# Patient Record
Sex: FEMALE | Race: WHITE | Employment: UNEMPLOYED | URBAN - METROPOLITAN AREA
[De-identification: names, ages, dates, MRNs, and addresses within clinical notes are randomized per-mention and may not be internally consistent; named-entity substitution may affect disease eponyms.]

---

## 2017-04-17 ENCOUNTER — HOSPITAL ENCOUNTER (EMERGENCY)
Age: 42
Discharge: HOME | End: 2017-04-17
Attending: EMERGENCY MEDICINE
Payer: MEDICAID

## 2017-04-17 VITALS
WEIGHT: 135 LBS | HEART RATE: 84 BPM | BODY MASS INDEX: 26.5 KG/M2 | TEMPERATURE: 98.1 F | DIASTOLIC BLOOD PRESSURE: 73 MMHG | SYSTOLIC BLOOD PRESSURE: 115 MMHG | HEIGHT: 60 IN | RESPIRATION RATE: 16 BRPM | OXYGEN SATURATION: 99 %

## 2017-04-17 DIAGNOSIS — L23.0 ALLERGIC CONTACT DERMATITIS DUE TO METALS: Primary | ICD-10-CM

## 2017-04-17 PROCEDURE — 99282 EMERGENCY DEPT VISIT SF MDM: CPT

## 2017-04-17 PROCEDURE — 6370000000 HC RX 637 (ALT 250 FOR IP): Performed by: PHYSICIAN ASSISTANT

## 2017-04-17 RX ORDER — PREDNISONE 20 MG/1
60 TABLET ORAL ONCE
Status: COMPLETED | OUTPATIENT
Start: 2017-04-17 | End: 2017-04-17

## 2017-04-17 RX ORDER — PREDNISONE 50 MG/1
50 TABLET ORAL DAILY
Qty: 4 TABLET | Refills: 0 | Status: ON HOLD | OUTPATIENT
Start: 2017-04-17 | End: 2017-04-29 | Stop reason: HOSPADM

## 2017-04-17 RX ORDER — DIPHENHYDRAMINE HCL 25 MG
25 CAPSULE ORAL EVERY 4 HOURS PRN
Qty: 15 CAPSULE | Refills: 0 | Status: ON HOLD | OUTPATIENT
Start: 2017-04-17 | End: 2017-04-29 | Stop reason: HOSPADM

## 2017-04-17 RX ADMIN — PREDNISONE 60 MG: 20 TABLET ORAL at 12:18

## 2017-04-25 ENCOUNTER — HOSPITAL ENCOUNTER (INPATIENT)
Age: 42
LOS: 4 days | Discharge: HOME | DRG: 720 | End: 2017-04-29
Attending: EMERGENCY MEDICINE | Admitting: INTERNAL MEDICINE
Payer: MEDICAID

## 2017-04-25 ENCOUNTER — APPOINTMENT (OUTPATIENT)
Dept: GENERAL RADIOLOGY | Age: 42
DRG: 720 | End: 2017-04-25
Payer: MEDICAID

## 2017-04-25 DIAGNOSIS — J18.9 PNEUMONIA DUE TO ORGANISM: Primary | ICD-10-CM

## 2017-04-25 DIAGNOSIS — R09.02 HYPOXEMIA: ICD-10-CM

## 2017-04-25 DIAGNOSIS — R50.9 FEVER, UNSPECIFIED FEVER CAUSE: ICD-10-CM

## 2017-04-25 PROBLEM — F17.200 SMOKER UNMOTIVATED TO QUIT: Status: ACTIVE | Noted: 2017-04-25

## 2017-04-25 PROBLEM — Z98.890 HISTORY OF GASTRIC SURGERY: Status: ACTIVE | Noted: 2017-04-25

## 2017-04-25 PROBLEM — J45.909 ASTHMA: Status: ACTIVE | Noted: 2017-04-25

## 2017-04-25 LAB
-: NORMAL
ABSOLUTE EOS #: 0.1 K/UL (ref 0–0.4)
ABSOLUTE LYMPH #: 1.3 K/UL (ref 1–4.8)
ABSOLUTE MONO #: 1.5 K/UL (ref 0.1–1.3)
ALBUMIN SERPL-MCNC: 3.4 G/DL (ref 3.5–5.2)
ALBUMIN/GLOBULIN RATIO: ABNORMAL (ref 1–2.5)
ALP BLD-CCNC: 69 U/L (ref 35–104)
ALT SERPL-CCNC: 19 U/L (ref 5–33)
AMORPHOUS: NORMAL
AMPHETAMINE SCREEN URINE: NEGATIVE
ANION GAP SERPL CALCULATED.3IONS-SCNC: 14 MMOL/L (ref 9–17)
AST SERPL-CCNC: 29 U/L
BACTERIA: NORMAL
BARBITURATE SCREEN URINE: NEGATIVE
BASOPHILS # BLD: 0 %
BASOPHILS ABSOLUTE: 0 K/UL (ref 0–0.2)
BENZODIAZEPINE SCREEN, URINE: NEGATIVE
BILIRUB SERPL-MCNC: 0.83 MG/DL (ref 0.3–1.2)
BILIRUBIN DIRECT: 0.27 MG/DL
BILIRUBIN URINE: NEGATIVE
BILIRUBIN, INDIRECT: 0.56 MG/DL (ref 0–1)
BUN BLDV-MCNC: 4 MG/DL (ref 6–20)
BUN/CREAT BLD: ABNORMAL (ref 9–20)
BUPRENORPHINE URINE: ABNORMAL
CALCIUM SERPL-MCNC: 8.5 MG/DL (ref 8.6–10.4)
CANNABINOID SCREEN URINE: NEGATIVE
CASTS UA: NORMAL /LPF
CHLORIDE BLD-SCNC: 97 MMOL/L (ref 98–107)
CO2: 25 MMOL/L (ref 20–31)
COCAINE METABOLITE, URINE: NEGATIVE
COLOR: YELLOW
COMMENT UA: ABNORMAL
CREAT SERPL-MCNC: 0.67 MG/DL (ref 0.5–0.9)
CRYSTALS, UA: NORMAL /HPF
DIFFERENTIAL TYPE: ABNORMAL
EOSINOPHILS RELATIVE PERCENT: 1 %
EPITHELIAL CELLS UA: NORMAL /HPF
GFR AFRICAN AMERICAN: >60 ML/MIN
GFR NON-AFRICAN AMERICAN: >60 ML/MIN
GFR SERPL CREATININE-BSD FRML MDRD: ABNORMAL ML/MIN/{1.73_M2}
GFR SERPL CREATININE-BSD FRML MDRD: ABNORMAL ML/MIN/{1.73_M2}
GLOBULIN: ABNORMAL G/DL (ref 1.5–3.8)
GLUCOSE BLD-MCNC: 102 MG/DL (ref 70–99)
GLUCOSE URINE: NEGATIVE
HCG QUALITATIVE: NEGATIVE
HCT VFR BLD CALC: 39.8 % (ref 36–46)
HEMOGLOBIN: 13.3 G/DL (ref 12–16)
KETONES, URINE: NEGATIVE
LACTIC ACID: 1.7 MMOL/L (ref 0.5–2.2)
LEUKOCYTE ESTERASE, URINE: NEGATIVE
LYMPHOCYTES # BLD: 12 %
MCH RBC QN AUTO: 27.8 PG (ref 26–34)
MCHC RBC AUTO-ENTMCNC: 33.4 G/DL (ref 31–37)
MCV RBC AUTO: 83.1 FL (ref 80–100)
MDMA URINE: ABNORMAL
METHADONE SCREEN, URINE: NEGATIVE
METHAMPHETAMINE, URINE: ABNORMAL
MONOCYTES # BLD: 14 %
MUCUS: NORMAL
NITRITE, URINE: NEGATIVE
OPIATES, URINE: POSITIVE
OTHER OBSERVATIONS UA: NORMAL
OXYCODONE SCREEN URINE: NEGATIVE
PDW BLD-RTO: 16.9 % (ref 11.5–14.9)
PH UA: 8 (ref 5–8)
PHENCYCLIDINE, URINE: NEGATIVE
PLATELET # BLD: 265 K/UL (ref 150–450)
PLATELET ESTIMATE: ABNORMAL
PMV BLD AUTO: 7.4 FL (ref 6–12)
POTASSIUM SERPL-SCNC: 3.1 MMOL/L (ref 3.7–5.3)
PROPOXYPHENE, URINE: ABNORMAL
PROTEIN UA: NEGATIVE
RBC # BLD: 4.78 M/UL (ref 4–5.2)
RBC # BLD: ABNORMAL 10*6/UL
RBC UA: NORMAL /HPF
RENAL EPITHELIAL, UA: NORMAL /HPF
SEG NEUTROPHILS: 73 %
SEGMENTED NEUTROPHILS ABSOLUTE COUNT: 8.1 K/UL (ref 1.3–9.1)
SODIUM BLD-SCNC: 136 MMOL/L (ref 135–144)
SPECIFIC GRAVITY UA: 1.01 (ref 1–1.03)
TEST INFORMATION: ABNORMAL
TOTAL PROTEIN: 7.4 G/DL (ref 6.4–8.3)
TRICHOMONAS: NORMAL
TRICYCLIC ANTIDEPRESSANTS, UR: ABNORMAL
TROPONIN INTERP: NORMAL
TROPONIN T: <0.03 NG/ML
TURBIDITY: CLEAR
URINE HGB: NEGATIVE
UROBILINOGEN, URINE: ABNORMAL
WBC # BLD: 11.1 K/UL (ref 3.5–11)
WBC # BLD: ABNORMAL 10*3/UL
WBC UA: NORMAL /HPF
YEAST: NORMAL

## 2017-04-25 PROCEDURE — 80307 DRUG TEST PRSMV CHEM ANLYZR: CPT

## 2017-04-25 PROCEDURE — 2580000003 HC RX 258: Performed by: EMERGENCY MEDICINE

## 2017-04-25 PROCEDURE — 6360000002 HC RX W HCPCS: Performed by: EMERGENCY MEDICINE

## 2017-04-25 PROCEDURE — 94761 N-INVAS EAR/PLS OXIMETRY MLT: CPT

## 2017-04-25 PROCEDURE — 94640 AIRWAY INHALATION TREATMENT: CPT

## 2017-04-25 PROCEDURE — 80048 BASIC METABOLIC PNL TOTAL CA: CPT

## 2017-04-25 PROCEDURE — 84703 CHORIONIC GONADOTROPIN ASSAY: CPT

## 2017-04-25 PROCEDURE — 6370000000 HC RX 637 (ALT 250 FOR IP): Performed by: HOSPITALIST

## 2017-04-25 PROCEDURE — 93005 ELECTROCARDIOGRAM TRACING: CPT

## 2017-04-25 PROCEDURE — 87040 BLOOD CULTURE FOR BACTERIA: CPT

## 2017-04-25 PROCEDURE — 6360000002 HC RX W HCPCS: Performed by: HOSPITALIST

## 2017-04-25 PROCEDURE — 71010 XR CHEST PORTABLE: CPT

## 2017-04-25 PROCEDURE — 99223 1ST HOSP IP/OBS HIGH 75: CPT | Performed by: INTERNAL MEDICINE

## 2017-04-25 PROCEDURE — 84484 ASSAY OF TROPONIN QUANT: CPT

## 2017-04-25 PROCEDURE — 94664 DEMO&/EVAL PT USE INHALER: CPT

## 2017-04-25 PROCEDURE — 2060000000 HC ICU INTERMEDIATE R&B

## 2017-04-25 PROCEDURE — 36415 COLL VENOUS BLD VENIPUNCTURE: CPT

## 2017-04-25 PROCEDURE — 85025 COMPLETE CBC W/AUTO DIFF WBC: CPT

## 2017-04-25 PROCEDURE — 2580000003 HC RX 258: Performed by: HOSPITALIST

## 2017-04-25 PROCEDURE — 83605 ASSAY OF LACTIC ACID: CPT

## 2017-04-25 PROCEDURE — 80076 HEPATIC FUNCTION PANEL: CPT

## 2017-04-25 PROCEDURE — 6370000000 HC RX 637 (ALT 250 FOR IP): Performed by: EMERGENCY MEDICINE

## 2017-04-25 PROCEDURE — 81001 URINALYSIS AUTO W/SCOPE: CPT

## 2017-04-25 PROCEDURE — 87449 NOS EACH ORGANISM AG IA: CPT

## 2017-04-25 PROCEDURE — 99285 EMERGENCY DEPT VISIT HI MDM: CPT

## 2017-04-25 RX ORDER — SODIUM CHLORIDE 0.9 % (FLUSH) 0.9 %
10 SYRINGE (ML) INJECTION PRN
Status: DISCONTINUED | OUTPATIENT
Start: 2017-04-25 | End: 2017-04-29 | Stop reason: HOSPADM

## 2017-04-25 RX ORDER — LORAZEPAM 2 MG/ML
3 INJECTION INTRAMUSCULAR
Status: DISCONTINUED | OUTPATIENT
Start: 2017-04-25 | End: 2017-04-26

## 2017-04-25 RX ORDER — LORAZEPAM 1 MG/1
3 TABLET ORAL
Status: DISCONTINUED | OUTPATIENT
Start: 2017-04-25 | End: 2017-04-26

## 2017-04-25 RX ORDER — GABAPENTIN 300 MG/1
600 CAPSULE ORAL 3 TIMES DAILY
Status: DISCONTINUED | OUTPATIENT
Start: 2017-04-25 | End: 2017-04-26

## 2017-04-25 RX ORDER — IPRATROPIUM BROMIDE AND ALBUTEROL SULFATE 2.5; .5 MG/3ML; MG/3ML
1 SOLUTION RESPIRATORY (INHALATION)
Status: DISCONTINUED | OUTPATIENT
Start: 2017-04-25 | End: 2017-04-25

## 2017-04-25 RX ORDER — ACETAMINOPHEN 325 MG/1
650 TABLET ORAL EVERY 4 HOURS PRN
Status: DISCONTINUED | OUTPATIENT
Start: 2017-04-25 | End: 2017-04-25 | Stop reason: SDUPTHER

## 2017-04-25 RX ORDER — QUETIAPINE FUMARATE 200 MG/1
200 TABLET, FILM COATED ORAL NIGHTLY
Status: ON HOLD | COMMUNITY
End: 2017-05-14 | Stop reason: HOSPADM

## 2017-04-25 RX ORDER — IPRATROPIUM BROMIDE AND ALBUTEROL SULFATE 2.5; .5 MG/3ML; MG/3ML
1 SOLUTION RESPIRATORY (INHALATION) EVERY 4 HOURS PRN
Status: DISCONTINUED | OUTPATIENT
Start: 2017-04-25 | End: 2017-04-29 | Stop reason: HOSPADM

## 2017-04-25 RX ORDER — IPRATROPIUM BROMIDE AND ALBUTEROL SULFATE 2.5; .5 MG/3ML; MG/3ML
1 SOLUTION RESPIRATORY (INHALATION)
Status: DISCONTINUED | OUTPATIENT
Start: 2017-04-25 | End: 2017-04-26

## 2017-04-25 RX ORDER — LEVOFLOXACIN 5 MG/ML
750 INJECTION, SOLUTION INTRAVENOUS EVERY 24 HOURS
Status: DISCONTINUED | OUTPATIENT
Start: 2017-04-25 | End: 2017-04-25 | Stop reason: SDUPTHER

## 2017-04-25 RX ORDER — SODIUM CHLORIDE 0.9 % (FLUSH) 0.9 %
10 SYRINGE (ML) INJECTION EVERY 12 HOURS SCHEDULED
Status: DISCONTINUED | OUTPATIENT
Start: 2017-04-25 | End: 2017-04-29 | Stop reason: HOSPADM

## 2017-04-25 RX ORDER — DOCUSATE SODIUM 100 MG/1
100 CAPSULE, LIQUID FILLED ORAL 2 TIMES DAILY
Status: DISCONTINUED | OUTPATIENT
Start: 2017-04-25 | End: 2017-04-29 | Stop reason: HOSPADM

## 2017-04-25 RX ORDER — ONDANSETRON 2 MG/ML
4 INJECTION INTRAMUSCULAR; INTRAVENOUS EVERY 6 HOURS PRN
Status: DISCONTINUED | OUTPATIENT
Start: 2017-04-25 | End: 2017-04-29 | Stop reason: HOSPADM

## 2017-04-25 RX ORDER — ACETAMINOPHEN 325 MG/1
650 TABLET ORAL ONCE
Status: COMPLETED | OUTPATIENT
Start: 2017-04-25 | End: 2017-04-25

## 2017-04-25 RX ORDER — LORAZEPAM 2 MG/ML
1 INJECTION INTRAMUSCULAR
Status: DISCONTINUED | OUTPATIENT
Start: 2017-04-25 | End: 2017-04-26

## 2017-04-25 RX ORDER — SODIUM CHLORIDE 9 MG/ML
INJECTION, SOLUTION INTRAVENOUS CONTINUOUS
Status: CANCELLED | OUTPATIENT
Start: 2017-04-25

## 2017-04-25 RX ORDER — 0.9 % SODIUM CHLORIDE 0.9 %
30 INTRAVENOUS SOLUTION INTRAVENOUS ONCE
Status: COMPLETED | OUTPATIENT
Start: 2017-04-25 | End: 2017-04-25

## 2017-04-25 RX ORDER — LORAZEPAM 2 MG/ML
2 INJECTION INTRAMUSCULAR
Status: DISCONTINUED | OUTPATIENT
Start: 2017-04-25 | End: 2017-04-26

## 2017-04-25 RX ORDER — SODIUM CHLORIDE 9 MG/ML
INJECTION, SOLUTION INTRAVENOUS CONTINUOUS
Status: DISCONTINUED | OUTPATIENT
Start: 2017-04-25 | End: 2017-04-29 | Stop reason: HOSPADM

## 2017-04-25 RX ORDER — LORAZEPAM 1 MG/1
2 TABLET ORAL
Status: DISCONTINUED | OUTPATIENT
Start: 2017-04-25 | End: 2017-04-26

## 2017-04-25 RX ORDER — FLUOXETINE HYDROCHLORIDE 20 MG/1
60 CAPSULE ORAL DAILY
Status: DISCONTINUED | OUTPATIENT
Start: 2017-04-25 | End: 2017-04-29 | Stop reason: HOSPADM

## 2017-04-25 RX ORDER — POTASSIUM CHLORIDE 20 MEQ/1
40 TABLET, EXTENDED RELEASE ORAL ONCE
Status: COMPLETED | OUTPATIENT
Start: 2017-04-25 | End: 2017-04-25

## 2017-04-25 RX ORDER — ARIPIPRAZOLE 5 MG/1
5 TABLET ORAL ONCE
Status: DISCONTINUED | OUTPATIENT
Start: 2017-04-25 | End: 2017-04-29 | Stop reason: HOSPADM

## 2017-04-25 RX ORDER — ALBUTEROL SULFATE 2.5 MG/3ML
2.5 SOLUTION RESPIRATORY (INHALATION)
Status: DISCONTINUED | OUTPATIENT
Start: 2017-04-25 | End: 2017-04-26

## 2017-04-25 RX ORDER — 0.9 % SODIUM CHLORIDE 0.9 %
500 INTRAVENOUS SOLUTION INTRAVENOUS ONCE
Status: DISCONTINUED | OUTPATIENT
Start: 2017-04-25 | End: 2017-04-28

## 2017-04-25 RX ORDER — LORAZEPAM 2 MG/ML
4 INJECTION INTRAMUSCULAR
Status: DISCONTINUED | OUTPATIENT
Start: 2017-04-25 | End: 2017-04-26

## 2017-04-25 RX ORDER — TRAZODONE HYDROCHLORIDE 50 MG/1
100 TABLET ORAL NIGHTLY
Status: DISCONTINUED | OUTPATIENT
Start: 2017-04-25 | End: 2017-04-26

## 2017-04-25 RX ORDER — BUPROPION HYDROCHLORIDE 150 MG/1
150 TABLET, EXTENDED RELEASE ORAL 2 TIMES DAILY
Status: ON HOLD | COMMUNITY
End: 2017-05-14 | Stop reason: HOSPADM

## 2017-04-25 RX ORDER — PANTOPRAZOLE SODIUM 20 MG/1
20 TABLET, DELAYED RELEASE ORAL
Status: DISCONTINUED | OUTPATIENT
Start: 2017-04-26 | End: 2017-04-26

## 2017-04-25 RX ORDER — BISACODYL 10 MG
10 SUPPOSITORY, RECTAL RECTAL DAILY PRN
Status: DISCONTINUED | OUTPATIENT
Start: 2017-04-25 | End: 2017-04-29 | Stop reason: HOSPADM

## 2017-04-25 RX ORDER — VENLAFAXINE HYDROCHLORIDE 150 MG/1
225 CAPSULE, EXTENDED RELEASE ORAL DAILY
Status: ON HOLD | COMMUNITY
End: 2017-05-14 | Stop reason: HOSPADM

## 2017-04-25 RX ORDER — LORAZEPAM 1 MG/1
1 TABLET ORAL
Status: DISCONTINUED | OUTPATIENT
Start: 2017-04-25 | End: 2017-04-26

## 2017-04-25 RX ORDER — FOLIC ACID 1 MG/1
1 TABLET ORAL DAILY
Status: DISCONTINUED | OUTPATIENT
Start: 2017-04-25 | End: 2017-04-29 | Stop reason: HOSPADM

## 2017-04-25 RX ORDER — LEVOFLOXACIN 5 MG/ML
750 INJECTION, SOLUTION INTRAVENOUS EVERY 24 HOURS
Status: DISCONTINUED | OUTPATIENT
Start: 2017-04-25 | End: 2017-04-29 | Stop reason: HOSPADM

## 2017-04-25 RX ORDER — ACETAMINOPHEN 325 MG/1
650 TABLET ORAL EVERY 4 HOURS PRN
Status: DISCONTINUED | OUTPATIENT
Start: 2017-04-25 | End: 2017-04-29 | Stop reason: HOSPADM

## 2017-04-25 RX ORDER — LORAZEPAM 1 MG/1
4 TABLET ORAL
Status: DISCONTINUED | OUTPATIENT
Start: 2017-04-25 | End: 2017-04-26

## 2017-04-25 RX ADMIN — LORAZEPAM 3 MG: 2 INJECTION INTRAMUSCULAR; INTRAVENOUS at 23:41

## 2017-04-25 RX ADMIN — POTASSIUM CHLORIDE 40 MEQ: 20 TABLET, EXTENDED RELEASE ORAL at 12:21

## 2017-04-25 RX ADMIN — TRAZODONE HYDROCHLORIDE 100 MG: 50 TABLET ORAL at 21:48

## 2017-04-25 RX ADMIN — LORAZEPAM 3 MG: 1 TABLET ORAL at 16:13

## 2017-04-25 RX ADMIN — IPRATROPIUM BROMIDE AND ALBUTEROL SULFATE 1 AMPULE: .5; 3 SOLUTION RESPIRATORY (INHALATION) at 14:43

## 2017-04-25 RX ADMIN — LORAZEPAM 3 MG: 2 INJECTION INTRAMUSCULAR; INTRAVENOUS at 18:39

## 2017-04-25 RX ADMIN — IPRATROPIUM BROMIDE AND ALBUTEROL SULFATE 1 AMPULE: .5; 3 SOLUTION RESPIRATORY (INHALATION) at 11:38

## 2017-04-25 RX ADMIN — ACETAMINOPHEN 650 MG: 325 TABLET ORAL at 12:21

## 2017-04-25 RX ADMIN — IPRATROPIUM BROMIDE AND ALBUTEROL SULFATE 1 AMPULE: .5; 3 SOLUTION RESPIRATORY (INHALATION) at 17:20

## 2017-04-25 RX ADMIN — IPRATROPIUM BROMIDE AND ALBUTEROL SULFATE 1 AMPULE: .5; 3 SOLUTION RESPIRATORY (INHALATION) at 19:57

## 2017-04-25 RX ADMIN — LORAZEPAM 4 MG: 2 INJECTION INTRAMUSCULAR; INTRAVENOUS at 21:36

## 2017-04-25 RX ADMIN — IPRATROPIUM BROMIDE AND ALBUTEROL SULFATE 1 AMPULE: .5; 3 SOLUTION RESPIRATORY (INHALATION) at 13:01

## 2017-04-25 RX ADMIN — GABAPENTIN 600 MG: 300 CAPSULE ORAL at 21:47

## 2017-04-25 RX ADMIN — ACETAMINOPHEN 650 MG: 325 TABLET ORAL at 23:41

## 2017-04-25 RX ADMIN — LEVOFLOXACIN 750 MG: 5 INJECTION, SOLUTION INTRAVENOUS at 12:41

## 2017-04-25 RX ADMIN — SODIUM CHLORIDE: 9 INJECTION, SOLUTION INTRAVENOUS at 14:11

## 2017-04-25 RX ADMIN — SODIUM CHLORIDE 1701 ML: 9 INJECTION, SOLUTION INTRAVENOUS at 11:43

## 2017-04-25 RX ADMIN — LORAZEPAM 4 MG: 2 INJECTION INTRAMUSCULAR; INTRAVENOUS at 20:10

## 2017-04-25 RX ADMIN — SODIUM CHLORIDE: 9 INJECTION, SOLUTION INTRAVENOUS at 23:50

## 2017-04-25 RX ADMIN — CEFTRIAXONE SODIUM 1 G: 1 INJECTION, POWDER, FOR SOLUTION INTRAMUSCULAR; INTRAVENOUS at 12:21

## 2017-04-25 ASSESSMENT — ENCOUNTER SYMPTOMS
RHINORRHEA: 0
SHORTNESS OF BREATH: 1
EYE REDNESS: 0
CONSTIPATION: 1
ABDOMINAL PAIN: 0
COUGH: 1
BACK PAIN: 0
BLOOD IN STOOL: 0
PHOTOPHOBIA: 0
WHEEZING: 1
CHEST TIGHTNESS: 0
WHEEZING: 0
COLOR CHANGE: 0
SORE THROAT: 0
VOMITING: 0
NAUSEA: 0

## 2017-04-25 ASSESSMENT — PAIN SCALES - GENERAL
PAINLEVEL_OUTOF10: 0
PAINLEVEL_OUTOF10: 6

## 2017-04-26 ENCOUNTER — APPOINTMENT (OUTPATIENT)
Dept: GENERAL RADIOLOGY | Age: 42
DRG: 720 | End: 2017-04-26
Payer: MEDICAID

## 2017-04-26 ENCOUNTER — APPOINTMENT (OUTPATIENT)
Dept: INTERVENTIONAL RADIOLOGY/VASCULAR | Age: 42
DRG: 720 | End: 2017-04-26
Payer: MEDICAID

## 2017-04-26 LAB
ALLEN TEST: ABNORMAL
ALLEN TEST: ABNORMAL
ANION GAP SERPL CALCULATED.3IONS-SCNC: 12 MMOL/L (ref 9–17)
BUN BLDV-MCNC: 4 MG/DL (ref 6–20)
BUN/CREAT BLD: ABNORMAL (ref 9–20)
CALCIUM SERPL-MCNC: 7.9 MG/DL (ref 8.6–10.4)
CARBOXYHEMOGLOBIN: 2.2 % (ref 0–5)
CARBOXYHEMOGLOBIN: 2.6 % (ref 0–5)
CHLORIDE BLD-SCNC: 109 MMOL/L (ref 98–107)
CO2: 21 MMOL/L (ref 20–31)
CREAT SERPL-MCNC: 0.56 MG/DL (ref 0.5–0.9)
DIRECT EXAM: NORMAL
FIO2: ABNORMAL
FIO2: ABNORMAL
GFR AFRICAN AMERICAN: >60 ML/MIN
GFR NON-AFRICAN AMERICAN: >60 ML/MIN
GFR SERPL CREATININE-BSD FRML MDRD: ABNORMAL ML/MIN/{1.73_M2}
GFR SERPL CREATININE-BSD FRML MDRD: ABNORMAL ML/MIN/{1.73_M2}
GLUCOSE BLD-MCNC: 88 MG/DL (ref 70–99)
HCO3 ARTERIAL: 21.3 MMOL/L (ref 22–26)
HCO3 ARTERIAL: 22 MMOL/L (ref 22–26)
HCT VFR BLD CALC: 35.8 % (ref 36–46)
HEMOGLOBIN: 11.6 G/DL (ref 12–16)
LACTIC ACID, WHOLE BLOOD: NORMAL MMOL/L (ref 0.7–2.1)
LACTIC ACID: 0.8 MMOL/L (ref 0.5–2.2)
LACTIC ACID: 0.8 MMOL/L (ref 0.5–2.2)
Lab: NORMAL
MCH RBC QN AUTO: 26.9 PG (ref 26–34)
MCHC RBC AUTO-ENTMCNC: 32.4 G/DL (ref 31–37)
MCV RBC AUTO: 83 FL (ref 80–100)
METHEMOGLOBIN: 0.2 % (ref 0–1.9)
METHEMOGLOBIN: 0.3 % (ref 0–1.9)
MODE: ABNORMAL
MODE: ABNORMAL
NEGATIVE BASE EXCESS, ART: 1.7 MMOL/L (ref 0–2)
NEGATIVE BASE EXCESS, ART: 2.8 MMOL/L (ref 0–2)
NOTIFICATION TIME: ABNORMAL
NOTIFICATION TIME: ABNORMAL
NOTIFICATION: ABNORMAL
NOTIFICATION: ABNORMAL
O2 DEVICE/FLOW/%: ABNORMAL
O2 DEVICE/FLOW/%: ABNORMAL
O2 SAT, ARTERIAL: 88.5 % (ref 95–98)
O2 SAT, ARTERIAL: 93.7 % (ref 95–98)
OXYHEMOGLOBIN: ABNORMAL % (ref 95–98)
OXYHEMOGLOBIN: ABNORMAL % (ref 95–98)
PATIENT TEMP: 37
PATIENT TEMP: 37
PCO2 ARTERIAL: 30.5 MMHG (ref 35–45)
PCO2 ARTERIAL: 31.4 MMHG (ref 35–45)
PCO2, ART, TEMP ADJ: ABNORMAL (ref 35–45)
PCO2, ART, TEMP ADJ: ABNORMAL (ref 35–45)
PDW BLD-RTO: 16.6 % (ref 11.5–14.9)
PEEP/CPAP: ABNORMAL
PEEP/CPAP: ABNORMAL
PH ARTERIAL: 7.44 (ref 7.35–7.45)
PH ARTERIAL: 7.47 (ref 7.35–7.45)
PH, ART, TEMP ADJ: ABNORMAL (ref 7.35–7.45)
PH, ART, TEMP ADJ: ABNORMAL (ref 7.35–7.45)
PLATELET # BLD: 273 K/UL (ref 150–450)
PMV BLD AUTO: 7.4 FL (ref 6–12)
PO2 ARTERIAL: 54.3 MMHG (ref 80–100)
PO2 ARTERIAL: 70.7 MMHG (ref 80–100)
PO2, ART, TEMP ADJ: ABNORMAL MMHG (ref 80–100)
PO2, ART, TEMP ADJ: ABNORMAL MMHG (ref 80–100)
POSITIVE BASE EXCESS, ART: ABNORMAL MMOL/L (ref 0–2)
POSITIVE BASE EXCESS, ART: ABNORMAL MMOL/L (ref 0–2)
POTASSIUM SERPL-SCNC: 3.8 MMOL/L (ref 3.7–5.3)
PROCALCITONIN: 0.14 NG/ML
PSV: ABNORMAL
PSV: ABNORMAL
PT. POSITION: ABNORMAL
PT. POSITION: ABNORMAL
RBC # BLD: 4.31 M/UL (ref 4–5.2)
RESPIRATORY RATE: 38
RESPIRATORY RATE: 50
SAMPLE SITE: ABNORMAL
SAMPLE SITE: ABNORMAL
SET RATE: ABNORMAL
SET RATE: ABNORMAL
SODIUM BLD-SCNC: 142 MMOL/L (ref 135–144)
SPECIMEN DESCRIPTION: NORMAL
SPECIMEN DESCRIPTION: NORMAL
STATUS: NORMAL
TEXT FOR RESPIRATORY: ABNORMAL
TEXT FOR RESPIRATORY: ABNORMAL
TOTAL HB: ABNORMAL G/DL (ref 12–16)
TOTAL HB: ABNORMAL G/DL (ref 12–16)
TOTAL RATE: ABNORMAL
TOTAL RATE: ABNORMAL
VT: ABNORMAL
VT: ABNORMAL
WBC # BLD: 11.4 K/UL (ref 3.5–11)

## 2017-04-26 PROCEDURE — 80048 BASIC METABOLIC PNL TOTAL CA: CPT

## 2017-04-26 PROCEDURE — 36569 INSJ PICC 5 YR+ W/O IMAGING: CPT | Performed by: RADIOLOGY

## 2017-04-26 PROCEDURE — C1769 GUIDE WIRE: HCPCS

## 2017-04-26 PROCEDURE — 82805 BLOOD GASES W/O2 SATURATION: CPT

## 2017-04-26 PROCEDURE — 83605 ASSAY OF LACTIC ACID: CPT

## 2017-04-26 PROCEDURE — 71010 XR CHEST PORTABLE: CPT

## 2017-04-26 PROCEDURE — 6370000000 HC RX 637 (ALT 250 FOR IP): Performed by: INTERNAL MEDICINE

## 2017-04-26 PROCEDURE — 6360000002 HC RX W HCPCS: Performed by: HOSPITALIST

## 2017-04-26 PROCEDURE — 2000000000 HC ICU R&B

## 2017-04-26 PROCEDURE — 77001 FLUOROGUIDE FOR VEIN DEVICE: CPT | Performed by: RADIOLOGY

## 2017-04-26 PROCEDURE — 85027 COMPLETE CBC AUTOMATED: CPT

## 2017-04-26 PROCEDURE — 99291 CRITICAL CARE FIRST HOUR: CPT | Performed by: INTERNAL MEDICINE

## 2017-04-26 PROCEDURE — 94640 AIRWAY INHALATION TREATMENT: CPT

## 2017-04-26 PROCEDURE — 6360000002 HC RX W HCPCS: Performed by: INTERNAL MEDICINE

## 2017-04-26 PROCEDURE — 94762 N-INVAS EAR/PLS OXIMTRY CONT: CPT

## 2017-04-26 PROCEDURE — 6370000000 HC RX 637 (ALT 250 FOR IP): Performed by: HOSPITALIST

## 2017-04-26 PROCEDURE — 36415 COLL VENOUS BLD VENIPUNCTURE: CPT

## 2017-04-26 PROCEDURE — 36600 WITHDRAWAL OF ARTERIAL BLOOD: CPT

## 2017-04-26 PROCEDURE — 2500000003 HC RX 250 WO HCPCS: Performed by: INTERNAL MEDICINE

## 2017-04-26 PROCEDURE — 76937 US GUIDE VASCULAR ACCESS: CPT | Performed by: RADIOLOGY

## 2017-04-26 PROCEDURE — 02HV33Z INSERTION OF INFUSION DEVICE INTO SUPERIOR VENA CAVA, PERCUTANEOUS APPROACH: ICD-10-PCS | Performed by: RADIOLOGY

## 2017-04-26 PROCEDURE — 51702 INSERT TEMP BLADDER CATH: CPT

## 2017-04-26 PROCEDURE — 2580000003 HC RX 258: Performed by: NURSE PRACTITIONER

## 2017-04-26 PROCEDURE — 2580000003 HC RX 258: Performed by: HOSPITALIST

## 2017-04-26 PROCEDURE — 2580000003 HC RX 258: Performed by: INTERNAL MEDICINE

## 2017-04-26 PROCEDURE — C9113 INJ PANTOPRAZOLE SODIUM, VIA: HCPCS | Performed by: INTERNAL MEDICINE

## 2017-04-26 PROCEDURE — 87040 BLOOD CULTURE FOR BACTERIA: CPT

## 2017-04-26 PROCEDURE — 84145 PROCALCITONIN (PCT): CPT

## 2017-04-26 PROCEDURE — 6360000002 HC RX W HCPCS: Performed by: EMERGENCY MEDICINE

## 2017-04-26 RX ORDER — PANTOPRAZOLE SODIUM 40 MG/10ML
40 INJECTION, POWDER, LYOPHILIZED, FOR SOLUTION INTRAVENOUS DAILY
Status: DISCONTINUED | OUTPATIENT
Start: 2017-04-26 | End: 2017-04-28

## 2017-04-26 RX ORDER — IPRATROPIUM BROMIDE AND ALBUTEROL SULFATE 2.5; .5 MG/3ML; MG/3ML
1 SOLUTION RESPIRATORY (INHALATION) EVERY 4 HOURS
Status: DISCONTINUED | OUTPATIENT
Start: 2017-04-26 | End: 2017-04-28

## 2017-04-26 RX ORDER — 0.9 % SODIUM CHLORIDE 0.9 %
10 VIAL (ML) INJECTION DAILY
Status: DISCONTINUED | OUTPATIENT
Start: 2017-04-26 | End: 2017-04-28

## 2017-04-26 RX ORDER — CLONIDINE HYDROCHLORIDE 0.1 MG/1
0.1 TABLET ORAL 2 TIMES DAILY
Status: DISCONTINUED | OUTPATIENT
Start: 2017-04-26 | End: 2017-04-26

## 2017-04-26 RX ORDER — HALOPERIDOL 5 MG/ML
5 INJECTION INTRAMUSCULAR EVERY 4 HOURS PRN
Status: DISCONTINUED | OUTPATIENT
Start: 2017-04-26 | End: 2017-04-29 | Stop reason: HOSPADM

## 2017-04-26 RX ORDER — 0.9 % SODIUM CHLORIDE 0.9 %
1000 INTRAVENOUS SOLUTION INTRAVENOUS ONCE
Status: COMPLETED | OUTPATIENT
Start: 2017-04-26 | End: 2017-04-26

## 2017-04-26 RX ADMIN — SODIUM CHLORIDE 1000 ML: 9 INJECTION, SOLUTION INTRAVENOUS at 02:45

## 2017-04-26 RX ADMIN — LORAZEPAM 3 MG: 2 INJECTION INTRAMUSCULAR; INTRAVENOUS at 11:40

## 2017-04-26 RX ADMIN — IPRATROPIUM BROMIDE AND ALBUTEROL SULFATE 1 AMPULE: .5; 3 SOLUTION RESPIRATORY (INHALATION) at 15:29

## 2017-04-26 RX ADMIN — DEXMEDETOMIDINE HYDROCHLORIDE 1.4 MCG/KG/HR: 100 INJECTION, SOLUTION INTRAVENOUS at 17:08

## 2017-04-26 RX ADMIN — SODIUM CHLORIDE: 9 INJECTION, SOLUTION INTRAVENOUS at 04:51

## 2017-04-26 RX ADMIN — IPRATROPIUM BROMIDE AND ALBUTEROL SULFATE 1 AMPULE: .5; 3 SOLUTION RESPIRATORY (INHALATION) at 23:16

## 2017-04-26 RX ADMIN — Medication 10 ML: at 08:27

## 2017-04-26 RX ADMIN — Medication 10 ML: at 20:45

## 2017-04-26 RX ADMIN — HALOPERIDOL LACTATE 5 MG: 5 INJECTION, SOLUTION INTRAMUSCULAR at 20:45

## 2017-04-26 RX ADMIN — LORAZEPAM 4 MG: 2 INJECTION INTRAMUSCULAR; INTRAVENOUS at 04:55

## 2017-04-26 RX ADMIN — DEXMEDETOMIDINE HYDROCHLORIDE 0.2 MCG/KG/HR: 100 INJECTION, SOLUTION INTRAVENOUS at 10:08

## 2017-04-26 RX ADMIN — LORAZEPAM 2 MG: 2 INJECTION INTRAMUSCULAR; INTRAVENOUS at 08:27

## 2017-04-26 RX ADMIN — ALBUTEROL SULFATE 2.5 MG: 2.5 SOLUTION RESPIRATORY (INHALATION) at 11:18

## 2017-04-26 RX ADMIN — SODIUM CHLORIDE: 9 INJECTION, SOLUTION INTRAVENOUS at 15:26

## 2017-04-26 RX ADMIN — LEVOFLOXACIN 750 MG: 5 INJECTION, SOLUTION INTRAVENOUS at 12:24

## 2017-04-26 RX ADMIN — HALOPERIDOL LACTATE 5 MG: 5 INJECTION, SOLUTION INTRAMUSCULAR at 16:23

## 2017-04-26 RX ADMIN — ENOXAPARIN SODIUM 40 MG: 40 INJECTION SUBCUTANEOUS at 08:30

## 2017-04-26 RX ADMIN — DEXMEDETOMIDINE HYDROCHLORIDE 1.4 MCG/KG/HR: 100 INJECTION, SOLUTION INTRAVENOUS at 21:42

## 2017-04-26 RX ADMIN — IPRATROPIUM BROMIDE AND ALBUTEROL SULFATE 1 AMPULE: .5; 3 SOLUTION RESPIRATORY (INHALATION) at 19:20

## 2017-04-26 RX ADMIN — SODIUM CHLORIDE 10 ML: 9 INJECTION INTRAMUSCULAR; INTRAVENOUS; SUBCUTANEOUS at 13:29

## 2017-04-26 RX ADMIN — SODIUM CHLORIDE 3 G: 900 INJECTION INTRAVENOUS at 19:44

## 2017-04-26 RX ADMIN — LORAZEPAM 4 MG: 2 INJECTION INTRAMUSCULAR; INTRAVENOUS at 02:32

## 2017-04-26 RX ADMIN — IPRATROPIUM BROMIDE AND ALBUTEROL SULFATE 1 AMPULE: .5; 3 SOLUTION RESPIRATORY (INHALATION) at 07:26

## 2017-04-26 RX ADMIN — PANTOPRAZOLE SODIUM 40 MG: 40 INJECTION, POWDER, FOR SOLUTION INTRAVENOUS at 13:28

## 2017-04-26 ASSESSMENT — PAIN SCALES - GENERAL
PAINLEVEL_OUTOF10: 0

## 2017-04-27 ENCOUNTER — APPOINTMENT (OUTPATIENT)
Dept: GENERAL RADIOLOGY | Age: 42
DRG: 720 | End: 2017-04-27
Payer: MEDICAID

## 2017-04-27 LAB
ALLEN TEST: ABNORMAL
ANION GAP SERPL CALCULATED.3IONS-SCNC: 15 MMOL/L (ref 9–17)
BUN BLDV-MCNC: 6 MG/DL (ref 6–20)
BUN/CREAT BLD: ABNORMAL (ref 9–20)
CALCIUM SERPL-MCNC: 8.2 MG/DL (ref 8.6–10.4)
CARBOXYHEMOGLOBIN: 1.5 % (ref 0–5)
CHLORIDE BLD-SCNC: 105 MMOL/L (ref 98–107)
CO2: 18 MMOL/L (ref 20–31)
CREAT SERPL-MCNC: <0.4 MG/DL (ref 0.5–0.9)
FIO2: ABNORMAL
GFR AFRICAN AMERICAN: ABNORMAL ML/MIN
GFR NON-AFRICAN AMERICAN: ABNORMAL ML/MIN
GFR SERPL CREATININE-BSD FRML MDRD: ABNORMAL ML/MIN/{1.73_M2}
GFR SERPL CREATININE-BSD FRML MDRD: ABNORMAL ML/MIN/{1.73_M2}
GLUCOSE BLD-MCNC: 120 MG/DL (ref 65–105)
GLUCOSE BLD-MCNC: 134 MG/DL (ref 65–105)
GLUCOSE BLD-MCNC: 142 MG/DL (ref 70–99)
HCO3 ARTERIAL: 24.5 MMOL/L (ref 22–26)
HCT VFR BLD CALC: 39.3 % (ref 36–46)
HEMOGLOBIN: 12.6 G/DL (ref 12–16)
MAGNESIUM: 1.9 MG/DL (ref 1.6–2.6)
MCH RBC QN AUTO: 27.3 PG (ref 26–34)
MCHC RBC AUTO-ENTMCNC: 32 G/DL (ref 31–37)
MCV RBC AUTO: 85.2 FL (ref 80–100)
METHEMOGLOBIN: 0.3 % (ref 0–1.9)
MODE: ABNORMAL
NEGATIVE BASE EXCESS, ART: ABNORMAL MMOL/L (ref 0–2)
NOTIFICATION TIME: ABNORMAL
NOTIFICATION: ABNORMAL
O2 DEVICE/FLOW/%: ABNORMAL
O2 SAT, ARTERIAL: 95.1 % (ref 95–98)
OXYHEMOGLOBIN: ABNORMAL % (ref 95–98)
PATIENT TEMP: 37
PCO2 ARTERIAL: 34.9 MMHG (ref 35–45)
PCO2, ART, TEMP ADJ: ABNORMAL (ref 35–45)
PDW BLD-RTO: 17 % (ref 11.5–14.9)
PEEP/CPAP: ABNORMAL
PH ARTERIAL: 7.45 (ref 7.35–7.45)
PH, ART, TEMP ADJ: ABNORMAL (ref 7.35–7.45)
PHOSPHORUS: 2.8 MG/DL (ref 2.6–4.5)
PLATELET # BLD: 376 K/UL (ref 150–450)
PMV BLD AUTO: 7.3 FL (ref 6–12)
PO2 ARTERIAL: 76.4 MMHG (ref 80–100)
PO2, ART, TEMP ADJ: ABNORMAL MMHG (ref 80–100)
POSITIVE BASE EXCESS, ART: 0.5 MMOL/L (ref 0–2)
POTASSIUM SERPL-SCNC: 3.9 MMOL/L (ref 3.7–5.3)
PREALBUMIN: <4 MG/DL (ref 20–40)
PSV: ABNORMAL
PT. POSITION: ABNORMAL
RBC # BLD: 4.62 M/UL (ref 4–5.2)
RESPIRATORY RATE: 38
SAMPLE SITE: ABNORMAL
SET RATE: ABNORMAL
SODIUM BLD-SCNC: 138 MMOL/L (ref 135–144)
TEXT FOR RESPIRATORY: ABNORMAL
TOTAL HB: ABNORMAL G/DL (ref 12–16)
TOTAL RATE: ABNORMAL
TRIGL SERPL-MCNC: 158 MG/DL
VT: ABNORMAL
WBC # BLD: 11.4 K/UL (ref 3.5–11)

## 2017-04-27 PROCEDURE — 84134 ASSAY OF PREALBUMIN: CPT

## 2017-04-27 PROCEDURE — 2000000000 HC ICU R&B

## 2017-04-27 PROCEDURE — 6360000002 HC RX W HCPCS: Performed by: STUDENT IN AN ORGANIZED HEALTH CARE EDUCATION/TRAINING PROGRAM

## 2017-04-27 PROCEDURE — 6360000002 HC RX W HCPCS: Performed by: HOSPITALIST

## 2017-04-27 PROCEDURE — 36415 COLL VENOUS BLD VENIPUNCTURE: CPT

## 2017-04-27 PROCEDURE — 83735 ASSAY OF MAGNESIUM: CPT

## 2017-04-27 PROCEDURE — 99291 CRITICAL CARE FIRST HOUR: CPT | Performed by: INTERNAL MEDICINE

## 2017-04-27 PROCEDURE — 84478 ASSAY OF TRIGLYCERIDES: CPT

## 2017-04-27 PROCEDURE — 2580000003 HC RX 258: Performed by: HOSPITALIST

## 2017-04-27 PROCEDURE — 2500000003 HC RX 250 WO HCPCS: Performed by: STUDENT IN AN ORGANIZED HEALTH CARE EDUCATION/TRAINING PROGRAM

## 2017-04-27 PROCEDURE — 94640 AIRWAY INHALATION TREATMENT: CPT

## 2017-04-27 PROCEDURE — 87899 AGENT NOS ASSAY W/OPTIC: CPT

## 2017-04-27 PROCEDURE — C9113 INJ PANTOPRAZOLE SODIUM, VIA: HCPCS | Performed by: INTERNAL MEDICINE

## 2017-04-27 PROCEDURE — 82805 BLOOD GASES W/O2 SATURATION: CPT

## 2017-04-27 PROCEDURE — 71010 XR CHEST PORTABLE: CPT

## 2017-04-27 PROCEDURE — 2500000003 HC RX 250 WO HCPCS: Performed by: INTERNAL MEDICINE

## 2017-04-27 PROCEDURE — 85027 COMPLETE CBC AUTOMATED: CPT

## 2017-04-27 PROCEDURE — 84100 ASSAY OF PHOSPHORUS: CPT

## 2017-04-27 PROCEDURE — 6370000000 HC RX 637 (ALT 250 FOR IP): Performed by: INTERNAL MEDICINE

## 2017-04-27 PROCEDURE — 82947 ASSAY GLUCOSE BLOOD QUANT: CPT

## 2017-04-27 PROCEDURE — 94762 N-INVAS EAR/PLS OXIMTRY CONT: CPT

## 2017-04-27 PROCEDURE — 80048 BASIC METABOLIC PNL TOTAL CA: CPT

## 2017-04-27 PROCEDURE — 6360000002 HC RX W HCPCS: Performed by: INTERNAL MEDICINE

## 2017-04-27 PROCEDURE — 36600 WITHDRAWAL OF ARTERIAL BLOOD: CPT

## 2017-04-27 PROCEDURE — 6360000002 HC RX W HCPCS: Performed by: EMERGENCY MEDICINE

## 2017-04-27 PROCEDURE — 2580000003 HC RX 258: Performed by: INTERNAL MEDICINE

## 2017-04-27 RX ORDER — NICOTINE POLACRILEX 4 MG
15 LOZENGE BUCCAL PRN
Status: DISCONTINUED | OUTPATIENT
Start: 2017-04-27 | End: 2017-04-29 | Stop reason: HOSPADM

## 2017-04-27 RX ORDER — DEXTROSE MONOHYDRATE 25 G/50ML
12.5 INJECTION, SOLUTION INTRAVENOUS PRN
Status: DISCONTINUED | OUTPATIENT
Start: 2017-04-27 | End: 2017-04-29 | Stop reason: HOSPADM

## 2017-04-27 RX ORDER — FUROSEMIDE 10 MG/ML
40 INJECTION INTRAMUSCULAR; INTRAVENOUS ONCE
Status: COMPLETED | OUTPATIENT
Start: 2017-04-27 | End: 2017-04-27

## 2017-04-27 RX ORDER — HYDRALAZINE HYDROCHLORIDE 20 MG/ML
5 INJECTION INTRAMUSCULAR; INTRAVENOUS EVERY 6 HOURS PRN
Status: DISCONTINUED | OUTPATIENT
Start: 2017-04-27 | End: 2017-04-29 | Stop reason: HOSPADM

## 2017-04-27 RX ORDER — DEXTROSE MONOHYDRATE 50 MG/ML
100 INJECTION, SOLUTION INTRAVENOUS PRN
Status: DISCONTINUED | OUTPATIENT
Start: 2017-04-27 | End: 2017-04-29 | Stop reason: HOSPADM

## 2017-04-27 RX ADMIN — ENOXAPARIN SODIUM 40 MG: 40 INJECTION SUBCUTANEOUS at 08:16

## 2017-04-27 RX ADMIN — Medication 10 ML: at 12:53

## 2017-04-27 RX ADMIN — IPRATROPIUM BROMIDE AND ALBUTEROL SULFATE 1 AMPULE: .5; 3 SOLUTION RESPIRATORY (INHALATION) at 07:21

## 2017-04-27 RX ADMIN — DEXMEDETOMIDINE HYDROCHLORIDE 1.4 MCG/KG/HR: 100 INJECTION, SOLUTION INTRAVENOUS at 13:47

## 2017-04-27 RX ADMIN — SODIUM CHLORIDE: 9 INJECTION, SOLUTION INTRAVENOUS at 13:42

## 2017-04-27 RX ADMIN — IPRATROPIUM BROMIDE AND ALBUTEROL SULFATE 1 AMPULE: .5; 3 SOLUTION RESPIRATORY (INHALATION) at 19:45

## 2017-04-27 RX ADMIN — SODIUM CHLORIDE: 9 INJECTION, SOLUTION INTRAVENOUS at 01:49

## 2017-04-27 RX ADMIN — PANTOPRAZOLE SODIUM 40 MG: 40 INJECTION, POWDER, FOR SOLUTION INTRAVENOUS at 08:16

## 2017-04-27 RX ADMIN — I.V. FAT EMULSION 100 ML: 20 EMULSION INTRAVENOUS at 17:55

## 2017-04-27 RX ADMIN — FUROSEMIDE 40 MG: 10 INJECTION, SOLUTION INTRAVENOUS at 13:47

## 2017-04-27 RX ADMIN — DEXMEDETOMIDINE HYDROCHLORIDE 1.4 MCG/KG/HR: 100 INJECTION, SOLUTION INTRAVENOUS at 09:12

## 2017-04-27 RX ADMIN — SODIUM CHLORIDE 3 G: 900 INJECTION INTRAVENOUS at 03:26

## 2017-04-27 RX ADMIN — IPRATROPIUM BROMIDE AND ALBUTEROL SULFATE 1 AMPULE: .5; 3 SOLUTION RESPIRATORY (INHALATION) at 23:29

## 2017-04-27 RX ADMIN — HALOPERIDOL LACTATE 5 MG: 5 INJECTION, SOLUTION INTRAMUSCULAR at 00:32

## 2017-04-27 RX ADMIN — HALOPERIDOL LACTATE 5 MG: 5 INJECTION, SOLUTION INTRAMUSCULAR at 08:16

## 2017-04-27 RX ADMIN — SODIUM CHLORIDE 3 G: 900 INJECTION INTRAVENOUS at 19:26

## 2017-04-27 RX ADMIN — CALCIUM GLUCONATE: 94 INJECTION, SOLUTION INTRAVENOUS at 17:54

## 2017-04-27 RX ADMIN — IPRATROPIUM BROMIDE AND ALBUTEROL SULFATE 1 AMPULE: .5; 3 SOLUTION RESPIRATORY (INHALATION) at 03:42

## 2017-04-27 RX ADMIN — IPRATROPIUM BROMIDE AND ALBUTEROL SULFATE 1 AMPULE: .5; 3 SOLUTION RESPIRATORY (INHALATION) at 15:26

## 2017-04-27 RX ADMIN — LEVOFLOXACIN 750 MG: 5 INJECTION, SOLUTION INTRAVENOUS at 13:37

## 2017-04-27 RX ADMIN — IPRATROPIUM BROMIDE AND ALBUTEROL SULFATE 1 AMPULE: .5; 3 SOLUTION RESPIRATORY (INHALATION) at 11:09

## 2017-04-27 RX ADMIN — DEXMEDETOMIDINE HYDROCHLORIDE 1.4 MCG/KG/HR: 100 INJECTION, SOLUTION INTRAVENOUS at 03:23

## 2017-04-27 RX ADMIN — SODIUM CHLORIDE 3 G: 900 INJECTION INTRAVENOUS at 12:50

## 2017-04-27 ASSESSMENT — PAIN SCALES - GENERAL
PAINLEVEL_OUTOF10: 0

## 2017-04-28 ENCOUNTER — APPOINTMENT (OUTPATIENT)
Dept: GENERAL RADIOLOGY | Age: 42
DRG: 720 | End: 2017-04-28
Payer: MEDICAID

## 2017-04-28 PROBLEM — A41.9 SEPSIS (HCC): Status: ACTIVE | Noted: 2017-04-28

## 2017-04-28 LAB
ALBUMIN SERPL-MCNC: 2.4 G/DL (ref 3.5–5.2)
ALBUMIN/GLOBULIN RATIO: ABNORMAL (ref 1–2.5)
ALP BLD-CCNC: 47 U/L (ref 35–104)
ALT SERPL-CCNC: 9 U/L (ref 5–33)
ANION GAP SERPL CALCULATED.3IONS-SCNC: 10 MMOL/L (ref 9–17)
AST SERPL-CCNC: 14 U/L
BILIRUB SERPL-MCNC: 0.34 MG/DL (ref 0.3–1.2)
BUN BLDV-MCNC: 7 MG/DL (ref 6–20)
BUN/CREAT BLD: ABNORMAL (ref 9–20)
CALCIUM SERPL-MCNC: 7.9 MG/DL (ref 8.6–10.4)
CHLORIDE BLD-SCNC: 106 MMOL/L (ref 98–107)
CO2: 26 MMOL/L (ref 20–31)
CREAT SERPL-MCNC: <0.4 MG/DL (ref 0.5–0.9)
DIRECT EXAM: NORMAL
DIRECT EXAM: NORMAL
GFR AFRICAN AMERICAN: ABNORMAL ML/MIN
GFR NON-AFRICAN AMERICAN: ABNORMAL ML/MIN
GFR SERPL CREATININE-BSD FRML MDRD: ABNORMAL ML/MIN/{1.73_M2}
GFR SERPL CREATININE-BSD FRML MDRD: ABNORMAL ML/MIN/{1.73_M2}
GLUCOSE BLD-MCNC: 119 MG/DL (ref 65–105)
GLUCOSE BLD-MCNC: 130 MG/DL (ref 65–105)
GLUCOSE BLD-MCNC: 132 MG/DL (ref 70–99)
GLUCOSE BLD-MCNC: 145 MG/DL (ref 65–105)
HCT VFR BLD CALC: 35.1 % (ref 36–46)
HEMOGLOBIN: 11.3 G/DL (ref 12–16)
Lab: NORMAL
MAGNESIUM: 1.9 MG/DL (ref 1.6–2.6)
MCH RBC QN AUTO: 26.9 PG (ref 26–34)
MCHC RBC AUTO-ENTMCNC: 32 G/DL (ref 31–37)
MCV RBC AUTO: 83.9 FL (ref 80–100)
PDW BLD-RTO: 16.7 % (ref 11.5–14.9)
PHOSPHORUS: 2.3 MG/DL (ref 2.6–4.5)
PLATELET # BLD: 392 K/UL (ref 150–450)
PMV BLD AUTO: 7.3 FL (ref 6–12)
POTASSIUM SERPL-SCNC: 3.1 MMOL/L (ref 3.7–5.3)
RBC # BLD: 4.19 M/UL (ref 4–5.2)
SODIUM BLD-SCNC: 142 MMOL/L (ref 135–144)
SPECIMEN DESCRIPTION: NORMAL
SPECIMEN DESCRIPTION: NORMAL
STATUS: NORMAL
TOTAL PROTEIN: 5.9 G/DL (ref 6.4–8.3)
WBC # BLD: 9.5 K/UL (ref 3.5–11)

## 2017-04-28 PROCEDURE — C9113 INJ PANTOPRAZOLE SODIUM, VIA: HCPCS | Performed by: INTERNAL MEDICINE

## 2017-04-28 PROCEDURE — 71010 XR CHEST PORTABLE: CPT

## 2017-04-28 PROCEDURE — 2580000003 HC RX 258: Performed by: INTERNAL MEDICINE

## 2017-04-28 PROCEDURE — 99291 CRITICAL CARE FIRST HOUR: CPT | Performed by: INTERNAL MEDICINE

## 2017-04-28 PROCEDURE — 94640 AIRWAY INHALATION TREATMENT: CPT

## 2017-04-28 PROCEDURE — 83735 ASSAY OF MAGNESIUM: CPT

## 2017-04-28 PROCEDURE — 6360000002 HC RX W HCPCS: Performed by: EMERGENCY MEDICINE

## 2017-04-28 PROCEDURE — 2580000003 HC RX 258: Performed by: HOSPITALIST

## 2017-04-28 PROCEDURE — 2060000000 HC ICU INTERMEDIATE R&B

## 2017-04-28 PROCEDURE — 6360000002 HC RX W HCPCS: Performed by: HOSPITALIST

## 2017-04-28 PROCEDURE — 6370000000 HC RX 637 (ALT 250 FOR IP): Performed by: INTERNAL MEDICINE

## 2017-04-28 PROCEDURE — 6360000002 HC RX W HCPCS: Performed by: INTERNAL MEDICINE

## 2017-04-28 PROCEDURE — 85027 COMPLETE CBC AUTOMATED: CPT

## 2017-04-28 PROCEDURE — 80053 COMPREHEN METABOLIC PANEL: CPT

## 2017-04-28 PROCEDURE — 6370000000 HC RX 637 (ALT 250 FOR IP): Performed by: HOSPITALIST

## 2017-04-28 PROCEDURE — 82947 ASSAY GLUCOSE BLOOD QUANT: CPT

## 2017-04-28 PROCEDURE — 94762 N-INVAS EAR/PLS OXIMTRY CONT: CPT

## 2017-04-28 PROCEDURE — 84100 ASSAY OF PHOSPHORUS: CPT

## 2017-04-28 RX ORDER — PANTOPRAZOLE SODIUM 40 MG/1
40 TABLET, DELAYED RELEASE ORAL
Status: DISCONTINUED | OUTPATIENT
Start: 2017-04-29 | End: 2017-04-29 | Stop reason: HOSPADM

## 2017-04-28 RX ORDER — LORAZEPAM 2 MG/ML
2 INJECTION INTRAMUSCULAR EVERY 4 HOURS PRN
Status: DISCONTINUED | OUTPATIENT
Start: 2017-04-28 | End: 2017-04-29 | Stop reason: HOSPADM

## 2017-04-28 RX ORDER — POTASSIUM CHLORIDE 20MEQ/15ML
40 LIQUID (ML) ORAL PRN
Status: DISCONTINUED | OUTPATIENT
Start: 2017-04-28 | End: 2017-04-29 | Stop reason: HOSPADM

## 2017-04-28 RX ORDER — POTASSIUM CHLORIDE 7.45 MG/ML
10 INJECTION INTRAVENOUS PRN
Status: DISCONTINUED | OUTPATIENT
Start: 2017-04-28 | End: 2017-04-29 | Stop reason: HOSPADM

## 2017-04-28 RX ORDER — IPRATROPIUM BROMIDE AND ALBUTEROL SULFATE 2.5; .5 MG/3ML; MG/3ML
1 SOLUTION RESPIRATORY (INHALATION) 4 TIMES DAILY
Status: DISCONTINUED | OUTPATIENT
Start: 2017-04-28 | End: 2017-04-29 | Stop reason: HOSPADM

## 2017-04-28 RX ORDER — POTASSIUM CHLORIDE 20 MEQ/1
40 TABLET, EXTENDED RELEASE ORAL PRN
Status: DISCONTINUED | OUTPATIENT
Start: 2017-04-28 | End: 2017-04-29 | Stop reason: HOSPADM

## 2017-04-28 RX ADMIN — IPRATROPIUM BROMIDE AND ALBUTEROL SULFATE 1 AMPULE: .5; 3 SOLUTION RESPIRATORY (INHALATION) at 03:14

## 2017-04-28 RX ADMIN — DOCUSATE SODIUM 100 MG: 100 CAPSULE, LIQUID FILLED ORAL at 09:06

## 2017-04-28 RX ADMIN — SODIUM CHLORIDE 3 G: 900 INJECTION INTRAVENOUS at 20:12

## 2017-04-28 RX ADMIN — LEVOFLOXACIN 750 MG: 5 INJECTION, SOLUTION INTRAVENOUS at 15:19

## 2017-04-28 RX ADMIN — IPRATROPIUM BROMIDE AND ALBUTEROL SULFATE 1 AMPULE: .5; 3 SOLUTION RESPIRATORY (INHALATION) at 11:01

## 2017-04-28 RX ADMIN — IPRATROPIUM BROMIDE AND ALBUTEROL SULFATE 1 AMPULE: .5; 3 SOLUTION RESPIRATORY (INHALATION) at 07:09

## 2017-04-28 RX ADMIN — Medication 10 ML: at 20:13

## 2017-04-28 RX ADMIN — IPRATROPIUM BROMIDE AND ALBUTEROL SULFATE 1 AMPULE: .5; 3 SOLUTION RESPIRATORY (INHALATION) at 16:19

## 2017-04-28 RX ADMIN — SODIUM CHLORIDE 10 ML: 9 INJECTION INTRAMUSCULAR; INTRAVENOUS; SUBCUTANEOUS at 09:06

## 2017-04-28 RX ADMIN — Medication 10 ML: at 09:07

## 2017-04-28 RX ADMIN — FLUOXETINE 60 MG: 20 CAPSULE ORAL at 09:06

## 2017-04-28 RX ADMIN — ACETAMINOPHEN 650 MG: 325 TABLET ORAL at 17:00

## 2017-04-28 RX ADMIN — IPRATROPIUM BROMIDE AND ALBUTEROL SULFATE 1 AMPULE: .5; 3 SOLUTION RESPIRATORY (INHALATION) at 19:39

## 2017-04-28 RX ADMIN — SODIUM CHLORIDE 3 G: 900 INJECTION INTRAVENOUS at 04:40

## 2017-04-28 RX ADMIN — FOLIC ACID 1 MG: 1 TABLET ORAL at 09:07

## 2017-04-28 RX ADMIN — ENOXAPARIN SODIUM 40 MG: 40 INJECTION SUBCUTANEOUS at 09:06

## 2017-04-28 RX ADMIN — PANTOPRAZOLE SODIUM 40 MG: 40 INJECTION, POWDER, FOR SOLUTION INTRAVENOUS at 09:05

## 2017-04-28 RX ADMIN — SODIUM CHLORIDE 3 G: 900 INJECTION INTRAVENOUS at 11:39

## 2017-04-28 ASSESSMENT — PAIN SCALES - GENERAL
PAINLEVEL_OUTOF10: 0
PAINLEVEL_OUTOF10: 5

## 2017-04-28 ASSESSMENT — PAIN DESCRIPTION - DESCRIPTORS: DESCRIPTORS: HEADACHE

## 2017-04-29 VITALS
WEIGHT: 125 LBS | TEMPERATURE: 98.8 F | HEART RATE: 70 BPM | HEIGHT: 60 IN | SYSTOLIC BLOOD PRESSURE: 109 MMHG | RESPIRATION RATE: 14 BRPM | DIASTOLIC BLOOD PRESSURE: 78 MMHG | BODY MASS INDEX: 24.54 KG/M2 | OXYGEN SATURATION: 98 %

## 2017-04-29 LAB
ANION GAP SERPL CALCULATED.3IONS-SCNC: 13 MMOL/L (ref 9–17)
BUN BLDV-MCNC: 7 MG/DL (ref 6–20)
BUN/CREAT BLD: ABNORMAL (ref 9–20)
CALCIUM SERPL-MCNC: 8 MG/DL (ref 8.6–10.4)
CHLORIDE BLD-SCNC: 105 MMOL/L (ref 98–107)
CO2: 25 MMOL/L (ref 20–31)
CREAT SERPL-MCNC: 0.44 MG/DL (ref 0.5–0.9)
GFR AFRICAN AMERICAN: >60 ML/MIN
GFR NON-AFRICAN AMERICAN: >60 ML/MIN
GFR SERPL CREATININE-BSD FRML MDRD: ABNORMAL ML/MIN/{1.73_M2}
GFR SERPL CREATININE-BSD FRML MDRD: ABNORMAL ML/MIN/{1.73_M2}
GLUCOSE BLD-MCNC: 105 MG/DL (ref 70–99)
GLUCOSE BLD-MCNC: 111 MG/DL (ref 65–105)
GLUCOSE BLD-MCNC: 131 MG/DL (ref 65–105)
HCT VFR BLD CALC: 37.2 % (ref 36–46)
HEMOGLOBIN: 12.1 G/DL (ref 12–16)
MAGNESIUM: 2.1 MG/DL (ref 1.6–2.6)
MCH RBC QN AUTO: 27.3 PG (ref 26–34)
MCHC RBC AUTO-ENTMCNC: 32.5 G/DL (ref 31–37)
MCV RBC AUTO: 84.1 FL (ref 80–100)
PDW BLD-RTO: 17 % (ref 11.5–14.9)
PHOSPHORUS: 3.4 MG/DL (ref 2.6–4.5)
PLATELET # BLD: 473 K/UL (ref 150–450)
PMV BLD AUTO: 7.4 FL (ref 6–12)
POTASSIUM SERPL-SCNC: 3.6 MMOL/L (ref 3.7–5.3)
RBC # BLD: 4.43 M/UL (ref 4–5.2)
SODIUM BLD-SCNC: 143 MMOL/L (ref 135–144)
WBC # BLD: 8.6 K/UL (ref 3.5–11)

## 2017-04-29 PROCEDURE — 36415 COLL VENOUS BLD VENIPUNCTURE: CPT

## 2017-04-29 PROCEDURE — 6370000000 HC RX 637 (ALT 250 FOR IP): Performed by: STUDENT IN AN ORGANIZED HEALTH CARE EDUCATION/TRAINING PROGRAM

## 2017-04-29 PROCEDURE — 84100 ASSAY OF PHOSPHORUS: CPT

## 2017-04-29 PROCEDURE — 6370000000 HC RX 637 (ALT 250 FOR IP): Performed by: INTERNAL MEDICINE

## 2017-04-29 PROCEDURE — 2580000003 HC RX 258: Performed by: INTERNAL MEDICINE

## 2017-04-29 PROCEDURE — 99238 HOSP IP/OBS DSCHRG MGMT 30/<: CPT | Performed by: INTERNAL MEDICINE

## 2017-04-29 PROCEDURE — 6360000002 HC RX W HCPCS: Performed by: INTERNAL MEDICINE

## 2017-04-29 PROCEDURE — 6370000000 HC RX 637 (ALT 250 FOR IP): Performed by: HOSPITALIST

## 2017-04-29 PROCEDURE — 82947 ASSAY GLUCOSE BLOOD QUANT: CPT

## 2017-04-29 PROCEDURE — 94640 AIRWAY INHALATION TREATMENT: CPT

## 2017-04-29 PROCEDURE — 80048 BASIC METABOLIC PNL TOTAL CA: CPT

## 2017-04-29 PROCEDURE — 85027 COMPLETE CBC AUTOMATED: CPT

## 2017-04-29 PROCEDURE — 6360000002 HC RX W HCPCS: Performed by: HOSPITALIST

## 2017-04-29 PROCEDURE — 83735 ASSAY OF MAGNESIUM: CPT

## 2017-04-29 RX ORDER — AMOXICILLIN AND CLAVULANATE POTASSIUM 875; 125 MG/1; MG/1
1 TABLET, FILM COATED ORAL 2 TIMES DAILY
Qty: 20 TABLET | Refills: 0 | Status: ON HOLD | OUTPATIENT
Start: 2017-04-29 | End: 2017-05-14 | Stop reason: HOSPADM

## 2017-04-29 RX ORDER — CALCIUM CARBONATE 200(500)MG
500 TABLET,CHEWABLE ORAL 3 TIMES DAILY PRN
Status: DISCONTINUED | OUTPATIENT
Start: 2017-04-29 | End: 2017-04-29 | Stop reason: HOSPADM

## 2017-04-29 RX ADMIN — SODIUM CHLORIDE 3 G: 900 INJECTION INTRAVENOUS at 11:07

## 2017-04-29 RX ADMIN — IPRATROPIUM BROMIDE AND ALBUTEROL SULFATE 1 AMPULE: .5; 3 SOLUTION RESPIRATORY (INHALATION) at 11:24

## 2017-04-29 RX ADMIN — FLUOXETINE 60 MG: 20 CAPSULE ORAL at 08:40

## 2017-04-29 RX ADMIN — FOLIC ACID 1 MG: 1 TABLET ORAL at 08:40

## 2017-04-29 RX ADMIN — SODIUM CHLORIDE 3 G: 900 INJECTION INTRAVENOUS at 03:27

## 2017-04-29 RX ADMIN — PANTOPRAZOLE SODIUM 40 MG: 40 TABLET, DELAYED RELEASE ORAL at 06:12

## 2017-04-29 RX ADMIN — IPRATROPIUM BROMIDE AND ALBUTEROL SULFATE 1 AMPULE: .5; 3 SOLUTION RESPIRATORY (INHALATION) at 07:25

## 2017-04-29 RX ADMIN — Medication 10 ML: at 08:40

## 2017-04-29 RX ADMIN — SODIUM CHLORIDE: 9 INJECTION, SOLUTION INTRAVENOUS at 02:31

## 2017-04-29 RX ADMIN — ENOXAPARIN SODIUM 40 MG: 40 INJECTION SUBCUTANEOUS at 08:40

## 2017-04-29 RX ADMIN — ANTACID TABLETS 500 MG: 500 TABLET, CHEWABLE ORAL at 10:15

## 2017-04-29 ASSESSMENT — PAIN SCALES - GENERAL: PAINLEVEL_OUTOF10: 0

## 2017-05-01 LAB
CULTURE: NORMAL
EKG ATRIAL RATE: 98 BPM
EKG P AXIS: 47 DEGREES
EKG P-R INTERVAL: 138 MS
EKG Q-T INTERVAL: 352 MS
EKG QRS DURATION: 74 MS
EKG QTC CALCULATION (BAZETT): 449 MS
EKG R AXIS: 15 DEGREES
EKG T AXIS: 38 DEGREES
EKG VENTRICULAR RATE: 98 BPM
Lab: NORMAL
SPECIMEN DESCRIPTION: NORMAL
STATUS: NORMAL
STATUS: NORMAL

## 2017-05-02 LAB
CULTURE: NORMAL
CULTURE: NORMAL
Lab: NORMAL
Lab: NORMAL
SPECIMEN DESCRIPTION: NORMAL
SPECIMEN DESCRIPTION: NORMAL
STATUS: NORMAL

## 2017-05-07 ENCOUNTER — HOSPITAL ENCOUNTER (INPATIENT)
Age: 42
LOS: 8 days | Discharge: HOME | DRG: 881 | End: 2017-05-15
Attending: EMERGENCY MEDICINE | Admitting: PSYCHIATRY & NEUROLOGY
Payer: COMMERCIAL

## 2017-05-07 DIAGNOSIS — R45.850 HOMICIDAL IDEATION: ICD-10-CM

## 2017-05-07 DIAGNOSIS — R45.851 SUICIDAL IDEATION: ICD-10-CM

## 2017-05-07 DIAGNOSIS — F10.921 ACUTE ALCOHOLIC INTOXICATION, WITH DELIRIUM (HCC): ICD-10-CM

## 2017-05-07 DIAGNOSIS — F32.A DEPRESSION, UNSPECIFIED DEPRESSION TYPE: Primary | ICD-10-CM

## 2017-05-07 LAB
ABSOLUTE EOS #: 0.1 K/UL (ref 0–0.4)
ABSOLUTE LYMPH #: 4.9 K/UL (ref 1–4.8)
ABSOLUTE MONO #: 0.5 K/UL (ref 0.1–1.3)
ALBUMIN SERPL-MCNC: 3.8 G/DL (ref 3.5–5.2)
ALBUMIN/GLOBULIN RATIO: ABNORMAL (ref 1–2.5)
ALP BLD-CCNC: 69 U/L (ref 35–104)
ALT SERPL-CCNC: 15 U/L (ref 5–33)
AMPHETAMINE SCREEN URINE: NEGATIVE
ANION GAP SERPL CALCULATED.3IONS-SCNC: 14 MMOL/L (ref 9–17)
AST SERPL-CCNC: 21 U/L
BARBITURATE SCREEN URINE: NEGATIVE
BASOPHILS # BLD: 2 %
BASOPHILS ABSOLUTE: 0.2 K/UL (ref 0–0.2)
BENZODIAZEPINE SCREEN, URINE: NEGATIVE
BILIRUB SERPL-MCNC: <0.15 MG/DL (ref 0.3–1.2)
BUN BLDV-MCNC: 5 MG/DL (ref 6–20)
BUN/CREAT BLD: ABNORMAL (ref 9–20)
BUPRENORPHINE URINE: ABNORMAL
CALCIUM SERPL-MCNC: 8.9 MG/DL (ref 8.6–10.4)
CANNABINOID SCREEN URINE: POSITIVE
CHLORIDE BLD-SCNC: 104 MMOL/L (ref 98–107)
CO2: 28 MMOL/L (ref 20–31)
COCAINE METABOLITE, URINE: NEGATIVE
CREAT SERPL-MCNC: 0.69 MG/DL (ref 0.5–0.9)
DIFFERENTIAL TYPE: ABNORMAL
EOSINOPHILS RELATIVE PERCENT: 1 %
ETHANOL PERCENT: 0.19 %
ETHANOL PERCENT: <0.01 %
ETHANOL: 187 MG/DL
ETHANOL: <10 MG/DL
GFR AFRICAN AMERICAN: >60 ML/MIN
GFR NON-AFRICAN AMERICAN: >60 ML/MIN
GFR SERPL CREATININE-BSD FRML MDRD: ABNORMAL ML/MIN/{1.73_M2}
GFR SERPL CREATININE-BSD FRML MDRD: ABNORMAL ML/MIN/{1.73_M2}
GLUCOSE BLD-MCNC: 85 MG/DL (ref 70–99)
HCG QUALITATIVE: NEGATIVE
HCT VFR BLD CALC: 42.9 % (ref 36–46)
HEMOGLOBIN: 13.9 G/DL (ref 12–16)
LYMPHOCYTES # BLD: 49 %
MAGNESIUM: 2.3 MG/DL (ref 1.6–2.6)
MCH RBC QN AUTO: 27.3 PG (ref 26–34)
MCHC RBC AUTO-ENTMCNC: 32.4 G/DL (ref 31–37)
MCV RBC AUTO: 84.3 FL (ref 80–100)
MDMA URINE: ABNORMAL
METHADONE SCREEN, URINE: NEGATIVE
METHAMPHETAMINE, URINE: ABNORMAL
MONOCYTES # BLD: 5 %
MORPHOLOGY: ABNORMAL
OPIATES, URINE: NEGATIVE
OXYCODONE SCREEN URINE: NEGATIVE
PDW BLD-RTO: 17.1 % (ref 11.5–14.9)
PHENCYCLIDINE, URINE: NEGATIVE
PLATELET # BLD: 592 K/UL (ref 150–450)
PLATELET ESTIMATE: ABNORMAL
PMV BLD AUTO: 6.8 FL (ref 6–12)
POTASSIUM SERPL-SCNC: 3.9 MMOL/L (ref 3.7–5.3)
PROPOXYPHENE, URINE: ABNORMAL
RBC # BLD: 5.08 M/UL (ref 4–5.2)
RBC # BLD: ABNORMAL 10*6/UL
SEG NEUTROPHILS: 43 %
SEGMENTED NEUTROPHILS ABSOLUTE COUNT: 4.3 K/UL (ref 1.3–9.1)
SODIUM BLD-SCNC: 146 MMOL/L (ref 135–144)
TEST INFORMATION: ABNORMAL
TOTAL PROTEIN: 7.9 G/DL (ref 6.4–8.3)
TRICYCLIC ANTIDEPRESSANTS, UR: ABNORMAL
WBC # BLD: 10 K/UL (ref 3.5–11)
WBC # BLD: ABNORMAL 10*3/UL

## 2017-05-07 PROCEDURE — 83735 ASSAY OF MAGNESIUM: CPT

## 2017-05-07 PROCEDURE — 84703 CHORIONIC GONADOTROPIN ASSAY: CPT

## 2017-05-07 PROCEDURE — 6360000002 HC RX W HCPCS: Performed by: EMERGENCY MEDICINE

## 2017-05-07 PROCEDURE — 80053 COMPREHEN METABOLIC PANEL: CPT

## 2017-05-07 PROCEDURE — 1240000000 HC EMOTIONAL WELLNESS R&B

## 2017-05-07 PROCEDURE — 96372 THER/PROPH/DIAG INJ SC/IM: CPT

## 2017-05-07 PROCEDURE — 36415 COLL VENOUS BLD VENIPUNCTURE: CPT

## 2017-05-07 PROCEDURE — 99285 EMERGENCY DEPT VISIT HI MDM: CPT

## 2017-05-07 PROCEDURE — 85025 COMPLETE CBC W/AUTO DIFF WBC: CPT

## 2017-05-07 PROCEDURE — G0480 DRUG TEST DEF 1-7 CLASSES: HCPCS

## 2017-05-07 PROCEDURE — 80307 DRUG TEST PRSMV CHEM ANLYZR: CPT

## 2017-05-07 PROCEDURE — G0378 HOSPITAL OBSERVATION PER HR: HCPCS

## 2017-05-07 RX ORDER — LORAZEPAM 2 MG/ML
2 INJECTION INTRAMUSCULAR ONCE
Status: COMPLETED | OUTPATIENT
Start: 2017-05-07 | End: 2017-05-07

## 2017-05-07 RX ADMIN — LORAZEPAM 2 MG: 2 INJECTION INTRAMUSCULAR; INTRAVENOUS at 12:29

## 2017-05-07 ASSESSMENT — SLEEP AND FATIGUE QUESTIONNAIRES
DIFFICULTY ARISING: NO
SLEEP PATTERN: DIFFICULTY FALLING ASLEEP
RESTFUL SLEEP: NO
DO YOU HAVE DIFFICULTY SLEEPING: YES
AVERAGE NUMBER OF SLEEP HOURS: 4
DIFFICULTY STAYING ASLEEP: YES
SLEEP PATTERN: DIFFICULTY FALLING ASLEEP
DIFFICULTY ARISING: NO
RESTFUL SLEEP: NO
DO YOU HAVE DIFFICULTY SLEEPING: YES
DIFFICULTY FALLING ASLEEP: YES
AVERAGE NUMBER OF SLEEP HOURS: 5
DIFFICULTY FALLING ASLEEP: YES
DIFFICULTY STAYING ASLEEP: YES
DO YOU USE A SLEEP AID: YES
DO YOU USE A SLEEP AID: YES

## 2017-05-07 ASSESSMENT — LIFESTYLE VARIABLES
HISTORY_ALCOHOL_USE: YES
HISTORY_ALCOHOL_USE: YES

## 2017-05-08 PROBLEM — F33.2 SEVERE RECURRENT MAJOR DEPRESSION WITHOUT PSYCHOTIC FEATURES (HCC): Status: ACTIVE | Noted: 2017-05-08

## 2017-05-08 PROCEDURE — G0378 HOSPITAL OBSERVATION PER HR: HCPCS

## 2017-05-08 PROCEDURE — 81001 URINALYSIS AUTO W/SCOPE: CPT

## 2017-05-08 PROCEDURE — 1240000000 HC EMOTIONAL WELLNESS R&B

## 2017-05-08 PROCEDURE — 6370000000 HC RX 637 (ALT 250 FOR IP): Performed by: PSYCHIATRY & NEUROLOGY

## 2017-05-08 RX ORDER — PANTOPRAZOLE SODIUM 40 MG/1
40 TABLET, DELAYED RELEASE ORAL
Status: DISCONTINUED | OUTPATIENT
Start: 2017-05-09 | End: 2017-05-15 | Stop reason: HOSPADM

## 2017-05-08 RX ORDER — ACETAMINOPHEN 325 MG/1
650 TABLET ORAL EVERY 4 HOURS PRN
Status: DISCONTINUED | OUTPATIENT
Start: 2017-05-08 | End: 2017-05-15 | Stop reason: HOSPADM

## 2017-05-08 RX ORDER — ARIPIPRAZOLE 10 MG/1
10 TABLET ORAL DAILY
Status: DISCONTINUED | OUTPATIENT
Start: 2017-05-08 | End: 2017-05-08

## 2017-05-08 RX ORDER — QUETIAPINE FUMARATE 200 MG/1
200 TABLET, FILM COATED ORAL NIGHTLY
Status: DISCONTINUED | OUTPATIENT
Start: 2017-05-08 | End: 2017-05-08

## 2017-05-08 RX ORDER — TRAZODONE HYDROCHLORIDE 100 MG/1
100 TABLET ORAL NIGHTLY
Status: DISCONTINUED | OUTPATIENT
Start: 2017-05-08 | End: 2017-05-15 | Stop reason: HOSPADM

## 2017-05-08 RX ORDER — HYDROXYZINE HYDROCHLORIDE 25 MG/1
25 TABLET, FILM COATED ORAL 3 TIMES DAILY PRN
Status: DISCONTINUED | OUTPATIENT
Start: 2017-05-08 | End: 2017-05-15 | Stop reason: HOSPADM

## 2017-05-08 RX ORDER — THIAMINE MONONITRATE (VIT B1) 100 MG
100 TABLET ORAL DAILY
Status: DISCONTINUED | OUTPATIENT
Start: 2017-05-08 | End: 2017-05-15 | Stop reason: HOSPADM

## 2017-05-08 RX ORDER — BENZTROPINE MESYLATE 1 MG/ML
2 INJECTION INTRAMUSCULAR; INTRAVENOUS DAILY PRN
Status: DISCONTINUED | OUTPATIENT
Start: 2017-05-08 | End: 2017-05-15 | Stop reason: HOSPADM

## 2017-05-08 RX ORDER — NAPROXEN 500 MG/1
500 TABLET ORAL 2 TIMES DAILY PRN
Status: DISCONTINUED | OUTPATIENT
Start: 2017-05-08 | End: 2017-05-15 | Stop reason: HOSPADM

## 2017-05-08 RX ORDER — M-VIT,TX,IRON,MINS/CALC/FOLIC 27MG-0.4MG
1 TABLET ORAL DAILY
Status: DISCONTINUED | OUTPATIENT
Start: 2017-05-08 | End: 2017-05-15 | Stop reason: HOSPADM

## 2017-05-08 RX ORDER — MAGNESIUM HYDROXIDE/ALUMINUM HYDROXICE/SIMETHICONE 120; 1200; 1200 MG/30ML; MG/30ML; MG/30ML
20 SUSPENSION ORAL EVERY 6 HOURS PRN
Status: DISCONTINUED | OUTPATIENT
Start: 2017-05-08 | End: 2017-05-15 | Stop reason: HOSPADM

## 2017-05-08 RX ORDER — GABAPENTIN 300 MG/1
600 CAPSULE ORAL 2 TIMES DAILY
Status: DISCONTINUED | OUTPATIENT
Start: 2017-05-08 | End: 2017-05-08

## 2017-05-08 RX ORDER — VENLAFAXINE HYDROCHLORIDE 150 MG/1
150 CAPSULE, EXTENDED RELEASE ORAL DAILY
Status: DISCONTINUED | OUTPATIENT
Start: 2017-05-08 | End: 2017-05-08

## 2017-05-08 RX ORDER — BUPROPION HYDROCHLORIDE 150 MG/1
150 TABLET, EXTENDED RELEASE ORAL 2 TIMES DAILY
Status: DISCONTINUED | OUTPATIENT
Start: 2017-05-08 | End: 2017-05-08

## 2017-05-08 RX ORDER — AMOXICILLIN AND CLAVULANATE POTASSIUM 875; 125 MG/1; MG/1
1 TABLET, FILM COATED ORAL 2 TIMES DAILY
Status: COMPLETED | OUTPATIENT
Start: 2017-05-08 | End: 2017-05-12

## 2017-05-08 RX ORDER — FLUOXETINE HYDROCHLORIDE 20 MG/1
60 CAPSULE ORAL DAILY
Status: DISCONTINUED | OUTPATIENT
Start: 2017-05-08 | End: 2017-05-08

## 2017-05-08 RX ORDER — FOLIC ACID 1 MG/1
1 TABLET ORAL DAILY
Status: DISCONTINUED | OUTPATIENT
Start: 2017-05-08 | End: 2017-05-15 | Stop reason: HOSPADM

## 2017-05-08 RX ADMIN — Medication 100 MG: at 11:26

## 2017-05-08 RX ADMIN — MULTIPLE VITAMINS W/ MINERALS TAB 1 TABLET: TAB at 11:26

## 2017-05-08 RX ADMIN — HYDROXYZINE HYDROCHLORIDE 25 MG: 25 TABLET, FILM COATED ORAL at 21:40

## 2017-05-08 RX ADMIN — TRAZODONE HYDROCHLORIDE 100 MG: 100 TABLET ORAL at 21:40

## 2017-05-08 RX ADMIN — VITAMIN D, TAB 1000IU (100/BT) 1000 UNITS: 25 TAB at 11:26

## 2017-05-08 RX ADMIN — AMOXICILLIN AND CLAVULANATE POTASSIUM 1 TABLET: 875; 125 TABLET, FILM COATED ORAL at 15:36

## 2017-05-08 RX ADMIN — HYDROXYZINE HYDROCHLORIDE 25 MG: 25 TABLET, FILM COATED ORAL at 11:26

## 2017-05-08 RX ADMIN — FOLIC ACID 1 MG: 1 TABLET ORAL at 11:26

## 2017-05-08 RX ADMIN — AMOXICILLIN AND CLAVULANATE POTASSIUM 1 TABLET: 875; 125 TABLET, FILM COATED ORAL at 21:40

## 2017-05-09 PROCEDURE — 6370000000 HC RX 637 (ALT 250 FOR IP): Performed by: PSYCHIATRY & NEUROLOGY

## 2017-05-09 PROCEDURE — 1240000000 HC EMOTIONAL WELLNESS R&B

## 2017-05-09 PROCEDURE — G0378 HOSPITAL OBSERVATION PER HR: HCPCS

## 2017-05-09 RX ORDER — CITALOPRAM 20 MG/1
20 TABLET ORAL DAILY
Status: DISCONTINUED | OUTPATIENT
Start: 2017-05-09 | End: 2017-05-15 | Stop reason: HOSPADM

## 2017-05-09 RX ORDER — QUETIAPINE FUMARATE 50 MG/1
300 TABLET, EXTENDED RELEASE ORAL NIGHTLY
Status: ON HOLD | COMMUNITY
End: 2017-05-14 | Stop reason: HOSPADM

## 2017-05-09 RX ADMIN — FOLIC ACID 1 MG: 1 TABLET ORAL at 08:25

## 2017-05-09 RX ADMIN — PANTOPRAZOLE SODIUM 40 MG: 40 TABLET, DELAYED RELEASE ORAL at 06:32

## 2017-05-09 RX ADMIN — ACETAMINOPHEN 650 MG: 325 TABLET, FILM COATED ORAL at 08:24

## 2017-05-09 RX ADMIN — VITAMIN D, TAB 1000IU (100/BT) 1000 UNITS: 25 TAB at 08:25

## 2017-05-09 RX ADMIN — TRAZODONE HYDROCHLORIDE 100 MG: 100 TABLET ORAL at 20:56

## 2017-05-09 RX ADMIN — MULTIPLE VITAMINS W/ MINERALS TAB 1 TABLET: TAB at 08:25

## 2017-05-09 RX ADMIN — AMOXICILLIN AND CLAVULANATE POTASSIUM 1 TABLET: 875; 125 TABLET, FILM COATED ORAL at 08:25

## 2017-05-09 RX ADMIN — HYDROXYZINE HYDROCHLORIDE 25 MG: 25 TABLET, FILM COATED ORAL at 08:25

## 2017-05-09 RX ADMIN — AMOXICILLIN AND CLAVULANATE POTASSIUM 1 TABLET: 875; 125 TABLET, FILM COATED ORAL at 20:56

## 2017-05-09 RX ADMIN — CITALOPRAM HYDROBROMIDE 20 MG: 20 TABLET ORAL at 09:05

## 2017-05-09 RX ADMIN — ACETAMINOPHEN 650 MG: 325 TABLET, FILM COATED ORAL at 13:11

## 2017-05-09 RX ADMIN — Medication 100 MG: at 08:25

## 2017-05-09 RX ADMIN — ACETAMINOPHEN 650 MG: 325 TABLET, FILM COATED ORAL at 20:56

## 2017-05-09 ASSESSMENT — PAIN SCALES - GENERAL
PAINLEVEL_OUTOF10: 1
PAINLEVEL_OUTOF10: 8
PAINLEVEL_OUTOF10: 1
PAINLEVEL_OUTOF10: 2
PAINLEVEL_OUTOF10: 2
PAINLEVEL_OUTOF10: 3

## 2017-05-09 ASSESSMENT — PAIN DESCRIPTION - ORIENTATION: ORIENTATION: MID;LOWER

## 2017-05-09 ASSESSMENT — PAIN DESCRIPTION - LOCATION: LOCATION: BACK

## 2017-05-09 ASSESSMENT — PAIN DESCRIPTION - PAIN TYPE: TYPE: CHRONIC PAIN

## 2017-05-10 PROCEDURE — G0378 HOSPITAL OBSERVATION PER HR: HCPCS

## 2017-05-10 PROCEDURE — 6370000000 HC RX 637 (ALT 250 FOR IP): Performed by: PSYCHIATRY & NEUROLOGY

## 2017-05-10 PROCEDURE — 1240000000 HC EMOTIONAL WELLNESS R&B

## 2017-05-10 RX ADMIN — FOLIC ACID 1 MG: 1 TABLET ORAL at 08:42

## 2017-05-10 RX ADMIN — TRAZODONE HYDROCHLORIDE 100 MG: 100 TABLET ORAL at 20:57

## 2017-05-10 RX ADMIN — AMOXICILLIN AND CLAVULANATE POTASSIUM 1 TABLET: 875; 125 TABLET, FILM COATED ORAL at 08:41

## 2017-05-10 RX ADMIN — AMOXICILLIN AND CLAVULANATE POTASSIUM 1 TABLET: 875; 125 TABLET, FILM COATED ORAL at 20:57

## 2017-05-10 RX ADMIN — VITAMIN D, TAB 1000IU (100/BT) 1000 UNITS: 25 TAB at 08:42

## 2017-05-10 RX ADMIN — MAGNESIUM HYDROXIDE 30 ML: 400 SUSPENSION ORAL at 20:56

## 2017-05-10 RX ADMIN — HYDROXYZINE HYDROCHLORIDE 25 MG: 25 TABLET, FILM COATED ORAL at 20:56

## 2017-05-10 RX ADMIN — PANTOPRAZOLE SODIUM 40 MG: 40 TABLET, DELAYED RELEASE ORAL at 06:36

## 2017-05-10 RX ADMIN — Medication 100 MG: at 08:42

## 2017-05-10 RX ADMIN — CITALOPRAM HYDROBROMIDE 20 MG: 20 TABLET ORAL at 08:42

## 2017-05-10 RX ADMIN — MULTIPLE VITAMINS W/ MINERALS TAB 1 TABLET: TAB at 08:42

## 2017-05-11 PROCEDURE — 1240000000 HC EMOTIONAL WELLNESS R&B

## 2017-05-11 PROCEDURE — 6370000000 HC RX 637 (ALT 250 FOR IP): Performed by: PSYCHIATRY & NEUROLOGY

## 2017-05-11 PROCEDURE — G0378 HOSPITAL OBSERVATION PER HR: HCPCS

## 2017-05-11 RX ORDER — QUETIAPINE FUMARATE 200 MG/1
200 TABLET, FILM COATED ORAL NIGHTLY
Status: DISCONTINUED | OUTPATIENT
Start: 2017-05-11 | End: 2017-05-15 | Stop reason: HOSPADM

## 2017-05-11 RX ADMIN — FOLIC ACID 1 MG: 1 TABLET ORAL at 08:50

## 2017-05-11 RX ADMIN — HYDROXYZINE HYDROCHLORIDE 25 MG: 25 TABLET, FILM COATED ORAL at 20:25

## 2017-05-11 RX ADMIN — TRAZODONE HYDROCHLORIDE 100 MG: 100 TABLET ORAL at 20:25

## 2017-05-11 RX ADMIN — QUETIAPINE FUMARATE 200 MG: 200 TABLET, FILM COATED ORAL at 20:25

## 2017-05-11 RX ADMIN — PANTOPRAZOLE SODIUM 40 MG: 40 TABLET, DELAYED RELEASE ORAL at 06:24

## 2017-05-11 RX ADMIN — AMOXICILLIN AND CLAVULANATE POTASSIUM 1 TABLET: 875; 125 TABLET, FILM COATED ORAL at 09:40

## 2017-05-11 RX ADMIN — VITAMIN D, TAB 1000IU (100/BT) 1000 UNITS: 25 TAB at 08:50

## 2017-05-11 RX ADMIN — MULTIPLE VITAMINS W/ MINERALS TAB 1 TABLET: TAB at 08:50

## 2017-05-11 RX ADMIN — Medication 100 MG: at 08:50

## 2017-05-11 RX ADMIN — CITALOPRAM HYDROBROMIDE 20 MG: 20 TABLET ORAL at 08:50

## 2017-05-11 RX ADMIN — ALUMINUM HYDROXIDE, MAGNESIUM HYDROXIDE, AND SIMETHICONE 20 ML: 200; 200; 20 SUSPENSION ORAL at 20:26

## 2017-05-11 RX ADMIN — AMOXICILLIN AND CLAVULANATE POTASSIUM 1 TABLET: 875; 125 TABLET, FILM COATED ORAL at 20:26

## 2017-05-12 PROCEDURE — 1240000000 HC EMOTIONAL WELLNESS R&B

## 2017-05-12 PROCEDURE — G0378 HOSPITAL OBSERVATION PER HR: HCPCS

## 2017-05-12 PROCEDURE — 6370000000 HC RX 637 (ALT 250 FOR IP): Performed by: PSYCHIATRY & NEUROLOGY

## 2017-05-12 RX ADMIN — TRAZODONE HYDROCHLORIDE 100 MG: 100 TABLET ORAL at 21:04

## 2017-05-12 RX ADMIN — HYDROXYZINE HYDROCHLORIDE 25 MG: 25 TABLET, FILM COATED ORAL at 21:04

## 2017-05-12 RX ADMIN — PANTOPRAZOLE SODIUM 40 MG: 40 TABLET, DELAYED RELEASE ORAL at 06:27

## 2017-05-12 RX ADMIN — QUETIAPINE FUMARATE 200 MG: 200 TABLET, FILM COATED ORAL at 21:04

## 2017-05-12 RX ADMIN — MULTIPLE VITAMINS W/ MINERALS TAB 1 TABLET: TAB at 08:36

## 2017-05-12 RX ADMIN — CITALOPRAM HYDROBROMIDE 20 MG: 20 TABLET ORAL at 08:35

## 2017-05-12 RX ADMIN — NICOTINE POLACRILEX 2 MG: 2 GUM, CHEWING BUCCAL at 18:21

## 2017-05-12 RX ADMIN — VITAMIN D, TAB 1000IU (100/BT) 1000 UNITS: 25 TAB at 08:35

## 2017-05-12 RX ADMIN — Medication 100 MG: at 08:35

## 2017-05-12 RX ADMIN — NICOTINE POLACRILEX 2 MG: 2 GUM, CHEWING BUCCAL at 10:02

## 2017-05-12 RX ADMIN — AMOXICILLIN AND CLAVULANATE POTASSIUM 1 TABLET: 875; 125 TABLET, FILM COATED ORAL at 21:04

## 2017-05-12 RX ADMIN — FOLIC ACID 1 MG: 1 TABLET ORAL at 08:35

## 2017-05-12 RX ADMIN — AMOXICILLIN AND CLAVULANATE POTASSIUM 1 TABLET: 875; 125 TABLET, FILM COATED ORAL at 08:35

## 2017-05-12 ASSESSMENT — PAIN SCALES - GENERAL: PAINLEVEL_OUTOF10: 0

## 2017-05-13 LAB
-: ABNORMAL
AMORPHOUS: ABNORMAL
BACTERIA: ABNORMAL
BILIRUBIN URINE: NEGATIVE
CASTS UA: ABNORMAL /LPF
COLOR: YELLOW
COMMENT UA: ABNORMAL
CRYSTALS, UA: ABNORMAL /HPF
EPITHELIAL CELLS UA: ABNORMAL /HPF
GLUCOSE URINE: NEGATIVE
KETONES, URINE: NEGATIVE
LEUKOCYTE ESTERASE, URINE: ABNORMAL
MUCUS: ABNORMAL
NITRITE, URINE: NEGATIVE
OTHER OBSERVATIONS UA: ABNORMAL
PH UA: 6.5 (ref 5–8)
PROTEIN UA: NEGATIVE
RBC UA: ABNORMAL /HPF
RENAL EPITHELIAL, UA: ABNORMAL /HPF
SPECIFIC GRAVITY UA: 1.02 (ref 1–1.03)
TRICHOMONAS: ABNORMAL
TURBIDITY: ABNORMAL
URINE HGB: NEGATIVE
UROBILINOGEN, URINE: NORMAL
WBC UA: ABNORMAL /HPF
YEAST: ABNORMAL

## 2017-05-13 PROCEDURE — G0378 HOSPITAL OBSERVATION PER HR: HCPCS

## 2017-05-13 PROCEDURE — 6370000000 HC RX 637 (ALT 250 FOR IP): Performed by: PSYCHIATRY & NEUROLOGY

## 2017-05-13 PROCEDURE — 87086 URINE CULTURE/COLONY COUNT: CPT

## 2017-05-13 PROCEDURE — 1240000000 HC EMOTIONAL WELLNESS R&B

## 2017-05-13 RX ADMIN — NICOTINE POLACRILEX 2 MG: 2 GUM, CHEWING BUCCAL at 18:42

## 2017-05-13 RX ADMIN — NICOTINE POLACRILEX 2 MG: 2 GUM, CHEWING BUCCAL at 08:36

## 2017-05-13 RX ADMIN — QUETIAPINE FUMARATE 200 MG: 200 TABLET, FILM COATED ORAL at 21:21

## 2017-05-13 RX ADMIN — PANTOPRAZOLE SODIUM 40 MG: 40 TABLET, DELAYED RELEASE ORAL at 06:32

## 2017-05-13 RX ADMIN — FOLIC ACID 1 MG: 1 TABLET ORAL at 08:36

## 2017-05-13 RX ADMIN — MULTIPLE VITAMINS W/ MINERALS TAB 1 TABLET: TAB at 08:36

## 2017-05-13 RX ADMIN — Medication 100 MG: at 08:36

## 2017-05-13 RX ADMIN — HYDROXYZINE HYDROCHLORIDE 25 MG: 25 TABLET, FILM COATED ORAL at 08:36

## 2017-05-13 RX ADMIN — TRAZODONE HYDROCHLORIDE 100 MG: 100 TABLET ORAL at 21:21

## 2017-05-13 RX ADMIN — VITAMIN D, TAB 1000IU (100/BT) 1000 UNITS: 25 TAB at 08:36

## 2017-05-13 RX ADMIN — HYDROXYZINE HYDROCHLORIDE 25 MG: 25 TABLET, FILM COATED ORAL at 21:21

## 2017-05-13 RX ADMIN — CITALOPRAM HYDROBROMIDE 20 MG: 20 TABLET ORAL at 08:36

## 2017-05-14 LAB
CULTURE: NO GROWTH
CULTURE: NORMAL
Lab: NORMAL
SPECIMEN DESCRIPTION: NORMAL
SPECIMEN DESCRIPTION: NORMAL
STATUS: NORMAL

## 2017-05-14 PROCEDURE — 6370000000 HC RX 637 (ALT 250 FOR IP): Performed by: PSYCHIATRY & NEUROLOGY

## 2017-05-14 PROCEDURE — G0378 HOSPITAL OBSERVATION PER HR: HCPCS

## 2017-05-14 PROCEDURE — 1240000000 HC EMOTIONAL WELLNESS R&B

## 2017-05-14 RX ORDER — TRAZODONE HYDROCHLORIDE 100 MG/1
100 TABLET ORAL NIGHTLY
Qty: 14 TABLET | Refills: 0 | Status: ON HOLD | OUTPATIENT
Start: 2017-05-14 | End: 2017-06-08 | Stop reason: HOSPADM

## 2017-05-14 RX ORDER — QUETIAPINE FUMARATE 200 MG/1
200 TABLET, FILM COATED ORAL NIGHTLY
Qty: 14 TABLET | Refills: 0 | Status: ON HOLD | OUTPATIENT
Start: 2017-05-14 | End: 2017-06-08 | Stop reason: HOSPADM

## 2017-05-14 RX ORDER — CITALOPRAM 20 MG/1
20 TABLET ORAL DAILY
Qty: 14 TABLET | Refills: 0 | Status: ON HOLD | OUTPATIENT
Start: 2017-05-14 | End: 2017-06-08 | Stop reason: HOSPADM

## 2017-05-14 RX ADMIN — VITAMIN D, TAB 1000IU (100/BT) 1000 UNITS: 25 TAB at 08:17

## 2017-05-14 RX ADMIN — QUETIAPINE FUMARATE 200 MG: 200 TABLET, FILM COATED ORAL at 21:07

## 2017-05-14 RX ADMIN — ACETAMINOPHEN 650 MG: 325 TABLET, FILM COATED ORAL at 08:17

## 2017-05-14 RX ADMIN — TRAZODONE HYDROCHLORIDE 100 MG: 100 TABLET ORAL at 21:07

## 2017-05-14 RX ADMIN — NICOTINE POLACRILEX 2 MG: 2 GUM, CHEWING BUCCAL at 13:14

## 2017-05-14 RX ADMIN — MULTIPLE VITAMINS W/ MINERALS TAB 1 TABLET: TAB at 08:17

## 2017-05-14 RX ADMIN — NICOTINE POLACRILEX 2 MG: 2 GUM, CHEWING BUCCAL at 08:17

## 2017-05-14 RX ADMIN — PANTOPRAZOLE SODIUM 40 MG: 40 TABLET, DELAYED RELEASE ORAL at 06:40

## 2017-05-14 RX ADMIN — FOLIC ACID 1 MG: 1 TABLET ORAL at 08:17

## 2017-05-14 RX ADMIN — Medication 100 MG: at 08:17

## 2017-05-14 RX ADMIN — CITALOPRAM HYDROBROMIDE 20 MG: 20 TABLET ORAL at 08:17

## 2017-05-14 ASSESSMENT — PAIN SCALES - GENERAL
PAINLEVEL_OUTOF10: 3
PAINLEVEL_OUTOF10: 3

## 2017-05-15 VITALS
BODY MASS INDEX: 26.5 KG/M2 | OXYGEN SATURATION: 99 % | WEIGHT: 135 LBS | HEIGHT: 60 IN | SYSTOLIC BLOOD PRESSURE: 103 MMHG | TEMPERATURE: 98.1 F | HEART RATE: 76 BPM | RESPIRATION RATE: 14 BRPM | DIASTOLIC BLOOD PRESSURE: 72 MMHG

## 2017-05-15 PROCEDURE — 6370000000 HC RX 637 (ALT 250 FOR IP): Performed by: PSYCHIATRY & NEUROLOGY

## 2017-05-15 PROCEDURE — G0378 HOSPITAL OBSERVATION PER HR: HCPCS

## 2017-05-15 RX ADMIN — PANTOPRAZOLE SODIUM 40 MG: 40 TABLET, DELAYED RELEASE ORAL at 06:15

## 2017-05-15 RX ADMIN — MULTIPLE VITAMINS W/ MINERALS TAB 1 TABLET: TAB at 08:48

## 2017-05-15 RX ADMIN — FOLIC ACID 1 MG: 1 TABLET ORAL at 08:48

## 2017-05-15 RX ADMIN — VITAMIN D, TAB 1000IU (100/BT) 1000 UNITS: 25 TAB at 08:48

## 2017-05-15 RX ADMIN — CITALOPRAM HYDROBROMIDE 20 MG: 20 TABLET ORAL at 08:48

## 2017-05-15 RX ADMIN — Medication 100 MG: at 08:48

## 2017-05-24 ENCOUNTER — HOSPITAL ENCOUNTER (EMERGENCY)
Age: 42
Discharge: HOME | End: 2017-05-24
Attending: EMERGENCY MEDICINE
Payer: MEDICAID

## 2017-05-24 VITALS
RESPIRATION RATE: 22 BRPM | WEIGHT: 130 LBS | OXYGEN SATURATION: 99 % | DIASTOLIC BLOOD PRESSURE: 66 MMHG | BODY MASS INDEX: 25.52 KG/M2 | HEART RATE: 75 BPM | SYSTOLIC BLOOD PRESSURE: 101 MMHG | TEMPERATURE: 98.2 F | HEIGHT: 60 IN

## 2017-05-24 DIAGNOSIS — F11.10 OPIOID ABUSE (HCC): ICD-10-CM

## 2017-05-24 PROCEDURE — 96374 THER/PROPH/DIAG INJ IV PUSH: CPT

## 2017-05-24 PROCEDURE — 99283 EMERGENCY DEPT VISIT LOW MDM: CPT

## 2017-05-24 PROCEDURE — 6360000002 HC RX W HCPCS

## 2017-05-24 RX ORDER — NALOXONE HYDROCHLORIDE 0.4 MG/ML
0.4 INJECTION, SOLUTION INTRAMUSCULAR; INTRAVENOUS; SUBCUTANEOUS EVERY 5 MIN PRN
Status: DISCONTINUED | OUTPATIENT
Start: 2017-05-24 | End: 2017-05-24 | Stop reason: HOSPADM

## 2017-05-24 RX ORDER — ONDANSETRON 2 MG/ML
INJECTION INTRAMUSCULAR; INTRAVENOUS
Status: DISCONTINUED
Start: 2017-05-24 | End: 2017-05-24 | Stop reason: WASHOUT

## 2017-05-24 RX ORDER — NALOXONE HYDROCHLORIDE 1 MG/ML
INJECTION INTRAMUSCULAR; INTRAVENOUS; SUBCUTANEOUS
Status: COMPLETED
Start: 2017-05-24 | End: 2017-05-24

## 2017-05-24 RX ADMIN — NALOXONE HYDROCHLORIDE 0.4 MG: 1 INJECTION PARENTERAL at 16:58

## 2017-05-24 ASSESSMENT — ENCOUNTER SYMPTOMS
DIARRHEA: 0
NAUSEA: 0
ABDOMINAL PAIN: 0
COUGH: 0
WHEEZING: 0
SHORTNESS OF BREATH: 0
VOMITING: 0

## 2017-05-26 ENCOUNTER — HOSPITAL ENCOUNTER (EMERGENCY)
Age: 42
Discharge: HOME | End: 2017-05-26
Attending: EMERGENCY MEDICINE
Payer: MEDICAID

## 2017-05-26 VITALS
RESPIRATION RATE: 16 BRPM | TEMPERATURE: 98.4 F | WEIGHT: 130 LBS | BODY MASS INDEX: 25.52 KG/M2 | HEIGHT: 60 IN | HEART RATE: 83 BPM | SYSTOLIC BLOOD PRESSURE: 108 MMHG | OXYGEN SATURATION: 97 % | DIASTOLIC BLOOD PRESSURE: 80 MMHG

## 2017-05-26 DIAGNOSIS — F33.2 SEVERE RECURRENT MAJOR DEPRESSION WITHOUT PSYCHOTIC FEATURES (HCC): Primary | ICD-10-CM

## 2017-05-26 LAB
ABSOLUTE EOS #: 0.3 K/UL (ref 0–0.4)
ABSOLUTE LYMPH #: 4.3 K/UL (ref 1–4.8)
ABSOLUTE MONO #: 0.8 K/UL (ref 0.1–1.2)
ACETAMINOPHEN LEVEL: <10 UG/ML (ref 10–30)
ALBUMIN SERPL-MCNC: 3.7 G/DL (ref 3.5–5.2)
ALBUMIN/GLOBULIN RATIO: 0.9 (ref 1–2.5)
ALP BLD-CCNC: 98 U/L (ref 35–104)
ALT SERPL-CCNC: 73 U/L (ref 5–33)
AMPHETAMINE SCREEN URINE: NEGATIVE
ANION GAP SERPL CALCULATED.3IONS-SCNC: 19 MMOL/L (ref 9–17)
AST SERPL-CCNC: 78 U/L
BARBITURATE SCREEN URINE: NEGATIVE
BASOPHILS # BLD: 1 %
BASOPHILS ABSOLUTE: 0.1 K/UL (ref 0–0.2)
BENZODIAZEPINE SCREEN, URINE: NEGATIVE
BILIRUB SERPL-MCNC: 0.19 MG/DL (ref 0.3–1.2)
BILIRUBIN DIRECT: 0.1 MG/DL
BILIRUBIN, INDIRECT: 0.09 MG/DL (ref 0–1)
BUN BLDV-MCNC: 2 MG/DL (ref 6–20)
BUN/CREAT BLD: ABNORMAL (ref 9–20)
BUPRENORPHINE URINE: NORMAL
CALCIUM SERPL-MCNC: 8.6 MG/DL (ref 8.6–10.4)
CANNABINOID SCREEN URINE: NEGATIVE
CHLORIDE BLD-SCNC: 101 MMOL/L (ref 98–107)
CO2: 21 MMOL/L (ref 20–31)
COCAINE METABOLITE, URINE: NEGATIVE
CREAT SERPL-MCNC: 0.71 MG/DL (ref 0.5–0.9)
DIFFERENTIAL TYPE: ABNORMAL
EOSINOPHILS RELATIVE PERCENT: 3 %
ETHANOL PERCENT: 0.09 %
ETHANOL: 87 MG/DL
GFR AFRICAN AMERICAN: >60 ML/MIN
GFR NON-AFRICAN AMERICAN: >60 ML/MIN
GFR SERPL CREATININE-BSD FRML MDRD: ABNORMAL ML/MIN/{1.73_M2}
GFR SERPL CREATININE-BSD FRML MDRD: ABNORMAL ML/MIN/{1.73_M2}
GLOBULIN: ABNORMAL G/DL (ref 1.5–3.8)
GLUCOSE BLD-MCNC: 135 MG/DL (ref 70–99)
HCT VFR BLD CALC: 48 % (ref 36–46)
HEMOGLOBIN: 15.7 G/DL (ref 12–16)
LIPASE: 50 U/L (ref 13–60)
LYMPHOCYTES # BLD: 41 %
MCH RBC QN AUTO: 27.6 PG (ref 26–34)
MCHC RBC AUTO-ENTMCNC: 32.8 G/DL (ref 31–37)
MCV RBC AUTO: 84.2 FL (ref 80–100)
MDMA URINE: NORMAL
METHADONE SCREEN, URINE: NEGATIVE
METHAMPHETAMINE, URINE: NORMAL
MONOCYTES # BLD: 8 %
OPIATES, URINE: NEGATIVE
OXYCODONE SCREEN URINE: NEGATIVE
PDW BLD-RTO: 16.3 % (ref 12.5–15.4)
PHENCYCLIDINE, URINE: NEGATIVE
PLATELET # BLD: 325 K/UL (ref 140–450)
PLATELET ESTIMATE: ABNORMAL
PMV BLD AUTO: 7.9 FL (ref 6–12)
POTASSIUM SERPL-SCNC: 3.8 MMOL/L (ref 3.7–5.3)
PROPOXYPHENE, URINE: NORMAL
RBC # BLD: 5.7 M/UL (ref 4–5.2)
RBC # BLD: ABNORMAL 10*6/UL
SALICYLATE LEVEL: <1 MG/DL (ref 3–10)
SEG NEUTROPHILS: 47 %
SEGMENTED NEUTROPHILS ABSOLUTE COUNT: 5.1 K/UL (ref 1.8–7.7)
SODIUM BLD-SCNC: 141 MMOL/L (ref 135–144)
TEST INFORMATION: NORMAL
TOTAL PROTEIN: 7.9 G/DL (ref 6.4–8.3)
TOXIC TRICYCLIC SC,BLOOD: NEGATIVE
TRICYCLIC ANTIDEPRESSANTS, UR: NORMAL
WBC # BLD: 10.6 K/UL (ref 3.5–11)
WBC # BLD: ABNORMAL 10*3/UL

## 2017-05-26 PROCEDURE — G0480 DRUG TEST DEF 1-7 CLASSES: HCPCS

## 2017-05-26 PROCEDURE — 6360000002 HC RX W HCPCS: Performed by: EMERGENCY MEDICINE

## 2017-05-26 PROCEDURE — 2580000003 HC RX 258: Performed by: EMERGENCY MEDICINE

## 2017-05-26 PROCEDURE — 96374 THER/PROPH/DIAG INJ IV PUSH: CPT

## 2017-05-26 PROCEDURE — 85025 COMPLETE CBC W/AUTO DIFF WBC: CPT

## 2017-05-26 PROCEDURE — 80307 DRUG TEST PRSMV CHEM ANLYZR: CPT

## 2017-05-26 PROCEDURE — 83690 ASSAY OF LIPASE: CPT

## 2017-05-26 PROCEDURE — 80076 HEPATIC FUNCTION PANEL: CPT

## 2017-05-26 PROCEDURE — 80048 BASIC METABOLIC PNL TOTAL CA: CPT

## 2017-05-26 PROCEDURE — 99284 EMERGENCY DEPT VISIT MOD MDM: CPT

## 2017-05-26 RX ORDER — ONDANSETRON 2 MG/ML
4 INJECTION INTRAMUSCULAR; INTRAVENOUS ONCE
Status: COMPLETED | OUTPATIENT
Start: 2017-05-26 | End: 2017-05-26

## 2017-05-26 RX ORDER — 0.9 % SODIUM CHLORIDE 0.9 %
1000 INTRAVENOUS SOLUTION INTRAVENOUS ONCE
Status: COMPLETED | OUTPATIENT
Start: 2017-05-26 | End: 2017-05-26

## 2017-05-26 RX ORDER — ONDANSETRON 4 MG/1
4 TABLET, FILM COATED ORAL ONCE
Status: DISCONTINUED | OUTPATIENT
Start: 2017-05-26 | End: 2017-05-26 | Stop reason: HOSPADM

## 2017-05-26 RX ADMIN — ONDANSETRON 4 MG: 2 INJECTION INTRAMUSCULAR; INTRAVENOUS at 18:45

## 2017-05-26 RX ADMIN — SODIUM CHLORIDE 1000 ML: 9 INJECTION, SOLUTION INTRAVENOUS at 18:44

## 2017-05-26 ASSESSMENT — ENCOUNTER SYMPTOMS
VOMITING: 1
COUGH: 0
WHEEZING: 0
BACK PAIN: 0
SHORTNESS OF BREATH: 0
ABDOMINAL DISTENTION: 0
CHEST TIGHTNESS: 0
ABDOMINAL PAIN: 1
CONSTIPATION: 0
NAUSEA: 1
DIARRHEA: 0

## 2017-06-03 ENCOUNTER — HOSPITAL ENCOUNTER (EMERGENCY)
Age: 42
Discharge: PSYCHIATRIC OR SUBSTANCE USE FACILITY | End: 2017-06-04
Attending: EMERGENCY MEDICINE
Payer: COMMERCIAL

## 2017-06-03 DIAGNOSIS — F32.A DEPRESSION, UNSPECIFIED DEPRESSION TYPE: ICD-10-CM

## 2017-06-03 DIAGNOSIS — R41.0 MENTAL CONFUSION: ICD-10-CM

## 2017-06-03 DIAGNOSIS — R45.851 SUICIDE IDEATION: Primary | ICD-10-CM

## 2017-06-03 PROCEDURE — 99285 EMERGENCY DEPT VISIT HI MDM: CPT

## 2017-06-03 ASSESSMENT — PAIN DESCRIPTION - LOCATION: LOCATION: CHEST

## 2017-06-04 ENCOUNTER — APPOINTMENT (OUTPATIENT)
Dept: GENERAL RADIOLOGY | Age: 42
End: 2017-06-04
Payer: COMMERCIAL

## 2017-06-04 ENCOUNTER — HOSPITAL ENCOUNTER (INPATIENT)
Age: 42
LOS: 5 days | Discharge: PSYCHIATRIC OR SUBSTANCE USE FACILITY | DRG: 885 | End: 2017-06-09
Attending: PSYCHIATRY & NEUROLOGY | Admitting: PSYCHIATRY & NEUROLOGY
Payer: COMMERCIAL

## 2017-06-04 ENCOUNTER — APPOINTMENT (OUTPATIENT)
Dept: CT IMAGING | Age: 42
End: 2017-06-04
Payer: COMMERCIAL

## 2017-06-04 VITALS
SYSTOLIC BLOOD PRESSURE: 111 MMHG | TEMPERATURE: 98.4 F | HEIGHT: 61 IN | HEART RATE: 78 BPM | OXYGEN SATURATION: 94 % | DIASTOLIC BLOOD PRESSURE: 83 MMHG | WEIGHT: 130 LBS | RESPIRATION RATE: 16 BRPM | BODY MASS INDEX: 24.55 KG/M2

## 2017-06-04 LAB
ABSOLUTE EOS #: 0.2 K/UL (ref 0–0.4)
ABSOLUTE LYMPH #: 3.9 K/UL (ref 1–4.8)
ABSOLUTE MONO #: 0.6 K/UL (ref 0.1–1.2)
ACETAMINOPHEN LEVEL: <10 UG/ML (ref 10–30)
ALBUMIN SERPL-MCNC: 4.1 G/DL (ref 3.5–5.2)
ALBUMIN/GLOBULIN RATIO: 1.1 (ref 1–2.5)
ALP BLD-CCNC: 96 U/L (ref 35–104)
ALT SERPL-CCNC: 124 U/L (ref 5–33)
AMPHETAMINE SCREEN URINE: NEGATIVE
ANION GAP SERPL CALCULATED.3IONS-SCNC: 17 MMOL/L (ref 9–17)
AST SERPL-CCNC: 115 U/L
BARBITURATE SCREEN URINE: NEGATIVE
BASOPHILS # BLD: 1 %
BASOPHILS ABSOLUTE: 0.1 K/UL (ref 0–0.2)
BENZODIAZEPINE SCREEN, URINE: POSITIVE
BILIRUB SERPL-MCNC: 0.22 MG/DL (ref 0.3–1.2)
BILIRUBIN DIRECT: <0.08 MG/DL
BILIRUBIN, INDIRECT: ABNORMAL MG/DL (ref 0–1)
BUN BLDV-MCNC: 4 MG/DL (ref 6–20)
BUN/CREAT BLD: ABNORMAL (ref 9–20)
BUPRENORPHINE URINE: ABNORMAL
CALCIUM SERPL-MCNC: 8.8 MG/DL (ref 8.6–10.4)
CANNABINOID SCREEN URINE: POSITIVE
CHLORIDE BLD-SCNC: 101 MMOL/L (ref 98–107)
CO2: 22 MMOL/L (ref 20–31)
COCAINE METABOLITE, URINE: POSITIVE
CREAT SERPL-MCNC: 0.76 MG/DL (ref 0.5–0.9)
DIFFERENTIAL TYPE: ABNORMAL
EOSINOPHILS RELATIVE PERCENT: 2 %
ETHANOL PERCENT: 0.29 %
ETHANOL: 291 MG/DL
GFR AFRICAN AMERICAN: >60 ML/MIN
GFR NON-AFRICAN AMERICAN: >60 ML/MIN
GFR SERPL CREATININE-BSD FRML MDRD: ABNORMAL ML/MIN/{1.73_M2}
GFR SERPL CREATININE-BSD FRML MDRD: ABNORMAL ML/MIN/{1.73_M2}
GLOBULIN: ABNORMAL G/DL (ref 1.5–3.8)
GLUCOSE BLD-MCNC: 116 MG/DL (ref 70–99)
HCG QUALITATIVE: NEGATIVE
HCT VFR BLD CALC: 42.4 % (ref 36–46)
HEMOGLOBIN: 13.7 G/DL (ref 12–16)
LYMPHOCYTES # BLD: 45 %
MCH RBC QN AUTO: 27.6 PG (ref 26–34)
MCHC RBC AUTO-ENTMCNC: 32.2 G/DL (ref 31–37)
MCV RBC AUTO: 85.6 FL (ref 80–100)
MDMA URINE: ABNORMAL
METHADONE SCREEN, URINE: NEGATIVE
METHAMPHETAMINE, URINE: ABNORMAL
MONOCYTES # BLD: 7 %
OPIATES, URINE: NEGATIVE
OXYCODONE SCREEN URINE: NEGATIVE
PDW BLD-RTO: 17.3 % (ref 12.5–15.4)
PHENCYCLIDINE, URINE: NEGATIVE
PLATELET # BLD: 294 K/UL (ref 140–450)
PLATELET ESTIMATE: ABNORMAL
PMV BLD AUTO: 7.5 FL (ref 6–12)
POTASSIUM SERPL-SCNC: 4.7 MMOL/L (ref 3.7–5.3)
PROPOXYPHENE, URINE: ABNORMAL
RBC # BLD: 4.96 M/UL (ref 4–5.2)
RBC # BLD: ABNORMAL 10*6/UL
SALICYLATE LEVEL: <1 MG/DL (ref 3–10)
SEG NEUTROPHILS: 45 %
SEGMENTED NEUTROPHILS ABSOLUTE COUNT: 3.9 K/UL (ref 1.8–7.7)
SODIUM BLD-SCNC: 140 MMOL/L (ref 135–144)
TEST INFORMATION: ABNORMAL
TOTAL PROTEIN: 7.7 G/DL (ref 6.4–8.3)
TOXIC TRICYCLIC SC,BLOOD: NEGATIVE
TRICYCLIC ANTIDEPRESSANTS, UR: ABNORMAL
WBC # BLD: 8.6 K/UL (ref 3.5–11)
WBC # BLD: ABNORMAL 10*3/UL

## 2017-06-04 PROCEDURE — 85025 COMPLETE CBC W/AUTO DIFF WBC: CPT

## 2017-06-04 PROCEDURE — 70450 CT HEAD/BRAIN W/O DYE: CPT

## 2017-06-04 PROCEDURE — 80076 HEPATIC FUNCTION PANEL: CPT

## 2017-06-04 PROCEDURE — G0480 DRUG TEST DEF 1-7 CLASSES: HCPCS

## 2017-06-04 PROCEDURE — 80307 DRUG TEST PRSMV CHEM ANLYZR: CPT

## 2017-06-04 PROCEDURE — 80048 BASIC METABOLIC PNL TOTAL CA: CPT

## 2017-06-04 PROCEDURE — 6370000000 HC RX 637 (ALT 250 FOR IP): Performed by: PSYCHIATRY & NEUROLOGY

## 2017-06-04 PROCEDURE — 6360000002 HC RX W HCPCS: Performed by: EMERGENCY MEDICINE

## 2017-06-04 PROCEDURE — 84703 CHORIONIC GONADOTROPIN ASSAY: CPT

## 2017-06-04 PROCEDURE — 72040 X-RAY EXAM NECK SPINE 2-3 VW: CPT

## 2017-06-04 PROCEDURE — 96374 THER/PROPH/DIAG INJ IV PUSH: CPT

## 2017-06-04 PROCEDURE — 93005 ELECTROCARDIOGRAM TRACING: CPT

## 2017-06-04 PROCEDURE — 1240000000 HC EMOTIONAL WELLNESS R&B

## 2017-06-04 PROCEDURE — 96372 THER/PROPH/DIAG INJ SC/IM: CPT

## 2017-06-04 RX ORDER — CITALOPRAM 20 MG/1
20 TABLET ORAL DAILY
Status: DISCONTINUED | OUTPATIENT
Start: 2017-06-04 | End: 2017-06-09 | Stop reason: HOSPADM

## 2017-06-04 RX ORDER — VENLAFAXINE 75 MG/1
225 TABLET ORAL DAILY
Status: DISCONTINUED | OUTPATIENT
Start: 2017-06-04 | End: 2017-06-09 | Stop reason: HOSPADM

## 2017-06-04 RX ORDER — QUETIAPINE 300 MG/1
300 TABLET, FILM COATED, EXTENDED RELEASE ORAL NIGHTLY
Status: ON HOLD | COMMUNITY
End: 2017-06-08 | Stop reason: HOSPADM

## 2017-06-04 RX ORDER — HALOPERIDOL 5 MG/ML
5 INJECTION INTRAMUSCULAR ONCE
Status: COMPLETED | OUTPATIENT
Start: 2017-06-04 | End: 2017-06-04

## 2017-06-04 RX ORDER — M-VIT,TX,IRON,MINS/CALC/FOLIC 27MG-0.4MG
1 TABLET ORAL DAILY
Status: DISCONTINUED | OUTPATIENT
Start: 2017-06-04 | End: 2017-06-09 | Stop reason: HOSPADM

## 2017-06-04 RX ORDER — VENLAFAXINE 75 MG/1
75 TABLET ORAL DAILY
Status: ON HOLD | COMMUNITY
End: 2017-06-08 | Stop reason: HOSPADM

## 2017-06-04 RX ORDER — THIAMINE MONONITRATE (VIT B1) 100 MG
100 TABLET ORAL DAILY
Status: DISCONTINUED | OUTPATIENT
Start: 2017-06-04 | End: 2017-06-09 | Stop reason: HOSPADM

## 2017-06-04 RX ORDER — HYDROXYZINE HYDROCHLORIDE 25 MG/1
50 TABLET, FILM COATED ORAL 3 TIMES DAILY PRN
Status: DISCONTINUED | OUTPATIENT
Start: 2017-06-04 | End: 2017-06-09 | Stop reason: HOSPADM

## 2017-06-04 RX ORDER — QUETIAPINE 150 MG/1
150 TABLET, FILM COATED, EXTENDED RELEASE ORAL NIGHTLY
Status: ON HOLD | COMMUNITY
End: 2017-06-08 | Stop reason: HOSPADM

## 2017-06-04 RX ORDER — QUETIAPINE FUMARATE 200 MG/1
200 TABLET, FILM COATED ORAL NIGHTLY
Status: DISCONTINUED | OUTPATIENT
Start: 2017-06-04 | End: 2017-06-09 | Stop reason: HOSPADM

## 2017-06-04 RX ORDER — GABAPENTIN 600 MG/1
600 TABLET ORAL 2 TIMES DAILY
Status: ON HOLD | COMMUNITY
End: 2017-06-08 | Stop reason: HOSPADM

## 2017-06-04 RX ORDER — OMEGA-3S/DHA/EPA/FISH OIL/D3 300MG-1000
400 CAPSULE ORAL DAILY
Status: DISCONTINUED | OUTPATIENT
Start: 2017-06-04 | End: 2017-06-09 | Stop reason: HOSPADM

## 2017-06-04 RX ORDER — GABAPENTIN 300 MG/1
600 CAPSULE ORAL 2 TIMES DAILY
Status: DISCONTINUED | OUTPATIENT
Start: 2017-06-04 | End: 2017-06-09 | Stop reason: HOSPADM

## 2017-06-04 RX ORDER — MIDAZOLAM HYDROCHLORIDE 1 MG/ML
2 INJECTION INTRAMUSCULAR; INTRAVENOUS ONCE
Status: COMPLETED | OUTPATIENT
Start: 2017-06-04 | End: 2017-06-04

## 2017-06-04 RX ORDER — FOLIC ACID 1 MG/1
1 TABLET ORAL DAILY
Status: DISCONTINUED | OUTPATIENT
Start: 2017-06-04 | End: 2017-06-09 | Stop reason: HOSPADM

## 2017-06-04 RX ORDER — PANTOPRAZOLE SODIUM 40 MG/1
40 TABLET, DELAYED RELEASE ORAL
Status: DISCONTINUED | OUTPATIENT
Start: 2017-06-05 | End: 2017-06-05

## 2017-06-04 RX ORDER — TRAZODONE HYDROCHLORIDE 100 MG/1
100 TABLET ORAL NIGHTLY
Status: DISCONTINUED | OUTPATIENT
Start: 2017-06-04 | End: 2017-06-09 | Stop reason: HOSPADM

## 2017-06-04 RX ADMIN — Medication 100 MG: at 16:20

## 2017-06-04 RX ADMIN — VITAMIN B12 0.1 MG ORAL TABLET 100 MCG: 0.1 TABLET ORAL at 17:14

## 2017-06-04 RX ADMIN — HALOPERIDOL LACTATE 5 MG: 5 INJECTION, SOLUTION INTRAMUSCULAR at 00:54

## 2017-06-04 RX ADMIN — GABAPENTIN 600 MG: 300 CAPSULE ORAL at 21:14

## 2017-06-04 RX ADMIN — CITALOPRAM HYDROBROMIDE 20 MG: 20 TABLET ORAL at 16:20

## 2017-06-04 RX ADMIN — FOLIC ACID 1 MG: 1 TABLET ORAL at 16:20

## 2017-06-04 RX ADMIN — MIDAZOLAM HYDROCHLORIDE 2 MG: 1 INJECTION, SOLUTION INTRAMUSCULAR; INTRAVENOUS at 00:04

## 2017-06-04 RX ADMIN — MULTIPLE VITAMINS W/ MINERALS TAB 1 TABLET: TAB at 16:20

## 2017-06-04 RX ADMIN — VENLAFAXINE 225 MG: 75 TABLET ORAL at 16:20

## 2017-06-04 RX ADMIN — TRAZODONE HYDROCHLORIDE 100 MG: 100 TABLET ORAL at 21:14

## 2017-06-04 RX ADMIN — CHOLECALCIFEROL TAB 10 MCG (400 UNIT) 400 UNITS: 10 TAB at 17:14

## 2017-06-04 RX ADMIN — QUETIAPINE FUMARATE 200 MG: 200 TABLET, FILM COATED ORAL at 21:14

## 2017-06-04 ASSESSMENT — PAIN DESCRIPTION - PROGRESSION: CLINICAL_PROGRESSION: NOT CHANGED

## 2017-06-04 ASSESSMENT — SLEEP AND FATIGUE QUESTIONNAIRES
SLEEP PATTERN: DIFFICULTY FALLING ASLEEP
AVERAGE NUMBER OF SLEEP HOURS: 5
DIFFICULTY ARISING: NO
DIFFICULTY FALLING ASLEEP: YES
DIFFICULTY STAYING ASLEEP: YES
DO YOU HAVE DIFFICULTY SLEEPING: YES
RESTFUL SLEEP: NO
DO YOU USE A SLEEP AID: YES

## 2017-06-04 ASSESSMENT — PAIN DESCRIPTION - FREQUENCY: FREQUENCY: CONTINUOUS

## 2017-06-04 ASSESSMENT — LIFESTYLE VARIABLES: HISTORY_ALCOHOL_USE: YES

## 2017-06-04 ASSESSMENT — PAIN SCALES - GENERAL: PAINLEVEL_OUTOF10: 8

## 2017-06-04 ASSESSMENT — PAIN DESCRIPTION - DESCRIPTORS: DESCRIPTORS: TENDER

## 2017-06-04 ASSESSMENT — PAIN DESCRIPTION - LOCATION: LOCATION: CHEST

## 2017-06-04 ASSESSMENT — PAIN DESCRIPTION - ONSET: ONSET: OTHER (COMMENT)

## 2017-06-04 ASSESSMENT — PAIN DESCRIPTION - PAIN TYPE: TYPE: ACUTE PAIN

## 2017-06-05 LAB
EKG ATRIAL RATE: 106 BPM
EKG P AXIS: 82 DEGREES
EKG P-R INTERVAL: 136 MS
EKG Q-T INTERVAL: 364 MS
EKG QRS DURATION: 74 MS
EKG QTC CALCULATION (BAZETT): 483 MS
EKG R AXIS: 62 DEGREES
EKG T AXIS: 62 DEGREES
EKG VENTRICULAR RATE: 106 BPM

## 2017-06-05 PROCEDURE — 1240000000 HC EMOTIONAL WELLNESS R&B

## 2017-06-05 PROCEDURE — 6370000000 HC RX 637 (ALT 250 FOR IP): Performed by: PSYCHIATRY & NEUROLOGY

## 2017-06-05 RX ORDER — PANTOPRAZOLE SODIUM 40 MG/1
40 TABLET, DELAYED RELEASE ORAL
Status: DISCONTINUED | OUTPATIENT
Start: 2017-06-05 | End: 2017-06-06

## 2017-06-05 RX ORDER — BENZTROPINE MESYLATE 1 MG/ML
2 INJECTION INTRAMUSCULAR; INTRAVENOUS DAILY PRN
Status: DISCONTINUED | OUTPATIENT
Start: 2017-06-05 | End: 2017-06-09 | Stop reason: HOSPADM

## 2017-06-05 RX ORDER — ACETAMINOPHEN 325 MG/1
650 TABLET ORAL EVERY 4 HOURS PRN
Status: DISCONTINUED | OUTPATIENT
Start: 2017-06-05 | End: 2017-06-09 | Stop reason: HOSPADM

## 2017-06-05 RX ADMIN — HYDROXYZINE HYDROCHLORIDE 50 MG: 25 TABLET, FILM COATED ORAL at 12:17

## 2017-06-05 RX ADMIN — MULTIPLE VITAMINS W/ MINERALS TAB 1 TABLET: TAB at 08:35

## 2017-06-05 RX ADMIN — GABAPENTIN 600 MG: 300 CAPSULE ORAL at 08:35

## 2017-06-05 RX ADMIN — VITAMIN B12 0.1 MG ORAL TABLET 100 MCG: 0.1 TABLET ORAL at 08:35

## 2017-06-05 RX ADMIN — CITALOPRAM HYDROBROMIDE 20 MG: 20 TABLET ORAL at 08:35

## 2017-06-05 RX ADMIN — QUETIAPINE FUMARATE 200 MG: 200 TABLET, FILM COATED ORAL at 20:36

## 2017-06-05 RX ADMIN — Medication 100 MG: at 08:35

## 2017-06-05 RX ADMIN — TRAZODONE HYDROCHLORIDE 100 MG: 100 TABLET ORAL at 20:36

## 2017-06-05 RX ADMIN — GABAPENTIN 600 MG: 300 CAPSULE ORAL at 20:36

## 2017-06-05 RX ADMIN — VENLAFAXINE 225 MG: 75 TABLET ORAL at 08:35

## 2017-06-05 RX ADMIN — FOLIC ACID 1 MG: 1 TABLET ORAL at 08:35

## 2017-06-05 RX ADMIN — PANTOPRAZOLE SODIUM 40 MG: 40 TABLET, DELAYED RELEASE ORAL at 08:35

## 2017-06-05 RX ADMIN — CHOLECALCIFEROL TAB 10 MCG (400 UNIT) 400 UNITS: 10 TAB at 08:35

## 2017-06-05 RX ADMIN — HYDROXYZINE HYDROCHLORIDE 50 MG: 25 TABLET, FILM COATED ORAL at 20:36

## 2017-06-05 ASSESSMENT — LIFESTYLE VARIABLES: HISTORY_ALCOHOL_USE: YES

## 2017-06-06 LAB
-: ABNORMAL
AMORPHOUS: ABNORMAL
BACTERIA: ABNORMAL
BILIRUBIN URINE: ABNORMAL
CASTS UA: ABNORMAL /LPF
COLOR: ABNORMAL
COMMENT UA: ABNORMAL
CRYSTALS, UA: ABNORMAL /HPF
DIRECT EXAM: NORMAL
EPITHELIAL CELLS UA: ABNORMAL /HPF
GLUCOSE URINE: NEGATIVE
KETONES, URINE: ABNORMAL
LEUKOCYTE ESTERASE, URINE: ABNORMAL
Lab: NORMAL
MUCUS: ABNORMAL
NITRITE, URINE: NEGATIVE
OTHER OBSERVATIONS UA: ABNORMAL
PH UA: 5.5 (ref 5–8)
PROTEIN UA: NEGATIVE
RBC UA: ABNORMAL /HPF
RENAL EPITHELIAL, UA: ABNORMAL /HPF
SPECIFIC GRAVITY UA: 1.03 (ref 1–1.03)
SPECIMEN DESCRIPTION: NORMAL
SPECIMEN DESCRIPTION: NORMAL
STATUS: NORMAL
TRICHOMONAS: ABNORMAL
TURBIDITY: ABNORMAL
URINE HGB: NEGATIVE
UROBILINOGEN, URINE: NORMAL
WBC UA: ABNORMAL /HPF
YEAST: ABNORMAL

## 2017-06-06 PROCEDURE — 87510 GARDNER VAG DNA DIR PROBE: CPT

## 2017-06-06 PROCEDURE — 6370000000 HC RX 637 (ALT 250 FOR IP): Performed by: PSYCHIATRY & NEUROLOGY

## 2017-06-06 PROCEDURE — 81001 URINALYSIS AUTO W/SCOPE: CPT

## 2017-06-06 PROCEDURE — 1240000000 HC EMOTIONAL WELLNESS R&B

## 2017-06-06 PROCEDURE — 87660 TRICHOMONAS VAGIN DIR PROBE: CPT

## 2017-06-06 PROCEDURE — 87480 CANDIDA DNA DIR PROBE: CPT

## 2017-06-06 PROCEDURE — 87591 N.GONORRHOEAE DNA AMP PROB: CPT

## 2017-06-06 PROCEDURE — 87086 URINE CULTURE/COLONY COUNT: CPT

## 2017-06-06 PROCEDURE — 87491 CHLMYD TRACH DNA AMP PROBE: CPT

## 2017-06-06 RX ORDER — PANTOPRAZOLE SODIUM 40 MG/1
40 TABLET, DELAYED RELEASE ORAL
Status: DISCONTINUED | OUTPATIENT
Start: 2017-06-06 | End: 2017-06-09 | Stop reason: HOSPADM

## 2017-06-06 RX ADMIN — VITAMIN B12 0.1 MG ORAL TABLET 100 MCG: 0.1 TABLET ORAL at 09:01

## 2017-06-06 RX ADMIN — Medication 100 MG: at 09:01

## 2017-06-06 RX ADMIN — MULTIPLE VITAMINS W/ MINERALS TAB 1 TABLET: TAB at 09:01

## 2017-06-06 RX ADMIN — GABAPENTIN 600 MG: 300 CAPSULE ORAL at 09:01

## 2017-06-06 RX ADMIN — ACETAMINOPHEN 650 MG: 325 TABLET ORAL at 21:00

## 2017-06-06 RX ADMIN — FOLIC ACID 1 MG: 1 TABLET ORAL at 09:01

## 2017-06-06 RX ADMIN — TRAZODONE HYDROCHLORIDE 100 MG: 100 TABLET ORAL at 21:00

## 2017-06-06 RX ADMIN — VENLAFAXINE 225 MG: 75 TABLET ORAL at 09:01

## 2017-06-06 RX ADMIN — GABAPENTIN 600 MG: 300 CAPSULE ORAL at 21:00

## 2017-06-06 RX ADMIN — PANTOPRAZOLE SODIUM 40 MG: 40 TABLET, DELAYED RELEASE ORAL at 09:01

## 2017-06-06 RX ADMIN — HYDROXYZINE HYDROCHLORIDE 50 MG: 25 TABLET, FILM COATED ORAL at 09:01

## 2017-06-06 RX ADMIN — HYDROXYZINE HYDROCHLORIDE 50 MG: 25 TABLET, FILM COATED ORAL at 16:41

## 2017-06-06 RX ADMIN — QUETIAPINE FUMARATE 200 MG: 200 TABLET, FILM COATED ORAL at 21:00

## 2017-06-06 RX ADMIN — CITALOPRAM HYDROBROMIDE 20 MG: 20 TABLET ORAL at 09:01

## 2017-06-06 RX ADMIN — CHOLECALCIFEROL TAB 10 MCG (400 UNIT) 400 UNITS: 10 TAB at 09:01

## 2017-06-06 ASSESSMENT — PAIN SCALES - GENERAL
PAINLEVEL_OUTOF10: 0
PAINLEVEL_OUTOF10: 3
PAINLEVEL_OUTOF10: 3

## 2017-06-06 ASSESSMENT — PAIN DESCRIPTION - LOCATION: LOCATION: CHEST

## 2017-06-07 LAB
C TRACH DNA GENITAL QL NAA+PROBE: NEGATIVE
CULTURE: NORMAL
CULTURE: NORMAL
Lab: NORMAL
N. GONORRHOEAE DNA: NEGATIVE
SPECIMEN DESCRIPTION: NORMAL
SPECIMEN DESCRIPTION: NORMAL
STATUS: NORMAL

## 2017-06-07 PROCEDURE — 6370000000 HC RX 637 (ALT 250 FOR IP): Performed by: PSYCHIATRY & NEUROLOGY

## 2017-06-07 PROCEDURE — 1240000000 HC EMOTIONAL WELLNESS R&B

## 2017-06-07 RX ORDER — NITROFURANTOIN 25; 75 MG/1; MG/1
100 CAPSULE ORAL EVERY 12 HOURS SCHEDULED
Status: DISCONTINUED | OUTPATIENT
Start: 2017-06-07 | End: 2017-06-09 | Stop reason: HOSPADM

## 2017-06-07 RX ORDER — MAGNESIUM HYDROXIDE/ALUMINUM HYDROXICE/SIMETHICONE 120; 1200; 1200 MG/30ML; MG/30ML; MG/30ML
20 SUSPENSION ORAL 3 TIMES DAILY PRN
Status: DISCONTINUED | OUTPATIENT
Start: 2017-06-07 | End: 2017-06-09 | Stop reason: HOSPADM

## 2017-06-07 RX ORDER — IBUPROFEN 800 MG/1
800 TABLET ORAL EVERY 8 HOURS PRN
Status: DISCONTINUED | OUTPATIENT
Start: 2017-06-07 | End: 2017-06-09 | Stop reason: HOSPADM

## 2017-06-07 RX ADMIN — NITROFURANTOIN (MONOHYDRATE/MACROCRYSTALS) 100 MG: 75; 25 CAPSULE ORAL at 22:03

## 2017-06-07 RX ADMIN — ALUMINUM HYDROXIDE, MAGNESIUM HYDROXIDE, AND SIMETHICONE 20 ML: 200; 200; 20 SUSPENSION ORAL at 11:08

## 2017-06-07 RX ADMIN — Medication 100 MG: at 08:14

## 2017-06-07 RX ADMIN — CITALOPRAM HYDROBROMIDE 20 MG: 20 TABLET ORAL at 08:15

## 2017-06-07 RX ADMIN — PANTOPRAZOLE SODIUM 40 MG: 40 TABLET, DELAYED RELEASE ORAL at 08:15

## 2017-06-07 RX ADMIN — MULTIPLE VITAMINS W/ MINERALS TAB 1 TABLET: TAB at 08:14

## 2017-06-07 RX ADMIN — IBUPROFEN 800 MG: 800 TABLET, FILM COATED ORAL at 14:39

## 2017-06-07 RX ADMIN — CHOLECALCIFEROL TAB 10 MCG (400 UNIT) 400 UNITS: 10 TAB at 08:14

## 2017-06-07 RX ADMIN — GABAPENTIN 600 MG: 300 CAPSULE ORAL at 22:00

## 2017-06-07 RX ADMIN — GABAPENTIN 600 MG: 300 CAPSULE ORAL at 08:14

## 2017-06-07 RX ADMIN — ACETAMINOPHEN 650 MG: 325 TABLET ORAL at 08:15

## 2017-06-07 RX ADMIN — VENLAFAXINE 225 MG: 75 TABLET ORAL at 08:15

## 2017-06-07 RX ADMIN — ALUMINUM HYDROXIDE, MAGNESIUM HYDROXIDE, AND SIMETHICONE 20 ML: 200; 200; 20 SUSPENSION ORAL at 18:09

## 2017-06-07 RX ADMIN — QUETIAPINE FUMARATE 200 MG: 200 TABLET, FILM COATED ORAL at 22:00

## 2017-06-07 RX ADMIN — ACETAMINOPHEN 650 MG: 325 TABLET ORAL at 20:24

## 2017-06-07 RX ADMIN — TRAZODONE HYDROCHLORIDE 100 MG: 100 TABLET ORAL at 22:00

## 2017-06-07 RX ADMIN — FOLIC ACID 1 MG: 1 TABLET ORAL at 08:15

## 2017-06-07 RX ADMIN — VITAMIN B12 0.1 MG ORAL TABLET 100 MCG: 0.1 TABLET ORAL at 08:15

## 2017-06-07 ASSESSMENT — PAIN SCALES - GENERAL
PAINLEVEL_OUTOF10: 8
PAINLEVEL_OUTOF10: 6
PAINLEVEL_OUTOF10: 6
PAINLEVEL_OUTOF10: 0
PAINLEVEL_OUTOF10: 3

## 2017-06-07 ASSESSMENT — PAIN DESCRIPTION - LOCATION: LOCATION: CHEST

## 2017-06-08 PROCEDURE — 93005 ELECTROCARDIOGRAM TRACING: CPT

## 2017-06-08 PROCEDURE — 1240000000 HC EMOTIONAL WELLNESS R&B

## 2017-06-08 PROCEDURE — 99255 IP/OBS CONSLTJ NEW/EST HI 80: CPT | Performed by: INTERNAL MEDICINE

## 2017-06-08 PROCEDURE — 6370000000 HC RX 637 (ALT 250 FOR IP): Performed by: PSYCHIATRY & NEUROLOGY

## 2017-06-08 RX ORDER — HYDROXYZINE 50 MG/1
50 TABLET, FILM COATED ORAL 3 TIMES DAILY PRN
Qty: 42 TABLET | Refills: 0 | Status: SHIPPED | OUTPATIENT
Start: 2017-06-08 | End: 2017-06-18

## 2017-06-08 RX ORDER — CITALOPRAM 20 MG/1
20 TABLET ORAL DAILY
Qty: 14 TABLET | Refills: 0 | Status: SHIPPED | OUTPATIENT
Start: 2017-06-08 | End: 2017-07-12

## 2017-06-08 RX ORDER — GABAPENTIN 300 MG/1
600 CAPSULE ORAL 2 TIMES DAILY
Qty: 28 CAPSULE | Refills: 0 | Status: SHIPPED | OUTPATIENT
Start: 2017-06-08 | End: 2017-07-12

## 2017-06-08 RX ORDER — QUETIAPINE FUMARATE 200 MG/1
200 TABLET, FILM COATED ORAL NIGHTLY
Qty: 14 TABLET | Refills: 0 | Status: ON HOLD | OUTPATIENT
Start: 2017-06-08 | End: 2017-11-05

## 2017-06-08 RX ORDER — TRAZODONE HYDROCHLORIDE 100 MG/1
100 TABLET ORAL NIGHTLY
Qty: 14 TABLET | Refills: 0 | Status: SHIPPED | OUTPATIENT
Start: 2017-06-08 | End: 2017-07-12

## 2017-06-08 RX ORDER — VENLAFAXINE 75 MG/1
225 TABLET ORAL DAILY
Qty: 42 TABLET | Refills: 0 | Status: SHIPPED | OUTPATIENT
Start: 2017-06-08 | End: 2017-07-12

## 2017-06-08 RX ORDER — PANTOPRAZOLE SODIUM 40 MG/1
40 TABLET, DELAYED RELEASE ORAL
Qty: 14 TABLET | Refills: 0 | Status: SHIPPED | OUTPATIENT
Start: 2017-06-08 | End: 2017-07-12

## 2017-06-08 RX ADMIN — NITROFURANTOIN (MONOHYDRATE/MACROCRYSTALS) 100 MG: 75; 25 CAPSULE ORAL at 08:39

## 2017-06-08 RX ADMIN — Medication 100 MG: at 08:39

## 2017-06-08 RX ADMIN — HYDROXYZINE HYDROCHLORIDE 50 MG: 25 TABLET, FILM COATED ORAL at 21:00

## 2017-06-08 RX ADMIN — GABAPENTIN 600 MG: 300 CAPSULE ORAL at 08:39

## 2017-06-08 RX ADMIN — PANTOPRAZOLE SODIUM 40 MG: 40 TABLET, DELAYED RELEASE ORAL at 08:39

## 2017-06-08 RX ADMIN — IBUPROFEN 800 MG: 800 TABLET, FILM COATED ORAL at 13:41

## 2017-06-08 RX ADMIN — NICOTINE POLACRILEX 2 MG: 2 GUM, CHEWING BUCCAL at 08:58

## 2017-06-08 RX ADMIN — TRAZODONE HYDROCHLORIDE 100 MG: 100 TABLET ORAL at 21:00

## 2017-06-08 RX ADMIN — QUETIAPINE FUMARATE 200 MG: 200 TABLET, FILM COATED ORAL at 21:00

## 2017-06-08 RX ADMIN — CITALOPRAM HYDROBROMIDE 20 MG: 20 TABLET ORAL at 08:39

## 2017-06-08 RX ADMIN — VENLAFAXINE 225 MG: 75 TABLET ORAL at 08:39

## 2017-06-08 RX ADMIN — MULTIPLE VITAMINS W/ MINERALS TAB 1 TABLET: TAB at 08:38

## 2017-06-08 RX ADMIN — CHOLECALCIFEROL TAB 10 MCG (400 UNIT) 400 UNITS: 10 TAB at 08:38

## 2017-06-08 RX ADMIN — FOLIC ACID 1 MG: 1 TABLET ORAL at 08:38

## 2017-06-08 RX ADMIN — HYDROXYZINE HYDROCHLORIDE 50 MG: 25 TABLET, FILM COATED ORAL at 08:39

## 2017-06-08 RX ADMIN — VITAMIN B12 0.1 MG ORAL TABLET 100 MCG: 0.1 TABLET ORAL at 09:57

## 2017-06-08 RX ADMIN — NICOTINE POLACRILEX 2 MG: 2 GUM, CHEWING BUCCAL at 21:02

## 2017-06-08 RX ADMIN — GABAPENTIN 600 MG: 300 CAPSULE ORAL at 21:00

## 2017-06-08 RX ADMIN — NITROFURANTOIN (MONOHYDRATE/MACROCRYSTALS) 100 MG: 75; 25 CAPSULE ORAL at 21:00

## 2017-06-08 ASSESSMENT — PAIN SCALES - GENERAL
PAINLEVEL_OUTOF10: 8
PAINLEVEL_OUTOF10: 7

## 2017-06-08 ASSESSMENT — PAIN DESCRIPTION - LOCATION: LOCATION: CHEST

## 2017-06-09 VITALS
HEART RATE: 78 BPM | SYSTOLIC BLOOD PRESSURE: 100 MMHG | DIASTOLIC BLOOD PRESSURE: 73 MMHG | RESPIRATION RATE: 16 BRPM | TEMPERATURE: 97.8 F | WEIGHT: 130 LBS | HEIGHT: 61 IN | BODY MASS INDEX: 24.55 KG/M2

## 2017-06-09 PROCEDURE — 6370000000 HC RX 637 (ALT 250 FOR IP): Performed by: PSYCHIATRY & NEUROLOGY

## 2017-06-09 PROCEDURE — 99232 SBSQ HOSP IP/OBS MODERATE 35: CPT | Performed by: INTERNAL MEDICINE

## 2017-06-09 PROCEDURE — 5130000000 HC BRIDGE APPOINTMENT

## 2017-06-09 RX ADMIN — VITAMIN B12 0.1 MG ORAL TABLET 100 MCG: 0.1 TABLET ORAL at 08:22

## 2017-06-09 RX ADMIN — IBUPROFEN 800 MG: 800 TABLET, FILM COATED ORAL at 03:53

## 2017-06-09 RX ADMIN — NITROFURANTOIN (MONOHYDRATE/MACROCRYSTALS) 100 MG: 75; 25 CAPSULE ORAL at 08:23

## 2017-06-09 RX ADMIN — CHOLECALCIFEROL TAB 10 MCG (400 UNIT) 400 UNITS: 10 TAB at 08:22

## 2017-06-09 RX ADMIN — CITALOPRAM HYDROBROMIDE 20 MG: 20 TABLET ORAL at 08:22

## 2017-06-09 RX ADMIN — NICOTINE POLACRILEX 2 MG: 2 GUM, CHEWING BUCCAL at 08:23

## 2017-06-09 RX ADMIN — HYDROXYZINE HYDROCHLORIDE 50 MG: 25 TABLET, FILM COATED ORAL at 08:22

## 2017-06-09 RX ADMIN — VENLAFAXINE 225 MG: 75 TABLET ORAL at 08:22

## 2017-06-09 RX ADMIN — PANTOPRAZOLE SODIUM 40 MG: 40 TABLET, DELAYED RELEASE ORAL at 08:22

## 2017-06-09 RX ADMIN — GABAPENTIN 600 MG: 300 CAPSULE ORAL at 08:22

## 2017-06-09 RX ADMIN — FOLIC ACID 1 MG: 1 TABLET ORAL at 08:22

## 2017-06-09 RX ADMIN — MULTIPLE VITAMINS W/ MINERALS TAB 1 TABLET: TAB at 08:23

## 2017-06-09 RX ADMIN — Medication 100 MG: at 08:22

## 2017-06-09 ASSESSMENT — PAIN SCALES - GENERAL
PAINLEVEL_OUTOF10: 7
PAINLEVEL_OUTOF10: 9

## 2017-06-12 LAB
EKG ATRIAL RATE: 78 BPM
EKG P AXIS: 41 DEGREES
EKG P-R INTERVAL: 150 MS
EKG Q-T INTERVAL: 398 MS
EKG QRS DURATION: 80 MS
EKG QTC CALCULATION (BAZETT): 453 MS
EKG R AXIS: 26 DEGREES
EKG T AXIS: 31 DEGREES
EKG VENTRICULAR RATE: 78 BPM

## 2017-07-05 ENCOUNTER — HOSPITAL ENCOUNTER (OUTPATIENT)
Age: 42
Discharge: HOME | End: 2017-07-05
Payer: MEDICAID

## 2017-07-05 LAB
ABSOLUTE EOS #: 0.2 K/UL (ref 0–0.4)
ABSOLUTE LYMPH #: 3.5 K/UL (ref 1–4.8)
ABSOLUTE MONO #: 0.7 K/UL (ref 0.1–1.2)
ALBUMIN SERPL-MCNC: 3.7 G/DL (ref 3.5–5.2)
ALBUMIN/GLOBULIN RATIO: 1 (ref 1–2.5)
ALP BLD-CCNC: 66 U/L (ref 35–104)
ALT SERPL-CCNC: 10 U/L (ref 5–33)
ANION GAP SERPL CALCULATED.3IONS-SCNC: 13 MMOL/L (ref 9–17)
AST SERPL-CCNC: 15 U/L
BASOPHILS # BLD: 1 %
BASOPHILS ABSOLUTE: 0 K/UL (ref 0–0.2)
BILIRUB SERPL-MCNC: <0.1 MG/DL (ref 0.3–1.2)
BUN BLDV-MCNC: 7 MG/DL (ref 6–20)
BUN/CREAT BLD: ABNORMAL (ref 9–20)
CALCIUM SERPL-MCNC: 9.6 MG/DL (ref 8.6–10.4)
CHLORIDE BLD-SCNC: 102 MMOL/L (ref 98–107)
CO2: 27 MMOL/L (ref 20–31)
CREAT SERPL-MCNC: 0.69 MG/DL (ref 0.5–0.9)
DIFFERENTIAL TYPE: ABNORMAL
EOSINOPHILS RELATIVE PERCENT: 3 %
GFR AFRICAN AMERICAN: >60 ML/MIN
GFR NON-AFRICAN AMERICAN: >60 ML/MIN
GFR SERPL CREATININE-BSD FRML MDRD: ABNORMAL ML/MIN/{1.73_M2}
GFR SERPL CREATININE-BSD FRML MDRD: ABNORMAL ML/MIN/{1.73_M2}
GLUCOSE BLD-MCNC: 69 MG/DL (ref 70–99)
HCT VFR BLD CALC: 41.5 % (ref 36–46)
HEMOGLOBIN: 13.4 G/DL (ref 12–16)
HIV AG/AB: NONREACTIVE
LYMPHOCYTES # BLD: 43 %
MCH RBC QN AUTO: 26.7 PG (ref 26–34)
MCHC RBC AUTO-ENTMCNC: 32.2 G/DL (ref 31–37)
MCV RBC AUTO: 83 FL (ref 80–100)
MONOCYTES # BLD: 9 %
PDW BLD-RTO: 15.8 % (ref 12.5–15.4)
PLATELET # BLD: 318 K/UL (ref 140–450)
PLATELET ESTIMATE: ABNORMAL
PMV BLD AUTO: 7.5 FL (ref 6–12)
POTASSIUM SERPL-SCNC: 4.6 MMOL/L (ref 3.7–5.3)
RBC # BLD: 5 M/UL (ref 4–5.2)
RBC # BLD: ABNORMAL 10*6/UL
SEG NEUTROPHILS: 44 %
SEGMENTED NEUTROPHILS ABSOLUTE COUNT: 3.6 K/UL (ref 1.8–7.7)
SODIUM BLD-SCNC: 142 MMOL/L (ref 135–144)
T. PALLIDUM, IGG: NONREACTIVE
THYROXINE, FREE: 0.85 NG/DL (ref 0.93–1.7)
TOTAL PROTEIN: 7.5 G/DL (ref 6.4–8.3)
TSH SERPL DL<=0.05 MIU/L-ACNC: 0.66 MIU/L (ref 0.3–5)
WBC # BLD: 8 K/UL (ref 3.5–11)
WBC # BLD: ABNORMAL 10*3/UL

## 2017-07-05 PROCEDURE — 84443 ASSAY THYROID STIM HORMONE: CPT

## 2017-07-05 PROCEDURE — 84439 ASSAY OF FREE THYROXINE: CPT

## 2017-07-05 PROCEDURE — 80053 COMPREHEN METABOLIC PANEL: CPT

## 2017-07-05 PROCEDURE — 86780 TREPONEMA PALLIDUM: CPT

## 2017-07-05 PROCEDURE — 85025 COMPLETE CBC W/AUTO DIFF WBC: CPT

## 2017-07-05 PROCEDURE — 87389 HIV-1 AG W/HIV-1&-2 AB AG IA: CPT

## 2017-07-05 PROCEDURE — 36415 COLL VENOUS BLD VENIPUNCTURE: CPT

## 2017-07-12 ENCOUNTER — HOSPITAL ENCOUNTER (EMERGENCY)
Age: 42
Discharge: HOME | End: 2017-07-12
Attending: EMERGENCY MEDICINE
Payer: MEDICAID

## 2017-07-12 VITALS — WEIGHT: 135 LBS | HEIGHT: 60 IN | BODY MASS INDEX: 26.5 KG/M2

## 2017-07-12 DIAGNOSIS — Z76.5 MALINGERING: ICD-10-CM

## 2017-07-12 DIAGNOSIS — H57.89 EYE IRRITATION: Primary | ICD-10-CM

## 2017-07-12 PROCEDURE — 6370000000 HC RX 637 (ALT 250 FOR IP): Performed by: EMERGENCY MEDICINE

## 2017-07-12 PROCEDURE — 99283 EMERGENCY DEPT VISIT LOW MDM: CPT

## 2017-07-12 RX ORDER — MIRTAZAPINE 15 MG/1
15 TABLET, FILM COATED ORAL NIGHTLY
Status: ON HOLD | COMMUNITY
End: 2017-11-05 | Stop reason: HOSPADM

## 2017-07-12 RX ORDER — OMEPRAZOLE 20 MG/1
20 CAPSULE, DELAYED RELEASE ORAL 2 TIMES DAILY
Status: ON HOLD | COMMUNITY
End: 2017-11-05 | Stop reason: HOSPADM

## 2017-07-12 RX ORDER — PROPARACAINE HYDROCHLORIDE 5 MG/ML
1 SOLUTION/ DROPS OPHTHALMIC ONCE
Status: COMPLETED | OUTPATIENT
Start: 2017-07-12 | End: 2017-07-12

## 2017-07-12 RX ADMIN — PROPARACAINE HYDROCHLORIDE 1 DROP: 5 SOLUTION/ DROPS OPHTHALMIC at 14:24

## 2017-07-12 RX ADMIN — CIPROFLOXACIN HYDROCHLORIDE 0.5 INCH: 3 OINTMENT OPHTHALMIC at 16:11

## 2017-07-13 ASSESSMENT — ENCOUNTER SYMPTOMS
EYE ITCHING: 1
PHOTOPHOBIA: 0
ABDOMINAL PAIN: 0
DIARRHEA: 0
CHEST TIGHTNESS: 0
SORE THROAT: 0
SHORTNESS OF BREATH: 0
EYE REDNESS: 0
EYE DISCHARGE: 0
BACK PAIN: 0
VOMITING: 0
CONSTIPATION: 0
RHINORRHEA: 0
BLOOD IN STOOL: 0
NAUSEA: 0
EYE PAIN: 1

## 2017-10-23 ENCOUNTER — HOSPITAL ENCOUNTER (OUTPATIENT)
Age: 42
Setting detail: SPECIMEN
Discharge: HOME | End: 2017-10-23
Payer: MEDICARE

## 2017-10-23 LAB
ABSOLUTE EOS #: 0.2 K/UL (ref 0–0.4)
ABSOLUTE IMMATURE GRANULOCYTE: ABNORMAL K/UL (ref 0–0.3)
ABSOLUTE LYMPH #: 2.6 K/UL (ref 1–4.8)
ABSOLUTE MONO #: 0.7 K/UL (ref 0.1–1.2)
ALBUMIN SERPL-MCNC: 3.7 G/DL (ref 3.5–5.2)
ALBUMIN/GLOBULIN RATIO: 0.9 (ref 1–2.5)
ALP BLD-CCNC: 101 U/L (ref 35–104)
ALT SERPL-CCNC: 85 U/L (ref 5–33)
ANION GAP SERPL CALCULATED.3IONS-SCNC: 16 MMOL/L (ref 9–17)
AST SERPL-CCNC: 80 U/L
BASOPHILS # BLD: 1 %
BASOPHILS ABSOLUTE: 0 K/UL (ref 0–0.2)
BILIRUB SERPL-MCNC: 0.29 MG/DL (ref 0.3–1.2)
BILIRUBIN DIRECT: 0.08 MG/DL
BILIRUBIN, INDIRECT: 0.21 MG/DL (ref 0–1)
BUN BLDV-MCNC: 8 MG/DL (ref 6–20)
CALCIUM SERPL-MCNC: 8.9 MG/DL (ref 8.6–10.4)
CHLORIDE BLD-SCNC: 105 MMOL/L (ref 98–107)
CHOLESTEROL, FASTING: 207 MG/DL
CHOLESTEROL/HDL RATIO: 8
CO2: 24 MMOL/L (ref 20–31)
CREAT SERPL-MCNC: 0.81 MG/DL (ref 0.5–0.9)
DIFFERENTIAL TYPE: ABNORMAL
EOSINOPHILS RELATIVE PERCENT: 3 %
GFR AFRICAN AMERICAN: >60 ML/MIN
GFR NON-AFRICAN AMERICAN: >60 ML/MIN
GFR SERPL CREATININE-BSD FRML MDRD: ABNORMAL ML/MIN/{1.73_M2}
GFR SERPL CREATININE-BSD FRML MDRD: ABNORMAL ML/MIN/{1.73_M2}
GLUCOSE BLD-MCNC: 101 MG/DL (ref 70–99)
HCG QUALITATIVE: NEGATIVE
HCT VFR BLD CALC: 42.4 % (ref 36–46)
HDLC SERPL-MCNC: 26 MG/DL
HEMOGLOBIN: 13.6 G/DL (ref 12–16)
IMMATURE GRANULOCYTES: ABNORMAL %
LDL CHOLESTEROL: 118 MG/DL (ref 0–130)
LYMPHOCYTES # BLD: 30 %
MCH RBC QN AUTO: 25.8 PG (ref 26–34)
MCHC RBC AUTO-ENTMCNC: 32 G/DL (ref 31–37)
MCV RBC AUTO: 80.5 FL (ref 80–100)
MONOCYTES # BLD: 8 %
PDW BLD-RTO: 17.8 % (ref 12.5–15.4)
PLATELET # BLD: 313 K/UL (ref 140–450)
PLATELET ESTIMATE: ABNORMAL
PMV BLD AUTO: 7.8 FL (ref 6–12)
POTASSIUM SERPL-SCNC: 4.4 MMOL/L (ref 3.7–5.3)
RBC # BLD: 5.27 M/UL (ref 4–5.2)
RBC # BLD: ABNORMAL 10*6/UL
SEG NEUTROPHILS: 58 %
SEGMENTED NEUTROPHILS ABSOLUTE COUNT: 5.1 K/UL (ref 1.8–7.7)
SODIUM BLD-SCNC: 145 MMOL/L (ref 135–144)
T3 FREE: 3.5 PG/ML (ref 2.02–4.43)
THYROXINE, FREE: 1.25 NG/DL (ref 0.93–1.7)
TOTAL PROTEIN: 7.7 G/DL (ref 6.4–8.3)
TRIGLYCERIDE, FASTING: 316 MG/DL
TSH SERPL DL<=0.05 MIU/L-ACNC: 0.24 MIU/L (ref 0.3–5)
VLDLC SERPL CALC-MCNC: ABNORMAL MG/DL (ref 1–30)
WBC # BLD: 8.7 K/UL (ref 3.5–11)
WBC # BLD: ABNORMAL 10*3/UL

## 2017-10-24 LAB
HAV IGM SER IA-ACNC: NONREACTIVE
HEPATITIS B CORE IGM ANTIBODY: NONREACTIVE
HEPATITIS B SURFACE ANTIGEN: NONREACTIVE
HEPATITIS C ANTIBODY: REACTIVE
HIV AG/AB: NONREACTIVE

## 2017-10-26 LAB
QUANTIFERON (R) TB GOLD (INCUBATED): NEGATIVE
QUANTIFERON MITOGEN: >10 IU/ML
QUANTIFERON NIL: 0.03 IU/ML
QUANTIFERON TB AG MINUS NIL: 0 IU/ML (ref 0–0.34)

## 2017-10-29 ENCOUNTER — HOSPITAL ENCOUNTER (INPATIENT)
Age: 42
LOS: 8 days | Discharge: HOME | DRG: 885 | End: 2017-11-06
Attending: EMERGENCY MEDICINE | Admitting: PSYCHIATRY & NEUROLOGY
Payer: COMMERCIAL

## 2017-10-29 ENCOUNTER — APPOINTMENT (OUTPATIENT)
Dept: GENERAL RADIOLOGY | Age: 42
DRG: 885 | End: 2017-10-29
Payer: COMMERCIAL

## 2017-10-29 DIAGNOSIS — M79.643 PAIN OF HAND, UNSPECIFIED LATERALITY: ICD-10-CM

## 2017-10-29 DIAGNOSIS — F17.200 SMOKER: ICD-10-CM

## 2017-10-29 DIAGNOSIS — R45.851 SUICIDAL IDEATION: ICD-10-CM

## 2017-10-29 DIAGNOSIS — F10.920 ACUTE ALCOHOLIC INTOXICATION WITHOUT COMPLICATION (HCC): Primary | ICD-10-CM

## 2017-10-29 PROCEDURE — 99285 EMERGENCY DEPT VISIT HI MDM: CPT

## 2017-10-29 PROCEDURE — 73130 X-RAY EXAM OF HAND: CPT

## 2017-10-29 PROCEDURE — 1240000000 HC EMOTIONAL WELLNESS R&B

## 2017-10-29 RX ORDER — NICOTINE 21 MG/24HR
1 PATCH, TRANSDERMAL 24 HOURS TRANSDERMAL DAILY
Status: DISCONTINUED | OUTPATIENT
Start: 2017-10-30 | End: 2017-10-30

## 2017-10-29 ASSESSMENT — PAIN DESCRIPTION - LOCATION: LOCATION: HAND

## 2017-10-29 ASSESSMENT — PAIN SCALES - GENERAL: PAINLEVEL_OUTOF10: 9

## 2017-10-29 ASSESSMENT — PAIN DESCRIPTION - DESCRIPTORS: DESCRIPTORS: ACHING

## 2017-10-29 ASSESSMENT — PAIN DESCRIPTION - ORIENTATION: ORIENTATION: RIGHT

## 2017-10-29 NOTE — ED PROVIDER NOTES
[facility]     Emergency Department     Faculty Attestation    I performed a history and physical examination of the patient and discussed management with the resident. I reviewed the residents note and agree with the documented findings and plan of care. Any areas of disagreement are noted on the chart. I was personally present for the key portions of any procedures. I have documented in the chart those procedures where I was not present during the key portions. I have reviewed the emergency nurses triage note. I agree with the chief complaint, past medical history, past surgical history, allergies, medications, social and family history as documented unless otherwise noted below. Documentation of the HPI, Physical Exam and Medical Decision Making performed by medical students or scribes is based on my personal performance of the HPI, PE and MDM. For APC cases, I have personally evaluated and examined the patient in conjunction with the APC and agree with the assessment, treatment plan, and disposition of the patient as recorded by the APCAdditional findings are as noted.       CHIEF COMPLAINT       Chief Complaint   Patient presents with    Suicidal    Alcohol Intoxication       RECENT VITALS:   Pulse 96   Temp 97.5 °F (36.4 °C) (Axillary)   Resp 18   Ht 5' 3\" (1.6 m)   Wt 145 lb (65.8 kg)   SpO2 98%   BMI 25.69 kg/m²       PERTINENT ATTENDING PHYSICIAN COMMENTS:              Stephany Givens MD, MABEL, [address] [facility]  Attending Emergency Physician           Stephany Givens MD  10/29/17 3245
use Yes      Comment: 3 pints daily    Drug use:      Frequency: 7.0 times per week     Types: Cocaine, Marijuana, IV      Comment: heroin last used 4/24/17     Sexual activity: Not Currently     Other Topics Concern    Not on file     Social History Narrative    No narrative on file       Family History   Problem Relation Age of Onset    Brain Cancer Mother     Cancer Mother      \"female cancer\"       Allergies:  Zithromax [azithromycin]    Home Medications:  Prior to Admission medications    Medication Sig Start Date End Date Taking? Authorizing Provider   omeprazole (PRILOSEC) 20 MG delayed release capsule Take 20 mg by mouth 2 times daily    Historical Provider, MD   mirtazapine (REMERON) 15 MG tablet Take 15 mg by mouth nightly    Historical Provider, MD   QUEtiapine (SEROQUEL) 200 MG tablet Take 1 tablet by mouth nightly 6/8/17   Rajat Phillip MD   therapeutic multivitamin-minerals [facility]) tablet Take 1 tablet by mouth daily. Historical Provider, MD       REVIEW OF SYSTEMS    (2-9 systems for level 4, 10 or more for level 5)      ROS:  Constitutional: Denied fever, weight loss  Eyes: Denied change in vision, eye pain  ENT: Denied sinus problems, congestion/obstruction  Respiratory: Denies shortness of breath, chest pain upon inspiration  CV: Denied edema, chest pain, palpitations  GI: Denies nausea, vomiting, constipation, diarrhea, change in stools   : Denied change in urination, hematuria,   MS: Denied muscle stiffness, arthritis  Neuro: Denies weakness, headaches and seizures  Endocrine Denies polyphagia, polydipsia,   Hematological/lympathic: Denies lymphadenopathy, bleeds easily  Integumentary:Denies skin changes, rashes    PHYSICAL EXAM   (up to 7 for level 4, 8 or more for level 5)      INITIAL VITALS:  height is 5' (1.524 m) and weight is 140 lb (63.5 kg). Her oral temperature is 98.4 °F (36.9 °C). Her blood pressure is 122/86 and her pulse is 113.  Her respiration is 14 and oxygen
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
## 2017-10-30 LAB
ABSOLUTE EOS #: 0.1 K/UL (ref 0–0.4)
ABSOLUTE IMMATURE GRANULOCYTE: ABNORMAL K/UL (ref 0–0.3)
ABSOLUTE LYMPH #: 1.9 K/UL (ref 1–4.8)
ABSOLUTE MONO #: 0.9 K/UL (ref 0.1–1.3)
ALBUMIN SERPL-MCNC: 3.5 G/DL (ref 3.5–5.2)
ALBUMIN/GLOBULIN RATIO: ABNORMAL (ref 1–2.5)
ALP BLD-CCNC: 116 U/L (ref 35–104)
ALT SERPL-CCNC: 95 U/L (ref 5–33)
ANION GAP SERPL CALCULATED.3IONS-SCNC: 14 MMOL/L (ref 9–17)
AST SERPL-CCNC: 82 U/L
BASOPHILS # BLD: 1 %
BASOPHILS ABSOLUTE: 0 K/UL (ref 0–0.2)
BILIRUB SERPL-MCNC: 0.34 MG/DL (ref 0.3–1.2)
BUN BLDV-MCNC: 10 MG/DL (ref 6–20)
BUN/CREAT BLD: ABNORMAL (ref 9–20)
CALCIUM SERPL-MCNC: 8.3 MG/DL (ref 8.6–10.4)
CHLORIDE BLD-SCNC: 100 MMOL/L (ref 98–107)
CO2: 24 MMOL/L (ref 20–31)
CREAT SERPL-MCNC: 0.78 MG/DL (ref 0.5–0.9)
DIFFERENTIAL TYPE: ABNORMAL
EOSINOPHILS RELATIVE PERCENT: 2 %
GFR AFRICAN AMERICAN: >60 ML/MIN
GFR NON-AFRICAN AMERICAN: >60 ML/MIN
GFR SERPL CREATININE-BSD FRML MDRD: ABNORMAL ML/MIN/{1.73_M2}
GFR SERPL CREATININE-BSD FRML MDRD: ABNORMAL ML/MIN/{1.73_M2}
GLUCOSE BLD-MCNC: 213 MG/DL (ref 70–99)
HCT VFR BLD CALC: 40.4 % (ref 36–46)
HEMOGLOBIN: 13 G/DL (ref 12–16)
IMMATURE GRANULOCYTES: ABNORMAL %
LYMPHOCYTES # BLD: 28 %
MCH RBC QN AUTO: 25.7 PG (ref 26–34)
MCHC RBC AUTO-ENTMCNC: 32.1 G/DL (ref 31–37)
MCV RBC AUTO: 80.1 FL (ref 80–100)
MONOCYTES # BLD: 14 %
PDW BLD-RTO: 16.6 % (ref 11.5–14.9)
PLATELET # BLD: 274 K/UL (ref 150–450)
PLATELET ESTIMATE: ABNORMAL
PMV BLD AUTO: 7.2 FL (ref 6–12)
POTASSIUM SERPL-SCNC: 4.5 MMOL/L (ref 3.7–5.3)
RBC # BLD: 5.04 M/UL (ref 4–5.2)
RBC # BLD: ABNORMAL 10*6/UL
SEG NEUTROPHILS: 55 %
SEGMENTED NEUTROPHILS ABSOLUTE COUNT: 3.7 K/UL (ref 1.3–9.1)
SODIUM BLD-SCNC: 138 MMOL/L (ref 135–144)
TOTAL PROTEIN: 7.3 G/DL (ref 6.4–8.3)
WBC # BLD: 6.7 K/UL (ref 3.5–11)
WBC # BLD: ABNORMAL 10*3/UL

## 2017-10-30 PROCEDURE — 1240000000 HC EMOTIONAL WELLNESS R&B

## 2017-10-30 PROCEDURE — 6370000000 HC RX 637 (ALT 250 FOR IP): Performed by: PSYCHIATRY & NEUROLOGY

## 2017-10-30 PROCEDURE — 6370000000 HC RX 637 (ALT 250 FOR IP): Performed by: EMERGENCY MEDICINE

## 2017-10-30 PROCEDURE — 6360000002 HC RX W HCPCS: Performed by: EMERGENCY MEDICINE

## 2017-10-30 PROCEDURE — 85025 COMPLETE CBC W/AUTO DIFF WBC: CPT

## 2017-10-30 PROCEDURE — 90715 TDAP VACCINE 7 YRS/> IM: CPT | Performed by: EMERGENCY MEDICINE

## 2017-10-30 PROCEDURE — 90471 IMMUNIZATION ADMIN: CPT | Performed by: EMERGENCY MEDICINE

## 2017-10-30 PROCEDURE — 80053 COMPREHEN METABOLIC PANEL: CPT

## 2017-10-30 PROCEDURE — 36415 COLL VENOUS BLD VENIPUNCTURE: CPT

## 2017-10-30 RX ORDER — GABAPENTIN 300 MG/1
600 CAPSULE ORAL 2 TIMES DAILY
Status: DISCONTINUED | OUTPATIENT
Start: 2017-10-30 | End: 2017-11-06 | Stop reason: HOSPADM

## 2017-10-30 RX ORDER — FOLIC ACID 1 MG/1
1 TABLET ORAL DAILY
Status: DISCONTINUED | OUTPATIENT
Start: 2017-10-30 | End: 2017-11-06 | Stop reason: HOSPADM

## 2017-10-30 RX ORDER — HYDROXYZINE HYDROCHLORIDE 25 MG/1
25 TABLET, FILM COATED ORAL 3 TIMES DAILY PRN
Status: DISCONTINUED | OUTPATIENT
Start: 2017-10-30 | End: 2017-10-30

## 2017-10-30 RX ORDER — TRAZODONE HYDROCHLORIDE 100 MG/1
100 TABLET ORAL NIGHTLY
Status: DISCONTINUED | OUTPATIENT
Start: 2017-10-30 | End: 2017-11-06 | Stop reason: HOSPADM

## 2017-10-30 RX ORDER — TRAZODONE HYDROCHLORIDE 50 MG/1
50 TABLET ORAL NIGHTLY PRN
Status: DISCONTINUED | OUTPATIENT
Start: 2017-10-30 | End: 2017-10-30

## 2017-10-30 RX ORDER — PANTOPRAZOLE SODIUM 40 MG/1
40 TABLET, DELAYED RELEASE ORAL
Status: DISCONTINUED | OUTPATIENT
Start: 2017-10-30 | End: 2017-10-31

## 2017-10-30 RX ORDER — M-VIT,TX,IRON,MINS/CALC/FOLIC 27MG-0.4MG
1 TABLET ORAL DAILY
Status: DISCONTINUED | OUTPATIENT
Start: 2017-10-30 | End: 2017-11-06 | Stop reason: HOSPADM

## 2017-10-30 RX ORDER — BENZTROPINE MESYLATE 1 MG/ML
2 INJECTION INTRAMUSCULAR; INTRAVENOUS DAILY PRN
Status: DISCONTINUED | OUTPATIENT
Start: 2017-10-30 | End: 2017-11-06 | Stop reason: HOSPADM

## 2017-10-30 RX ORDER — CHLORDIAZEPOXIDE HYDROCHLORIDE 25 MG/1
25 CAPSULE, GELATIN COATED ORAL 2 TIMES DAILY
Status: DISCONTINUED | OUTPATIENT
Start: 2017-10-30 | End: 2017-11-01

## 2017-10-30 RX ORDER — VENLAFAXINE HYDROCHLORIDE 75 MG/1
75 CAPSULE, EXTENDED RELEASE ORAL
Status: DISCONTINUED | OUTPATIENT
Start: 2017-10-30 | End: 2017-11-06 | Stop reason: HOSPADM

## 2017-10-30 RX ORDER — FAMOTIDINE 20 MG/1
20 TABLET, FILM COATED ORAL 2 TIMES DAILY PRN
Status: DISCONTINUED | OUTPATIENT
Start: 2017-10-30 | End: 2017-11-06 | Stop reason: HOSPADM

## 2017-10-30 RX ORDER — MAGNESIUM HYDROXIDE/ALUMINUM HYDROXICE/SIMETHICONE 120; 1200; 1200 MG/30ML; MG/30ML; MG/30ML
20 SUSPENSION ORAL 3 TIMES DAILY PRN
Status: DISCONTINUED | OUTPATIENT
Start: 2017-10-30 | End: 2017-11-06 | Stop reason: HOSPADM

## 2017-10-30 RX ORDER — NICOTINE 21 MG/24HR
1 PATCH, TRANSDERMAL 24 HOURS TRANSDERMAL DAILY
Status: DISCONTINUED | OUTPATIENT
Start: 2017-10-30 | End: 2017-11-06 | Stop reason: HOSPADM

## 2017-10-30 RX ORDER — CITALOPRAM 20 MG/1
20 TABLET ORAL DAILY
Status: DISCONTINUED | OUTPATIENT
Start: 2017-10-30 | End: 2017-11-06 | Stop reason: HOSPADM

## 2017-10-30 RX ORDER — HYDROXYZINE HYDROCHLORIDE 25 MG/1
50 TABLET, FILM COATED ORAL 3 TIMES DAILY PRN
Status: DISCONTINUED | OUTPATIENT
Start: 2017-10-30 | End: 2017-11-06 | Stop reason: HOSPADM

## 2017-10-30 RX ORDER — THIAMINE MONONITRATE (VIT B1) 100 MG
100 TABLET ORAL DAILY
Status: DISCONTINUED | OUTPATIENT
Start: 2017-10-30 | End: 2017-11-06 | Stop reason: HOSPADM

## 2017-10-30 RX ORDER — ACETAMINOPHEN 325 MG/1
650 TABLET ORAL EVERY 4 HOURS PRN
Status: DISCONTINUED | OUTPATIENT
Start: 2017-10-30 | End: 2017-11-06 | Stop reason: HOSPADM

## 2017-10-30 RX ORDER — QUETIAPINE FUMARATE 200 MG/1
200 TABLET, FILM COATED ORAL NIGHTLY
Status: DISCONTINUED | OUTPATIENT
Start: 2017-10-30 | End: 2017-11-06 | Stop reason: HOSPADM

## 2017-10-30 RX ADMIN — HYDROXYZINE HYDROCHLORIDE 50 MG: 25 TABLET, FILM COATED ORAL at 17:02

## 2017-10-30 RX ADMIN — ALUMINUM HYDROXIDE, MAGNESIUM HYDROXIDE, AND SIMETHICONE 20 ML: 200; 200; 20 SUSPENSION ORAL at 14:18

## 2017-10-30 RX ADMIN — QUETIAPINE FUMARATE 200 MG: 200 TABLET ORAL at 21:09

## 2017-10-30 RX ADMIN — FAMOTIDINE 20 MG: 20 TABLET, FILM COATED ORAL at 17:02

## 2017-10-30 RX ADMIN — MULTIPLE VITAMINS W/ MINERALS TAB 1 TABLET: TAB at 12:44

## 2017-10-30 RX ADMIN — GABAPENTIN 600 MG: 300 CAPSULE ORAL at 21:09

## 2017-10-30 RX ADMIN — CITALOPRAM HYDROBROMIDE 20 MG: 20 TABLET ORAL at 10:00

## 2017-10-30 RX ADMIN — Medication 100 MG: at 12:44

## 2017-10-30 RX ADMIN — PANTOPRAZOLE SODIUM 40 MG: 40 TABLET, DELAYED RELEASE ORAL at 10:00

## 2017-10-30 RX ADMIN — TRAZODONE HYDROCHLORIDE 100 MG: 100 TABLET ORAL at 21:09

## 2017-10-30 RX ADMIN — FOLIC ACID 1 MG: 1 TABLET ORAL at 12:44

## 2017-10-30 RX ADMIN — HYDROXYZINE HYDROCHLORIDE 50 MG: 25 TABLET, FILM COATED ORAL at 10:04

## 2017-10-30 RX ADMIN — VENLAFAXINE HYDROCHLORIDE 75 MG: 75 CAPSULE, EXTENDED RELEASE ORAL at 10:01

## 2017-10-30 RX ADMIN — TETANUS TOXOID, REDUCED DIPHTHERIA TOXOID AND ACELLULAR PERTUSSIS VACCINE, ADSORBED 0.5 ML: 5; 2.5; 8; 8; 2.5 SUSPENSION INTRAMUSCULAR at 00:11

## 2017-10-30 RX ADMIN — GABAPENTIN 600 MG: 300 CAPSULE ORAL at 10:00

## 2017-10-30 RX ADMIN — CHLORDIAZEPOXIDE HYDROCHLORIDE 25 MG: 25 CAPSULE ORAL at 10:01

## 2017-10-30 RX ADMIN — CHLORDIAZEPOXIDE HYDROCHLORIDE 25 MG: 25 CAPSULE ORAL at 21:09

## 2017-10-30 ASSESSMENT — SLEEP AND FATIGUE QUESTIONNAIRES
AVERAGE NUMBER OF SLEEP HOURS: 3
DIFFICULTY STAYING ASLEEP: YES
RESTFUL SLEEP: NO
DIFFICULTY FALLING ASLEEP: YES
SLEEP PATTERN: DIFFICULTY FALLING ASLEEP;DIFFICULTY ARISING;RESTLESSNESS;NIGHTMARES/TERRORS
DO YOU USE A SLEEP AID: NO
DO YOU HAVE DIFFICULTY SLEEPING: YES
RESTFUL SLEEP: NO
DIFFICULTY ARISING: YES
DO YOU USE A SLEEP AID: NO
DO YOU HAVE DIFFICULTY SLEEPING: YES
DIFFICULTY STAYING ASLEEP: YES
SLEEP PATTERN: DIFFICULTY FALLING ASLEEP;DIFFICULTY ARISING;DISTURBED/INTERRUPTED SLEEP;RESTLESSNESS
DIFFICULTY FALLING ASLEEP: YES
DIFFICULTY ARISING: NO
AVERAGE NUMBER OF SLEEP HOURS: 3

## 2017-10-30 ASSESSMENT — PAIN DESCRIPTION - PAIN TYPE: TYPE: ACUTE PAIN

## 2017-10-30 ASSESSMENT — PAIN DESCRIPTION - LOCATION: LOCATION: ABDOMEN

## 2017-10-30 ASSESSMENT — LIFESTYLE VARIABLES
HISTORY_ALCOHOL_USE: YES

## 2017-10-30 ASSESSMENT — PATIENT HEALTH QUESTIONNAIRE - PHQ9: SUM OF ALL RESPONSES TO PHQ QUESTIONS 1-9: 18

## 2017-10-30 ASSESSMENT — PAIN SCALES - GENERAL: PAINLEVEL_OUTOF10: 7

## 2017-10-30 NOTE — PLAN OF CARE
Problem: Depressive Behavior with or without Suicide precautions  Goal: STG-Able to verbalize suicidal ideations  Outcome: Ongoing  Patient did not attend psychotherapy group despite encouragement from staff at 1000.
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
## 2017-10-30 NOTE — PLAN OF CARE
Problem: Depressive Behavior with or without Suicide precautions  Goal: STG-Able to verbalize suicidal ideations  Outcome: Ongoing  [facility]  Initial Interdisciplinary Treatment Plan NO      Original treatment plan Date & Time: 10/30/17 0737    Admission Type:  Admission Type: Voluntary    Reason for admission:   Reason for Admission: Pt voluntary from [facility] ED with suicidal ideation to overdose.     Estimated Length of Stay:  5-7days  Estimated Discharge Date: to be determined by physician    PATIENT STRENGTHS:  Patient Strengths:Strengths: Connection to output provider, Medication Compliance  Patient Strengths and Limitations:Limitations: Tendency to isolate self, Inappropriate/potentially harmful leisure interests, Hopeless about future  Addictive Behavior: Addictive Behavior  In the past 3 months, have you felt or has someone told you that you have a problem with:  : None  Do you have a history of Chemical Use?: No  Do you have a history of Alcohol Use?: Yes  Do you have a history of Street Drug Abuse?: Yes  Histroy of Prescripton Drug Abuse?: No  Medical Problems:  Past Medical History:   Diagnosis Date    Anxiety     Asthma     Depression      Status EXAM:Status and Exam  Normal: No  Facial Expression: Expressionless, Sad, Avoids Gaze  Affect: Constricted  Level of Consciousness: Alert  Mood:Normal: No  Mood: Depressed, Helpless, Sad, Angry  Motor Activity:Normal: Yes  Interview Behavior: Cooperative  Preception: [place] to Person, [place] to Time, [place] to Place, [place] to Situation  Attention:Normal: Yes  Thought Content:Normal: No  Thought Content: Preoccupations  Hallucinations: None  Delusions: No  Memory:Normal: Yes  Insight and Judgment: No  Insight and Judgment: Poor Judgment, Poor Insight  Present Suicidal Ideation: Yes (plan to OD on pills or heroin)  Present Homicidal Ideation: No    EDUCATION:   Learner Progress Toward Treatment Goals: reviewed group plans and strategies for care    Method:group therapy, medication compliance, individualized assessments and care planning    Outcome: needs reinforcement    PATIENT GOALS: to be discussed with patient within 72 hours    PLAN/TREATMENT RECOMMENDATIONS:     continue group therapy , medications compliance, goal setting, individualized assessments and care, continue to monitor pt on unit      SHORT-TERM GOALS:   Time frame for Short-Term Goals: 5-7 days    LONG-TERM GOALS:  Time frame for Long-Term Goals: 6 months  Members Present in Team Meeting: See Signature Sheet    KRYSTIAN Haro    Goal: LTG-Absence of self-harm  Outcome: Ongoing      Problem: Substance Abuse  Goal: LTG-Absence of acute withdrawl symptoms  Outcome: Ongoing    Goal: STG-Knowledge of positive coping patterns  Outcome: Ongoing

## 2017-10-30 NOTE — BH NOTE
Patient given tobacco quitline number 71306161307 at this time, refusing to call at this time, states \" I just dont want to quit now\"- patient given information as to the dangers of long term tobacco use. Continue to reinforce the importance of tobacco cessation.

## 2017-10-30 NOTE — PLAN OF CARE
Problem: Substance Abuse  Goal: STG-Knowledge of positive coping patterns  Outcome: Ongoing  Pt did not attend RT group at 6912-1381 d/t resting in room despite staff invitation to attend.

## 2017-10-30 NOTE — PLAN OF CARE
Problem: Depressive Behavior with or without Suicide precautions  Goal: STG-Able to verbalize suicidal ideations  Outcome: Ongoing  Pt did not attend creative expression skills group at 1100 despite staff invitation to attend.

## 2017-10-30 NOTE — PLAN OF CARE
Problem: Depressive Behavior with or without Suicide precautions  Goal: STG-Able to verbalize suicidal ideations  Outcome: Ongoing  Pt did not attend Community meeting/goal setting at 0900 d/t resting in room despite staff invitation to attend.

## 2017-10-30 NOTE — CARE COORDINATION
BHI Biopsychosocial Assessment    Current Level of Psychosocial Functioning     Independent X  Dependent    Minimal Assist     Comments:    Psychosocial High Risk Factors (check all that apply)    Unable to obtain meds   Chronic illness/pain    Substance abuse X  Lack of Family Support X  Financial stress X  Isolation X  Inadequate Community Resources  Suicide attempt(s)  Not taking medications X  Victim of crime   Developmental Delay  Unable to manage personal needs    Age 72 or older   Homeless X  No transportation   Readmission within 30 days  Unemployment X  Traumatic Event    Comments:   Psychiatric Advanced Directives: none on record    Family to Involve in Treatment: none requested    Sexual Orientation: homosexual    Patient Strengths: communication, insight, transportation    Patient Barriers: homeless, losses, lack of positive support      Opiate Education Provided:  yes      CMHC/mental health history: previous admission and linked with [facility]   medication management, group/individual therapies, family meetings, psycho -education, treatment team meetings to assist with stabilization    Initial Discharge Plan:  Pt. Has an apartment coming available on  at [facility] but would give it up to be placed in Rehab program follow up with Jerzy; [facility] second choice      Clinical Summary:    Pt is a 35 y.o.  female admitted for SI. Pt has history of admission for SI and substance abuse related issues. Pt reports mother  Aug 2017 and Father Oct 2017. Pt is homeless but has a car. Pt was removed from the [facility] program 2 weeks ago due to a positive drug test. Pt reports binge drinking and doing drugs for the pat two weeks. She has a placement in the [facility] coming available in two weeks but requests a residential sobriety program placement.

## 2017-10-30 NOTE — BH NOTE
`[facility] [place]  Admission Note     Admission Type:   Admission Type: Voluntary    Reason for admission:  Reason for Admission: Pt voluntary from [facility] with suicidal ideation to overdose. PATIENT STRENGTHS:  Strengths: Connection to output provider, Medication Compliance    Patient Strengths and Limitations:  Limitations: Tendency to isolate self, Inappropriate/potentially harmful leisure interests, Hopeless about future    Addictive Behavior:   Addictive Behavior  In the past 3 months, have you felt or has someone told you that you have a problem with:  : None  Do you have a history of Chemical Use?: No  Do you have a history of Alcohol Use?: Yes  Do you have a history of Street Drug Abuse?: Yes  Histroy of Prescripton Drug Abuse?: No    Medical Problems:   Past Medical History:   Diagnosis Date    Anxiety     Asthma     Depression        Status EXAM:  Status and Exam  Normal: No  Facial Expression: Expressionless, Sad, Avoids Gaze  Affect: Constricted  Level of Consciousness: Alert  Mood:Normal: No  Mood: Depressed, Helpless, Sad, Angry  Motor Activity:Normal: Yes  Interview Behavior: Cooperative  Preception: [place] to Person, [place] to Time, [place] to Place, [place] to Situation  Attention:Normal: Yes  Thought Content:Normal: No  Thought Content: Preoccupations  Hallucinations: None  Delusions: No  Memory:Normal: Yes  Insight and Judgment: No  Insight and Judgment: Poor Judgment, Poor Insight  Present Suicidal Ideation: Yes (plan to OD on pills or heroin)  Present Homicidal Ideation: No    Pt admitted with followings belongings:  Dentures: None  Vision - Corrective Lenses: None  Hearing Aid: None  Jewelry: Necklace  Body Piercings Removed: N/A  Clothing: Footwear, Pants, Shirt, Socks  Were All Patient Medications Collected?: Yes  Other Valuables: Cell phone     Valuables placed in safe in security envelope, number:  [id number]. Patient's home medications were placed in safe.   Patient

## 2017-10-31 PROCEDURE — 6370000000 HC RX 637 (ALT 250 FOR IP): Performed by: PSYCHIATRY & NEUROLOGY

## 2017-10-31 PROCEDURE — 1240000000 HC EMOTIONAL WELLNESS R&B

## 2017-10-31 RX ORDER — PANTOPRAZOLE SODIUM 40 MG/1
40 TABLET, DELAYED RELEASE ORAL
Status: DISCONTINUED | OUTPATIENT
Start: 2017-10-31 | End: 2017-11-06 | Stop reason: HOSPADM

## 2017-10-31 RX ADMIN — Medication 100 MG: at 08:40

## 2017-10-31 RX ADMIN — FOLIC ACID 1 MG: 1 TABLET ORAL at 08:40

## 2017-10-31 RX ADMIN — TRAZODONE HYDROCHLORIDE 100 MG: 100 TABLET ORAL at 20:59

## 2017-10-31 RX ADMIN — GABAPENTIN 600 MG: 300 CAPSULE ORAL at 08:39

## 2017-10-31 RX ADMIN — MULTIPLE VITAMINS W/ MINERALS TAB 1 TABLET: TAB at 08:40

## 2017-10-31 RX ADMIN — CHLORDIAZEPOXIDE HYDROCHLORIDE 25 MG: 25 CAPSULE ORAL at 20:59

## 2017-10-31 RX ADMIN — VENLAFAXINE HYDROCHLORIDE 75 MG: 75 CAPSULE, EXTENDED RELEASE ORAL at 08:40

## 2017-10-31 RX ADMIN — GABAPENTIN 600 MG: 300 CAPSULE ORAL at 20:59

## 2017-10-31 RX ADMIN — QUETIAPINE FUMARATE 200 MG: 200 TABLET ORAL at 20:59

## 2017-10-31 RX ADMIN — PANTOPRAZOLE SODIUM 40 MG: 40 TABLET, DELAYED RELEASE ORAL at 08:42

## 2017-10-31 RX ADMIN — CHLORDIAZEPOXIDE HYDROCHLORIDE 25 MG: 25 CAPSULE ORAL at 08:40

## 2017-10-31 RX ADMIN — CITALOPRAM HYDROBROMIDE 20 MG: 20 TABLET ORAL at 08:40

## 2017-10-31 NOTE — PLAN OF CARE
Problem: Substance Abuse  Goal: STG-Knowledge of positive coping patterns  Outcome: Ongoing  Pt did not attend RT group at 1330 d/t resting in room despite staff invitation to attend.

## 2017-10-31 NOTE — PLAN OF CARE
Problem: Substance Abuse  Goal: STG-Knowledge of positive coping patterns  Outcome: Ongoing  PSYCHOEDUCATION GROUP NOTE    Date: 10/31/17  Start Time: 0900  End Time: 0915    Number Participants in Group:  9/19     Goal:  Patient will demonstrate increased interpersonal interaction   Topic: Community Meeting     Discipline Responsible:   OT  AT  [facility]. x RT MHP Other       Participation Level:     None  Minimal   x Active Listener x Interactive    Monopolizing         Participation Quality:  x Appropriate  Inappropriate   x       Attentive        Intrusive   x       Sharing        Resistant          Supportive        Lethargic       Affective:    Congruent  Incongruent  Blunted  Flat   x Constricted  Anxious  Elated  Angry    Labile  Depressed  Other         Cognitive:  x Alert x Oriented PPTP     Concentration x G  F  P   Attention Span x G  F  P   Short-Term Memory x G  F  P   Long-Term Memory x G  F  P   ProblemSolving/  Decision Making  G x F  P   Ability to Process  Information  G x F  P      Contributing Factors             Delusional             Hallucinating             Flight of Ideas             Other:       Modes of Intervention:  x Education x Support x Exploration   x Clarifying x Problem Solving  Confrontation    Socialization  Limit Setting x Reality Testing    Activity  Movement  Media    Other:            Response to Learning:   Able to verbalize current knowledge/experience    Able to verbalize/acknowledge new learning    Able to retain information    Capable of insight    Able to change behavior   x Progressing to goal    Other:        Comments:

## 2017-10-31 NOTE — PLAN OF CARE
Problem: Substance Abuse  Goal: STG-Knowledge of positive coping patterns  Outcome: Ongoing  PSYCHOTHERAPY GROUP NOTE       Date:   10/31/17               Start Time:    1000                     End Time: 1045      Number Participants in Group: 9/17      Goals: relationships and recovery         RT  SW  Nsg  LPN   BHTII  XX LPC       Participation Level:     None  Minimal   X Active Listener X Interactive    Monopolizing         Participation Quality:  x Appropriate  Inappropriate   x  Attentive   Intrusive   x  Sharing   Resistant   x  Supportive    Lethargic       Affective:   x Congruent  Incongruent  Blunted  Flat    Constricted  Anxious  Elated  Angry    Labile  Depressed  Other         Cognitive:  x Alert x Oriented PPTS     Concentration G x F  P    Attention Span G x F  P    Short-Term Memory G x F  P    Long-Term Memory G x F  P    ProblemSolving/  Decision Making G x F  P    Ability to Process  Information G x F  P       Contributing Factors             Delusional             Hallucinating             Flight of Ideas             Other: poor concentration       Modes of Intervention:   Education x Support x Exploration   x Clarifying x Problem Solving  Confrontation    Socialization  Limit Setting  Reality Testing    Activity  Movement  Media    Other:          Response to Learning:  x Able to verbalize current knowledge/experience   x Able to verbalize/acknowledge new learning   x Able to retain information   x Capable of insight   x Able to change behavior   x Progressing to goal    Other:        Comments:  participated fully in group

## 2017-10-31 NOTE — PLAN OF CARE
Problem: Depressive Behavior with or without Suicide precautions  Goal: LTG-Absence of self-harm  Outcome: Ongoing  Psychoeducation Group Note    Date: 10/31//17  Start Time: 1100  End Time: 4181    Number Participants in Group:  7    Goal:  Patient will demonstrate increased interpersonal interaction. Topic:  Social skills    Discipline Responsible:   OT  AT  [facility]. X RT  Other       Participation Level:     None  Minimal   x Active Listener x Interactive    Monopolizing         Participation Quality:  x Appropriate  Inappropriate   x       Attentive        Intrusive   x       Sharing        Resistant          Supportive        Lethargic       Affective:   x Congruent  Incongruent  Blunted  Flat    Constricted  Anxious  Elated  Angry    Labile  Depressed  Other  Bright       Cognitive:  x Alert x Oriented PPTP     Concentration x G  F  P   Attention Span x G  F  P   Short-Term Memory  G  F  P   Long-Term Memory  G  F  P   ProblemSolving/  Decision Making x G  F  P   Ability to Process  Information x G  F  P      Contributing Factors             Delusional             Hallucinating             Flight of Ideas             Other:       Modes of Intervention:   Education  Support  Exploration    Clarifying x Problem Solving x Confrontation   x Socialization  Limit Setting  Reality Testing    Activity  Movement  Media    Other:            Response to Learning:  x Able to verbalize current knowledge/experience   x Able to verbalize/acknowledge new learning   x Able to retain information   x Capable of insight   x Able to change behavior   x Progressing to goal    Other:        Comments: pt attended group and participated.

## 2017-10-31 NOTE — PLAN OF CARE
Problem: Depressive Behavior with or without Suicide precautions  Goal: LTG-Absence of self-harm  Outcome: Ongoing  [facility]  Day 3 Interdisciplinary Treatment Plan NOTE    Review Date & Time: 10/31/17 1320    Admission Type:   Admission Type: Voluntary    Reason for admission:  Reason for Admission: Pt voluntary from [facility] with suicidal ideation to overdose. Estimated Length of Stay: 5-7 days  Estimated Discharge Date Update: to be determined by physician    PATIENT STRENGTHS:  Patient Strengths Strengths: Connection to output provider, Medication Compliance  Patient Strengths and Limitations:Limitations: Inappropriate/potentially harmful leisure interests, Apathetic / unmotivated, Multiple barriers to leisure interests, Tendency to isolate self, Lacks leisure interests, General negative or hopeless attitude about future/recovery  Addictive Behavior:Addictive Behavior  In the past 3 months, have you felt or has someone told you that you have a problem with:  : None  Do you have a history of Chemical Use?: Yes  Do you have a history of Alcohol Use?: Yes  Do you have a history of Street Drug Abuse?: Yes  Histroy of Prescripton Drug Abuse?: Yes  Medical Problems:  Past Medical History:   Diagnosis Date    Anxiety     Asthma     Depression        Risk:  Fall RiskTotal: 65  Tee Scale Tee Scale Score: 22  BVC Total: 0  Change in scores no Changes to plan of Care no    Status EXAM:   Status and Exam  Normal: No  Facial Expression: Flat, Sad  Affect: Constricted  Level of Consciousness: Alert  Mood:Normal: No  Mood: Depressed, Sad, Helpless  Motor Activity:Normal: No  Motor Activity: Decreased  Interview Behavior: Cooperative  Preception: [place] to Person, [place] to Time, [place] to Place, [place] to Situation  Attention:Normal: No  Attention: Distractible  Thought Processes: Circumstantial  Thought Content:Normal: No  Thought Content: Preoccupations  Hallucinations:  Auditory (Comment), Visual (Comment)  Delusions: No  Memory:Normal: Yes  Insight and Judgment: No  Insight and Judgment: Poor Judgment, Poor Insight  Present Suicidal Ideation: Yes  Present Homicidal Ideation: No    Daily Assessment Last Entry:   Daily Sleep (WDL): Within Defined Limits         Patient Currently in Pain: Denies  Daily Nutrition (WDL): Within Defined Limits    Patient Monitoring:  Frequency of Checks: 4 times per hour, close    Psychiatric Symptoms:   Depression Symptoms  Depression Symptoms: Feelings of helplessness, Feelings of hopelessess, Loss of interest, Isolative, Impaired concentration  Anxiety Symptoms  Anxiety Symptoms: Generalized, Obsessions  Ingrid Symptoms  Ingrid Symptoms: No problems reported or observed. Psychosis Symptoms  Delusion Type: No problems reported or observed. Suicide Risk CSSR-S:  1) Within the past month, have you wished you were dead or wished you could go to sleep and not wake up? : YES  2) Within the past month, have you actually had any thoughts of killing yourself? : YES  3) Within the past month, have you been thinking about how you might kill yourself? : YES  5) Within the past month, have you started to work out or worked out the details of how to kill yourself?  Do you intend to carry out this plan? : YES  6)  Have you ever done anything, started to do anything, or prepared to do anything to end your life?: YES  Change in Result n/a Change in Plan of care n/a      EDUCATION:   EDUCATION:   Learner Progress Toward Treatment Goals: Reviewed results and recommendations of this team, Reviewed group plan and strategies, Reviewed signs, symptoms and risk of self harm and violent behavior, Reviewed goals and plan of care    Method:small group, individual verbal education    Outcome:verbalized by patient, but needs reinforcement to obtain goals    PATIENT GOALS:  Short term:\"work on talking more\"  Long term:\"stay clean and sober\"    PLAN/TREATMENT RECOMMENDATIONS UPDATE: continue with

## 2017-11-01 PROCEDURE — 1240000000 HC EMOTIONAL WELLNESS R&B

## 2017-11-01 PROCEDURE — 6370000000 HC RX 637 (ALT 250 FOR IP): Performed by: PSYCHIATRY & NEUROLOGY

## 2017-11-01 RX ADMIN — PANTOPRAZOLE SODIUM 40 MG: 40 TABLET, DELAYED RELEASE ORAL at 08:38

## 2017-11-01 RX ADMIN — GABAPENTIN 600 MG: 300 CAPSULE ORAL at 08:37

## 2017-11-01 RX ADMIN — CHLORDIAZEPOXIDE HYDROCHLORIDE 25 MG: 25 CAPSULE ORAL at 08:38

## 2017-11-01 RX ADMIN — HYDROXYZINE HYDROCHLORIDE 50 MG: 25 TABLET, FILM COATED ORAL at 20:48

## 2017-11-01 RX ADMIN — GABAPENTIN 600 MG: 300 CAPSULE ORAL at 20:48

## 2017-11-01 RX ADMIN — MULTIPLE VITAMINS W/ MINERALS TAB 1 TABLET: TAB at 08:38

## 2017-11-01 RX ADMIN — TRAZODONE HYDROCHLORIDE 100 MG: 100 TABLET ORAL at 20:48

## 2017-11-01 RX ADMIN — HYDROXYZINE HYDROCHLORIDE 50 MG: 25 TABLET, FILM COATED ORAL at 13:23

## 2017-11-01 RX ADMIN — QUETIAPINE FUMARATE 200 MG: 200 TABLET ORAL at 20:48

## 2017-11-01 RX ADMIN — VENLAFAXINE HYDROCHLORIDE 75 MG: 75 CAPSULE, EXTENDED RELEASE ORAL at 08:38

## 2017-11-01 RX ADMIN — FOLIC ACID 1 MG: 1 TABLET ORAL at 08:38

## 2017-11-01 RX ADMIN — Medication 100 MG: at 08:38

## 2017-11-01 RX ADMIN — CITALOPRAM HYDROBROMIDE 20 MG: 20 TABLET ORAL at 08:38

## 2017-11-01 ASSESSMENT — PAIN DESCRIPTION - ORIENTATION: ORIENTATION: LOWER;MID

## 2017-11-01 ASSESSMENT — PAIN SCALES - GENERAL: PAINLEVEL_OUTOF10: 5

## 2017-11-01 ASSESSMENT — PAIN DESCRIPTION - LOCATION: LOCATION: BACK

## 2017-11-01 NOTE — PLAN OF CARE
Problem: Depressive Behavior with or without Suicide precautions  Goal: LTG-Absence of self-harm  Outcome: Ongoing  PSYCHOTHERAPY GROUP NOTE       Date:   11/01/17               Start Time:    1000                     End Time: 1045      Number Participants in Group: 11/16      Goals: relationships and recovery         RT  SW  Nsg  LPN   BHTII  XX LPC       Participation Level:     None  Minimal   X Active Listener X Interactive    Monopolizing         Participation Quality:  x Appropriate  Inappropriate   x  Attentive   Intrusive   x  Sharing   Resistant   x  Supportive    Lethargic       Affective:   x Congruent  Incongruent  Blunted  Flat    Constricted  Anxious  Elated  Angry    Labile  Depressed  Other         Cognitive:  x Alert x Oriented PPTS     Concentration G x F  P    Attention Span G x F  P    Short-Term Memory G x F  P    Long-Term Memory G x F  P    ProblemSolving/  Decision Making G x F  P    Ability to Process  Information G x F  P       Contributing Factors             Delusional             Hallucinating             Flight of Ideas             Other: poor concentration       Modes of Intervention:   Education x Support x Exploration   x Clarifying x Problem Solving  Confrontation    Socialization  Limit Setting  Reality Testing    Activity  Movement  Media    Other:          Response to Learning:  x Able to verbalize current knowledge/experience   x Able to verbalize/acknowledge new learning   x Able to retain information   x Capable of insight   x Able to change behavior   x Progressing to goal    Other:        Comments:  participated fully in group

## 2017-11-01 NOTE — PLAN OF CARE
Problem: Depressive Behavior with or without Suicide precautions  Goal: STG-Able to verbalize suicidal ideations  Outcome: Ongoing  Pt did not attend community meeting and goal setting group at 0900 despite staff invitation to attend.

## 2017-11-01 NOTE — PLAN OF CARE
Problem: Depressive Behavior with or without Suicide precautions  Goal: LTG-Absence of self-harm  Outcome: Ongoing  Psychoeducation Group Note    Date: 11/1/17  Start Time: 1100  End Time: 6781    Number Participants in Group:  7    Goal:  Patient will demonstrate increased interpersonal interaction. Topic:  Cognitive skills     Discipline Responsible:   OT  AT  [facility]. X RT  Other       Participation Level:     None  Minimal   x Active Listener x Interactive    Monopolizing         Participation Quality:  x Appropriate  Inappropriate   x       Attentive        Intrusive   x       Sharing        Resistant          Supportive        Lethargic       Affective:   x Congruent  Incongruent  Blunted  Flat    Constricted  Anxious  Elated  Angry    Labile  Depressed  Other  Bright       Cognitive:  x Alert x Oriented PPTP     Concentration x G  F  P   Attention Span x G  F  P   Short-Term Memory  G  F  P   Long-Term Memory  G  F  P   ProblemSolving/  Decision Making x G  F  P   Ability to Process  Information x G  F  P      Contributing Factors             Delusional             Hallucinating             Flight of Ideas             Other:       Modes of Intervention:   Education  Support  Exploration   x Clarifying x Problem Solving x Confrontation   x Socialization  Limit Setting  Reality Testing   x Activity  Movement x Media    Other:            Response to Learning:  x Able to verbalize current knowledge/experience   x Able to verbalize/acknowledge new learning    Able to retain information    Capable of insight   x Able to change behavior   x Progressing to goal    Other:        Comments: pt attended group and participated.

## 2017-11-01 NOTE — H&P
Cancer, Seizures,Thyroid disease, Kidney Disease, Hepatitis, TB.    SURGICAL HISTORY       Past Surgical History:   Procedure Laterality Date    CARPAL TUNNEL RELEASE      CHOLECYSTECTOMY      GASTRIC BYPASS SURGERY      HYSTERECTOMY         FAMILY HISTORY       Family History   Problem Relation Age of Onset    Brain Cancer Mother     Cancer Mother      \"female cancer\"       SOCIAL HISTORY       Social History     Social History    Marital status: Single     Spouse name: N/A    Number of children: N/A    Years of education: N/A     Social History Main Topics    Smoking status: Current Every Day Smoker     Packs/day: 1.50     Years: 30.00     Types: Cigarettes    Smokeless tobacco: Not on file      Comment: pt accepting of nicotine replacement    Alcohol use Yes      Comment: 3 pints daily    Drug use:      Frequency: 7.0 times per week     Types: Cocaine, Marijuana, IV      Comment: heroin last used 4/24/17     Sexual activity: Not Currently     Other Topics Concern    Not on file     Social History Narrative    No narrative on file           REVIEW OF SYSTEMS      Allergies   Allergen Reactions    Zithromax [Azithromycin]        No current facility-administered medications on file prior to encounter. Current Outpatient Prescriptions on File Prior to Encounter   Medication Sig Dispense Refill    omeprazole (PRILOSEC) 20 MG delayed release capsule Take 20 mg by mouth 2 times daily      mirtazapine (REMERON) 15 MG tablet Take 15 mg by mouth nightly      QUEtiapine (SEROQUEL) 200 MG tablet Take 1 tablet by mouth nightly 14 tablet 0    therapeutic multivitamin-minerals (THERAGRAN-M) tablet Take 1 tablet by mouth daily. General health:  Fairly good. No fever or chills. Skin:  No itching, redness or rash. Head, eyes, ears, nose, throat:  No headache, epistaxis, rhinorrhea hearing loss or sore throat. Neck:  No pain, stiffness or masses. Cardiovascular/Respiratory system:  No chest pain, palpitation, shortness of breath, coughing or expectoration. Gastrointestinal tract: No abdominal pain, nausea, vomiting, diarrhea or constipation. Genitourinary:  No burning on micturition. No hesitancy, urgency, frequency or discoloration of urine. Locomotor:  No bone or joint pains. No swelling or deformities. Neuropsychiatric:  See HPI. GENERAL PHYSICAL EXAM:     Vitals: /65   Pulse 68   Temp 97.3 °F (36.3 °C) (Oral)   Resp 15   Ht 5' (1.524 m)   Wt 140 lb (63.5 kg)   SpO2 97%   BMI 27.34 kg/m²  Body mass index is 27.34 kg/m². Pt was examined with a nurse present in the room. GENERAL APPEARANCE:  Slime Funes is 38 y.o.,  female, mildly obese, nourished, conscious, alert. Does not appear to be distress or pain at this time. SKIN:  Warm, dry, no cyanosis or jaundice. HEAD:  Normocephalic, atraumatic, no swelling or tenderness. EYES:  Pupils equal, reactive to light, Conjunctiva is clear, EOMs intact anastasia. eyelids WNL. EARS:  No discharge, no marked hearing loss. NOSE:  No rhinorrhea, epistaxis or septal deformity. THROAT:  Not congested. No ulceration bleeding or discharge. NECK:  No stiffness, trachea central.  No palpable masses or L.N.      CHEST:  Symmetrical and equal on expansion. HEART:  Regular rate and rhythm. S1 > S2, No audible murmurs or gallops. LUNGS:  Equal on expansion, normal breath sounds. No adventitious sounds. ABDOMEN:  Obese. Soft on palpation. Mild epig tenderness. No guarding or rigidity. No palpable organomegaly. LYMPHATICS:  No palpable Lymphadenopathy. LOCOMOTOR, BACK AND SPINE:  No tenderness or deformities. EXTREMITIES:  Symmetrical, no pedal edema. Perrys sign negative. No discoloration or ulcerations. NEUROLOGIC:  The patient is conscious, alert, oriented, Gait and balance WNL.   No apparent focal sensory deficits. No motor deficits, muscle strength equal Percy. No facial droop, tongue protrudes centrally, no slurring of the speech. PROVISIONAL DIAGNOSES:      Depression, Alcohol and Substance Abuse.     JARROD TAPIA PA-C on 11/1/2017 at 8:34 AM

## 2017-11-01 NOTE — PLAN OF CARE
Problem: Depressive Behavior with or without Suicide precautions  Goal: STG-Able to verbalize suicidal ideations  Outcome: Ongoing  Pt denies any thoughts to harm herself   Goal: LTG-Absence of self-harm  Outcome: Ongoing  Pt remains free from self harm at this time, Pt denies any thoughts to harm herself and agrees to seek staff out should she start to have these thoughts.  Q 15 minute checks per unit policy

## 2017-11-02 PROCEDURE — 1240000000 HC EMOTIONAL WELLNESS R&B

## 2017-11-02 PROCEDURE — 6370000000 HC RX 637 (ALT 250 FOR IP): Performed by: PSYCHIATRY & NEUROLOGY

## 2017-11-02 RX ADMIN — Medication 100 MG: at 09:38

## 2017-11-02 RX ADMIN — GABAPENTIN 600 MG: 300 CAPSULE ORAL at 09:38

## 2017-11-02 RX ADMIN — MULTIPLE VITAMINS W/ MINERALS TAB 1 TABLET: TAB at 09:37

## 2017-11-02 RX ADMIN — VENLAFAXINE HYDROCHLORIDE 75 MG: 75 CAPSULE, EXTENDED RELEASE ORAL at 09:37

## 2017-11-02 RX ADMIN — HYDROXYZINE HYDROCHLORIDE 50 MG: 25 TABLET, FILM COATED ORAL at 21:03

## 2017-11-02 RX ADMIN — FOLIC ACID 1 MG: 1 TABLET ORAL at 09:38

## 2017-11-02 RX ADMIN — QUETIAPINE FUMARATE 200 MG: 200 TABLET ORAL at 21:03

## 2017-11-02 RX ADMIN — PANTOPRAZOLE SODIUM 40 MG: 40 TABLET, DELAYED RELEASE ORAL at 09:37

## 2017-11-02 RX ADMIN — GABAPENTIN 600 MG: 300 CAPSULE ORAL at 21:03

## 2017-11-02 RX ADMIN — TRAZODONE HYDROCHLORIDE 100 MG: 100 TABLET ORAL at 21:03

## 2017-11-02 RX ADMIN — CITALOPRAM HYDROBROMIDE 20 MG: 20 TABLET ORAL at 09:38

## 2017-11-02 NOTE — PLAN OF CARE
Problem: Depressive Behavior with or without Suicide precautions  Goal: STG-Able to verbalize suicidal ideations  Outcome: Ongoing  PSYCHOEDUCATION GROUP NOTE    Date: 11/2/17  Start Time: 1100  End Time: 1150    Number Participants in Group:  7    Goal:  Patient will demonstrate increased interpersonal interaction   Topic: Health education    Discipline Responsible:   OT  AT  [facility]. x RT MHP Other       Participation Level:     None  Minimal    Active Listener x Interactive    Monopolizing         Participation Quality:  x Appropriate  Inappropriate   x       Attentive        Intrusive   x       Sharing        Resistant          Supportive        Lethargic       Affective:   x Congruent  Incongruent  Blunted  Flat    Constricted  Anxious  Elated  Angry    Labile  Depressed  Other         Cognitive:  x Alert x Oriented PPTP     Concentration  G x F  P   Attention Span x G  F  P   Short-Term Memory x G  F  P   Long-Term Memory x G  F  P   ProblemSolving/  Decision Making x G  F  P   Ability to Process  Information x G  F  P      Contributing Factors             Delusional             Hallucinating             Flight of Ideas             Other:       Modes of Intervention:  x Education  Support  Exploration    Clarifying x Problem Solving  Confrontation   x Socialization  Limit Setting x Reality Testing   x Activity  Movement  Media    Other:            Response to Learning:  x Able to verbalize current knowledge/experience   x Able to verbalize/acknowledge new learning   x Able to retain information   x Capable of insight   x Able to change behavior   x Progressing to goal    Other:        Comments: Pt attended group and participated.

## 2017-11-02 NOTE — PLAN OF CARE
Problem: Depressive Behavior with or without Suicide precautions  Goal: STG-Able to verbalize suicidal ideations  Outcome: Met This Shift  PSYCHOTHERAPY GROUP NOTE       Date:   11/02/17               Start Time:    1000                     End Time: 1045      Number Participants in Group: 6/13      Goals: relationships and recovery         RT  SW  Nsg  LPN   BHTII  XX LPC       Participation Level:     None  Minimal   X Active Listener X Interactive    Monopolizing         Participation Quality:  x Appropriate  Inappropriate   x  Attentive   Intrusive   x  Sharing   Resistant   x  Supportive    Lethargic       Affective:   x Congruent  Incongruent  Blunted  Flat    Constricted  Anxious  Elated  Angry    Labile  Depressed  Other         Cognitive:  x Alert x Oriented PPTS     Concentration G x F  P    Attention Span G x F  P    Short-Term Memory G x F  P    Long-Term Memory G x F  P    ProblemSolving/  Decision Making G x F  P    Ability to Process  Information G x F  P       Contributing Factors             Delusional             Hallucinating             Flight of Ideas             Other: poor concentration       Modes of Intervention:   Education x Support x Exploration   x Clarifying x Problem Solving  Confrontation    Socialization  Limit Setting  Reality Testing    Activity  Movement  Media    Other:          Response to Learning:  x Able to verbalize current knowledge/experience   x Able to verbalize/acknowledge new learning   x Able to retain information   x Capable of insight   x Able to change behavior   x Progressing to goal    Other:        Comments:  participated fully in group

## 2017-11-02 NOTE — PLAN OF CARE
Problem: Depressive Behavior with or without Suicide precautions  Goal: STG-Able to verbalize suicidal ideations  Outcome: Ongoing  PSYCHOEDUCATION GROUP NOTE    Date: 11/2/17  Start Time: 1335  End Time: 1415    Number Participants in Group:  8    Goal:  Patient will demonstrate increased interpersonal interaction   Topic: Physical health and yoga principles    Discipline Responsible:   OT  AT  [facility]. x RT MHP Other       Participation Level:     None  Minimal    Active Listener x Interactive    Monopolizing         Participation Quality:  x Appropriate  Inappropriate   x       Attentive        Intrusive   x       Sharing        Resistant          Supportive        Lethargic       Affective:   x Congruent  Incongruent  Blunted  Flat    Constricted  Anxious  Elated  Angry    Labile  Depressed  Other         Cognitive:  x Alert x Oriented PPTP     Concentration x G  F  P   Attention Span x G  F  P   Short-Term Memory x G  F  P   Long-Term Memory x G  F  P   ProblemSolving/  Decision Making x G  F  P   Ability to Process  Information x G  F  P      Contributing Factors             Delusional             Hallucinating             Flight of Ideas             Other:       Modes of Intervention:  x Education x Support x Exploration    Clarifying  Problem Solving  Confrontation   x Socialization x Limit Setting  Reality Testing   x Activity x Movement  Media    Other:            Response to Learning:  x Able to verbalize current knowledge/experience   x Able to verbalize/acknowledge new learning   x Able to retain information   x Capable of insight   x Able to change behavior   x Progressing to goal    Other:        Comments: Pt attended group and participated.

## 2017-11-02 NOTE — PLAN OF CARE
Problem: Depressive Behavior with or without Suicide precautions  Goal: STG-Able to verbalize suicidal ideations  Outcome: Ongoing  PSYCHOEDUCATION GROUP NOTE    Date: 11/2/17  Start Time: 0900  End Time: 0935    Number Participants in Group:  7    Goal:  Patient will demonstrate increased interpersonal interaction   Topic: Community meeting and goal setting    Discipline Responsible:   OT  AT  [facility]. x RT MHP Other       Participation Level:     None  Minimal    Active Listener x Interactive    Monopolizing         Participation Quality:  x Appropriate  Inappropriate   x       Attentive        Intrusive   x       Sharing        Resistant          Supportive        Lethargic       Affective:   x Congruent  Incongruent  Blunted  Flat    Constricted  Anxious  Elated  Angry    Labile  Depressed  Other         Cognitive:  x Alert x Oriented PPTP     Concentration  G x F  P   Attention Span x G  F  P   Short-Term Memory x G  F  P   Long-Term Memory x G  F  P   ProblemSolving/  Decision Making x G  F  P   Ability to Process  Information x G  F  P      Contributing Factors             Delusional             Hallucinating             Flight of Ideas             Other:       Modes of Intervention:  x Education x Support x Exploration    Clarifying  Problem Solving  Confrontation   x Socialization x Limit Setting  Reality Testing   x Activity  Movement  Media    Other:            Response to Learning:  x Able to verbalize current knowledge/experience   x Able to verbalize/acknowledge new learning   x Able to retain information   x Capable of insight   x Able to change behavior   x Progressing to goal    Other:        Comments: Pt attended group and participated.

## 2017-11-03 PROCEDURE — 6370000000 HC RX 637 (ALT 250 FOR IP): Performed by: PSYCHIATRY & NEUROLOGY

## 2017-11-03 PROCEDURE — 1240000000 HC EMOTIONAL WELLNESS R&B

## 2017-11-03 RX ADMIN — VENLAFAXINE HYDROCHLORIDE 75 MG: 75 CAPSULE, EXTENDED RELEASE ORAL at 08:22

## 2017-11-03 RX ADMIN — MULTIPLE VITAMINS W/ MINERALS TAB 1 TABLET: TAB at 08:22

## 2017-11-03 RX ADMIN — FAMOTIDINE 20 MG: 20 TABLET, FILM COATED ORAL at 11:09

## 2017-11-03 RX ADMIN — Medication 100 MG: at 08:22

## 2017-11-03 RX ADMIN — TRAZODONE HYDROCHLORIDE 100 MG: 100 TABLET ORAL at 20:57

## 2017-11-03 RX ADMIN — HYDROXYZINE HYDROCHLORIDE 50 MG: 25 TABLET, FILM COATED ORAL at 12:55

## 2017-11-03 RX ADMIN — CITALOPRAM HYDROBROMIDE 20 MG: 20 TABLET ORAL at 08:22

## 2017-11-03 RX ADMIN — PANTOPRAZOLE SODIUM 40 MG: 40 TABLET, DELAYED RELEASE ORAL at 08:22

## 2017-11-03 RX ADMIN — QUETIAPINE FUMARATE 200 MG: 200 TABLET ORAL at 20:57

## 2017-11-03 RX ADMIN — GABAPENTIN 600 MG: 300 CAPSULE ORAL at 08:22

## 2017-11-03 RX ADMIN — FOLIC ACID 1 MG: 1 TABLET ORAL at 08:22

## 2017-11-03 RX ADMIN — GABAPENTIN 600 MG: 300 CAPSULE ORAL at 20:57

## 2017-11-03 ASSESSMENT — PAIN DESCRIPTION - LOCATION: LOCATION: BACK

## 2017-11-03 ASSESSMENT — PAIN SCALES - GENERAL: PAINLEVEL_OUTOF10: 7

## 2017-11-03 ASSESSMENT — PAIN DESCRIPTION - ORIENTATION: ORIENTATION: LOWER

## 2017-11-03 NOTE — BH NOTE
Date: 11/2/17   Start Time: 9:00pm  End Time: 9:30pm    Number Participants in Group:  8/16    Goal:  Patient will demonstrate increased interpersonal interaction   Topic:     Discipline Responsible:   OT  AT   x Nsg.  RT  Other       Participation Level:     None  Minimal   x Active Listener x Interactive    Monopolizing         Participation Quality:  x Appropriate  Inappropriate   x       Attentive        Intrusive   x       Sharing        Resistant   x       Supportive        Lethargic       Affective:   x Congruent  Incongruent  Blunted  Flat    Constricted  Anxious  Elated  Angry    Labile  Depressed  Other         Cognitive:  x Alert  Oriented PPTP     Concentration x G  F  P   Attention Span x G  F  P   Short-Term Memory x G  F  P   Long-Term Memory x G  F  P   ProblemSolving/  Decision Making x G  F  P   Ability to Process  Information x G  F  P      Contributing Factors             Delusional             Hallucinating             Flight of Ideas             Other:       Modes of Intervention:  x Education  Support  Exploration   x Clarifying  Problem Solving  Confrontation    Socialization  Limit Setting  Reality Testing    Activity  Movement  Media    Other:            Response to Learning:  x Able to verbalize current knowledge/experience   x Able to verbalize/acknowledge new learning    Able to retain information    Capable of insight    Able to change behavior    Progressing to goal    Other:        Comments:

## 2017-11-03 NOTE — PLAN OF CARE
Problem: Depressive Behavior with or without Suicide precautions  Goal: STG-Able to verbalize suicidal ideations  Outcome: Ongoing  PSYCHOEDUCATION GROUP NOTE    Date: 11/3/17  Start Time: 0900  End Time: 0935    Number Participants in Group:  7    Goal:  Patient will demonstrate increased interpersonal interaction   Topic: [facility], goal setting and walking    Discipline Responsible:   OT  AT  [facility]. x RT MHP Other       Participation Level:     None  Minimal    Active Listener x Interactive    Monopolizing         Participation Quality:  x Appropriate  Inappropriate   x       Attentive        Intrusive   x       Sharing        Resistant          Supportive        Lethargic       Affective:    Congruent  Incongruent  Blunted  Flat   x Constricted  Anxious  Elated  Angry    Labile  Depressed  Other         Cognitive:  x Alert x Oriented PPTP     Concentration  G x F  P   Attention Span x G  F  P   Short-Term Memory x G  F  P   Long-Term Memory x G  F  P   ProblemSolving/  Decision Making x G  F  P   Ability to Process  Information x G  F  P      Contributing Factors             Delusional             Hallucinating             Flight of Ideas             Other:       Modes of Intervention:  x Education x Support x Exploration    Clarifying  Problem Solving  Confrontation   x Socialization x Limit Setting  Reality Testing   x Activity  Movement  Media    Other:            Response to Learning:  x Able to verbalize current knowledge/experience   x Able to verbalize/acknowledge new learning   x Able to retain information   x Capable of insight   x Able to change behavior   x Progressing to goal    Other:        Comments: Pt attended group and participated.

## 2017-11-03 NOTE — PLAN OF CARE
Problem: Depressive Behavior with or without Suicide precautions  Goal: STG-Able to verbalize suicidal ideations  Outcome: Ongoing  Pt did not attend music therapy group at [phone] despite staff invitation to attend.

## 2017-11-04 PROCEDURE — 6370000000 HC RX 637 (ALT 250 FOR IP): Performed by: PSYCHIATRY & NEUROLOGY

## 2017-11-04 PROCEDURE — 1240000000 HC EMOTIONAL WELLNESS R&B

## 2017-11-04 RX ADMIN — FOLIC ACID 1 MG: 1 TABLET ORAL at 08:47

## 2017-11-04 RX ADMIN — Medication 100 MG: at 08:47

## 2017-11-04 RX ADMIN — GABAPENTIN 600 MG: 300 CAPSULE ORAL at 22:01

## 2017-11-04 RX ADMIN — QUETIAPINE FUMARATE 200 MG: 200 TABLET ORAL at 22:01

## 2017-11-04 RX ADMIN — GABAPENTIN 600 MG: 300 CAPSULE ORAL at 08:47

## 2017-11-04 RX ADMIN — VENLAFAXINE HYDROCHLORIDE 75 MG: 75 CAPSULE, EXTENDED RELEASE ORAL at 08:47

## 2017-11-04 RX ADMIN — CITALOPRAM HYDROBROMIDE 20 MG: 20 TABLET ORAL at 08:47

## 2017-11-04 RX ADMIN — MULTIPLE VITAMINS W/ MINERALS TAB 1 TABLET: TAB at 08:47

## 2017-11-04 RX ADMIN — TRAZODONE HYDROCHLORIDE 100 MG: 100 TABLET ORAL at 22:01

## 2017-11-04 RX ADMIN — PANTOPRAZOLE SODIUM 40 MG: 40 TABLET, DELAYED RELEASE ORAL at 08:47

## 2017-11-04 NOTE — PLAN OF CARE
Problem: Depressive Behavior with or without Suicide precautions  Goal: STG-Able to verbalize suicidal ideations  Outcome: Ongoing   Pt denies any self harm q15 min safety checks maintained. Pt is out and social with peers pt is bright and attended all ordered medications. q15 min safety checks maintained.      Goal: LTG-Absence of self-harm  Outcome: Ongoing  Pt remains free from self harm

## 2017-11-04 NOTE — PLAN OF CARE
Problem: Depressive Behavior with or without Suicide precautions  Goal: STG-Able to verbalize suicidal ideations  Outcome: Ongoing  Pt denies all at this time. Calm controlled cooperative with treatment. Cooperative during 1:1. Normal sleep and appetite. Attending groups. MC/BC. Spontaneous safety checks to be maintained by staff.   Goal: LTG-Absence of self-harm  Outcome: Ongoing

## 2017-11-04 NOTE — PLAN OF CARE
Problem: Substance Abuse  Goal: STG-Knowledge of positive coping patterns  Outcome: Ongoing  Pt did not participate in cognitive skills/ leisure grouo at 1330 despite staff encouragement to attend.

## 2017-11-04 NOTE — PLAN OF CARE
Problem: Depressive Behavior with or without Suicide precautions  Goal: STG-Able to verbalize suicidal ideations  Outcome: Ongoing                                                   PSYCHOEDUCATION GROUP NOTE       Date: 11/04/17                 Start Time:  10:00 am          End Time: 10:55 am      Number Participants in Group: 5/18      Name of group: Psychoeducation-Dealing with Conflicts        RT  SW  Nsg  LPN   BHTII        X Other       Participation Level:     None  Minimal    Active Listener x Interactive    Monopolizing         Participation Quality:  x Appropriate  Inappropriate   x  Attentive   Intrusive   x  Sharing   Resistant     Supportive    Lethargic       Affective:   x Congruent  Incongruent  Blunted  Flat    Constricted  Anxious  Elated  Angry    Labile  Depressed  Other         Cognitive:  x Alert  Oriented PPTS     Concentration G x F  P    Attention Span G x F  P    Short-Term Memory G x F  P    Long-Term Memory G x F  P    ProblemSolving/  Decision Making G x F  P    Ability to Process  Information G x F  P       Contributing Factors             Delusional             Hallucinating             Flight of Ideas             Other: poor concentration       Modes of Intervention:  x Education x Support  Exploration    Clarifying x Problem Solving  Confrontation    Socialization  Limit Setting  Reality Testing    Activity  Movement  Media    Other:          Response to Learning:  x Able to verbalize current knowledge/experience   x Able to verbalize/acknowledge new learning   x Able to retain information   x Capable of insight   x Able to change behavior   x Progressing to goal    Other:        Comments: PT was actively engaged in discussion and identified that she tends to be aggressive when she is drinking and controlling. PT reported that when she is sober she becomes more passive aggressive.   PT reported that she could recognize that neither were healthy ways of dealing with

## 2017-11-05 PROCEDURE — 1240000000 HC EMOTIONAL WELLNESS R&B

## 2017-11-05 PROCEDURE — 6370000000 HC RX 637 (ALT 250 FOR IP): Performed by: PSYCHIATRY & NEUROLOGY

## 2017-11-05 RX ORDER — QUETIAPINE FUMARATE 200 MG/1
200 TABLET, FILM COATED ORAL NIGHTLY
Qty: 30 TABLET | Refills: 0 | Status: ON HOLD | OUTPATIENT
Start: 2017-11-05 | End: 2017-11-16

## 2017-11-05 RX ORDER — TRAZODONE HYDROCHLORIDE 100 MG/1
100 TABLET ORAL NIGHTLY
Qty: 30 TABLET | Refills: 0 | Status: ON HOLD | OUTPATIENT
Start: 2017-11-05 | End: 2017-11-16

## 2017-11-05 RX ORDER — VENLAFAXINE HYDROCHLORIDE 75 MG/1
75 CAPSULE, EXTENDED RELEASE ORAL
Qty: 30 CAPSULE | Refills: 0 | Status: ON HOLD | OUTPATIENT
Start: 2017-11-06 | End: 2017-11-16 | Stop reason: HOSPADM

## 2017-11-05 RX ORDER — PANTOPRAZOLE SODIUM 40 MG/1
40 TABLET, DELAYED RELEASE ORAL
Qty: 30 TABLET | Refills: 0 | Status: ON HOLD | OUTPATIENT
Start: 2017-11-06 | End: 2017-11-16

## 2017-11-05 RX ORDER — GABAPENTIN 600 MG/1
600 TABLET ORAL 3 TIMES DAILY
Qty: 90 TABLET | Refills: 0 | Status: ON HOLD | OUTPATIENT
Start: 2017-11-05 | End: 2017-11-16

## 2017-11-05 RX ORDER — CITALOPRAM 20 MG/1
20 TABLET ORAL DAILY
Qty: 30 TABLET | Refills: 0 | Status: ON HOLD | OUTPATIENT
Start: 2017-11-06 | End: 2017-11-16

## 2017-11-05 RX ORDER — M-VIT,TX,IRON,MINS/CALC/FOLIC 27MG-0.4MG
1 TABLET ORAL DAILY
Qty: 30 TABLET | Refills: 0 | Status: ON HOLD | OUTPATIENT
Start: 2017-11-05 | End: 2018-03-28 | Stop reason: HOSPADM

## 2017-11-05 RX ORDER — HYDROXYZINE 50 MG/1
50 TABLET, FILM COATED ORAL 3 TIMES DAILY PRN
Qty: 90 TABLET | Refills: 0 | Status: ON HOLD | OUTPATIENT
Start: 2017-11-05 | End: 2017-11-16 | Stop reason: HOSPADM

## 2017-11-05 RX ADMIN — CITALOPRAM HYDROBROMIDE 20 MG: 20 TABLET ORAL at 08:37

## 2017-11-05 RX ADMIN — Medication 100 MG: at 08:37

## 2017-11-05 RX ADMIN — TRAZODONE HYDROCHLORIDE 100 MG: 100 TABLET ORAL at 20:50

## 2017-11-05 RX ADMIN — PANTOPRAZOLE SODIUM 40 MG: 40 TABLET, DELAYED RELEASE ORAL at 08:37

## 2017-11-05 RX ADMIN — GABAPENTIN 600 MG: 300 CAPSULE ORAL at 20:50

## 2017-11-05 RX ADMIN — GABAPENTIN 600 MG: 300 CAPSULE ORAL at 08:37

## 2017-11-05 RX ADMIN — MULTIPLE VITAMINS W/ MINERALS TAB 1 TABLET: TAB at 08:37

## 2017-11-05 RX ADMIN — VENLAFAXINE HYDROCHLORIDE 75 MG: 75 CAPSULE, EXTENDED RELEASE ORAL at 08:37

## 2017-11-05 RX ADMIN — QUETIAPINE FUMARATE 200 MG: 200 TABLET ORAL at 20:50

## 2017-11-05 RX ADMIN — FOLIC ACID 1 MG: 1 TABLET ORAL at 08:37

## 2017-11-05 ASSESSMENT — PAIN DESCRIPTION - LOCATION: LOCATION: BACK

## 2017-11-05 ASSESSMENT — PAIN SCALES - GENERAL: PAINLEVEL_OUTOF10: 8

## 2017-11-05 NOTE — PLAN OF CARE
Problem: Depressive Behavior with or without Suicide precautions  Goal: STG-Able to verbalize suicidal ideations  Outcome: Ongoing                                                   PSYCHOEDUCATION GROUP NOTE       Date:    11/05/17              Start Time:  10:00 am              End Time: 11:00 am      Number Participants in Group: 5/20      Name of group: Psychoeducation-Defense Mechanisms        RT  SW  Nsg  LPN   BHTII      X Other       Participation Level:     None  Minimal   x Active Listener  Interactive    Monopolizing         Participation Quality:  x Appropriate  Inappropriate   x  Attentive   Intrusive     Sharing   Resistant     Supportive    Lethargic       Affective:   x Congruent  Incongruent  Blunted  Flat    Constricted  Anxious  Elated  Angry    Labile  Depressed  Other         Cognitive:  x Alert  Oriented PPTS     Concentration G x F  P    Attention Span G x F  P    Short-Term Memory G x F  P    Long-Term Memory G x F  P    ProblemSolving/  Decision Making G x F  P    Ability to Process  Information G x F  P       Contributing Factors             Delusional             Hallucinating             Flight of Ideas             Other: poor concentration       Modes of Intervention:  x Education x Support  Exploration    Clarifying x Problem Solving  Confrontation    Socialization  Limit Setting  Reality Testing    Activity  Movement  Media    Other:          Response to Learning:  x Able to verbalize current knowledge/experience   x Able to verbalize/acknowledge new learning   x Able to retain information   x Capable of insight   x Able to change behavior    Progressing to goal    Other:        Comments: PT was attentive and engaged in discussion. PT shared examples of how she uses different defense mechanisms.

## 2017-11-05 NOTE — PLAN OF CARE
Problem: Substance Abuse  Goal: STG-Knowledge of positive coping patterns  Outcome: Ongoing  Pt verbalizes knowledge of positive coping patterns. Pt reports she is ready for discharge.

## 2017-11-06 VITALS
RESPIRATION RATE: 14 BRPM | BODY MASS INDEX: 27.48 KG/M2 | OXYGEN SATURATION: 97 % | HEART RATE: 87 BPM | TEMPERATURE: 97.5 F | SYSTOLIC BLOOD PRESSURE: 108 MMHG | HEIGHT: 60 IN | WEIGHT: 140 LBS | DIASTOLIC BLOOD PRESSURE: 71 MMHG

## 2017-11-06 PROCEDURE — 6370000000 HC RX 637 (ALT 250 FOR IP): Performed by: PSYCHIATRY & NEUROLOGY

## 2017-11-06 PROCEDURE — 5130000000 HC BRIDGE APPOINTMENT: Performed by: COUNSELOR

## 2017-11-06 RX ADMIN — FOLIC ACID 1 MG: 1 TABLET ORAL at 09:05

## 2017-11-06 RX ADMIN — MULTIPLE VITAMINS W/ MINERALS TAB 1 TABLET: TAB at 09:05

## 2017-11-06 RX ADMIN — PANTOPRAZOLE SODIUM 40 MG: 40 TABLET, DELAYED RELEASE ORAL at 09:05

## 2017-11-06 RX ADMIN — Medication 100 MG: at 09:04

## 2017-11-06 RX ADMIN — VENLAFAXINE HYDROCHLORIDE 75 MG: 75 CAPSULE, EXTENDED RELEASE ORAL at 09:05

## 2017-11-06 RX ADMIN — CITALOPRAM HYDROBROMIDE 20 MG: 20 TABLET ORAL at 09:05

## 2017-11-06 RX ADMIN — GABAPENTIN 600 MG: 300 CAPSULE ORAL at 09:06

## 2017-11-06 ASSESSMENT — PAIN DESCRIPTION - LOCATION: LOCATION: BACK

## 2017-11-06 ASSESSMENT — PAIN DESCRIPTION - PAIN TYPE: TYPE: CHRONIC PAIN

## 2017-11-06 ASSESSMENT — PAIN SCALES - GENERAL: PAINLEVEL_OUTOF10: 6

## 2017-11-06 ASSESSMENT — PAIN DESCRIPTION - ORIENTATION: ORIENTATION: LOWER

## 2017-11-06 NOTE — CARE COORDINATION
Name: Violeta Berry    : 1975    Discharge Date: 2017    Primary Auth/Cert #: [id number]    Discharge Medications:      Medication List      START taking these medications    citalopram 20 MG tablet  Commonly known as:  CELEXA  Take 1 tablet by mouth daily  Notes to patient:  Depression      gabapentin 600 MG tablet  Commonly known as:  NEURONTIN  Take 1 tablet by mouth 3 times daily  Notes to patient:  Nerve pain     hydrOXYzine 50 MG tablet  Commonly known as:  ATARAX  Take 1 tablet by mouth 3 times daily as needed for Anxiety  Notes to patient:  Anxiety      pantoprazole 40 MG tablet  Commonly known as:  PROTONIX  Take 1 tablet by mouth every morning (before breakfast)  Notes to patient:  Acid reflux     traZODone 100 MG tablet  Commonly known as:  DESYREL  Take 1 tablet by mouth nightly  Notes to patient:  Sleep      venlafaxine 75 MG extended release capsule  Commonly known as:  EFFEXOR XR  Take 1 capsule by mouth daily (with breakfast)  Notes to patient:  Mood stabilizer         CONTINUE taking these medications    QUEtiapine 200 MG tablet  Commonly known as:  SEROQUEL  Take 1 tablet by mouth nightly  Notes to patient:  Clear thoughts      therapeutic multivitamin-minerals tablet  Take 1 tablet by mouth daily  Notes to patient:  Nutritional supplement         STOP taking these medications    mirtazapine 15 MG tablet  Commonly known as:  REMERON     PRILOSEC 20 MG delayed release capsule  Generic drug:  omeprazole           Where to Get Your Medications      You can get these medications from any pharmacy    Bring a paper prescription for each of these medications  · citalopram 20 MG tablet  · gabapentin 600 MG tablet  · hydrOXYzine 50 MG tablet  · pantoprazole 40 MG tablet  · QUEtiapine 200 MG tablet  · therapeutic multivitamin-minerals tablet  · traZODone 100 MG tablet  · venlafaxine 75 MG extended release capsule         Follow Up Appointment: Krysta Alas MD  [address] [facility]

## 2017-11-06 NOTE — BH NOTE
Pt did not participate  In wrap up group at 20:30 due to sleeping after much encouragement from staff

## 2017-11-08 ENCOUNTER — HOSPITAL ENCOUNTER (INPATIENT)
Age: 42
LOS: 1 days | Discharge: PSYCHIATRIC OR SUBSTANCE USE FACILITY | DRG: 918 | End: 2017-11-09
Attending: EMERGENCY MEDICINE | Admitting: INTERNAL MEDICINE
Payer: COMMERCIAL

## 2017-11-08 DIAGNOSIS — F10.921 ACUTE ALCOHOLIC INTOXICATION WITH DELIRIUM (HCC): ICD-10-CM

## 2017-11-08 DIAGNOSIS — T50.902A INTENTIONAL DRUG OVERDOSE, INITIAL ENCOUNTER (HCC): ICD-10-CM

## 2017-11-08 DIAGNOSIS — R45.851 SUICIDAL IDEATION: Primary | ICD-10-CM

## 2017-11-08 LAB
-: ABNORMAL
ABSOLUTE EOS #: 0.1 K/UL (ref 0–0.4)
ABSOLUTE IMMATURE GRANULOCYTE: ABNORMAL K/UL (ref 0–0.3)
ABSOLUTE LYMPH #: 2.3 K/UL (ref 1–4.8)
ABSOLUTE MONO #: 0.5 K/UL (ref 0.1–1.3)
ACETAMINOPHEN LEVEL: <10 UG/ML (ref 10–30)
ALBUMIN SERPL-MCNC: 3.8 G/DL (ref 3.5–5.2)
ALBUMIN/GLOBULIN RATIO: ABNORMAL (ref 1–2.5)
ALP BLD-CCNC: 109 U/L (ref 35–104)
ALT SERPL-CCNC: 52 U/L (ref 5–33)
AMMONIA: 35 UMOL/L (ref 11–51)
AMORPHOUS: ABNORMAL
AMPHETAMINE SCREEN URINE: POSITIVE
ANION GAP SERPL CALCULATED.3IONS-SCNC: 17 MMOL/L (ref 9–17)
AST SERPL-CCNC: 37 U/L
BACTERIA: ABNORMAL
BARBITURATE SCREEN URINE: POSITIVE
BASOPHILS # BLD: 1 %
BASOPHILS ABSOLUTE: 0 K/UL (ref 0–0.2)
BENZODIAZEPINE SCREEN, URINE: POSITIVE
BILIRUB SERPL-MCNC: 0.19 MG/DL (ref 0.3–1.2)
BILIRUBIN URINE: ABNORMAL
BUN BLDV-MCNC: 9 MG/DL (ref 6–20)
BUN/CREAT BLD: ABNORMAL (ref 9–20)
BUPRENORPHINE URINE: ABNORMAL
CALCIUM SERPL-MCNC: 8.3 MG/DL (ref 8.6–10.4)
CANNABINOID SCREEN URINE: NEGATIVE
CASTS UA: ABNORMAL /LPF
CHLORIDE BLD-SCNC: 103 MMOL/L (ref 98–107)
CO2: 22 MMOL/L (ref 20–31)
COCAINE METABOLITE, URINE: NEGATIVE
COLOR: YELLOW
COMMENT UA: ABNORMAL
CREAT SERPL-MCNC: 0.62 MG/DL (ref 0.5–0.9)
CRYSTALS, UA: ABNORMAL /HPF
DIFFERENTIAL TYPE: ABNORMAL
EOSINOPHILS RELATIVE PERCENT: 1 %
EPITHELIAL CELLS UA: ABNORMAL /HPF
ETHANOL PERCENT: 0.1 %
ETHANOL: 99 MG/DL
GFR AFRICAN AMERICAN: >60 ML/MIN
GFR NON-AFRICAN AMERICAN: >60 ML/MIN
GFR SERPL CREATININE-BSD FRML MDRD: ABNORMAL ML/MIN/{1.73_M2}
GFR SERPL CREATININE-BSD FRML MDRD: ABNORMAL ML/MIN/{1.73_M2}
GLUCOSE BLD-MCNC: 114 MG/DL (ref 70–99)
GLUCOSE BLD-MCNC: 96 MG/DL (ref 65–105)
GLUCOSE URINE: NEGATIVE
HCG QUALITATIVE: NEGATIVE
HCT VFR BLD CALC: 42.8 % (ref 36–46)
HEMOGLOBIN: 14 G/DL (ref 12–16)
IMMATURE GRANULOCYTES: ABNORMAL %
KETONES, URINE: NEGATIVE
LEUKOCYTE ESTERASE, URINE: NEGATIVE
LYMPHOCYTES # BLD: 40 %
MAGNESIUM: 2.1 MG/DL (ref 1.6–2.6)
MCH RBC QN AUTO: 26.3 PG (ref 26–34)
MCHC RBC AUTO-ENTMCNC: 32.7 G/DL (ref 31–37)
MCV RBC AUTO: 80.3 FL (ref 80–100)
MDMA URINE: ABNORMAL
METHADONE SCREEN, URINE: POSITIVE
METHAMPHETAMINE, URINE: ABNORMAL
MONOCYTES # BLD: 9 %
MUCUS: ABNORMAL
NITRITE, URINE: NEGATIVE
OPIATES, URINE: NEGATIVE
OTHER OBSERVATIONS UA: ABNORMAL
OXYCODONE SCREEN URINE: NEGATIVE
PDW BLD-RTO: 16.7 % (ref 11.5–14.9)
PH UA: 5.5 (ref 5–8)
PHENCYCLIDINE, URINE: NEGATIVE
PLATELET # BLD: 264 K/UL (ref 150–450)
PLATELET ESTIMATE: ABNORMAL
PMV BLD AUTO: 7.5 FL (ref 6–12)
POTASSIUM SERPL-SCNC: 4.1 MMOL/L (ref 3.7–5.3)
PROPOXYPHENE, URINE: ABNORMAL
PROTEIN UA: ABNORMAL
RBC # BLD: 5.32 M/UL (ref 4–5.2)
RBC # BLD: ABNORMAL 10*6/UL
RBC UA: ABNORMAL /HPF
RENAL EPITHELIAL, UA: ABNORMAL /HPF
SALICYLATE LEVEL: <1 MG/DL (ref 3–10)
SEG NEUTROPHILS: 49 %
SEGMENTED NEUTROPHILS ABSOLUTE COUNT: 2.9 K/UL (ref 1.3–9.1)
SODIUM BLD-SCNC: 142 MMOL/L (ref 135–144)
SPECIFIC GRAVITY UA: 1.02 (ref 1–1.03)
TEST INFORMATION: ABNORMAL
TOTAL CK: 72 U/L (ref 26–192)
TOTAL PROTEIN: 7.8 G/DL (ref 6.4–8.3)
TRICHOMONAS: ABNORMAL
TRICYCLIC ANTIDEPRESSANTS, UR: ABNORMAL
TURBIDITY: ABNORMAL
URINE HGB: NEGATIVE
UROBILINOGEN, URINE: NORMAL
WBC # BLD: 5.8 K/UL (ref 3.5–11)
WBC # BLD: ABNORMAL 10*3/UL
WBC UA: ABNORMAL /HPF
YEAST: ABNORMAL

## 2017-11-08 PROCEDURE — 2060000000 HC ICU INTERMEDIATE R&B

## 2017-11-08 PROCEDURE — 81001 URINALYSIS AUTO W/SCOPE: CPT

## 2017-11-08 PROCEDURE — G0480 DRUG TEST DEF 1-7 CLASSES: HCPCS

## 2017-11-08 PROCEDURE — 80053 COMPREHEN METABOLIC PANEL: CPT

## 2017-11-08 PROCEDURE — 99285 EMERGENCY DEPT VISIT HI MDM: CPT

## 2017-11-08 PROCEDURE — 82140 ASSAY OF AMMONIA: CPT

## 2017-11-08 PROCEDURE — 93005 ELECTROCARDIOGRAM TRACING: CPT

## 2017-11-08 PROCEDURE — 2580000003 HC RX 258: Performed by: INTERNAL MEDICINE

## 2017-11-08 PROCEDURE — 82947 ASSAY GLUCOSE BLOOD QUANT: CPT

## 2017-11-08 PROCEDURE — 94760 N-INVAS EAR/PLS OXIMETRY 1: CPT

## 2017-11-08 PROCEDURE — 84703 CHORIONIC GONADOTROPIN ASSAY: CPT

## 2017-11-08 PROCEDURE — 80307 DRUG TEST PRSMV CHEM ANLYZR: CPT

## 2017-11-08 PROCEDURE — 96360 HYDRATION IV INFUSION INIT: CPT

## 2017-11-08 PROCEDURE — 85025 COMPLETE CBC W/AUTO DIFF WBC: CPT

## 2017-11-08 PROCEDURE — 36415 COLL VENOUS BLD VENIPUNCTURE: CPT

## 2017-11-08 PROCEDURE — 99222 1ST HOSP IP/OBS MODERATE 55: CPT | Performed by: INTERNAL MEDICINE

## 2017-11-08 PROCEDURE — 2580000003 HC RX 258: Performed by: EMERGENCY MEDICINE

## 2017-11-08 PROCEDURE — 6360000002 HC RX W HCPCS: Performed by: INTERNAL MEDICINE

## 2017-11-08 PROCEDURE — 83735 ASSAY OF MAGNESIUM: CPT

## 2017-11-08 PROCEDURE — 82550 ASSAY OF CK (CPK): CPT

## 2017-11-08 RX ORDER — ACETAMINOPHEN 325 MG/1
650 TABLET ORAL EVERY 4 HOURS PRN
Status: DISCONTINUED | OUTPATIENT
Start: 2017-11-08 | End: 2017-11-09 | Stop reason: HOSPADM

## 2017-11-08 RX ORDER — SODIUM CHLORIDE 0.9 % (FLUSH) 0.9 %
10 SYRINGE (ML) INJECTION EVERY 12 HOURS SCHEDULED
Status: DISCONTINUED | OUTPATIENT
Start: 2017-11-08 | End: 2017-11-09 | Stop reason: HOSPADM

## 2017-11-08 RX ORDER — POTASSIUM CHLORIDE 20 MEQ/1
40 TABLET, EXTENDED RELEASE ORAL PRN
Status: DISCONTINUED | OUTPATIENT
Start: 2017-11-08 | End: 2017-11-09 | Stop reason: HOSPADM

## 2017-11-08 RX ORDER — ONDANSETRON 2 MG/ML
4 INJECTION INTRAMUSCULAR; INTRAVENOUS EVERY 6 HOURS PRN
Status: DISCONTINUED | OUTPATIENT
Start: 2017-11-08 | End: 2017-11-09 | Stop reason: HOSPADM

## 2017-11-08 RX ORDER — NICOTINE 21 MG/24HR
1 PATCH, TRANSDERMAL 24 HOURS TRANSDERMAL DAILY PRN
Status: DISCONTINUED | OUTPATIENT
Start: 2017-11-08 | End: 2017-11-09 | Stop reason: HOSPADM

## 2017-11-08 RX ORDER — POTASSIUM CHLORIDE 20MEQ/15ML
40 LIQUID (ML) ORAL PRN
Status: DISCONTINUED | OUTPATIENT
Start: 2017-11-08 | End: 2017-11-09 | Stop reason: HOSPADM

## 2017-11-08 RX ORDER — BISACODYL 10 MG
10 SUPPOSITORY, RECTAL RECTAL DAILY PRN
Status: DISCONTINUED | OUTPATIENT
Start: 2017-11-08 | End: 2017-11-09 | Stop reason: HOSPADM

## 2017-11-08 RX ORDER — POTASSIUM CHLORIDE 7.45 MG/ML
10 INJECTION INTRAVENOUS PRN
Status: DISCONTINUED | OUTPATIENT
Start: 2017-11-08 | End: 2017-11-09 | Stop reason: HOSPADM

## 2017-11-08 RX ORDER — MAGNESIUM SULFATE 1 G/100ML
1 INJECTION INTRAVENOUS PRN
Status: DISCONTINUED | OUTPATIENT
Start: 2017-11-08 | End: 2017-11-09 | Stop reason: HOSPADM

## 2017-11-08 RX ORDER — SODIUM CHLORIDE 0.9 % (FLUSH) 0.9 %
10 SYRINGE (ML) INJECTION PRN
Status: DISCONTINUED | OUTPATIENT
Start: 2017-11-08 | End: 2017-11-09 | Stop reason: HOSPADM

## 2017-11-08 RX ORDER — SODIUM CHLORIDE 9 MG/ML
INJECTION, SOLUTION INTRAVENOUS CONTINUOUS
Status: DISCONTINUED | OUTPATIENT
Start: 2017-11-08 | End: 2017-11-09 | Stop reason: HOSPADM

## 2017-11-08 RX ORDER — 0.9 % SODIUM CHLORIDE 0.9 %
1000 INTRAVENOUS SOLUTION INTRAVENOUS ONCE
Status: COMPLETED | OUTPATIENT
Start: 2017-11-08 | End: 2017-11-08

## 2017-11-08 RX ADMIN — ENOXAPARIN SODIUM 40 MG: 40 INJECTION SUBCUTANEOUS at 16:00

## 2017-11-08 RX ADMIN — SODIUM CHLORIDE: 9 INJECTION, SOLUTION INTRAVENOUS at 15:16

## 2017-11-08 RX ADMIN — SODIUM CHLORIDE 1000 ML: 9 INJECTION, SOLUTION INTRAVENOUS at 09:46

## 2017-11-08 ASSESSMENT — ENCOUNTER SYMPTOMS
VISUAL CHANGE: 0
INGESTION: 1
ABDOMINAL PAIN: 0

## 2017-11-08 NOTE — ED NOTES
Pt remains A+O x 4, GCS = 15, PMS x 4 intact, eupneic, and PWD. Pt is slow to respond but having appropriate conversation with this RN and her family. Pupils 3mm bilat and slightly sluggish.        Derrek Nicole RN  11/08/17 1300

## 2017-11-08 NOTE — ED PROVIDER NOTES
([facility]     3 pints daily    Anxiety     Asthma     Depression     Polysubstance abuse     drug abuse includes, cocaine, IV heroin, cannabis, and ETOH    Suicide attempt 11/08/2017    S/A by overdosing on Trazodone and Seroquel   bipolar  SURGICAL HISTORY       Past Surgical History:   Procedure Laterality Date    CARPAL TUNNEL RELEASE      CHOLECYSTECTOMY      GASTRIC BYPASS SURGERY      HYSTERECTOMY       CURRENT MEDICATIONS       Previous Medications    CITALOPRAM (CELEXA) 20 MG TABLET    Take 1 tablet by mouth daily    GABAPENTIN (NEURONTIN) 600 MG TABLET    Take 1 tablet by mouth 3 times daily    HYDROXYZINE (ATARAX) 50 MG TABLET    Take 1 tablet by mouth 3 times daily as needed for Anxiety    MULTIPLE VITAMINS-MINERALS (THERAPEUTIC MULTIVITAMIN-MINERALS) TABLET    Take 1 tablet by mouth daily    PANTOPRAZOLE (PROTONIX) 40 MG TABLET    Take 1 tablet by mouth every morning (before breakfast)    QUETIAPINE (SEROQUEL) 200 MG TABLET    Take 1 tablet by mouth nightly    TRAZODONE (DESYREL) 100 MG TABLET    Take 1 tablet by mouth nightly    VENLAFAXINE (EFFEXOR XR) 75 MG EXTENDED RELEASE CAPSULE    Take 1 capsule by mouth daily (with breakfast)     ALLERGIES     is allergic to zithromax [azithromycin]. FAMILY HISTORY     indicated that the status of her mother is unknown. SOCIAL HISTORY      reports that she has been smoking Cigarettes. She has a 45.00 pack-year smoking history. She does not have any smokeless tobacco history on file. She reports that she drinks alcohol. She reports that she uses drugs, including Cocaine, Marijuana, and IV, about 7 times per week. PHYSICAL EXAM     INITIAL VITALS: /72   Pulse 104   Temp 97.5 °F (36.4 °C) (Oral)   Resp 14   Ht 5' (1.524 m)   Wt 144 lb (65.3 kg)   SpO2 100%   BMI 28.12 kg/m²    Physical Exam   Constitutional: She appears well-developed and well-nourished. No distress. HENT:   Head: Normocephalic and atraumatic.    Right Ear: External ear within normal limits   AMMONIA   HCG, SERUM, QUALITATIVE   CK   MAGNESIUM   ICTOTEST, URINE     EMERGENCY DEPARTMENT COURSE:     Vitals:    Vitals:    11/08/17 1018 11/08/17 1115 11/08/17 1200 11/08/17 1258   BP: 119/81 124/88 103/75 108/72   Pulse: 93 99 102 104   Resp: 16 14 13 14   Temp:    97.5 °F (36.4 °C)   TempSrc:    Oral   SpO2: 97% 100% 100% 100%   Weight:       Height:         The patient was given the following medications while in the emergency department:  Orders Placed This Encounter   Medications    0.9 % sodium chloride bolus     CONSULTS:  IP CONSULT TO INTERNAL MEDICINE  IP CONSULT TO PSYCHIATRY    FINAL IMPRESSION      1. Suicidal ideation    2. [facility]    3.  Acute alcoholic intoxication with delirium [facility])          DISPOSITION/PLAN   DISPOSITION admit    Jessica Saeed MD  Attending Emergency Physician                      Jessica Saeed MD  11/08/17 1500 Augustin Avila MD  11/08/17 0882

## 2017-11-08 NOTE — Clinical Note
Patient Class: Inpatient [101]   REQUIRED: Diagnosis: Bipolar 1 disorder [facility]) [207081]   Estimated Length of Stay: Estimated stay of more than 2 midnights   Future Attending Provider: Cindy Ortiz [[id number]]   Admitting Provider: Cindy Ortiz [[id number]]   Preferred Department: [facility]

## 2017-11-09 ENCOUNTER — HOSPITAL ENCOUNTER (INPATIENT)
Age: 42
LOS: 7 days | Discharge: HOME | DRG: 885 | End: 2017-11-16
Attending: PSYCHIATRY & NEUROLOGY | Admitting: PSYCHIATRY & NEUROLOGY
Payer: COMMERCIAL

## 2017-11-09 VITALS
TEMPERATURE: 98.3 F | SYSTOLIC BLOOD PRESSURE: 106 MMHG | RESPIRATION RATE: 14 BRPM | BODY MASS INDEX: 28.27 KG/M2 | DIASTOLIC BLOOD PRESSURE: 80 MMHG | WEIGHT: 144 LBS | HEIGHT: 60 IN | OXYGEN SATURATION: 98 % | HEART RATE: 73 BPM

## 2017-11-09 LAB
ALBUMIN SERPL-MCNC: 3.3 G/DL (ref 3.5–5.2)
ALBUMIN/GLOBULIN RATIO: ABNORMAL (ref 1–2.5)
ALP BLD-CCNC: 88 U/L (ref 35–104)
ALT SERPL-CCNC: 38 U/L (ref 5–33)
ANION GAP SERPL CALCULATED.3IONS-SCNC: 9 MMOL/L (ref 9–17)
AST SERPL-CCNC: 26 U/L
BILIRUB SERPL-MCNC: 0.28 MG/DL (ref 0.3–1.2)
BUN BLDV-MCNC: 9 MG/DL (ref 6–20)
BUN/CREAT BLD: ABNORMAL (ref 9–20)
CALCIUM SERPL-MCNC: 8.3 MG/DL (ref 8.6–10.4)
CHLORIDE BLD-SCNC: 107 MMOL/L (ref 98–107)
CO2: 26 MMOL/L (ref 20–31)
CREAT SERPL-MCNC: 0.75 MG/DL (ref 0.5–0.9)
EKG ATRIAL RATE: 65 BPM
EKG ATRIAL RATE: 91 BPM
EKG P AXIS: 53 DEGREES
EKG P AXIS: 57 DEGREES
EKG P-R INTERVAL: 162 MS
EKG P-R INTERVAL: 166 MS
EKG Q-T INTERVAL: 392 MS
EKG Q-T INTERVAL: 440 MS
EKG QRS DURATION: 76 MS
EKG QRS DURATION: 80 MS
EKG QTC CALCULATION (BAZETT): 457 MS
EKG QTC CALCULATION (BAZETT): 482 MS
EKG R AXIS: 12 DEGREES
EKG R AXIS: 25 DEGREES
EKG T AXIS: 23 DEGREES
EKG T AXIS: 28 DEGREES
EKG VENTRICULAR RATE: 65 BPM
EKG VENTRICULAR RATE: 91 BPM
GFR AFRICAN AMERICAN: >60 ML/MIN
GFR NON-AFRICAN AMERICAN: >60 ML/MIN
GFR SERPL CREATININE-BSD FRML MDRD: ABNORMAL ML/MIN/{1.73_M2}
GFR SERPL CREATININE-BSD FRML MDRD: ABNORMAL ML/MIN/{1.73_M2}
GLUCOSE BLD-MCNC: 111 MG/DL (ref 70–99)
HCT VFR BLD CALC: 39.2 % (ref 36–46)
HEMOGLOBIN: 12.9 G/DL (ref 12–16)
INR BLD: 1
MCH RBC QN AUTO: 26.3 PG (ref 26–34)
MCHC RBC AUTO-ENTMCNC: 32.9 G/DL (ref 31–37)
MCV RBC AUTO: 80.1 FL (ref 80–100)
PDW BLD-RTO: 17.1 % (ref 11.5–14.9)
PLATELET # BLD: 265 K/UL (ref 150–450)
PMV BLD AUTO: 7.4 FL (ref 6–12)
POTASSIUM SERPL-SCNC: 4.5 MMOL/L (ref 3.7–5.3)
PROTHROMBIN TIME: 10.5 SEC (ref 9.7–12)
RBC # BLD: 4.9 M/UL (ref 4–5.2)
SODIUM BLD-SCNC: 142 MMOL/L (ref 135–144)
TOTAL PROTEIN: 6.8 G/DL (ref 6.4–8.3)
WBC # BLD: 7.9 K/UL (ref 3.5–11)

## 2017-11-09 PROCEDURE — 85027 COMPLETE CBC AUTOMATED: CPT

## 2017-11-09 PROCEDURE — 1240000000 HC EMOTIONAL WELLNESS R&B

## 2017-11-09 PROCEDURE — 93005 ELECTROCARDIOGRAM TRACING: CPT

## 2017-11-09 PROCEDURE — 97535 SELF CARE MNGMENT TRAINING: CPT

## 2017-11-09 PROCEDURE — 99238 HOSP IP/OBS DSCHRG MGMT 30/<: CPT | Performed by: INTERNAL MEDICINE

## 2017-11-09 PROCEDURE — 6360000002 HC RX W HCPCS: Performed by: INTERNAL MEDICINE

## 2017-11-09 PROCEDURE — 80053 COMPREHEN METABOLIC PANEL: CPT

## 2017-11-09 PROCEDURE — 97165 OT EVAL LOW COMPLEX 30 MIN: CPT

## 2017-11-09 PROCEDURE — 90686 IIV4 VACC NO PRSV 0.5 ML IM: CPT | Performed by: INTERNAL MEDICINE

## 2017-11-09 PROCEDURE — 36415 COLL VENOUS BLD VENIPUNCTURE: CPT

## 2017-11-09 PROCEDURE — 85610 PROTHROMBIN TIME: CPT

## 2017-11-09 PROCEDURE — 2580000003 HC RX 258: Performed by: INTERNAL MEDICINE

## 2017-11-09 PROCEDURE — G0008 ADMIN INFLUENZA VIRUS VAC: HCPCS | Performed by: INTERNAL MEDICINE

## 2017-11-09 RX ORDER — BENZTROPINE MESYLATE 1 MG/ML
2 INJECTION INTRAMUSCULAR; INTRAVENOUS 2 TIMES DAILY PRN
Status: DISCONTINUED | OUTPATIENT
Start: 2017-11-09 | End: 2017-11-16 | Stop reason: HOSPADM

## 2017-11-09 RX ORDER — NICOTINE 21 MG/24HR
1 PATCH, TRANSDERMAL 24 HOURS TRANSDERMAL DAILY
Status: DISCONTINUED | OUTPATIENT
Start: 2017-11-10 | End: 2017-11-16 | Stop reason: HOSPADM

## 2017-11-09 RX ORDER — GABAPENTIN 600 MG/1
600 TABLET ORAL 3 TIMES DAILY
Status: DISCONTINUED | OUTPATIENT
Start: 2017-11-09 | End: 2017-11-16 | Stop reason: HOSPADM

## 2017-11-09 RX ORDER — M-VIT,TX,IRON,MINS/CALC/FOLIC 27MG-0.4MG
1 TABLET ORAL DAILY
Status: DISCONTINUED | OUTPATIENT
Start: 2017-11-10 | End: 2017-11-16 | Stop reason: HOSPADM

## 2017-11-09 RX ORDER — BISACODYL 10 MG
10 SUPPOSITORY, RECTAL RECTAL DAILY PRN
Status: CANCELLED | OUTPATIENT
Start: 2017-11-09

## 2017-11-09 RX ORDER — QUETIAPINE FUMARATE 200 MG/1
200 TABLET, FILM COATED ORAL NIGHTLY
Status: DISCONTINUED | OUTPATIENT
Start: 2017-11-09 | End: 2017-11-16 | Stop reason: HOSPADM

## 2017-11-09 RX ORDER — VENLAFAXINE HYDROCHLORIDE 150 MG/1
150 CAPSULE, EXTENDED RELEASE ORAL
Status: DISCONTINUED | OUTPATIENT
Start: 2017-11-10 | End: 2017-11-16 | Stop reason: HOSPADM

## 2017-11-09 RX ORDER — ACETAMINOPHEN 325 MG/1
650 TABLET ORAL EVERY 4 HOURS PRN
Status: CANCELLED | OUTPATIENT
Start: 2017-11-09

## 2017-11-09 RX ORDER — TRAZODONE HYDROCHLORIDE 100 MG/1
100 TABLET ORAL NIGHTLY
Status: DISCONTINUED | OUTPATIENT
Start: 2017-11-09 | End: 2017-11-16 | Stop reason: HOSPADM

## 2017-11-09 RX ORDER — ACETAMINOPHEN 325 MG/1
650 TABLET ORAL EVERY 4 HOURS PRN
Status: DISCONTINUED | OUTPATIENT
Start: 2017-11-09 | End: 2017-11-16 | Stop reason: HOSPADM

## 2017-11-09 RX ORDER — PANTOPRAZOLE SODIUM 40 MG/1
40 TABLET, DELAYED RELEASE ORAL
Status: DISCONTINUED | OUTPATIENT
Start: 2017-11-10 | End: 2017-11-16 | Stop reason: HOSPADM

## 2017-11-09 RX ORDER — CITALOPRAM 20 MG/1
20 TABLET ORAL DAILY
Status: DISCONTINUED | OUTPATIENT
Start: 2017-11-10 | End: 2017-11-16 | Stop reason: HOSPADM

## 2017-11-09 RX ORDER — BISACODYL 10 MG
10 SUPPOSITORY, RECTAL RECTAL DAILY PRN
Status: DISCONTINUED | OUTPATIENT
Start: 2017-11-09 | End: 2017-11-16 | Stop reason: HOSPADM

## 2017-11-09 RX ORDER — ACETAMINOPHEN 325 MG/1
650 TABLET ORAL EVERY 4 HOURS PRN
Status: DISCONTINUED | OUTPATIENT
Start: 2017-11-09 | End: 2017-11-09

## 2017-11-09 RX ORDER — IBUPROFEN 400 MG/1
400 TABLET ORAL EVERY 6 HOURS PRN
Status: DISCONTINUED | OUTPATIENT
Start: 2017-11-09 | End: 2017-11-16 | Stop reason: HOSPADM

## 2017-11-09 RX ADMIN — SODIUM CHLORIDE: 9 INJECTION, SOLUTION INTRAVENOUS at 03:34

## 2017-11-09 RX ADMIN — INFLUENZA VIRUS VACCINE 0.5 ML: 15; 15; 15; 15 SUSPENSION INTRAMUSCULAR at 13:53

## 2017-11-09 RX ADMIN — ENOXAPARIN SODIUM 40 MG: 40 INJECTION SUBCUTANEOUS at 09:52

## 2017-11-09 ASSESSMENT — SLEEP AND FATIGUE QUESTIONNAIRES
DIFFICULTY STAYING ASLEEP: YES
DIFFICULTY ARISING: NO
DO YOU HAVE DIFFICULTY SLEEPING: YES
DO YOU USE A SLEEP AID: NO
RESTFUL SLEEP: NO
SLEEP PATTERN: DIFFICULTY FALLING ASLEEP;DIFFICULTY ARISING;DISTURBED/INTERRUPTED SLEEP;RESTLESSNESS
AVERAGE NUMBER OF SLEEP HOURS: 3
DIFFICULTY FALLING ASLEEP: YES

## 2017-11-09 ASSESSMENT — PATIENT HEALTH QUESTIONNAIRE - PHQ9: SUM OF ALL RESPONSES TO PHQ QUESTIONS 1-9: 18

## 2017-11-09 ASSESSMENT — LIFESTYLE VARIABLES: HISTORY_ALCOHOL_USE: YES

## 2017-11-09 NOTE — DISCHARGE SUMMARY
Internal Medicine   Discharge Summary         Patient Identification:  Lizz Gilliam is a 38 y.o. female. :  1975  MRN: [id number]     Acct: [de-identified]   Admit Date:  2017  Discharge date and time: No discharge date for patient encounter. Attending Provider: Lex Son MD                                       Reason for Admission: Suicidal intentional drug overdose      Discharge Diagnoses:   Patient Active Problem List   Diagnosis    History of gastric surgery (gastric sleeve )    Smoker unmotivated to quit    Alcoholism /alcohol abuse ([facility]    Pneumonia of both lower lobes due to infectious organism    Asthma    Sepsis ([facility]    Severe recurrent major depression without psychotic features ([facility]          Consults:  Psychiatry     Procedures:     Hospital Course:     Suicidal intentional drug overdose  D/w poison center by ER   resp status stable       Disposition:  to ecf     Discharged Condition:  Stable     Discharge Medications:       Emma Mcfarlane   Home Medication Instructions [id number]    Printed on:17 1600   Medication Information                      citalopram (CELEXA) 20 MG tablet  Take 1 tablet by mouth daily             gabapentin (NEURONTIN) 600 MG tablet  Take 1 tablet by mouth 3 times daily             hydrOXYzine (ATARAX) 50 MG tablet  Take 1 tablet by mouth 3 times daily as needed for Anxiety             Multiple Vitamins-Minerals (THERAPEUTIC MULTIVITAMIN-MINERALS) tablet  Take 1 tablet by mouth daily             pantoprazole (PROTONIX) 40 MG tablet  Take 1 tablet by mouth every morning (before breakfast)             QUEtiapine (SEROQUEL) 200 MG tablet  Take 1 tablet by mouth nightly             traZODone (DESYREL) 100 MG tablet  Take 1 tablet by mouth nightly             venlafaxine (EFFEXOR XR) 75 MG extended release capsule  Take 1 capsule by mouth daily (with breakfast)                 Discharge Instructions:    Follow up with Raúl Mason

## 2017-11-09 NOTE — FLOWSHEET NOTE
Patient engaged in conversation, sharing that she lost her partner of 17 years a couple years ago and then more recently both her mom and dad. Patient indicated she \"chooses the wrong things\" when she's hurting so she doesn't feel the emotions. Patient's brother and sister were visiting the patient and were supportive of her. Writer prayed with patient and let her know spiritual care is available if she needed to talk. 11/09/17 1505   Encounter Summary   Services provided to: Patient and family together   Referral/Consult From: [address] [place] [address] Family members   Continue Visiting (11-9-17)   Complexity of Encounter Moderate   Length of Encounter 30 minutes   Spiritual Assessment Completed Yes   Routine   Type Initial   Assessment Approachable;Tearful; Anxious; Sad   Intervention Active listening;Explored feelings, thoughts, concerns;Explored coping resources;Prayer;Sustaining presence/ Ministry of presence   Outcome Expressed gratitude;Engaged in conversation;Expressed feelings/needs/concerns; Shared life review; Tearful;Receptive;Grieving

## 2017-11-09 NOTE — CARE COORDINATION
CASE MANAGEMENT NOTE:    Admission Date:  11/8/2017 Gabrielle Carrillo is a 38 y.o.  female    Ad[facility] for : [facility] [F31.9[facility]  Overdose drug, initial encounter Caterina Bynum    Met with:  Patient    PCP:  [facility] Dr. Lauren Coles:  Domenica     Current Residence/ Living Arrangements:  independently at home             Current Services PTA:  No    Is patient agreeable to VNS: no    Freedom of choice provided: NA         VNS chosen:  NA    DME:  none    Home Oxygen: No    Nebulizer: No    Supplier: N/A    Potential Assistance Needed: No    SNF needed: No    Pharmacy:  [facility] on [address]        Does Patient want to use MEDS to BEDS? No    Family Members/Caregivers that pt would like involved in their care:    Yes    If yes, list name here:  Mariano Mullen    Transportation Provider:  Family                      Discharge Plan:  11/9/17 Domenica Pt is from home with her cousin she was just dc'd from [facility] on Monday pt uses no dme and denies need for vns pt will need a ride home if she does not go back to [facility] will follow for needs plan home vs [facility] .//tv               Readmission Risk              Readmission Risk:        16.5       Age 72 or Greater:  0    Admitted from SNF or Requires Paid or Family Care:  0    Currently has CHF,COPD,ARF,CRI,or is on dialysis:  0    Takes more than 5 Prescription Medications:  4    Takes Digoxin,Insulin,Anticoagulants,Narcotics or ASA/Plavix:  [address] in Past 12 Months:  10    On Disability:  0    Patient Considers own Health:  2.5          Electronically signed by:  Sobeida Beach RN on 11/9/2017 at 11:10 AM

## 2017-11-09 NOTE — CONSULTS
[azithromycin]    Results for orders placed or performed during the hospital encounter of 11/08/17   CBC Auto Differential   Result Value Ref Range    WBC 5.8 3.5 - 11.0 k/uL    RBC 5.32 (H) 4.0 - 5.2 m/uL    Hemoglobin 14.0 12.0 - 16.0 g/dL    Hematocrit 42.8 36 - 46 %    MCV 80.3 80 - 100 fL    MCH 26.3 26 - 34 pg    MCHC 32.7 31 - 37 g/dL    RDW 16.7 (H) 11.5 - 14.9 %    Platelets 722 357 - 105 k/uL    MPV 7.5 6.0 - 12.0 fL    Differential Type NOT REPORTED     Seg Neutrophils 49 %    Lymphocytes 40 %    Monocytes 9 %    Eosinophils % 1 %    Basophils 1 %    Immature Granulocytes NOT REPORTED 0 %    Segs Absolute 2.90 1.3 - 9.1 k/uL    Absolute Lymph # 2.30 1.0 - 4.8 k/uL    Absolute Mono # 0.50 0.1 - 1.3 k/uL    Absolute Eos # 0.10 0.0 - 0.4 k/uL    Basophils # 0.00 0.0 - 0.2 k/uL    Absolute Immature Granulocyte NOT REPORTED 0.00 - 0.30 k/uL    WBC Morphology NOT REPORTED     RBC Morphology NOT REPORTED     Platelet Estimate NOT REPORTED    Comprehensive Metabolic Panel   Result Value Ref Range    Glucose 114 (H) 70 - 99 mg/dL    BUN 9 6 - 20 mg/dL    CREATININE 0.62 0.50 - 0.90 mg/dL    Bun/Cre Ratio NOT REPORTED 9 - 20    Calcium 8.3 (L) 8.6 - 10.4 mg/dL    Sodium 142 135 - 144 mmol/L    Potassium 4.1 3.7 - 5.3 mmol/L    Chloride 103 98 - 107 mmol/L    CO2 22 20 - 31 mmol/L    Anion Gap 17 9 - 17 mmol/L    Alkaline Phosphatase 109 (H) 35 - 104 U/L    ALT 52 (H) 5 - 33 U/L    AST 37 (H) <32 U/L    Total Bilirubin 0.19 (L) 0.3 - 1.2 mg/dL    Total Protein 7.8 6.4 - 8.3 g/dL    Alb 3.8 3.5 - 5.2 g/dL    Albumin/Globulin Ratio NOT REPORTED 1.0 - 2.5    GFR Non-African American >60 >60 mL/min    GFR African American >60 >60 mL/min    GFR Comment          GFR Staging NOT REPORTED    Acetaminophen Level   Result Value Ref Range    Acetaminophen Level <10 (L) 10 - 30 ug/mL   Ammonia   Result Value Ref Range    Ammonia 35 11 - 51 umol/L   Salicylate   Result Value Ref Range    Salicylate Lvl <1 (L) 3 - 10 mg/dL   HCG Qualitative, Serum   Result Value Ref Range    hCG Qual NEGATIVE NEG   Urine Drug Screen   Result Value Ref Range    Amphetamine Screen, Ur POSITIVE (A) NEG    Barbiturate Screen, Ur POSITIVE (A) NEG    Benzodiazepine Screen, Urine POSITIVE (A) NEG    Cocaine Metabolite, Urine NEGATIVE NEG    Methadone Screen, Urine POSITIVE (A) NEG    Opiates, Urine NEGATIVE NEG    Phencyclidine, Urine NEGATIVE NEG    Propoxyphene, Urine NOT REPORTED NEG    Cannabinoid Scrn, Ur NEGATIVE NEG    Oxycodone Screen, Ur NEGATIVE NEG    Methamphetamine, Urine NOT REPORTED NEG    Tricyclic Antidepressants, Ur NOT REPORTED NEG    MDMA URINE NOT REPORTED NEG    Buprenorphine Urine NOT REPORTED NEG    Test Information       Assay provides medical screening only.   The absence of expected drug(s) and/or   Urinalysis   Result Value Ref Range    Color, UA YELLOW YEL    Turbidity UA CLOUDY (A) CLEAR    Glucose, Ur NEGATIVE NEG    Bilirubin Urine NEGATIVE  Verified by ictotest. (A) NEG    Ketones, Urine NEGATIVE NEG    Specific Gravity, UA 1.022 1.000 - 1.030    Urine Hgb NEGATIVE NEG    pH, UA 5.5 5.0 - 8.0    Protein, UA TRACE (A) NEG    Urobilinogen, Urine Normal NORM    Nitrite, Urine NEGATIVE NEG    Leukocyte Esterase, Urine NEGATIVE NEG    Urinalysis Comments NOT REPORTED    CK   Result Value Ref Range    Total CK 72 26 - 192 U/L   Ethanol   Result Value Ref Range    Ethanol 99 (H) <10 mg/dL    Ethanol percent 0.099 %   Magnesium   Result Value Ref Range    Magnesium 2.1 1.6 - 2.6 mg/dL   Microscopic Urinalysis   Result Value Ref Range    -          WBC, UA 2 TO 5 /HPF    RBC, UA 2 TO 5 /HPF    Casts UA 20 TO 50 /LPF    Crystals UA NOT REPORTED NONE /HPF    Epithelial Cells UA 2 TO 5 /HPF    Renal Epithelial, Urine NOT REPORTED 0 /HPF    Bacteria, UA FEW (A) NONE    Mucus, UA 1+ (A) NONE    Trichomonas, UA NOT REPORTED NONE    Amorphous, UA NOT REPORTED NONE    Other Observations UA NOT REPORTED NREQ    Yeast, UA NOT REPORTED NONE Comprehensive metabolic panel   Result Value Ref Range    Glucose 111 (H) 70 - 99 mg/dL    BUN 9 6 - 20 mg/dL    CREATININE 0.75 0.50 - 0.90 mg/dL    Bun/Cre Ratio NOT REPORTED 9 - 20    Calcium 8.3 (L) 8.6 - 10.4 mg/dL    Sodium 142 135 - 144 mmol/L    Potassium 4.5 3.7 - 5.3 mmol/L    Chloride 107 98 - 107 mmol/L    CO2 26 20 - 31 mmol/L    Anion Gap 9 9 - 17 mmol/L    Alkaline Phosphatase 88 35 - 104 U/L    ALT 38 (H) 5 - 33 U/L    AST 26 <32 U/L    Total Bilirubin 0.28 (L) 0.3 - 1.2 mg/dL    Total Protein 6.8 6.4 - 8.3 g/dL    Alb 3.3 (L) 3.5 - 5.2 g/dL    Albumin/Globulin Ratio NOT REPORTED 1.0 - 2.5    GFR Non-African American >60 >60 mL/min    GFR African American >60 >60 mL/min    GFR Comment          GFR Staging NOT REPORTED    CBC   Result Value Ref Range    WBC 7.9 3.5 - 11.0 k/uL    RBC 4.90 4.0 - 5.2 m/uL    Hemoglobin 12.9 12.0 - 16.0 g/dL    Hematocrit 39.2 36 - 46 %    MCV 80.1 80 - 100 fL    MCH 26.3 26 - 34 pg    MCHC 32.9 31 - 37 g/dL    RDW 17.1 (H) 11.5 - 14.9 %    Platelets 943 528 - 110 k/uL    MPV 7.4 6.0 - 12.0 fL   Protime-INR   Result Value Ref Range    Protime 10.5 9.7 - 12.0 sec    INR 1.0    POC Glucose Fingerstick   Result Value Ref Range    POC Glucose 96 65 - 105 mg/dL   EKG 12 Lead   Result Value Ref Range    Ventricular Rate 91 BPM    Atrial Rate 91 BPM    P-R Interval 166 ms    QRS Duration 80 ms    Q-T Interval 392 ms    QTc Calculation (Bazett) 482 ms    P Axis 57 degrees    R Axis 12 degrees    T Axis 23 degrees   EKG 12 Lead   Result Value Ref Range    Ventricular Rate 65 BPM    Atrial Rate 65 BPM    P-R Interval 162 ms    QRS Duration 76 ms    Q-T Interval 440 ms    QTc Calculation (Bazett) 457 ms    P Axis 53 degrees    R Axis 25 degrees    T Axis 28 degrees   EKG 12 Lead   Result Value Ref Range    Ventricular Rate 70 BPM    Atrial Rate 70 BPM    P-R Interval 138 ms    QRS Duration 74 ms    Q-T Interval 406 ms    QTc Calculation (Bazett) 438 ms    P Axis 14 degrees    R Axis 5 degrees    T Axis 9 degrees         Examination:      Vital Signs /80   Pulse 73   Temp 98.3 °F (36.8 °C) (Oral)   Resp 14   Ht 5' (1.524 m)   Wt 144 lb (65.3 kg)   SpO2 98%   BMI 28.12 kg/m²    Vitals:    11/09/17 1329   BP: 106/80   Pulse: 73   Resp: 14   Temp: 98.3 °F (36.8 °C)   SpO2: 98%     Gait and station: Not observed  Appearance    alert  Speech    spontaneous  Mood    Anxious  Depressed  Affect    depressed affect  Thought Content    intact  Thought Process    linear  Associations    logical connections  Insight    Fair  Judgment    Impaired  No abnormal movements, tics or mannerisms. Orientation    oriented to person, place, time, and general circumstances  Memory    recent and remote memory intact  Attention/Concentration    intact  Language    0 - no aphasia, normal  Fund of Knowledge    intact  Suicide Assessment    ambivalent  Homicidal ideations denies any. Diagnosis:    Recurrent depression    Recommendations:  1. Please transfer to psychiatry once she is medically cleared  2.  Thank you for allowing me to participate in the care of this patient                 Bree Hernandez  Consulting Psychiatrist

## 2017-11-09 NOTE — PROGRESS NOTES
disability  Leisure & Hobbies: Pt states no hobbies.      Objective              ADL  Feeding: Independent  Grooming: Independent  UE Bathing:  (TBA)  LE Bathing:  (TBA)  UE Dressing: Modified independent  (Pt wears hospital gown)  LE Dressing: Stand by assistance (Pt wears short, footies. )  Toileting: Contact guard assistance    UE Function  Hand Dominance  Hand Dominance: Right        LUE Strength  L Shoulder Flex: 4-/5  L Shoulder ABduction: 4-/5  L Elbow Flex: 5/5  L Elbow Ext: 5/5  L Wrist Flex: 4/5 (IV on top of hand)  L Wrist Ext:  (not tested IV on top of hand)  Left Hand Strength -  (lbs)  Handle Setting 2: 15, 20  LUE Tone: Normotonic     LUE AROM (degrees)  LUE AROM : WFL     Left Hand AROM (degrees)  Left Hand AROM: WFL  RUE Strength  R Shoulder Flex: 4-/5  R Shoulder ADduction: 4-/5  R Elbow Flex: 5/5  R Elbow Ext: 5/5  R Wrist Flex: 4/5  R Wrist Ext: 4/5   Right Hand Strength -  (lbs)  Handle Setting 2: 35, 35  RUE Tone: Normotonic     RUE AROM (degrees)  RUE AROM : WFL     Right Hand AROM (degrees)  Right Hand AROM: WFL    Fine Motor Skills  Coordination  Movements Are Fluid And Coordinated: Yes       Left Hand Strength - Pinch (lbs)  Lateral: 9, 9  Palmar 3 point: 8, 7     Right Hand Strength - Pinch (lbs)  Lateral: 9,8  Palmar 3 point: 8,7             Mobility  Supine to Sit: Independent  Sit to Supine: Independent       Balance  Sitting Balance: Independent (Pt sits at EOB)  Standing Balance  Time: 2 minutes  Activity: Pt stands holding onto bed rail.(pt states her legs feel unsteady)  Sit to stand: Minimal assistance  Stand to sit: Contact guard assistance     Bed mobility  Rolling to Left: Independent  Rolling to Right: Independent  Supine to Sit: Independent  Sit to Supine: Independent     Transfers  Sit to stand: Minimal assistance  Stand to sit: Contact guard assistance  Toilet Transfers  Toilet - Technique: Ambulating  Toilet Transfer: Minimal assistance  Toilet Transfers Comments: Pt ambulates to bathroom without device. Gait unsteady. Pt holds onto grab bar in bathroom and OT pushes IV. Assessment  Performance deficits / Impairments: Decreased functional mobility , Decreased strength, Decreased ADL status  Treatment Diagnosis: Impaired ADL status, impaired mobility, weakness  Prognosis: Good  Decision Making: Low Complexity  History: Pt admitted to hospital w medication drug overdose, suicide attempt, alcohol intoxication. Hx anxiety, depression. poly substance abuse. Exam: ADL transfers, MMT, ROM, coordination, dynamometer, pinch meter. Patient Education: OT POC, safety w ADL Transfers (instructed pt to use grab bars in hospital bathroom and to use grab bars by toilet)  Barriers to Learning: none  REQUIRES OT FOLLOW UP: Yes  Discharge Recommendations: Home with assist PRN  Activity Tolerance: Patient limited by fatigue  Activity Tolerance: Pt states she has been sleeping a lot. Pt states she feels tired. G CODE       Goals  Patient Goals   Patient goals : To go home.    Short term goals  Time Frame for Short term goals: by discharge  Short term goal 1: Pt will safely perform ADL transfers (toilet etc) at modified independent level  Short term goal 2: Pt will verbalize/report and /or demonstrate modified independent to independence w ADL (bathing)   Short term goal 3: Pt will participate in 20-30 minutes bilateral UE therapeutic exercises to increase UB strength and endurance for ADLs  Short term goal 4: Pt will verbalize 2-3 ECWS techniques she can use for daily activities    Plan  Safety Devices  Safety Devices in place: Yes  Type of devices: Call light within reach, Gait belt, Left in bed, Patient at risk for falls (Pt has 1 on 1 person)     Plan  Times per week: 3  Times per day: Daily  Current Treatment Recommendations: Strengthening, Self-Care / ADL, Functional Mobility Training, Patient/Caregiver Education & Training, Safety Education & Training  Plan Comment: continue OT       OT Individual Minutes  Time In: 9613  Time Out: 4413  Minutes: 33     11/09/17 0911   OT Individual Minutes   Time In [address]   Time Out 5987   Minutes 33   Time Code Minutes    Timed Code Treatment Minutes 15 Minutes     Electronically signed by Kia Galvan OT on 11/9/17 at 9:44 AM

## 2017-11-09 NOTE — H&P
[facility]    Patient name:  Aden Bloch  Date of admission:  11/8/2017  9:47 AM  MRN:   [id number]  YOB: 1975      Cc suicidal   etoh intoxication     HPI   Pt admitted with sympts of overdose        HPI   1) Location/Symptom suicidal overdose   2) Timing/Onset: acute   3) Context/Setting: intentional   4) Quality: sleepy   5) Duration: today   6) Modifying Factors: multiple drugs   7) Severity: severe acute         Past Medical History:   Diagnosis Date    Alcoholism ([facility]     3 pints daily    Anxiety     Asthma     Depression     Polysubstance abuse     drug abuse includes, cocaine, IV heroin, cannabis, and ETOH    Suicide attempt 11/08/2017    S/A by overdosing on Trazodone and Seroquel    Suicide ideation      Family History   Problem Relation Age of Onset    Brain Cancer Mother     Cancer Mother      \"female cancer\"    Heart Disease Father       reports that she has been smoking Cigarettes. She has a 45.00 pack-year smoking history. She has never used smokeless tobacco. She reports that she drinks alcohol. She reports that she uses drugs, including Cocaine, Marijuana, and IV, about 7 times per week.   Past Medical History:   Diagnosis Date    Alcoholism ([facility]     3 pints daily    Anxiety     Asthma     Depression     Polysubstance abuse     drug abuse includes, cocaine, IV heroin, cannabis, and ETOH    Suicide attempt 11/08/2017    S/A by overdosing on Trazodone and Seroquel    Suicide ideation      Allergies   Allergen Reactions    Zithromax [Azithromycin] Swelling       Review of systems  Cannot obtain     ROS  Physical exam     /80   Pulse 73   Temp 98.3 °F (36.8 °C) (Oral)   Resp 14   Ht 5' (1.524 m)   Wt 144 lb (65.3 kg)   SpO2 98%   BMI 28.12 kg/m²      General appearance: sleepy  in no distress  Eyes:  CATY   Head: AT/NC  ENT NAD   Neck: Trachea midline; supple  Lungs: normal effort, clear to auscultation. CVS sinus with no murmurs. Vasc No JVP, no carotid bruit  Abdomen: Soft, non-tender; no masses or HSM,   Extremities: no edema; no digital cyanosis or clubbing. Musculoskeletal NO joint effusion or synovitis  Skin: No rash or ulcers  Neurologic: Cranial nerves II-XII grossly intact; no motor deficit. Psych: sleepy   NO lymphadenopathy            Relevant Labs/Imaging  BMP:    Recent Labs      11/08/17   1005    NA  142    K  4.1    CL  103    CO2  22    BUN  9    CREATININE  0.62    GLUCOSE  114*      S. Calcium:       Assessment / Plan      Patient Active Problem List   Diagnosis    History of gastric surgery (gastric sleeve 2012)    Smoker unmotivated to quit    [facility]    Pneumonia of both lower lobes due to infectious organism    Asthma    Sepsis ([facility]    Severe recurrent major depression without psychotic features ([facility]       A/p  Suicidal intentional drug overdose  D/w poison center by ER    resp status stable   Keep NPO until more awake       MD AMADOR Maza [address], [address] [place] [address].    Phone [phone]   Fax: [phone]  Answering Service: [phone]

## 2017-11-09 NOTE — PLAN OF CARE
Problem: Suicide risk  Goal: Provide patient with safe environment  Provide patient with safe environment   Outcome: Met This Shift  1:1 care all shift. Problem: Falls - Risk of  Goal: Absence of falls  Outcome: Met This Shift  No falls this shift. Problem: Safety:  Goal: Free from accidental physical injury  Free from accidental physical injury   Outcome: Met This Shift  No new injury this shift. Problem: Pain:  Goal: Patient's pain/discomfort is manageable  Patient's pain/discomfort is manageable   Outcome: Met This Shift  Reports no pain this shift.

## 2017-11-09 NOTE — PLAN OF CARE
Problem: Suicide risk  Goal: Provide patient with safe environment  Provide patient with safe environment   Outcome: Ongoing  Patient has a 1 on 1    Problem: Falls - Risk of  Goal: Absence of falls  Outcome: Ongoing  Bed locked and in lowest position. Call light in reach. Side rails up x2. Non skid slips on. No falls this shift    Problem: Safety:  Goal: Free from accidental physical injury  Free from accidental physical injury   Outcome: Met This Shift    Goal: Free from intentional harm  Free from intentional harm   Outcome: Met This Shift  Patient free from intentional harm this shift. Problem: Pain:  Goal: Patient's pain/discomfort is manageable  Patient's pain/discomfort is manageable   Outcome: Ongoing  Patient reports no pain.

## 2017-11-10 LAB
EKG ATRIAL RATE: 59 BPM
EKG P AXIS: 25 DEGREES
EKG P-R INTERVAL: 146 MS
EKG Q-T INTERVAL: 424 MS
EKG QRS DURATION: 80 MS
EKG QTC CALCULATION (BAZETT): 419 MS
EKG R AXIS: 26 DEGREES
EKG T AXIS: 30 DEGREES
EKG VENTRICULAR RATE: 59 BPM
THYROXINE, FREE: 0.89 NG/DL (ref 0.93–1.7)
TSH SERPL DL<=0.05 MIU/L-ACNC: 1.95 MIU/L (ref 0.3–5)

## 2017-11-10 PROCEDURE — 1240000000 HC EMOTIONAL WELLNESS R&B

## 2017-11-10 PROCEDURE — 84439 ASSAY OF FREE THYROXINE: CPT

## 2017-11-10 PROCEDURE — 84443 ASSAY THYROID STIM HORMONE: CPT

## 2017-11-10 PROCEDURE — 36415 COLL VENOUS BLD VENIPUNCTURE: CPT

## 2017-11-10 PROCEDURE — 6370000000 HC RX 637 (ALT 250 FOR IP): Performed by: PSYCHIATRY & NEUROLOGY

## 2017-11-10 PROCEDURE — 93005 ELECTROCARDIOGRAM TRACING: CPT

## 2017-11-10 RX ORDER — CHLORDIAZEPOXIDE HYDROCHLORIDE 25 MG/1
25 CAPSULE, GELATIN COATED ORAL EVERY 6 HOURS PRN
Status: DISCONTINUED | OUTPATIENT
Start: 2017-11-10 | End: 2017-11-16 | Stop reason: HOSPADM

## 2017-11-10 RX ORDER — THIAMINE MONONITRATE (VIT B1) 100 MG
100 TABLET ORAL DAILY
Status: DISCONTINUED | OUTPATIENT
Start: 2017-11-10 | End: 2017-11-16 | Stop reason: HOSPADM

## 2017-11-10 RX ORDER — FOLIC ACID 1 MG/1
1 TABLET ORAL DAILY
Status: DISCONTINUED | OUTPATIENT
Start: 2017-11-10 | End: 2017-11-16 | Stop reason: HOSPADM

## 2017-11-10 RX ADMIN — FOLIC ACID 1 MG: 1 TABLET ORAL at 16:56

## 2017-11-10 RX ADMIN — CHLORDIAZEPOXIDE HYDROCHLORIDE 25 MG: 25 CAPSULE ORAL at 16:56

## 2017-11-10 RX ADMIN — CITALOPRAM HYDROBROMIDE 20 MG: 20 TABLET ORAL at 08:36

## 2017-11-10 RX ADMIN — GABAPENTIN 600 MG: 600 TABLET, FILM COATED ORAL at 14:10

## 2017-11-10 RX ADMIN — Medication 100 MG: at 16:56

## 2017-11-10 RX ADMIN — GABAPENTIN 600 MG: 600 TABLET, FILM COATED ORAL at 21:38

## 2017-11-10 RX ADMIN — MULTIPLE VITAMINS W/ MINERALS TAB 1 TABLET: TAB at 08:36

## 2017-11-10 RX ADMIN — TRAZODONE HYDROCHLORIDE 100 MG: 100 TABLET ORAL at 21:38

## 2017-11-10 RX ADMIN — VENLAFAXINE HYDROCHLORIDE 150 MG: 150 CAPSULE, EXTENDED RELEASE ORAL at 08:36

## 2017-11-10 RX ADMIN — PANTOPRAZOLE SODIUM 40 MG: 40 TABLET, DELAYED RELEASE ORAL at 08:36

## 2017-11-10 RX ADMIN — QUETIAPINE FUMARATE 200 MG: 200 TABLET ORAL at 21:38

## 2017-11-10 RX ADMIN — GABAPENTIN 600 MG: 600 TABLET, FILM COATED ORAL at 08:36

## 2017-11-10 ASSESSMENT — PAIN DESCRIPTION - LOCATION
LOCATION: SHOULDER
LOCATION: ABDOMEN

## 2017-11-10 ASSESSMENT — PAIN SCALES - GENERAL
PAINLEVEL_OUTOF10: 6
PAINLEVEL_OUTOF10: 5

## 2017-11-10 ASSESSMENT — PAIN DESCRIPTION - ORIENTATION: ORIENTATION: LEFT

## 2017-11-10 ASSESSMENT — PAIN DESCRIPTION - DESCRIPTORS: DESCRIPTORS: ACHING;CONSTANT

## 2017-11-10 ASSESSMENT — LIFESTYLE VARIABLES: HISTORY_ALCOHOL_USE: YES

## 2017-11-10 ASSESSMENT — PAIN DESCRIPTION - PAIN TYPE: TYPE: ACUTE PAIN

## 2017-11-10 NOTE — PLAN OF CARE
Problem: Altered Mood, Depressive Behavior  Goal: LTG-Able to verbalize acceptance of life and situations over which he or she has no control  Outcome: Ongoing  [facility]  Initial Interdisciplinary Treatment Plan NOTE    Original treatment plan Date & Time: 11/10/17 0920    Admission Type:  Admission Type: Voluntary    Reason for admission:   Reason for Admission: recent overdose w/tx on medical @ [facility], drug abuse    Estimated Length of Stay:  5-7days  Estimated Discharge Date:  11/16/17 to be determined by physician    PATIENT STRENGTHS:  Patient Strengths:Strengths: Connection to output provider, Positive Support  Patient Strengths and Limitations:Limitations: Inappropriate/potentially harmful leisure interests  Addictive Behavior: Addictive Behavior  In the past 3 months, have you felt or has someone told you that you have a problem with:  : None  Do you have a history of Chemical Use?: Yes  Do you have a history of Alcohol Use?: Yes  Do you have a history of Street Drug Abuse?: Yes  Histroy of Prescripton Drug Abuse?: Yes  Medical Problems:  Past Medical History:   Diagnosis Date    Alcoholism ([facility]     3 pints daily    Anxiety     Asthma     Depression     Polysubstance abuse     drug abuse includes, cocaine, IV heroin, cannabis, and ETOH    Suicide attempt 11/08/2017    S/A by overdosing on Trazodone and Seroquel    Suicide ideation      Status EXAM:Status and Exam  Normal: No  Facial Expression: Flat  Affect: Blunt  Level of Consciousness: Alert  Mood:Normal: No  Mood: Depressed, Sad  Motor Activity:Normal: Yes  Interview Behavior: Cooperative  Attention:Normal: Yes  Thought Content:Normal: Yes  Thought Content: Preoccupations  Hallucinations: None  Delusions: No  Memory:Normal: Yes  Insight and Judgment: No  Insight and Judgment: Poor Judgment, Poor Insight  Present Suicidal Ideation: Yes  Present Homicidal Ideation: No    EDUCATION:   Learner Progress Toward Treatment Goals: Reviewed group plans and strategies for care    Method:  Group therapy, medication compliance, individualized assessments and care planning    Outcome:  Needs reinforcement    PATIENT GOALS:  Pt did not identify, to be discussed with patient within 72 hours.     PLAN/TREATMENT RECOMMENDATIONS:   Group therapy, medications compliance, goal setting, individualized assessments and care, continue to monitor pt on unit      SHORT-TERM GOALS:   Time frame for Short-Term Goals:  5-7 days    LONG-TERM GOALS:  Time frame for Long-Term Goals:  6 months    Members Present in Team Meeting:     KRYSTIAN Zuleta  Goal: LTG-Able to verbalize and/or display a decrease in depressive symptoms  Outcome: Ongoing    Goal: STG-Able to verbalize suicidal ideations  Outcome: Ongoing    Goal: STG-Able to verbalize support system  Outcome: Ongoing    Goal: LTG-Absence of self-harm  Outcome: Ongoing    Goal: STG-Knowledge of positive coping patterns  Outcome: Ongoing    Goal: Patient Specific Goal  Outcome: Ongoing    Goal: Participation in care planning  Outcome: Ongoing      Problem: Suicide risk  Goal: Provide patient with safe environment  Provide patient with safe environment   Outcome: Ongoing

## 2017-11-10 NOTE — CARE COORDINATION
Writer attempted to talk with pt but pt was meeting with Chemical Dependency Counselor at this time.

## 2017-11-10 NOTE — PLAN OF CARE
Problem: Altered Mood, Depressive Behavior  Goal: LTG-Able to verbalize and/or display a decrease in depressive symptoms  Outcome: Ongoing  Pt did not participate in Goal Setting and [employer] [facility] [employer] despite staff encouragement.

## 2017-11-10 NOTE — PROGRESS NOTES
`[facility] [place]  Admission Note     Admission Type:   Admission Type: Voluntary    Reason for admission:  Reason for Admission: recent overdose w/tx on medical @ [facility], drug abuse    PATIENT STRENGTHS:       Patient Strengths and Limitations:       Addictive Behavior:   Addictive Behavior  In the past 3 months, have you felt or has someone told you that you have a problem with:  : None  Do you have a history of Chemical Use?: Yes  Do you have a history of Alcohol Use?: Yes  Do you have a history of Street Drug Abuse?: Yes  Histroy of Prescripton Drug Abuse?: Yes    Medical Problems:   Past Medical History:   Diagnosis Date    Alcoholism ([facility]     3 pints daily    Anxiety     Asthma     Depression     Polysubstance abuse     drug abuse includes, cocaine, IV heroin, cannabis, and ETOH    Suicide attempt 11/08/2017    S/A by overdosing on Trazodone and Seroquel    Suicide ideation        Status EXAM:  Status and Exam  Normal: No  Facial Expression: Flat  Affect: Blunt  Level of Consciousness: Alert  Mood:Normal: No  Mood: Depressed, Sad  Motor Activity:Normal: Yes  Interview Behavior: Cooperative  Attention:Normal: Yes  Thought Content:Normal: Yes  Thought Content: Preoccupations  Hallucinations: None  Delusions: No  Memory:Normal: Yes  Insight and Judgment: No  Insight and Judgment: Poor Judgment, Poor Insight  Present Suicidal Ideation: Yes  Present Homicidal Ideation: No    Tobacco Screening:  Practical Counseling, on admission, ainsley X, if applicable and completed (first 3 are required if patient doesn't refuse):            ( )  Recognizing danger situations (included triggers and roadblocks)                    ( )  Coping skills (new ways to manage stress, exercise, relaxation techniques, changing routine, distraction)                                                           ( )  Basic information about quitting (benefits of quitting, techniques in how to quit, available resources  ( ) Referral for counseling faxed to [facility]                                           ( ) Patient refused counseling  ( ) Patient has not smoked in the last 30 days      Pt admitted with followings belongings:  Dentures: None  Vision - Corrective Lenses: None  Hearing Aid: None  Jewelry: Necklace  Body Piercings Removed: N/A  Clothing: Footwear, Pants, Shirt, Socks, Undergarments (Comment)  Were All Patient Medications Collected?: Yes  Other Valuables: Cell phone, Bandar Fermin, Other (Comment) ($87.79, [employer], [employer] card, [employer], social security card, necklace, 2 med bottles)     Valuables sent home with n/a. Valuables placed in safe in security envelope, number:  [id number]. Patient's home medications were not w/her. Patient oriented to surroundings and program expectations and copy of patient rights given. Received admission packet:  yes. Consents reviewed, signed all paperwork. Refused nothing. Patient verbalize understanding:  yes. Patient education on precautions: yes. No contraband found when searched.                    Josh Mims RN
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
## 2017-11-10 NOTE — PROGRESS NOTES

## 2017-11-10 NOTE — PLAN OF CARE
Problem: Altered Mood, Depressive Behavior  Goal: LTG-Able to verbalize and/or display a decrease in depressive symptoms  Outcome: Ongoing  Pt reports feeling depressed and anxious at a level of 10 for both. Goal: STG-Able to verbalize suicidal ideations  Outcome: Ongoing  Pt denies thoughts of self harm and is agreeable to seeking out should thoughts of self harm arise. Safe environment maintained. Q15 minute checks for safety cont per unit policy. Will cont to monitor for safety and provides support and reassurance as needed. Goal: STG-Able to verbalize support system  Outcome: Ongoing  Pt reports having the support of her family. Goal: LTG-Absence of self-harm  Outcome: Ongoing  Pt denied thoughts of SI/HI/self-harm and agreed to seek out staff should thoughts of SI/HI/self-harm arise. Safe environment maintained. Q15 minute checks for safety continued per unit policy. Will continue to monitor for safety and provide support and reassurance as needed. Pt also reports she is grieving for her parents and her mate. Goal: STG-Knowledge of positive coping patterns  Outcome: Ongoing  1:1 with pt x ten minutes. Pt encouraged to attend unit programming and interact with peers and staff. Pt also encouraged to tend to hygiene and ADLs. Pt encouraged to discuss feelings with staff and feedback and reassurance provided.

## 2017-11-11 LAB
EKG ATRIAL RATE: 70 BPM
EKG P AXIS: 14 DEGREES
EKG P-R INTERVAL: 138 MS
EKG Q-T INTERVAL: 406 MS
EKG QRS DURATION: 74 MS
EKG QTC CALCULATION (BAZETT): 438 MS
EKG R AXIS: 5 DEGREES
EKG T AXIS: 9 DEGREES
EKG VENTRICULAR RATE: 70 BPM

## 2017-11-11 PROCEDURE — 1240000000 HC EMOTIONAL WELLNESS R&B

## 2017-11-11 PROCEDURE — 6370000000 HC RX 637 (ALT 250 FOR IP): Performed by: PSYCHIATRY & NEUROLOGY

## 2017-11-11 RX ADMIN — QUETIAPINE FUMARATE 200 MG: 200 TABLET ORAL at 20:52

## 2017-11-11 RX ADMIN — CHLORDIAZEPOXIDE HYDROCHLORIDE 25 MG: 25 CAPSULE ORAL at 20:52

## 2017-11-11 RX ADMIN — TRAZODONE HYDROCHLORIDE 100 MG: 100 TABLET ORAL at 20:52

## 2017-11-11 RX ADMIN — VENLAFAXINE HYDROCHLORIDE 150 MG: 150 CAPSULE, EXTENDED RELEASE ORAL at 08:26

## 2017-11-11 RX ADMIN — GABAPENTIN 600 MG: 600 TABLET, FILM COATED ORAL at 20:52

## 2017-11-11 RX ADMIN — PANTOPRAZOLE SODIUM 40 MG: 40 TABLET, DELAYED RELEASE ORAL at 08:26

## 2017-11-11 RX ADMIN — FOLIC ACID 1 MG: 1 TABLET ORAL at 08:26

## 2017-11-11 RX ADMIN — CHLORDIAZEPOXIDE HYDROCHLORIDE 25 MG: 25 CAPSULE ORAL at 09:15

## 2017-11-11 RX ADMIN — GABAPENTIN 600 MG: 600 TABLET, FILM COATED ORAL at 14:24

## 2017-11-11 RX ADMIN — Medication 100 MG: at 08:26

## 2017-11-11 RX ADMIN — GABAPENTIN 600 MG: 600 TABLET, FILM COATED ORAL at 08:26

## 2017-11-11 RX ADMIN — MULTIPLE VITAMINS W/ MINERALS TAB 1 TABLET: TAB at 08:26

## 2017-11-11 RX ADMIN — CITALOPRAM HYDROBROMIDE 20 MG: 20 TABLET ORAL at 08:26

## 2017-11-11 NOTE — PROGRESS NOTES
Psychiatric Admission and evaluation Note      Patient was admitted through the medical floor with complaints of increasing depression and suicidal ideation. Chief Complaints: Depressed, suicidal attempt, substance abuse  HPI: Patient is a 38 y.o. female who complains of excessive worry, agitation, fearfulness, labile mood, insomnia and depression. She also complains of anhedonia, difficulty concentrating, fatigue, feelings of worthlessness/guilt, hopelessness, insomnia and psychomotor agitation. Onset was approximately a few weeks ago, rapidly worsening since that time. Intensity of depression rated at a 7/10 and denies any mitigating factors. Review of Systems   Constitutional: Negative for chills, fever and weight loss. Neurological: Negative for focal weakness, seizures and loss of consciousness. Psychiatric/Behavioral: Positive for depression, substance abuse and suicidal ideas. All other systems reviewed and are negative.     Past Medical and Psychiatric History:        Diagnosis Date    Alcoholism ([facility]     3 pints daily    Anxiety     Asthma     Depression     Polysubstance abuse     drug abuse includes, cocaine, IV heroin, cannabis, and ETOH    Suicide attempt 11/08/2017    S/A by overdosing on Trazodone and Seroquel    Suicide ideation       Psychiatric hospitalizations, Depression, Anhedonia and ETOH/Drugs    Social History:    Education Status: some college    Employment History: Unemployed, not seeking work    Relationships: Single Children: No     Substance use: Alcohol:  Larger amounts or over a longer period of time than intended, Opioids:  Larger amounts or over a longer period of time than intended, Sedatives, Hypnotics, Anxiolytics:  Larger amounts or over a longer period of time than intended and Stimulants:  Larger amounts or over a longer period of time than intended    Family History:    No Family History  Family is not active in patient's care.         Vitals:    11/10/17 2000   BP: (!) 86/57   Pulse: 66   Resp: 14   Temp: 97.5 °F (36.4 °C)     Results for orders placed or performed during the hospital encounter of 11/09/17   TSH without Reflex   Result Value Ref Range    TSH 1.95 0.30 - 5.00 mIU/L   T4, free   Result Value Ref Range    Thyroxine, Free 0.89 (L) 0.93 - 1.70 ng/dL   EKG 12 lead   Result Value Ref Range    Ventricular Rate 59 BPM    Atrial Rate 59 BPM    P-R Interval 146 ms    QRS Duration 80 ms    Q-T Interval 424 ms    QTc Calculation (Bazett) 419 ms    P Axis 25 degrees    R Axis 26 degrees    T Axis 30 degrees     Current Facility-Administered Medications   Medication Dose Route Frequency Provider Last Rate Last Dose    chlordiazePOXIDE (LIBRIUM) capsule 25 mg  25 mg Oral Q6H PRN Sam Burt MD   25 mg at 11/10/17 1656    vitamin B-1 (THIAMINE) tablet 100 mg  100 mg Oral Daily Sam Burt MD   100 mg at 50/73/75 1939    folic acid (FOLVITE) tablet 1 mg  1 mg Oral Daily Sam Burt MD   1 mg at 11/10/17 1656    citalopram (CELEXA) tablet 20 mg  20 mg Oral Daily Sam Burt MD   20 mg at 11/10/17 0836    gabapentin (NEURONTIN) tablet 600 mg  600 mg Oral TID Sam Burt MD   600 mg at 11/10/17 2138    therapeutic multivitamin-minerals 1 tablet  1 tablet Oral Daily Sam Burt MD   1 tablet at 11/10/17 0836    pantoprazole (PROTONIX) tablet 40 mg  40 mg Oral QAM AC Sam Burt MD   40 mg at 11/10/17 0836    QUEtiapine (SEROQUEL) tablet 200 mg  200 mg Oral Nightly Sam Burt MD   200 mg at 11/10/17 2138    traZODone (DESYREL) tablet 100 mg  100 mg Oral Nightly Sam Burt MD   100 mg at 11/10/17 2138    venlafaxine (EFFEXOR XR) extended release capsule 150 mg  150 mg Oral Daily with breakfast Sam Burt MD   150 mg at 11/10/17 0836    bisacodyl (DULCOLAX) suppository 10 mg  10 mg Rectal Daily PRN Abril Reyes MD        acetaminophen (TYLENOL) tablet 650 mg  650 mg Oral

## 2017-11-11 NOTE — PLAN OF CARE
Problem: Altered Mood, Depressive Behavior  Goal: LTG-Able to verbalize acceptance of life and situations over which he or she has no control  Outcome: Ongoing  1:1 with pt x ten minutes. Pt encouraged to attend unit programming and interact with peers and staff. Pt also encouraged to tend to hygiene and ADLs. Pt encouraged to discuss feelings with staff and feedback and reassurance provided. Pt denies thoughts of self harm and is agreeable to seeking out should thoughts of self harm arise. Safe environment maintained. Q15 minute checks for safety cont per unit policy. Will cont to monitor for safety and provides support and reassurance as needed. Pt is controlled in behaviors. Relates to anxiety. Compliant with medications.

## 2017-11-11 NOTE — PLAN OF CARE
Problem: Altered Mood, Depressive Behavior  Goal: LTG-Able to verbalize and/or display a decrease in depressive symptoms  Outcome: Ongoing  Coop. With vitals taken, remains quiet guarded aloof et asocial, secludes self to room resting on bed. Refuses offer of 1;1. Affect flat, depressed mood. Anxiety is up at this time. Refuses offer of all unit groups et unit activities. Refuses offer of nourishment. Accepting of all medications provided. Goal: STG-Able to verbalize suicidal ideations  Outcome: Ongoing  Cont. To seclude self to room resting on bed. Relates is feeling safe at this time, able to contract for safety. Pt checked q 15  Min.

## 2017-11-11 NOTE — PLAN OF CARE
Problem: Altered Mood, Depressive Behavior  Goal: LTG-Able to verbalize acceptance of life and situations over which he or she has no control  Outcome: Ongoing  PSYCHOEDUCATION GROUP NOTE  Date: 11/11/2017 Start Time: 1000 End Time: 1050  Number Participants in Group: 10  Goal: Patient will demonstrate increased interpersonal interaction, cognitive function, and teamwork/cooperation skills. Topic: Socialization, Cognitive Skills, Teamwork, Cooperation   ? Discipline Responsible:   OT  AT  [facility]. X RT MHP Other   ? Participation Level:    None  Minimal   X Active Listener X Interactive    Monopolizing     ? Participation Quality:  X Appropriate ? Inappropriate   X Attentive ? Intrusive   X Sharing ? Resistant   X Supportive ? Lethargic   ? Affective:   ? Congruent ? Incongruent ? Blunted ? Flat   ? Constricted ? Anxious ? Elated ? Angry   ? Labile ? Depressed ? Other x bright   ? Cognitive:  X Alert X Oriented PPTP     Concentration X G ? F ? P   Attention Span X G  F  P   Short-Term Memory ? G ? F ? P   Long-Term Memory X G ? F ? P   ProblemSolving/  Decision Making X G ? F ? P   Ability to Process  Information X G ? F ? P     ? Contributing Factors   ? Delusional   ? Hallucinating   ? Flight of Ideas   ? Other:   ? Modes of Intervention:  x Education x Support x Exploration   ? Clarifying x Problem Solving ? Confrontation   x Socialization ? Limit Setting ? Reality Testing   x Activity ? Movement ? Media   ? Other:  ? ? ? ?   ? Response to Learning:  X Able to verbalize current knowledge/experience   X Able to verbalize/acknowledge new learning   X Able to retain information   X Capable of insight   ? Able to change behavior   X Progressing to goal   ? Other:    ?  Comments: pt was very social and pleasant during group.

## 2017-11-12 PROCEDURE — 6370000000 HC RX 637 (ALT 250 FOR IP): Performed by: PSYCHIATRY & NEUROLOGY

## 2017-11-12 PROCEDURE — 1240000000 HC EMOTIONAL WELLNESS R&B

## 2017-11-12 RX ADMIN — GABAPENTIN 600 MG: 600 TABLET, FILM COATED ORAL at 20:30

## 2017-11-12 RX ADMIN — CITALOPRAM HYDROBROMIDE 20 MG: 20 TABLET ORAL at 08:09

## 2017-11-12 RX ADMIN — GABAPENTIN 600 MG: 600 TABLET, FILM COATED ORAL at 14:15

## 2017-11-12 RX ADMIN — GABAPENTIN 600 MG: 600 TABLET, FILM COATED ORAL at 08:09

## 2017-11-12 RX ADMIN — Medication 100 MG: at 08:10

## 2017-11-12 RX ADMIN — CHLORDIAZEPOXIDE HYDROCHLORIDE 25 MG: 25 CAPSULE ORAL at 20:30

## 2017-11-12 RX ADMIN — CHLORDIAZEPOXIDE HYDROCHLORIDE 25 MG: 25 CAPSULE ORAL at 08:10

## 2017-11-12 RX ADMIN — MULTIPLE VITAMINS W/ MINERALS TAB 1 TABLET: TAB at 08:09

## 2017-11-12 RX ADMIN — PANTOPRAZOLE SODIUM 40 MG: 40 TABLET, DELAYED RELEASE ORAL at 08:09

## 2017-11-12 RX ADMIN — TRAZODONE HYDROCHLORIDE 100 MG: 100 TABLET ORAL at 20:30

## 2017-11-12 RX ADMIN — VENLAFAXINE HYDROCHLORIDE 150 MG: 150 CAPSULE, EXTENDED RELEASE ORAL at 08:10

## 2017-11-12 RX ADMIN — QUETIAPINE FUMARATE 200 MG: 200 TABLET ORAL at 20:30

## 2017-11-12 RX ADMIN — FOLIC ACID 1 MG: 1 TABLET ORAL at 08:09

## 2017-11-12 NOTE — PLAN OF CARE
Problem: Altered Mood, Depressive Behavior  Goal: LTG-Able to verbalize and/or display a decrease in depressive symptoms  Outcome: Ongoing  During 1:1 pt rated her depression at 6/10. Pt informed writer that her depression is about the same as when she first came in.  Pt's mother  in May and her dad in Oct.this year. Pt's partner of 17 years  4 years ago. Pt informed writer that when her significant other  she lost everything and has been sleeping from place to place. When pt's significant other  she started drinking again. Pt present with sad, flat affect. Pt related that she is ready to get a place of her own again. Pt also has an itinerary written out of counseling, groups and meetings she plans to attend to fill her day when discharged. Empathy/support/positive reinforcement given throughout the shift. Goal: STG-Able to verbalize suicidal ideations  Outcome: Ongoing  Pt currently denies SI and has remained free from self harm this shift. Will continue to monitor for a change in behavior. Q 15 min checks per Unit Policy maintained for pt safety.

## 2017-11-12 NOTE — PLAN OF CARE
Problem: Altered Mood, Depressive Behavior  Goal: LTG-Able to verbalize acceptance of life and situations over which he or she has no control  Outcome: Ongoing  Psychoeducation Group Note    Date: 11/12/17  Start Time: 0845  End Time: 4063    Number Participants in Group:  9    Goal:  Patient will demonstrate increased interpersonal interaction. Topic:  Goal Setting and [employer]    Discipline Responsible:   OT  AT  [facility]. X RT  Other       Participation Level:     None  Minimal   X Active Listener X Interactive    Monopolizing         Participation Quality:  X Appropriate  Inappropriate   X       Attentive        Intrusive   X       Sharing        Resistant   X       Supportive        Lethargic       Affective:   X Congruent  Incongruent  Blunted  Flat    Constricted  Anxious  Elated  Angry    Labile  Depressed  Other  Bright       Cognitive:  X Alert X Oriented PPTP     Concentration X G  F  P   Attention Span X G  F  P   Short-Term Memory X G  F  P   Long-Term Memory X G  F  P   ProblemSolving/  Decision Making X G  F  P   Ability to Process  Information X G  F  P      Contributing Factors             Delusional             Hallucinating             Flight of Ideas             Other:       Modes of Intervention:  X Education X Support X Exploration   X Clarifying X Problem Solving  Confrontation    Socialization X Limit Setting  Reality Testing   X Activity  Movement  Media    Other:            Response to Learning:  X Able to verbalize current knowledge/experience   X Able to verbalize/acknowledge new learning   X Able to retain information   X Capable of insight    Able to change behavior   X Progressing to goal    Other:        Comments: Pt developed daily goal to watch football, work on puzzle, stay positive by interacting with peers.

## 2017-11-13 PROCEDURE — 6370000000 HC RX 637 (ALT 250 FOR IP): Performed by: PSYCHIATRY & NEUROLOGY

## 2017-11-13 PROCEDURE — 1240000000 HC EMOTIONAL WELLNESS R&B

## 2017-11-13 RX ADMIN — GABAPENTIN 600 MG: 600 TABLET, FILM COATED ORAL at 15:19

## 2017-11-13 RX ADMIN — GABAPENTIN 600 MG: 600 TABLET, FILM COATED ORAL at 07:54

## 2017-11-13 RX ADMIN — QUETIAPINE FUMARATE 200 MG: 200 TABLET ORAL at 20:38

## 2017-11-13 RX ADMIN — PANTOPRAZOLE SODIUM 40 MG: 40 TABLET, DELAYED RELEASE ORAL at 06:38

## 2017-11-13 RX ADMIN — CITALOPRAM HYDROBROMIDE 20 MG: 20 TABLET ORAL at 07:54

## 2017-11-13 RX ADMIN — TRAZODONE HYDROCHLORIDE 100 MG: 100 TABLET ORAL at 20:38

## 2017-11-13 RX ADMIN — CHLORDIAZEPOXIDE HYDROCHLORIDE 25 MG: 25 CAPSULE ORAL at 20:38

## 2017-11-13 RX ADMIN — MULTIPLE VITAMINS W/ MINERALS TAB 1 TABLET: TAB at 07:54

## 2017-11-13 RX ADMIN — FOLIC ACID 1 MG: 1 TABLET ORAL at 07:54

## 2017-11-13 RX ADMIN — Medication 100 MG: at 07:54

## 2017-11-13 RX ADMIN — GABAPENTIN 600 MG: 600 TABLET, FILM COATED ORAL at 20:38

## 2017-11-13 RX ADMIN — VENLAFAXINE HYDROCHLORIDE 150 MG: 150 CAPSULE, EXTENDED RELEASE ORAL at 07:54

## 2017-11-13 NOTE — PLAN OF CARE
Problem: Altered Mood, Depressive Behavior  Goal: LTG-Able to verbalize acceptance of life and situations over which he or she has no control  Outcome: Ongoing  1:1 with pt x ten minutes. Pt encouraged to attend unit programming and interact with peers and staff. Pt also encouraged to tend to hygiene and ADLs. Pt encouraged to discuss feelings with staff and feedback and reassurance provided. Pt denies thoughts of self harm and is agreeable to seeking out should thoughts of self harm arise. Safe environment maintained. Q15 minute checks for safety cont per unit policy. Will cont to monitor for safety and provides support and reassurance as needed. Pt is controlled et cooperative. Compliant with medications and shows no side effects. Social with peers and is attending groups.

## 2017-11-13 NOTE — PLAN OF CARE
Problem: Altered Mood, Depressive Behavior  Goal: Participation in care planning  Pt participates in care planning daily with social work and staff. Problem: Pain:  Goal: Control of acute pain  Control of acute pain   Pain controled with prn Tylenol on requests. No c/o pain this shift noted.

## 2017-11-13 NOTE — PLAN OF CARE
Problem: Altered Mood, Depressive Behavior  Goal: LTG-Able to verbalize acceptance of life and situations over which he or she has no control  Outcome: Ongoing  Pt did not participate in Therapeutic Leisure Skills Group at 1100 despite staff encouragement.

## 2017-11-13 NOTE — PLAN OF CARE
Problem: Altered Mood, Depressive Behavior  Goal: LTG-Able to verbalize acceptance of life and situations over which he or she has no control  Outcome: Ongoing  PSYCHOTHERAPY GROUP NOTE    Date: 11/13/17  Start Time: 10 AM  End Time: 1045 AM    Number Participants in Group:  6    Goal:  Patient will demonstrate increased interpersonal interaction   Topic: Support     Discipline Responsible:   OT  AT x SW  Nsg.  RT MHP Other       Participation Level:     None  Minimal    Active Listener x Interactive    Monopolizing         Participation Quality:  x Appropriate  Inappropriate   x       Attentive        Intrusive   x       Sharing        Resistant   x       Supportive        Lethargic       Affective:   x Congruent  Incongruent  Blunted  Flat    Constricted  Anxious  Elated  Angry    Labile  Depressed  Other         Cognitive:  x Alert x Oriented PPTP     Concentration  G x F  P   Attention Span  G x F  P   Short-Term Memory  G x F  P   Long-Term Memory  G x F  P   ProblemSolving/  Decision Making  G x F  P   Ability to Process  Information  G x F  P      Contributing Factors             Delusional             Hallucinating             Flight of Ideas             Other:       Modes of Intervention:   Education x Support  Exploration    Clarifying  Problem Solving  Confrontation    Socialization  Limit Setting  Reality Testing    Activity  Movement  Media    Other:            Response to Learning:  x Able to verbalize current knowledge/experience    Able to verbalize/acknowledge new learning   x Able to retain information   x Capable of insight    Able to change behavior    Progressing to goal    Other:        Comments:

## 2017-11-14 PROCEDURE — 6370000000 HC RX 637 (ALT 250 FOR IP): Performed by: PSYCHIATRY & NEUROLOGY

## 2017-11-14 PROCEDURE — 1240000000 HC EMOTIONAL WELLNESS R&B

## 2017-11-14 RX ADMIN — VENLAFAXINE HYDROCHLORIDE 150 MG: 150 CAPSULE, EXTENDED RELEASE ORAL at 08:17

## 2017-11-14 RX ADMIN — FOLIC ACID 1 MG: 1 TABLET ORAL at 08:17

## 2017-11-14 RX ADMIN — GABAPENTIN 600 MG: 600 TABLET, FILM COATED ORAL at 08:17

## 2017-11-14 RX ADMIN — PANTOPRAZOLE SODIUM 40 MG: 40 TABLET, DELAYED RELEASE ORAL at 08:18

## 2017-11-14 RX ADMIN — GABAPENTIN 600 MG: 600 TABLET, FILM COATED ORAL at 20:48

## 2017-11-14 RX ADMIN — QUETIAPINE FUMARATE 200 MG: 200 TABLET ORAL at 20:48

## 2017-11-14 RX ADMIN — TRAZODONE HYDROCHLORIDE 100 MG: 100 TABLET ORAL at 20:48

## 2017-11-14 RX ADMIN — CHLORDIAZEPOXIDE HYDROCHLORIDE 25 MG: 25 CAPSULE ORAL at 08:17

## 2017-11-14 RX ADMIN — MULTIPLE VITAMINS W/ MINERALS TAB 1 TABLET: TAB at 08:17

## 2017-11-14 RX ADMIN — Medication 100 MG: at 08:17

## 2017-11-14 RX ADMIN — CITALOPRAM HYDROBROMIDE 20 MG: 20 TABLET ORAL at 08:17

## 2017-11-14 RX ADMIN — GABAPENTIN 600 MG: 600 TABLET, FILM COATED ORAL at 14:57

## 2017-11-14 ASSESSMENT — PAIN SCALES - GENERAL: PAINLEVEL_OUTOF10: 0

## 2017-11-14 NOTE — PLAN OF CARE
Problem: Altered Mood, Depressive Behavior  Goal: LTG-Able to verbalize and/or display a decrease in depressive symptoms  Outcome: Ongoing  Pt did not participate in Cognitive Skills Therapeutic Group at 1330 despite staff encouragement.

## 2017-11-14 NOTE — BH NOTE
Psychoeducation Group Note    Date:11/13/2017  Start Time: 845p  End Time: 900p    Number Participants in Group:  7      Topic: wrap up group    Discipline Responsible:   OT  AT  [facility].  RT BHt2 Other       Participation Level:     None x Minimal   x Active Listener  Interactive    Monopolizing         Participation Quality:  x Appropriate  Inappropriate   x       Attentive        Intrusive   x       Sharing        Resistant          Supportive        Lethargic       Affective:    Congruent  Incongruent  Blunted x Flat    Constricted  Anxious  Elated  Angry    Labile  Depressed  Other         Cognitive:  x Alert  Oriented PPTP     Concentration  G x F  P   Attention Span  G x F  P   Short-Term Memory  G x F  P   Long-Term Memory  G x F  P   ProblemSolving/  Decision Making  G x F  P   Ability to Process  Information  G x F  P     x Contributing Factors             Delusional             Hallucinating             Flight of Ideas             Other:       Modes of Intervention:  x Education  Support  Exploration    Clarifying  Problem Solving  Confrontation    Socialization  Limit Setting  Reality Testing    Activity  Movement  Media    Other:            Response to Learning:  x Able to verbalize current knowledge/experience   x Able to verbalize/acknowledge new learning   x Able to retain information   x Capable of insight   x Able to change behavior   x Progressing to goal    Other:        Comments:

## 2017-11-14 NOTE — PLAN OF CARE
Problem: Altered Mood, Depressive Behavior  Goal: LTG-Able to verbalize and/or display a decrease in depressive symptoms  Outcome: Ongoing  Pt reports feeling depressed at a level of 8 and anxious at a level of 5. Goal: STG-Able to verbalize suicidal ideations  Outcome: Ongoing  Pt denies thoughts of self harm and is agreeable to seeking out should thoughts of self harm arise. Safe environment maintained. Q15 minute checks for safety cont per unit policy. Will cont to monitor for safety and provides support and reassurance as needed. Goal: LTG-Absence of self-harm  Outcome: Ongoing  Pt denied thoughts of SI/HI/self-harm and agreed to seek out staff should thoughts of SI/HI/self-harm arise. Safe environment maintained. Q15 minute checks for safety continued per unit policy. Will continue to monitor for safety and provide support and reassurance as needed. Goal: STG-Knowledge of positive coping patterns  Outcome: Ongoing  1:1 with pt x ten minutes. Pt encouraged to attend unit programming and interact with peers and staff. Pt also encouraged to tend to hygiene and ADLs. Pt encouraged to discuss feelings with staff and feedback and reassurance provided. Goal: Patient Specific Goal  Outcome: Ongoing  Pt goals are to stay clean and sober.

## 2017-11-14 NOTE — PROGRESS NOTES
CC: Depressed, suicidal and polysubstance use  Interval History: Chart reviewed Patient seen. Remains symptomatic. Patient complains of depressed mood. She complains that it is accompanied by anhedonia, difficulty concentrating, fatigue, feelings of worthlessness/guilt, hopelessness and insomnia. she describes the depression as a sense of sadness. unchanged since admission. Attending groups. Denies any side effects from medications. Still unable to contract for safety  Patient has been taking care of  AsDL and socializing adequately according to staff  ROS: no dizziness, muscle spasms or fever  No nausea or vomiting  Psychological ROS: positive for - anxiety, concentration difficulties and depression  negative for - disorientation  Vitals:    11/13/17 1930   BP: 105/72   Pulse: 85   Resp: 14   Temp: 97.7 °F (36.5 °C)     Gait and station normal. Appearance shows that patient looks her age and is dressed appropriately. Patient makes reasonable eye contact. Psychomotor activity somewhat decreased. Speech low toned and shows reduced spontaneity. Mood is dysphoric and affect labile. Thought process linear albeit with decreased production. Associations are logical. Contents reflect ideas of helplessness and hopelessness. Denies hallucinations. Oriented X 3. Insight and judgement remain limited. Scheduled Meds:   thiamine  100 mg Oral Daily    folic acid  1 mg Oral Daily    citalopram  20 mg Oral Daily    gabapentin  600 mg Oral TID    therapeutic multivitamin-minerals  1 tablet Oral Daily    pantoprazole  40 mg Oral QAM AC    QUEtiapine  200 mg Oral Nightly    traZODone  100 mg Oral Nightly    venlafaxine  150 mg Oral Daily with breakfast    nicotine  1 patch Transdermal Daily     Continuous Infusions:   PRN Meds:.chlordiazePOXIDE, bisacodyl, acetaminophen, ibuprofen, benztropine mesylate, magnesium hydroxide  She is making slow but steady progress, tolerating medications.  Given that she is unable to contract for safety and having some spells of behavioral dyscontrol patient needs continued hospitalization.   She was provided with supportive and insight oriented therapy

## 2017-11-14 NOTE — H&P
2017    Alcoholism /alcohol abuse ([facility] [address] 2017    Pneumonia of both lower lobes due to infectious organism 2017    Asthma 2017           Katie Blackburn PA-C on 2017 at [address], [place] Physician Assistant Signed Internal Medicine  H&P Date of Service: 2017  8:28 AM   Related encounter: ED to Hosp-Admission (Discharged) from 10/29/2017 in [place] [address] All    []Hide copied text  []Paramjit for attribution information        HISTORY and Treinta SNEHA [address]      NAME:  Talia Waldrop  MRN: [id number]   YOB: 1975   Date: 2017   Age: 38 y.o. Gender: female      COMPLAINT AND PRESENT HISTORY:       Talia Waldrop is 38 y.o.,  female, admitted because of depression. Pt has suicidal ideation, Pt overdosed on pills. Pt had homicidal ideation, towards her partner/ girlfriend. Pt has a history of previous suicide attempts by OD. Pt feels hopeless, helpless, worthless, lack of interest, loss of energy. Poor insight. Pt has poor sleep, loss of appetite, poor concentration and memory. Pt gets auditory, visual hallucinations. patient hears command voices to kill self, but not others. Pt sees a lady in an orange [employer] suit, red eyes, black curly hair. Patient binge drinks alcohol 3 pints of 80% proof Vodka/ day. Pt abuses Cocaine and IV Heroin. Pt relapsed again. Pt's father  4 days ago. Patient lives with cousin, will go to an apartment after discharge. .   Pt has been non compliant with the psychiatric medications.      DIAGNOSTIC RESULTS   Labs:  CBC:       Recent Labs      10/30/17   0919   WBC  6.7   HGB  13.0   PLT  274      BMP:        Recent Labs      10/30/17   0919   NA  138   K  4.5   CL  100   CO2  24   BUN  10   CREATININE  0.78   GLUCOSE  213*      Hepatic:       Recent Labs      10/30/17   0919   AST  82*   ALT  95*   BILITOT  0.34   ALKPHOS  116*      U/A:        Lab Results No ulceration bleeding or discharge.      NECK:  No stiffness, trachea central.  No palpable masses or L. N.       CHEST:  Symmetrical and equal on expansion.       HEART:  Regular rate and rhythm. S1 > S2, No audible murmurs or gallops.      LUNGS:  Equal on expansion, normal breath sounds. No adventitious sounds.       ABDOMEN:  Obese. Soft on palpation. Mild epig tenderness. No guarding or rigidity. No palpable organomegaly.     LYMPHATICS:  No palpable Lymphadenopathy.      LOCOMOTOR, BACK AND SPINE:  No tenderness or deformities.      EXTREMITIES:  Symmetrical, no pedal edema. Perrys sign negative. No discoloration or ulcerations.     NEUROLOGIC:  The patient is conscious, alert, oriented, Gait and balance WNL. No apparent focal sensory deficits. No motor deficits, muscle strength equal Percy. No facial droop, tongue protrudes centrally, no slurring of the speech.             PROVISIONAL DIAGNOSES:       Depression, Alcohol and Substance Abuse.     JARROD TAPIA PA-C on 11/1/2017 at 8:34 AM            Cosigned by: Pavel Ocampo MD at 11/2/2017  1:41 PM

## 2017-11-14 NOTE — PLAN OF CARE
Problem: Altered Mood, Depressive Behavior  Goal: LTG-Able to verbalize acceptance of life and situations over which he or she has no control  Outcome: Ongoing  Psychotherapy Group Note    Date: 11/14/17  Start Time: 11:00am  End Time: 11:40am    Number Participants in Group:  4    Goal:  Patient will demonstrate increased interpersonal interaction   Topic: [facility]    Discipline Responsible:   OT  AT X SW  Nsg.  RT  Other       Participation Level:     None  Minimal   X Active Listener X Interactive    Monopolizing         Participation Quality:  X Appropriate  Inappropriate   X       Attentive        Intrusive   X       Sharing        Resistant   X       Supportive        Lethargic       Affective:    Congruent  Incongruent  Blunted  Flat    Constricted X Anxious  Elated  Angry    Labile  Depressed  Other         Cognitive:  X Alert X Oriented PPTP     Concentration  G X F  P   Attention Span  G X F  P   Short-Term Memory  G X F  P   Long-Term Memory  G X F  P   ProblemSolving/  Decision Making  G X F  P   Ability to Process  Information  G X F  P      Contributing Factors             Delusional             Hallucinating   X          Flight of Ideas             Other:       Modes of Intervention:   Education X Support  Exploration    Clarifying  Problem Solving  Confrontation    Socialization  Limit Setting  Reality Testing    Activity  Movement  Media    Other:            Response to Learning:   Able to verbalize current knowledge/experience    Able to verbalize/acknowledge new learning    Able to retain information   X Capable of insight   X Able to change behavior    Progressing to goal    Other:        Comments:

## 2017-11-15 PROBLEM — F33.9 RECURRENT DEPRESSION (HCC): Status: ACTIVE | Noted: 2017-11-15

## 2017-11-15 PROCEDURE — 5130000000 HC BRIDGE APPOINTMENT

## 2017-11-15 PROCEDURE — 6370000000 HC RX 637 (ALT 250 FOR IP): Performed by: INTERNAL MEDICINE

## 2017-11-15 PROCEDURE — 6370000000 HC RX 637 (ALT 250 FOR IP): Performed by: PSYCHIATRY & NEUROLOGY

## 2017-11-15 PROCEDURE — 1240000000 HC EMOTIONAL WELLNESS R&B

## 2017-11-15 RX ADMIN — GABAPENTIN 600 MG: 600 TABLET, FILM COATED ORAL at 08:34

## 2017-11-15 RX ADMIN — MULTIPLE VITAMINS W/ MINERALS TAB 1 TABLET: TAB at 08:34

## 2017-11-15 RX ADMIN — ACETAMINOPHEN 650 MG: 325 TABLET, FILM COATED ORAL at 10:52

## 2017-11-15 RX ADMIN — QUETIAPINE FUMARATE 200 MG: 200 TABLET ORAL at 21:30

## 2017-11-15 RX ADMIN — TRAZODONE HYDROCHLORIDE 100 MG: 100 TABLET ORAL at 21:33

## 2017-11-15 RX ADMIN — FOLIC ACID 1 MG: 1 TABLET ORAL at 08:34

## 2017-11-15 RX ADMIN — GABAPENTIN 600 MG: 600 TABLET, FILM COATED ORAL at 21:30

## 2017-11-15 RX ADMIN — PANTOPRAZOLE SODIUM 40 MG: 40 TABLET, DELAYED RELEASE ORAL at 07:06

## 2017-11-15 RX ADMIN — VENLAFAXINE HYDROCHLORIDE 150 MG: 150 CAPSULE, EXTENDED RELEASE ORAL at 08:34

## 2017-11-15 RX ADMIN — GABAPENTIN 600 MG: 600 TABLET, FILM COATED ORAL at 14:38

## 2017-11-15 RX ADMIN — CITALOPRAM HYDROBROMIDE 20 MG: 20 TABLET ORAL at 08:34

## 2017-11-15 RX ADMIN — Medication 100 MG: at 08:34

## 2017-11-15 ASSESSMENT — PAIN DESCRIPTION - LOCATION
LOCATION: HEAD
LOCATION: HEAD

## 2017-11-15 ASSESSMENT — PAIN SCALES - GENERAL
PAINLEVEL_OUTOF10: 4
PAINLEVEL_OUTOF10: 8

## 2017-11-15 NOTE — PLAN OF CARE
Problem: Altered Mood, Depressive Behavior  Goal: STG-Knowledge of positive coping patterns  Outcome: Ongoing  Psychoeducation Group Note    Date: 11/15/2017  Start Time: 1330  End Time: 5160    Number Participants in Group:  10    Goal:  Patient will demonstrate increased interpersonal interaction.    Topic:  Positive Thinking / Listening Skills / Leisure Awareness    Discipline Responsible:   OT  AT  [facility]. X RT  Other       Participation Level:     None  Minimal   x Active Listener x Interactive    Monopolizing         Participation Quality:  x Appropriate  Inappropriate   x       Attentive        Intrusive   x       Sharing        Resistant   x       Supportive        Lethargic       Affective:   x Congruent  Incongruent  Blunted  Flat    Constricted  Anxious  Elated  Angry    Labile  Depressed  Other  Bright       Cognitive:  x Alert x Oriented PPTP     Concentration x G  F  P   Attention Span x G  F  P   Short-Term Memory x G  F  P   Long-Term Memory  G  F  P   ProblemSolving/  Decision Making x G  F  P   Ability to Process  Information x G  F  P      Contributing Factors             Delusional             Hallucinating             Flight of Ideas             Other:       Modes of Intervention:  x Education x Support x Exploration   x Clarifying x Problem Solving x Confrontation   x Socialization x Limit Setting x Reality Testing   x Activity  Movement  Media    Other:            Response to Learning:  x Able to verbalize current knowledge/experience   x Able to verbalize/acknowledge new learning   x Able to retain information   x Capable of insight    Able to change behavior   x Progressing to goal    Other:        Comments:

## 2017-11-15 NOTE — PLAN OF CARE
Problem: Altered Mood, Depressive Behavior  Goal: LTG-Able to verbalize and/or display a decrease in depressive symptoms  Outcome: Ongoing  Psychoeducation Group Note    Date: 11/15/17  Start Time: 1100  End Time: 1140    Number Participants in Group:  6    Goal:  Patient will demonstrate increased interpersonal interaction.    Topic:  Therapeutic Leisure Skills Group    Discipline Responsible:   OT  AT  [facility]. X RT  Other       Participation Level:     None  Minimal   X Active Listener X Interactive    Monopolizing         Participation Quality:  X Appropriate  Inappropriate   X       Attentive        Intrusive   X       Sharing        Resistant   X       Supportive        Lethargic       Affective:   X Congruent  Incongruent  Blunted  Flat    Constricted  Anxious  Elated  Angry    Labile  Depressed  Other  Bright       Cognitive:  X Alert X Oriented PPTP     Concentration X G  F  P   Attention Span X G  F  P   Short-Term Memory X G  F  P   Long-Term Memory X G  F  P   ProblemSolving/  Decision Making X G  F  P   Ability to Process  Information X G  F  P      Contributing Factors             Delusional             Hallucinating             Flight of Ideas             Other:       Modes of Intervention:  X Education X Support X Exploration    Clarifying X Problem Solving  Confrontation   X Socialization X Limit Setting  Reality Testing   X Activity  Movement  Media    Other:            Response to Learning:  X Able to verbalize current knowledge/experience   X Able to verbalize/acknowledge new learning   X Able to retain information   X Capable of insight    Able to change behavior   X Progressing to goal    Other:        Comments:

## 2017-11-15 NOTE — CARE COORDINATION
Bridge Appointment completed: Reviewed Discharge Instructions with patient. Patient verbalizes understanding and agreement with the discharge plan using the teachback method. Discharge Arrangements: Pt being discharged to cousins home to gather belongings and then to a sober friends home until [facility] and [facility] inspect apartment. Guardian notified: N/A  Discharge destination/address: [address] [facility].  [place], [address]  Transported by: [employer]

## 2017-11-15 NOTE — PLAN OF CARE
Problem: Altered Mood, Depressive Behavior  Goal: STG-Knowledge of positive coping patterns  Outcome: Ongoing  Psychoeducation Group Note    Date: 11/15/2017  Start Time: 0845  End Time: 0890    Number Participants in Group:  11    Goal:  Patient will demonstrate increased interpersonal interaction. Topic:  Goal Setting / Community Meeting    Discipline Responsible:   OT  AT  [facility]. X RT  Other       Participation Level:     None  Minimal   x Active Listener x Interactive    Monopolizing         Participation Quality:  x Appropriate  Inappropriate   x       Attentive        Intrusive   x       Sharing        Resistant          Supportive        Lethargic       Affective:   x Congruent  Incongruent  Blunted  Flat    Constricted  Anxious  Elated  Angry    Labile  Depressed  Other  Bright       Cognitive:  x Alert x Oriented PPTP     Concentration  G x F  P   Attention Span  G x F  P   Short-Term Memory x G  F  P   Long-Term Memory  G  F  P   ProblemSolving/  Decision Making x G  F  P   Ability to Process  Information x G  F  P      Contributing Factors             Delusional             Hallucinating             Flight of Ideas             Other:       Modes of Intervention:  x Education x Support x Exploration   x Clarifying x Problem Solving x Confrontation   x Socialization x Limit Setting x Reality Testing   x Activity  Movement  Media    Other:            Response to Learning:  x Able to verbalize current knowledge/experience   x Able to verbalize/acknowledge new learning   x Able to retain information   x Capable of insight    Able to change behavior   x Progressing to goal    Other:        Goal for today: Talk with doctor about discharge.

## 2017-11-15 NOTE — PLAN OF CARE
Problem: Altered Mood, Depressive Behavior  Goal: LTG-Able to verbalize and/or display a decrease in depressive symptoms  Outcome: Ongoing  Psychoeducation Group Note    Date: 11/15/17  Start Time: 1430  End Time: 1505    Number Participants in Group:  7    Goal:  Patient will demonstrate increased interpersonal interaction.    Topic:  Therapeutic Recovery Group - Positive Thinking    Discipline Responsible:   OT  AT  [facility]. X RT  Other       Participation Level:     None  Minimal   X Active Listener X Interactive    Monopolizing         Participation Quality:  X Appropriate  Inappropriate   X       Attentive        Intrusive   X       Sharing        Resistant   X       Supportive        Lethargic       Affective:   X Congruent  Incongruent  Blunted  Flat    Constricted  Anxious  Elated  Angry    Labile  Depressed  Other  Bright       Cognitive:  X Alert X Oriented PPTP     Concentration X G  F  P   Attention Span X G  F  P   Short-Term Memory X G  F  P   Long-Term Memory X G  F  P   ProblemSolving/  Decision Making X G  F  P   Ability to Process  Information X G  F  P      Contributing Factors             Delusional             Hallucinating             Flight of Ideas             Other:       Modes of Intervention:   Education X Support X Exploration   X Clarifying X Problem Solving X Confrontation    Socialization  Limit Setting  Reality Testing   X Activity  Movement  Media    Other:            Response to Learning:  X Able to verbalize current knowledge/experience   X Able to verbalize/acknowledge new learning   X Able to retain information   X Capable of insight    Able to change behavior   X Progressing to goal    Other:        Comments:

## 2017-11-15 NOTE — PROGRESS NOTES
CC: Depressed, suicidal, polysubstance dependence  Interval History: Chart reviewed Patient seen. Remains symptomatic. Patient complains of depressed mood. She complains that it is accompanied by anhedonia, difficulty concentrating, fatigue, feelings of worthlessness/guilt, hopelessness and insomnia. she describes the depression as a sense of sadness. unchanged since admission. Attending groups. Denies any side effects from medications. Still unable to contract for safety  Patient has been taking care of  AsDL and socializing adequately according to staff  ROS: no dizziness, muscle spasms or fever  No nausea or vomiting  Psychological ROS: positive for - anxiety, concentration difficulties and depression  negative for - disorientation  Vitals:    11/14/17 1932   BP: 113/75   Pulse: 86   Resp: 14   Temp: 97.8 °F (36.6 °C)     Gait and station normal. Appearance shows that patient looks her age and is dressed appropriately. Patient makes reasonable eye contact. Psychomotor activity somewhat decreased. Speech low toned and shows reduced spontaneity. Mood is dysphoric and affect labile. Thought process linear albeit with decreased production. Associations are logical. Contents reflect ideas of helplessness and hopelessness. Denies hallucinations. Oriented X 3. Insight and judgement remain limited. Scheduled Meds:   thiamine  100 mg Oral Daily    folic acid  1 mg Oral Daily    citalopram  20 mg Oral Daily    gabapentin  600 mg Oral TID    therapeutic multivitamin-minerals  1 tablet Oral Daily    pantoprazole  40 mg Oral QAM AC    QUEtiapine  200 mg Oral Nightly    traZODone  100 mg Oral Nightly    venlafaxine  150 mg Oral Daily with breakfast    nicotine  1 patch Transdermal Daily     Continuous Infusions:   PRN Meds:.chlordiazePOXIDE, bisacodyl, acetaminophen, ibuprofen, benztropine mesylate, magnesium hydroxide  She is making slow but steady progress, tolerating medications.  Given that she is unable to contract for safety and having some spells of behavioral dyscontrol patient needs continued hospitalization.   She was provided with supportive and insight oriented therapy

## 2017-11-15 NOTE — PLAN OF CARE
Problem: Altered Mood, Depressive Behavior  Goal: LTG-Able to verbalize and/or display a decrease in depressive symptoms  Outcome: Ongoing  Patient is calm, controlled, and medication compliant. Patient denies suicidal ideations but reports some anxiety. Patient is brightened , attending groups and reports auditory disturbances stating she hears \"low voices\"; these are improving. Patient reports eating and sleeping adequately with safety checks Q15min and at irregular intervals. Patient safety is maintained.   Goal: LTG-Absence of self-harm  Outcome: Ongoing    Goal: STG-Knowledge of positive coping patterns  Outcome: Ongoing

## 2017-11-16 VITALS
RESPIRATION RATE: 14 BRPM | WEIGHT: 144 LBS | TEMPERATURE: 97.5 F | HEIGHT: 60 IN | BODY MASS INDEX: 28.27 KG/M2 | HEART RATE: 75 BPM | SYSTOLIC BLOOD PRESSURE: 97 MMHG | DIASTOLIC BLOOD PRESSURE: 59 MMHG

## 2017-11-16 PROCEDURE — 6370000000 HC RX 637 (ALT 250 FOR IP): Performed by: PSYCHIATRY & NEUROLOGY

## 2017-11-16 PROCEDURE — 5130000000 HC BRIDGE APPOINTMENT

## 2017-11-16 RX ORDER — GABAPENTIN 600 MG/1
600 TABLET ORAL 3 TIMES DAILY
Qty: 90 TABLET | Refills: 0 | Status: ON HOLD | OUTPATIENT
Start: 2017-11-16 | End: 2018-03-28 | Stop reason: HOSPADM

## 2017-11-16 RX ORDER — NICOTINE 21 MG/24HR
1 PATCH, TRANSDERMAL 24 HOURS TRANSDERMAL DAILY
Qty: 30 PATCH | Refills: 0 | Status: ON HOLD | OUTPATIENT
Start: 2017-11-16 | End: 2018-03-28 | Stop reason: HOSPADM

## 2017-11-16 RX ORDER — CITALOPRAM 20 MG/1
20 TABLET ORAL DAILY
Qty: 30 TABLET | Refills: 0 | Status: ON HOLD | OUTPATIENT
Start: 2017-11-16 | End: 2018-03-28 | Stop reason: HOSPADM

## 2017-11-16 RX ORDER — VENLAFAXINE HYDROCHLORIDE 150 MG/1
150 CAPSULE, EXTENDED RELEASE ORAL
Qty: 30 CAPSULE | Refills: 0 | Status: ON HOLD | OUTPATIENT
Start: 2017-11-16 | End: 2018-03-28 | Stop reason: HOSPADM

## 2017-11-16 RX ORDER — TRAZODONE HYDROCHLORIDE 100 MG/1
100 TABLET ORAL NIGHTLY
Qty: 30 TABLET | Refills: 0 | Status: ON HOLD | OUTPATIENT
Start: 2017-11-16 | End: 2018-03-28 | Stop reason: HOSPADM

## 2017-11-16 RX ORDER — QUETIAPINE FUMARATE 200 MG/1
200 TABLET, FILM COATED ORAL NIGHTLY
Qty: 30 TABLET | Refills: 0 | Status: ON HOLD | OUTPATIENT
Start: 2017-11-16 | End: 2018-03-28 | Stop reason: HOSPADM

## 2017-11-16 RX ORDER — PANTOPRAZOLE SODIUM 40 MG/1
40 TABLET, DELAYED RELEASE ORAL
Qty: 30 TABLET | Refills: 0 | Status: ON HOLD | OUTPATIENT
Start: 2017-11-16 | End: 2018-03-28 | Stop reason: HOSPADM

## 2017-11-16 RX ADMIN — GABAPENTIN 600 MG: 600 TABLET, FILM COATED ORAL at 08:11

## 2017-11-16 RX ADMIN — FOLIC ACID 1 MG: 1 TABLET ORAL at 08:12

## 2017-11-16 RX ADMIN — VENLAFAXINE HYDROCHLORIDE 150 MG: 150 CAPSULE, EXTENDED RELEASE ORAL at 08:11

## 2017-11-16 RX ADMIN — Medication 100 MG: at 08:11

## 2017-11-16 RX ADMIN — GABAPENTIN 600 MG: 600 TABLET, FILM COATED ORAL at 14:14

## 2017-11-16 RX ADMIN — MULTIPLE VITAMINS W/ MINERALS TAB 1 TABLET: TAB at 08:11

## 2017-11-16 RX ADMIN — CITALOPRAM HYDROBROMIDE 20 MG: 20 TABLET ORAL at 08:11

## 2017-11-16 RX ADMIN — PANTOPRAZOLE SODIUM 40 MG: 40 TABLET, DELAYED RELEASE ORAL at 06:57

## 2017-11-16 NOTE — PLAN OF CARE
Problem: Altered Mood, Depressive Behavior  Goal: LTG-Able to verbalize and/or display a decrease in depressive symptoms  Outcome: Ongoing  Psychoeducation Group Note    Date: 11/16/17  Start Time: 1100  End Time: 6845    Number Participants in Group:  8    Goal:  Patient will demonstrate increased interpersonal interaction.    Topic:  Therapeutic Leisure Skills Group    Discipline Responsible:   OT  AT  [facility]. X RT  Other       Participation Level:     None  Minimal   X Active Listener X Interactive    Monopolizing         Participation Quality:  X Appropriate  Inappropriate   X       Attentive        Intrusive   X       Sharing        Resistant   X       Supportive        Lethargic       Affective:   X Congruent  Incongruent  Blunted  Flat    Constricted  Anxious  Elated  Angry    Labile  Depressed  Other  Bright       Cognitive:  X Alert X Oriented PPTP     Concentration X G  F  P   Attention Span X G  F  P   Short-Term Memory X G  F  P   Long-Term Memory X G  F  P   ProblemSolving/  Decision Making X G  F  P   Ability to Process  Information X G  F  P      Contributing Factors             Delusional             Hallucinating             Flight of Ideas             Other:       Modes of Intervention:  X Education X Support X Exploration   X Clarifying X Problem Solving  Confrontation   X Socialization X Limit Setting  Reality Testing   X Activity  Movement  Media    Other:            Response to Learning:  X Able to verbalize current knowledge/experience   X Able to verbalize/acknowledge new learning   X Able to retain information   X Capable of insight    Able to change behavior   X Progressing to goal    Other:        Comments:

## 2017-11-16 NOTE — PLAN OF CARE
Problem: Altered Mood, Depressive Behavior  Goal: LTG-Able to verbalize and/or display a decrease in depressive symptoms  Outcome: Ongoing  Pt. Is controlled and cooperative. Attending groups. Denies voices, denies thougthts to harm self or others. Medication complaint. Pt. Remains safe on the unit. Q 15 minute checks for safety maintained. Goal: LTG-Absence of self-harm  Outcome: Ongoing  Pt denies thoughts of self harm and is agreeable to seeking out should thoughts of self harm arise. Safe environment maintained. Q15 minute checks for safety cont per unit policy. Will cont to monitor for safety and provides support and reassurance as needed. Goal: STG-Knowledge of positive coping patterns  Outcome: Ongoing  Pt. Is med complaint. Selectively attends groups. Says she wants to go home. Controlled and cooperative. Pt. Remains safe on the unit. Q 15 minute checks for safety maintained.

## 2017-11-16 NOTE — BH NOTE
RN WELLNESS GROUP NOTE      The patient did attend RN wellness group this date at 1600 Hours. The patient eagerly participated and was appropriate during such.

## 2017-11-16 NOTE — CARE COORDINATION
DISCHARGE PLANNING:    Pt reported she would be staying with her friend Rachele Porter in [place], [place]. NELA on file. Writer spoke with Rachele Porter and confirmed her address; [address] [place] [address]. [facility], [facility]. She confirmed that pt can stay with her until she obtains her apartment or independent housing. Rachele Porter is a sober friend and willing to support pt through sobriety.

## 2017-11-16 NOTE — BH NOTE
Patient given tobacco quitline number 1-645.724.8029 at this time. With nurse observation patient called number for information and follow up. Continue to reinforce the dangers of long term tobacco use and why tobacco cessation is important to patient.

## 2017-11-16 NOTE — PLAN OF CARE
Problem: Altered Mood, Depressive Behavior  Goal: LTG-Able to verbalize and/or display a decrease in depressive symptoms  Outcome: Ongoing  Psychotherapy Group Note    Date: 11/16/17  Start Time: 10:00am  End Time: 10:45am    Number Participants in Group:  7    Goal:  Patient will demonstrate increased interpersonal interaction   Topic: [facility]    Discipline Responsible:   OT  AT X SW  Nsg.  RT  Other       Participation Level:     None  Minimal   X Active Listener X Interactive    Monopolizing         Participation Quality:  X Appropriate  Inappropriate   X       Attentive        Intrusive   X       Sharing        Resistant   X       Supportive        Lethargic       Affective:   X Congruent  Incongruent  Blunted  Flat    Constricted  Anxious  Elated  Angry    Labile  Depressed  Other         Cognitive:  X Alert X Oriented PPTP     Concentration X G  F  P   Attention Span X G  F  P   Short-Term Memory X G  F  P   Long-Term Memory X G  F  P   ProblemSolving/  Decision Making X G  F  P   Ability to Process  Information X G  F  P      Contributing Factors             Delusional             Hallucinating             Flight of Ideas             Other:       Modes of Intervention:   Education X Support  Exploration    Clarifying  Problem Solving  Confrontation    Socialization  Limit Setting  Reality Testing    Activity  Movement  Media    Other:            Response to Learning:  X Able to verbalize current knowledge/experience   X Able to verbalize/acknowledge new learning   X Able to retain information   X Capable of insight   X Able to change behavior    Progressing to goal    Other:        Comments:

## 2017-11-17 NOTE — CARE COORDINATION
Name: Paul Shelley    : 1975    Discharge Date: 2017    Primary Auth/Cert #: [id number]    Discharge Medications:      Medication List      START taking these medications    nicotine 21 MG/24HR  Commonly known as:  [address] [facility] 1 patch onto the skin daily  Notes to patient:  Smoking cessation        CHANGE how you take these medications    venlafaxine 150 MG extended release capsule  Commonly known as:  EFFEXOR XR  Take 1 capsule by mouth daily (with breakfast)  What changed:  · medication strength  · how much to take  Notes to patient:  depression        CONTINUE taking these medications    citalopram 20 MG tablet  Commonly known as:  CELEXA  Take 1 tablet by mouth daily  Notes to patient:  depression     gabapentin 600 MG tablet  Commonly known as:  NEURONTIN  Take 1 tablet by mouth 3 times daily  Notes to patient:  Nerve pain     pantoprazole 40 MG tablet  Commonly known as:  PROTONIX  Take 1 tablet by mouth every morning (before breakfast)  Notes to patient:  Acid reflux     QUEtiapine 200 MG tablet  Commonly known as:  SEROQUEL  Take 1 tablet by mouth nightly  Notes to patient:  Mood stabilizer     therapeutic multivitamin-minerals tablet  Take 1 tablet by mouth daily  Notes to patient:  supplement     traZODone 100 MG tablet  Commonly known as:  DESYREL  Take 1 tablet by mouth nightly  Notes to patient:  Sleep aid        STOP taking these medications    hydrOXYzine 50 MG tablet  Commonly known as:  ATARAX           Where to Get Your Medications      These medications were sent to [facility]  [address] [place] [address]    Phone:  [phone]   · citalopram 20 MG tablet  · gabapentin 600 MG tablet  · nicotine 21 MG/24HR  · pantoprazole 40 MG tablet  · QUEtiapine 200 MG tablet  · traZODone 100 MG tablet  · venlafaxine 150 MG extended release capsule         Follow Up Appointment: [facility] [place]  [address] [facility] [address]          [address]  [place], [address] [place] [address]  P: [phone]  F: [phone]  Go on 11/27/2017  Appointment at 9:30am for Medication Continuation

## 2017-11-17 NOTE — PROGRESS NOTES
to contract for safety and having some spells of behavioral dyscontrol patient needs continued hospitalization.   She was provided with supportive and insight oriented therapy

## 2017-11-27 ENCOUNTER — HOSPITAL ENCOUNTER (INPATIENT)
Age: 42
LOS: 7 days | Discharge: ANOTHER HOSPITAL | DRG: 885 | End: 2017-12-04
Attending: EMERGENCY MEDICINE | Admitting: PSYCHIATRY & NEUROLOGY
Payer: COMMERCIAL

## 2017-11-27 DIAGNOSIS — F23 ACUTE PSYCHOSIS (HCC): Primary | ICD-10-CM

## 2017-11-27 PROCEDURE — 99285 EMERGENCY DEPT VISIT HI MDM: CPT

## 2017-11-27 PROCEDURE — 1240000000 HC EMOTIONAL WELLNESS R&B

## 2017-11-27 PROCEDURE — 6370000000 HC RX 637 (ALT 250 FOR IP): Performed by: PSYCHIATRY & NEUROLOGY

## 2017-11-27 RX ORDER — MAGNESIUM HYDROXIDE/ALUMINUM HYDROXICE/SIMETHICONE 120; 1200; 1200 MG/30ML; MG/30ML; MG/30ML
20 SUSPENSION ORAL EVERY 6 HOURS PRN
Status: DISCONTINUED | OUTPATIENT
Start: 2017-11-27 | End: 2017-12-04 | Stop reason: HOSPADM

## 2017-11-27 RX ORDER — THIAMINE MONONITRATE (VIT B1) 100 MG
100 TABLET ORAL DAILY
Status: COMPLETED | OUTPATIENT
Start: 2017-11-27 | End: 2017-12-01

## 2017-11-27 RX ORDER — ACETAMINOPHEN 325 MG/1
650 TABLET ORAL EVERY 4 HOURS PRN
Status: DISCONTINUED | OUTPATIENT
Start: 2017-11-27 | End: 2017-12-04 | Stop reason: HOSPADM

## 2017-11-27 RX ORDER — TRAZODONE HYDROCHLORIDE 100 MG/1
100 TABLET ORAL NIGHTLY
Status: DISCONTINUED | OUTPATIENT
Start: 2017-11-27 | End: 2017-12-04 | Stop reason: HOSPADM

## 2017-11-27 RX ORDER — CITALOPRAM 20 MG/1
20 TABLET ORAL DAILY
Status: DISCONTINUED | OUTPATIENT
Start: 2017-11-27 | End: 2017-12-04 | Stop reason: HOSPADM

## 2017-11-27 RX ORDER — FOLIC ACID 1 MG/1
1 TABLET ORAL DAILY
Status: COMPLETED | OUTPATIENT
Start: 2017-11-27 | End: 2017-12-01

## 2017-11-27 RX ORDER — GABAPENTIN 600 MG/1
600 TABLET ORAL 3 TIMES DAILY
Status: DISCONTINUED | OUTPATIENT
Start: 2017-11-27 | End: 2017-12-04 | Stop reason: HOSPADM

## 2017-11-27 RX ORDER — VENLAFAXINE HYDROCHLORIDE 150 MG/1
150 CAPSULE, EXTENDED RELEASE ORAL
Status: DISCONTINUED | OUTPATIENT
Start: 2017-11-28 | End: 2017-12-04 | Stop reason: HOSPADM

## 2017-11-27 RX ORDER — PANTOPRAZOLE SODIUM 40 MG/1
40 TABLET, DELAYED RELEASE ORAL
Status: DISCONTINUED | OUTPATIENT
Start: 2017-11-28 | End: 2017-11-28

## 2017-11-27 RX ORDER — LOPERAMIDE HYDROCHLORIDE 2 MG/1
2 CAPSULE ORAL EVERY 6 HOURS PRN
Status: DISCONTINUED | OUTPATIENT
Start: 2017-11-27 | End: 2017-12-04 | Stop reason: HOSPADM

## 2017-11-27 RX ORDER — CHLORDIAZEPOXIDE HYDROCHLORIDE 25 MG/1
25 CAPSULE, GELATIN COATED ORAL EVERY 6 HOURS PRN
Status: DISPENSED | OUTPATIENT
Start: 2017-11-27 | End: 2017-11-30

## 2017-11-27 RX ORDER — NICOTINE 21 MG/24HR
1 PATCH, TRANSDERMAL 24 HOURS TRANSDERMAL DAILY
Status: DISCONTINUED | OUTPATIENT
Start: 2017-11-27 | End: 2017-11-27

## 2017-11-27 RX ORDER — DOCUSATE SODIUM 100 MG/1
100 CAPSULE, LIQUID FILLED ORAL 2 TIMES DAILY PRN
Status: DISCONTINUED | OUTPATIENT
Start: 2017-11-27 | End: 2017-12-04 | Stop reason: HOSPADM

## 2017-11-27 RX ORDER — QUETIAPINE FUMARATE 200 MG/1
200 TABLET, FILM COATED ORAL NIGHTLY
Status: DISCONTINUED | OUTPATIENT
Start: 2017-11-27 | End: 2017-12-04 | Stop reason: HOSPADM

## 2017-11-27 RX ORDER — NICOTINE 21 MG/24HR
1 PATCH, TRANSDERMAL 24 HOURS TRANSDERMAL DAILY
Status: DISCONTINUED | OUTPATIENT
Start: 2017-11-27 | End: 2017-12-04 | Stop reason: HOSPADM

## 2017-11-27 RX ORDER — ONDANSETRON 2 MG/ML
4 INJECTION INTRAMUSCULAR; INTRAVENOUS EVERY 6 HOURS PRN
Status: DISCONTINUED | OUTPATIENT
Start: 2017-11-27 | End: 2017-11-28

## 2017-11-27 RX ORDER — M-VIT,TX,IRON,MINS/CALC/FOLIC 27MG-0.4MG
1 TABLET ORAL DAILY
Status: DISCONTINUED | OUTPATIENT
Start: 2017-11-27 | End: 2017-12-04 | Stop reason: HOSPADM

## 2017-11-27 RX ADMIN — CHLORDIAZEPOXIDE HYDROCHLORIDE 25 MG: 25 CAPSULE ORAL at 16:33

## 2017-11-27 RX ADMIN — CITALOPRAM HYDROBROMIDE 20 MG: 20 TABLET ORAL at 16:32

## 2017-11-27 RX ADMIN — CHLORDIAZEPOXIDE HYDROCHLORIDE 25 MG: 25 CAPSULE ORAL at 22:39

## 2017-11-27 RX ADMIN — FOLIC ACID 1 MG: 1 TABLET ORAL at 16:33

## 2017-11-27 RX ADMIN — Medication 100 MG: at 16:33

## 2017-11-27 RX ADMIN — GABAPENTIN 600 MG: 600 TABLET, FILM COATED ORAL at 16:33

## 2017-11-27 RX ADMIN — TRAZODONE HYDROCHLORIDE 100 MG: 100 TABLET ORAL at 22:39

## 2017-11-27 RX ADMIN — GABAPENTIN 600 MG: 600 TABLET, FILM COATED ORAL at 22:39

## 2017-11-27 RX ADMIN — MULTIPLE VITAMINS W/ MINERALS TAB 1 TABLET: TAB at 16:33

## 2017-11-27 ASSESSMENT — SLEEP AND FATIGUE QUESTIONNAIRES
DIFFICULTY STAYING ASLEEP: YES
DIFFICULTY ARISING: YES
DIFFICULTY FALLING ASLEEP: YES
SLEEP PATTERN: DIFFICULTY FALLING ASLEEP;RESTLESSNESS;INSOMNIA
RESTFUL SLEEP: NO
DO YOU HAVE DIFFICULTY SLEEPING: YES
AVERAGE NUMBER OF SLEEP HOURS: 5
DO YOU USE A SLEEP AID: YES

## 2017-11-27 ASSESSMENT — LIFESTYLE VARIABLES: HISTORY_ALCOHOL_USE: YES

## 2017-11-27 ASSESSMENT — ENCOUNTER SYMPTOMS
COUGH: 0
DIARRHEA: 0
CONSTIPATION: 0
BLOOD IN STOOL: 0
SORE THROAT: 0
EYE REDNESS: 0
BACK PAIN: 0
EYE DISCHARGE: 0
CHEST TIGHTNESS: 0
SHORTNESS OF BREATH: 0
NAUSEA: 0
COLOR CHANGE: 0
SINUS PRESSURE: 0
FACIAL SWELLING: 0
RHINORRHEA: 0
VOMITING: 0
WHEEZING: 0
TROUBLE SWALLOWING: 0
ABDOMINAL PAIN: 0
EYE PAIN: 0

## 2017-11-27 ASSESSMENT — PAIN SCALES - GENERAL: PAINLEVEL_OUTOF10: 0

## 2017-11-27 ASSESSMENT — PATIENT HEALTH QUESTIONNAIRE - PHQ9: SUM OF ALL RESPONSES TO PHQ QUESTIONS 1-9: 25

## 2017-11-27 NOTE — BH NOTE
`[facility]  Admission Note     Admission Type:   Admission Type: Involuntary (Pt remains on a pink slip.)    Reason for admission:  Reason for Admission:  (Pt is depressed,homicidal, suicidal, lost parents, having hallucinations and using drugs and alcohol.)    PATIENT STRENGTHS:  Strengths: Connection to output provider, No significant Physical Illness    Patient Strengths and Limitations:  Limitations: Difficulty problem solving/relies on others to help solve problems, Inappropriate/potentially harmful leisure interests    Addictive Behavior:        Medical Problems:   Past Medical History:   Diagnosis Date    Alcoholism ([facility] [address]     3 pints daily    Anxiety     Asthma     Depression     Polysubstance abuse     drug abuse includes, cocaine, IV heroin, cannabis, and ETOH    Suicide attempt 11/08/2017    S/A by overdosing on Trazodone and Seroquel    Suicide ideation        Status EXAM:  Status and Exam  Normal: No  Facial Expression: Flat, Sad, Worried  Affect: Blunt  Level of Consciousness: Alert  Mood:Normal: No  Mood: Depressed, Anxious, Sad, Helpless  Motor Activity:Normal: No  Motor Activity: Decreased  Interview Behavior: Cooperative, Irritable  Preception: [place] to Person, Antonetta Shave to Time, [place] to Place, [place] to Situation  Attention:Normal: No  Attention: Unable to Concentrate, Distractible  Thought Processes: Blocking  Thought Content:Normal: No  Thought Content: Paranoia, Preoccupations  Hallucinations: Auditory (Comment)  Delusions: No  Memory:Normal: No  Memory: Poor Recent, Poor Remote  Insight and Judgment: No  Insight and Judgment: Poor Judgment, Poor Insight  Present Suicidal Ideation: Yes  Present Homicidal Ideation: Yes    Tobacco Screening:  Practical Counseling, on admission, ainsley X, if applicable and completed (first 3 are required if patient doesn't refuse):             ( x)  Recognizing danger situations (included triggers and roadblocks)                    (x ) Coping skills (new ways to manage stress, exercise, relaxation techniques, changing routine, distraction)                                                           ( x)  Basic information about quitting (benefits of quitting, techniques in how to quit, available resources  ( ) Referral for counseling faxed to [facility]                                           ( ) Patient refused counseling  ( ) Patient has not smoked in the last 30 days      Pt admitted with followings belongings:  Dentures: None  Vision - Corrective Lenses: None  Hearing Aid: None  Jewelry: Necklace  Body Piercings Removed: N/A  Clothing: Footwear, Pants, Jacket / coat, Shirt, Socks, Pajamas, Undergarments (Comment)  Were All Patient Medications Collected?: Not Applicable  Other Valuables: Cell phone, Money (Comment), Rafy Medrano ($12.00)      Valuables placed in safe in security envelope, number: [id number] Patient's home medications were reviewed and updated in computer. Patient oriented to surroundings and program expectations and copy of patient rights given. Received admission packet: yes Pt is on a pink slip and isn't signed in as of this documentation.      Patient verbalize understanding: yes  Patient education on precautions: yes                 VERNA DELEON RN

## 2017-11-27 NOTE — BH NOTE
Patient given tobacco quitline number 43942881950 at this time, refusing to call at this time, states \" I just dont want to quit now\"- patient given information as to the dangers of long term tobacco use. Continue to reinforce the importance of tobacco cessation.

## 2017-11-27 NOTE — ED PROVIDER NOTES
for arthralgias, back pain, gait problem, joint swelling, myalgias and neck pain. Skin: Negative for color change, pallor, rash and wound. Neurological: Negative for dizziness, tremors, seizures, syncope, speech difficulty, weakness, numbness and headaches. Psychiatric/Behavioral: Positive for dysphoric mood, hallucinations and sleep disturbance. Negative for confusion, decreased concentration, self-injury and suicidal ideas. PAST MEDICAL HISTORY     Past Medical History:   Diagnosis Date    Alcoholism ([facility] [address]     3 pints daily    Anxiety     Asthma     Depression     Polysubstance abuse     drug abuse includes, cocaine, IV heroin, cannabis, and ETOH    Suicide attempt 11/08/2017    S/A by overdosing on Trazodone and Seroquel    Suicide ideation        SURGICAL HISTORY       Past Surgical History:   Procedure Laterality Date    CARPAL TUNNEL RELEASE      CHOLECYSTECTOMY      GASTRIC BYPASS SURGERY      HYSTERECTOMY         CURRENT MEDICATIONS       Previous Medications    CITALOPRAM (CELEXA) 20 MG TABLET    Take 1 tablet by mouth daily    GABAPENTIN (NEURONTIN) 600 MG TABLET    Take 1 tablet by mouth 3 times daily    MULTIPLE VITAMINS-MINERALS (THERAPEUTIC MULTIVITAMIN-MINERALS) TABLET    Take 1 tablet by mouth daily    NICOTINE (NICODERM CQ) 21 MG/24HR    Place 1 patch onto the skin daily    PANTOPRAZOLE (PROTONIX) 40 MG TABLET    Take 1 tablet by mouth every morning (before breakfast)    QUETIAPINE (SEROQUEL) 200 MG TABLET    Take 1 tablet by mouth nightly    TRAZODONE (DESYREL) 100 MG TABLET    Take 1 tablet by mouth nightly    VENLAFAXINE (EFFEXOR XR) 150 MG EXTENDED RELEASE CAPSULE    Take 1 capsule by mouth daily (with breakfast)       ALLERGIES     is allergic to zithromax [azithromycin]. SOCIAL HISTORY      reports that she has been smoking Cigarettes. She has a 45.00 pack-year smoking history. She has never used smokeless tobacco. She reports that she drinks alcohol.  She reports that she uses drugs, including Cocaine, Marijuana, and IV, about 7 times per week. PHYSICAL EXAM     INITIAL VITALS: /74   Pulse 65   Temp 98.1 °F (36.7 °C) (Oral)   Resp 15   Wt 145 lb (65.8 kg)   SpO2 99%   BMI 28.32 kg/m²      Physical Exam   Constitutional: She is oriented to person, place, and time. She appears well-developed and well-nourished. No distress. HENT:   Head: Normocephalic and atraumatic. Eyes: Conjunctivae and EOM are normal. Pupils are equal, round, and reactive to light. Right eye exhibits no discharge. Left eye exhibits no discharge. No scleral icterus. Cardiovascular: Normal rate, regular rhythm, normal heart sounds and intact distal pulses. Exam reveals no gallop and no friction rub. No murmur heard. Pulmonary/Chest: Effort normal and breath sounds normal. No respiratory distress. She has no wheezes. She has no rales. Neurological: She is alert and oriented to person, place, and time. Skin: She is not diaphoretic. Psychiatric: Her speech is delayed. She is slowed and withdrawn. Thought content is paranoid. She exhibits a depressed mood. Unclear due to the patient's answers whether patient is actually suicidal because this is a voice in her head that she intends to shoot whether this is an actual person and she is homicidal.  But she does have a plan of buying a gun and shooting. DIAGNOSTIC RESULTS     RADIOLOGY:All plain film, CT, MRI, and formal ultrasound images (except ED bedside ultrasound) are read by the radiologist and the images and interpretations are directly viewed by the emergency physician. LABS: All lab results were reviewed by myself, and all abnormals are listed below. Labs Reviewed - No data to display      MEDICAL DECISION MAKING:     Patient is medically cleared for psychiatric evaluation.       EMERGENCY DEPARTMENT COURSE:   Vitals:    Vitals:    11/27/17 1110   BP: 107/74   Pulse: 65   Resp: 15   Temp: 98.1 °F (36.7 °C) TempSrc: Oral   SpO2: 99%   Weight: 145 lb (65.8 kg)       The patient was given the following medications while in the emergency department:  No orders of the defined types were placed in this encounter. -------------------------  12:11 PM  Patient has been evaluated and will be admitted to the [facility] for further psychiatric evaluation and treatment. CONSULTS:  None    PROCEDURES:  none    FINAL IMPRESSION      1.  Acute psychosis          DISPOSITION/PLAN   DISPOSITION Admitted    PATIENT REFERRED TO:  Luisa Downs MD  [address] [facility] [address]  [phone]            DISCHARGE MEDICATIONS:  New Prescriptions    No medications on file       (Please note that portions of this note were completed with a voice recognition program.  Efforts were made to edit the dictations but occasionally words are mis-transcribed.)    Sophia Saucedo MD  Attending Emergency Physician                        Sophia Saucedo MD  11/27/17 8052

## 2017-11-27 NOTE — PLAN OF CARE
Problem: Altered Mood, Depressive Behavior  Goal: LTG-Able to verbalize acceptance of life and situations over which he or she has no control  Outcome: Ongoing  [facility]  Initial Interdisciplinary Treatment Plan NO      Original treatment plan Date & Time: 11/27/2017 1349    Admission Type:  Admission Type:  Involuntary (Pt remains on a pink slip.)    Reason for admission:   Reason for Admission:  (Pt is depressed,homicidal, suicidal, lost parents, having hallucinations and using drugs and alcohol.)    Estimated Length of Stay:  5-7days  Estimated Discharge Date: to be determined by physician    PATIENT STRENGTHS:  Patient Strengths:Strengths: Connection to output provider, No significant Physical Illness  Patient Strengths and Limitations:Limitations: Difficult relationships / poor social skills, Difficulty problem solving/relies on others to help solve problems, Inappropriate/potentially harmful leisure interests, Apathetic / unmotivated, Limited education -> difficulty reading or writing, Tendency to isolate self, Multiple barriers to leisure interests (off meds poor coping skills SI/HI limited support poor coping skills substance abuse anxiety)  Addictive Behavior: Addictive Behavior  In the past 3 months, have you felt or has someone told you that you have a problem with:  : None  Do you have a history of Chemical Use?: Yes  Do you have a history of Alcohol Use?: Yes  Do you have a history of Street Drug Abuse?: Yes  Histroy of Prescripton Drug Abuse?: No  Medical Problems:  Past Medical History:   Diagnosis Date    Alcoholism ([facility] [address]     3 pints daily    Anxiety     Asthma     Depression     Polysubstance abuse     drug abuse includes, cocaine, IV heroin, cannabis, and ETOH    Suicide attempt 11/08/2017    S/A by overdosing on Trazodone and Seroquel    Suicide ideation      Status EXAM:Status and Exam  Normal: No  Facial Expression: Flat, Sad, Worried  Affect: Blunt  Level of

## 2017-11-28 PROBLEM — F11.10 HEROIN ABUSE (HCC): Status: ACTIVE | Noted: 2017-11-28

## 2017-11-28 PROBLEM — F31.60 BIPOLAR AFFECTIVE DISORDER, CURRENT EPISODE MIXED (HCC): Status: ACTIVE | Noted: 2017-11-15

## 2017-11-28 PROBLEM — F43.12 CHRONIC POST-TRAUMATIC STRESS DISORDER (PTSD): Status: ACTIVE | Noted: 2017-11-28

## 2017-11-28 LAB
ABSOLUTE EOS #: 0.07 K/UL (ref 0–0.4)
ABSOLUTE IMMATURE GRANULOCYTE: ABNORMAL K/UL (ref 0–0.3)
ABSOLUTE LYMPH #: 2.89 K/UL (ref 1–4.8)
ABSOLUTE MONO #: 0.44 K/UL (ref 0.1–1.3)
ANION GAP SERPL CALCULATED.3IONS-SCNC: 11 MMOL/L (ref 9–17)
BASOPHILS # BLD: 0 % (ref 0–2)
BASOPHILS ABSOLUTE: 0 K/UL (ref 0–0.2)
BUN BLDV-MCNC: 5 MG/DL (ref 6–20)
BUN/CREAT BLD: ABNORMAL (ref 9–20)
CALCIUM SERPL-MCNC: 8.6 MG/DL (ref 8.6–10.4)
CHLORIDE BLD-SCNC: 107 MMOL/L (ref 98–107)
CO2: 26 MMOL/L (ref 20–31)
CREAT SERPL-MCNC: 0.68 MG/DL (ref 0.5–0.9)
DIFFERENTIAL TYPE: ABNORMAL
EOSINOPHILS RELATIVE PERCENT: 1 % (ref 0–4)
GFR AFRICAN AMERICAN: >60 ML/MIN
GFR NON-AFRICAN AMERICAN: >60 ML/MIN
GFR SERPL CREATININE-BSD FRML MDRD: ABNORMAL ML/MIN/{1.73_M2}
GFR SERPL CREATININE-BSD FRML MDRD: ABNORMAL ML/MIN/{1.73_M2}
GLUCOSE BLD-MCNC: 101 MG/DL (ref 70–99)
HCT VFR BLD CALC: 41.8 % (ref 36–46)
HEMOGLOBIN: 13.5 G/DL (ref 12–16)
IMMATURE GRANULOCYTES: ABNORMAL %
LYMPHOCYTES # BLD: 39 % (ref 24–44)
MCH RBC QN AUTO: 25.8 PG (ref 26–34)
MCHC RBC AUTO-ENTMCNC: 32.3 G/DL (ref 31–37)
MCV RBC AUTO: 79.9 FL (ref 80–100)
MONOCYTES # BLD: 6 % (ref 1–7)
MORPHOLOGY: ABNORMAL
PDW BLD-RTO: 16.8 % (ref 11.5–14.9)
PLATELET # BLD: 309 K/UL (ref 150–450)
PLATELET ESTIMATE: ABNORMAL
PMV BLD AUTO: 7.2 FL (ref 6–12)
POTASSIUM SERPL-SCNC: 3.6 MMOL/L (ref 3.7–5.3)
RBC # BLD: 5.24 M/UL (ref 4–5.2)
RBC # BLD: ABNORMAL 10*6/UL
SEG NEUTROPHILS: 54 % (ref 36–66)
SEGMENTED NEUTROPHILS ABSOLUTE COUNT: 4 K/UL (ref 1.3–9.1)
SODIUM BLD-SCNC: 144 MMOL/L (ref 135–144)
WBC # BLD: 7.4 K/UL (ref 3.5–11)
WBC # BLD: ABNORMAL 10*3/UL

## 2017-11-28 PROCEDURE — 1240000000 HC EMOTIONAL WELLNESS R&B

## 2017-11-28 PROCEDURE — 6370000000 HC RX 637 (ALT 250 FOR IP): Performed by: PSYCHIATRY & NEUROLOGY

## 2017-11-28 PROCEDURE — 80048 BASIC METABOLIC PNL TOTAL CA: CPT

## 2017-11-28 PROCEDURE — 6360000002 HC RX W HCPCS: Performed by: PSYCHIATRY & NEUROLOGY

## 2017-11-28 PROCEDURE — 36415 COLL VENOUS BLD VENIPUNCTURE: CPT

## 2017-11-28 PROCEDURE — 85025 COMPLETE CBC W/AUTO DIFF WBC: CPT

## 2017-11-28 RX ORDER — ONDANSETRON 4 MG/1
4 TABLET, FILM COATED ORAL EVERY 6 HOURS PRN
Status: DISCONTINUED | OUTPATIENT
Start: 2017-11-28 | End: 2017-12-04 | Stop reason: HOSPADM

## 2017-11-28 RX ORDER — ONDANSETRON 4 MG/1
4 TABLET, FILM COATED ORAL EVERY 6 HOURS PRN
Status: DISCONTINUED | OUTPATIENT
Start: 2017-11-28 | End: 2017-11-28

## 2017-11-28 RX ORDER — PANTOPRAZOLE SODIUM 40 MG/1
40 TABLET, DELAYED RELEASE ORAL
Status: DISCONTINUED | OUTPATIENT
Start: 2017-11-28 | End: 2017-12-04 | Stop reason: HOSPADM

## 2017-11-28 RX ORDER — ONDANSETRON 2 MG/ML
4 INJECTION INTRAMUSCULAR; INTRAVENOUS EVERY 6 HOURS PRN
Status: DISCONTINUED | OUTPATIENT
Start: 2017-11-28 | End: 2017-12-04 | Stop reason: HOSPADM

## 2017-11-28 RX ADMIN — GABAPENTIN 600 MG: 600 TABLET, FILM COATED ORAL at 13:38

## 2017-11-28 RX ADMIN — FOLIC ACID 1 MG: 1 TABLET ORAL at 08:53

## 2017-11-28 RX ADMIN — TRAZODONE HYDROCHLORIDE 100 MG: 100 TABLET ORAL at 20:41

## 2017-11-28 RX ADMIN — MULTIPLE VITAMINS W/ MINERALS TAB 1 TABLET: TAB at 08:53

## 2017-11-28 RX ADMIN — CITALOPRAM HYDROBROMIDE 20 MG: 20 TABLET ORAL at 08:53

## 2017-11-28 RX ADMIN — CHLORDIAZEPOXIDE HYDROCHLORIDE 25 MG: 25 CAPSULE ORAL at 08:53

## 2017-11-28 RX ADMIN — ACETAMINOPHEN 650 MG: 325 TABLET, FILM COATED ORAL at 20:40

## 2017-11-28 RX ADMIN — ONDANSETRON HYDROCHLORIDE 4 MG: 4 TABLET, FILM COATED ORAL at 13:38

## 2017-11-28 RX ADMIN — PANTOPRAZOLE SODIUM 40 MG: 40 TABLET, DELAYED RELEASE ORAL at 08:53

## 2017-11-28 RX ADMIN — ONDANSETRON HYDROCHLORIDE 4 MG: 4 TABLET, FILM COATED ORAL at 20:41

## 2017-11-28 RX ADMIN — Medication 100 MG: at 08:53

## 2017-11-28 RX ADMIN — GABAPENTIN 600 MG: 600 TABLET, FILM COATED ORAL at 20:41

## 2017-11-28 RX ADMIN — VENLAFAXINE HYDROCHLORIDE 150 MG: 150 CAPSULE, EXTENDED RELEASE ORAL at 08:53

## 2017-11-28 RX ADMIN — CHLORDIAZEPOXIDE HYDROCHLORIDE 25 MG: 25 CAPSULE ORAL at 20:41

## 2017-11-28 RX ADMIN — GABAPENTIN 600 MG: 600 TABLET, FILM COATED ORAL at 08:53

## 2017-11-28 RX ADMIN — CHLORDIAZEPOXIDE HYDROCHLORIDE 25 MG: 25 CAPSULE ORAL at 15:33

## 2017-11-28 ASSESSMENT — PAIN SCALES - GENERAL
PAINLEVEL_OUTOF10: 3
PAINLEVEL_OUTOF10: 9
PAINLEVEL_OUTOF10: 1

## 2017-11-28 ASSESSMENT — LIFESTYLE VARIABLES: HISTORY_ALCOHOL_USE: YES
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
## 2017-11-28 NOTE — PLAN OF CARE
Problem: Altered Mood, Depressive Behavior  Goal: LTG-Able to verbalize and/or display a decrease in depressive symptoms  Outcome: Ongoing  Pt isolative to room for long periods. Affect flat mood depressed. Also reports intermiteent a/h of both \"negative and positive things\"; didn't verbalize specific content. reports has a lot of external stressors on outside. Still utilizing prn meds for w/d s/s. Consumed some of am meal. MC/BC. Reports undecided at present if wants to go to rehab at discharge or work on getting her own place. Reports she is tired of just having to sleep from \" place to place\". Goal: LTG-Absence of self-harm  Outcome: Ongoing  Pt denies any thoughts of self harm. Verbally states wont harm self. Verbalized if feelings/condition changes will inform staff. Safety checks continued and maintained.

## 2017-11-28 NOTE — CARE COORDINATION
Brief Intervention and Referral to Treatment Summary    Patient was provided PHQ-9, AUDIT and DAST Screening:      PHQ-9 Score:  AUDIT Score: 40 Dependent/Referral to treatment  DAST Score: 10 Dependent/ Referral to Treatment    Patients substance use is considered    Low Risk/Healthy  Risk  Harmful  Dependent X AUDIT and DAST    Patients depression is considered:    Minimal  Mild   Moderate  Moderately Severe X  Severe    Brief Education was provided: ALEXANDER    Patient was receptive  Patient was not receptive      Brief Intervention Is Provided (Only for AUDIT or DAST, there is no brief intervention for Depression) ALEXANDER    Patient reports readiness to decrease and/or stop use and a plan was discussed   Patient denies readiness to decrease and/or stop use and a plan was not discussed      Recommendations/Referrals for Brief and/or Specialized Treatment Provided to Patient: SW will provide education and intervention to PT as needed and assist her with community resources nd exploring treatment facilities.

## 2017-11-28 NOTE — PLAN OF CARE
Problem: Altered Mood, Depressive Behavior  Goal: LTG-Absence of self-harm  Outcome: Ongoing  Pt denies thoughts of self harm and is agreeable to seeking out should thoughts of self harm arise. Safe environment maintained. Q15 minute checks for safety cont per unit policy. Will cont to monitor for safety and provides support and reassurance as needed.

## 2017-11-28 NOTE — BH NOTE
Pt did not participate in COMMUNITY MEETING/GOALS GROUP at 845am due to pt was resting in room et declined to attend group when encouraged.

## 2017-11-28 NOTE — PLAN OF CARE
Problem: Pain:  Goal: Pain level will decrease  Pain level will decrease   Outcome: Ongoing    Goal: Control of acute pain  Control of acute pain   Outcome: Ongoing    Goal: Control of chronic pain  Control of chronic pain   Outcome: Ongoing      Problem: Altered Mood, Depressive Behavior  Goal: LTG-Able to verbalize acceptance of life and situations over which he or she has no control  Outcome: Ongoing  [facility]  Day 3 Interdisciplinary Treatment Plan NOTE    Review Date & Time: 11/28/17                  1320  Admission Type:   Admission Type: Involuntary    Reason for admission:  Reason for Admission: SI no plan  Estimated Length of Stay: 5-7 days  Estimated Discharge Date Update: to be determined by physician    PATIENT STRENGTHS:  Patient Strengths Strengths: Positive Support, No significant Physical Illness  Patient Strengths and Limitations:Limitations: Tendency to isolate self, Lacks leisure interests, Difficulty problem solving/relies on others to help solve problems, Hopeless about future, Multiple barriers to leisure interests, Inappropriate/potentially harmful leisure interests (depression substance abuse anxiety poor coping skills)  Addictive Behavior:Addictive Behavior  In the past 3 months, have you felt or has someone told you that you have a problem with:  : None  Do you have a history of Chemical Use?: No  Do you have a history of Alcohol Use?: No  Do you have a history of Street Drug Abuse?: Yes  Histroy of Prescripton Drug Abuse?: No  Medical Problems:No past medical history on file.     Risk:  Fall RiskTotal: 53  Tee Scale Tee Scale Score: 22  BVC Total: 0  Change in scores no Changes to plan of Care no    Status EXAM:   Status and Exam  Normal: No  Facial Expression: Elevated  Affect: Inappropriate  Level of Consciousness: Alert  Mood:Normal: No  Mood: Depressed, Anxious  Motor Activity:Normal: No  Motor Activity: Decreased  Interview Behavior: Cooperative  Preception: [place] to Person, [place]ni F[place] to Time, [place] to Place, [place] to Situation  Attention:Normal: Yes  Attention: Distractible  Thought Processes: Circumstantial  Thought Content:Normal: Yes  Thought Content: Preoccupations  Hallucinations: None  Delusions: No  Memory:Normal: Yes  Memory: Poor Recent, Confabulation  Insight and Judgment: No  Insight and Judgment: Unmotivated  Present Suicidal Ideation: No  Present Homicidal Ideation: No    Daily Assessment Last Entry:   Daily Sleep (WDL): Within Defined Limits         Patient Currently in Pain: No  Daily Nutrition (WDL): Within Defined Limits    Patient Monitoring:  Frequency of Checks: 4 times per hour, close    Psychiatric Symptoms:   Depression Symptoms  Depression Symptoms: Isolative, Loss of interest  Anxiety Symptoms  Anxiety Symptoms: Generalized  Ingrid Symptoms  Ingrid Symptoms: No problems reported or observed. Psychosis Symptoms  Delusion Type: No problems reported or observed.     Suicide Risk CSSR-S:  Have you wished you were dead or wished you could go to sleep and not wake up? : NO  Have you actually had any thoughts of killing yourself? : NO  Have you ever done anything, started to do anything, or prepared to do anything to end your life?: NO  Change in Result   NO                 Change in Plan of care   NO      EDUCATION:   EDUCATION:   Learner Progress Toward Treatment Goals: Reviewed results and recommendations of this team, Reviewed group plan and strategies, Reviewed signs, symptoms and risk of self harm and violent behavior, Reviewed goals and plan of care    Method:small group, individual verbal education    Outcome:verbalized by patient, but needs reinforcement to obtain goals    PATIENT GOALS:  Short term: \"GET THROUGH DETOX\"  Long term: \"FIND HOUSING, FOLLOW UP UP WITH [facility], GET INTO RECOVERY [place]\"    PLAN/TREATMENT RECOMMENDATIONS UPDATE: continue with group therapies, increased socialization, continue planning for after discharge goals,

## 2017-11-29 LAB
ABSOLUTE EOS #: 0.2 K/UL (ref 0–0.4)
ABSOLUTE IMMATURE GRANULOCYTE: ABNORMAL K/UL (ref 0–0.3)
ABSOLUTE LYMPH #: 2.57 K/UL (ref 1–4.8)
ABSOLUTE MONO #: 0.59 K/UL (ref 0.1–1.3)
AMPHETAMINE SCREEN URINE: NEGATIVE
AMYLASE: 47 U/L (ref 28–100)
ANION GAP SERPL CALCULATED.3IONS-SCNC: 8 MMOL/L (ref 9–17)
BARBITURATE SCREEN URINE: NEGATIVE
BASOPHILS # BLD: 1 % (ref 0–2)
BASOPHILS ABSOLUTE: 0.07 K/UL (ref 0–0.2)
BENZODIAZEPINE SCREEN, URINE: POSITIVE
BILIRUBIN URINE: NEGATIVE
BUN BLDV-MCNC: 6 MG/DL (ref 6–20)
BUN/CREAT BLD: ABNORMAL (ref 9–20)
BUPRENORPHINE URINE: ABNORMAL
CALCIUM SERPL-MCNC: 9.3 MG/DL (ref 8.6–10.4)
CANNABINOID SCREEN URINE: NEGATIVE
CHLORIDE BLD-SCNC: 103 MMOL/L (ref 98–107)
CO2: 29 MMOL/L (ref 20–31)
COCAINE METABOLITE, URINE: NEGATIVE
COLOR: YELLOW
COMMENT UA: NORMAL
CREAT SERPL-MCNC: 0.77 MG/DL (ref 0.5–0.9)
DIFFERENTIAL TYPE: ABNORMAL
EOSINOPHILS RELATIVE PERCENT: 3 % (ref 0–4)
GFR AFRICAN AMERICAN: >60 ML/MIN
GFR NON-AFRICAN AMERICAN: >60 ML/MIN
GFR SERPL CREATININE-BSD FRML MDRD: ABNORMAL ML/MIN/{1.73_M2}
GFR SERPL CREATININE-BSD FRML MDRD: ABNORMAL ML/MIN/{1.73_M2}
GLUCOSE BLD-MCNC: 134 MG/DL (ref 70–99)
GLUCOSE URINE: NEGATIVE
HCT VFR BLD CALC: 42.6 % (ref 36–46)
HEMOGLOBIN: 13.5 G/DL (ref 12–16)
IMMATURE GRANULOCYTES: ABNORMAL %
KETONES, URINE: NEGATIVE
LEUKOCYTE ESTERASE, URINE: NEGATIVE
LIPASE: 49 U/L (ref 13–60)
LYMPHOCYTES # BLD: 39 % (ref 24–44)
MCH RBC QN AUTO: 25.6 PG (ref 26–34)
MCHC RBC AUTO-ENTMCNC: 31.6 G/DL (ref 31–37)
MCV RBC AUTO: 81.1 FL (ref 80–100)
MDMA URINE: ABNORMAL
METHADONE SCREEN, URINE: NEGATIVE
METHAMPHETAMINE, URINE: ABNORMAL
MONOCYTES # BLD: 9 % (ref 1–7)
MORPHOLOGY: ABNORMAL
NITRITE, URINE: NEGATIVE
OPIATES, URINE: NEGATIVE
OXYCODONE SCREEN URINE: NEGATIVE
PDW BLD-RTO: 16.3 % (ref 11.5–14.9)
PH UA: 7 (ref 5–8)
PHENCYCLIDINE, URINE: NEGATIVE
PLATELET # BLD: 274 K/UL (ref 150–450)
PLATELET ESTIMATE: ABNORMAL
PMV BLD AUTO: 7.2 FL (ref 6–12)
POTASSIUM SERPL-SCNC: 4.7 MMOL/L (ref 3.7–5.3)
PROPOXYPHENE, URINE: ABNORMAL
PROTEIN UA: NEGATIVE
RBC # BLD: 5.25 M/UL (ref 4–5.2)
RBC # BLD: ABNORMAL 10*6/UL
SEG NEUTROPHILS: 48 % (ref 36–66)
SEGMENTED NEUTROPHILS ABSOLUTE COUNT: 3.17 K/UL (ref 1.3–9.1)
SODIUM BLD-SCNC: 140 MMOL/L (ref 135–144)
SPECIFIC GRAVITY UA: 1.01 (ref 1–1.03)
TEST INFORMATION: ABNORMAL
TRICYCLIC ANTIDEPRESSANTS, UR: ABNORMAL
TURBIDITY: CLEAR
URINE HGB: NEGATIVE
UROBILINOGEN, URINE: NORMAL
WBC # BLD: 6.6 K/UL (ref 3.5–11)
WBC # BLD: ABNORMAL 10*3/UL

## 2017-11-29 PROCEDURE — 1240000000 HC EMOTIONAL WELLNESS R&B

## 2017-11-29 PROCEDURE — 80307 DRUG TEST PRSMV CHEM ANLYZR: CPT

## 2017-11-29 PROCEDURE — 81003 URINALYSIS AUTO W/O SCOPE: CPT

## 2017-11-29 PROCEDURE — 80048 BASIC METABOLIC PNL TOTAL CA: CPT

## 2017-11-29 PROCEDURE — 6370000000 HC RX 637 (ALT 250 FOR IP): Performed by: PSYCHIATRY & NEUROLOGY

## 2017-11-29 PROCEDURE — 6360000002 HC RX W HCPCS: Performed by: PSYCHIATRY & NEUROLOGY

## 2017-11-29 PROCEDURE — 83690 ASSAY OF LIPASE: CPT

## 2017-11-29 PROCEDURE — 85025 COMPLETE CBC W/AUTO DIFF WBC: CPT

## 2017-11-29 PROCEDURE — 36415 COLL VENOUS BLD VENIPUNCTURE: CPT

## 2017-11-29 PROCEDURE — 82150 ASSAY OF AMYLASE: CPT

## 2017-11-29 RX ADMIN — ONDANSETRON HYDROCHLORIDE 4 MG: 4 TABLET, FILM COATED ORAL at 08:13

## 2017-11-29 RX ADMIN — ACETAMINOPHEN 650 MG: 325 TABLET, FILM COATED ORAL at 09:59

## 2017-11-29 RX ADMIN — GABAPENTIN 600 MG: 600 TABLET, FILM COATED ORAL at 08:14

## 2017-11-29 RX ADMIN — TRAZODONE HYDROCHLORIDE 100 MG: 100 TABLET ORAL at 21:55

## 2017-11-29 RX ADMIN — PANTOPRAZOLE SODIUM 40 MG: 40 TABLET, DELAYED RELEASE ORAL at 08:14

## 2017-11-29 RX ADMIN — GABAPENTIN 600 MG: 600 TABLET, FILM COATED ORAL at 21:55

## 2017-11-29 RX ADMIN — CHLORDIAZEPOXIDE HYDROCHLORIDE 25 MG: 25 CAPSULE ORAL at 08:13

## 2017-11-29 RX ADMIN — CHLORDIAZEPOXIDE HYDROCHLORIDE 25 MG: 25 CAPSULE ORAL at 16:02

## 2017-11-29 RX ADMIN — Medication 100 MG: at 08:15

## 2017-11-29 RX ADMIN — FOLIC ACID 1 MG: 1 TABLET ORAL at 08:15

## 2017-11-29 RX ADMIN — ONDANSETRON HYDROCHLORIDE 4 MG: 4 TABLET, FILM COATED ORAL at 14:16

## 2017-11-29 RX ADMIN — GABAPENTIN 600 MG: 600 TABLET, FILM COATED ORAL at 14:16

## 2017-11-29 RX ADMIN — ALUMINUM HYDROXIDE, MAGNESIUM HYDROXIDE, AND SIMETHICONE 20 ML: 200; 200; 20 SUSPENSION ORAL at 17:31

## 2017-11-29 RX ADMIN — ACETAMINOPHEN 650 MG: 325 TABLET, FILM COATED ORAL at 21:56

## 2017-11-29 RX ADMIN — ACETAMINOPHEN 650 MG: 325 TABLET, FILM COATED ORAL at 16:02

## 2017-11-29 RX ADMIN — CITALOPRAM HYDROBROMIDE 20 MG: 20 TABLET ORAL at 08:14

## 2017-11-29 RX ADMIN — CHLORDIAZEPOXIDE HYDROCHLORIDE 25 MG: 25 CAPSULE ORAL at 21:56

## 2017-11-29 RX ADMIN — MULTIPLE VITAMINS W/ MINERALS TAB 1 TABLET: TAB at 08:14

## 2017-11-29 RX ADMIN — ONDANSETRON HYDROCHLORIDE 4 MG: 4 TABLET, FILM COATED ORAL at 21:55

## 2017-11-29 RX ADMIN — VENLAFAXINE HYDROCHLORIDE 150 MG: 150 CAPSULE, EXTENDED RELEASE ORAL at 08:14

## 2017-11-29 RX ADMIN — QUETIAPINE FUMARATE 200 MG: 200 TABLET ORAL at 21:55

## 2017-11-29 ASSESSMENT — PAIN DESCRIPTION - PAIN TYPE: TYPE: ACUTE PAIN

## 2017-11-29 ASSESSMENT — PAIN SCALES - GENERAL
PAINLEVEL_OUTOF10: 0
PAINLEVEL_OUTOF10: 3
PAINLEVEL_OUTOF10: 0
PAINLEVEL_OUTOF10: 3
PAINLEVEL_OUTOF10: 7
PAINLEVEL_OUTOF10: 5

## 2017-11-29 ASSESSMENT — PAIN DESCRIPTION - DESCRIPTORS: DESCRIPTORS: ACHING

## 2017-11-29 ASSESSMENT — PAIN DESCRIPTION - ORIENTATION: ORIENTATION: LEFT

## 2017-11-29 ASSESSMENT — PAIN DESCRIPTION - LOCATION: LOCATION: ABDOMEN

## 2017-11-29 NOTE — PROGRESS NOTES
Psychiatry Progress  Note     CHIEF  COMPLAINT     Bipolar affective disorder, current episode mixed ([facility]        SUBJECT JANET AND OBJECT JANET:  Complaining of abdominal pain with hx of pancreatitis, mood depressed but less agitated    Diamante Lane is presenting with persistant depressed moods, limited participation in groups and less agitation than yesterday moods fluctuating. with no hallucinations and thoughts are goal directed. The symptoms still   present are marked in severity    . ASSESSMENT AND PLAN    Symptoms of illness and medications  discussed with pt and support provided. Progress of treatment discussed with staff     Encouraged to participate in activies and groups. Her   Bipolar affective disorder, current episode mixed ([facility]  is unchanged.                   Medical consult for abd pain       pantoprazole  40 mg Oral Daily with breakfast    citalopram  20 mg Oral Daily    gabapentin  600 mg Oral TID    therapeutic multivitamin-minerals  1 tablet Oral Daily    QUEtiapine  200 mg Oral Nightly    traZODone  100 mg Oral Nightly    venlafaxine  150 mg Oral Daily with breakfast    thiamine  100 mg Oral Daily    folic acid  1 mg Oral Daily    nicotine  1 patch Transdermal Daily       ondansetron **OR** ondansetron, acetaminophen, magnesium hydroxide, docusate sodium, chlordiazePOXIDE, loperamide, aluminum & magnesium hydroxide-simethicone       Vahid Bonilla MD  Psychiatry

## 2017-11-29 NOTE — PLAN OF CARE
Problem: Altered Mood, Depressive Behavior  Intervention: Environmental safety management-behavioral health  environment maintained for safety, visual checks maintained. Intervention: Medication intake supervison-behavioral health  Instructed on action and purpose of medications ordered, medications tolerated as ordered. Goal: LTG-Absence of self-harm  Outcome: Ongoing  Pt denies thoughts of self harm, no thoughts of harming others. Pt remains flat, and preoccupied during 1:1. Instructed on and encouraged to seek help from staff when needed, pt verbalizes understanding. Instructed on participating in programming and communicating with staff. Pt selectively social , selective with programming.

## 2017-11-29 NOTE — H&P
PAST MEDICAL HISTORY      Past Medical History        Past Medical History:   Diagnosis Date    Anxiety      Asthma      Depression              Pt denies any history of Diabetes mellitus type 2, hypertension, stroke, heart disease, COPD, GERD, HLD, Cancer, Seizures,Thyroid disease, Kidney Disease, Hepatitis, TB.     SURGICAL HISTORY        Past Surgical History         Past Surgical History:   Procedure Laterality Date    CARPAL TUNNEL RELEASE        CHOLECYSTECTOMY        GASTRIC BYPASS SURGERY        HYSTERECTOMY                FAMILY HISTORY        Family History   Family History   Problem Relation Age of Onset    Brain Cancer Mother      Cancer Mother         \"female cancer\"            SOCIAL HISTORY        Social History   Social History            Social History    Marital status: Single       Spouse name: N/A    Number of children: N/A    Years of education: N/A             Social History Main Topics    Smoking status: Current Every Day Smoker       Packs/day: 1.50       Years: 30.00       Types: Cigarettes    Smokeless tobacco: Not on file         Comment: pt accepting of nicotine replacement    Alcohol use Yes         Comment: 3 pints daily    Drug use:         Frequency: 7.0 times per week       Types: Cocaine, Marijuana, IV         Comment: heroin last used 4/24/17     Sexual activity: Not Currently           Other Topics Concern    Not on file          Social History Narrative    No narrative on file               REVIEW OF SYSTEMS            Allergies   Allergen Reactions    Zithromax [Azithromycin]           No current facility-administered medications on file prior to encounter.              Current Outpatient Prescriptions on File Prior to Encounter   Medication Sig Dispense Refill    omeprazole (PRILOSEC) 20 MG delayed release capsule Take 20 mg by mouth 2 times daily        mirtazapine (REMERON) 15 MG tablet Take 15 mg by mouth nightly        QUEtiapine (SEROQUEL) 200 MG tablet Take 1 tablet by mouth nightly 14 tablet 0    therapeutic multivitamin-minerals (THERAGRAN-M) tablet Take 1 tablet by mouth daily.                           General health:  Fairly good. No fever or chills. Skin:  No itching, redness or rash.       Head, eyes, ears, nose, throat:  No headache, epistaxis, rhinorrhea hearing loss or sore throat.        Neck:  No pain, stiffness or masses.      Cardiovascular/Respiratory system:  No chest pain, palpitation, shortness of breath, coughing or expectoration.                Gastrointestinal tract: No abdominal pain, nausea, vomiting, diarrhea or constipation.     Genitourinary:  No burning on micturition. No hesitancy, urgency, frequency or discoloration of urine.      Locomotor:  No bone or joint pains. No swelling or deformities.       Neuropsychiatric:  See HPI.      GENERAL PHYSICAL EXAM:      Vitals: /65   Pulse 68   Temp 97.3 °F (36.3 °C) (Oral)   Resp 15   Ht 5' (1.524 m)   Wt 140 lb (63.5 kg)   SpO2 97%   BMI 27.34 kg/m²  Body mass index is 27.34 kg/m².      Pt was examined with a nurse present in the room. GENERAL APPEARANCE:  Gerard John is 38 y.o.,  female, mildly obese, nourished, conscious, alert. Does not appear to be distress or pain at this time.           SKIN:  Warm, dry, no cyanosis or jaundice.     HEAD:  Normocephalic, atraumatic, no swelling or tenderness.      EYES:  Pupils equal, reactive to light, Conjunctiva is clear, EOMs intact anastasia. eyelids WNL.    EARS:  No discharge, no marked hearing loss.     NOSE:  No rhinorrhea, epistaxis or septal deformity.     THROAT:  Not congested. No ulceration bleeding or discharge.      NECK:  No stiffness, trachea central.  No palpable masses or L. N.       CHEST:  Symmetrical and equal on expansion.       HEART:  Regular rate and rhythm. S1 > S2, No audible murmurs or gallops.      LUNGS:  Equal on expansion, normal breath sounds.   No adventitious

## 2017-11-29 NOTE — BH NOTE
Pt did not particpate in Community Meeting/Goals Group at 845am due to pt was resting in room et declined to attend group when encouraged.

## 2017-11-30 PROCEDURE — 1240000000 HC EMOTIONAL WELLNESS R&B

## 2017-11-30 PROCEDURE — 6370000000 HC RX 637 (ALT 250 FOR IP): Performed by: PSYCHIATRY & NEUROLOGY

## 2017-11-30 RX ORDER — LITHIUM CARBONATE 300 MG/1
300 TABLET, FILM COATED, EXTENDED RELEASE ORAL EVERY 12 HOURS SCHEDULED
Status: DISCONTINUED | OUTPATIENT
Start: 2017-11-30 | End: 2017-12-04 | Stop reason: HOSPADM

## 2017-11-30 RX ADMIN — LITHIUM CARBONATE 300 MG: 300 TABLET, FILM COATED, EXTENDED RELEASE ORAL at 21:21

## 2017-11-30 RX ADMIN — GABAPENTIN 600 MG: 600 TABLET, FILM COATED ORAL at 21:20

## 2017-11-30 RX ADMIN — FOLIC ACID 1 MG: 1 TABLET ORAL at 08:31

## 2017-11-30 RX ADMIN — MULTIPLE VITAMINS W/ MINERALS TAB 1 TABLET: TAB at 08:31

## 2017-11-30 RX ADMIN — PANTOPRAZOLE SODIUM 40 MG: 40 TABLET, DELAYED RELEASE ORAL at 08:31

## 2017-11-30 RX ADMIN — CHLORDIAZEPOXIDE HYDROCHLORIDE 25 MG: 25 CAPSULE ORAL at 08:32

## 2017-11-30 RX ADMIN — QUETIAPINE FUMARATE 200 MG: 200 TABLET ORAL at 21:20

## 2017-11-30 RX ADMIN — GABAPENTIN 600 MG: 600 TABLET, FILM COATED ORAL at 08:30

## 2017-11-30 RX ADMIN — Medication 100 MG: at 08:32

## 2017-11-30 RX ADMIN — VENLAFAXINE HYDROCHLORIDE 150 MG: 150 CAPSULE, EXTENDED RELEASE ORAL at 08:31

## 2017-11-30 RX ADMIN — GABAPENTIN 600 MG: 600 TABLET, FILM COATED ORAL at 15:35

## 2017-11-30 RX ADMIN — TRAZODONE HYDROCHLORIDE 100 MG: 100 TABLET ORAL at 21:20

## 2017-11-30 RX ADMIN — CITALOPRAM HYDROBROMIDE 20 MG: 20 TABLET ORAL at 08:31

## 2017-11-30 NOTE — PROGRESS NOTES
Pt did not attend 1330 creative expresion group d/t resting in room despite staff invitation to attend.

## 2017-11-30 NOTE — PLAN OF CARE
Problem: Altered Mood, Depressive Behavior  Goal: LTG-Absence of self-harm  Outcome: Ongoing  Met with pt 1:1 , pt able to verbalize issues and express concerns to staff as needed. Pt encouraged to attend groups and identify ways to cope with depression and anxiety. Pt remains in behavioral control and takes all meds,pt sates having bad thought about exgirlfriend. Free from self harm.

## 2017-11-30 NOTE — PLAN OF CARE
Problem: Altered Mood, Depressive Behavior  Goal: STG-Knowledge of positive coping patterns  Outcome: Ongoing  Psychoeducation Group Note    Date: 11/30/2017  Start Time: 1000  End Time: 1045    Number Participants in Group:  7    Goal:  Patient will demonstrate increased interpersonal interaction   Topic: Leisure/ cognitive skills    Discipline Responsible:   OT  AT  [facility]. x RT  Other       Participation Level:     None  Minimal   x Active Listener x Interactive    Monopolizing         Participation Quality:  x Appropriate  Inappropriate   x       Attentive        Intrusive   x       Sharing        Resistant          Supportive        Lethargic       Affective:   x Congruent  Incongruent  Blunted  Flat    Constricted  Anxious  Elated  Angry    Labile  Depressed  Other         Cognitive:  x Alert x Oriented PPTP     Concentration  G x F  P   Attention Span  G x F  P   Short-Term Memory  G x F  P   Long-Term Memory  G x F  P   ProblemSolving/  Decision Making  G x F  P   Ability to Process  Information  G x F  P      Contributing Factors             Delusional             Hallucinating             Flight of Ideas             Other:       Modes of Intervention:  x Education x Support x Exploration   x Clarifying x Problem Solving  Confrontation   x Socialization  Limit Setting x Reality Testing   x Activity  Movement  Media    Other:            Response to Learning:  x Able to verbalize current knowledge/experience   x Able to verbalize/acknowledge new learning   x Able to retain information    Capable of insight    Able to change behavior   x Progressing to goal    Other:        Comments:
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
## 2017-11-30 NOTE — PLAN OF CARE
Problem: Altered Mood, Depressive Behavior  Goal: LTG-Able to verbalize acceptance of life and situations over which he or she has no control  Outcome: Ongoing  Patient denies thoughts of self harm  States she is still depressed 8/10  States she feels better than on admission  Patient social with peers on the unit takes medications as prescribed and attends group programming  q 15 minute visual safety checks continue  Per unit protocol

## 2017-12-01 PROCEDURE — 99253 IP/OBS CNSLTJ NEW/EST LOW 45: CPT | Performed by: INTERNAL MEDICINE

## 2017-12-01 PROCEDURE — 1240000000 HC EMOTIONAL WELLNESS R&B

## 2017-12-01 PROCEDURE — 6370000000 HC RX 637 (ALT 250 FOR IP): Performed by: PSYCHIATRY & NEUROLOGY

## 2017-12-01 RX ADMIN — GABAPENTIN 600 MG: 600 TABLET, FILM COATED ORAL at 21:16

## 2017-12-01 RX ADMIN — Medication 100 MG: at 09:15

## 2017-12-01 RX ADMIN — ACETAMINOPHEN 650 MG: 325 TABLET, FILM COATED ORAL at 09:19

## 2017-12-01 RX ADMIN — ACETAMINOPHEN 650 MG: 325 TABLET, FILM COATED ORAL at 21:16

## 2017-12-01 RX ADMIN — GABAPENTIN 600 MG: 600 TABLET, FILM COATED ORAL at 13:35

## 2017-12-01 RX ADMIN — FOLIC ACID 1 MG: 1 TABLET ORAL at 09:15

## 2017-12-01 RX ADMIN — LITHIUM CARBONATE 300 MG: 300 TABLET, FILM COATED, EXTENDED RELEASE ORAL at 09:15

## 2017-12-01 RX ADMIN — GABAPENTIN 600 MG: 600 TABLET, FILM COATED ORAL at 09:15

## 2017-12-01 RX ADMIN — CITALOPRAM HYDROBROMIDE 20 MG: 20 TABLET ORAL at 09:15

## 2017-12-01 RX ADMIN — MULTIPLE VITAMINS W/ MINERALS TAB 1 TABLET: TAB at 09:15

## 2017-12-01 RX ADMIN — TRAZODONE HYDROCHLORIDE 100 MG: 100 TABLET ORAL at 21:16

## 2017-12-01 RX ADMIN — VENLAFAXINE HYDROCHLORIDE 150 MG: 150 CAPSULE, EXTENDED RELEASE ORAL at 09:15

## 2017-12-01 RX ADMIN — LITHIUM CARBONATE 300 MG: 300 TABLET, FILM COATED, EXTENDED RELEASE ORAL at 21:16

## 2017-12-01 RX ADMIN — QUETIAPINE FUMARATE 200 MG: 200 TABLET ORAL at 21:16

## 2017-12-01 RX ADMIN — PANTOPRAZOLE SODIUM 40 MG: 40 TABLET, DELAYED RELEASE ORAL at 09:15

## 2017-12-01 ASSESSMENT — ENCOUNTER SYMPTOMS
NAUSEA: 1
SHORTNESS OF BREATH: 0
VOMITING: 1
ABDOMINAL PAIN: 1

## 2017-12-01 ASSESSMENT — PAIN SCALES - GENERAL
PAINLEVEL_OUTOF10: 6
PAINLEVEL_OUTOF10: 7
PAINLEVEL_OUTOF10: 0
PAINLEVEL_OUTOF10: 3

## 2017-12-01 NOTE — CONSULTS
H&P done.   JARROD TAPIA PA-C on 11/29/2017 at 8:42 PM
her.  Patient states that she wants to get a gun and shoot them. Unclear at this time whether that is a voice in her head and she is talking about shooting her head or whether this is an actual person. Patient states that she has no suicidal intent at this time. Patient denies any somatic complaints. Patient does admit to alcohol and drug abuse and states the last use of drugs was 3 days ago and last alcohol was 3 AM this morning. PAST MEDICAL HISTORY    has a past medical history of Alcoholism ([facility] Anxiety; Asthma; Depression; Polysubstance abuse; Suicide attempt; and Suicide ideation. ALLERGIES   Morphine and Zithromax [azithromycin]     REVIEW OF SYSTEMS     Review of Systems   Constitutional: Positive for malaise/fatigue. Negative for weight loss. Respiratory: Negative for shortness of breath. Cardiovascular: Negative for chest pain, palpitations and leg swelling. Gastrointestinal: Positive for abdominal pain, nausea and vomiting. Musculoskeletal: Negative for myalgias. Skin: Negative for rash. Neurological: Negative for focal weakness. Psychiatric/Behavioral: Positive for depression, hallucinations and substance abuse. The patient is nervous/anxious. PHYSICAL EXAM     /63   Pulse 75   Temp 98.2 °F (36.8 °C)   Resp 14   Ht 5' (1.524 m)   Wt 145 lb (65.8 kg)   SpO2 97%   BMI 28.32 kg/m²   Body mass index is 28.32 kg/m². Physical Exam   Constitutional: She is cooperative. Neck: Carotid bruit is not present. No thyroid mass and no thyromegaly present. Cardiovascular: Normal rate, regular rhythm and normal heart sounds. Exam reveals no S3. No murmur heard. Pulmonary/Chest: Effort normal and breath sounds normal.   Neurological: She is alert. She has normal strength. Skin: Skin is warm and dry. No rash noted. Psychiatric: She is agitated. Thought content is delusional. She expresses impulsivity. She exhibits a depressed mood.         DIAGNOSTICS

## 2017-12-01 NOTE — PLAN OF CARE
Problem: Altered Mood, Depressive Behavior  Goal: LTG-Able to verbalize acceptance of life and situations over which he or she has no control  Outcome: Ongoing  During interview with writer pt unable to verbalize acceptance over life situations at this time. Pt encouraged to utilize group activities on unit and 1:1 talk time with staff to help identify situations and gain acceptance. Will continue to monitor. Goal: STG-Knowledge of positive coping patterns  Outcome: Ongoing  During interview with writer pt not knowledgeable of positive of coping patterns. Pt encouraged to utilize 1:1 time with staff to help identify these patterns. Will continue to monitor.

## 2017-12-01 NOTE — BH NOTE
Date:  11/30/17 Start Time: 830pm  End Time: 915pm    Number Participants in Group:  10/15    Goal:  Patient will demonstrate increased interpersonal interaction   Topic: Wrap Up    Discipline Responsible:   OT  AT   x Nsg.  RT  Other       Participation Level:     None  Minimal   x Active Listener x Interactive    Monopolizing         Participation Quality:  x Appropriate  Inappropriate   x       Attentive        Intrusive   x       Sharing        Resistant   x       Supportive        Lethargic       Affective:   x Congruent  Incongruent  Blunted  Flat    Constricted  Anxious  Elated  Angry    Labile  Depressed  Other         Cognitive:  x Alert  Oriented PPTP     Concentration x G  F  P   Attention Span x G  F  P   Short-Term Memory x G  F  P   Long-Term Memory x G  F  P   ProblemSolving/  Decision Making x G  F  P   Ability to Process  Information x G  F  P      Contributing Factors             Delusional             Hallucinating             Flight of Ideas             Other:       Modes of Intervention:  x Education  Support  Exploration   x Clarifying  Problem Solving  Confrontation    Socialization  Limit Setting  Reality Testing    Activity  Movement  Media    Other:            Response to Learning:  x Able to verbalize current knowledge/experience   x Able to verbalize/acknowledge new learning    Able to retain information    Capable of insight    Able to change behavior    Progressing to goal    Other:        Comments:

## 2017-12-01 NOTE — PLAN OF CARE
Problem: Altered Mood, Depressive Behavior  Goal: STG-Knowledge of positive coping patterns  Outcome: Ongoing  Psychoeducation Group Note    Date: 12/1/2017  Start Time: 0845  End Time: 0910    Number Participants in Group:  9    Goal:  Patient will demonstrate increased interpersonal interaction   Topic: Community meeting/ goals group    Discipline Responsible:   OT  AT  [facility]. x RT  Other       Participation Level:     None  Minimal   x Active Listener x Interactive    Monopolizing         Participation Quality:  x Appropriate  Inappropriate   x       Attentive        Intrusive   x       Sharing        Resistant          Supportive        Lethargic       Affective:   x Congruent  Incongruent  Blunted  Flat    Constricted  Anxious  Elated  Angry    Labile  Depressed  Other         Cognitive:  x Alert x Oriented PPTP     Concentration x G  F  P   Attention Span x G  F  P   Short-Term Memory x G  F  P   Long-Term Memory x G  F  P   ProblemSolving/  Decision Making x G  F  P   Ability to Process  Information x G  F  P      Contributing Factors             Delusional             Hallucinating             Flight of Ideas             Other:       Modes of Intervention:  x Education x Support x Exploration   x Clarifying x Problem Solving  Confrontation   x Socialization  Limit Setting x Reality Testing   x Activity  Movement  Media    Other:            Response to Learning:  x Able to verbalize current knowledge/experience   x Able to verbalize/acknowledge new learning   x Able to retain information    Capable of insight    Able to change behavior   x Progressing to goal    Other:        Comments:

## 2017-12-01 NOTE — PLAN OF CARE
Problem: Altered Mood, Depressive Behavior  Goal: STG-Knowledge of positive coping patterns  Outcome: Ongoing  Pt did not attend RT group at 1330 d/t resting in room despite staff invitation to attend.

## 2017-12-01 NOTE — CARE COORDINATION
Problem: Altered Mood, Depressive Behavior  Goal: LTG-Able to verbalize and/or display a decrease in depressive symptoms  Outcome: Ongoing   PSYCHOTHERAPY GROUP NOTE        Date:        12/1          Start Time:         10:00am                End Time:   10:45am        Number Participants in Group:  11/14 and 2 1:!        Goals:  Participate in group psychotherapy and remain in the here-and-now. RT X SW   Nsg   LPN    BHTII   Other         Participation Level:                   None   Minimal   X Active Listener X Interactive     Monopolizing             Participation Quality:  X Appropriate   Inappropriate   X  Attentive    Intrusive   X  Sharing    Resistant   X  Supportive     Lethargic         Affective:            Congruent   Incongruent   Blunted   Flat     Constricted X Anxious   Elated   Angry     Labile X Depressed   Other             Cognitive:  X Alert   Oriented PPTS      Concentration G   F X P     Attention Span G   F X P     Short-Term Memory G X F   P     Long-Term Memory G   F X P     ProblemSolving/  Decision Making G X F   P     Ability to Process  Information G   F X P          Contributing Factors              Delusional              Hallucinating              Flight of Ideas              Other: poor concentration         Modes of Intervention:    Education X Support X Exploration   X Clarifying X Problem Solving X Confrontation   X Socialization   Limit Setting   Reality Testing     Activity   Movement   Media     Other:               Response to Learning:    Able to verbalize current knowledge/experience     Able to verbalize/acknowledge new learning     Able to retain information     Capable of insight     Able to change behavior   X Progressing to goal     Other:          Comments: PT participates in group psychotherapy.

## 2017-12-02 PROCEDURE — 1240000000 HC EMOTIONAL WELLNESS R&B

## 2017-12-02 PROCEDURE — 6370000000 HC RX 637 (ALT 250 FOR IP): Performed by: PSYCHIATRY & NEUROLOGY

## 2017-12-02 RX ADMIN — LITHIUM CARBONATE 300 MG: 300 TABLET, FILM COATED, EXTENDED RELEASE ORAL at 20:59

## 2017-12-02 RX ADMIN — CITALOPRAM HYDROBROMIDE 20 MG: 20 TABLET ORAL at 08:19

## 2017-12-02 RX ADMIN — GABAPENTIN 600 MG: 600 TABLET, FILM COATED ORAL at 20:59

## 2017-12-02 RX ADMIN — GABAPENTIN 600 MG: 600 TABLET, FILM COATED ORAL at 14:34

## 2017-12-02 RX ADMIN — LITHIUM CARBONATE 300 MG: 300 TABLET, FILM COATED, EXTENDED RELEASE ORAL at 08:20

## 2017-12-02 RX ADMIN — TRAZODONE HYDROCHLORIDE 100 MG: 100 TABLET ORAL at 21:25

## 2017-12-02 RX ADMIN — GABAPENTIN 600 MG: 600 TABLET, FILM COATED ORAL at 08:19

## 2017-12-02 RX ADMIN — QUETIAPINE FUMARATE 200 MG: 200 TABLET ORAL at 21:25

## 2017-12-02 RX ADMIN — VENLAFAXINE HYDROCHLORIDE 150 MG: 150 CAPSULE, EXTENDED RELEASE ORAL at 08:00

## 2017-12-02 RX ADMIN — PANTOPRAZOLE SODIUM 40 MG: 40 TABLET, DELAYED RELEASE ORAL at 08:20

## 2017-12-02 RX ADMIN — MULTIPLE VITAMINS W/ MINERALS TAB 1 TABLET: TAB at 08:20

## 2017-12-02 NOTE — PLAN OF CARE
Problem: Altered Mood, Depressive Behavior  Goal: STG-Knowledge of positive coping patterns  Outcome: Ongoing  Pt has a flat affect, remains anxious and preoccupied. Denies suicidal thoughts and hallucinations. Selective superficial interactions , attends groups. No concerns voiced. Monitored q 15 mins. Assess mood and mental status. Monitor behaviors and interactions.

## 2017-12-02 NOTE — PLAN OF CARE
Problem: Pain:  Goal: Pain level will decrease  Pain level will decrease   Outcome: Ongoing      Problem: Altered Mood, Depressive Behavior  Goal: STG-Knowledge of positive coping patterns  Outcome: Ongoing  Pt denies thoughts of self harm and is agreeable to seeking out should thoughts of self harm arise. Safe environment maintained. Q15 minute checks for safety cont per unit policy. Will cont to monitor for safety and provides support and reassurance as needed.

## 2017-12-02 NOTE — BH NOTE
PSYCHOEDUCATION GROUP NOTE       Date:     12/2/17             Start Time:           1100              End Time: 1140      Number Participants in Group: 8/16      Name of group: Anger Management         RT  SW  Nsg  LPN   x BHTII  Other       Participation Level:     None  Minimal    x Active Listener  Interactive    Monopolizing         Participation Quality:   x Appropriate  Inappropriate     Attentive   Intrusive     Sharing   Resistant     Supportive    Lethargic       Affective:    x Congruent  Incongruent  Blunted  Flat    Constricted  Anxious  Elated  Angry    Labile  Depressed  Other         Cognitive:   x Alert  x Oriented PPTS     Concentration G  F  x P    Attention Span G  F  x P    Short-Term Memory G  F  x P    Long-Term Memory G  F  x P    ProblemSolving/  Decision Making G  F  x P    Ability to Process  Information G  F  x P       Contributing Factors             Delusional             Hallucinating             Flight of Ideas             Other: poor concentration       Modes of Intervention:   x Education  x Support  Exploration    Clarifying  x Problem Solving  Confrontation    Socialization  Limit Setting  Reality Testing    Activity  Movement  Media    Other:          Response to Learning:   x Able to verbalize current knowledge/experience    Able to verbalize/acknowledge new learning    Able to retain information    Capable of insight    Able to change behavior    Progressing to goal    Other:        Comments:

## 2017-12-02 NOTE — BH NOTE
[facility] PSYCHOEDUCATION GROUP NOTE    Date: 12/2/17        Start Time: 1600      End Time: 1630      Number Participants in Group: 11/15    Name of group: Expressing Anger      Discipline Responsible:   OT  AT   x Nsg.  RT MHP Other       Participation Level:     None  Minimal   x Active Listener x Interactive    Monopolizing         Participation Quality:  x Appropriate  Inappropriate   x       Attentive        Intrusive   x       Sharing        Resistant   x       Supportive        Lethargic       Affective:   x Congruent  Incongruent  Blunted  Flat    Constricted  Anxious  Elated  Angry    Labile  Depressed  Other         Cognitive:  x Alert x Oriented PPTP     Concentration x G  F  P   Attention Span x G  F  P   Short-Term Memory x G  F  P   Long-Term Memory x G  F  P   ProblemSolving/  Decision Making x G  F  P   Ability to Process  Information x G  F  P      Contributing Factors             Delusional             Hallucinating             Flight of Ideas             Other: poor concentration       Modes of Intervention:  x Education x Support x Exploration    Clarifying  Problem Solving  Confrontation   x Socialization  Limit Setting  Reality Testing   x Activity  Movement  Media    Other:            Response to Learning:  x Able to verbalize current knowledge/experience   x Able to verbalize/acknowledge new learning   x Able to retain information   x Capable of insight   x Able to change behavior   x Progressing to goal    Other:        Comments:

## 2017-12-02 NOTE — BH NOTE
Psychoeducation Group Note  Date:12/2/17 Start Time: 845AM End Time: 935AM  Number Participants in Group: 14/16  Goal: Patient will demonstrate increased interpersonal interaction   Topic: COMMUNITY MEETING/GOAL GROUP  ? Discipline Responsible:   OT  AT  [facility].  X RT  Other   ? Participation Level:   ? None ? Minimal   ?X Active Listener ? X Interactive   ? Monopolizing ? ?   ? Participation Quality:  ? Appropriate ? Inappropriate   ? X Attentive ? Intrusive   ? X Sharing ? Resistant   ? X Supportive ? Lethargic   ? Affective:   X? Congruent ? Incongruent ? Blunted ? Flat   ? Constricted ? Anxious ? Elated ? Angry   ? Labile ? Depressed ? Other X? ? BRIGHTENED   ? Cognitive:  X? Alert ? X Oriented PPTP     Concentration ? X G ? F ? P   Attention Span ? X G ? F ? P   Short-Term Memory ? G ? F ? P   Long-Term Memory ? G ? F ? P   ProblemSolving/  Decision Making ? G ?X F ? P   Ability to Process  Information ? X G ? F ? P     ? Contributing Factors   ? Delusional   ? Hallucinating   ? Flight of Ideas   ? Other:   ? Modes of Intervention:  X? Education ? X Support X? Exploration   X? Clarifying X? Problem Solving ? Confrontation   X? Socialization ? Limit Setting ? Reality Testing   X? Activity ? Movement ? Media   ? Other:  ? ? ? ?   ? Response to Learning:  X? Able to verbalize current knowledge/experience   X? Able to verbalize/acknowledge new learning   ? X Able to retain information   X? Capable of insight   ? Able to change behavior   ? X Progressing to goal   ? Other:    ?  Comments:   ?

## 2017-12-03 PROCEDURE — 6370000000 HC RX 637 (ALT 250 FOR IP): Performed by: PSYCHIATRY & NEUROLOGY

## 2017-12-03 PROCEDURE — 1240000000 HC EMOTIONAL WELLNESS R&B

## 2017-12-03 RX ORDER — LITHIUM CARBONATE 300 MG/1
300 TABLET, FILM COATED, EXTENDED RELEASE ORAL EVERY 12 HOURS SCHEDULED
Qty: 28 TABLET | Refills: 0 | Status: ON HOLD | OUTPATIENT
Start: 2017-12-03 | End: 2018-03-28 | Stop reason: HOSPADM

## 2017-12-03 RX ADMIN — GABAPENTIN 600 MG: 600 TABLET, FILM COATED ORAL at 14:01

## 2017-12-03 RX ADMIN — MULTIPLE VITAMINS W/ MINERALS TAB 1 TABLET: TAB at 08:46

## 2017-12-03 RX ADMIN — VENLAFAXINE HYDROCHLORIDE 150 MG: 150 CAPSULE, EXTENDED RELEASE ORAL at 08:46

## 2017-12-03 RX ADMIN — TRAZODONE HYDROCHLORIDE 100 MG: 100 TABLET ORAL at 21:04

## 2017-12-03 RX ADMIN — PANTOPRAZOLE SODIUM 40 MG: 40 TABLET, DELAYED RELEASE ORAL at 08:46

## 2017-12-03 RX ADMIN — QUETIAPINE FUMARATE 200 MG: 200 TABLET ORAL at 21:04

## 2017-12-03 RX ADMIN — GABAPENTIN 600 MG: 600 TABLET, FILM COATED ORAL at 20:52

## 2017-12-03 RX ADMIN — GABAPENTIN 600 MG: 600 TABLET, FILM COATED ORAL at 08:46

## 2017-12-03 RX ADMIN — LITHIUM CARBONATE 300 MG: 300 TABLET, FILM COATED, EXTENDED RELEASE ORAL at 08:46

## 2017-12-03 RX ADMIN — CITALOPRAM HYDROBROMIDE 20 MG: 20 TABLET ORAL at 08:46

## 2017-12-03 RX ADMIN — LITHIUM CARBONATE 300 MG: 300 TABLET, FILM COATED, EXTENDED RELEASE ORAL at 20:52

## 2017-12-03 NOTE — BH NOTE
Psychoeducation Group Note  ? Date: 12/3/17 Start Time: 845am End Time: 930am  Number Participants in Group: 11/17  ? Goal: Patient will demonstrate increased interpersonal interaction   Topic: community meeting/goals group? Discipline Responsible:  ? OT ? AT ? SW ? Nsg. X RT ? Other   ? ? Participation Level:   ? None ? Minimal   ?X Active Listener ? X Interactive   ? Monopolizing ? ?   ?  ? Participation Quality:  X? Appropriate ? Inappropriate   ? X Attentive ? Intrusive   ? X Sharing ? Resistant   ? X Supportive ? Lethargic   ? ? Affective:   X? Congruent ? Incongruent ? Blunted ? Flat   ? Constricted ? Anxious ? Elated ? Angry   ? Labile ? Depressed ? Other X? ? BRIGHTENS   ?  ? Cognitive:  X? Alert X? Oriented PPTP   ? Concentration X? G ? F ? P   Attention Span ? X G ? F ? P   Short-Term Memory ? G ? F ? P   Long-Term Memory ? G ? F ? P   ProblemSolving/  Decision Making ? G X? F ? P   Ability to Process  Information X? G ? F ? P   ?  ? Contributing Factors   ? Delusional   ? Hallucinating   ? Flight of Ideas   ? Other:   ?  ? Modes of Intervention:  X? Education ? X Support ? X Exploration   X? Clarifying ? X Problem Solving ? Confrontation   X? Socialization ? Limit Setting ? Reality Testing   X? Activity X? Movement ? Media   ? Other:  ? ? ? ?   ?  ? Response to Learning:  X? Able to verbalize current knowledge/experience   X? Able to verbalize/acknowledge new learning   X? Able to retain information   x? Capable of insight   ? Able to change behavior   ? X Progressing to goal   ? Other: pt expressed feelings, was open to support from peers et offered support et encouragement to peers   ? ?   Comments:   ?  ?  ?  ?

## 2017-12-03 NOTE — PLAN OF CARE
Problem: Altered Mood, Depressive Behavior  Goal: LTG-Able to verbalize acceptance of life and situations over which he or she has no control  Outcome: Ongoing  PSYCHOEDUCATIONAL GROUP NOTE       Date:     12/3/17             Start Time: 10:00                        End Time: 11:00      Number Participants in Group: 10/16      Goals: To participate in group psychotherapy and remain in the here and now. RT X SW  Nsg  LPN   BHTII  Other       Participation Level:     None  Minimal   X Active Listener X Interactive    Monopolizing         Participation Quality:  X Appropriate  Inappropriate   X  Attentive   Intrusive   X  Sharing   Resistant   X  Supportive    Lethargic       Affective:    Congruent  Incongruent  Blunted  Flat    Constricted  Anxious  Elated  Angry    Labile  Depressed  Other         Cognitive:  X Alert X Oriented PPTS     Concentration G X F  P    Attention Span G X F  P    Short-Term Memory G  F X P    Long-Term Memory G X F  P    ProblemSolving/  Decision Making G  F X P    Ability to Process  Information G X F  P       Contributing Factors             Delusional             Hallucinating             Flight of Ideas             Other: poor concentration       Modes of Intervention:  X Education X Support  Exploration   X Clarifying X Problem Solving  Confrontation    Socialization X Limit Setting  Reality Testing    Activity  Movement  Media    Other:          Response to Learning:  X Able to verbalize current knowledge/experience   X Able to verbalize/acknowledge new learning    Able to retain information   X Capable of insight    Able to change behavior   X Progressing to goal    Other:        Comments: PT participates actively in group.

## 2017-12-03 NOTE — PLAN OF CARE
Problem: Pain:  Goal: Pain level will decrease  Pain level will decrease   Outcome: Ongoing  Patient denies pain at this time. Problem: Altered Mood, Depressive Behavior  Goal: LTG-Able to verbalize acceptance of life and situations over which he or she has no control  Outcome: Ongoing  Patient denies si and hi, denies a/v hallucinations, states she is excited to go home, vacant expression to face when asking questions but then brightens when talking to peers in dayroom, remains safe on 15 min checks. Goal: STG-Knowledge of positive coping patterns  Outcome: Ongoing  Patient denies si and hi, denies a/v hallucinations, states she is excited to go home, vacant expression to face when asking questions but then brightens when talking to peers in dayroom, remains safe on 15 min checks.

## 2017-12-03 NOTE — PLAN OF CARE
Problem: Altered Mood, Depressive Behavior  Goal: LTG-Able to verbalize acceptance of life and situations over which he or she has no control  Outcome: Ongoing  Pt reports feeling better. Feels her moods are stable. Attending groups. Consumes meals. Looking forward to discharge tomorrow. MC/BC. Pt denies any thoughts of self harm. Verbally states wont harm self. Verbalized if feelings/condition changes will inform staff. Safety checks continued and maintained.

## 2017-12-04 VITALS
TEMPERATURE: 98.2 F | RESPIRATION RATE: 16 BRPM | BODY MASS INDEX: 28.47 KG/M2 | OXYGEN SATURATION: 99 % | DIASTOLIC BLOOD PRESSURE: 79 MMHG | SYSTOLIC BLOOD PRESSURE: 107 MMHG | HEIGHT: 60 IN | WEIGHT: 145 LBS | HEART RATE: 74 BPM

## 2017-12-04 PROCEDURE — 6370000000 HC RX 637 (ALT 250 FOR IP): Performed by: PSYCHIATRY & NEUROLOGY

## 2017-12-04 PROCEDURE — 5130000000 HC BRIDGE APPOINTMENT

## 2017-12-04 RX ADMIN — GABAPENTIN 600 MG: 600 TABLET, FILM COATED ORAL at 08:48

## 2017-12-04 RX ADMIN — VENLAFAXINE HYDROCHLORIDE 150 MG: 150 CAPSULE, EXTENDED RELEASE ORAL at 08:48

## 2017-12-04 RX ADMIN — PANTOPRAZOLE SODIUM 40 MG: 40 TABLET, DELAYED RELEASE ORAL at 08:48

## 2017-12-04 RX ADMIN — CITALOPRAM HYDROBROMIDE 20 MG: 20 TABLET ORAL at 08:48

## 2017-12-04 RX ADMIN — MULTIPLE VITAMINS W/ MINERALS TAB 1 TABLET: TAB at 08:48

## 2017-12-04 RX ADMIN — LITHIUM CARBONATE 300 MG: 300 TABLET, FILM COATED, EXTENDED RELEASE ORAL at 08:48

## 2017-12-04 NOTE — PLAN OF CARE
Problem: Pain:  Goal: Pain level will decrease  Pain level will decrease   Outcome: Ongoing  Patient denies pain at this time. Problem: Altered Mood, Depressive Behavior  Goal: LTG-Able to verbalize acceptance of life and situations over which he or she has no control  Outcome: Ongoing  Patient denies si and hi, denies a/v hallucinations, medication compliant, social with peers this shift, states she is excited about discharge tomorrow, remains safe on 15 min checks. Goal: STG-Knowledge of positive coping patterns  Outcome: Ongoing  Patient denies si and hi, denies a/v hallucinations, medication compliant, social with peers this shift, states she is excited about discharge tomorrow, remains safe on 15 min checks.

## 2017-12-04 NOTE — BH NOTE
Patient given tobacco quitline number 38864216031 at this time, refusing to call at this time, states \" I just dont want to quit now\"- patient given information as to the dangers of long term tobacco use. Continue to reinforce the importance of tobacco cessation.

## 2017-12-04 NOTE — CARE COORDINATION
Name: Brandon Olivia    : 1975    Discharge Date: 2017    Primary Auth/Cert #: [id number]    Discharge Medications:      Medication List      START taking these medications    lithium 300 MG extended release tablet  Commonly known as:  LITHOBID  Take 1 tablet by mouth every 12 hours for 14 days  Notes to patient:  Stabilize mood        CONTINUE taking these medications    citalopram 20 MG tablet  Commonly known as:  CELEXA  Take 1 tablet by mouth daily  Notes to patient:  Improve mood     gabapentin 600 MG tablet  Commonly known as:  NEURONTIN  Take 1 tablet by mouth 3 times daily  Notes to patient:  Stabilize mood     nicotine 21 MG/24HR  Commonly known as:  NICODERM CQ  Place 1 patch onto the skin daily  Notes to patient:  Smoking cessation     pantoprazole 40 MG tablet  Commonly known as:  PROTONIX  Take 1 tablet by mouth every morning (before breakfast)  Notes to patient:  Reduce stomach acid     QUEtiapine 200 MG tablet  Commonly known as:  SEROQUEL  Take 1 tablet by mouth nightly  Notes to patient:  Clear thoughts     therapeutic multivitamin-minerals tablet  Take 1 tablet by mouth daily  Notes to patient:  Supplement     traZODone 100 MG tablet  Commonly known as:  DESYREL  Take 1 tablet by mouth nightly  Notes to patient:  Sleep     venlafaxine 150 MG extended release capsule  Commonly known as:  EFFEXOR XR  Take 1 capsule by mouth daily (with breakfast)  Notes to patient:  Improve mood           Where to Get Your Medications      You can get these medications from any pharmacy    Bring a paper prescription for each of these medications  · lithium 300 MG extended release tablet         Follow Up Appointment: Jeb Mccall MD  [address] [place] [address]  [phone]          [address]  Go on 2017  Gera Monte, you have an appointment on Monday, Dec. 11th at 9:30am with Jacquie Pham

## 2017-12-04 NOTE — CARE COORDINATION
Bridge Appointment completed: Reviewed Discharge Instructions with patient. Patient verbalizes understanding and agreement with the discharge plan using the teachback method. Discharge Arrangements:  1958 [address], [address] [facility] [address]    Monday December 11, 2017 at 9:30 with Rola Robles  Guardian notified: PT is her own guardian  Discharge destination/address:  Going directly to [address] [place] [address]  To follow up with  and to get her car.    Transported by:    Kaleb Proctor

## 2017-12-04 NOTE — CARE COORDINATION
PSYCHOTHERAPY GROUP NOTE       Date:    12/4/2017              Start Time:       11:00                  End Time: 12:00      Number Participants in Group: 4/9      Goals: To participate in group psychotherapy and remain in the here and now. RT X SW  Nsg  LPN   BHTII  Other       Participation Level:     None  Minimal   X Active Listener X Interactive    Monopolizing         Participation Quality:  X Appropriate  Inappropriate   X  Attentive   Intrusive   X  Sharing   Resistant   X  Supportive    Lethargic       Affective:    Congruent  Incongruent  Blunted  Flat    Constricted  Anxious  Elated  Angry    Labile  Depressed  Other         Cognitive:  X Alert X Oriented PPTS     Concentration G X F  P    Attention Span G X F  P    Short-Term Memory G X F  P    Long-Term Memory G X F  P    ProblemSolving/  Decision Making G  F X P    Ability to Process  Information G X F  P       Contributing Factors             Delusional             Hallucinating             Flight of Ideas             Other: poor concentration       Modes of Intervention:   Education X Support  Exploration   X Clarifying X Problem Solving  Confrontation   X Socialization X Limit Setting  Reality Testing    Activity  Movement  Media    Other:          Response to Learning:  X Able to verbalize current knowledge/experience   X Able to verbalize/acknowledge new learning    Able to retain information   X Capable of insight    Able to change behavior   X Progressing to goal    Other:        Comments: PT participates in group.

## 2017-12-12 NOTE — DISCHARGE SUMMARY
HydrOXYzine HCl 50 mg Oral 3 TIMES DAILY PRN      Multiple Vitamins-Minerals 1 tablet Oral DAILY      Pantoprazole Sodium 40 mg Oral DAILY BEFORE BREAKFAST      QUEtiapine Fumarate 200 mg Oral NIGHTLY      TraZODone HCl 100 mg Oral NIGHTLY      Venlafaxine HCl 75 mg Oral DAILY WITH BREAKFAST

## 2017-12-12 NOTE — PROGRESS NOTES
Jacekr followed up with Pt about placement. PT reported that due to her need for her medication she did not desire to go to [facility] in [facility] stated that this was fine. PT shared that she became upset with the thought of staying until Monday. PT shared that she told the MD that she was ready to go home and was going to stay with her cousin. Writer asked Pt to think through this decision and Pt was able to verbalize that this would lead to drinking and using. PT shared that she did not want this. Writer processed PT about her impulsivity when it comes to difficult emotions and Pt agreed that she became fearful and anxious and asked to leave without a plan of success. Pt reported that she was due for and apartment but shared concerns that she would get bored, isolate and then use. Pt identified that she needed structure, accountability and support. Writer went over options and Writer agreed to look into some residential or long-term facilities. Jacekr had Pt sign [facility] for [facility] and to provide a list for Pt.  will follow up with Pt on Friday.
Patient denies any further symptoms she attributes to withdrawal.  There is no observable tremor at this point. Patient does report overall, continued mood improvement. Affect is bright and appropriately reactive. Patient continues to go back and forth about desire for residential treatment versus pursuing apartment through [place]. Charting and medications reviewed. Therapeutic support provided. Will continue Seroquel, Celexa, Effexor, trazodone.
Patient states that she  is feeling much better in terms of her mood. Patient reports that she has not been experiencing any feelings of self-harm or thoughts of suicide. Affect is brighter and more reactive. Denies any side effects to medication regimen. Explored her  concerns and feelings. Reassurance and support provided. Charting and medications reviewed. Spent time discharge planning. Plan will be for discharge tomorrow as long as patient feels better overnight and there are no safety concerns.
Patient states that she feels withdrawal symptoms are minimal at this point in time. Denies any significant tremor. There is no outward display of tremor. Patient reports that overall mood is improved. Affect is brighter and more reactive. Patient is ambivalent about wanting to go to any residential treatment. Displays fairly poor insight into why previous drug and alcohol treatment has not been effective in the big picture. Charting and medications reviewed. Therapeutic support provided. Will stop Librium, continue Seroquel, Celexa, Effexor, trazodone.
Psychiatric Admission Note         Alexandra Kuhn is a 38 y.o. female who was admitted from the emergency department where she presented brought by her girlfriend. Patient reported that she has been experiencing increasing depression with recent thoughts of committing suicide by overdose. Patient reports she has been experiencing command auditory hallucinations telling her to overdose. She has been struggling with heavy use of alcohol, using heroin and crack cocaine sporadically. She states she has not taken any psychiatric medications in the past 2 weeks due to substance use and girlfriend's concern about mixing drug use and medications. She reports she has been experiencing very poor sleep and appetite. Reports poor subjective mood, low energy, decreased hedonic capacity. Allergies:  Zithromax [azithromycin]     History of Substance Abuse     She reports daily use of alcohol. Reports more sporadic use of crack cocaine and heroin. Past Psychiatric History     Patient Reports noncompliance with outpatient psychiatric care. Reported history of psychiatric inpatient hospitalizations. Reported history of suicide attempts. Family History of psychiatric disorders    Family history: positive for depression      Medical History     Past Medical History:   Diagnosis Date    Anxiety     Asthma     Depression         Mental Status  Pt. was alert, fully oriented, and cooperative. Appearance and hygiene weredisheveled, poor hygiene . Mood was depressed. Affect was irritable Thought process was linear and well-organized. Patient endorsed auditory hallucinations including command hallucinations. Patient endorsed suicidal ideations. Patient denied homicidal ideations . Patient's gross cognitive functions were intact. Insight and judgement were poor. Both recent and remote memory were intact.     Psychomotor status was without abnormality     Diagnostic Impression    Bipolar affective disorder with
She is still quite depressed, despondent, overwhelmed, and having frequent suicidal thoughts to overdose. Affect remains flat and poorly reactive. She continues to report bothersome auditory hallucinations driving distress. Patient has been withdrawn and isolative. Patient complains of high level of racing thoughts driving feelings of anxiety. She reports better sleep and appetite. Feeling hopeless and helpless. Denies any side effects to medications. Explored her  concerns and support provided. There is no identifiable safe alternative other than continued hospitalization. Charting and medications reviewed.  Will continue Seroquel, Celexa, Trazodone, Effexor XR, Librium to treat withdrawal.
Writer called [facility] and spoke with Yanni Ferreira. Writer was told that there was no application needed and confirmed that Pt did complete a phone screen and there was a bed available for her. Writer inquired about the medication issue and Yanni Ferreira shared that Lyrica was not accepted. Writer received a list of approved psychotropic medications as listed below:   Prozac, Welburtin, Zoloft, Zyprexa, Lexapro, Lithium, Lamictal, Depakote, Latuda, Risperdal, and Ambilify. In terms of pain management only Tylenol, Ibuprofen, and Asprin were acceptable. Residents are not allowed any anxiety medication or sleep medication. Writer told Yanni Ferreira that she could need to speak with MD about the medication issue and would get back to her. Writer to also follow up with PT about the situation.
Writer met with PT to discuss discharge planning. PT shared frustrations and hurt feelings of conversations with her siblings about her parents belongings. Pt shared that her belongings were also at their residence in [place] from when she lived there and she had one of her checks in a bank account. PT reported that her brother and sister refused to  her belongings and reported that they would give her the money when she was sober and responsible. Writer provided actively listening and support. Pt shared that she had been thinking and reported that she felt that she was ready for an apartment. Pt shared that her CM came to see her the previous day and they planned on helping with getting PT grief counseling, individual counseling and IOP. Pt also shared that she wanted to get into outside meetings as well. Writer shared concerns that Pt would be homeless for a minimum of a week in the interm of her apartment being available. PT was agreeable to trying to stay in a hotel if her siblings would give her a loan for the money she had in her father's account. PT shared that she was still interested in the Vivitrol injection as well. Writer to follow up with Pt the next day in regards to a plan on how to obtain her keys and vehicle from her cousins house.
Writer met with Pt per SW request. PT shared about her relapse after being in the [facility] program for four months. PT shared that her mother passed in August after long-term illness and her father passed away unexpectedly this week. PT shared that she relapsed on alcohol, heroin and crack. PT was tearful when sharing and reported that she does not have any supports. PT shared that she had nothing to live for now. Pt did verbalize change talk and reported that she enjoyed sobriety. PT shared that she was unsure where to go from here because she was getting tiresome of programs but did not know if she would be successful on her own. Writer processed this with Pt and told Pt about a couple of programs in [facility] that have phases to the treatment program, the [facility] and [facility]. Writer provided the information and agreed to follow up with PT the next day. PT demonstrated some insight into her relapse and a willingness to get sober.
HydrOXYzine HCl 50 mg Oral 3 TIMES DAILY PRN      Multiple Vitamins-Minerals 1 tablet Oral DAILY      Pantoprazole Sodium 40 mg Oral DAILY BEFORE BREAKFAST      QUEtiapine Fumarate 200 mg Oral NIGHTLY      TraZODone HCl 100 mg Oral NIGHTLY      Venlafaxine HCl 75 mg Oral DAILY WITH BREAKFAST

## 2017-12-15 ENCOUNTER — APPOINTMENT (OUTPATIENT)
Dept: CT IMAGING | Age: 42
End: 2017-12-15
Payer: COMMERCIAL

## 2017-12-15 ENCOUNTER — HOSPITAL ENCOUNTER (EMERGENCY)
Age: 42
Discharge: HOME | End: 2017-12-16
Attending: EMERGENCY MEDICINE
Payer: COMMERCIAL

## 2017-12-15 DIAGNOSIS — M54.2 NECK PAIN: Primary | ICD-10-CM

## 2017-12-15 LAB
ABSOLUTE EOS #: 0.26 K/UL (ref 0–0.44)
ABSOLUTE IMMATURE GRANULOCYTE: 0.03 K/UL (ref 0–0.3)
ABSOLUTE LYMPH #: 4.16 K/UL (ref 1.1–3.7)
ABSOLUTE MONO #: 0.48 K/UL (ref 0.1–1.2)
ACETAMINOPHEN LEVEL: <10 UG/ML (ref 10–30)
ANION GAP SERPL CALCULATED.3IONS-SCNC: 13 MMOL/L (ref 9–17)
BASOPHILS # BLD: 1 % (ref 0–2)
BASOPHILS ABSOLUTE: 0.05 K/UL (ref 0–0.2)
BUN BLDV-MCNC: 5 MG/DL (ref 6–20)
BUN/CREAT BLD: ABNORMAL (ref 9–20)
CALCIUM SERPL-MCNC: 7.8 MG/DL (ref 8.6–10.4)
CHLORIDE BLD-SCNC: 105 MMOL/L (ref 98–107)
CO2: 27 MMOL/L (ref 20–31)
CREAT SERPL-MCNC: 0.76 MG/DL (ref 0.5–0.9)
DIFFERENTIAL TYPE: ABNORMAL
EKG ATRIAL RATE: 81 BPM
EKG P AXIS: 54 DEGREES
EKG P-R INTERVAL: 150 MS
EKG Q-T INTERVAL: 400 MS
EKG QRS DURATION: 80 MS
EKG QTC CALCULATION (BAZETT): 464 MS
EKG R AXIS: 23 DEGREES
EKG T AXIS: 32 DEGREES
EKG VENTRICULAR RATE: 81 BPM
EOSINOPHILS RELATIVE PERCENT: 3 % (ref 1–4)
ETHANOL PERCENT: 0.31 %
ETHANOL: 309 MG/DL
GFR AFRICAN AMERICAN: >60 ML/MIN
GFR NON-AFRICAN AMERICAN: >60 ML/MIN
GFR SERPL CREATININE-BSD FRML MDRD: ABNORMAL ML/MIN/{1.73_M2}
GFR SERPL CREATININE-BSD FRML MDRD: ABNORMAL ML/MIN/{1.73_M2}
GLUCOSE BLD-MCNC: 109 MG/DL (ref 70–99)
HCT VFR BLD CALC: 40.9 % (ref 36.3–47.1)
HEMOGLOBIN: 12.7 G/DL (ref 11.9–15.1)
IMMATURE GRANULOCYTES: 0 %
LYMPHOCYTES # BLD: 49 % (ref 24–43)
MCH RBC QN AUTO: 25.1 PG (ref 25.2–33.5)
MCHC RBC AUTO-ENTMCNC: 31.1 G/DL (ref 28.4–34.8)
MCV RBC AUTO: 81 FL (ref 82.6–102.9)
MONOCYTES # BLD: 6 % (ref 3–12)
PDW BLD-RTO: 16.1 % (ref 11.8–14.4)
PLATELET # BLD: 270 K/UL (ref 138–453)
PLATELET ESTIMATE: ABNORMAL
PMV BLD AUTO: 9.6 FL (ref 8.1–13.5)
POTASSIUM SERPL-SCNC: 3.2 MMOL/L (ref 3.7–5.3)
RBC # BLD: 5.05 M/UL (ref 3.95–5.11)
RBC # BLD: ABNORMAL 10*6/UL
SALICYLATE LEVEL: <1 MG/DL (ref 3–10)
SEG NEUTROPHILS: 41 % (ref 36–65)
SEGMENTED NEUTROPHILS ABSOLUTE COUNT: 3.48 K/UL (ref 1.5–8.1)
SODIUM BLD-SCNC: 145 MMOL/L (ref 135–144)
TOXIC TRICYCLIC SC,BLOOD: NEGATIVE
WBC # BLD: 8.5 K/UL (ref 3.5–11.3)
WBC # BLD: ABNORMAL 10*3/UL

## 2017-12-15 PROCEDURE — 6360000002 HC RX W HCPCS: Performed by: EMERGENCY MEDICINE

## 2017-12-15 PROCEDURE — 93005 ELECTROCARDIOGRAM TRACING: CPT

## 2017-12-15 PROCEDURE — 85025 COMPLETE CBC W/AUTO DIFF WBC: CPT

## 2017-12-15 PROCEDURE — 80307 DRUG TEST PRSMV CHEM ANLYZR: CPT

## 2017-12-15 PROCEDURE — 99285 EMERGENCY DEPT VISIT HI MDM: CPT

## 2017-12-15 PROCEDURE — 70450 CT HEAD/BRAIN W/O DYE: CPT

## 2017-12-15 PROCEDURE — 70490 CT SOFT TISSUE NECK W/O DYE: CPT

## 2017-12-15 PROCEDURE — 80048 BASIC METABOLIC PNL TOTAL CA: CPT

## 2017-12-15 PROCEDURE — 96376 TX/PRO/DX INJ SAME DRUG ADON: CPT

## 2017-12-15 PROCEDURE — 6360000002 HC RX W HCPCS: Performed by: STUDENT IN AN ORGANIZED HEALTH CARE EDUCATION/TRAINING PROGRAM

## 2017-12-15 PROCEDURE — 96374 THER/PROPH/DIAG INJ IV PUSH: CPT

## 2017-12-15 PROCEDURE — G0480 DRUG TEST DEF 1-7 CLASSES: HCPCS

## 2017-12-15 RX ADMIN — HYDROMORPHONE HYDROCHLORIDE 0.5 MG: 1 INJECTION, SOLUTION INTRAMUSCULAR; INTRAVENOUS; SUBCUTANEOUS at 20:13

## 2017-12-15 RX ADMIN — HYDROMORPHONE HYDROCHLORIDE 0.5 MG: 1 INJECTION, SOLUTION INTRAMUSCULAR; INTRAVENOUS; SUBCUTANEOUS at 18:24

## 2017-12-15 ASSESSMENT — ENCOUNTER SYMPTOMS
NAUSEA: 0
VOICE CHANGE: 0
VOMITING: 0
DIARRHEA: 0
CONSTIPATION: 0
SHORTNESS OF BREATH: 0
TROUBLE SWALLOWING: 1
COUGH: 0
SORE THROAT: 1
ABDOMINAL DISTENTION: 0
COLOR CHANGE: 0
BACK PAIN: 0
ABDOMINAL PAIN: 0
PHOTOPHOBIA: 0
CHOKING: 0
CHEST TIGHTNESS: 0
WHEEZING: 0

## 2017-12-15 ASSESSMENT — PAIN DESCRIPTION - LOCATION: LOCATION: FACE;HEAD

## 2017-12-15 ASSESSMENT — PAIN SCALES - GENERAL
PAINLEVEL_OUTOF10: 8
PAINLEVEL_OUTOF10: 7

## 2017-12-15 ASSESSMENT — PAIN DESCRIPTION - DESCRIPTORS: DESCRIPTORS: SHARP;ACHING;POUNDING

## 2017-12-15 ASSESSMENT — PAIN DESCRIPTION - PAIN TYPE: TYPE: ACUTE PAIN

## 2017-12-15 ASSESSMENT — PAIN DESCRIPTION - FREQUENCY: FREQUENCY: CONTINUOUS
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
## 2017-12-15 NOTE — ED NOTES
[] Cesar    [] [address]    [x]  [facility] ASSESSMENT      Y  N     [x] [] In the past two weeks have you had thoughts of hurting yourself in any way? [x] [] In the past two weeks have you had thoughts that you would be better off dead? [] [x] Have you made a suicide attempt in the past two months? [x] [] Do you have a plan for hurting yourself or suicide? [] [x] Presence of hallucinations/voices related to hurting himself or herself or someone else. SUICIDE/SECURITY WATCH PRECAUTION CHECKLIST     Orders    [x]  Suicide/Security Watch Precautions initiated as checked below:   Pt arrival to ER BED    [x] Notified physician:  No att. providers found  12/15/17 5:23 PM    [x] Orders obtained as appropriate:     [x] 1:1 Observer     [] Psych Consult     [] Psych Consult    Name:  Date:  Time:    [x] 1:1 Observer, Notified by: Coordinator   Contact Nurse Supervisor    [x] Remove all personal clothes from room and place in snap/paper gown/pants. Slipper only    [x] Remove all personal belongings from room and secured away from patient. Documentation    [x] Initiate Suicide/Security Watch Precaution Flow Sheet    [x] Initiate individualized Care Plan/Problem    [x] Document why precautions initiated on flow sheet (Initiate Nursing Care Plan/Problem)    [x] 1:1 Observer in place; instructions provided. Suicide precautions require observer be within arms length. [x] Nurse-Observer Communication Hand-off initiated by RN, reviewed with Observer. Subsequently used as Hand Off between Observers. [x] Initiate every 15 minute observations per observer as delegated by the RN.     [x] Initiate RN assessment and documentation    Environmental Scan  Search Criteria and Process: OPTIONAL, see Search Policy    [x] Reason for search: suicide precautions    [x] Nursing in presence of second person to search patient    [x] Patient notified of reason for body assessment and to)  [x] Expressed or implied suicidal ideation/behavior  [] Depression  [x] Suicide attempt      [] Low self-esteem  [] Hallucinations      [] Feeling of Hopelessness  [] Substance abuse or withdrawal    [] Dysfunctional family  [] Major traumatic event, eg., divorce, etc   [x] Excessive stress/anxiety    12/15/17    Expected Outcomes    Patient will:   [x] Patient will remain safe for the duration of their stay   [x] Patient's environment will be safe, eg. Free of potential suicide weapons   [] Verbalize Recovery from suicidal episode and improvement in self-worth   [x] Discuss feeling that precipitated suicide attempt/thoughts/behavior   [] Will describe available resources for crisis prevention and management   [] Will verbalize positive coping skills     Nursing Intervention   [x] Assessment and Observations hourly   [x] Suicide Precautions implemented with patient, should be 1:1 observation   [x] Document observation [id number] and RN assessment hourly   [] Consult physician for:    [] Psychiatric consult    [] Pharmacological therapy    [] Other:    [x] Patient search completed by security   [x] Initiated appropriate safety protocols by removing from the patient's environment anything that could be used to inflict self injury, eg. Order safe tray, snap gown, etc   [x] Maintain open, warm, caring, non-judgmental attitude/manner towards patient   [] Discuss advantages and disadvantages of existing coping methods/skills   [x] Assist and educate patient with identifying present strengths and coping skills   [x] Keep patient informed regarding plan of care and provide clear concise explanations. Provide the patient/family education information as well as telephone numbers and other information about crisis centers, hot lines, and counselors.     Discharge Planning:   [] Referral  [] Groups [] Health agencies  [] Other:          Lana Perez RN  12/15/17 8315
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
## 2017-12-16 VITALS
HEART RATE: 88 BPM | OXYGEN SATURATION: 99 % | DIASTOLIC BLOOD PRESSURE: 76 MMHG | HEIGHT: 64 IN | TEMPERATURE: 98.2 F | WEIGHT: 150 LBS | RESPIRATION RATE: 14 BRPM | SYSTOLIC BLOOD PRESSURE: 124 MMHG | BODY MASS INDEX: 25.61 KG/M2

## 2017-12-16 PROCEDURE — 6370000000 HC RX 637 (ALT 250 FOR IP): Performed by: EMERGENCY MEDICINE

## 2017-12-16 RX ORDER — OXYCODONE HYDROCHLORIDE AND ACETAMINOPHEN 5; 325 MG/1; MG/1
1 TABLET ORAL ONCE
Status: COMPLETED | OUTPATIENT
Start: 2017-12-16 | End: 2017-12-16

## 2017-12-16 RX ORDER — IBUPROFEN 800 MG/1
800 TABLET ORAL EVERY 8 HOURS PRN
Qty: 30 TABLET | Refills: 0 | Status: ON HOLD | OUTPATIENT
Start: 2017-12-16 | End: 2018-05-08

## 2017-12-16 RX ADMIN — OXYCODONE HYDROCHLORIDE AND ACETAMINOPHEN 1 TABLET: 5; 325 TABLET ORAL at 04:16

## 2017-12-16 ASSESSMENT — PAIN SCALES - GENERAL: PAINLEVEL_OUTOF10: 6

## 2017-12-16 NOTE — ED NOTES
Pt requesting something for head and neck pain, will notify resident.       Celeste Christine, STEFANI  12/16/17 [facility], RN  12/16/17 2686
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
## 2017-12-16 NOTE — DISCHARGE SUMMARY
tablet  Commonly known as:  CELEXA  Take 1 tablet by mouth daily  Notes to patient:  Improve mood     gabapentin 600 MG tablet  Commonly known as:  NEURONTIN  Take 1 tablet by mouth 3 times daily  Notes to patient:  Stabilize mood     nicotine 21 MG/24HR  Commonly known as:  NICODERM CQ  Place 1 patch onto the skin daily  Notes to patient:  Smoking cessation     pantoprazole 40 MG tablet  Commonly known as:  PROTONIX  Take 1 tablet by mouth every morning (before breakfast)  Notes to patient:  Reduce stomach acid     QUEtiapine 200 MG tablet  Commonly known as:  SEROQUEL  Take 1 tablet by mouth nightly  Notes to patient:  Clear thoughts     therapeutic multivitamin-minerals tablet  Take 1 tablet by mouth daily  Notes to patient:  Supplement     traZODone 100 MG tablet  Commonly known as:  DESYREL  Take 1 tablet by mouth nightly  Notes to patient:  Sleep     venlafaxine 150 MG extended release capsule  Commonly known as:  EFFEXOR XR  Take 1 capsule by mouth daily (with breakfast)  Notes to patient:  Improve mood           Where to Get Your Medications      You can get these medications from any pharmacy    Bring a paper prescription for each of these medications  · lithium 300 MG extended release tablet             Follow Up: Follow up with [facility]/PCP/PSYCHIATRIST within 2 weeks. Emily Lyles MD  [address] [facility] [address]  [phone]          [facility]. [address] [place] [address], [address] [place] [address]  Go on 12/11/2017  Joi Tolbert, you have an appointment on Monday, Dec. 11th at 9:30am with Angela Gilbert MD  Psychiatrist

## 2017-12-16 NOTE — ED PROVIDER NOTES
RECOMMENDATIONS:   Fleischner Society guidelines for follow-up and management of incidentally   detected pulmonary nodules:      Single Solid Nodule:      Nodule size less than 6 mm   In a low-risk patient, no routine follow-up. In a high-risk patient, optional CT at 12 months. Nodule size equals 6-8 mm   In a low-risk patient, CT at 6-12 months, then consider CT at 18-24 months. In a high-risk patient, CT at 6-12 months, then CT at 18-24 months. Nodule size greater than 8 mm   In a low-risk patient, consider CT, PET/CT, or tissue sampling at 3 months. In a high-risk patient, consider CT, PET/CT, or tissue sampling at 3 months. Multiple Solid Nodules:      Nodule size less than 6 mm   In a low-risk patient, no routine follow-up. In a high-risk patient, optional CT at 12 months. Nodule size equals 6-8 mm   In a low-risk patient, CT at 3-6 months, then consider CT at 18-24 months. In a high-risk patient, CT at 3-6 months, then CT at 18-24 months. Nodule size greater than 8 mm   In a low-risk patient, CT at 3-6 months, then consider CT at 18-24 months. In a high-risk patient, CT at 3-6 months, then CT at 18-24 months. - Low risk patients include individuals with minimal or absent history of   smoking and other known risk factors. - High risk patients include individuals with a history or smoking or known   risk factors. Radiology 2017 http://pubs. rsna.org/doi/full/10.1148/radiol. 3508295555         CT HEAD WO CONTRAST   Final Result   1. Stable mild sulcal atrophy of the cerebrum near the vertex. 2. No clear evidence for acute intracranial hemorrhage or midline shift.                LABS:  Labs Reviewed   CBC WITH AUTO DIFFERENTIAL - Abnormal; Notable for the following:        Result Value    MCV 81.0 (*)     MCH 25.1 (*)     RDW 16.1 (*)     Lymphocytes 49 (*)     Absolute Lymph # 4.16 (*)     All other components within normal limits   TOX SCR, BLD, ED -

## 2017-12-16 NOTE — ED NOTES
Pt laying on cot, NAD noted. Guard at bedside.  Pt given drink per request.      Sandrine Melendez, RN  12/16/17 2890
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
## 2017-12-16 NOTE — PROGRESS NOTES
Patient also stated she did not wish to go to the [facility] as she met her girlfriend there and Halma Nip will find me there\". She states she prefers to go to [employer] or [employer]. She was told this is up to the on-call doctor. Vikas Blair.  Eddi Kern

## 2018-02-11 ENCOUNTER — APPOINTMENT (OUTPATIENT)
Dept: CT IMAGING | Age: 43
End: 2018-02-11
Payer: MEDICARE

## 2018-02-11 ENCOUNTER — APPOINTMENT (OUTPATIENT)
Dept: GENERAL RADIOLOGY | Age: 43
End: 2018-02-11
Payer: MEDICARE

## 2018-02-11 ENCOUNTER — HOSPITAL ENCOUNTER (OUTPATIENT)
Age: 43
Setting detail: OBSERVATION
Discharge: HOME | End: 2018-02-12
Attending: EMERGENCY MEDICINE | Admitting: EMERGENCY MEDICINE
Payer: MEDICARE

## 2018-02-11 DIAGNOSIS — R41.0 CONFUSION: ICD-10-CM

## 2018-02-11 DIAGNOSIS — R53.83 FATIGUE, UNSPECIFIED TYPE: ICD-10-CM

## 2018-02-11 DIAGNOSIS — E86.0 DEHYDRATION: Primary | ICD-10-CM

## 2018-02-11 LAB
-: NORMAL
ABSOLUTE EOS #: 0.21 K/UL (ref 0–0.44)
ABSOLUTE IMMATURE GRANULOCYTE: 0.03 K/UL (ref 0–0.3)
ABSOLUTE LYMPH #: 1.7 K/UL (ref 1.1–3.7)
ABSOLUTE MONO #: 0.69 K/UL (ref 0.1–1.2)
ACETAMINOPHEN LEVEL: <10 UG/ML (ref 10–30)
AMORPHOUS: NORMAL
AMPHETAMINE SCREEN URINE: NEGATIVE
ANION GAP SERPL CALCULATED.3IONS-SCNC: 11 MMOL/L (ref 9–17)
BACTERIA: NORMAL
BARBITURATE SCREEN URINE: NEGATIVE
BASOPHILS # BLD: 1 % (ref 0–2)
BASOPHILS ABSOLUTE: 0.05 K/UL (ref 0–0.2)
BENZODIAZEPINE SCREEN, URINE: NEGATIVE
BILIRUBIN URINE: NEGATIVE
BUN BLDV-MCNC: 5 MG/DL (ref 6–20)
BUN/CREAT BLD: ABNORMAL (ref 9–20)
BUPRENORPHINE URINE: ABNORMAL
CALCIUM SERPL-MCNC: 8 MG/DL (ref 8.6–10.4)
CANNABINOID SCREEN URINE: NEGATIVE
CASTS UA: NORMAL /LPF (ref 0–8)
CHLORIDE BLD-SCNC: 106 MMOL/L (ref 98–107)
CO2: 25 MMOL/L (ref 20–31)
COCAINE METABOLITE, URINE: POSITIVE
COLOR: YELLOW
COMMENT UA: ABNORMAL
CREAT SERPL-MCNC: 0.73 MG/DL (ref 0.5–0.9)
CRYSTALS, UA: NORMAL /HPF
DIFFERENTIAL TYPE: ABNORMAL
EKG ATRIAL RATE: 80 BPM
EKG P AXIS: 58 DEGREES
EKG P-R INTERVAL: 140 MS
EKG Q-T INTERVAL: 386 MS
EKG QRS DURATION: 82 MS
EKG QTC CALCULATION (BAZETT): 445 MS
EKG R AXIS: 17 DEGREES
EKG T AXIS: 25 DEGREES
EKG VENTRICULAR RATE: 80 BPM
EOSINOPHILS RELATIVE PERCENT: 3 % (ref 1–4)
EPITHELIAL CELLS UA: NORMAL /HPF (ref 0–5)
ETHANOL PERCENT: <0.01 %
ETHANOL: <10 MG/DL
GFR AFRICAN AMERICAN: >60 ML/MIN
GFR NON-AFRICAN AMERICAN: >60 ML/MIN
GFR SERPL CREATININE-BSD FRML MDRD: ABNORMAL ML/MIN/{1.73_M2}
GFR SERPL CREATININE-BSD FRML MDRD: ABNORMAL ML/MIN/{1.73_M2}
GLUCOSE BLD-MCNC: 85 MG/DL (ref 70–99)
GLUCOSE URINE: NEGATIVE
HCT VFR BLD CALC: 43.5 % (ref 36.3–47.1)
HEMOGLOBIN: 12.7 G/DL (ref 11.9–15.1)
IMMATURE GRANULOCYTES: 0 %
KETONES, URINE: NEGATIVE
LACTIC ACID, WHOLE BLOOD: 1.9 MMOL/L (ref 0.7–2.1)
LEUKOCYTE ESTERASE, URINE: ABNORMAL
LITHIUM DATE LAST DOSE: NORMAL
LITHIUM DOSE AMOUNT: NORMAL
LITHIUM DOSE TIME: NORMAL
LITHIUM LEVEL: 0.9 MMOL/L (ref 0.6–1.2)
LYMPHOCYTES # BLD: 20 % (ref 24–43)
MCH RBC QN AUTO: 24.6 PG (ref 25.2–33.5)
MCHC RBC AUTO-ENTMCNC: 29.2 G/DL (ref 28.4–34.8)
MCV RBC AUTO: 84.1 FL (ref 82.6–102.9)
MDMA URINE: ABNORMAL
METHADONE SCREEN, URINE: NEGATIVE
METHAMPHETAMINE, URINE: ABNORMAL
MONOCYTES # BLD: 8 % (ref 3–12)
MUCUS: NORMAL
NITRITE, URINE: NEGATIVE
NRBC AUTOMATED: 0 PER 100 WBC
OPIATES, URINE: NEGATIVE
OTHER OBSERVATIONS UA: NORMAL
OXYCODONE SCREEN URINE: NEGATIVE
PDW BLD-RTO: 18.9 % (ref 11.8–14.4)
PH UA: 7 (ref 5–8)
PHENCYCLIDINE, URINE: NEGATIVE
PLATELET # BLD: 332 K/UL (ref 138–453)
PLATELET ESTIMATE: ABNORMAL
PMV BLD AUTO: 9.6 FL (ref 8.1–13.5)
POC TROPONIN I: 0 NG/ML (ref 0–0.1)
POC TROPONIN I: 0 NG/ML (ref 0–0.1)
POC TROPONIN INTERP: NORMAL
POC TROPONIN INTERP: NORMAL
POTASSIUM SERPL-SCNC: 3.2 MMOL/L (ref 3.7–5.3)
PROPOXYPHENE, URINE: ABNORMAL
PROTEIN UA: NEGATIVE
RBC # BLD: 5.17 M/UL (ref 3.95–5.11)
RBC # BLD: ABNORMAL 10*6/UL
RBC UA: NORMAL /HPF (ref 0–4)
RENAL EPITHELIAL, UA: NORMAL /HPF
SALICYLATE LEVEL: <1 MG/DL (ref 3–10)
SEG NEUTROPHILS: 68 % (ref 36–65)
SEGMENTED NEUTROPHILS ABSOLUTE COUNT: 5.82 K/UL (ref 1.5–8.1)
SODIUM BLD-SCNC: 142 MMOL/L (ref 135–144)
SPECIFIC GRAVITY UA: 1 (ref 1–1.03)
TEST INFORMATION: ABNORMAL
TOXIC TRICYCLIC SC,BLOOD: NEGATIVE
TRICHOMONAS: NORMAL
TRICYCLIC ANTIDEPRESSANTS, UR: ABNORMAL
TURBIDITY: CLEAR
URINE HGB: ABNORMAL
UROBILINOGEN, URINE: NORMAL
WBC # BLD: 8.5 K/UL (ref 3.5–11.3)
WBC # BLD: ABNORMAL 10*3/UL
WBC UA: NORMAL /HPF (ref 0–5)
YEAST: NORMAL

## 2018-02-11 PROCEDURE — 87086 URINE CULTURE/COLONY COUNT: CPT

## 2018-02-11 PROCEDURE — 84484 ASSAY OF TROPONIN QUANT: CPT

## 2018-02-11 PROCEDURE — 96374 THER/PROPH/DIAG INJ IV PUSH: CPT

## 2018-02-11 PROCEDURE — 83605 ASSAY OF LACTIC ACID: CPT

## 2018-02-11 PROCEDURE — 80048 BASIC METABOLIC PNL TOTAL CA: CPT

## 2018-02-11 PROCEDURE — 71046 X-RAY EXAM CHEST 2 VIEWS: CPT

## 2018-02-11 PROCEDURE — 80178 ASSAY OF LITHIUM: CPT

## 2018-02-11 PROCEDURE — 96372 THER/PROPH/DIAG INJ SC/IM: CPT

## 2018-02-11 PROCEDURE — 85025 COMPLETE CBC W/AUTO DIFF WBC: CPT

## 2018-02-11 PROCEDURE — 2580000003 HC RX 258: Performed by: EMERGENCY MEDICINE

## 2018-02-11 PROCEDURE — 6370000000 HC RX 637 (ALT 250 FOR IP): Performed by: EMERGENCY MEDICINE

## 2018-02-11 PROCEDURE — 96376 TX/PRO/DX INJ SAME DRUG ADON: CPT

## 2018-02-11 PROCEDURE — G0378 HOSPITAL OBSERVATION PER HR: HCPCS

## 2018-02-11 PROCEDURE — 6360000002 HC RX W HCPCS: Performed by: EMERGENCY MEDICINE

## 2018-02-11 PROCEDURE — 80307 DRUG TEST PRSMV CHEM ANLYZR: CPT

## 2018-02-11 PROCEDURE — G0480 DRUG TEST DEF 1-7 CLASSES: HCPCS

## 2018-02-11 PROCEDURE — 96361 HYDRATE IV INFUSION ADD-ON: CPT

## 2018-02-11 PROCEDURE — 93005 ELECTROCARDIOGRAM TRACING: CPT

## 2018-02-11 PROCEDURE — 81001 URINALYSIS AUTO W/SCOPE: CPT

## 2018-02-11 PROCEDURE — 96375 TX/PRO/DX INJ NEW DRUG ADDON: CPT

## 2018-02-11 PROCEDURE — 99285 EMERGENCY DEPT VISIT HI MDM: CPT

## 2018-02-11 PROCEDURE — 70450 CT HEAD/BRAIN W/O DYE: CPT

## 2018-02-11 RX ORDER — 0.9 % SODIUM CHLORIDE 0.9 %
1000 INTRAVENOUS SOLUTION INTRAVENOUS ONCE
Status: COMPLETED | OUTPATIENT
Start: 2018-02-11 | End: 2018-02-11

## 2018-02-11 RX ORDER — NALOXONE HYDROCHLORIDE 0.4 MG/ML
INJECTION, SOLUTION INTRAMUSCULAR; INTRAVENOUS; SUBCUTANEOUS
Status: DISCONTINUED
Start: 2018-02-11 | End: 2018-02-11

## 2018-02-11 RX ORDER — NALOXONE HYDROCHLORIDE 0.4 MG/ML
0.4 INJECTION, SOLUTION INTRAMUSCULAR; INTRAVENOUS; SUBCUTANEOUS ONCE
Status: COMPLETED | OUTPATIENT
Start: 2018-02-11 | End: 2018-02-11

## 2018-02-11 RX ORDER — NICOTINE 21 MG/24HR
1 PATCH, TRANSDERMAL 24 HOURS TRANSDERMAL DAILY
Status: DISCONTINUED | OUTPATIENT
Start: 2018-02-11 | End: 2018-02-12 | Stop reason: HOSPADM

## 2018-02-11 RX ORDER — QUETIAPINE FUMARATE 200 MG/1
200 TABLET, FILM COATED ORAL NIGHTLY
Status: DISCONTINUED | OUTPATIENT
Start: 2018-02-11 | End: 2018-02-12 | Stop reason: HOSPADM

## 2018-02-11 RX ORDER — ACETAMINOPHEN 325 MG/1
650 TABLET ORAL EVERY 4 HOURS PRN
Status: DISCONTINUED | OUTPATIENT
Start: 2018-02-11 | End: 2018-02-12 | Stop reason: HOSPADM

## 2018-02-11 RX ORDER — PANTOPRAZOLE SODIUM 40 MG/1
40 TABLET, DELAYED RELEASE ORAL
Status: DISCONTINUED | OUTPATIENT
Start: 2018-02-12 | End: 2018-02-12 | Stop reason: HOSPADM

## 2018-02-11 RX ORDER — SODIUM CHLORIDE 0.9 % (FLUSH) 0.9 %
10 SYRINGE (ML) INJECTION EVERY 12 HOURS SCHEDULED
Status: DISCONTINUED | OUTPATIENT
Start: 2018-02-11 | End: 2018-02-12 | Stop reason: HOSPADM

## 2018-02-11 RX ORDER — SODIUM CHLORIDE 9 MG/ML
INJECTION, SOLUTION INTRAVENOUS CONTINUOUS
Status: DISCONTINUED | OUTPATIENT
Start: 2018-02-11 | End: 2018-02-12 | Stop reason: HOSPADM

## 2018-02-11 RX ORDER — POTASSIUM CHLORIDE 20 MEQ/1
40 TABLET, EXTENDED RELEASE ORAL ONCE
Status: DISCONTINUED | OUTPATIENT
Start: 2018-02-11 | End: 2018-02-12 | Stop reason: HOSPADM

## 2018-02-11 RX ORDER — NALOXONE HYDROCHLORIDE 0.4 MG/ML
0.4 INJECTION, SOLUTION INTRAMUSCULAR; INTRAVENOUS; SUBCUTANEOUS ONCE
Status: DISCONTINUED | OUTPATIENT
Start: 2018-02-11 | End: 2018-02-12 | Stop reason: HOSPADM

## 2018-02-11 RX ORDER — NALOXONE HYDROCHLORIDE 0.4 MG/ML
0.4 INJECTION, SOLUTION INTRAMUSCULAR; INTRAVENOUS; SUBCUTANEOUS ONCE
Status: DISCONTINUED | OUTPATIENT
Start: 2018-02-11 | End: 2018-02-11

## 2018-02-11 RX ORDER — SODIUM CHLORIDE 0.9 % (FLUSH) 0.9 %
10 SYRINGE (ML) INJECTION PRN
Status: DISCONTINUED | OUTPATIENT
Start: 2018-02-11 | End: 2018-02-12 | Stop reason: HOSPADM

## 2018-02-11 RX ORDER — CITALOPRAM 20 MG/1
20 TABLET ORAL DAILY
Status: DISCONTINUED | OUTPATIENT
Start: 2018-02-11 | End: 2018-02-12 | Stop reason: HOSPADM

## 2018-02-11 RX ORDER — ONDANSETRON 2 MG/ML
4 INJECTION INTRAMUSCULAR; INTRAVENOUS EVERY 8 HOURS PRN
Status: DISCONTINUED | OUTPATIENT
Start: 2018-02-11 | End: 2018-02-12 | Stop reason: HOSPADM

## 2018-02-11 RX ADMIN — SODIUM CHLORIDE 1000 ML: 9 INJECTION, SOLUTION INTRAVENOUS at 16:00

## 2018-02-11 RX ADMIN — ONDANSETRON 4 MG: 2 INJECTION INTRAMUSCULAR; INTRAVENOUS at 20:59

## 2018-02-11 RX ADMIN — SODIUM CHLORIDE: 9 INJECTION, SOLUTION INTRAVENOUS at 20:59

## 2018-02-11 RX ADMIN — QUETIAPINE FUMARATE 200 MG: 200 TABLET ORAL at 23:13

## 2018-02-11 RX ADMIN — CITALOPRAM 20 MG: 20 TABLET, FILM COATED ORAL at 23:12

## 2018-02-11 RX ADMIN — SODIUM CHLORIDE 1000 ML: 9 INJECTION, SOLUTION INTRAVENOUS at 14:40

## 2018-02-11 RX ADMIN — ACETAMINOPHEN 650 MG: 325 TABLET ORAL at 23:12

## 2018-02-11 RX ADMIN — ENOXAPARIN SODIUM 40 MG: 40 INJECTION SUBCUTANEOUS at 23:13

## 2018-02-11 RX ADMIN — NALOXONE HYDROCHLORIDE 0.4 MG: 0.4 INJECTION, SOLUTION INTRAMUSCULAR; INTRAVENOUS; SUBCUTANEOUS at 15:28

## 2018-02-11 RX ADMIN — Medication 10 ML: at 23:14

## 2018-02-11 RX ADMIN — NALOXONE HYDROCHLORIDE 0.4 MG: 0.4 INJECTION, SOLUTION INTRAMUSCULAR; INTRAVENOUS; SUBCUTANEOUS at 16:00

## 2018-02-11 ASSESSMENT — ENCOUNTER SYMPTOMS
NAUSEA: 0
EYE PAIN: 0
COUGH: 0
VOMITING: 0
COLOR CHANGE: 0
ABDOMINAL PAIN: 0

## 2018-02-11 ASSESSMENT — PAIN SCALES - GENERAL
PAINLEVEL_OUTOF10: 0
PAINLEVEL_OUTOF10: 6

## 2018-02-11 NOTE — ED NOTES
Pt presented to ed via ems c/o n/v/d and a syncopal episode today. Pt is A&Ox4 with even non labored breaths on arrival. Pt answers questions very slowly but answers appropriately. Pt states the diarrhea has been ongoing for a week. Pt states she ate breakfast today and kept it down. Pt was acting fine per significant other, went to next door neighbor and then became drowsy and not acting herself. Pt states she smoked crack yesterday but denies any drug use today. Pt placed on full monitor, ekg done, iv established and pt placed in gown.       Dm Cagle RN  02/11/18 0458
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
## 2018-02-11 NOTE — ED NOTES
Pt resting in bed with eyes closed with even non labored breaths. Pt shows no signs of distress. Will continue to monitor pt.         Perez Madden RN  02/11/18 8439

## 2018-02-11 NOTE — ED PROVIDER NOTES
Cholecystectomy; and Carpal tunnel release. CURRENT MEDICATIONS       Current Discharge Medication List      CONTINUE these medications which have NOT CHANGED    Details   ibuprofen (ADVIL;MOTRIN) 800 MG tablet Take 1 tablet by mouth every 8 hours as needed for Pain  Qty: 30 tablet, Refills: 0      lithium (LITHOBID) 300 MG extended release tablet Take 1 tablet by mouth every 12 hours for 14 days  Qty: 28 tablet, Refills: 0      gabapentin (NEURONTIN) 600 MG tablet Take 1 tablet by mouth 3 times daily  Qty: 90 tablet, Refills: 0      citalopram (CELEXA) 20 MG tablet Take 1 tablet by mouth daily  Qty: 30 tablet, Refills: 0      traZODone (DESYREL) 100 MG tablet Take 1 tablet by mouth nightly  Qty: 30 tablet, Refills: 0      venlafaxine (EFFEXOR XR) 150 MG extended release capsule Take 1 capsule by mouth daily (with breakfast)  Qty: 30 capsule, Refills: 0      QUEtiapine (SEROQUEL) 200 MG tablet Take 1 tablet by mouth nightly  Qty: 30 tablet, Refills: 0      nicotine (NICODERM CQ) 21 MG/24HR Place 1 patch onto the skin daily  Qty: 30 patch, Refills: 0      pantoprazole (PROTONIX) 40 MG tablet Take 1 tablet by mouth every morning (before breakfast)  Qty: 30 tablet, Refills: 0      Multiple Vitamins-Minerals (THERAPEUTIC MULTIVITAMIN-MINERALS) tablet Take 1 tablet by mouth daily  Qty: 30 tablet, Refills: 0             ALLERGIES     is allergic to morphine and zithromax [azithromycin]. FAMILY HISTORY     indicated that her mother is . She indicated that her father is . family history includes Brain Cancer in her mother; Cancer in her mother; Heart Disease in her father. SOCIAL HISTORY      reports that she has been smoking Cigarettes. She has a 45.00 pack-year smoking history. She has never used smokeless tobacco. She reports that she drinks alcohol. She reports that she uses drugs, including Cocaine, Marijuana, and IV, about 7 times per week.     PHYSICAL EXAM     INITIAL VITALS: height is 5' (1.524 m) and weight is 143 lb 9.6 oz (65.1 kg). Her oral temperature is 97.9 °F (36.6 °C). Her blood pressure is 115/78 and her pulse is 57. Her respiration is 15 and oxygen saturation is 100%. Physical Exam   Constitutional: She is oriented to person, place, and time. She appears well-developed and well-nourished. No distress. Sluggish appearing but answers questions appropriately   HENT:   Head: Normocephalic and atraumatic. Dry mucous membranes   Eyes: Conjunctivae and EOM are normal. Pupils are equal, round, and reactive to light. Right eye exhibits no discharge. Left eye exhibits no discharge. Neck: Normal range of motion. Neck supple. No tracheal deviation present. Cardiovascular: Normal rate, regular rhythm and normal heart sounds. No murmur heard. Pulmonary/Chest: Effort normal and breath sounds normal. No respiratory distress. She has no wheezes. She has no rales. Abdominal: Soft. Bowel sounds are normal. She exhibits no distension. There is no tenderness. Musculoskeletal: Normal range of motion. She exhibits no tenderness. Neurological: She is alert and oriented to person, place, and time. No cranial nerve deficit. Skin: Skin is warm and dry. No rash noted. She is not diaphoretic. Psychiatric: She has a normal mood and affect. Nursing note and vitals reviewed. DIFFERENTIAL DIAGNOSIS/MDM:   Dehydration, polysubstance abuse, OD, acs, pe, vasovagal    Patient sluggish but neurologically intact with no focal deficits. No head CT to be obtained secondary to benign physical exam findings. Cardiac workup with EKG, chest x-ray, and lab work to be obtained. We'll give IV fluids and consider Narcan. Pre-hypertension/Hypertension: If the patient was noted to have hypertension then the patient has been informed that they may have pre-hypertension or Hypertension based on a blood pressure reading in the emergency department.  I recommend that the patient call the primary care provider listed on their discharge instructions or a physician of their choice this week to arrange follow up for further evaluation of possible pre-hypertension or Hypertension. DIAGNOSTIC RESULTS     EKG: All EKG's are interpreted by the Emergency Department Physician who either signs or Co-signs this chart in the absence of a cardiologist.    EKG Interpretation    Interpreted by emergency department physician    Rhythm: normal sinus   Rate: normal  Axis: normal  Ectopy: none  Conduction: normal  ST Segments: normal  T Waves: normal  Q Waves: none    Clinical Impression: non-specific EKG    JENNIFER VERONICA      RADIOLOGY:   I directly visualized the following  images and reviewed the radiologist interpretations:  CT Head WO Contrast   Final Result   No acute intracranial abnormality.          XR CHEST STANDARD (2 VW)   Final Result   No acute findings                 ED BEDSIDE ULTRASOUND:       LABS:  Labs Reviewed   BASIC METABOLIC PANEL - Abnormal; Notable for the following:        Result Value    BUN 5 (*)     Calcium 8.0 (*)     Potassium 3.2 (*)     All other components within normal limits   CBC WITH AUTO DIFFERENTIAL - Abnormal; Notable for the following:     RBC 5.17 (*)     MCH 24.6 (*)     RDW 18.9 (*)     Seg Neutrophils 68 (*)     Lymphocytes 20 (*)     All other components within normal limits   UA W/REFLEX CULTURE - Abnormal; Notable for the following:     Specific Gravity, UA 1.003 (*)     Urine Hgb TRACE (*)     Leukocyte Esterase, Urine MODERATE (*)     All other components within normal limits   URINE DRUG SCREEN - Abnormal; Notable for the following:     Cocaine Metabolite, Urine POSITIVE (*)     All other components within normal limits   TOX SCR, BLD, ED - Abnormal; Notable for the following:     Salicylate Lvl <1 (*)     Acetaminophen Level <10 (*)     All other components within normal limits   URINE CULTURE   LACTIC ACID, WHOLE BLOOD   LITHIUM LEVEL   MICROSCOPIC URINALYSIS POCT TROPONIN   POCT TROPONIN   POCT TROPONIN   POCT TROPONIN             EMERGENCY DEPARTMENT COURSE:   Vitals:    Vitals:    02/11/18 1802 02/11/18 1809 02/11/18 1832 02/11/18 1926   BP: (!) 83/58 123/75 109/72 115/78   Pulse: 85 61 69 57   Resp: 15 17 16 15   Temp:    97.9 °F (36.6 °C)   TempSrc:    Oral   SpO2: 99% 99% 99% 100%   Weight:    143 lb 9.6 oz (65.1 kg)   Height:    5' (1.524 m)         CRITICAL CARE:      CONSULTS:  None      PROCEDURES:  Procedures    ED Course      Patient developed brief hypotensive episode which responded to a liter of IV fluids. Remainder of hemodynamics unremarkable. Lab work and imaging including CT of the head and chest x-ray is also unremarkable. I did speak patient's brother stated that she does get like this at times after a three-day binge of drug usage and states he has seen her like this in the past.  He has not had one reliable home to take care of for inpatient is not currently mentally appropriate to be able to ambulate or care for herself. So the symptoms will resolve on their own after IV hydration as well as rest.  Patient will be admitted observation for further management and IV fluids. I anticipate patient to be discharged tomorrow. FINAL IMPRESSION      1. Dehydration    2. Fatigue, unspecified type    3. Confusion            DISPOSITION/PLAN   DISPOSITION Admitted 02/11/2018 06:17:24 PM          PATIENT REFERRED TO:  No follow-up provider specified.     DISCHARGE MEDICATIONS:  Current Discharge Medication List          (Please note that portions of this note were completed with a voice recognition program.  Efforts were made to edit the dictations but occasionally words are mis-transcribed.)    Tana Lopez  Emergency Medicine Resident              Mariah Libman, MD  02/11/18 1256

## 2018-02-11 NOTE — ED NOTES
Bed: 37  Expected date:   Expected time:   Means of arrival:   Comments:  [facility] [address], [facility]  02/11/18 6078

## 2018-02-12 VITALS
WEIGHT: 143.6 LBS | HEART RATE: 58 BPM | BODY MASS INDEX: 28.19 KG/M2 | HEIGHT: 60 IN | RESPIRATION RATE: 18 BRPM | SYSTOLIC BLOOD PRESSURE: 119 MMHG | DIASTOLIC BLOOD PRESSURE: 82 MMHG | TEMPERATURE: 97.5 F | OXYGEN SATURATION: 100 %

## 2018-02-12 LAB
CULTURE: NORMAL
CULTURE: NORMAL
Lab: NORMAL
SPECIMEN DESCRIPTION: NORMAL
STATUS: NORMAL

## 2018-02-12 PROCEDURE — G0378 HOSPITAL OBSERVATION PER HR: HCPCS

## 2018-02-12 PROCEDURE — 6370000000 HC RX 637 (ALT 250 FOR IP): Performed by: EMERGENCY MEDICINE

## 2018-02-12 PROCEDURE — 96372 THER/PROPH/DIAG INJ SC/IM: CPT

## 2018-02-12 PROCEDURE — 96375 TX/PRO/DX INJ NEW DRUG ADDON: CPT

## 2018-02-12 RX ORDER — CALCIUM CARBONATE 200(500)MG
500 TABLET,CHEWABLE ORAL 3 TIMES DAILY PRN
Status: DISCONTINUED | OUTPATIENT
Start: 2018-02-12 | End: 2018-02-12 | Stop reason: HOSPADM

## 2018-02-12 RX ADMIN — PANTOPRAZOLE SODIUM 40 MG: 40 TABLET, DELAYED RELEASE ORAL at 09:03

## 2018-02-12 ASSESSMENT — PAIN SCALES - GENERAL: PAINLEVEL_OUTOF10: 2

## 2018-02-12 NOTE — H&P
focal weakness, no loss of sensation  Dermatological ROS - No lesions, No rash     (PQRS) Advance directives on face sheet per hospital policy. No change unless specifically mentioned in chart    [address]    has a past medical history of Alcoholism ([facility] [address] Anxiety; Asthma; Depression; Polysubstance abuse; Suicide attempt; and Suicide ideation. I have reviewed the past medical history with the patient and it is pertinent to this complaint. SURGICAL HISTORY      has a past surgical history that includes Gastric bypass surgery; Hysterectomy; Cholecystectomy; and Carpal tunnel release. I have reviewed and agree with Surgical History entered    CURRENT MEDICATIONS       No current facility-administered medications for this encounter. All medication charted and reviewed. ALLERGIES     is allergic to morphine and zithromax [azithromycin]. FAMILY HISTORY     indicated that her mother is . She indicated that her father is . family history includes Brain Cancer in her mother; Cancer in her mother; Heart Disease in her father. The patient denies any pertinent family history. I have reviewed and agree with the family history entered. I have reviewed the Family History and it is not significant to the case    SOCIAL HISTORY      reports that she has been smoking Cigarettes. She has a 45.00 pack-year smoking history. She has never used smokeless tobacco. She reports that she drinks alcohol. She reports that she uses drugs, including Cocaine, Marijuana, and IV, about 7 times per week. I have reviewed and agree with all Social.  There are concerns for cocaine/heroin abuse. PHYSICAL EXAM     INITIAL VITALS:  height is 5' (1.524 m) and weight is 143 lb 9.6 oz (65.1 kg). Her temperature is 97.5 °F (36.4 °C). Her blood pressure is 119/82 and her pulse is 58. Her respiration is 18 and oxygen saturation is 100%.       CONSTITUTIONAL: AOx4, no apparent distress, appears stated age    HEAD: normocephalic, atraumatic   EYES: PERRLA, EOMI    ENT: moist mucous membranes, uvula midline   NECK: supple, symmetric   BACK: symmetric   LUNGS: clear to auscultation bilaterally   CARDIOVASCULAR: regular rate and rhythm, no murmurs, rubs or gallops   ABDOMEN: soft, non-tender, non-distended with normal active bowel sounds   NEUROLOGIC:  MAEx4, no focal sensory or motor deficits   MUSCULOSKELETAL: no clubbing, cyanosis or edema   SKIN: no rash or wounds       DIFFERENTIAL DIAGNOSIS/MDM:     Cardiac arrhythmia/ valvular, low glucose, anemia, SAH, dissection, PE, AAA rupture, ectopic pregnancy rupture, seizure, vasovagal, orthostatic    Evaluate for: heart disease, chest pain/ SOB/ palpitations, prodrome, dark stool/ bleed, pain, low BP, pallor, focal neurologic deficit, head injury, no abdominal pulsatile mass, guaiac stool        DIAGNOSTIC RESULTS     EKG: All EKG's are interpreted by the Observation Physician who either signs or Co-signs this chart in the absence of a cardiologist.    EKG Interpretation    Interpreted by observation physician    Rhythm: normal sinus   Rate: normal  Axis: normal  Ectopy: none  Conduction: normal  ST Segments: normal  T Waves: normal  Q Waves: none     Clinical Impression: non-specific EKG       Tavia Tavarez MD        RADIOLOGY:   I directly visualized the following  images and reviewed the radiologist interpretations:    Xr Chest Standard (2 Vw)    Result Date: 2/11/2018  EXAMINATION: TWO VIEWS OF THE CHEST 2/11/2018 2:45 pm COMPARISON: April 28, 2017 HISTORY: ORDERING SYSTEM PROVIDED HISTORY: chest pain TECHNOLOGIST PROVIDED HISTORY: Reason for exam:->chest pain FINDINGS: Cardiac silhouette is normal in size. Lungs are clear. No acute bony abnormality.      No acute findings     Ct Head Wo Contrast    Result Date: 2/11/2018  EXAMINATION: CT OF THE HEAD WITHOUT CONTRAST  2/11/2018 5:29 pm TECHNIQUE: CT of the head was performed without the administration of intravenous contrast. Dose modulation, iterative reconstruction, and/or weight based adjustment of the mA/kV was utilized to reduce the radiation dose to as low as reasonably achievable. COMPARISON: CT head December 15, 2017 HISTORY: ORDERING SYSTEM PROVIDED HISTORY: AMS TECHNOLOGIST PROVIDED HISTORY: Has a \"code stroke\" or \"stroke alert\" been called? ->No FINDINGS: BRAIN/VENTRICLES: There is minimal parenchymal volume loss. There is no acute intracranial hemorrhage, mass effect or midline shift. No abnormal extra-axial fluid collection. The gray-white differentiation is maintained without evidence of an acute infarct. There is no evidence of hydrocephalus. ORBITS: The visualized portion of the orbits demonstrate no acute abnormality. SINUSES: The visualized paranasal sinuses and mastoid air cells demonstrate no acute abnormality. SOFT TISSUES/SKULL:  No acute abnormality of the visualized skull or soft tissues. No acute intracranial abnormality. LABS:  I have reviewed and interpreted all available lab results.   Labs Reviewed   BASIC METABOLIC PANEL - Abnormal; Notable for the following:        Result Value    BUN 5 (*)     Calcium 8.0 (*)     Potassium 3.2 (*)     All other components within normal limits   CBC WITH AUTO DIFFERENTIAL - Abnormal; Notable for the following:     RBC 5.17 (*)     MCH 24.6 (*)     RDW 18.9 (*)     Seg Neutrophils 68 (*)     Lymphocytes 20 (*)     All other components within normal limits   UA W/REFLEX CULTURE - Abnormal; Notable for the following:     Specific Gravity, UA 1.003 (*)     Urine Hgb TRACE (*)     Leukocyte Esterase, Urine MODERATE (*)     All other components within normal limits   URINE DRUG SCREEN - Abnormal; Notable for the following:     Cocaine Metabolite, Urine POSITIVE (*)     All other components within normal limits   TOX SCR, BLD, ED - Abnormal; Notable for the following:     Salicylate Lvl <1 (*)     Acetaminophen Level <10 (*)     All other components within normal limits   URINE CULTURE   LACTIC ACID, WHOLE BLOOD   LITHIUM LEVEL   MICROSCOPIC URINALYSIS   POCT TROPONIN   POCT TROPONIN   POCT TROPONIN   POCT TROPONIN       CDU IMPRESSION       Gianfranco Leal is a 38 y.o. female who presents with    1. Acute syncopal episode most likely etiology vasovagal syncope versus drug ingestion most likely cocaine   2. Acute on chronic substance abuse with cocaine        CDU PLAN     · Acute syncopal episode: patient now feeling better  · Acute on chronic substance abuse with cocaine: patient counseled on addiction  · Discharge today  IP CONSULT TO SOCIAL WORK  · Further workup and evaluation   · Follow up recommendations     · Continue home meds, pain control   · Monitor vitals, labs, imaging         CONSULTS:    IP CONSULT TO SOCIAL WORK    PROCEDURES:  Not indicated       PATIENT REFERRED TO:    No follow-up provider specified. --  Otoniel White MD   Emergency Medicine Resident     This dictation was generated by voice recognition computer software. Although all attempts are made to edit the dictation for accuracy, there may be errors in the transcription that are not intended.

## 2018-02-12 NOTE — DISCHARGE SUMMARY
CDU Discharge Summary        Patient:  Dania Phoenix  YOB: 1975    MRN: [id number]   Acct: [de-identified]    Primary Care Physician: No primary care provider on file. Admit date:  2/11/2018 2/11/2018  2:07 PM  Discharge date:  2/12/2018 2/12/2018  1:30 PM     Discharge Diagnoses:   Acute on chronic substance abuse with cocaine, due to unknown etiology - Improved with time, will follow up outpatient with PCP     Acute syncopal episode most likely etiology vasovagal syncope versus drug ingestion most likely cocaine  - Improved with time, will follow up outpatient with PCP      Discharge Medications:       Kerry Oconnor   Home Medication Instructions Health system:[phone]    Printed on:02/12/18 6241   Medication Information                      citalopram (CELEXA) 20 MG tablet  Take 1 tablet by mouth daily             gabapentin (NEURONTIN) 600 MG tablet  Take 1 tablet by mouth 3 times daily             ibuprofen (ADVIL;MOTRIN) 800 MG tablet  Take 1 tablet by mouth every 8 hours as needed for Pain             lithium (LITHOBID) 300 MG extended release tablet  Take 1 tablet by mouth every 12 hours for 14 days             Multiple Vitamins-Minerals (THERAPEUTIC MULTIVITAMIN-MINERALS) tablet  Take 1 tablet by mouth daily             nicotine (NICODERM CQ) 21 MG/24HR  Place 1 patch onto the skin daily             pantoprazole (PROTONIX) 40 MG tablet  Take 1 tablet by mouth every morning (before breakfast)             QUEtiapine (SEROQUEL) 200 MG tablet  Take 1 tablet by mouth nightly             traZODone (DESYREL) 100 MG tablet  Take 1 tablet by mouth nightly             venlafaxine (EFFEXOR XR) 150 MG extended release capsule  Take 1 capsule by mouth daily (with breakfast)                 Diet:        Activity:  As tolerated    Follow-up:  Call today/tomorrow for a follow up appointment with No primary care provider on file.      Consultants: IP CONSULT TO SOCIAL WORK    Procedures:      Diagnostic Test:         Physical Exam:    General appearance - NAD, AOx 3  Lungs -CTA Bilateraly  Heart - Normal S1/S2  Abdomen - Soft NT/ND  Neurological:  No focal motor or sensory weakness. Extremities - Cap refil <2 sec in all ext. Skin -warm, dry      Hospital Course:   Vinicius Reagan originally presented to the hospital on 2/11/2018  2:07 PM with acute syncopal episode most likely etiology vasovagal syncope versus drug ingestion.     Patient presents with acute syncopal episode with estimated loss of consciousness approximately 30 seconds, she does admit to frequent crack cocaine use over the past week. No alleviating or exacerbating factors. Patient denies any recent illness. She states she did vomit once a few days ago but denies any nausea currently. Patient has history of alcoholism, anxiety, asthma, depression, substance abuse, gastric bypass surgery.       Patient workup in emergency department consisting of CT head which was negative as well as a drug screen which showed positive cocaine and was admitted to observation unit for continued monitoring and IV fluids. Patient felt better today and given drug addiction resources. Labs and imaging were followed daily. At time of discharge, Vinicius Reagan was tolerating a PO intake well, passing flatus, urinating adequately, ambulating and had adequate analgesia on oral pain medications. She is medically stable to be discharged. Clinical course has improved. I feel the patient can be safely discharged to home with outpatient follow up. Instructions have been given for the patient to return to the ED for any worsening of the symptoms, including but not limited to increased pain, shortness of breath, weakness, or any deterioration of their current condition. Disposition: Home    Condition: Good      Patient stable and ready for discharge home. I have discussed plan of care with patient and they are in understanding.  They were instructed to read

## 2018-02-12 NOTE — CARE COORDINATION
Social work consult: drug abuse  YARITZA met with alert and cooperative pt. Pt states that she is a [facility] pt and has a  there. She reports that she goes to her appnts regularly and takes prescribed meds. Note in chart from ED indicated that family member stated that pt did not keep all appnts and is not consistently taking psych meds. Pt was last at [facility] in November. Pt reports hx of substance abuse including etoh, heroin and crack cocaine. She states that she drinks about every other day approx a fifth. She has recent use of cocaine/crack and uses heroin \"occasionaly. \"  Pt reports OD x2 on heroin. She states that she has been in several tx programs with the most recent being the [facility] [employer]. She was referred by [facility] and stayed in the program for approx 3 mo before being asked to leave. Provided pt with list of treatment programs. She was agreeable with YARITZA contacting [facility]. YARITZA spoke with [facility] officer. He states that they get pts connected with treatment and pt is getting services already through [place]. YARITZA updated pt's nurse on above.   KODI MATUTE

## 2018-03-16 ENCOUNTER — HOSPITAL ENCOUNTER (EMERGENCY)
Age: 43
Discharge: HOME | End: 2018-03-17
Attending: EMERGENCY MEDICINE
Payer: MEDICARE

## 2018-03-16 ENCOUNTER — APPOINTMENT (OUTPATIENT)
Dept: ULTRASOUND IMAGING | Age: 43
End: 2018-03-16
Payer: MEDICARE

## 2018-03-16 ENCOUNTER — APPOINTMENT (OUTPATIENT)
Dept: GENERAL RADIOLOGY | Age: 43
End: 2018-03-16
Payer: MEDICARE

## 2018-03-16 ENCOUNTER — APPOINTMENT (OUTPATIENT)
Dept: CT IMAGING | Age: 43
End: 2018-03-16
Payer: MEDICARE

## 2018-03-16 VITALS
WEIGHT: 129 LBS | OXYGEN SATURATION: 99 % | HEIGHT: 60 IN | RESPIRATION RATE: 19 BRPM | HEART RATE: 74 BPM | BODY MASS INDEX: 25.32 KG/M2 | DIASTOLIC BLOOD PRESSURE: 76 MMHG | TEMPERATURE: 97.5 F | SYSTOLIC BLOOD PRESSURE: 111 MMHG

## 2018-03-16 DIAGNOSIS — F10.929 ACUTE ALCOHOLIC INTOXICATION WITH COMPLICATION (HCC): ICD-10-CM

## 2018-03-16 DIAGNOSIS — E87.6 HYPOKALEMIA: ICD-10-CM

## 2018-03-16 DIAGNOSIS — F10.10 ALCOHOL ABUSE: ICD-10-CM

## 2018-03-16 DIAGNOSIS — E86.1 HYPOVOLEMIA: ICD-10-CM

## 2018-03-16 DIAGNOSIS — R10.9 ABDOMINAL PAIN, UNSPECIFIED ABDOMINAL LOCATION: Primary | ICD-10-CM

## 2018-03-16 LAB
ABSOLUTE EOS #: 0.18 K/UL (ref 0–0.44)
ABSOLUTE IMMATURE GRANULOCYTE: 0.03 K/UL (ref 0–0.3)
ABSOLUTE LYMPH #: 2.4 K/UL (ref 1.1–3.7)
ABSOLUTE MONO #: 0.86 K/UL (ref 0.1–1.2)
ALBUMIN SERPL-MCNC: 3.2 G/DL (ref 3.5–5.2)
ALBUMIN/GLOBULIN RATIO: 1.1 (ref 1–2.5)
ALP BLD-CCNC: 89 U/L (ref 35–104)
ALT SERPL-CCNC: 196 U/L (ref 5–33)
ANION GAP SERPL CALCULATED.3IONS-SCNC: 16 MMOL/L (ref 9–17)
AST SERPL-CCNC: 227 U/L
BASOPHILS # BLD: 1 % (ref 0–2)
BASOPHILS ABSOLUTE: 0.09 K/UL (ref 0–0.2)
BETA-HYDROXYBUTYRATE: 0.73 MMOL/L (ref 0.02–0.27)
BILIRUB SERPL-MCNC: 0.17 MG/DL (ref 0.3–1.2)
BILIRUBIN DIRECT: 0.1 MG/DL
BILIRUBIN URINE: NEGATIVE
BILIRUBIN, INDIRECT: 0.07 MG/DL (ref 0–1)
BUN BLDV-MCNC: 6 MG/DL (ref 6–20)
BUN/CREAT BLD: ABNORMAL (ref 9–20)
CALCIUM SERPL-MCNC: 7.6 MG/DL (ref 8.6–10.4)
CHLORIDE BLD-SCNC: 111 MMOL/L (ref 98–107)
CO2: 17 MMOL/L (ref 20–31)
COLOR: YELLOW
COMMENT UA: ABNORMAL
CREAT SERPL-MCNC: 0.55 MG/DL (ref 0.5–0.9)
DIFFERENTIAL TYPE: ABNORMAL
EOSINOPHILS RELATIVE PERCENT: 3 % (ref 1–4)
ETHANOL PERCENT: 0.19 %
ETHANOL: 190 MG/DL
GFR AFRICAN AMERICAN: >60 ML/MIN
GFR NON-AFRICAN AMERICAN: >60 ML/MIN
GFR SERPL CREATININE-BSD FRML MDRD: ABNORMAL ML/MIN/{1.73_M2}
GFR SERPL CREATININE-BSD FRML MDRD: ABNORMAL ML/MIN/{1.73_M2}
GLOBULIN: ABNORMAL G/DL (ref 1.5–3.8)
GLUCOSE BLD-MCNC: 104 MG/DL (ref 70–99)
GLUCOSE URINE: NEGATIVE
HCT VFR BLD CALC: 40.9 % (ref 36.3–47.1)
HEMOGLOBIN: 11.9 G/DL (ref 11.9–15.1)
IMMATURE GRANULOCYTES: 0 %
KETONES, URINE: ABNORMAL
LACTIC ACID, WHOLE BLOOD: 2.7 MMOL/L (ref 0.7–2.1)
LACTIC ACID, WHOLE BLOOD: 2.8 MMOL/L (ref 0.7–2.1)
LEUKOCYTE ESTERASE, URINE: NEGATIVE
LIPASE: 27 U/L (ref 13–60)
LYMPHOCYTES # BLD: 36 % (ref 24–43)
MCH RBC QN AUTO: 23.5 PG (ref 25.2–33.5)
MCHC RBC AUTO-ENTMCNC: 29.1 G/DL (ref 28.4–34.8)
MCV RBC AUTO: 80.8 FL (ref 82.6–102.9)
MONOCYTES # BLD: 13 % (ref 3–12)
NITRITE, URINE: NEGATIVE
NRBC AUTOMATED: 0 PER 100 WBC
PDW BLD-RTO: 17.5 % (ref 11.8–14.4)
PH UA: 5.5 (ref 5–8)
PLATELET # BLD: 353 K/UL (ref 138–453)
PLATELET ESTIMATE: ABNORMAL
PMV BLD AUTO: 9.5 FL (ref 8.1–13.5)
POTASSIUM SERPL-SCNC: 3.4 MMOL/L (ref 3.7–5.3)
PROTEIN UA: NEGATIVE
RBC # BLD: 5.06 M/UL (ref 3.95–5.11)
RBC # BLD: ABNORMAL 10*6/UL
SEG NEUTROPHILS: 47 % (ref 36–65)
SEGMENTED NEUTROPHILS ABSOLUTE COUNT: 3.19 K/UL (ref 1.5–8.1)
SODIUM BLD-SCNC: 144 MMOL/L (ref 135–144)
SPECIFIC GRAVITY UA: 1.01 (ref 1–1.03)
TOTAL PROTEIN: 6.2 G/DL (ref 6.4–8.3)
TURBIDITY: CLEAR
URINE HGB: NEGATIVE
UROBILINOGEN, URINE: NORMAL
WBC # BLD: 6.8 K/UL (ref 3.5–11.3)
WBC # BLD: ABNORMAL 10*3/UL

## 2018-03-16 PROCEDURE — 2500000003 HC RX 250 WO HCPCS: Performed by: EMERGENCY MEDICINE

## 2018-03-16 PROCEDURE — 99285 EMERGENCY DEPT VISIT HI MDM: CPT

## 2018-03-16 PROCEDURE — 96366 THER/PROPH/DIAG IV INF ADDON: CPT

## 2018-03-16 PROCEDURE — 87389 HIV-1 AG W/HIV-1&-2 AB AG IA: CPT

## 2018-03-16 PROCEDURE — 83690 ASSAY OF LIPASE: CPT

## 2018-03-16 PROCEDURE — 85025 COMPLETE CBC W/AUTO DIFF WBC: CPT

## 2018-03-16 PROCEDURE — 96365 THER/PROPH/DIAG IV INF INIT: CPT

## 2018-03-16 PROCEDURE — 71045 X-RAY EXAM CHEST 1 VIEW: CPT

## 2018-03-16 PROCEDURE — 84439 ASSAY OF FREE THYROXINE: CPT

## 2018-03-16 PROCEDURE — 6360000004 HC RX CONTRAST MEDICATION: Performed by: EMERGENCY MEDICINE

## 2018-03-16 PROCEDURE — 82010 KETONE BODYS QUAN: CPT

## 2018-03-16 PROCEDURE — 80048 BASIC METABOLIC PNL TOTAL CA: CPT

## 2018-03-16 PROCEDURE — 6360000002 HC RX W HCPCS: Performed by: EMERGENCY MEDICINE

## 2018-03-16 PROCEDURE — 2580000003 HC RX 258: Performed by: EMERGENCY MEDICINE

## 2018-03-16 PROCEDURE — 84443 ASSAY THYROID STIM HORMONE: CPT

## 2018-03-16 PROCEDURE — 96368 THER/DIAG CONCURRENT INF: CPT

## 2018-03-16 PROCEDURE — 81003 URINALYSIS AUTO W/O SCOPE: CPT

## 2018-03-16 PROCEDURE — G0480 DRUG TEST DEF 1-7 CLASSES: HCPCS

## 2018-03-16 PROCEDURE — 96375 TX/PRO/DX INJ NEW DRUG ADDON: CPT

## 2018-03-16 PROCEDURE — 80076 HEPATIC FUNCTION PANEL: CPT

## 2018-03-16 PROCEDURE — 84481 FREE ASSAY (FT-3): CPT

## 2018-03-16 PROCEDURE — 74177 CT ABD & PELVIS W/CONTRAST: CPT

## 2018-03-16 PROCEDURE — S0028 INJECTION, FAMOTIDINE, 20 MG: HCPCS | Performed by: EMERGENCY MEDICINE

## 2018-03-16 PROCEDURE — 83605 ASSAY OF LACTIC ACID: CPT

## 2018-03-16 PROCEDURE — 80074 ACUTE HEPATITIS PANEL: CPT

## 2018-03-16 RX ORDER — LITHIUM CARBONATE 300 MG/1
450 CAPSULE ORAL
Status: ON HOLD | COMMUNITY
End: 2018-03-28 | Stop reason: HOSPADM

## 2018-03-16 RX ORDER — MAGNESIUM SULFATE 1 G/100ML
1 INJECTION INTRAVENOUS
Status: ACTIVE | OUTPATIENT
Start: 2018-03-16 | End: 2018-03-16

## 2018-03-16 RX ORDER — 0.9 % SODIUM CHLORIDE 0.9 %
1000 INTRAVENOUS SOLUTION INTRAVENOUS ONCE
Status: COMPLETED | OUTPATIENT
Start: 2018-03-16 | End: 2018-03-16

## 2018-03-16 RX ORDER — POTASSIUM CHLORIDE 7.45 MG/ML
10 INJECTION INTRAVENOUS ONCE
Status: COMPLETED | OUTPATIENT
Start: 2018-03-16 | End: 2018-03-17

## 2018-03-16 RX ORDER — SODIUM CHLORIDE, SODIUM LACTATE, POTASSIUM CHLORIDE, AND CALCIUM CHLORIDE .6; .31; .03; .02 G/100ML; G/100ML; G/100ML; G/100ML
1000 INJECTION, SOLUTION INTRAVENOUS ONCE
Status: COMPLETED | OUTPATIENT
Start: 2018-03-16 | End: 2018-03-17

## 2018-03-16 RX ORDER — ONDANSETRON 2 MG/ML
4 INJECTION INTRAMUSCULAR; INTRAVENOUS ONCE
Status: COMPLETED | OUTPATIENT
Start: 2018-03-16 | End: 2018-03-16

## 2018-03-16 RX ADMIN — SODIUM CHLORIDE 1000 ML: 9 INJECTION, SOLUTION INTRAVENOUS at 18:35

## 2018-03-16 RX ADMIN — ONDANSETRON 4 MG: 2 INJECTION INTRAMUSCULAR; INTRAVENOUS at 18:36

## 2018-03-16 RX ADMIN — Medication 10 MEQ: at 19:15

## 2018-03-16 RX ADMIN — SODIUM CHLORIDE, POTASSIUM CHLORIDE, SODIUM LACTATE AND CALCIUM CHLORIDE 1000 ML: 600; 310; 30; 20 INJECTION, SOLUTION INTRAVENOUS at 23:37

## 2018-03-16 RX ADMIN — Medication 10 MEQ: at 19:00

## 2018-03-16 RX ADMIN — FAMOTIDINE 20 MG: 10 INJECTION INTRAVENOUS at 18:37

## 2018-03-16 RX ADMIN — IOPAMIDOL 75 ML: 755 INJECTION, SOLUTION INTRAVENOUS at 23:23

## 2018-03-16 RX ADMIN — SODIUM CHLORIDE 1000 ML: 9 INJECTION, SOLUTION INTRAVENOUS at 18:00

## 2018-03-16 ASSESSMENT — ENCOUNTER SYMPTOMS
ABDOMINAL PAIN: 1
NAUSEA: 1
BLOOD IN STOOL: 0
SHORTNESS OF BREATH: 0
COUGH: 0
DIARRHEA: 0
ANAL BLEEDING: 0
SORE THROAT: 0
VOMITING: 1
EYE DISCHARGE: 0
ABDOMINAL DISTENTION: 0
CONSTIPATION: 0

## 2018-03-16 ASSESSMENT — PAIN SCALES - GENERAL: PAINLEVEL_OUTOF10: 4

## 2018-03-16 NOTE — ED NOTES
Pt changed into gown and given a warm blanket     Octavia Kamara  03/16/18 [address] [facility], RN  03/17/18 0187

## 2018-03-16 NOTE — ED PROVIDER NOTES
of possible pre-hypertension or Hypertension. EKG Interpretation    Interpreted by me      CRITICAL CARE: There was a high probability of clinically significant/life threatening deterioration in this patient's condition which required my urgent intervention. Total critical care time was  minutes. This excludes any time for separately reportable procedures.        [address] [facility],   03/16/18 [address] [facility] [employer], DO  03/17/18 [address], DO  03/17/18 [address], DO  03/17/18 1710

## 2018-03-16 NOTE — ED PROVIDER NOTES
nontoxic. Patient is asking to eat at this time. However, given how tender she is on exam and the hypotension, Will place IV, fluid resuscitate obtain abdominal labs, including a CT abdomen and pelvis. There is low suspicion for dissection but it possible with crack use. The hypotension is likely hypovolemia in nature and also secondary to no oral intake for the last 4 days. We'll also get a UA but there is no flank pain to suggest pyelonephritis. We will also check for pancreatitis and AKA. Will keep pt on monitor and re-assess    RADIOLOGY:  None    EMERGENCY DEPARTMENT COURSE:  Pt seen. BP is now 184 systolic. Pt states her BP is normally 90/60. She denies lightheadedness or dizziness. Will continue to monitor    PROCEDURES:  None    CONSULTS:  None    CRITICAL CARE:  None    FINAL IMPRESSION      1. Abdominal pain, unspecified abdominal location    2. Alcohol abuse    3. Acute alcoholic intoxication with complication ([facility] [address]    4. Hypovolemia    5.  Hypokalemia          DISPOSITION / PLAN     DISPOSITION        Signed out to night resident; work up pending    [address] DO  Emergency Medicine Resident    (Please note that portions of this note were completed with a voice recognition program.  Efforts were made to edit the dictations but occasionally words are mis-transcribed.)       [address] DO  Resident  03/16/18 4151

## 2018-03-17 ENCOUNTER — HOSPITAL ENCOUNTER (OUTPATIENT)
Age: 43
Setting detail: SPECIMEN
Discharge: HOME | End: 2018-03-17
Payer: MEDICAID

## 2018-03-17 LAB
HAV IGM SER IA-ACNC: NONREACTIVE
HEPATITIS B CORE IGM ANTIBODY: NONREACTIVE
HEPATITIS B SURFACE ANTIGEN: NONREACTIVE
HEPATITIS C ANTIBODY: REACTIVE
HIV AG/AB: NONREACTIVE
THYROXINE, FREE: 1.24 NG/DL (ref 0.93–1.7)
TSH SERPL DL<=0.05 MIU/L-ACNC: 0.63 MIU/L (ref 0.3–5)

## 2018-03-17 PROCEDURE — 96366 THER/PROPH/DIAG IV INF ADDON: CPT

## 2018-03-17 PROCEDURE — 6360000002 HC RX W HCPCS: Performed by: EMERGENCY MEDICINE

## 2018-03-17 RX ORDER — MAGNESIUM SULFATE 1 G/100ML
1 INJECTION INTRAVENOUS
Status: COMPLETED | OUTPATIENT
Start: 2018-03-17 | End: 2018-03-17

## 2018-03-17 RX ADMIN — MAGNESIUM SULFATE HEPTAHYDRATE 1 G: 1 INJECTION, SOLUTION INTRAVENOUS at 00:15

## 2018-03-17 RX ADMIN — MAGNESIUM SULFATE HEPTAHYDRATE 1 G: 1 INJECTION, SOLUTION INTRAVENOUS at 00:51

## 2018-03-17 NOTE — ED PROVIDER NOTES
[facility] ED  Emergency Department  Emergency Medicine Resident Sign-out     Care of Rosana Gonzalez was assumed from Dr. Jovani Sotelo and is being seen for Abdominal Pain (withdrawal from alcohol,  Abd pain and vomting. Hasnt eaten in 4 days)    The patient's initial evaluation and plan have been discussed with the prior provider who initially evaluated the patient.      EMERGENCY DEPARTMENT COURSE / MEDICAL DECISION MAKING:       MEDICATIONS GIVEN:  Orders Placed This Encounter   Medications    ondansetron (ZOFRAN) injection 4 mg    0.9 % sodium chloride bolus    famotidine (PEPCID) injection 20 mg    0.9 % sodium chloride bolus    potassium chloride 10 mEq/100 mL IVPB (Peripheral Line)    magnesium sulfate 1 g in dextrose 5% 100 mL IVPB    potassium chloride 10 mEq/100 mL IVPB (Peripheral Line)    iopamidol (ISOVUE-370) 76 % injection 75 mL       LABS / RADIOLOGY:     Labs Reviewed   CBC WITH AUTO DIFFERENTIAL - Abnormal; Notable for the following:        Result Value    MCV 80.8 (*)     MCH 23.5 (*)     RDW 17.5 (*)     Monocytes 13 (*)     All other components within normal limits   BASIC METABOLIC PANEL - Abnormal; Notable for the following:     Glucose 104 (*)     Calcium 7.6 (*)     Potassium 3.4 (*)     Chloride 111 (*)     CO2 17 (*)     All other components within normal limits   ETHANOL - Abnormal; Notable for the following:     Ethanol 190 (*)     All other components within normal limits   HEPATIC FUNCTION PANEL - Abnormal; Notable for the following:     Alb 3.2 (*)      (*)      (*)     Total Bilirubin 0.17 (*)     Total Protein 6.2 (*)     All other components within normal limits   UA W/REFLEX CULTURE - Abnormal; Notable for the following:     Ketones, Urine TRACE (*)     All other components within normal limits   LACTIC ACID, WHOLE BLOOD - Abnormal; Notable for the following:     Lactic Acid, Whole Blood 2.8 (*)     All other components within normal limits BETA-HYDROXYBUTYRATE - Abnormal; Notable for the following:     Beta-Hydroxybutyrate 0.73 (*)     All other components within normal limits   LIPASE   LACTIC ACID, WHOLE BLOOD       Xr Chest Portable    Result Date: 3/16/2018  EXAMINATION: SINGLE VIEW OF THE CHEST 3/16/2018 6:58 pm COMPARISON: None. HISTORY: ORDERING SYSTEM PROVIDED HISTORY: ruq and epigastric abd pain for 4 days with guarding on exam TECHNOLOGIST PROVIDED HISTORY: Reason for exam:->ruq and epigastric abd pain for 4 days with guarding on exam FINDINGS: Cardiomediastinal silhouette and pulmonary vasculature are within normal limits. No focal airspace consolidation, pneumothorax, or pleural effusion. No free air beneath the diaphragm. No acute osseous abnormality. Cholecystectomy clips are noted. No acute intrathoracic process. RECENT VITALS:     Temp: 97.5 °F (36.4 °C),  Pulse: 74, Resp: 19, BP: 111/76, SpO2: 99 %    This patient is a 38 y.o. Female with Complaints of abdominal pain , nausea and vomiting. History of cholecystectomy. Presented dry with decreased oral intake. Found to have mild hypotension which improved with IV fluid. Patient was repleted with IV potassium. Magnesium. Was given Pepcid and ondansetron. Urinalysis shows trace ketones, no signs of UTI. Elevated lactic acid. LFT showed increased before meals and ALT consistent with alcoholic hepatitis. Elevation of bilirubin or alkphos. CX-ray without acute intrathoracic process. ED Course as of Mar 17 0254   Sat Mar 17, 2018   0107  seen and reassessed after obtaining CT scan of the chest.  All labs reviewed. On examination patient is alert, oriented, following commands without any slurred speech, appears to be no longer intoxicated at this time. She is capable of making medical decisions. She claims she feels much better. She has drank almost 40 ounces of water, juice and soda. No further emesis.   Denies any history of alcohol withdrawal

## 2018-03-17 NOTE — ED NOTES
Called [facility] and spoke with on call nurse about placement for pt. Was told she would call me back after looking up pt. Pt in NAD. SW @ bedside. Will continue to monitor.       Mabel Davis RN  03/17/18 2117

## 2018-03-17 NOTE — ED NOTES
Writer spoke with staff at [facility] who will be coordinating transportation for pt from our facility to [facility]. Writer faxed over chart. Pt in NAD. Will conitnue to monitor.      Pedro Keita RN  03/17/18 [id number]

## 2018-03-17 NOTE — ED NOTES
Pt resting comfortably in bed at this time. Pt in NAD. Will continue to monitor.      Bernie Coleman RN  03/17/18 1996

## 2018-03-18 LAB — T3 FREE: 3.01 PG/ML (ref 2.02–4.43)

## 2018-03-22 ENCOUNTER — HOSPITAL ENCOUNTER (INPATIENT)
Age: 43
LOS: 1 days | Discharge: STILL ADMITTED OR ADMINISTRATIVE | DRG: 753 | End: 2018-03-23
Attending: EMERGENCY MEDICINE | Admitting: PSYCHIATRY & NEUROLOGY
Payer: MEDICARE

## 2018-03-22 DIAGNOSIS — F19.10 POLYSUBSTANCE ABUSE (HCC): ICD-10-CM

## 2018-03-22 DIAGNOSIS — R45.851 DEPRESSION WITH SUICIDAL IDEATION: Primary | ICD-10-CM

## 2018-03-22 DIAGNOSIS — F32.A DEPRESSION WITH SUICIDAL IDEATION: Primary | ICD-10-CM

## 2018-03-22 DIAGNOSIS — R44.0 AUDITORY HALLUCINATION: ICD-10-CM

## 2018-03-22 DIAGNOSIS — Z91.199 MEDICALLY NONCOMPLIANT: ICD-10-CM

## 2018-03-22 PROBLEM — F11.20 HEROIN DEPENDENCE (HCC): Status: ACTIVE | Noted: 2017-11-28

## 2018-03-22 PROBLEM — F33.9 MAJOR DEPRESSION, RECURRENT (HCC): Status: ACTIVE | Noted: 2018-03-22

## 2018-03-22 LAB
ETHANOL PERCENT: 0.05 %
ETHANOL: 47 MG/DL
HCG QUANTITATIVE: 2 IU/L

## 2018-03-22 PROCEDURE — 36415 COLL VENOUS BLD VENIPUNCTURE: CPT

## 2018-03-22 PROCEDURE — 84702 CHORIONIC GONADOTROPIN TEST: CPT

## 2018-03-22 PROCEDURE — 6370000000 HC RX 637 (ALT 250 FOR IP): Performed by: PSYCHIATRY & NEUROLOGY

## 2018-03-22 PROCEDURE — 1240000000 HC EMOTIONAL WELLNESS R&B

## 2018-03-22 PROCEDURE — G0480 DRUG TEST DEF 1-7 CLASSES: HCPCS

## 2018-03-22 PROCEDURE — 99285 EMERGENCY DEPT VISIT HI MDM: CPT

## 2018-03-22 RX ORDER — PANTOPRAZOLE SODIUM 40 MG/1
40 TABLET, DELAYED RELEASE ORAL
Status: DISCONTINUED | OUTPATIENT
Start: 2018-03-22 | End: 2018-03-22

## 2018-03-22 RX ORDER — LITHIUM CARBONATE 450 MG
450 TABLET, EXTENDED RELEASE ORAL EVERY 12 HOURS SCHEDULED
Status: DISCONTINUED | OUTPATIENT
Start: 2018-03-22 | End: 2018-03-23

## 2018-03-22 RX ORDER — GABAPENTIN 300 MG/1
600 CAPSULE ORAL 3 TIMES DAILY
Status: DISCONTINUED | OUTPATIENT
Start: 2018-03-22 | End: 2018-03-23

## 2018-03-22 RX ORDER — VENLAFAXINE HYDROCHLORIDE 150 MG/1
150 CAPSULE, EXTENDED RELEASE ORAL
Status: DISCONTINUED | OUTPATIENT
Start: 2018-03-22 | End: 2018-03-23 | Stop reason: HOSPADM

## 2018-03-22 RX ORDER — PANTOPRAZOLE SODIUM 40 MG/1
40 TABLET, DELAYED RELEASE ORAL
Status: DISCONTINUED | OUTPATIENT
Start: 2018-03-22 | End: 2018-03-23 | Stop reason: HOSPADM

## 2018-03-22 RX ORDER — TRAZODONE HYDROCHLORIDE 50 MG/1
50 TABLET ORAL NIGHTLY PRN
Status: DISCONTINUED | OUTPATIENT
Start: 2018-03-22 | End: 2018-03-22 | Stop reason: SDUPTHER

## 2018-03-22 RX ORDER — TRAZODONE HYDROCHLORIDE 100 MG/1
100 TABLET ORAL NIGHTLY
Status: DISCONTINUED | OUTPATIENT
Start: 2018-03-22 | End: 2018-03-23 | Stop reason: HOSPADM

## 2018-03-22 RX ORDER — BENZTROPINE MESYLATE 1 MG/ML
2 INJECTION INTRAMUSCULAR; INTRAVENOUS DAILY PRN
Status: DISCONTINUED | OUTPATIENT
Start: 2018-03-22 | End: 2018-03-23 | Stop reason: HOSPADM

## 2018-03-22 RX ORDER — HALOPERIDOL 5 MG/ML
5 INJECTION INTRAMUSCULAR 3 TIMES DAILY PRN
Status: DISCONTINUED | OUTPATIENT
Start: 2018-03-22 | End: 2018-03-23 | Stop reason: HOSPADM

## 2018-03-22 RX ORDER — MAGNESIUM HYDROXIDE/ALUMINUM HYDROXICE/SIMETHICONE 120; 1200; 1200 MG/30ML; MG/30ML; MG/30ML
30 SUSPENSION ORAL 2 TIMES DAILY PRN
Status: DISCONTINUED | OUTPATIENT
Start: 2018-03-22 | End: 2018-03-23 | Stop reason: HOSPADM

## 2018-03-22 RX ORDER — ACETAMINOPHEN 325 MG/1
650 TABLET ORAL EVERY 6 HOURS PRN
Status: DISCONTINUED | OUTPATIENT
Start: 2018-03-22 | End: 2018-03-23 | Stop reason: HOSPADM

## 2018-03-22 RX ORDER — QUETIAPINE FUMARATE 200 MG/1
200 TABLET, FILM COATED ORAL NIGHTLY
Status: DISCONTINUED | OUTPATIENT
Start: 2018-03-22 | End: 2018-03-23 | Stop reason: HOSPADM

## 2018-03-22 RX ORDER — BUPRENORPHINE AND NALOXONE 4; 1 MG/1; MG/1
1 FILM, SOLUBLE BUCCAL; SUBLINGUAL 2 TIMES DAILY
Status: DISCONTINUED | OUTPATIENT
Start: 2018-03-22 | End: 2018-03-23 | Stop reason: HOSPADM

## 2018-03-22 RX ORDER — LORAZEPAM 1 MG/1
1 TABLET ORAL 2 TIMES DAILY
Status: COMPLETED | OUTPATIENT
Start: 2018-03-22 | End: 2018-03-22

## 2018-03-22 RX ORDER — LORAZEPAM 1 MG/1
1 TABLET ORAL 2 TIMES DAILY PRN
Status: DISCONTINUED | OUTPATIENT
Start: 2018-03-22 | End: 2018-03-22

## 2018-03-22 RX ADMIN — LORAZEPAM 1 MG: 1 TABLET ORAL at 15:21

## 2018-03-22 RX ADMIN — QUETIAPINE FUMARATE 200 MG: 200 TABLET ORAL at 21:06

## 2018-03-22 RX ADMIN — VENLAFAXINE HYDROCHLORIDE 150 MG: 150 CAPSULE, EXTENDED RELEASE ORAL at 09:00

## 2018-03-22 RX ADMIN — LITHIUM CARBONATE 450 MG: 450 TABLET, EXTENDED RELEASE ORAL at 21:06

## 2018-03-22 RX ADMIN — GABAPENTIN 600 MG: 300 CAPSULE ORAL at 15:20

## 2018-03-22 RX ADMIN — BUPRENORPHINE HYDROCHLORIDE, NALOXONE HYDROCHLORIDE 1 FILM: 4; 1 FILM, SOLUBLE BUCCAL; SUBLINGUAL at 18:37

## 2018-03-22 RX ADMIN — LORAZEPAM 1 MG: 1 TABLET ORAL at 21:06

## 2018-03-22 RX ADMIN — TRAZODONE HYDROCHLORIDE 100 MG: 100 TABLET ORAL at 21:06

## 2018-03-22 RX ADMIN — BUPRENORPHINE HYDROCHLORIDE, NALOXONE HYDROCHLORIDE 1 FILM: 4; 1 FILM, SOLUBLE BUCCAL; SUBLINGUAL at 12:14

## 2018-03-22 RX ADMIN — LITHIUM CARBONATE 450 MG: 450 TABLET, EXTENDED RELEASE ORAL at 09:00

## 2018-03-22 RX ADMIN — GABAPENTIN 600 MG: 300 CAPSULE ORAL at 21:06

## 2018-03-22 RX ADMIN — PANTOPRAZOLE SODIUM 40 MG: 40 TABLET, DELAYED RELEASE ORAL at 09:00

## 2018-03-22 RX ADMIN — GABAPENTIN 600 MG: 300 CAPSULE ORAL at 09:00

## 2018-03-22 ASSESSMENT — LIFESTYLE VARIABLES
HISTORY_ALCOHOL_USE: YES
HISTORY_ALCOHOL_USE: YES

## 2018-03-22 ASSESSMENT — SLEEP AND FATIGUE QUESTIONNAIRES
DIFFICULTY STAYING ASLEEP: YES
DIFFICULTY FALLING ASLEEP: YES
AVERAGE NUMBER OF SLEEP HOURS: 4
RESTFUL SLEEP: NO
RESTFUL SLEEP: NO
DO YOU HAVE DIFFICULTY SLEEPING: YES
SLEEP PATTERN: DISTURBED/INTERRUPTED SLEEP
DIFFICULTY STAYING ASLEEP: YES
DIFFICULTY ARISING: NO
DIFFICULTY ARISING: NO
SLEEP PATTERN: DISTURBED/INTERRUPTED SLEEP
DO YOU HAVE DIFFICULTY SLEEPING: YES
DO YOU USE A SLEEP AID: YES
DIFFICULTY FALLING ASLEEP: YES
DO YOU USE A SLEEP AID: YES

## 2018-03-22 ASSESSMENT — ENCOUNTER SYMPTOMS
SORE THROAT: 0
DIARRHEA: 0
NAUSEA: 0
VOMITING: 0
SHORTNESS OF BREATH: 0
BACK PAIN: 0
ABDOMINAL PAIN: 0
EYE PAIN: 0
COUGH: 0

## 2018-03-22 ASSESSMENT — PAIN SCALES - GENERAL
PAINLEVEL_OUTOF10: 0

## 2018-03-22 ASSESSMENT — PAIN - FUNCTIONAL ASSESSMENT: PAIN_FUNCTIONAL_ASSESSMENT: 0-10

## 2018-03-22 ASSESSMENT — PATIENT HEALTH QUESTIONNAIRE - PHQ9: SUM OF ALL RESPONSES TO PHQ QUESTIONS 1-9: 17

## 2018-03-22 NOTE — BH NOTE
Psychoeducation Group Note    Date: 3/22/18  Start Time: 1430  End Time:1515    Number Participants in Group:  9    Goal:  Patient will demonstrate increased interpersonal interaction   Topic: COGNITIVE SKILLS GROUP: COMMUNICATION    Discipline Responsible:   OT  AT  [facility]. X RT  Other       Participation Level:     None  Minimal   X Active Listener  Interactive    Monopolizing         Participation Quality:   Appropriate  Inappropriate   X       Attentive        Intrusive          Sharing        Resistant          Supportive        Lethargic       Affective:    Congruent  Incongruent x Blunted  Flat    Constricted  Anxious  Elated  Angry    Labile  Depressed  Other X BRIGHTENS       Cognitive:  X Alert X Oriented PPTP     Concentration  G x F  P   Attention Span  G x F  P   Short-Term Memory  G  F  P   Long-Term Memory  G  F  P   ProblemSolving/  Decision Making  G X F  P   Ability to Process  Information X G  F  P      Contributing Factors             Delusional             Hallucinating             Flight of Ideas             Other:pt left group early, pt was attentive but did not participate in task,pt was restless        Modes of Intervention:  X Education X Support X Exploration   X Clarifying X Problem Solving  Confrontation   X Socialization  Limit Setting  Reality Testing   X Activity  Movement  Media    Other:            Response to Learning:  X Able to verbalize current knowledge/experience   X Able to acknowledge new learning   X Able to retain information    Capable of insight    Able to change behavior   X Progressing to goal    Other:        Comments:

## 2018-03-22 NOTE — BH NOTE
`[facility]  Admission Note     Admission Type:   Admission Type: Voluntary    Reason for admission:  Reason for Admission: pt having increased SI with plan to OD    PATIENT STRENGTHS:  Strengths: Connection to output provider    Patient Strengths and Limitations:  Limitations: Inappropriate/potentially harmful leisure interests, Difficulty problem solving/relies on others to help solve problems    Addictive Behavior:   Addictive Behavior  In the past 3 months, have you felt or has someone told you that you have a problem with:  : None  Do you have a history of Chemical Use?: No  Do you have a history of Alcohol Use?: Yes  Do you have a history of Street Drug Abuse?: Yes  Histroy of Prescripton Drug Abuse?: No    Medical Problems:   Past Medical History:   Diagnosis Date    Alcoholism ([facility] [address]     3 pints daily    Anxiety     Asthma     Depression     Polysubstance abuse     drug abuse includes, cocaine, IV heroin, cannabis, and ETOH    Suicide attempt 11/08/2017    S/A by overdosing on Trazodone and Seroquel    Suicide ideation        Status EXAM:  Status and Exam  Normal: No  Facial Expression: Flat, Sad  Affect: Appropriate  Level of Consciousness: Alert  Mood:Normal: No  Mood: Depressed, Anxious, Worthless, low self-esteem  Motor Activity:Normal: Yes  Interview Behavior: Cooperative  Preception: [place] to Person, [place] to Time, [place] to Place, [place] to Situation  Attention:Normal: No  Attention: Distractible  Thought Processes: Circumstantial  Thought Content:Normal: No  Thought Content: Preoccupations  Hallucinations: None  Delusions: No  Memory:Normal: Yes  Insight and Judgment: No  Insight and Judgment: Poor Judgment, Poor Insight  Present Suicidal Ideation: Yes (no plan while on unit.  contracts for safety.)  Present Homicidal Ideation: No    Tobacco Screening:  Practical Counseling, on admission, ainsley X, if applicable and completed (first 3 are required if patient doesn't refuse): (x )  Recognizing danger situations (included triggers and roadblocks)                    ( x)  Coping skills (new ways to manage stress, exercise, relaxation techniques, changing routine, distraction)                                                           (x )  Basic information about quitting (benefits of quitting, techniques in how to quit, available resources  ( ) Referral for counseling faxed to [facility]                                           ( ) Patient refused counseling  ( ) Patient has not smoked in the last 30 days      Pt admitted with followings belongings:  Dentures: None  Vision - Corrective Lenses: None  Hearing Aid: None  Jewelry: Ring, Necklace, Watch  Body Piercings Removed: N/A  Clothing: Socks, Undergarments (Comment), Pajamas, Footwear, Jacket / coat, Pants, Shirt  Were All Patient Medications Collected?: Not Applicable  Other Valuables: Cell phone, [place] placed in safe in security envelope, number:  [id number]. Patient's home medications were reviewed. Patient oriented to surroundings and program expectations and copy of patient rights given. Received admission packet. Consents reviewed and signed. Patient verbalizes understanding.         Lizzette Sumner

## 2018-03-22 NOTE — ED PROVIDER NOTES
history of Alcoholism ([facility] [address] Anxiety; Asthma; Depression; Polysubstance abuse; Suicide attempt; and Suicide ideation. Surgical History:  has a past surgical history that includes Gastric bypass surgery; Hysterectomy; Carpal tunnel release; Hysterectomy, total abdominal; and Cholecystectomy. Social History:  reports that she has been smoking Cigarettes. She has a 45.00 pack-year smoking history. She has never used smokeless tobacco. She reports that she drinks alcohol. She reports that she uses drugs, including Marijuana, IV, and Cocaine, about 7 times per week. Family History: Noncontributory at this time  Psychiatric History: Per HPI    Allergies:is allergic to morphine; azithromycin; morphine; nsaids; and zithromax [azithromycin]. PHYSICAL EXAM     INITIAL VITALS: BP (!) 133/94   Pulse 75   Temp 97.5 °F (36.4 °C) (Oral)   Resp 15   Ht 5' (1.524 m)   Wt 129 lb (58.5 kg)   SpO2 97%   BMI 25.19 kg/m²     Physical Exam   Constitutional: She is oriented to person, place, and time. She appears well-developed and well-nourished. HENT:   Head: Normocephalic and atraumatic. Right Ear: External ear normal.   Left Ear: External ear normal.   Nose: Nose normal.   Mouth/Throat: Oropharynx is clear and moist.   Eyes: Conjunctivae and EOM are normal. Pupils are equal, round, and reactive to light. Right eye exhibits no discharge. Left eye exhibits no discharge. Neck: Normal range of motion. Neck supple. No tracheal deviation present. Cardiovascular: Normal rate, regular rhythm, normal heart sounds and intact distal pulses. Exam reveals no gallop and no friction rub. No murmur heard. Pulmonary/Chest: Effort normal and breath sounds normal. No respiratory distress. She has no wheezes. She has no rales. She exhibits no tenderness. Abdominal: Soft. Bowel sounds are normal. She exhibits no distension and no mass. There is no tenderness. There is no rebound and no guarding.    Musculoskeletal: Normal range of motion. She exhibits no edema or tenderness. Neurological: She is alert and oriented to person, place, and time. She has normal reflexes. No cranial nerve deficit. Skin: No rash noted. She is not diaphoretic. Psychiatric: Her speech is delayed. She is withdrawn and actively hallucinating. She exhibits a depressed mood. She expresses suicidal ideation. She expresses no homicidal ideation. She expresses suicidal plans. Vitals reviewed. DIAGNOSTIC RESULTS     EKG: All EKG's are interpreted by the Emergency Department Physician who either signs or Co-signs this chart in the absence of a cardiologist.      RADIOLOGY:   All plain film, CT, MRI, and formal ultrasound images (except ED bedside ultrasound) are read by the radiologist, see reports below, unless otherwise noted in the medical decision-making or here. No orders to display            LABS:  Labs Reviewed   ETHANOL - Abnormal; Notable for the following:        Result Value    Ethanol 47 (*)     All other components within normal limits   HCG, QUANTITATIVE, PREGNANCY   URINE DRUG SCREEN         EMERGENCY DEPARTMENT COURSE:   Medical Decision Making:  Suicidal ideation with plan to end her life by way ofOverdose in the form of either purulent or \"pills\". Patient is medically noncompliant abuses illicit drugs. She also has auditory hallucinations that are obstructing of harming herself. Patient denies medical complaints. Patient is medically stable for further inpatient psychiatric evaluation and treatment. Patient is voluntary. CRITICAL CARE:       CONSULTS:  IP CONSULT TO HOSPITALIST      FINAL IMPRESSION      1. Depression with suicidal ideation    2. Medically noncompliant    3. Polysubstance abuse    4. Auditory hallucination          DISPOSITION/PLAN:  DISPOSITION Admitted 03/22/2018 04:09:24 AM        PATIENT REFERRED TO:  No follow-up provider specified.     DISCHARGE MEDICATIONS:  Current Discharge Medication List

## 2018-03-22 NOTE — PLAN OF CARE
Problem: Altered Mood, Depressive Behavior:  Goal: Ability to disclose and discuss suicidal ideas will improve  Ability to disclose and discuss suicidal ideas will improve   Outcome: Ongoing  [facility]  Initial Interdisciplinary Treatment Plan NO      Original treatment plan Date & Time: 3/22/2018 0722    Admission Type:  Admission Type: Voluntary    Reason for admission:   Reason for Admission: pt having increased SI with plan to OD    Estimated Length of Stay:  5-7days  Estimated Discharge Date: to be determined by physician    PATIENT STRENGTHS:  Patient Strengths:Strengths: Connection to output provider  Patient Strengths and Limitations:Limitations: Inappropriate/potentially harmful leisure interests, Difficulty problem solving/relies on others to help solve problems  Addictive Behavior: Addictive Behavior  In the past 3 months, have you felt or has someone told you that you have a problem with:  : None  Do you have a history of Chemical Use?: No  Do you have a history of Alcohol Use?: Yes  Do you have a history of Street Drug Abuse?: Yes  Histroy of Prescripton Drug Abuse?: No  Medical Problems:  Past Medical History:   Diagnosis Date    Alcoholism ([facility] [address]     3 pints daily    Anxiety     Asthma     Depression     Polysubstance abuse     drug abuse includes, cocaine, IV heroin, cannabis, and ETOH    Suicide attempt 11/08/2017    S/A by overdosing on Trazodone and Seroquel    Suicide ideation      Status EXAM:Status and Exam  Normal: No  Facial Expression: Flat, Sad  Affect: Appropriate  Level of Consciousness: Alert  Mood:Normal: No  Mood: Depressed, Anxious, Worthless, low self-esteem  Motor Activity:Normal: Yes  Interview Behavior: Cooperative  Preception: [place] to Person, [place] to Time, [place] to Place, [place] to Situation  Attention:Normal: No  Attention: Distractible  Thought Processes: Circumstantial  Thought Content:Normal: No  Thought Content: Preoccupations  Hallucinations: None  Delusions: No  Memory:Normal: Yes  Insight and Judgment: No  Insight and Judgment: Poor Judgment, Poor Insight  Present Suicidal Ideation: Yes (no plan while on unit.  contracts for safety.)  Present Homicidal Ideation: No    EDUCATION:   Learner Progress Toward Treatment Goals: reviewed group plans and strategies for care    Method:group therapy, medication compliance, individualized assessments and care planning    Outcome: needs reinforcement    PATIENT GOALS: to be discussed with patient within 72 hours    PLAN/TREATMENT RECOMMENDATIONS:     continue group therapy , medications compliance, goal setting, individualized assessments and care, continue to monitor pt on unit      SHORT-TERM GOALS:   Time frame for Short-Term Goals: 5-7 days    LONG-TERM GOALS:  Time frame for Long-Term Goals: 6 months  Members Present in Team Meeting: See Signature Sheet    KRYSTIAN RUBIO    Problem: Depressive Behavior With or Without Suicide Precautions:  Goal: Able to verbalize support systems  Able to verbalize support systems   Outcome: Ongoing    Goal: Absence of self-harm  Absence of self-harm   Outcome: Ongoing

## 2018-03-23 ENCOUNTER — HOSPITAL ENCOUNTER (INPATIENT)
Age: 43
LOS: 1 days | Discharge: PSYCHIATRIC OR SUBSTANCE USE FACILITY | DRG: 139 | End: 2018-03-25
Attending: INTERNAL MEDICINE | Admitting: INTERNAL MEDICINE
Payer: MEDICARE

## 2018-03-23 ENCOUNTER — APPOINTMENT (OUTPATIENT)
Dept: GENERAL RADIOLOGY | Age: 43
DRG: 753 | End: 2018-03-23
Payer: MEDICARE

## 2018-03-23 VITALS
DIASTOLIC BLOOD PRESSURE: 66 MMHG | SYSTOLIC BLOOD PRESSURE: 105 MMHG | HEART RATE: 78 BPM | BODY MASS INDEX: 25.32 KG/M2 | WEIGHT: 129 LBS | RESPIRATION RATE: 14 BRPM | OXYGEN SATURATION: 97 % | TEMPERATURE: 98.2 F | HEIGHT: 60 IN

## 2018-03-23 PROBLEM — F19.10 POLYSUBSTANCE ABUSE (HCC): Status: ACTIVE | Noted: 2018-03-23

## 2018-03-23 PROBLEM — F31.9 DEPRESSED BIPOLAR I DISORDER (HCC): Status: ACTIVE | Noted: 2018-03-23

## 2018-03-23 PROBLEM — J18.9 PNEUMONIA: Status: ACTIVE | Noted: 2018-03-23

## 2018-03-23 LAB
-: ABNORMAL
ABSOLUTE BANDS #: 1.74 K/UL (ref 0–1)
ABSOLUTE EOS #: 0 K/UL (ref 0–0.4)
ABSOLUTE IMMATURE GRANULOCYTE: ABNORMAL K/UL (ref 0–0.3)
ABSOLUTE LYMPH #: 1.49 K/UL (ref 1–4.8)
ABSOLUTE MONO #: 0.25 K/UL (ref 0.1–1.3)
ALBUMIN SERPL-MCNC: 3.8 G/DL (ref 3.5–5.2)
ALBUMIN/GLOBULIN RATIO: ABNORMAL (ref 1–2.5)
ALLEN TEST: ABNORMAL
ALP BLD-CCNC: 93 U/L (ref 35–104)
ALT SERPL-CCNC: 101 U/L (ref 5–33)
AMORPHOUS: ABNORMAL
ANION GAP SERPL CALCULATED.3IONS-SCNC: 8 MMOL/L (ref 9–17)
AST SERPL-CCNC: 80 U/L
BACTERIA: ABNORMAL
BANDS: 14 % (ref 0–10)
BASOPHILS # BLD: 0 % (ref 0–2)
BASOPHILS ABSOLUTE: 0 K/UL (ref 0–0.2)
BILIRUB SERPL-MCNC: 0.27 MG/DL (ref 0.3–1.2)
BILIRUBIN URINE: NEGATIVE
BUN BLDV-MCNC: 6 MG/DL (ref 6–20)
BUN/CREAT BLD: ABNORMAL (ref 9–20)
C-REACTIVE PROTEIN: 5.8 MG/L (ref 0–5)
CALCIUM SERPL-MCNC: 8.9 MG/DL (ref 8.6–10.4)
CARBOXYHEMOGLOBIN: 1.2 %
CASTS UA: ABNORMAL /LPF
CHLORIDE BLD-SCNC: 102 MMOL/L (ref 98–107)
CO2: 32 MMOL/L (ref 20–31)
COLOR: YELLOW
COMMENT UA: ABNORMAL
CREAT SERPL-MCNC: 0.73 MG/DL (ref 0.5–0.9)
CRYSTALS, UA: ABNORMAL /HPF
DIFFERENTIAL TYPE: ABNORMAL
EKG ATRIAL RATE: 91 BPM
EKG P AXIS: 34 DEGREES
EKG P-R INTERVAL: 138 MS
EKG Q-T INTERVAL: 346 MS
EKG QRS DURATION: 72 MS
EKG QTC CALCULATION (BAZETT): 425 MS
EKG R AXIS: 1 DEGREES
EKG T AXIS: 22 DEGREES
EKG VENTRICULAR RATE: 91 BPM
EOSINOPHILS RELATIVE PERCENT: 0 % (ref 0–4)
EPITHELIAL CELLS UA: ABNORMAL /HPF
FIO2: ABNORMAL
GFR AFRICAN AMERICAN: >60 ML/MIN
GFR NON-AFRICAN AMERICAN: >60 ML/MIN
GFR SERPL CREATININE-BSD FRML MDRD: ABNORMAL ML/MIN/{1.73_M2}
GFR SERPL CREATININE-BSD FRML MDRD: ABNORMAL ML/MIN/{1.73_M2}
GLUCOSE BLD-MCNC: 105 MG/DL (ref 70–99)
GLUCOSE BLD-MCNC: 107 MG/DL (ref 65–105)
GLUCOSE URINE: NEGATIVE
HCO3 ARTERIAL: 32.9 MMOL/L
HCT VFR BLD CALC: 42.4 % (ref 36–46)
HEMOGLOBIN: 13.3 G/DL (ref 12–16)
HIV AG/AB: NONREACTIVE
IMMATURE GRANULOCYTES: ABNORMAL %
KETONES, URINE: NEGATIVE
LACTIC ACID: 1.5 MMOL/L (ref 0.5–2.2)
LEUKOCYTE ESTERASE, URINE: ABNORMAL
LYMPHOCYTES # BLD: 12 % (ref 24–44)
MCH RBC QN AUTO: 23.9 PG (ref 26–34)
MCHC RBC AUTO-ENTMCNC: 31.3 G/DL (ref 31–37)
MCV RBC AUTO: 76.6 FL (ref 80–100)
METHEMOGLOBIN: 0.5 %
MODE: ABNORMAL
MONOCYTES # BLD: 2 % (ref 1–7)
MORPHOLOGY: ABNORMAL
MUCUS: ABNORMAL
NEGATIVE BASE EXCESS, ART: ABNORMAL MMOL/L (ref 0–2)
NITRITE, URINE: NEGATIVE
NOTIFICATION TIME: ABNORMAL
NOTIFICATION: ABNORMAL
NRBC AUTOMATED: ABNORMAL PER 100 WBC
O2 DEVICE/FLOW/%: ABNORMAL
O2 SAT, ARTERIAL: 93.7 %
OTHER OBSERVATIONS UA: ABNORMAL
OXYHEMOGLOBIN: ABNORMAL % (ref 95–98)
PATIENT TEMP: 37
PCO2 ARTERIAL: 53.5 MMHG
PCO2, ART, TEMP ADJ: ABNORMAL
PDW BLD-RTO: 17.9 % (ref 11.5–14.9)
PEEP/CPAP: ABNORMAL
PH ARTERIAL: 7.4
PH UA: 7 (ref 5–8)
PH, ART, TEMP ADJ: ABNORMAL
PLATELET # BLD: 292 K/UL (ref 150–450)
PLATELET ESTIMATE: ABNORMAL
PMV BLD AUTO: 8.1 FL (ref 6–12)
PO2 ARTERIAL: 77.3 MMHG
PO2, ART, TEMP ADJ: ABNORMAL MMHG
POSITIVE BASE EXCESS, ART: 8.1 MMOL/L (ref 0–2)
POTASSIUM SERPL-SCNC: 5.7 MMOL/L (ref 3.7–5.3)
PROCALCITONIN: 0.04 NG/ML
PROTEIN UA: NEGATIVE
PSV: ABNORMAL
PT. POSITION: ABNORMAL
RBC # BLD: 5.54 M/UL (ref 4–5.2)
RBC # BLD: ABNORMAL 10*6/UL
RBC UA: ABNORMAL /HPF
RENAL EPITHELIAL, UA: ABNORMAL /HPF
RESPIRATORY RATE: 18
SAMPLE SITE: ABNORMAL
SEG NEUTROPHILS: 72 % (ref 36–66)
SEGMENTED NEUTROPHILS ABSOLUTE COUNT: 8.92 K/UL (ref 1.3–9.1)
SET RATE: ABNORMAL
SODIUM BLD-SCNC: 142 MMOL/L (ref 135–144)
SPECIFIC GRAVITY UA: 1 (ref 1–1.03)
TEXT FOR RESPIRATORY: ABNORMAL
TOTAL HB: ABNORMAL G/DL (ref 12–16)
TOTAL PROTEIN: 7.5 G/DL (ref 6.4–8.3)
TOTAL RATE: ABNORMAL
TRICHOMONAS: ABNORMAL
TROPONIN INTERP: NORMAL
TROPONIN INTERP: NORMAL
TROPONIN T: <0.03 NG/ML
TROPONIN T: <0.03 NG/ML
TURBIDITY: CLEAR
URINE HGB: NEGATIVE
UROBILINOGEN, URINE: NORMAL
VT: ABNORMAL
WBC # BLD: 12.4 K/UL (ref 3.5–11)
WBC # BLD: ABNORMAL 10*3/UL
WBC UA: ABNORMAL /HPF
YEAST: ABNORMAL

## 2018-03-23 PROCEDURE — 94664 DEMO&/EVAL PT USE INHALER: CPT

## 2018-03-23 PROCEDURE — 2580000003 HC RX 258: Performed by: RADIOLOGY

## 2018-03-23 PROCEDURE — 6370000000 HC RX 637 (ALT 250 FOR IP): Performed by: PSYCHIATRY & NEUROLOGY

## 2018-03-23 PROCEDURE — 82805 BLOOD GASES W/O2 SATURATION: CPT

## 2018-03-23 PROCEDURE — 93005 ELECTROCARDIOGRAM TRACING: CPT

## 2018-03-23 PROCEDURE — 71045 X-RAY EXAM CHEST 1 VIEW: CPT

## 2018-03-23 PROCEDURE — 87086 URINE CULTURE/COLONY COUNT: CPT

## 2018-03-23 PROCEDURE — 87389 HIV-1 AG W/HIV-1&-2 AB AG IA: CPT

## 2018-03-23 PROCEDURE — 36415 COLL VENOUS BLD VENIPUNCTURE: CPT

## 2018-03-23 PROCEDURE — 81001 URINALYSIS AUTO W/SCOPE: CPT

## 2018-03-23 PROCEDURE — 99223 1ST HOSP IP/OBS HIGH 75: CPT | Performed by: INTERNAL MEDICINE

## 2018-03-23 PROCEDURE — 87633 RESP VIRUS 12-25 TARGETS: CPT

## 2018-03-23 PROCEDURE — 87641 MR-STAPH DNA AMP PROBE: CPT

## 2018-03-23 PROCEDURE — 36600 WITHDRAWAL OF ARTERIAL BLOOD: CPT

## 2018-03-23 PROCEDURE — G0378 HOSPITAL OBSERVATION PER HR: HCPCS

## 2018-03-23 PROCEDURE — 87798 DETECT AGENT NOS DNA AMP: CPT

## 2018-03-23 PROCEDURE — 82947 ASSAY GLUCOSE BLOOD QUANT: CPT

## 2018-03-23 PROCEDURE — 87486 CHLMYD PNEUM DNA AMP PROBE: CPT

## 2018-03-23 PROCEDURE — 87899 AGENT NOS ASSAY W/OPTIC: CPT

## 2018-03-23 PROCEDURE — 84145 PROCALCITONIN (PCT): CPT

## 2018-03-23 PROCEDURE — 86140 C-REACTIVE PROTEIN: CPT

## 2018-03-23 PROCEDURE — G0379 DIRECT REFER HOSPITAL OBSERV: HCPCS

## 2018-03-23 PROCEDURE — 94640 AIRWAY INHALATION TREATMENT: CPT

## 2018-03-23 PROCEDURE — 6370000000 HC RX 637 (ALT 250 FOR IP): Performed by: RADIOLOGY

## 2018-03-23 PROCEDURE — 6370000000 HC RX 637 (ALT 250 FOR IP): Performed by: INTERNAL MEDICINE

## 2018-03-23 PROCEDURE — 87449 NOS EACH ORGANISM AG IA: CPT

## 2018-03-23 PROCEDURE — 85025 COMPLETE CBC W/AUTO DIFF WBC: CPT

## 2018-03-23 PROCEDURE — 87581 M.PNEUMON DNA AMP PROBE: CPT

## 2018-03-23 PROCEDURE — 99255 IP/OBS CONSLTJ NEW/EST HI 80: CPT | Performed by: INTERNAL MEDICINE

## 2018-03-23 PROCEDURE — 6360000002 HC RX W HCPCS: Performed by: RADIOLOGY

## 2018-03-23 PROCEDURE — 83605 ASSAY OF LACTIC ACID: CPT

## 2018-03-23 PROCEDURE — 87040 BLOOD CULTURE FOR BACTERIA: CPT

## 2018-03-23 PROCEDURE — 6360000002 HC RX W HCPCS: Performed by: FAMILY MEDICINE

## 2018-03-23 PROCEDURE — 80053 COMPREHEN METABOLIC PANEL: CPT

## 2018-03-23 PROCEDURE — 84484 ASSAY OF TROPONIN QUANT: CPT

## 2018-03-23 RX ORDER — NICOTINE 21 MG/24HR
1 PATCH, TRANSDERMAL 24 HOURS TRANSDERMAL DAILY
Status: DISCONTINUED | OUTPATIENT
Start: 2018-03-23 | End: 2018-03-25 | Stop reason: HOSPADM

## 2018-03-23 RX ORDER — PROMETHAZINE HYDROCHLORIDE 25 MG/ML
12.5 INJECTION, SOLUTION INTRAMUSCULAR; INTRAVENOUS EVERY 6 HOURS PRN
Status: DISCONTINUED | OUTPATIENT
Start: 2018-03-23 | End: 2018-03-25 | Stop reason: HOSPADM

## 2018-03-23 RX ORDER — ACETAMINOPHEN 325 MG/1
650 TABLET ORAL EVERY 4 HOURS PRN
Status: DISCONTINUED | OUTPATIENT
Start: 2018-03-23 | End: 2018-03-23

## 2018-03-23 RX ORDER — 0.9 % SODIUM CHLORIDE 0.9 %
1000 INTRAVENOUS SOLUTION INTRAVENOUS ONCE
Status: COMPLETED | OUTPATIENT
Start: 2018-03-23 | End: 2018-03-23

## 2018-03-23 RX ORDER — LORAZEPAM 1 MG/1
2 TABLET ORAL
Status: DISCONTINUED | OUTPATIENT
Start: 2018-03-23 | End: 2018-03-24

## 2018-03-23 RX ORDER — ACETAMINOPHEN 325 MG/1
650 TABLET ORAL EVERY 4 HOURS PRN
Status: DISCONTINUED | OUTPATIENT
Start: 2018-03-23 | End: 2018-03-25 | Stop reason: HOSPADM

## 2018-03-23 RX ORDER — SODIUM CHLORIDE 0.9 % (FLUSH) 0.9 %
10 SYRINGE (ML) INJECTION EVERY 12 HOURS SCHEDULED
Status: DISCONTINUED | OUTPATIENT
Start: 2018-03-23 | End: 2018-03-23 | Stop reason: SDUPTHER

## 2018-03-23 RX ORDER — SODIUM CHLORIDE 0.9 % (FLUSH) 0.9 %
10 SYRINGE (ML) INJECTION EVERY 12 HOURS SCHEDULED
Status: DISCONTINUED | OUTPATIENT
Start: 2018-03-23 | End: 2018-03-25 | Stop reason: HOSPADM

## 2018-03-23 RX ORDER — GUAIFENESIN 600 MG/1
600 TABLET, EXTENDED RELEASE ORAL 2 TIMES DAILY
Status: DISCONTINUED | OUTPATIENT
Start: 2018-03-23 | End: 2018-03-25 | Stop reason: HOSPADM

## 2018-03-23 RX ORDER — SODIUM CHLORIDE 0.9 % (FLUSH) 0.9 %
10 SYRINGE (ML) INJECTION PRN
Status: DISCONTINUED | OUTPATIENT
Start: 2018-03-23 | End: 2018-03-25 | Stop reason: HOSPADM

## 2018-03-23 RX ORDER — LORAZEPAM 2 MG/ML
2 INJECTION INTRAMUSCULAR
Status: DISCONTINUED | OUTPATIENT
Start: 2018-03-23 | End: 2018-03-24

## 2018-03-23 RX ORDER — METHYLPREDNISOLONE SODIUM SUCCINATE 40 MG/ML
40 INJECTION, POWDER, LYOPHILIZED, FOR SOLUTION INTRAMUSCULAR; INTRAVENOUS EVERY 6 HOURS
Status: DISCONTINUED | OUTPATIENT
Start: 2018-03-23 | End: 2018-03-25 | Stop reason: HOSPADM

## 2018-03-23 RX ORDER — OMEPRAZOLE 20 MG/1
20 CAPSULE, DELAYED RELEASE ORAL 2 TIMES DAILY
Status: ON HOLD | COMMUNITY
End: 2018-03-28 | Stop reason: HOSPADM

## 2018-03-23 RX ORDER — LORAZEPAM 1 MG/1
3 TABLET ORAL
Status: DISCONTINUED | OUTPATIENT
Start: 2018-03-23 | End: 2018-03-24

## 2018-03-23 RX ORDER — LORAZEPAM 1 MG/1
1 TABLET ORAL
Status: DISCONTINUED | OUTPATIENT
Start: 2018-03-23 | End: 2018-03-24

## 2018-03-23 RX ORDER — LORAZEPAM 2 MG/ML
3 INJECTION INTRAMUSCULAR
Status: DISCONTINUED | OUTPATIENT
Start: 2018-03-23 | End: 2018-03-24

## 2018-03-23 RX ORDER — SODIUM CHLORIDE 9 MG/ML
INJECTION, SOLUTION INTRAVENOUS CONTINUOUS
Status: DISCONTINUED | OUTPATIENT
Start: 2018-03-23 | End: 2018-03-25 | Stop reason: HOSPADM

## 2018-03-23 RX ORDER — LEVOFLOXACIN 5 MG/ML
500 INJECTION, SOLUTION INTRAVENOUS EVERY 24 HOURS
Status: DISCONTINUED | OUTPATIENT
Start: 2018-03-23 | End: 2018-03-25 | Stop reason: HOSPADM

## 2018-03-23 RX ORDER — LORAZEPAM 1 MG/1
4 TABLET ORAL
Status: DISCONTINUED | OUTPATIENT
Start: 2018-03-23 | End: 2018-03-24

## 2018-03-23 RX ORDER — NICOTINE 21 MG/24HR
1 PATCH, TRANSDERMAL 24 HOURS TRANSDERMAL DAILY
Status: DISCONTINUED | OUTPATIENT
Start: 2018-03-23 | End: 2018-03-23 | Stop reason: HOSPADM

## 2018-03-23 RX ORDER — BUPRENORPHINE AND NALOXONE 4; 1 MG/1; MG/1
1 FILM, SOLUBLE BUCCAL; SUBLINGUAL 2 TIMES DAILY
Status: DISCONTINUED | OUTPATIENT
Start: 2018-03-23 | End: 2018-03-24

## 2018-03-23 RX ORDER — FAMOTIDINE 20 MG/1
20 TABLET, FILM COATED ORAL 2 TIMES DAILY
Status: DISCONTINUED | OUTPATIENT
Start: 2018-03-23 | End: 2018-03-25 | Stop reason: HOSPADM

## 2018-03-23 RX ORDER — LORAZEPAM 2 MG/ML
1 INJECTION INTRAMUSCULAR
Status: DISCONTINUED | OUTPATIENT
Start: 2018-03-23 | End: 2018-03-24

## 2018-03-23 RX ORDER — LORAZEPAM 2 MG/ML
4 INJECTION INTRAMUSCULAR
Status: DISCONTINUED | OUTPATIENT
Start: 2018-03-23 | End: 2018-03-24

## 2018-03-23 RX ORDER — SODIUM CHLORIDE 0.9 % (FLUSH) 0.9 %
10 SYRINGE (ML) INJECTION PRN
Status: DISCONTINUED | OUTPATIENT
Start: 2018-03-23 | End: 2018-03-23 | Stop reason: SDUPTHER

## 2018-03-23 RX ORDER — IPRATROPIUM BROMIDE AND ALBUTEROL SULFATE 2.5; .5 MG/3ML; MG/3ML
1 SOLUTION RESPIRATORY (INHALATION)
Status: DISCONTINUED | OUTPATIENT
Start: 2018-03-23 | End: 2018-03-25 | Stop reason: HOSPADM

## 2018-03-23 RX ADMIN — GABAPENTIN 600 MG: 300 CAPSULE ORAL at 08:32

## 2018-03-23 RX ADMIN — SODIUM CHLORIDE: 9 INJECTION, SOLUTION INTRAVENOUS at 18:41

## 2018-03-23 RX ADMIN — VENLAFAXINE HYDROCHLORIDE 150 MG: 150 CAPSULE, EXTENDED RELEASE ORAL at 08:32

## 2018-03-23 RX ADMIN — BUPRENORPHINE HYDROCHLORIDE, NALOXONE HYDROCHLORIDE 1 FILM: 4; 1 FILM, SOLUBLE BUCCAL; SUBLINGUAL at 08:32

## 2018-03-23 RX ADMIN — PANTOPRAZOLE SODIUM 40 MG: 40 TABLET, DELAYED RELEASE ORAL at 08:32

## 2018-03-23 RX ADMIN — METHYLPREDNISOLONE SODIUM SUCCINATE 40 MG: 40 INJECTION, POWDER, FOR SOLUTION INTRAMUSCULAR; INTRAVENOUS at 18:42

## 2018-03-23 RX ADMIN — LITHIUM CARBONATE 450 MG: 450 TABLET, EXTENDED RELEASE ORAL at 08:32

## 2018-03-23 RX ADMIN — LORAZEPAM 1 MG: 1 TABLET ORAL at 22:29

## 2018-03-23 RX ADMIN — ENOXAPARIN SODIUM 40 MG: 40 INJECTION, SOLUTION INTRAVENOUS; SUBCUTANEOUS at 17:21

## 2018-03-23 RX ADMIN — SODIUM CHLORIDE 1000 ML: 9 INJECTION, SOLUTION INTRAVENOUS at 16:31

## 2018-03-23 RX ADMIN — CEFTRIAXONE SODIUM 1 G: 1 INJECTION, POWDER, FOR SOLUTION INTRAMUSCULAR; INTRAVENOUS at 18:44

## 2018-03-23 RX ADMIN — LEVOFLOXACIN 500 MG: 5 INJECTION, SOLUTION INTRAVENOUS at 21:37

## 2018-03-23 RX ADMIN — GUAIFENESIN 600 MG: 600 TABLET, EXTENDED RELEASE ORAL at 21:37

## 2018-03-23 RX ADMIN — MOMETASONE FUROATE AND FORMOTEROL FUMARATE DIHYDRATE 2 PUFF: 100; 5 AEROSOL RESPIRATORY (INHALATION) at 22:02

## 2018-03-23 RX ADMIN — FAMOTIDINE 20 MG: 20 TABLET, FILM COATED ORAL at 21:37

## 2018-03-23 RX ADMIN — IPRATROPIUM BROMIDE AND ALBUTEROL SULFATE 1 AMPULE: .5; 3 SOLUTION RESPIRATORY (INHALATION) at 19:36

## 2018-03-23 RX ADMIN — BUPRENORPHINE HYDROCHLORIDE, NALOXONE HYDROCHLORIDE 1 FILM: 4; 1 FILM, SOLUBLE BUCCAL; SUBLINGUAL at 21:38

## 2018-03-23 RX ADMIN — METHYLPREDNISOLONE SODIUM SUCCINATE 40 MG: 40 INJECTION, POWDER, FOR SOLUTION INTRAMUSCULAR; INTRAVENOUS at 21:38

## 2018-03-23 ASSESSMENT — PAIN DESCRIPTION - DESCRIPTORS
DESCRIPTORS: ACHING;DISCOMFORT
DESCRIPTORS: ACHING;DISCOMFORT

## 2018-03-23 ASSESSMENT — ENCOUNTER SYMPTOMS
NAUSEA: 1
DIARRHEA: 0
SHORTNESS OF BREATH: 1
VOMITING: 1
HEMOPTYSIS: 1
ABDOMINAL PAIN: 0
SINUS PAIN: 0
CONSTIPATION: 1
WHEEZING: 1
SPUTUM PRODUCTION: 1
COUGH: 1

## 2018-03-23 ASSESSMENT — PAIN DESCRIPTION - FREQUENCY
FREQUENCY: INTERMITTENT
FREQUENCY: INTERMITTENT

## 2018-03-23 ASSESSMENT — PAIN SCALES - GENERAL
PAINLEVEL_OUTOF10: 7
PAINLEVEL_OUTOF10: 0
PAINLEVEL_OUTOF10: 5

## 2018-03-23 ASSESSMENT — PAIN DESCRIPTION - LOCATION
LOCATION: GENERALIZED;SHOULDER
LOCATION: GENERALIZED;SHOULDER

## 2018-03-23 ASSESSMENT — PAIN DESCRIPTION - PAIN TYPE
TYPE: ACUTE PAIN
TYPE: ACUTE PAIN

## 2018-03-23 ASSESSMENT — PAIN DESCRIPTION - ORIENTATION
ORIENTATION: LEFT
ORIENTATION: LEFT

## 2018-03-23 ASSESSMENT — LIFESTYLE VARIABLES: HISTORY_ALCOHOL_USE: YES

## 2018-03-23 NOTE — CONSULTS
H&P not done. Patient discharged prior to being seen by H&P staff.      Patient admitted to [facility] 2049.     Iam Lynn PA-C on 3/23/2018 at 3:46 PM

## 2018-03-23 NOTE — PLAN OF CARE
Problem: Altered Mood, Depressive Behavior:  Goal: Ability to disclose and discuss suicidal ideas will improve  Ability to disclose and discuss suicidal ideas will improve   Outcome: Ongoing  During 1:1 talk time with writer, pt reports that she is feeling better than yesterday, but she feels if she were not in the hospital that she would have thoughts of harming herself. Pt instructed to seek assistance from staff should thoughts of self harm arise. Safety maintained per unit policy. Problem: Depressive Behavior With or Without Suicide Precautions:  Goal: Absence of self-harm  Absence of self-harm   Outcome: Ongoing  Pt remains free from self harm at this time. Pt denies SI when asked by Quentin Starkso. Will continue to monitor. Safety maintained per unit policy.

## 2018-03-23 NOTE — CONSULTS
[address] [facility]    Consult Note. Date:   3/23/2018  Patient name:  Renny Alicea  Date of admission:  3/22/2018 12:46 AM  MRN:   [id number]  YOB: 1975    Pt seen at the request of Luciana Dawn MD    CHIEF COMPLAINT:     History Obtained From:  Patient and chart review. HISTORY OF PRESENT ILLNESS:      The patient is a 38 y.o.  female who is admitted to the hospital for   sob wheeze cough, elevated wbc   fever    2 days    Past Medical History:   has a past medical history of Alcoholism ([address] Anxiety; Asthma; Depression; Polysubstance abuse; Suicide attempt; and Suicide ideation. Past Surgical History:   has a past surgical history that includes Gastric bypass surgery; Hysterectomy; Carpal tunnel release; Hysterectomy, total abdominal; and Cholecystectomy. Home Medications:    Prior to Admission medications    Medication Sig Start Date End Date Taking?  Authorizing Provider   lithium 300 MG capsule Take 300 mg by mouth 3 times daily (with meals)   Yes Historical Provider, MD   Venlafaxine HCl (EFFEXOR PO) Take by mouth   Yes Historical Provider, MD   ARIPiprazole (ABILIFY PO) Take by mouth   Yes Historical Provider, MD   TRAZODONE HCL PO Take by mouth   Yes Historical Provider, MD   traZODone (DESYREL) 100 MG tablet Take 1 tablet by mouth nightly 11/16/17  Yes Elan Malagon MD   venlafaxine (EFFEXOR XR) 150 MG extended release capsule Take 1 capsule by mouth daily (with breakfast) 11/16/17  Yes Elan Malagon MD   QUEtiapine (SEROQUEL) 200 MG tablet Take 1 tablet by mouth nightly 11/16/17  Yes Elan Malagon MD   nicotine (NICODERM CQ) 21 MG/24HR Place 1 patch onto the skin daily 11/16/17  Yes Elan Malagon MD   pantoprazole (PROTONIX) 40 MG tablet Take 1 tablet by mouth every morning (before breakfast) 11/16/17  Yes Elan Malagon MD   Multiple Vitamins-Minerals (THERAPEUTIC MULTIVITAMIN-MINERALS) tablet Take 1 tablet by mouth daily 11/5/17  Yes Janina Buerger, MD   ibuprofen (ADVIL;MOTRIN) 800 MG tablet Take 1 tablet by mouth every 8 hours as needed for Pain 12/16/17 12/21/17  Eli Lozano DO   lithium (LITHOBID) 300 MG extended release tablet Take 1 tablet by mouth every 12 hours for 14 days 12/3/17 12/17/17  Hiram Christianson MD   gabapentin (NEURONTIN) 600 MG tablet Take 1 tablet by mouth 3 times daily 11/16/17   Nadine Nielsen MD   citalopram (CELEXA) 20 MG tablet Take 1 tablet by mouth daily 11/16/17   Nadine Nielsen MD       Allergies:  Morphine; Azithromycin; Morphine; Nsaids; and Zithromax [azithromycin]    Social History:   reports that she has been smoking Cigarettes. She has a 45.00 pack-year smoking history. She has never used smokeless tobacco. She reports that she drinks alcohol. She reports that she uses drugs, including Marijuana, IV, and Cocaine, about 7 times per week. Family History: family history includes Brain Cancer in her mother; Cancer in her mother; Heart Disease in her father. REVIEW OF SYSTEMS:    · Constitutional: Negative for weight loss  · Eyes: Negative for visual changes, diplopia, scleral icterus. · ENT: Negative for Headaches, hearing loss, vertigo, mouth sores, sore throat. · Cardiovascular: Negative for lightheadedness/orthostatic symptoms ,chest pain, dyspnea on exertion, palpitations or loss of consciousness. · Respiratory:positivefor cough or wheezing, sputum production, hemoptysis, pleuritic pain. · Gastrointestinal: Negative for nausea/vomiting, change in bowel habits, abdominal pain, dysphagia/appetite loss, hematemesis, blood in stools. · Genitourinary:Negative for change in bladder habits, dysuria, trouble voiding, hematuria. · Musculoskeletal: Negative for gait disturbance, weakness, joint complaints. · Integumentary: Negative for rash, pruritis.   · Neurological: Negative for headache, dizziness, change in muscle strength, numbness/tingling, change in gait, balance, coordination,   · Endocrine: negative for temperature intolerance, excessive thirst, fluid intake, or urination, tremor. · Hematologic/Lymphatic: negative for abnormal bruising or bleeding, blood clots, swollen lymph nodes. · Allergic/Immunologic: negative for nasal congestion, pruritis, hives. PHYSICAL EXAM:    /66   Pulse 78   Temp 98.2 °F (36.8 °C) (Oral)   Resp 14   Ht 5' (1.524 m)   Wt 129 lb (58.5 kg)   SpO2 97%   BMI 25.19 kg/m²      · General appearance: well nourished  · HEENT: Head: Normocephalic, no lesions, without obvious abnormality. · Lungs: consolidation rll with increased fremitus and rales , decreased air entry  · Heart: regular rate and rhythm, S1, S2 normal, no murmur, click, rub or gallop  · Abdomen: soft, non-tender; bowel sounds normal; no masses,  no organomegaly  · Extremities: extremities normal, atraumatic, no cyanosis or edema  · Neurological: Gait normal. Reflexes normal and symmetric. Sensation grossly normal  · Skin - no rash, no lump   · Eye no icterus no redness  · Lymphatic system-no lymphadenopathy no splenomegaly       DIAGNOSTICS:    Laboratory Testing:  CBC:   Recent Labs      03/23/18   0711   WBC  12.4*   HGB  13.3   PLT  292     BMP:    Recent Labs      03/23/18   1146   NA  142   K  5.7*   CL  102   CO2  32*   BUN  6   CREATININE  0.73   GLUCOSE  105*     S. Calcium:  Recent Labs      03/23/18   1146   CALCIUM  8.9     S. Ionized Calcium:No results for input(s): IONCA in the last 72 hours. S. Magnesium:No results for input(s): MG in the last 72 hours. S. Phosphorus:No results for input(s): PHOS in the last 72 hours. S. Glucose:  Recent Labs      03/23/18   1138   POCGLU  107*     Glycosylated hemoglobin A1C: No results for input(s): LABA1C in the last 72 hours. INR: No results for input(s): INR in the last 72 hours.   Hepatic functions:   Recent Labs      03/23/18   1146   ALKPHOS  93   ALT  101*   AST  80* PROT  7.5   BILITOT  0.27*   LABALBU  3.8     Pancreatic functions:No results for input(s): LACTA, AMYLASE in the last 72 hours. S. Lactic Acid: No results for input(s): LACTA in the last 72 hours. Cardiac enzymes:No results for input(s): CKTOTAL, CKMB, CKMBINDEX, TROPONINI in the last 72 hours. BNP:No results for input(s): BNP in the last 72 hours. Lipid profile: No results for input(s): CHOL, TRIG, HDL, LDLCALC in the last 72 hours. Invalid input(s): LDL  Blood Gases: No results found for: PH, PCO2, PO2, HCO3, O2SAT  Thyroid functions:   Lab Results   Component Value Date    TSH 0.63 03/16/2018        Imaging/Diagonstics:      CXR: No acute cardiopulmonary findings. ASSESSMENT:    rml/rll pneumonia with pleural effusion     copd excerbation     ivda        PLAN:  Admit med surg   iv rocephin/azithro    thoracentesis   aerosols   iv steroids   blood cx   hiv          MD AMADOR Pond [address], [address] [place] [address].    Phone [phone]   Fax: [phone]  Answering Service: [phone]

## 2018-03-23 NOTE — BH NOTE
Pt at desk taking medication from nurse and complaining of not feeling right, pt stated she had taken her bedtime medication and just didn't feel right.  Writer encouraged pt to lay down, vitals 108/70

## 2018-03-23 NOTE — PROGRESS NOTES
Spoke to Dr Meaghan Yeager regarding patient stating she drinks 2 pints or a case of beer a day and telephone orders placed with read back for CIWA scale Ativan.

## 2018-03-23 NOTE — CARE COORDINATION
BHI Biopsychosocial Assessment    Current Level of Psychosocial Functioning     Independent   Dependent    Minimal Assist X    Comments:  Bipolar I Disorder, depressive type, recurrent; Alcohol Use Disorder; Cocaine Use Disorder; Opioid Use Disorder    Psychosocial High Risk Factors (check all that apply)    Unable to obtain meds   Chronic illness/pain    Substance abuse X  Lack of Family Support X  Financial stress   Isolation   Inadequate Community Resources X  Suicide attempt(s)   Not taking medications   Victim of crime   Developmental Delay  Unable to manage personal needs    Age 72 or older   Homeless X  No transportation   Readmission within 30 days  Unemployment X  Traumatic Event    Psychiatric Advanced Directives:  None reports    Family to Involve in Treatment: None    Sexual Orientation:  ALEXANDER    Patient Strengths: Medicaid [employer] Linked to [place]. Staying with a friend. SSDI    Patient Barriers:  PT had previous psychiatric hospitalization. PT has a history of Opioid Use, Severe; Alcohol Use Disorder; and, Cocaine Use Disorder. HX of suicidal ideation. Unstable housing. Inappropriate/potentially harmful leisure interests; difficulty problem solving/relies on others to help solve problems     Opiate Education Provided:  Reviewed and discussed with PT    CMHC/mental health history:  [facility]    Plan of Care   medication management, group/individual therapies, psycho -education, treatment team meetings to assist with stabilization    Initial Discharge Plan:  Return to live with friend; follow-up appointment at [place]; discuss options of in/out patient AOD treatment; recommend grief counseling at [facility] in [place] and free groups at [address] [facility] [address] Summary:  Writer meets with PT and attempts to completes biopsychosocial assessment. PT presents as drowsy and tired. Writer able to collect most information and collected from ED notes.   PT is A&O x4; endorces HX of

## 2018-03-23 NOTE — PROGRESS NOTES
Head-to-toe assessment completed.  PCT sitting in with patient at this time d/t patient admitting to being suicidal. Valorie Gastelum to return page

## 2018-03-23 NOTE — H&P
[address] [facility]    HISTORY AND PHYSICAL EXAMINATION            Date:   3/23/2018  Patient name:  Marci Miller  Date of admission:  3/23/2018  3:08 PM  MRN:   [id number]  Account:  [de-identified]  YOB: 1975  PCP:    No primary care provider on file. Room:   [address] [place] [address]  Code Status:    Full Code    Chief Complaint:     No chief complaint on file. SOB, productive cough, lethargy    History Obtained From:     patient    History of Present Illness: The patient is a 38 y.o.  female PMH bipolar disorder, asthma, polysubstance who presents with and she is admitted to the hospital for the management of  Pneumonia and copd exacerbation. Patient was admitted to [facility] yesterday for bipolar with suicidal ideation. Today, she was lethargic and not attending her [facility] meetings. Medicine was consulted and she is transferred to med/surg today for lethargy, SOB, productive cough, hemoptysis, wheezing. Per pt she has been having a productive cough for the last week. She also c/o left chest and shoulder pain which is new today. Chest pain is described as tightness associated with SOB but is reproducible. She does not use oxygen at home but does use inhalers. She continues to smoke (45 pack years) and admits to IV heroin, cocaine, cannabis, and alcohol abuse. She has h/o sleeve gastrectomy and has been having some nausea and vomiting the past week. PO intake has been poor. CXR shows consolidation of left middle lobe atlectasis vs PNA, and possible small left pleural effusion. WBC elevated at 12.4. BP 90/60 HR 84. She is requiring oxygen as SpO2 was 84.         Past Medical History:     Past Medical History:   Diagnosis Date    Alcoholism ([address]     3 pints daily    Anxiety     Asthma     Depression     Polysubstance abuse     drug abuse includes, cocaine, IV heroin, cannabis, and ETOH    Suicide attempt 11/08/2017    S/A by aortic aneurysm. Bones/Soft Tissues: No acute osseous findings identified. 1.  Borderline wall thickening of the proximal sigmoid and distal left colon. Finding could reflect artifact related to nondistention of the colon or mild colitis. The former is favored. 2.  Otherwise, no acute findings present. Xr Chest Portable    Result Date: 3/23/2018  EXAMINATION: SINGLE VIEW OF THE CHEST 3/23/2018 11:35 am COMPARISON: Two-view chest from 02/11/2018 HISTORY: ORDERING SYSTEM PROVIDED HISTORY: pain TECHNOLOGIST PROVIDED HISTORY: Reason for exam:->pain Ordering Physician Provided Reason for Exam: left shoulder pain, done supine due to pt. AMS Acuity: Unknown Type of Exam: Unknown History of asthma, polysubstance abuse, and alcoholism. FINDINGS: Supine study with low lung volumes accentuating findings ; crowding bronchovascular markings but likely unchanged cardiomediastinal shadow. Ill-defined bandlike opacity left mid lung and haziness left lateral base. Minimal blunting lateral left costophrenic angle. No large pleural effusion or pneumothorax. Clips status post cholecystectomy. Bones and soft tissues otherwise unchanged and intact. Low lung volumes with left-sided atelectasis versus pneumonia, and a possible small left pleural effusion ; correlate clinically. RECOMMENDATION: Follow-up two-view examination when feasible. Xr Chest Portable    Result Date: 3/16/2018  EXAMINATION: SINGLE VIEW OF THE CHEST 3/16/2018 6:58 pm COMPARISON: None. HISTORY: ORDERING SYSTEM PROVIDED HISTORY: ruq and epigastric abd pain for 4 days with guarding on exam TECHNOLOGIST PROVIDED HISTORY: Reason for exam:->ruq and epigastric abd pain for 4 days with guarding on exam FINDINGS: Cardiomediastinal silhouette and pulmonary vasculature are within normal limits. No focal airspace consolidation, pneumothorax, or pleural effusion. No free air beneath the diaphragm. No acute osseous abnormality. Cholecystectomy clips are noted. No acute intrathoracic process. Assessment :      Primary Problem  Pneumonia    Active Hospital Problems    Diagnosis Date Noted    Pneumonia [J18.9] 03/23/2018    Polysubstance abuse [F19.10] 03/23/2018    Heroin dependence ([facility] [F11.20] 11/28/2017    Bipolar affective disorder, current episode mixed ([facility] [F31.60] 11/15/2017    Smoker unmotivated to quit [F17.200] 04/25/2017    Asthma [J45.909] 04/25/2017       Plan:     Patient status Admit as observation in the  Med/Surge    CAP left middle lobe  - CXR: left middle lobe consolidation, small left pleural eff  - WBC 12.4, needing O2 NC  - lactic acid, ABG, procal, CRP  - BCx2, sputum Cx, UA, resp viral panel, mrsa swab, legionella, strep pneumo  - HIV screen  - EKG, trop q6  - IV rocephin and levaquin (tongue swelling with azithro)  - 1L NS bolus then [email or web address]    COPD/asthma exacerbation  - diminished breath sounds throughout  - IV solumedrol 40 q6  - dulera  - mucinex  - duonebs q4wa  - resp care eval and treat    Bipolar disorder  - psych consult for psych med reconciliation    GI PPx: pepcid 20 bid  DVT PPx: lovenox 40 qd    Consultations:   IP CONSULT TO SOCIAL WORK  IP CONSULT TO PSYCHIATRY     Patient is admitted as inpatient status because of co-morbidities listed above, severity of signs and symptoms as outlined, requirement for current medical therapies and most importantly because of direct risk to patient if care not provided in a hospital setting. Milda Sever, MD  3/23/2018  4:46 PM    Copy sent to Dr. Massey primary care provider on file.

## 2018-03-23 NOTE — PLAN OF CARE
details of how to kill yourself?  Do you intend to carry out this plan? :  (PT did not want to answer anymore questions)  6)  Have you ever done anything, started to do anything, or prepared to do anything to end your life?:  (PT did not want to answer anymore questions)  Change in Result no Change in Plan of care no      EDUCATION:   EDUCATION:   Learner Progress Toward Treatment Goals: Reviewed results and recommendations of this team, Reviewed group plan and strategies, Reviewed signs, symptoms and risk of self harm and violent behavior, Reviewed goals and plan of care    Method:small group, individual verbal education    Outcome:verbalized by patient, but needs reinforcement to obtain goals    PATIENT GOALS:  Short term: pt is unable to participate in team d/t not feeling well   Long term: pt is unable to participate in team d/t not feeling well    PLAN/TREATMENT RECOMMENDATIONS UPDATE: continue with group therapies, increased socialization, continue planning for after discharge goals, continue with medication compliance    SHORT-TERM GOALS UPDATE:   Time frame for Short-Term Goals: 5-7 days    LONG-TERM GOALS UPDATE:   Time frame for Long-Term Goals: 6 months  Members Present in Team Meeting: See Signature Sheet    KRYSTIAN RUBIO    Problem: Depressive Behavior With or Without Suicide Precautions:  Goal: Able to verbalize support systems  Able to verbalize support systems   Outcome: Ongoing    Goal: Absence of self-harm  Absence of self-harm   Outcome: Ongoing

## 2018-03-24 PROBLEM — E44.1 MILD MALNUTRITION (HCC): Status: ACTIVE | Noted: 2018-03-24

## 2018-03-24 LAB
ABSOLUTE EOS #: 0 K/UL (ref 0–0.4)
ABSOLUTE IMMATURE GRANULOCYTE: ABNORMAL K/UL (ref 0–0.3)
ABSOLUTE LYMPH #: 2.3 K/UL (ref 1–4.8)
ABSOLUTE MONO #: 0.95 K/UL (ref 0.1–1.3)
ADENOVIRUS PCR: NOT DETECTED
ANION GAP SERPL CALCULATED.3IONS-SCNC: 9 MMOL/L (ref 9–17)
BASOPHILS # BLD: 0 % (ref 0–2)
BASOPHILS ABSOLUTE: 0 K/UL (ref 0–0.2)
BORDETELLA PERTUSSIS PCR: NOT DETECTED
BUN BLDV-MCNC: 6 MG/DL (ref 6–20)
BUN/CREAT BLD: ABNORMAL (ref 9–20)
CALCIUM SERPL-MCNC: 8.9 MG/DL (ref 8.6–10.4)
CHLAMYDIA PNEUMONIAE BY PCR: NOT DETECTED
CHLORIDE BLD-SCNC: 103 MMOL/L (ref 98–107)
CO2: 27 MMOL/L (ref 20–31)
CORONAVIRUS 229E PCR: NOT DETECTED
CORONAVIRUS HKU1 PCR: NOT DETECTED
CORONAVIRUS NL63 PCR: NOT DETECTED
CORONAVIRUS OC43 PCR: NOT DETECTED
CREAT SERPL-MCNC: 0.58 MG/DL (ref 0.5–0.9)
DIFFERENTIAL TYPE: ABNORMAL
DIRECT EXAM: NORMAL
EOSINOPHILS RELATIVE PERCENT: 0 % (ref 0–4)
GFR AFRICAN AMERICAN: >60 ML/MIN
GFR NON-AFRICAN AMERICAN: >60 ML/MIN
GFR SERPL CREATININE-BSD FRML MDRD: ABNORMAL ML/MIN/{1.73_M2}
GFR SERPL CREATININE-BSD FRML MDRD: ABNORMAL ML/MIN/{1.73_M2}
GLUCOSE BLD-MCNC: 119 MG/DL (ref 70–99)
HCT VFR BLD CALC: 39.2 % (ref 36–46)
HEMOGLOBIN: 12.1 G/DL (ref 12–16)
HUMAN METAPNEUMOVIRUS PCR: NOT DETECTED
IMMATURE GRANULOCYTES: ABNORMAL %
INFLUENZA A BY PCR: NOT DETECTED
INFLUENZA A H1 (2009) PCR: NORMAL
INFLUENZA A H1 PCR: NORMAL
INFLUENZA A H3 PCR: NORMAL
INFLUENZA B BY PCR: NOT DETECTED
LYMPHOCYTES # BLD: 17 % (ref 24–44)
Lab: NORMAL
Lab: NORMAL
MCH RBC QN AUTO: 23.9 PG (ref 26–34)
MCHC RBC AUTO-ENTMCNC: 30.8 G/DL (ref 31–37)
MCV RBC AUTO: 77.7 FL (ref 80–100)
MONOCYTES # BLD: 7 % (ref 1–7)
MORPHOLOGY: ABNORMAL
MRSA, DNA, NASAL: NORMAL
MYCOPLASMA PNEUMONIAE PCR: NOT DETECTED
NRBC AUTOMATED: ABNORMAL PER 100 WBC
PARAINFLUENZA 1 PCR: NOT DETECTED
PARAINFLUENZA 2 PCR: NOT DETECTED
PARAINFLUENZA 3 PCR: NOT DETECTED
PARAINFLUENZA 4 PCR: NOT DETECTED
PDW BLD-RTO: 18.1 % (ref 11.5–14.9)
PLATELET # BLD: 257 K/UL (ref 150–450)
PLATELET ESTIMATE: ABNORMAL
PMV BLD AUTO: 8.2 FL (ref 6–12)
POTASSIUM SERPL-SCNC: 5 MMOL/L (ref 3.7–5.3)
RBC # BLD: 5.04 M/UL (ref 4–5.2)
RBC # BLD: ABNORMAL 10*6/UL
RESP SYNCYTIAL VIRUS PCR: NOT DETECTED
RHINO/ENTEROVIRUS PCR: NOT DETECTED
SEG NEUTROPHILS: 76 % (ref 36–66)
SEGMENTED NEUTROPHILS ABSOLUTE COUNT: 10.25 K/UL (ref 1.3–9.1)
SODIUM BLD-SCNC: 139 MMOL/L (ref 135–144)
SOURCE: NORMAL
SPECIMEN DESCRIPTION: NORMAL
STATUS: NORMAL
STATUS: NORMAL
TROPONIN INTERP: NORMAL
TROPONIN T: <0.03 NG/ML
WBC # BLD: 13.5 K/UL (ref 3.5–11)
WBC # BLD: ABNORMAL 10*3/UL

## 2018-03-24 PROCEDURE — 6370000000 HC RX 637 (ALT 250 FOR IP): Performed by: INTERNAL MEDICINE

## 2018-03-24 PROCEDURE — 6370000000 HC RX 637 (ALT 250 FOR IP): Performed by: PSYCHIATRY & NEUROLOGY

## 2018-03-24 PROCEDURE — 2580000003 HC RX 258: Performed by: RADIOLOGY

## 2018-03-24 PROCEDURE — G8980 MOBILITY D/C STATUS: HCPCS

## 2018-03-24 PROCEDURE — G8979 MOBILITY GOAL STATUS: HCPCS

## 2018-03-24 PROCEDURE — 84484 ASSAY OF TROPONIN QUANT: CPT

## 2018-03-24 PROCEDURE — 80048 BASIC METABOLIC PNL TOTAL CA: CPT

## 2018-03-24 PROCEDURE — 36415 COLL VENOUS BLD VENIPUNCTURE: CPT

## 2018-03-24 PROCEDURE — 6360000002 HC RX W HCPCS: Performed by: FAMILY MEDICINE

## 2018-03-24 PROCEDURE — 6370000000 HC RX 637 (ALT 250 FOR IP): Performed by: RADIOLOGY

## 2018-03-24 PROCEDURE — 97161 PT EVAL LOW COMPLEX 20 MIN: CPT

## 2018-03-24 PROCEDURE — 94640 AIRWAY INHALATION TREATMENT: CPT

## 2018-03-24 PROCEDURE — G8978 MOBILITY CURRENT STATUS: HCPCS

## 2018-03-24 PROCEDURE — 99232 SBSQ HOSP IP/OBS MODERATE 35: CPT | Performed by: INTERNAL MEDICINE

## 2018-03-24 PROCEDURE — 94761 N-INVAS EAR/PLS OXIMETRY MLT: CPT

## 2018-03-24 PROCEDURE — 6360000002 HC RX W HCPCS: Performed by: PSYCHIATRY & NEUROLOGY

## 2018-03-24 PROCEDURE — 85025 COMPLETE CBC W/AUTO DIFF WBC: CPT

## 2018-03-24 PROCEDURE — 87070 CULTURE OTHR SPECIMN AEROBIC: CPT

## 2018-03-24 PROCEDURE — 87205 SMEAR GRAM STAIN: CPT

## 2018-03-24 PROCEDURE — 1200000000 HC SEMI PRIVATE

## 2018-03-24 PROCEDURE — 6360000002 HC RX W HCPCS: Performed by: RADIOLOGY

## 2018-03-24 PROCEDURE — 6370000000 HC RX 637 (ALT 250 FOR IP): Performed by: FAMILY MEDICINE

## 2018-03-24 RX ORDER — VENLAFAXINE HYDROCHLORIDE 150 MG/1
150 CAPSULE, EXTENDED RELEASE ORAL
Status: DISCONTINUED | OUTPATIENT
Start: 2018-03-24 | End: 2018-03-25 | Stop reason: HOSPADM

## 2018-03-24 RX ORDER — BUPRENORPHINE AND NALOXONE 8; 2 MG/1; MG/1
1 FILM, SOLUBLE BUCCAL; SUBLINGUAL 2 TIMES DAILY
Status: DISCONTINUED | OUTPATIENT
Start: 2018-03-24 | End: 2018-03-24

## 2018-03-24 RX ORDER — QUETIAPINE FUMARATE 200 MG/1
200 TABLET, FILM COATED ORAL NIGHTLY
Status: DISCONTINUED | OUTPATIENT
Start: 2018-03-24 | End: 2018-03-25 | Stop reason: HOSPADM

## 2018-03-24 RX ORDER — LORAZEPAM 1 MG/1
1 TABLET ORAL EVERY 6 HOURS PRN
Status: DISCONTINUED | OUTPATIENT
Start: 2018-03-24 | End: 2018-03-25 | Stop reason: HOSPADM

## 2018-03-24 RX ORDER — BUPRENORPHINE AND NALOXONE 4; 1 MG/1; MG/1
1 FILM, SOLUBLE BUCCAL; SUBLINGUAL ONCE
Status: COMPLETED | OUTPATIENT
Start: 2018-03-24 | End: 2018-03-24

## 2018-03-24 RX ORDER — BUPRENORPHINE AND NALOXONE 8; 2 MG/1; MG/1
1 FILM, SOLUBLE BUCCAL; SUBLINGUAL 2 TIMES DAILY
Status: DISCONTINUED | OUTPATIENT
Start: 2018-03-24 | End: 2018-03-25 | Stop reason: HOSPADM

## 2018-03-24 RX ORDER — ONDANSETRON 2 MG/ML
4 INJECTION INTRAMUSCULAR; INTRAVENOUS EVERY 6 HOURS PRN
Status: DISCONTINUED | OUTPATIENT
Start: 2018-03-24 | End: 2018-03-25 | Stop reason: HOSPADM

## 2018-03-24 RX ADMIN — SODIUM CHLORIDE: 9 INJECTION, SOLUTION INTRAVENOUS at 06:29

## 2018-03-24 RX ADMIN — LORAZEPAM 1 MG: 1 TABLET ORAL at 17:09

## 2018-03-24 RX ADMIN — FAMOTIDINE 20 MG: 20 TABLET, FILM COATED ORAL at 21:17

## 2018-03-24 RX ADMIN — QUETIAPINE FUMARATE 200 MG: 200 TABLET ORAL at 21:19

## 2018-03-24 RX ADMIN — VENLAFAXINE HYDROCHLORIDE 150 MG: 150 CAPSULE, EXTENDED RELEASE ORAL at 13:24

## 2018-03-24 RX ADMIN — LEVOFLOXACIN 500 MG: 5 INJECTION, SOLUTION INTRAVENOUS at 18:55

## 2018-03-24 RX ADMIN — CEFTRIAXONE SODIUM 1 G: 1 INJECTION, POWDER, FOR SOLUTION INTRAMUSCULAR; INTRAVENOUS at 16:39

## 2018-03-24 RX ADMIN — FAMOTIDINE 20 MG: 20 TABLET, FILM COATED ORAL at 09:55

## 2018-03-24 RX ADMIN — ACETAMINOPHEN 650 MG: 325 TABLET, FILM COATED ORAL at 15:18

## 2018-03-24 RX ADMIN — METHYLPREDNISOLONE SODIUM SUCCINATE 40 MG: 40 INJECTION, POWDER, FOR SOLUTION INTRAMUSCULAR; INTRAVENOUS at 11:09

## 2018-03-24 RX ADMIN — GUAIFENESIN 600 MG: 600 TABLET, EXTENDED RELEASE ORAL at 09:55

## 2018-03-24 RX ADMIN — METHYLPREDNISOLONE SODIUM SUCCINATE 40 MG: 40 INJECTION, POWDER, FOR SOLUTION INTRAMUSCULAR; INTRAVENOUS at 06:01

## 2018-03-24 RX ADMIN — ENOXAPARIN SODIUM 40 MG: 40 INJECTION, SOLUTION INTRAVENOUS; SUBCUTANEOUS at 09:58

## 2018-03-24 RX ADMIN — LORAZEPAM 1 MG: 1 TABLET ORAL at 10:16

## 2018-03-24 RX ADMIN — BUPRENORPHINE HYDROCHLORIDE, NALOXONE HYDROCHLORIDE 1 FILM: 8; 2 FILM, SOLUBLE BUCCAL; SUBLINGUAL at 21:17

## 2018-03-24 RX ADMIN — BUPRENORPHINE HYDROCHLORIDE, NALOXONE HYDROCHLORIDE 1 FILM: 4; 1 FILM, SOLUBLE BUCCAL; SUBLINGUAL at 13:24

## 2018-03-24 RX ADMIN — BUPRENORPHINE HYDROCHLORIDE, NALOXONE HYDROCHLORIDE 1 FILM: 4; 1 FILM, SOLUBLE BUCCAL; SUBLINGUAL at 09:55

## 2018-03-24 RX ADMIN — MOMETASONE FUROATE AND FORMOTEROL FUMARATE DIHYDRATE 2 PUFF: 100; 5 AEROSOL RESPIRATORY (INHALATION) at 09:56

## 2018-03-24 RX ADMIN — METHYLPREDNISOLONE SODIUM SUCCINATE 40 MG: 40 INJECTION, POWDER, FOR SOLUTION INTRAMUSCULAR; INTRAVENOUS at 16:39

## 2018-03-24 RX ADMIN — LORAZEPAM 2 MG: 1 TABLET ORAL at 01:58

## 2018-03-24 RX ADMIN — METHYLPREDNISOLONE SODIUM SUCCINATE 40 MG: 40 INJECTION, POWDER, FOR SOLUTION INTRAMUSCULAR; INTRAVENOUS at 21:16

## 2018-03-24 RX ADMIN — GUAIFENESIN 600 MG: 600 TABLET, EXTENDED RELEASE ORAL at 21:16

## 2018-03-24 RX ADMIN — IPRATROPIUM BROMIDE AND ALBUTEROL SULFATE 1 AMPULE: .5; 3 SOLUTION RESPIRATORY (INHALATION) at 07:56

## 2018-03-24 RX ADMIN — ACETAMINOPHEN 650 MG: 325 TABLET, FILM COATED ORAL at 03:52

## 2018-03-24 ASSESSMENT — PAIN DESCRIPTION - LOCATION
LOCATION: HEAD
LOCATION: ABDOMEN

## 2018-03-24 ASSESSMENT — PAIN DESCRIPTION - FREQUENCY: FREQUENCY: CONTINUOUS

## 2018-03-24 ASSESSMENT — PAIN DESCRIPTION - ORIENTATION: ORIENTATION: RIGHT;LEFT;MID

## 2018-03-24 ASSESSMENT — PAIN DESCRIPTION - DESCRIPTORS
DESCRIPTORS: PRESSURE;CONSTANT
DESCRIPTORS: HEADACHE

## 2018-03-24 ASSESSMENT — PAIN DESCRIPTION - PAIN TYPE
TYPE: ACUTE PAIN
TYPE: ACUTE PAIN

## 2018-03-24 ASSESSMENT — PAIN SCALES - GENERAL
PAINLEVEL_OUTOF10: 0
PAINLEVEL_OUTOF10: 0
PAINLEVEL_OUTOF10: 4
PAINLEVEL_OUTOF10: 5
PAINLEVEL_OUTOF10: 0
PAINLEVEL_OUTOF10: 7

## 2018-03-24 ASSESSMENT — PAIN DESCRIPTION - ONSET: ONSET: UNABLE TO TELL

## 2018-03-24 NOTE — PROGRESS NOTES
[address]    Date:   3/24/2018  Patient name:  Renny Alicea  Date of admission:  3/23/2018  3:08 PM  MRN:   [id number]  YOB: 1975    CC- pneumonia    HPI-  Pt with sob now on oxygen  Known smoker copd   CXR suggestive of pneumonia     REVIEW OF SYSTEMS:    · General----negative for fatigue, weight loss  · GI negative for nausea and vomiting, no dysphagia       EXAM-  /68   Pulse 61   Temp 98.1 °F (36.7 °C)   Resp 17   Ht 5' (1.524 m)   Wt 133 lb 6.1 oz (60.5 kg)   SpO2 98%   BMI 26.05 kg/m²      · General appearance: NAD conversant  · Lungs: normal effort, clear to auscultation bilaterally,no wheeze. · Heart: regular rate and rhythm, S1, S2 normal, no murmur  · Abdomen: soft, non-tender; no masses, no organomegaly  · Extremities: no cyanosis, no edema no clubbing no synovitis      Laboratory Testing:  CBC:   Recent Labs      03/24/18   0701   WBC  13.5*   HGB  12.1   PLT  257     BMP:    Recent Labs      03/23/18   1146  03/24/18   0701   NA  142  139   K  5.7*  5.0   CL  102  103   CO2  32*  27   BUN  6  6   CREATININE  0.73  0.58   GLUCOSE  105*  119*         ASSESSMENT:    Patient Active Problem List   Diagnosis    History of gastric surgery (gastric sleeve 2012)    Smoker unmotivated to quit    Alcoholism /alcohol abuse ([address]    Pneumonia of both lower lobes due to infectious organism    Asthma    Sepsis ([address]    Bipolar affective disorder, current episode mixed ([address]    Heroin dependence ([address]    Chronic post-traumatic stress disorder (PTSD)    Confusion    Major depression, recurrent ([address]    Depressed bipolar I disorder ([address]    Pneumonia    Polysubstance abuse    Mild malnutrition ([address]       PLAN:  Pneumonia   Suicidal     Use  Oxygen to keep sat more than 90   Cont antb   Will follow    MD AMADOR Aguilera [address], [address] [place] [address].    Phone

## 2018-03-24 NOTE — H&P
[facility]    HISTORY AND PHYSICAL EXAMINATION            Date:   3/23/2018  Patient name:  Dania Phoenix  Date of admission:  3/23/2018  3:08 PM  MRN:   [id number]  Account:  [de-identified]  YOB: 1975  PCP:    No primary care provider on file. Room:   [address] [place] [address]  Code Status:    Full Code    Chief Complaint:     No chief complaint on file. SOB, productive cough, lethargy    History Obtained From:     patient    History of Present Illness: The patient is a 38 y.o.  female PMH bipolar disorder, asthma, polysubstance who presents with and she is admitted to the hospital for the management of  Pneumonia and copd exacerbation. Patient was admitted to [facility] yesterday for bipolar with suicidal ideation. Today, she was lethargic and not attending her [facility] meetings. Medicine was consulted and she is transferred to med/surg today for lethargy, SOB, productive cough, hemoptysis, wheezing. Per pt she has been having a productive cough for the last week. She also c/o left chest and shoulder pain which is new today. Chest pain is described as tightness associated with SOB but is reproducible. She does not use oxygen at home but does use inhalers. She continues to smoke (45 pack years) and admits to IV heroin, cocaine, cannabis, and alcohol abuse. She has h/o sleeve gastrectomy and has been having some nausea and vomiting the past week. PO intake has been poor. CXR shows consolidation of left middle lobe atlectasis vs PNA, and possible small left pleural effusion. WBC elevated at 12.4. BP 90/60 HR 84. She is requiring oxygen as SpO2 was 84.         Past Medical History:     Past Medical History:   Diagnosis Date    Alcoholism ([address]     3 pints daily    Anxiety     Asthma     Depression     Polysubstance abuse     drug abuse includes, cocaine, IV heroin, cannabis, and ETOH    Suicide attempt 11/08/2017    S/A by overdosing on Trazodone and Seroquel    Suicide ideation         Past Surgical History:     Past Surgical History:   Procedure Laterality Date    CARPAL TUNNEL RELEASE      CHOLECYSTECTOMY      GASTRIC BYPASS SURGERY      HYSTERECTOMY      HYSTERECTOMY, TOTAL ABDOMINAL          Medications Prior to Admission:     Prior to Admission medications    Medication Sig Start Date End Date Taking?  Authorizing Provider   omeprazole (PRILOSEC) 20 MG delayed release capsule Take 20 mg by mouth 2 times daily   Yes Historical Provider, MD   lithium 300 MG capsule Take 450 mg by mouth 3 times daily (with meals)    Yes Historical Provider, MD   ARIPiprazole (ABILIFY PO) Take by mouth   Yes Historical Provider, MD   gabapentin (NEURONTIN) 600 MG tablet Take 1 tablet by mouth 3 times daily 11/16/17  Yes Monica Hampton MD   traZODone (DESYREL) 100 MG tablet Take 1 tablet by mouth nightly 11/16/17  Yes Monica Hampton MD   venlafaxine (EFFEXOR XR) 150 MG extended release capsule Take 1 capsule by mouth daily (with breakfast) 11/16/17  Yes Monica Hampton MD   QUEtiapine (SEROQUEL) 200 MG tablet Take 1 tablet by mouth nightly 11/16/17  Yes Monica Hampton MD   Multiple Vitamins-Minerals (THERAPEUTIC MULTIVITAMIN-MINERALS) tablet Take 1 tablet by mouth daily 11/5/17  Yes Lizeth Gorman MD   Venlafaxine HCl (EFFEXOR PO) Take by mouth    Historical Provider, MD   TRAZODONE HCL PO Take by mouth    Historical Provider, MD   ibuprofen (ADVIL;MOTRIN) 800 MG tablet Take 1 tablet by mouth every 8 hours as needed for Pain 12/16/17 12/21/17  Alea Bradley DO   lithium (LITHOBID) 300 MG extended release tablet Take 1 tablet by mouth every 12 hours for 14 days 12/3/17 12/17/17  Nabil Edwards MD   citalopram (CELEXA) 20 MG tablet Take 1 tablet by mouth daily  Patient not taking: Reported on 3/23/2018 11/16/17   Monica Hampton MD   nicotine (NICODERM CQ) 21 MG/24HR Place 1 patch onto the skin daily 11/16/17   Monica Hampton MD pantoprazole (PROTONIX) 40 MG tablet Take 1 tablet by mouth every morning (before breakfast) 17   Elan Malagon MD        Allergies:     Morphine; Azithromycin; Morphine; Nsaids; and Zithromax [azithromycin]    Social History:     Tobacco:    reports that she has been smoking Cigarettes. She has a 30.00 pack-year smoking history. She has never used smokeless tobacco.  Alcohol:      reports that she drinks about 14.4 oz of alcohol per week . Drug Use:  reports that she uses drugs, including Marijuana, IV, and Cocaine, about 7 times per week. Family History:     Family History   Problem Relation Age of Onset    Brain Cancer Mother     Cancer Mother      \"female cancer\"    Heart Disease Father        Review of Systems:     Positive and Negative as described in HPI. Review of Systems   Constitutional: Positive for fever and malaise/fatigue. Negative for chills. HENT: Negative for congestion and sinus pain. Respiratory: Positive for cough, hemoptysis, sputum production, shortness of breath and wheezing. Cardiovascular: Positive for chest pain. Gastrointestinal: Positive for constipation, nausea and vomiting. Negative for abdominal pain and diarrhea. Genitourinary: Negative for dysuria and frequency. Psychiatric/Behavioral: Positive for depression and substance abuse.          Physical Exam:   /74   Pulse 104   Temp 99.1 °F (37.3 °C) (Oral)   Resp 16   Ht 5' (1.524 m)   Wt 128 lb 15.5 oz (58.5 kg)   SpO2 95%   BMI 25.19 kg/m²   Temp (24hrs), Av.7 °F (37.1 °C), Min:98.2 °F (36.8 °C), Max:99.1 °F (37.3 °C)    Recent Labs      18   1138   POCGLU  107*       Intake/Output Summary (Last 24 hours) at 18 2202  Last data filed at 18 2148   Gross per 24 hour   Intake              950 ml   Output             2000 ml   Net            -1050 ml       Physical Exam   Constitutional: She is oriented to person, place, and time and well-developed, well-nourished, and in and haziness left lateral base. Minimal blunting lateral left costophrenic angle. No large pleural effusion or pneumothorax. Clips status post cholecystectomy. Bones and soft tissues otherwise unchanged and intact. Low lung volumes with left-sided atelectasis versus pneumonia, and a possible small left pleural effusion ; correlate clinically. RECOMMENDATION: Follow-up two-view examination when feasible. Xr Chest Portable    Result Date: 3/16/2018  EXAMINATION: SINGLE VIEW OF THE CHEST 3/16/2018 6:58 pm COMPARISON: None. HISTORY: ORDERING SYSTEM PROVIDED HISTORY: ruq and epigastric abd pain for 4 days with guarding on exam TECHNOLOGIST PROVIDED HISTORY: Reason for exam:->ruq and epigastric abd pain for 4 days with guarding on exam FINDINGS: Cardiomediastinal silhouette and pulmonary vasculature are within normal limits. No focal airspace consolidation, pneumothorax, or pleural effusion. No free air beneath the diaphragm. No acute osseous abnormality. Cholecystectomy clips are noted. No acute intrathoracic process.          Assessment :      Primary Problem  Pneumonia    Active Hospital Problems    Diagnosis Date Noted    Pneumonia [J18.9] 03/23/2018    Polysubstance abuse [F19.10] 03/23/2018    Heroin dependence ([facility] [F11.20] 11/28/2017    Bipolar affective disorder, current episode mixed ([facility] [F31.60] 11/15/2017    Smoker unmotivated to quit [F17.200] 04/25/2017    Asthma [J45.909] 04/25/2017       Plan:     Patient status Admit as observation in the  Med/Surge    CAP left middle lobe  - CXR: left middle lobe consolidation, small left pleural eff  - WBC 12.4, needing O2 NC  - lactic acid, ABG, procal, CRP  - BCx2, sputum Cx, UA, resp viral panel, mrsa swab, legionella, strep pneumo  - HIV screen  - EKG, trop q6  - IV rocephin and levaquin (tongue swelling with azithro)  - 1L NS bolus then [email or web address]    COPD/asthma exacerbation  - diminished breath sounds throughout  - IV solumedrol 40 q6  -

## 2018-03-24 NOTE — PROGRESS NOTES
Physical Therapy    Facility/Department: [facility] MED SURG  Initial Assessment    NAME: Stephania Marrero  : 1975  MRN: [id number]    Date of Service: 3/24/2018    Patient Diagnosis(es): There were no encounter diagnoses. has a past medical history of Alcoholism ([address] Anxiety; Asthma; Depression; Polysubstance abuse; Suicide attempt; and Suicide ideation. has a past surgical history that includes Gastric bypass surgery; Hysterectomy; Carpal tunnel release; Hysterectomy, total abdominal; and Cholecystectomy. Vision/Hearing  Vision: Within Functional Limits  Hearing: Within functional limits       General  Chart Reviewed: Yes  Patient assessed for rehabilitation services?: Yes  Response To Previous Treatment: Not applicable  Family / Caregiver Present: No  Referring Practitioner: René Gaviria MD  Referral Date : 18  Diagnosis: Pneumonia  Follows Commands: Within Functional Limits     Subjective  Pain Screening  Patient Currently in Pain: Yes  Pain Assessment  Pain Assessment: 0-10  Pain Level: 7  Pain Type: Acute pain  Pain Location: Abdomen  Pain Orientation: Right;Left;Mid  Pain Descriptors: Pressure; Constant (Clenching )  Pain Frequency: Continuous  Pain Onset: Unable to tell  Pain Intervention(s): Medication (see eMar)  Response to Pain Intervention: Patient Satisfied  Orientation  Overall Orientation Status: Within Normal Limits  Social/Functional History  Lives With: Alone  Type of Home:  (@ [facility])  Ambulation Assistance: Independent  Transfer Assistance: Independent    Objective  Range of Motion  AROM RLE (degrees)  RLE AROM: WFL  AROM LLE (degrees)  LLE AROM : WFL  AROM RUE (degrees)  RUE AROM : WFL  AROM LUE (degrees)  LUE AROM : WFL  Strength  Strength RLE  Strength RLE: WFL  Strength LLE  Strength LLE: WFL  Strength RUE  Strength RUE: WFL  Strength LUE  Strength LUE: WFL  Bed mobility  Rolling to Left: Independent  Rolling to Right: Independent  Supine to Sit:

## 2018-03-24 NOTE — PROGRESS NOTES
Perfect serve to Dr Nikki Camilo regarding UA results of Large Leuko estrase and K+ of 5.7. Informed Dr that patient receiving Rocephin Q24H and Levaquin Q24H. Dr Nikki Camilo states to repeat BMP in AM and orders for daily BMP in place. No new orders at this time.

## 2018-03-25 ENCOUNTER — HOSPITAL ENCOUNTER (INPATIENT)
Age: 43
LOS: 3 days | Discharge: HOME | DRG: 753 | End: 2018-03-28
Attending: PSYCHIATRY & NEUROLOGY | Admitting: PSYCHIATRY & NEUROLOGY
Payer: MEDICARE

## 2018-03-25 VITALS
RESPIRATION RATE: 17 BRPM | OXYGEN SATURATION: 100 % | SYSTOLIC BLOOD PRESSURE: 128 MMHG | DIASTOLIC BLOOD PRESSURE: 82 MMHG | HEIGHT: 60 IN | HEART RATE: 72 BPM | BODY MASS INDEX: 25.75 KG/M2 | WEIGHT: 131.17 LBS | TEMPERATURE: 98.2 F

## 2018-03-25 DIAGNOSIS — F11.20 UNCOMPLICATED OPIOID DEPENDENCE (HCC): Primary | ICD-10-CM

## 2018-03-25 PROBLEM — F31.9 BIPOLAR 1 DISORDER (HCC): Status: ACTIVE | Noted: 2018-03-25

## 2018-03-25 LAB
ABSOLUTE EOS #: 0 K/UL (ref 0–0.4)
ABSOLUTE IMMATURE GRANULOCYTE: ABNORMAL K/UL (ref 0–0.3)
ABSOLUTE LYMPH #: 2.44 K/UL (ref 1–4.8)
ABSOLUTE MONO #: 1.39 K/UL (ref 0.1–1.3)
ANION GAP SERPL CALCULATED.3IONS-SCNC: 10 MMOL/L (ref 9–17)
BASOPHILS # BLD: 0 % (ref 0–2)
BASOPHILS ABSOLUTE: 0 K/UL (ref 0–0.2)
BUN BLDV-MCNC: 7 MG/DL (ref 6–20)
BUN/CREAT BLD: ABNORMAL (ref 9–20)
CALCIUM SERPL-MCNC: 9.3 MG/DL (ref 8.6–10.4)
CHLORIDE BLD-SCNC: 102 MMOL/L (ref 98–107)
CO2: 28 MMOL/L (ref 20–31)
CREAT SERPL-MCNC: 0.54 MG/DL (ref 0.5–0.9)
CULTURE: NORMAL
CULTURE: NORMAL
DIFFERENTIAL TYPE: ABNORMAL
EOSINOPHILS RELATIVE PERCENT: 0 % (ref 0–4)
GFR AFRICAN AMERICAN: >60 ML/MIN
GFR NON-AFRICAN AMERICAN: >60 ML/MIN
GFR SERPL CREATININE-BSD FRML MDRD: ABNORMAL ML/MIN/{1.73_M2}
GFR SERPL CREATININE-BSD FRML MDRD: ABNORMAL ML/MIN/{1.73_M2}
GLUCOSE BLD-MCNC: 135 MG/DL (ref 70–99)
HCT VFR BLD CALC: 42.9 % (ref 36–46)
HEMOGLOBIN: 13.3 G/DL (ref 12–16)
IMMATURE GRANULOCYTES: ABNORMAL %
LYMPHOCYTES # BLD: 14 % (ref 24–44)
Lab: NORMAL
MCH RBC QN AUTO: 24 PG (ref 26–34)
MCHC RBC AUTO-ENTMCNC: 31 G/DL (ref 31–37)
MCV RBC AUTO: 77.5 FL (ref 80–100)
MONOCYTES # BLD: 8 % (ref 1–7)
MORPHOLOGY: ABNORMAL
NRBC AUTOMATED: ABNORMAL PER 100 WBC
PDW BLD-RTO: 18.5 % (ref 11.5–14.9)
PLATELET # BLD: 313 K/UL (ref 150–450)
PLATELET ESTIMATE: ABNORMAL
PMV BLD AUTO: 8.3 FL (ref 6–12)
POTASSIUM SERPL-SCNC: 5 MMOL/L (ref 3.7–5.3)
RBC # BLD: 5.54 M/UL (ref 4–5.2)
RBC # BLD: ABNORMAL 10*6/UL
SEG NEUTROPHILS: 78 % (ref 36–66)
SEGMENTED NEUTROPHILS ABSOLUTE COUNT: 13.57 K/UL (ref 1.3–9.1)
SODIUM BLD-SCNC: 140 MMOL/L (ref 135–144)
SPECIMEN DESCRIPTION: NORMAL
SPECIMEN DESCRIPTION: NORMAL
STATUS: NORMAL
WBC # BLD: 17.4 K/UL (ref 3.5–11)
WBC # BLD: ABNORMAL 10*3/UL

## 2018-03-25 PROCEDURE — 6370000000 HC RX 637 (ALT 250 FOR IP): Performed by: RADIOLOGY

## 2018-03-25 PROCEDURE — 80048 BASIC METABOLIC PNL TOTAL CA: CPT

## 2018-03-25 PROCEDURE — 2580000003 HC RX 258: Performed by: RADIOLOGY

## 2018-03-25 PROCEDURE — 1240000000 HC EMOTIONAL WELLNESS R&B

## 2018-03-25 PROCEDURE — 6370000000 HC RX 637 (ALT 250 FOR IP): Performed by: PSYCHIATRY & NEUROLOGY

## 2018-03-25 PROCEDURE — 6360000002 HC RX W HCPCS: Performed by: FAMILY MEDICINE

## 2018-03-25 PROCEDURE — 6360000002 HC RX W HCPCS: Performed by: RADIOLOGY

## 2018-03-25 PROCEDURE — 85025 COMPLETE CBC W/AUTO DIFF WBC: CPT

## 2018-03-25 PROCEDURE — 99239 HOSP IP/OBS DSCHRG MGMT >30: CPT | Performed by: INTERNAL MEDICINE

## 2018-03-25 PROCEDURE — 6370000000 HC RX 637 (ALT 250 FOR IP): Performed by: INTERNAL MEDICINE

## 2018-03-25 PROCEDURE — 6360000002 HC RX W HCPCS: Performed by: PSYCHIATRY & NEUROLOGY

## 2018-03-25 PROCEDURE — 94640 AIRWAY INHALATION TREATMENT: CPT

## 2018-03-25 PROCEDURE — 6360000002 HC RX W HCPCS: Performed by: INTERNAL MEDICINE

## 2018-03-25 PROCEDURE — 6370000000 HC RX 637 (ALT 250 FOR IP): Performed by: FAMILY MEDICINE

## 2018-03-25 PROCEDURE — 94760 N-INVAS EAR/PLS OXIMETRY 1: CPT

## 2018-03-25 PROCEDURE — 36415 COLL VENOUS BLD VENIPUNCTURE: CPT

## 2018-03-25 PROCEDURE — 2580000003 HC RX 258: Performed by: INTERNAL MEDICINE

## 2018-03-25 RX ORDER — GUAIFENESIN 600 MG/1
600 TABLET, EXTENDED RELEASE ORAL 2 TIMES DAILY
Status: DISCONTINUED | OUTPATIENT
Start: 2018-03-25 | End: 2018-03-28 | Stop reason: HOSPADM

## 2018-03-25 RX ORDER — BUPRENORPHINE AND NALOXONE 8; 2 MG/1; MG/1
1 FILM, SOLUBLE BUCCAL; SUBLINGUAL 2 TIMES DAILY
Status: DISCONTINUED | OUTPATIENT
Start: 2018-03-25 | End: 2018-03-28 | Stop reason: HOSPADM

## 2018-03-25 RX ORDER — VENLAFAXINE HYDROCHLORIDE 150 MG/1
150 CAPSULE, EXTENDED RELEASE ORAL
Status: DISCONTINUED | OUTPATIENT
Start: 2018-03-26 | End: 2018-03-28 | Stop reason: HOSPADM

## 2018-03-25 RX ORDER — IPRATROPIUM BROMIDE AND ALBUTEROL SULFATE 2.5; .5 MG/3ML; MG/3ML
1 SOLUTION RESPIRATORY (INHALATION)
Status: DISCONTINUED | OUTPATIENT
Start: 2018-03-25 | End: 2018-03-28 | Stop reason: HOSPADM

## 2018-03-25 RX ORDER — PREDNISONE 20 MG/1
20 TABLET ORAL DAILY
Status: DISCONTINUED | OUTPATIENT
Start: 2018-03-25 | End: 2018-03-28 | Stop reason: HOSPADM

## 2018-03-25 RX ORDER — QUETIAPINE FUMARATE 200 MG/1
200 TABLET, FILM COATED ORAL NIGHTLY
Status: DISCONTINUED | OUTPATIENT
Start: 2018-03-25 | End: 2018-03-28 | Stop reason: HOSPADM

## 2018-03-25 RX ORDER — ACETAMINOPHEN 325 MG/1
650 TABLET ORAL EVERY 6 HOURS PRN
Status: DISCONTINUED | OUTPATIENT
Start: 2018-03-25 | End: 2018-03-28 | Stop reason: HOSPADM

## 2018-03-25 RX ORDER — LEVOFLOXACIN 500 MG/1
500 TABLET, FILM COATED ORAL DAILY
Status: CANCELLED | OUTPATIENT
Start: 2018-03-25 | End: 2018-04-01

## 2018-03-25 RX ORDER — MAGNESIUM HYDROXIDE/ALUMINUM HYDROXICE/SIMETHICONE 120; 1200; 1200 MG/30ML; MG/30ML; MG/30ML
30 SUSPENSION ORAL 2 TIMES DAILY PRN
Status: DISCONTINUED | OUTPATIENT
Start: 2018-03-25 | End: 2018-03-28 | Stop reason: HOSPADM

## 2018-03-25 RX ORDER — LORAZEPAM 1 MG/1
1 TABLET ORAL 2 TIMES DAILY PRN
Status: DISPENSED | OUTPATIENT
Start: 2018-03-25 | End: 2018-03-27

## 2018-03-25 RX ORDER — GUAIFENESIN 600 MG/1
600 TABLET, EXTENDED RELEASE ORAL 2 TIMES DAILY
Status: CANCELLED | OUTPATIENT
Start: 2018-03-25

## 2018-03-25 RX ORDER — TRAZODONE HYDROCHLORIDE 50 MG/1
50 TABLET ORAL NIGHTLY PRN
Status: DISCONTINUED | OUTPATIENT
Start: 2018-03-25 | End: 2018-03-28 | Stop reason: HOSPADM

## 2018-03-25 RX ORDER — ACETAMINOPHEN 325 MG/1
650 TABLET ORAL EVERY 4 HOURS PRN
Status: CANCELLED | OUTPATIENT
Start: 2018-03-25

## 2018-03-25 RX ORDER — BUPRENORPHINE AND NALOXONE 8; 2 MG/1; MG/1
1 FILM, SOLUBLE BUCCAL; SUBLINGUAL 2 TIMES DAILY
Status: CANCELLED | OUTPATIENT
Start: 2018-03-25

## 2018-03-25 RX ORDER — HALOPERIDOL 5 MG/ML
5 INJECTION INTRAMUSCULAR 3 TIMES DAILY PRN
Status: DISCONTINUED | OUTPATIENT
Start: 2018-03-25 | End: 2018-03-28 | Stop reason: HOSPADM

## 2018-03-25 RX ORDER — IPRATROPIUM BROMIDE AND ALBUTEROL SULFATE 2.5; .5 MG/3ML; MG/3ML
1 SOLUTION RESPIRATORY (INHALATION)
Status: CANCELLED | OUTPATIENT
Start: 2018-03-25

## 2018-03-25 RX ORDER — BENZTROPINE MESYLATE 1 MG/ML
2 INJECTION INTRAMUSCULAR; INTRAVENOUS DAILY PRN
Status: DISCONTINUED | OUTPATIENT
Start: 2018-03-25 | End: 2018-03-28 | Stop reason: HOSPADM

## 2018-03-25 RX ORDER — LEVOFLOXACIN 500 MG/1
500 TABLET, FILM COATED ORAL DAILY
Status: DISCONTINUED | OUTPATIENT
Start: 2018-03-25 | End: 2018-03-28 | Stop reason: HOSPADM

## 2018-03-25 RX ORDER — VENLAFAXINE HYDROCHLORIDE 150 MG/1
150 CAPSULE, EXTENDED RELEASE ORAL
Status: CANCELLED | OUTPATIENT
Start: 2018-03-26

## 2018-03-25 RX ORDER — PREDNISONE 20 MG/1
20 TABLET ORAL DAILY
Status: CANCELLED | OUTPATIENT
Start: 2018-03-25 | End: 2018-04-01

## 2018-03-25 RX ORDER — ACETAMINOPHEN 325 MG/1
650 TABLET ORAL EVERY 4 HOURS PRN
Status: DISCONTINUED | OUTPATIENT
Start: 2018-03-25 | End: 2018-03-25 | Stop reason: SDUPTHER

## 2018-03-25 RX ORDER — QUETIAPINE FUMARATE 200 MG/1
200 TABLET, FILM COATED ORAL NIGHTLY
Status: CANCELLED | OUTPATIENT
Start: 2018-03-25

## 2018-03-25 RX ADMIN — MOMETASONE FUROATE AND FORMOTEROL FUMARATE DIHYDRATE 2 PUFF: 100; 5 AEROSOL RESPIRATORY (INHALATION) at 20:56

## 2018-03-25 RX ADMIN — NICOTINE POLACRILEX 2 MG: 2 GUM, CHEWING BUCCAL at 19:51

## 2018-03-25 RX ADMIN — ACETAMINOPHEN 650 MG: 325 TABLET, FILM COATED ORAL at 19:51

## 2018-03-25 RX ADMIN — FAMOTIDINE 20 MG: 20 TABLET, FILM COATED ORAL at 08:31

## 2018-03-25 RX ADMIN — PREDNISONE 20 MG: 20 TABLET ORAL at 17:22

## 2018-03-25 RX ADMIN — Medication 10 ML: at 08:50

## 2018-03-25 RX ADMIN — VENLAFAXINE HYDROCHLORIDE 150 MG: 150 CAPSULE, EXTENDED RELEASE ORAL at 08:33

## 2018-03-25 RX ADMIN — ACETAMINOPHEN 650 MG: 325 TABLET, FILM COATED ORAL at 12:34

## 2018-03-25 RX ADMIN — NICOTINE POLACRILEX 2 MG: 2 GUM, CHEWING BUCCAL at 20:57

## 2018-03-25 RX ADMIN — IPRATROPIUM BROMIDE AND ALBUTEROL SULFATE 1 AMPULE: .5; 3 SOLUTION RESPIRATORY (INHALATION) at 21:19

## 2018-03-25 RX ADMIN — MOMETASONE FUROATE AND FORMOTEROL FUMARATE DIHYDRATE 2 PUFF: 100; 5 AEROSOL RESPIRATORY (INHALATION) at 08:47

## 2018-03-25 RX ADMIN — METHYLPREDNISOLONE SODIUM SUCCINATE 40 MG: 40 INJECTION, POWDER, FOR SOLUTION INTRAMUSCULAR; INTRAVENOUS at 06:09

## 2018-03-25 RX ADMIN — GUAIFENESIN 600 MG: 600 TABLET, EXTENDED RELEASE ORAL at 08:32

## 2018-03-25 RX ADMIN — SODIUM CHLORIDE: 9 INJECTION, SOLUTION INTRAVENOUS at 03:51

## 2018-03-25 RX ADMIN — BUPRENORPHINE HYDROCHLORIDE, NALOXONE HYDROCHLORIDE 1 FILM: 8; 2 FILM, SOLUBLE BUCCAL; SUBLINGUAL at 20:57

## 2018-03-25 RX ADMIN — LORAZEPAM 1 MG: 1 TABLET ORAL at 20:57

## 2018-03-25 RX ADMIN — QUETIAPINE FUMARATE 200 MG: 200 TABLET ORAL at 20:57

## 2018-03-25 RX ADMIN — BUPRENORPHINE HYDROCHLORIDE, NALOXONE HYDROCHLORIDE 1 FILM: 8; 2 FILM, SOLUBLE BUCCAL; SUBLINGUAL at 08:30

## 2018-03-25 RX ADMIN — GUAIFENESIN 600 MG: 600 TABLET, EXTENDED RELEASE ORAL at 21:01

## 2018-03-25 RX ADMIN — ENOXAPARIN SODIUM 40 MG: 40 INJECTION, SOLUTION INTRAVENOUS; SUBCUTANEOUS at 08:30

## 2018-03-25 RX ADMIN — TRAZODONE HYDROCHLORIDE 50 MG: 50 TABLET ORAL at 20:57

## 2018-03-25 RX ADMIN — LORAZEPAM 1 MG: 1 TABLET ORAL at 08:44

## 2018-03-25 RX ADMIN — LEVOFLOXACIN 500 MG: 500 TABLET, FILM COATED ORAL at 17:22

## 2018-03-25 ASSESSMENT — PAIN SCALES - GENERAL
PAINLEVEL_OUTOF10: 0
PAINLEVEL_OUTOF10: 5
PAINLEVEL_OUTOF10: 0
PAINLEVEL_OUTOF10: 0
PAINLEVEL_OUTOF10: 3
PAINLEVEL_OUTOF10: 0
PAINLEVEL_OUTOF10: 6
PAINLEVEL_OUTOF10: 8

## 2018-03-25 ASSESSMENT — SLEEP AND FATIGUE QUESTIONNAIRES
RESTFUL SLEEP: NO
DIFFICULTY FALLING ASLEEP: YES
DIFFICULTY STAYING ASLEEP: YES
DO YOU HAVE DIFFICULTY SLEEPING: YES
SLEEP PATTERN: DISTURBED/INTERRUPTED SLEEP;DIFFICULTY FALLING ASLEEP
DIFFICULTY ARISING: NO
DO YOU USE A SLEEP AID: YES
AVERAGE NUMBER OF SLEEP HOURS: 4

## 2018-03-25 ASSESSMENT — PAIN DESCRIPTION - LOCATION
LOCATION: ABDOMEN
LOCATION: ABDOMEN

## 2018-03-25 ASSESSMENT — PATIENT HEALTH QUESTIONNAIRE - PHQ9: SUM OF ALL RESPONSES TO PHQ QUESTIONS 1-9: 17

## 2018-03-25 ASSESSMENT — LIFESTYLE VARIABLES: HISTORY_ALCOHOL_USE: YES

## 2018-03-25 NOTE — PLAN OF CARE
Problem: Tobacco Use:  Goal: Inpatient tobacco use cessation counseling participation  Inpatient tobacco use cessation counseling participation  Outcome: Ongoing  Patient given tobacco quitline number 59048907183 at this time, refusing to call at this time, states \" I just dont want to quit now\"- patient given information as to the dangers of long term tobacco use. Continue to reinforce the importance of tobacco cessation.

## 2018-03-25 NOTE — BH NOTE
`[facility]  Admission Note     Admission Type:   Admission Type: Voluntary    Reason for admission:  Reason for Admission: +SI with plan to OD    PATIENT STRENGTHS:  Strengths: Connection to output provider, Social Skills    Patient Strengths and Limitations:  Limitations: Apathetic / unmotivated, Hopeless about future    Addictive Behavior:   Addictive Behavior  In the past 3 months, have you felt or has someone told you that you have a problem with:  : None  Do you have a history of Chemical Use?: No  Do you have a history of Alcohol Use?: Yes  Do you have a history of Street Drug Abuse?: Yes  Histroy of Prescripton Drug Abuse?: No    Medical Problems:   Past Medical History:   Diagnosis Date    Alcoholism ([facility] [address]     3 pints daily    Anxiety     Asthma     Depression     Pneumonia     Polysubstance abuse     drug abuse includes, cocaine, IV heroin, cannabis, and ETOH    Suicide attempt 11/08/2017    S/A by overdosing on Trazodone and Seroquel    Suicide ideation        Status EXAM:  Status and Exam  Normal: No  Facial Expression: Flat  Affect: Blunt  Level of Consciousness: Alert  Mood:Normal: No  Mood: Depressed, Anxious  Motor Activity:Normal: No  Motor Activity: Decreased  Interview Behavior: Cooperative  Preception: Skipwith to Person, Skipwith to Time, Skipwith to Place, Skipwith to Situation  Attention:Normal: Yes  Thought Processes: Circumstantial  Thought Content:Normal: No  Thought Content: Poverty of Content  Hallucinations: None  Delusions: No  Memory:Normal: Yes  Insight and Judgment: No  Insight and Judgment: Poor Judgment, Poor Insight  Present Suicidal Ideation: No  Present Homicidal Ideation: No    Tobacco Screening:  Practical Counseling, on admission, ainsley X, if applicable and completed (first 3 are required if patient doesn't refuse):             (x )  Recognizing danger situations (included triggers and roadblocks)                    (x )  Coping skills (new ways to manage

## 2018-03-25 NOTE — DISCHARGE INSTR - DIET

## 2018-03-25 NOTE — CARE COORDINATION
ONGOING DISCHARGE PLAN:    Plan remains for Pt. To admit to [facility], when medically stable. Psych is following for this. Pt. Remains on IV Levaquin/Rocephin & steroids 40 Q 6. Will continue to follow for additional discharge needs.     Electronically signed by Marcia Drew RN on 3/25/2018 at 10:05 AM

## 2018-03-25 NOTE — DISCHARGE SUMMARY
Internal Medicine   Discharge Summary         Patient Identification:  Rosana Gonzalez is a 38 y.o. female. :  1975  MRN: [id number]     Acct: [de-identified]   Admit Date:  3/23/2018  Discharge date and time: No discharge date for patient encounter.      Attending Provider: Nicole Alcala MD                                       Reason for Admission: pneumonia copd exacerbation     Discharge Diagnoses:   Patient Active Problem List   Diagnosis    History of gastric surgery (gastric sleeve )    Smoker unmotivated to quit    Alcoholism /alcohol abuse ([address]    Pneumonia of both lower lobes due to infectious organism    Asthma    Sepsis ([address]    Bipolar affective disorder, current episode mixed ([address]    Heroin dependence ([address]    Chronic post-traumatic stress disorder (PTSD)    Confusion    Major depression, recurrent ([address]    Depressed bipolar I disorder ([address]    Pneumonia    Polysubstance abuse    Mild malnutrition ([address]          Consults:  Psychiatry     Procedures:     Hospital Course:   Improved sob sat 100 on room air   Leukocytosis due to steroids     Disposition:   psychiatric floor     Discharged Condition:  Stable     Discharge Medications:       Иван Canela   Home Medication Instructions FCQ:[id number]    Printed on:18 1232   Medication Information                      ARIPiprazole (ABILIFY PO)  Take by mouth             citalopram (CELEXA) 20 MG tablet  Take 1 tablet by mouth daily             gabapentin (NEURONTIN) 600 MG tablet  Take 1 tablet by mouth 3 times daily             ibuprofen (ADVIL;MOTRIN) 800 MG tablet  Take 1 tablet by mouth every 8 hours as needed for Pain             lithium (LITHOBID) 300 MG extended release tablet  Take 1 tablet by mouth every 12 hours for 14 days             lithium 300 MG capsule  Take 450 mg by mouth 3 times daily (with meals)              Multiple Vitamins-Minerals (THERAPEUTIC MULTIVITAMIN-MINERALS) tablet  Take 1 tablet by

## 2018-03-26 LAB
CULTURE: ABNORMAL
CULTURE: ABNORMAL
DIRECT EXAM: ABNORMAL
Lab: ABNORMAL
SPECIMEN DESCRIPTION: ABNORMAL
SPECIMEN DESCRIPTION: ABNORMAL
STATUS: ABNORMAL

## 2018-03-26 PROCEDURE — 1240000000 HC EMOTIONAL WELLNESS R&B

## 2018-03-26 PROCEDURE — 6370000000 HC RX 637 (ALT 250 FOR IP): Performed by: PSYCHIATRY & NEUROLOGY

## 2018-03-26 PROCEDURE — 94640 AIRWAY INHALATION TREATMENT: CPT

## 2018-03-26 PROCEDURE — 6370000000 HC RX 637 (ALT 250 FOR IP): Performed by: INTERNAL MEDICINE

## 2018-03-26 PROCEDURE — 6360000002 HC RX W HCPCS: Performed by: INTERNAL MEDICINE

## 2018-03-26 PROCEDURE — 94761 N-INVAS EAR/PLS OXIMETRY MLT: CPT

## 2018-03-26 RX ORDER — GABAPENTIN 400 MG/1
400 CAPSULE ORAL 3 TIMES DAILY
Status: DISCONTINUED | OUTPATIENT
Start: 2018-03-26 | End: 2018-03-26

## 2018-03-26 RX ORDER — GABAPENTIN 300 MG/1
600 CAPSULE ORAL 3 TIMES DAILY
Status: DISCONTINUED | OUTPATIENT
Start: 2018-03-26 | End: 2018-03-28 | Stop reason: HOSPADM

## 2018-03-26 RX ADMIN — NICOTINE POLACRILEX 2 MG: 2 GUM, CHEWING BUCCAL at 20:46

## 2018-03-26 RX ADMIN — NICOTINE POLACRILEX 2 MG: 2 GUM, CHEWING BUCCAL at 08:50

## 2018-03-26 RX ADMIN — LEVOFLOXACIN 500 MG: 500 TABLET, FILM COATED ORAL at 08:44

## 2018-03-26 RX ADMIN — GUAIFENESIN 600 MG: 600 TABLET, EXTENDED RELEASE ORAL at 20:46

## 2018-03-26 RX ADMIN — QUETIAPINE FUMARATE 200 MG: 200 TABLET ORAL at 20:46

## 2018-03-26 RX ADMIN — NICOTINE POLACRILEX 2 MG: 2 GUM, CHEWING BUCCAL at 19:44

## 2018-03-26 RX ADMIN — IPRATROPIUM BROMIDE AND ALBUTEROL SULFATE 1 AMPULE: .5; 3 SOLUTION RESPIRATORY (INHALATION) at 08:33

## 2018-03-26 RX ADMIN — GUAIFENESIN 600 MG: 600 TABLET, EXTENDED RELEASE ORAL at 08:44

## 2018-03-26 RX ADMIN — TRAZODONE HYDROCHLORIDE 50 MG: 50 TABLET ORAL at 20:45

## 2018-03-26 RX ADMIN — LORAZEPAM 1 MG: 1 TABLET ORAL at 19:43

## 2018-03-26 RX ADMIN — IPRATROPIUM BROMIDE AND ALBUTEROL SULFATE 1 AMPULE: .5; 3 SOLUTION RESPIRATORY (INHALATION) at 12:14

## 2018-03-26 RX ADMIN — GABAPENTIN 600 MG: 300 CAPSULE ORAL at 14:15

## 2018-03-26 RX ADMIN — ACETAMINOPHEN 650 MG: 325 TABLET, FILM COATED ORAL at 15:32

## 2018-03-26 RX ADMIN — GABAPENTIN 600 MG: 300 CAPSULE ORAL at 20:46

## 2018-03-26 RX ADMIN — NICOTINE POLACRILEX 2 MG: 2 GUM, CHEWING BUCCAL at 15:32

## 2018-03-26 RX ADMIN — IPRATROPIUM BROMIDE AND ALBUTEROL SULFATE 1 AMPULE: .5; 3 SOLUTION RESPIRATORY (INHALATION) at 15:50

## 2018-03-26 RX ADMIN — ACETAMINOPHEN 650 MG: 325 TABLET, FILM COATED ORAL at 20:45

## 2018-03-26 RX ADMIN — VENLAFAXINE HYDROCHLORIDE 150 MG: 150 CAPSULE, EXTENDED RELEASE ORAL at 08:44

## 2018-03-26 RX ADMIN — MOMETASONE FUROATE AND FORMOTEROL FUMARATE DIHYDRATE 2 PUFF: 100; 5 AEROSOL RESPIRATORY (INHALATION) at 20:45

## 2018-03-26 RX ADMIN — GABAPENTIN 400 MG: 400 CAPSULE ORAL at 09:19

## 2018-03-26 RX ADMIN — BUPRENORPHINE HYDROCHLORIDE, NALOXONE HYDROCHLORIDE 1 FILM: 8; 2 FILM, SOLUBLE BUCCAL; SUBLINGUAL at 08:44

## 2018-03-26 RX ADMIN — PREDNISONE 20 MG: 20 TABLET ORAL at 08:43

## 2018-03-26 RX ADMIN — MOMETASONE FUROATE AND FORMOTEROL FUMARATE DIHYDRATE 2 PUFF: 100; 5 AEROSOL RESPIRATORY (INHALATION) at 08:44

## 2018-03-26 RX ADMIN — IPRATROPIUM BROMIDE AND ALBUTEROL SULFATE 1 AMPULE: .5; 3 SOLUTION RESPIRATORY (INHALATION) at 21:16

## 2018-03-26 RX ADMIN — BUPRENORPHINE HYDROCHLORIDE, NALOXONE HYDROCHLORIDE 1 FILM: 8; 2 FILM, SOLUBLE BUCCAL; SUBLINGUAL at 20:45

## 2018-03-26 ASSESSMENT — PAIN SCALES - GENERAL
PAINLEVEL_OUTOF10: 3
PAINLEVEL_OUTOF10: 0
PAINLEVEL_OUTOF10: 2
PAINLEVEL_OUTOF10: 0
PAINLEVEL_OUTOF10: 3
PAINLEVEL_OUTOF10: 0

## 2018-03-26 ASSESSMENT — PAIN DESCRIPTION - PAIN TYPE: TYPE: ACUTE PAIN

## 2018-03-26 NOTE — PLAN OF CARE
Problem: Depressive Behavior With or Without Suicide Precautions:  Goal: Able to verbalize acceptance of life and situations over which he or she has no control  Able to verbalize acceptance of life and situations over which he or she has no control   Outcome: Ongoing  Pt did not attend Therapeutic Recreation at 1430 d/t resting in room despite staff invitation to attend.

## 2018-03-26 NOTE — PLAN OF CARE
Problem: Depressive Behavior With or Without Suicide Precautions:  Goal: Absence of self-harm  Absence of self-harm   Outcome: Ongoing  Pt complains of fleeting suicidal ideations upon request this morning. Denies any specific plans but reports she feels overwhelmed with recent loses and does not feel like she could be safe or sober if discharged. Support and reassurance given. Continues to complain of feeling lethargic from medical issues. Encouraged to attend unit programing and take medications as prescribed. Agrees to seek out staff as needed and before harming self if negative self harm thoughts arise. Q15 minute checks for safety cont.

## 2018-03-26 NOTE — H&P
HISTORY and [id number]       NAME:  Yair Beckman  MRN: [id number]   YOB: 1975   Date: 3/26/2018   Age: 38 y.o. Gender: female     H&P Update Note    H&P from  03/25/2018 reviewed and updated, She is being treated for Pneumonia as well as depression. Patient examined. Kareen Magaña was asleep when I arrived but wakens easily, She is alert, has a very hoarse voice, deep bronchial cough. She has no change from h&P done 24 hours ago. Heart tones regular, Scattered wheezes all lobes, Is getting aerosols every 4 hours. Vitals: /66   Pulse 54   Temp 97.9 °F (36.6 °C) (Oral)   Resp 16   Ht 5' (1.524 m)   Wt 127 lb (57.6 kg)   SpO2 96%   BMI 24.80 kg/m²  Body mass index is 24.8 kg/m². No interval changes. I concur with the findings.       PROVISIONAL DIAGNOSES / SURGERY:      Bipolar  Pneumonia   Patient Active Problem List    Diagnosis Date Noted    Bipolar 1 disorder ([address] 03/25/2018    Mild malnutrition ([address] 03/24/2018    Depressed bipolar I disorder ([address] 03/23/2018    Pneumonia 03/23/2018    Polysubstance abuse 03/23/2018    Major depression, recurrent ([address] 03/22/2018    Confusion 02/11/2018    Heroin dependence ([address] 11/28/2017    Chronic post-traumatic stress disorder (PTSD) 11/28/2017    Bipolar affective disorder, current episode mixed ([address] 11/15/2017    Sepsis ([address] 04/28/2017    History of gastric surgery (gastric sleeve 2012) 04/25/2017    Smoker unmotivated to quit 04/25/2017    Alcoholism /alcohol abuse ([address] 04/25/2017    Pneumonia of both lower lobes due to infectious organism 04/25/2017    Asthma 04/25/2017                   Negar Anthony MD Physician Signed   H&P Date of Service: 3/23/2018 10:02 PM   Related encounter: Admission (Discharged) from 3/23/2018 in [address] [place] [address] All    ManualTemplate Copied                                                                                                                                                  [address] [address]   HISTORY AND PHYSICAL EXAMINATION   Date: 3/23/2018   Patient name: Rosana Gonzalez   Date of admission: 3/23/2018 3:08 PM   MRN: [id number]   Account: [de-identified]   YOB: 1975   PCP: No primary care provider on file. Room: [address] [place] [address]   Code Status: Full Code    Chief Complaint:   No chief complaint on file. SOB, productive cough, lethargy    History Obtained From:   patient    History of Present Illness: The patient is a 38 y.o. female PMH bipolar disorder, asthma, polysubstance who presents with and she is admitted to the hospital for the management of Pneumonia and copd exacerbation. Patient was admitted to [facility] yesterday for bipolar with suicidal ideation. Today, she was lethargic and not attending her [facility] meetings. Medicine was consulted and she is transferred to med/surg today for lethargy, SOB, productive cough, hemoptysis, wheezing. Per pt she has been having a productive cough for the last week. She also c/o left chest and shoulder pain which is new today. Chest pain is described as tightness associated with SOB but is reproducible. She does not use oxygen at home but does use inhalers. She continues to smoke (45 pack years) and admits to IV heroin, cocaine, cannabis, and alcohol abuse. She has h/o sleeve gastrectomy and has been having some nausea and vomiting the past week. PO intake has been poor. CXR shows consolidation of left middle lobe atlectasis vs PNA, and possible small left pleural effusion. WBC elevated at 12.4. BP 90/60 HR 84. She is requiring oxygen as SpO2 was 84.     Past Medical History:                                                                                    RAYMOND Meadows on 3/26/2018 at 2:12 PM

## 2018-03-27 PROCEDURE — 94761 N-INVAS EAR/PLS OXIMETRY MLT: CPT

## 2018-03-27 PROCEDURE — 1240000000 HC EMOTIONAL WELLNESS R&B

## 2018-03-27 PROCEDURE — 6370000000 HC RX 637 (ALT 250 FOR IP): Performed by: INTERNAL MEDICINE

## 2018-03-27 PROCEDURE — 6370000000 HC RX 637 (ALT 250 FOR IP): Performed by: PSYCHIATRY & NEUROLOGY

## 2018-03-27 PROCEDURE — 6360000002 HC RX W HCPCS: Performed by: INTERNAL MEDICINE

## 2018-03-27 PROCEDURE — 94640 AIRWAY INHALATION TREATMENT: CPT

## 2018-03-27 RX ADMIN — LORAZEPAM 1 MG: 1 TABLET ORAL at 08:33

## 2018-03-27 RX ADMIN — VENLAFAXINE HYDROCHLORIDE 150 MG: 150 CAPSULE, EXTENDED RELEASE ORAL at 08:33

## 2018-03-27 RX ADMIN — NICOTINE POLACRILEX 2 MG: 2 GUM, CHEWING BUCCAL at 08:32

## 2018-03-27 RX ADMIN — LEVOFLOXACIN 500 MG: 500 TABLET, FILM COATED ORAL at 08:33

## 2018-03-27 RX ADMIN — TRAZODONE HYDROCHLORIDE 50 MG: 50 TABLET ORAL at 20:34

## 2018-03-27 RX ADMIN — ACETAMINOPHEN 650 MG: 325 TABLET, FILM COATED ORAL at 14:59

## 2018-03-27 RX ADMIN — IPRATROPIUM BROMIDE AND ALBUTEROL SULFATE 1 AMPULE: .5; 3 SOLUTION RESPIRATORY (INHALATION) at 08:40

## 2018-03-27 RX ADMIN — BUPRENORPHINE HYDROCHLORIDE, NALOXONE HYDROCHLORIDE 1 FILM: 8; 2 FILM, SOLUBLE BUCCAL; SUBLINGUAL at 20:34

## 2018-03-27 RX ADMIN — GUAIFENESIN 600 MG: 600 TABLET, EXTENDED RELEASE ORAL at 20:34

## 2018-03-27 RX ADMIN — GUAIFENESIN 600 MG: 600 TABLET, EXTENDED RELEASE ORAL at 08:33

## 2018-03-27 RX ADMIN — ACETAMINOPHEN 650 MG: 325 TABLET, FILM COATED ORAL at 08:33

## 2018-03-27 RX ADMIN — PREDNISONE 20 MG: 20 TABLET ORAL at 08:33

## 2018-03-27 RX ADMIN — MOMETASONE FUROATE AND FORMOTEROL FUMARATE DIHYDRATE 2 PUFF: 100; 5 AEROSOL RESPIRATORY (INHALATION) at 20:34

## 2018-03-27 RX ADMIN — BUPRENORPHINE HYDROCHLORIDE, NALOXONE HYDROCHLORIDE 1 FILM: 8; 2 FILM, SOLUBLE BUCCAL; SUBLINGUAL at 08:32

## 2018-03-27 RX ADMIN — GABAPENTIN 600 MG: 300 CAPSULE ORAL at 08:33

## 2018-03-27 RX ADMIN — GABAPENTIN 600 MG: 300 CAPSULE ORAL at 20:34

## 2018-03-27 RX ADMIN — QUETIAPINE FUMARATE 200 MG: 200 TABLET ORAL at 20:34

## 2018-03-27 RX ADMIN — MOMETASONE FUROATE AND FORMOTEROL FUMARATE DIHYDRATE 2 PUFF: 100; 5 AEROSOL RESPIRATORY (INHALATION) at 08:32

## 2018-03-27 RX ADMIN — IPRATROPIUM BROMIDE AND ALBUTEROL SULFATE 1 AMPULE: .5; 3 SOLUTION RESPIRATORY (INHALATION) at 11:49

## 2018-03-27 RX ADMIN — GABAPENTIN 600 MG: 300 CAPSULE ORAL at 14:59

## 2018-03-27 ASSESSMENT — PAIN SCALES - GENERAL
PAINLEVEL_OUTOF10: 8
PAINLEVEL_OUTOF10: 4
PAINLEVEL_OUTOF10: 4
PAINLEVEL_OUTOF10: 7
PAINLEVEL_OUTOF10: 5
PAINLEVEL_OUTOF10: 6
PAINLEVEL_OUTOF10: 0

## 2018-03-27 NOTE — PLAN OF CARE
Problem: Depressive Behavior With or Without Suicide Precautions:  Goal: Able to verbalize acceptance of life and situations over which he or she has no control  Able to verbalize acceptance of life and situations over which he or she has no control   Outcome: Ongoing  [facility]  Day 3 Interdisciplinary Treatment Plan NOTE    Review Date & Time: 3/27/2018 1316    Admission Type:   Admission Type: Voluntary    Reason for admission:  Reason for Admission: +SI with plan to OD  Estimated Length of Stay: 5-7 days  Estimated Discharge Date Update: to be determined by physician    PATIENT STRENGTHS:  Patient Strengths Strengths: Connection to output provider, Social Skills  Patient Strengths and Limitations:Limitations: Tendency to isolate self, Lacks leisure interests, Inappropriate/potentially harmful leisure interests, Difficulty problem solving/relies on others to help solve problems  Addictive Behavior:Addictive Behavior  In the past 3 months, have you felt or has someone told you that you have a problem with:  : None  Do you have a history of Chemical Use?: No  Do you have a history of Alcohol Use?: Yes  Do you have a history of Street Drug Abuse?: Yes  Histroy of Prescripton Drug Abuse?: No  Medical Problems:  Past Medical History:   Diagnosis Date    Alcoholism ([facility]     3 pints daily    Anxiety     Asthma     Depression     Pneumonia     Polysubstance abuse     drug abuse includes, cocaine, IV heroin, cannabis, and ETOH    Suicide attempt 11/08/2017    S/A by overdosing on Trazodone and Seroquel    Suicide ideation        Risk:  Fall RiskTotal: 53  Tee Scale Tee Scale Score: 23  BVC Total: 0  Change in scores no Changes to plan of Care no    Status EXAM:   Status and Exam  Normal: No  Facial Expression: Avoids Gaze  Affect: Appropriate  Level of Consciousness: Alert  Mood:Normal: No  Mood: Depressed, Anxious  Motor Activity:Normal: Yes  Motor Activity: Decreased  Interview Behavior:

## 2018-03-27 NOTE — PLAN OF CARE
Problem: Depressive Behavior With or Without Suicide Precautions:  Goal: Able to verbalize and/or display a decrease in depressive symptoms  Able to verbalize and/or display a decrease in depressive symptoms   Outcome: Ongoing  Pt did not participate in leisure group at 1100 despite staff encouragement to attend.

## 2018-03-27 NOTE — PLAN OF CARE
Problem: Depressive Behavior With or Without Suicide Precautions:  Goal: Absence of self-harm  Absence of self-harm   Outcome: Ongoing  Pt denies thoughts of self harm and is agreeable to seeking out should thoughts of self harm arise. Safe environment maintained. Q15 minute checks for safety cont per unit policy. Will cont to monitor for safety and provides support and reassurance as needed.

## 2018-03-27 NOTE — DISCHARGE SUMMARY
Psychiatry Discharge Summary        DSM- DIAGNOSIS:     (Axis I): Bipolar affective disorder, current episode mixed ([facility]   Opioid  Dependence     Pneumonia    Axis II:  none  Axis III:  Past Medical History:   Diagnosis Date    Alcoholism ([facility]     3 pints daily    Anxiety     Asthma     Depression     Pneumonia     Polysubstance abuse     drug abuse includes, cocaine, IV heroin, cannabis, and ETOH    Suicide attempt 11/08/2017    S/A by overdosing on Trazodone and Seroquel    Suicide ideation       Stressors (Axis IV):  Severity of stressors is moderate  Source of stressors:  health issues and other psychological and environmental problems        Procedures: none    Hospital Course and significant findings      Nani Linton is a 38 y.o. female. was admitted for inpatient psychiatric treatment with symptoms including hopelessness,depressed mood and suicide ideations, racing thoughts and   mood swings. Patient admitted to - auditory hallucinations. During this hospitalization acute psychiatric symptoms were stabilized with supportive psychotherapy and medication management. Pt was also involved in therapeutic activities and psychotherapy  groups to improve coping skills.     Pt developed pneumonia and medicine was consulted and pt was transferred to medical unit  Plan:       Medication List      ASK your doctor about these medications    ABILIFY PO     citalopram 20 MG tablet  Commonly known as:  CELEXA  Take 1 tablet by mouth daily     gabapentin 600 MG tablet  Commonly known as:  NEURONTIN  Take 1 tablet by mouth 3 times daily     ibuprofen 800 MG tablet  Commonly known as:  ADVIL;MOTRIN  Take 1 tablet by mouth every 8 hours as needed for Pain     * lithium 300 MG extended release tablet  Commonly known as:  LITHOBID  Take 1 tablet by mouth every 12 hours for 14 days     * lithium 300 MG capsule     nicotine 21 MG/24HR  Commonly known as:  NICODERM CQ  Place 1 patch onto the skin daily

## 2018-03-28 VITALS
HEIGHT: 60 IN | HEART RATE: 68 BPM | DIASTOLIC BLOOD PRESSURE: 75 MMHG | RESPIRATION RATE: 14 BRPM | OXYGEN SATURATION: 96 % | SYSTOLIC BLOOD PRESSURE: 107 MMHG | BODY MASS INDEX: 24.94 KG/M2 | TEMPERATURE: 98.2 F | WEIGHT: 127 LBS

## 2018-03-28 PROCEDURE — 5130000000 HC BRIDGE APPOINTMENT

## 2018-03-28 PROCEDURE — 94640 AIRWAY INHALATION TREATMENT: CPT

## 2018-03-28 PROCEDURE — 6370000000 HC RX 637 (ALT 250 FOR IP): Performed by: INTERNAL MEDICINE

## 2018-03-28 PROCEDURE — 6360000002 HC RX W HCPCS: Performed by: INTERNAL MEDICINE

## 2018-03-28 PROCEDURE — 6370000000 HC RX 637 (ALT 250 FOR IP): Performed by: PSYCHIATRY & NEUROLOGY

## 2018-03-28 RX ORDER — LEVOFLOXACIN 500 MG/1
500 TABLET, FILM COATED ORAL DAILY
Qty: 3 TABLET | Refills: 0 | Status: SHIPPED | OUTPATIENT
Start: 2018-03-29 | End: 2018-04-08

## 2018-03-28 RX ORDER — GUAIFENESIN 600 MG/1
600 TABLET, EXTENDED RELEASE ORAL 2 TIMES DAILY
Qty: 14 TABLET | Refills: 0 | Status: ON HOLD | OUTPATIENT
Start: 2018-03-28 | End: 2018-05-08

## 2018-03-28 RX ORDER — PREDNISONE 20 MG/1
20 TABLET ORAL DAILY
Qty: 3 TABLET | Refills: 0 | Status: SHIPPED | OUTPATIENT
Start: 2018-03-29 | End: 2018-04-08

## 2018-03-28 RX ORDER — BUPRENORPHINE AND NALOXONE 8; 2 MG/1; MG/1
1 FILM, SOLUBLE BUCCAL; SUBLINGUAL 2 TIMES DAILY
Qty: 14 EACH | Refills: 1 | Status: SHIPPED | OUTPATIENT
Start: 2018-03-28 | End: 2018-04-11

## 2018-03-28 RX ORDER — TRAZODONE HYDROCHLORIDE 50 MG/1
50 TABLET ORAL NIGHTLY PRN
Qty: 14 TABLET | Refills: 0 | Status: ON HOLD | OUTPATIENT
Start: 2018-03-28 | End: 2018-06-18 | Stop reason: HOSPADM

## 2018-03-28 RX ORDER — GABAPENTIN 300 MG/1
600 CAPSULE ORAL 3 TIMES DAILY
Qty: 84 CAPSULE | Refills: 0 | Status: ON HOLD | OUTPATIENT
Start: 2018-03-28 | End: 2018-05-08

## 2018-03-28 RX ORDER — VENLAFAXINE HYDROCHLORIDE 150 MG/1
150 CAPSULE, EXTENDED RELEASE ORAL
Qty: 14 CAPSULE | Refills: 0 | Status: ON HOLD | OUTPATIENT
Start: 2018-03-29 | End: 2018-06-18 | Stop reason: HOSPADM

## 2018-03-28 RX ORDER — PANTOPRAZOLE SODIUM 40 MG/1
40 TABLET, DELAYED RELEASE ORAL DAILY
Qty: 30 TABLET | Refills: 0 | Status: ON HOLD | OUTPATIENT
Start: 2018-03-28 | End: 2018-08-02 | Stop reason: HOSPADM

## 2018-03-28 RX ORDER — QUETIAPINE FUMARATE 200 MG/1
200 TABLET, FILM COATED ORAL NIGHTLY
Qty: 14 TABLET | Refills: 0 | Status: ON HOLD | OUTPATIENT
Start: 2018-03-28 | End: 2018-06-18 | Stop reason: HOSPADM

## 2018-03-28 RX ADMIN — MOMETASONE FUROATE AND FORMOTEROL FUMARATE DIHYDRATE 2 PUFF: 100; 5 AEROSOL RESPIRATORY (INHALATION) at 08:29

## 2018-03-28 RX ADMIN — NICOTINE POLACRILEX 2 MG: 2 GUM, CHEWING BUCCAL at 08:31

## 2018-03-28 RX ADMIN — IPRATROPIUM BROMIDE AND ALBUTEROL SULFATE 1 AMPULE: .5; 3 SOLUTION RESPIRATORY (INHALATION) at 08:37

## 2018-03-28 RX ADMIN — LEVOFLOXACIN 500 MG: 500 TABLET, FILM COATED ORAL at 08:31

## 2018-03-28 RX ADMIN — ACETAMINOPHEN 650 MG: 325 TABLET, FILM COATED ORAL at 08:31

## 2018-03-28 RX ADMIN — GUAIFENESIN 600 MG: 600 TABLET, EXTENDED RELEASE ORAL at 08:31

## 2018-03-28 RX ADMIN — PREDNISONE 20 MG: 20 TABLET ORAL at 08:31

## 2018-03-28 RX ADMIN — IPRATROPIUM BROMIDE AND ALBUTEROL SULFATE 1 AMPULE: .5; 3 SOLUTION RESPIRATORY (INHALATION) at 11:52

## 2018-03-28 RX ADMIN — GABAPENTIN 600 MG: 300 CAPSULE ORAL at 08:31

## 2018-03-28 RX ADMIN — BUPRENORPHINE HYDROCHLORIDE, NALOXONE HYDROCHLORIDE 1 FILM: 8; 2 FILM, SOLUBLE BUCCAL; SUBLINGUAL at 08:31

## 2018-03-28 RX ADMIN — VENLAFAXINE HYDROCHLORIDE 150 MG: 150 CAPSULE, EXTENDED RELEASE ORAL at 08:31

## 2018-03-28 RX ADMIN — ALUMINUM HYDROXIDE, MAGNESIUM HYDROXIDE, AND SIMETHICONE 30 ML: 200; 200; 20 SUSPENSION ORAL at 09:10

## 2018-03-28 RX ADMIN — ALUMINUM HYDROXIDE, MAGNESIUM HYDROXIDE, AND SIMETHICONE 30 ML: 200; 200; 20 SUSPENSION ORAL at 12:18

## 2018-03-28 ASSESSMENT — PAIN SCALES - GENERAL
PAINLEVEL_OUTOF10: 3
PAINLEVEL_OUTOF10: 4

## 2018-03-28 NOTE — PLAN OF CARE
Problem: Depressive Behavior With or Without Suicide Precautions:  Goal: Able to verbalize and/or display a decrease in depressive symptoms  Able to verbalize and/or display a decrease in depressive symptoms   Outcome: Ongoing  Patient is alert and oriented, patient is brightened upon approach, attends to ADL's, patient is MC/BC, encouraged to seek out staff with any questions or concerns. Goal: Absence of self-harm  Absence of self-harm   Outcome: Ongoing  Patient is alert and oriented, remains free from self harm at this time, Q15 minute checks for patient safety, encouraged to seek out staff with any questions or concerns.

## 2018-03-28 NOTE — PROGRESS NOTES
PSYCHOEDUCATION GROUP NOTE    Date:  3/28/2018  Start Time: 0845  End Time: 0910    Number Participants in Group:  9    Goal:  Patient will demonstrate increased interpersonal interaction   Topic: community meeting / goal setting    Discipline Responsible:   OT  AT  [facility]. X RT MHP Other       Participation Level:     None  Minimal   X Active Listener X Interactive    Monopolizing         Participation Quality:  X Appropriate  Inappropriate   X       Attentive        Intrusive   X       Sharing        Resistant   X       Supportive        Lethargic       Affective:    Congruent  Incongruent  Blunted  Flat    Constricted  Anxious  Elated  Angry    Labile  Depressed  Other x bright       Cognitive:  X Alert X Oriented PPTP     Concentration X G  F  P   Attention Span X G  F  P   Short-Term Memory  G  F  P   Long-Term Memory  G  F  P   ProblemSolving/  Decision Making X G  F  P   Ability to Process  Information X G  F  P      Contributing Factors             Delusional             Hallucinating             Flight of Ideas             Other:       Modes of Intervention:  x Education x Support x Exploration    Clarifying x Problem Solving  Confrontation   x Socialization  Limit Setting  Reality Testing   x Activity  Movement  Media    Other:            Response to Learning:  X Able to verbalize current knowledge/experience   X Able to verbalize/acknowledge new learning   X Able to retain information   X Capable of insight    Able to change behavior   X Progressing to goal    Other:        Comments
Psychiatry Progress  Note     CHIEF  COMPLAINT     Bipolar affective disorder, current episode mixed ([facility]        SUBJECT JANET AND OBJECT JANET:    Beverly File is presenting with  no suicidal thoughts and less depressed mood than yesterday moods stabilizing. with no hallucinations and thoughts are goal directed. The symptoms still   present are moderate in severity    . ASSESSMENT AND PLAN    Symptoms of illness and medications  discussed with pt and support provided. Progress of treatment discussed with staff     Encouraged to participate in activies and groups. Her   Bipolar affective disorder, current episode mixed [facility])  is gradually improving Plan to discharge tomorrow discussed. Yonatan Valero gabapentin  600 mg Oral TID    buprenorphine-naloxone  1 Film Sublingual BID    guaiFENesin  600 mg Oral BID    ipratropium-albuterol  1 ampule Inhalation Q4H WA    levofloxacin  500 mg Oral Daily    mometasone-formoterol  2 puff Inhalation BID    predniSONE  20 mg Oral Daily    QUEtiapine  200 mg Oral Nightly    venlafaxine  150 mg Oral Daily with breakfast       acetaminophen, haloperidol lactate, traZODone, benztropine mesylate, magnesium hydroxide, aluminum & magnesium hydroxide-simethicone, nicotine polacrilex       Elizabeth Zaldivar MD  Psychiatry
History of psychiatric disorders:    Family history: negative mood disorders. Medical History:     Past Medical History:   Diagnosis Date    Alcoholism ([facility]     3 pints daily    Anxiety     Asthma     Depression     Pneumonia     Polysubstance abuse     drug abuse includes, cocaine, IV heroin, cannabis, and ETOH    Suicide attempt 11/08/2017    S/A by overdosing on Trazodone and Seroquel    Suicide ideation         History of Psychiatric Symptoms/Review of Systems:   Ingrid: yes   Panic attacks:  No   Phobias: No   Obsessions and/or Compulsions: No   Involuntary body movements/tics: No   Hallucinations: No   Suicidal admits to  Homicidal denies  Delusions: No   Trauma/PTSD:  No       Mental Status  Pt. Is alert, cooperative and withdrawn, fair cooperation,Appropriate dress, affect is Appropriate, mood is anxious and depressed . Thought process is goal directed. Thought content presents  no evidence of delusions, perceptual symptoms present -  auditory hallucinations. Evaluation of suicidal/homidal ideation: Positive for suicidal ideation and Negative for homicidal ideation. Patient gross cognitive functions are Fair. Orientation:place. Insight and judgement fair. Diagnostic Impression    (Axis I):     Bipolar I disorder; depressed episode, severe and with psychotic features  Substance disorders:  Opioid (heroin) dependence  Axis II:  none  Axis III:  Past Medical History:   Diagnosis Date    Alcoholism ([facility]     3 pints daily    Anxiety     Asthma     Depression     Pneumonia     Polysubstance abuse     drug abuse includes, cocaine, IV heroin, cannabis, and ETOH    Suicide attempt 11/08/2017    S/A by overdosing on Trazodone and Seroquel    Suicide ideation              Medications   gabapentin  400 mg Oral TID    buprenorphine-naloxone  1 Film Sublingual BID    guaiFENesin  600 mg Oral BID    ipratropium-albuterol  1 ampule Inhalation Q4H WA    levofloxacin  500 mg Oral Daily   

## 2018-03-29 LAB
CULTURE: NORMAL
Lab: NORMAL
SPECIMEN DESCRIPTION: NORMAL
STATUS: NORMAL
STATUS: NORMAL

## 2018-03-29 NOTE — CARE COORDINATION
BHI Biopsychosocial Assessment     Current Level of Psychosocial Functioning      Independent   Dependent    Minimal Assist X     Comments:  Bipolar I Disorder, depressive type, recurrent; Alcohol Use Disorder; Cocaine Use Disorder; Opioid Use Disorder     Psychosocial High Risk Factors (check all that apply)     Unable to obtain meds   Chronic illness/pain    Substance abuse X  Lack of Family Support X  Financial stress   Isolation   Inadequate Community Resources X  Suicide attempt(s)   Not taking medications   Victim of crime   Developmental Delay  Unable to manage personal needs    Age 72 or older   Homeless X  No transportation   Readmission within 30 days  Unemployment X  Traumatic Event     Psychiatric Advanced Directives:  None reports     Family to Involve in Treatment: None     Sexual Orientation:  ALEXANDER     Patient Strengths: Medicaid [employer] Linked to [facility]. Staying with a friend. SSDI     Patient Barriers:  PT had previous psychiatric hospitalization. PT has a history of Opioid Use, Severe; Alcohol Use Disorder; and, Cocaine Use Disorder. HX of suicidal ideation. Unstable housing. Inappropriate/potentially harmful leisure interests; difficulty problem solving/relies on others to help solve problems     Opiate Education Provided:  Reviewed and discussed with PT     CMHC/mental health history:  [facility]     Plan of Care   medication management, group/individual therapies, psycho -education, treatment team meetings to assist with stabilization     Initial Discharge Plan:  Return to live with friend; follow-up appointment at [facility]; discuss options of in/out patient AOD treatment; [facility] and free groups at [id number] [facility] Summary:  Writer meets with PT and attempts to completes biopsychosocial assessment. PT presents as drowsy and tired. Writer able to collect most information and collected from ED notes.   PT is A&O x4;
ADVIL;MOTRIN  Take 1 tablet by mouth every 8 hours as needed for Pain  Notes to patient:  Pain management     QUEtiapine 200 MG tablet  Commonly known as:  SEROQUEL  Take 1 tablet by mouth nightly  Notes to patient:  Clear thoughts        STOP taking these medications    ABILIFY PO     citalopram 20 MG tablet  Commonly known as:  CELEXA     gabapentin 600 MG tablet  Commonly known as:  NEURONTIN  Replaced by:  gabapentin 300 MG capsule     lithium 300 MG capsule     lithium 300 MG extended release tablet  Commonly known as:  LITHOBID     nicotine 21 MG/24HR  Commonly known as:  NICODERM CQ     omeprazole 20 MG delayed release capsule  Commonly known as:  PRILOSEC     therapeutic multivitamin-minerals tablet           Where to Get Your Medications      You can get these medications from any pharmacy    Bring a paper prescription for each of these medications  · buprenorphine-naloxone 8-2 MG Film SL film  · gabapentin 300 MG capsule  · guaiFENesin 600 MG extended release tablet  · levofloxacin 500 MG tablet  · pantoprazole 40 MG tablet  · predniSONE 20 MG tablet  · QUEtiapine 200 MG tablet  · traZODone 50 MG tablet  · venlafaxine 150 MG extended release capsule         Follow Up Appointment: Discharge to home:  [address]  Go to  Please send home by [employer] cab. [address] [facility], [address]  Go on 4/3/2018  Tuesday, April 3rd at 10:30am for an appointment and acceptance into their Suboxone Program with Amos Schwab. [address] [employer] [facility].   [address]  Go on 3/30/2018  Friday, March 30th at 9:30am on Viviana Kirk with Nusrat Batista

## 2018-04-05 ENCOUNTER — HOSPITAL ENCOUNTER (OUTPATIENT)
Age: 43
Setting detail: SPECIMEN
Discharge: HOME | End: 2018-04-05
Payer: COMMERCIAL

## 2018-04-05 LAB
ABSOLUTE EOS #: 0.22 K/UL (ref 0–0.44)
ABSOLUTE IMMATURE GRANULOCYTE: 0.05 K/UL (ref 0–0.3)
ABSOLUTE LYMPH #: 3.99 K/UL (ref 1.1–3.7)
ABSOLUTE MONO #: 0.78 K/UL (ref 0.1–1.2)
ALBUMIN SERPL-MCNC: 3.5 G/DL (ref 3.5–5.2)
ALBUMIN/GLOBULIN RATIO: 1.1 (ref 1–2.5)
ALP BLD-CCNC: 75 U/L (ref 35–104)
ALT SERPL-CCNC: 32 U/L (ref 5–33)
ANION GAP SERPL CALCULATED.3IONS-SCNC: 12 MMOL/L (ref 9–17)
AST SERPL-CCNC: 18 U/L
BASOPHILS # BLD: 1 % (ref 0–2)
BASOPHILS ABSOLUTE: 0.07 K/UL (ref 0–0.2)
BILIRUB SERPL-MCNC: <0.1 MG/DL (ref 0.3–1.2)
BUN BLDV-MCNC: 9 MG/DL (ref 6–20)
BUN/CREAT BLD: ABNORMAL (ref 9–20)
CALCIUM SERPL-MCNC: 8.8 MG/DL (ref 8.6–10.4)
CHLORIDE BLD-SCNC: 101 MMOL/L (ref 98–107)
CO2: 29 MMOL/L (ref 20–31)
CREAT SERPL-MCNC: 1.04 MG/DL (ref 0.5–0.9)
DIFFERENTIAL TYPE: ABNORMAL
EOSINOPHILS RELATIVE PERCENT: 2 % (ref 1–4)
GFR AFRICAN AMERICAN: >60 ML/MIN
GFR NON-AFRICAN AMERICAN: 58 ML/MIN
GFR SERPL CREATININE-BSD FRML MDRD: ABNORMAL ML/MIN/{1.73_M2}
GFR SERPL CREATININE-BSD FRML MDRD: ABNORMAL ML/MIN/{1.73_M2}
GLUCOSE BLD-MCNC: 95 MG/DL (ref 70–99)
HAV IGM SER IA-ACNC: NONREACTIVE
HCG QUALITATIVE: NEGATIVE
HCT VFR BLD CALC: 47.2 % (ref 36.3–47.1)
HEMOGLOBIN: 13.4 G/DL (ref 11.9–15.1)
HEPATITIS B CORE IGM ANTIBODY: NONREACTIVE
HEPATITIS B SURFACE ANTIGEN: NONREACTIVE
HEPATITIS C ANTIBODY: REACTIVE
IMMATURE GRANULOCYTES: 1 %
LYMPHOCYTES # BLD: 41 % (ref 24–43)
MCH RBC QN AUTO: 22.8 PG (ref 25.2–33.5)
MCHC RBC AUTO-ENTMCNC: 28.4 G/DL (ref 28.4–34.8)
MCV RBC AUTO: 80.3 FL (ref 82.6–102.9)
MONOCYTES # BLD: 8 % (ref 3–12)
NRBC AUTOMATED: 0 PER 100 WBC
PDW BLD-RTO: 19.5 % (ref 11.8–14.4)
PLATELET # BLD: 434 K/UL (ref 138–453)
PLATELET ESTIMATE: ABNORMAL
PMV BLD AUTO: 10 FL (ref 8.1–13.5)
POTASSIUM SERPL-SCNC: 4.7 MMOL/L (ref 3.7–5.3)
RBC # BLD: 5.88 M/UL (ref 3.95–5.11)
RBC # BLD: ABNORMAL 10*6/UL
SEG NEUTROPHILS: 47 % (ref 36–65)
SEGMENTED NEUTROPHILS ABSOLUTE COUNT: 4.68 K/UL (ref 1.5–8.1)
SODIUM BLD-SCNC: 142 MMOL/L (ref 135–144)
TOTAL PROTEIN: 6.8 G/DL (ref 6.4–8.3)
WBC # BLD: 9.8 K/UL (ref 3.5–11.3)
WBC # BLD: ABNORMAL 10*3/UL

## 2018-04-05 NOTE — CARE COORDINATION
Name: Parveen Chan    : 1975    Discharge Date: 3/23/2018    Primary Auth/Cert #: [id number]    Discharge Medications:      Medication List      ASK your doctor about these medications    ABILIFY PO     citalopram 20 MG tablet  Commonly known as:  CELEXA  Take 1 tablet by mouth daily     * EFFEXOR PO     gabapentin 600 MG tablet  Commonly known as:  NEURONTIN  Take 1 tablet by mouth 3 times daily     ibuprofen 800 MG tablet  Commonly known as:  ADVIL;MOTRIN  Take 1 tablet by mouth every 8 hours as needed for Pain     * lithium 300 MG capsule     * lithium 300 MG extended release tablet  Commonly known as:  LITHOBID  Take 1 tablet by mouth every 12 hours for 14 days     nicotine 21 MG/24HR  Commonly known as:  NICODERM CQ  Place 1 patch onto the skin daily     pantoprazole 40 MG tablet  Commonly known as:  PROTONIX  Take 1 tablet by mouth every morning (before breakfast)     QUEtiapine 200 MG tablet  Commonly known as:  SEROQUEL  Take 1 tablet by mouth nightly     therapeutic multivitamin-minerals tablet  Take 1 tablet by mouth daily     * traZODone 100 MG tablet  Commonly known as:  DESYREL  Take 1 tablet by mouth nightly     * TRAZODONE HCL PO     * venlafaxine 150 MG extended release capsule  Commonly known as:  EFFEXOR XR  Take 1 capsule by mouth daily (with breakfast)        * This list has 6 medication(s) that are the same as other medications prescribed for you. Read the directions carefully, and ask your doctor or other care provider to review them with you. Follow Up Appointment: No follow-up provider specified.

## 2018-05-07 ENCOUNTER — HOSPITAL ENCOUNTER (INPATIENT)
Age: 43
LOS: 1 days | Discharge: PSYCHIATRIC OR SUBSTANCE USE FACILITY | DRG: 817 | End: 2018-05-08
Attending: EMERGENCY MEDICINE | Admitting: INTERNAL MEDICINE
Payer: MEDICARE

## 2018-05-07 DIAGNOSIS — F10.920 ACUTE ALCOHOLIC INTOXICATION WITHOUT COMPLICATION (HCC): ICD-10-CM

## 2018-05-07 DIAGNOSIS — F32.A DEPRESSION, UNSPECIFIED DEPRESSION TYPE: ICD-10-CM

## 2018-05-07 DIAGNOSIS — T50.902A INTENTIONAL DRUG OVERDOSE, INITIAL ENCOUNTER (HCC): Primary | ICD-10-CM

## 2018-05-07 LAB
ABSOLUTE EOS #: 0.23 K/UL (ref 0–0.4)
ABSOLUTE IMMATURE GRANULOCYTE: ABNORMAL K/UL (ref 0–0.3)
ABSOLUTE LYMPH #: 3.3 K/UL (ref 1–4.8)
ABSOLUTE MONO #: 0.6 K/UL (ref 0.1–1.3)
ACETAMINOPHEN LEVEL: <5 UG/ML (ref 10–30)
ALBUMIN SERPL-MCNC: 4.5 G/DL (ref 3.5–5.2)
ALBUMIN/GLOBULIN RATIO: ABNORMAL (ref 1–2.5)
ALP BLD-CCNC: 88 U/L (ref 35–104)
ALT SERPL-CCNC: 14 U/L (ref 5–33)
ANION GAP SERPL CALCULATED.3IONS-SCNC: 16 MMOL/L (ref 9–17)
AST SERPL-CCNC: 29 U/L
BASOPHILS # BLD: 1 % (ref 0–2)
BASOPHILS ABSOLUTE: 0.08 K/UL (ref 0–0.2)
BILIRUB SERPL-MCNC: 0.2 MG/DL (ref 0.3–1.2)
BILIRUBIN DIRECT: 0.08 MG/DL
BILIRUBIN, INDIRECT: 0.12 MG/DL (ref 0–1)
BUN BLDV-MCNC: 6 MG/DL (ref 6–20)
BUN/CREAT BLD: ABNORMAL (ref 9–20)
CALCIUM SERPL-MCNC: 8.7 MG/DL (ref 8.6–10.4)
CHLORIDE BLD-SCNC: 103 MMOL/L (ref 98–107)
CO2: 26 MMOL/L (ref 20–31)
CREAT SERPL-MCNC: 0.69 MG/DL (ref 0.5–0.9)
DIFFERENTIAL TYPE: ABNORMAL
EOSINOPHILS RELATIVE PERCENT: 3 % (ref 0–4)
ETHANOL PERCENT: 0.28 %
ETHANOL: 276 MG/DL
GFR AFRICAN AMERICAN: >60 ML/MIN
GFR NON-AFRICAN AMERICAN: >60 ML/MIN
GFR SERPL CREATININE-BSD FRML MDRD: ABNORMAL ML/MIN/{1.73_M2}
GFR SERPL CREATININE-BSD FRML MDRD: ABNORMAL ML/MIN/{1.73_M2}
GLOBULIN: ABNORMAL G/DL (ref 1.5–3.8)
GLUCOSE BLD-MCNC: 87 MG/DL (ref 70–99)
HCG QUALITATIVE: NEGATIVE
HCT VFR BLD CALC: 44.2 % (ref 36–46)
HEMOGLOBIN: 14.2 G/DL (ref 12–16)
IMMATURE GRANULOCYTES: ABNORMAL %
LITHIUM DATE LAST DOSE: ABNORMAL
LITHIUM DOSE AMOUNT: ABNORMAL
LITHIUM DOSE TIME: ABNORMAL
LITHIUM LEVEL: 0.3 MMOL/L (ref 0.6–1.2)
LYMPHOCYTES # BLD: 44 % (ref 24–44)
MAGNESIUM: 2.4 MG/DL (ref 1.6–2.6)
MCH RBC QN AUTO: 25 PG (ref 26–34)
MCHC RBC AUTO-ENTMCNC: 32.1 G/DL (ref 31–37)
MCV RBC AUTO: 78 FL (ref 80–100)
MONOCYTES # BLD: 8 % (ref 1–7)
MORPHOLOGY: ABNORMAL
NRBC AUTOMATED: ABNORMAL PER 100 WBC
PDW BLD-RTO: 23.4 % (ref 11.5–14.9)
PLATELET # BLD: 297 K/UL (ref 150–450)
PLATELET ESTIMATE: ABNORMAL
PMV BLD AUTO: 7.7 FL (ref 6–12)
POTASSIUM SERPL-SCNC: 3.9 MMOL/L (ref 3.7–5.3)
RBC # BLD: 5.67 M/UL (ref 4–5.2)
RBC # BLD: ABNORMAL 10*6/UL
SALICYLATE LEVEL: <1 MG/DL (ref 3–10)
SEG NEUTROPHILS: 44 % (ref 36–66)
SEGMENTED NEUTROPHILS ABSOLUTE COUNT: 3.29 K/UL (ref 1.3–9.1)
SODIUM BLD-SCNC: 145 MMOL/L (ref 135–144)
TOTAL PROTEIN: 8.3 G/DL (ref 6.4–8.3)
WBC # BLD: 7.5 K/UL (ref 3.5–11)
WBC # BLD: ABNORMAL 10*3/UL

## 2018-05-07 PROCEDURE — 96374 THER/PROPH/DIAG INJ IV PUSH: CPT

## 2018-05-07 PROCEDURE — G0480 DRUG TEST DEF 1-7 CLASSES: HCPCS

## 2018-05-07 PROCEDURE — 2580000003 HC RX 258: Performed by: EMERGENCY MEDICINE

## 2018-05-07 PROCEDURE — 99285 EMERGENCY DEPT VISIT HI MDM: CPT

## 2018-05-07 PROCEDURE — 85025 COMPLETE CBC W/AUTO DIFF WBC: CPT

## 2018-05-07 PROCEDURE — 80076 HEPATIC FUNCTION PANEL: CPT

## 2018-05-07 PROCEDURE — 93005 ELECTROCARDIOGRAM TRACING: CPT

## 2018-05-07 PROCEDURE — 84703 CHORIONIC GONADOTROPIN ASSAY: CPT

## 2018-05-07 PROCEDURE — 80048 BASIC METABOLIC PNL TOTAL CA: CPT

## 2018-05-07 PROCEDURE — 2580000003 HC RX 258: Performed by: FAMILY MEDICINE

## 2018-05-07 PROCEDURE — 80178 ASSAY OF LITHIUM: CPT

## 2018-05-07 PROCEDURE — 6360000002 HC RX W HCPCS: Performed by: FAMILY MEDICINE

## 2018-05-07 PROCEDURE — 83735 ASSAY OF MAGNESIUM: CPT

## 2018-05-07 PROCEDURE — 96376 TX/PRO/DX INJ SAME DRUG ADON: CPT

## 2018-05-07 PROCEDURE — 96372 THER/PROPH/DIAG INJ SC/IM: CPT

## 2018-05-07 PROCEDURE — 2500000003 HC RX 250 WO HCPCS: Performed by: FAMILY MEDICINE

## 2018-05-07 PROCEDURE — G0378 HOSPITAL OBSERVATION PER HR: HCPCS

## 2018-05-07 PROCEDURE — 80307 DRUG TEST PRSMV CHEM ANLYZR: CPT

## 2018-05-07 PROCEDURE — 36415 COLL VENOUS BLD VENIPUNCTURE: CPT

## 2018-05-07 PROCEDURE — 6360000002 HC RX W HCPCS: Performed by: STUDENT IN AN ORGANIZED HEALTH CARE EDUCATION/TRAINING PROGRAM

## 2018-05-07 PROCEDURE — 6370000000 HC RX 637 (ALT 250 FOR IP): Performed by: STUDENT IN AN ORGANIZED HEALTH CARE EDUCATION/TRAINING PROGRAM

## 2018-05-07 RX ORDER — SODIUM CHLORIDE 0.9 % (FLUSH) 0.9 %
10 SYRINGE (ML) INJECTION PRN
Status: DISCONTINUED | OUTPATIENT
Start: 2018-05-07 | End: 2018-05-08 | Stop reason: HOSPADM

## 2018-05-07 RX ORDER — LORAZEPAM 1 MG/1
1 TABLET ORAL
Status: DISCONTINUED | OUTPATIENT
Start: 2018-05-07 | End: 2018-05-08 | Stop reason: HOSPADM

## 2018-05-07 RX ORDER — LORAZEPAM 1 MG/1
4 TABLET ORAL
Status: DISCONTINUED | OUTPATIENT
Start: 2018-05-07 | End: 2018-05-08 | Stop reason: HOSPADM

## 2018-05-07 RX ORDER — LORAZEPAM 1 MG/1
3 TABLET ORAL
Status: DISCONTINUED | OUTPATIENT
Start: 2018-05-07 | End: 2018-05-08 | Stop reason: HOSPADM

## 2018-05-07 RX ORDER — LORAZEPAM 2 MG/ML
4 INJECTION INTRAMUSCULAR
Status: DISCONTINUED | OUTPATIENT
Start: 2018-05-07 | End: 2018-05-08 | Stop reason: HOSPADM

## 2018-05-07 RX ORDER — LITHIUM CARBONATE 450 MG
450 TABLET, EXTENDED RELEASE ORAL 2 TIMES DAILY
Status: ON HOLD | COMMUNITY
End: 2018-06-18 | Stop reason: HOSPADM

## 2018-05-07 RX ORDER — SODIUM CHLORIDE 9 MG/ML
INJECTION, SOLUTION INTRAVENOUS CONTINUOUS
Status: DISCONTINUED | OUTPATIENT
Start: 2018-05-07 | End: 2018-05-07

## 2018-05-07 RX ORDER — SODIUM CHLORIDE 0.9 % (FLUSH) 0.9 %
10 SYRINGE (ML) INJECTION EVERY 12 HOURS SCHEDULED
Status: DISCONTINUED | OUTPATIENT
Start: 2018-05-07 | End: 2018-05-08 | Stop reason: HOSPADM

## 2018-05-07 RX ORDER — PALIPERIDONE 6 MG/1
6 TABLET, EXTENDED RELEASE ORAL EVERY MORNING
COMMUNITY
End: 2018-06-07

## 2018-05-07 RX ORDER — ONDANSETRON 2 MG/ML
4 INJECTION INTRAMUSCULAR; INTRAVENOUS EVERY 6 HOURS PRN
Status: DISCONTINUED | OUTPATIENT
Start: 2018-05-07 | End: 2018-05-08 | Stop reason: HOSPADM

## 2018-05-07 RX ORDER — 0.9 % SODIUM CHLORIDE 0.9 %
1000 INTRAVENOUS SOLUTION INTRAVENOUS ONCE
Status: COMPLETED | OUTPATIENT
Start: 2018-05-07 | End: 2018-05-07

## 2018-05-07 RX ORDER — LORAZEPAM 1 MG/1
2 TABLET ORAL
Status: DISCONTINUED | OUTPATIENT
Start: 2018-05-07 | End: 2018-05-08 | Stop reason: HOSPADM

## 2018-05-07 RX ORDER — LORAZEPAM 2 MG/ML
3 INJECTION INTRAMUSCULAR
Status: DISCONTINUED | OUTPATIENT
Start: 2018-05-07 | End: 2018-05-08 | Stop reason: HOSPADM

## 2018-05-07 RX ORDER — LORAZEPAM 2 MG/ML
2 INJECTION INTRAMUSCULAR
Status: DISCONTINUED | OUTPATIENT
Start: 2018-05-07 | End: 2018-05-08 | Stop reason: HOSPADM

## 2018-05-07 RX ORDER — LORAZEPAM 2 MG/ML
1 INJECTION INTRAMUSCULAR
Status: DISCONTINUED | OUTPATIENT
Start: 2018-05-07 | End: 2018-05-08 | Stop reason: HOSPADM

## 2018-05-07 RX ORDER — SODIUM CHLORIDE 9 MG/ML
INJECTION, SOLUTION INTRAVENOUS CONTINUOUS
Status: DISCONTINUED | OUTPATIENT
Start: 2018-05-07 | End: 2018-05-08 | Stop reason: HOSPADM

## 2018-05-07 RX ORDER — PANTOPRAZOLE SODIUM 40 MG/1
40 TABLET, DELAYED RELEASE ORAL
Status: DISCONTINUED | OUTPATIENT
Start: 2018-05-08 | End: 2018-05-08 | Stop reason: HOSPADM

## 2018-05-07 RX ORDER — PANTOPRAZOLE SODIUM 40 MG/1
40 TABLET, DELAYED RELEASE ORAL ONCE
Status: COMPLETED | OUTPATIENT
Start: 2018-05-07 | End: 2018-05-07

## 2018-05-07 RX ADMIN — ENOXAPARIN SODIUM 40 MG: 40 INJECTION SUBCUTANEOUS at 13:57

## 2018-05-07 RX ADMIN — LORAZEPAM 2 MG: 1 TABLET ORAL at 18:03

## 2018-05-07 RX ADMIN — SODIUM CHLORIDE 1000 ML: 9 INJECTION, SOLUTION INTRAVENOUS at 13:53

## 2018-05-07 RX ADMIN — PANTOPRAZOLE SODIUM 40 MG: 40 TABLET, DELAYED RELEASE ORAL at 18:00

## 2018-05-07 RX ADMIN — FOLIC ACID: 5 INJECTION, SOLUTION INTRAMUSCULAR; INTRAVENOUS; SUBCUTANEOUS at 15:58

## 2018-05-07 RX ADMIN — ONDANSETRON 4 MG: 2 INJECTION INTRAMUSCULAR; INTRAVENOUS at 23:56

## 2018-05-07 RX ADMIN — ONDANSETRON 4 MG: 2 INJECTION INTRAMUSCULAR; INTRAVENOUS at 13:51

## 2018-05-07 ASSESSMENT — ENCOUNTER SYMPTOMS
NAUSEA: 1
CONSTIPATION: 0
SHORTNESS OF BREATH: 0
ABDOMINAL PAIN: 0
SPUTUM PRODUCTION: 0
BLOOD IN STOOL: 0
SORE THROAT: 0
WHEEZING: 0
DIARRHEA: 0
PHOTOPHOBIA: 1
HEARTBURN: 0
VOMITING: 1
COUGH: 0

## 2018-05-07 ASSESSMENT — PAIN SCALES - GENERAL: PAINLEVEL_OUTOF10: 4

## 2018-05-08 ENCOUNTER — HOSPITAL ENCOUNTER (INPATIENT)
Age: 43
LOS: 3 days | Discharge: HOME | DRG: 751 | End: 2018-05-11
Attending: PSYCHIATRY & NEUROLOGY | Admitting: PSYCHIATRY & NEUROLOGY
Payer: MEDICARE

## 2018-05-08 VITALS
TEMPERATURE: 98.4 F | HEIGHT: 60 IN | OXYGEN SATURATION: 98 % | DIASTOLIC BLOOD PRESSURE: 93 MMHG | WEIGHT: 120.81 LBS | HEART RATE: 59 BPM | RESPIRATION RATE: 14 BRPM | BODY MASS INDEX: 23.72 KG/M2 | SYSTOLIC BLOOD PRESSURE: 129 MMHG

## 2018-05-08 PROBLEM — F31.5: Status: ACTIVE | Noted: 2018-03-25

## 2018-05-08 PROBLEM — F33.9 MDD (MAJOR DEPRESSIVE DISORDER), RECURRENT EPISODE (HCC): Status: ACTIVE | Noted: 2018-05-08

## 2018-05-08 PROBLEM — E43 SEVERE MALNUTRITION (HCC): Chronic | Status: ACTIVE | Noted: 2018-05-08

## 2018-05-08 PROBLEM — T50.902A DRUG OVERDOSE, INTENTIONAL SELF-HARM, INITIAL ENCOUNTER (HCC): Status: ACTIVE | Noted: 2018-05-08

## 2018-05-08 LAB
ABSOLUTE EOS #: 0.18 K/UL (ref 0–0.4)
ABSOLUTE IMMATURE GRANULOCYTE: ABNORMAL K/UL (ref 0–0.3)
ABSOLUTE LYMPH #: 3.83 K/UL (ref 1–4.8)
ABSOLUTE MONO #: 0.8 K/UL (ref 0.1–1.3)
ANION GAP SERPL CALCULATED.3IONS-SCNC: 12 MMOL/L (ref 9–17)
BASOPHILS # BLD: 1 % (ref 0–2)
BASOPHILS ABSOLUTE: 0.09 K/UL (ref 0–0.2)
BUN BLDV-MCNC: 4 MG/DL (ref 6–20)
BUN/CREAT BLD: ABNORMAL (ref 9–20)
CALCIUM SERPL-MCNC: 7.8 MG/DL (ref 8.6–10.4)
CHLORIDE BLD-SCNC: 105 MMOL/L (ref 98–107)
CO2: 23 MMOL/L (ref 20–31)
CREAT SERPL-MCNC: 0.59 MG/DL (ref 0.5–0.9)
DIFFERENTIAL TYPE: ABNORMAL
EOSINOPHILS RELATIVE PERCENT: 2 % (ref 0–4)
GFR AFRICAN AMERICAN: >60 ML/MIN
GFR NON-AFRICAN AMERICAN: >60 ML/MIN
GFR SERPL CREATININE-BSD FRML MDRD: ABNORMAL ML/MIN/{1.73_M2}
GFR SERPL CREATININE-BSD FRML MDRD: ABNORMAL ML/MIN/{1.73_M2}
GLUCOSE BLD-MCNC: 89 MG/DL (ref 70–99)
HCT VFR BLD CALC: 39.8 % (ref 36–46)
HEMOGLOBIN: 12.4 G/DL (ref 12–16)
IMMATURE GRANULOCYTES: ABNORMAL %
LYMPHOCYTES # BLD: 43 % (ref 24–44)
MAGNESIUM: 1.9 MG/DL (ref 1.6–2.6)
MCH RBC QN AUTO: 24.6 PG (ref 26–34)
MCHC RBC AUTO-ENTMCNC: 31.3 G/DL (ref 31–37)
MCV RBC AUTO: 78.6 FL (ref 80–100)
MONOCYTES # BLD: 9 % (ref 1–7)
MORPHOLOGY: ABNORMAL
NRBC AUTOMATED: ABNORMAL PER 100 WBC
PDW BLD-RTO: 23.5 % (ref 11.5–14.9)
PLATELET # BLD: 240 K/UL (ref 150–450)
PLATELET ESTIMATE: ABNORMAL
PMV BLD AUTO: 7.5 FL (ref 6–12)
POTASSIUM SERPL-SCNC: 3.3 MMOL/L (ref 3.7–5.3)
RBC # BLD: 5.07 M/UL (ref 4–5.2)
RBC # BLD: ABNORMAL 10*6/UL
SEG NEUTROPHILS: 45 % (ref 36–66)
SEGMENTED NEUTROPHILS ABSOLUTE COUNT: 4 K/UL (ref 1.3–9.1)
SODIUM BLD-SCNC: 140 MMOL/L (ref 135–144)
WBC # BLD: 8.9 K/UL (ref 3.5–11)
WBC # BLD: ABNORMAL 10*3/UL

## 2018-05-08 PROCEDURE — 80048 BASIC METABOLIC PNL TOTAL CA: CPT

## 2018-05-08 PROCEDURE — 2580000003 HC RX 258: Performed by: FAMILY MEDICINE

## 2018-05-08 PROCEDURE — 2060000000 HC ICU INTERMEDIATE R&B

## 2018-05-08 PROCEDURE — 83735 ASSAY OF MAGNESIUM: CPT

## 2018-05-08 PROCEDURE — 1240000000 HC EMOTIONAL WELLNESS R&B

## 2018-05-08 PROCEDURE — 6370000000 HC RX 637 (ALT 250 FOR IP): Performed by: PSYCHIATRY & NEUROLOGY

## 2018-05-08 PROCEDURE — 90792 PSYCH DIAG EVAL W/MED SRVCS: CPT | Performed by: NURSE PRACTITIONER

## 2018-05-08 PROCEDURE — 6370000000 HC RX 637 (ALT 250 FOR IP): Performed by: STUDENT IN AN ORGANIZED HEALTH CARE EDUCATION/TRAINING PROGRAM

## 2018-05-08 PROCEDURE — 6360000002 HC RX W HCPCS: Performed by: STUDENT IN AN ORGANIZED HEALTH CARE EDUCATION/TRAINING PROGRAM

## 2018-05-08 PROCEDURE — 93005 ELECTROCARDIOGRAM TRACING: CPT

## 2018-05-08 PROCEDURE — 99223 1ST HOSP IP/OBS HIGH 75: CPT | Performed by: INTERNAL MEDICINE

## 2018-05-08 PROCEDURE — 96372 THER/PROPH/DIAG INJ SC/IM: CPT

## 2018-05-08 PROCEDURE — 96376 TX/PRO/DX INJ SAME DRUG ADON: CPT

## 2018-05-08 PROCEDURE — 36415 COLL VENOUS BLD VENIPUNCTURE: CPT

## 2018-05-08 PROCEDURE — 85025 COMPLETE CBC W/AUTO DIFF WBC: CPT

## 2018-05-08 RX ORDER — BENZTROPINE MESYLATE 1 MG/ML
2 INJECTION INTRAMUSCULAR; INTRAVENOUS 2 TIMES DAILY PRN
Status: DISCONTINUED | OUTPATIENT
Start: 2018-05-08 | End: 2018-05-11 | Stop reason: HOSPADM

## 2018-05-08 RX ORDER — CALCIUM CARBONATE 200(500)MG
500 TABLET,CHEWABLE ORAL 2 TIMES DAILY PRN
Status: DISCONTINUED | OUTPATIENT
Start: 2018-05-08 | End: 2018-05-08 | Stop reason: HOSPADM

## 2018-05-08 RX ORDER — MAGNESIUM HYDROXIDE/ALUMINUM HYDROXICE/SIMETHICONE 120; 1200; 1200 MG/30ML; MG/30ML; MG/30ML
15 SUSPENSION ORAL EVERY 6 HOURS PRN
Status: DISCONTINUED | OUTPATIENT
Start: 2018-05-08 | End: 2018-05-11 | Stop reason: HOSPADM

## 2018-05-08 RX ORDER — LITHIUM CARBONATE 450 MG
450 TABLET, EXTENDED RELEASE ORAL 2 TIMES DAILY
Status: DISCONTINUED | OUTPATIENT
Start: 2018-05-08 | End: 2018-05-11 | Stop reason: HOSPADM

## 2018-05-08 RX ORDER — HALOPERIDOL 5 MG/ML
5 INJECTION INTRAMUSCULAR EVERY 4 HOURS PRN
Status: DISCONTINUED | OUTPATIENT
Start: 2018-05-08 | End: 2018-05-11 | Stop reason: HOSPADM

## 2018-05-08 RX ORDER — VENLAFAXINE HYDROCHLORIDE 150 MG/1
150 CAPSULE, EXTENDED RELEASE ORAL
Status: DISCONTINUED | OUTPATIENT
Start: 2018-05-09 | End: 2018-05-11 | Stop reason: HOSPADM

## 2018-05-08 RX ORDER — TRAZODONE HYDROCHLORIDE 50 MG/1
50 TABLET ORAL NIGHTLY PRN
Status: DISCONTINUED | OUTPATIENT
Start: 2018-05-08 | End: 2018-05-11

## 2018-05-08 RX ORDER — GABAPENTIN 600 MG/1
600 TABLET ORAL 2 TIMES DAILY
Status: ON HOLD | COMMUNITY
End: 2018-08-02 | Stop reason: HOSPADM

## 2018-05-08 RX ORDER — CHLORDIAZEPOXIDE HYDROCHLORIDE 5 MG/1
5 CAPSULE, GELATIN COATED ORAL EVERY 6 HOURS PRN
Status: DISCONTINUED | OUTPATIENT
Start: 2018-05-08 | End: 2018-05-11 | Stop reason: HOSPADM

## 2018-05-08 RX ORDER — HYDROXYZINE HYDROCHLORIDE 25 MG/1
50 TABLET, FILM COATED ORAL 3 TIMES DAILY PRN
Status: DISCONTINUED | OUTPATIENT
Start: 2018-05-08 | End: 2018-05-11 | Stop reason: HOSPADM

## 2018-05-08 RX ORDER — TRAZODONE HYDROCHLORIDE 100 MG/1
100 TABLET ORAL NIGHTLY PRN
Status: DISCONTINUED | OUTPATIENT
Start: 2018-05-08 | End: 2018-05-11 | Stop reason: HOSPADM

## 2018-05-08 RX ORDER — PANTOPRAZOLE SODIUM 40 MG/1
40 TABLET, DELAYED RELEASE ORAL DAILY
Status: DISCONTINUED | OUTPATIENT
Start: 2018-05-08 | End: 2018-05-11 | Stop reason: HOSPADM

## 2018-05-08 RX ORDER — ACETAMINOPHEN 325 MG/1
650 TABLET ORAL EVERY 4 HOURS PRN
Status: DISCONTINUED | OUTPATIENT
Start: 2018-05-08 | End: 2018-05-11 | Stop reason: HOSPADM

## 2018-05-08 RX ORDER — QUETIAPINE FUMARATE 200 MG/1
200 TABLET, FILM COATED ORAL NIGHTLY
Status: DISCONTINUED | OUTPATIENT
Start: 2018-05-08 | End: 2018-05-11 | Stop reason: HOSPADM

## 2018-05-08 RX ORDER — POTASSIUM CHLORIDE 7.45 MG/ML
10 INJECTION INTRAVENOUS PRN
Status: DISCONTINUED | OUTPATIENT
Start: 2018-05-08 | End: 2018-05-08 | Stop reason: HOSPADM

## 2018-05-08 RX ORDER — GABAPENTIN 600 MG/1
600 TABLET ORAL 2 TIMES DAILY
Status: DISCONTINUED | OUTPATIENT
Start: 2018-05-08 | End: 2018-05-11 | Stop reason: HOSPADM

## 2018-05-08 RX ORDER — POTASSIUM CHLORIDE 20MEQ/15ML
40 LIQUID (ML) ORAL PRN
Status: DISCONTINUED | OUTPATIENT
Start: 2018-05-08 | End: 2018-05-08 | Stop reason: HOSPADM

## 2018-05-08 RX ORDER — OLANZAPINE 5 MG/1
5 TABLET ORAL
Status: ACTIVE | OUTPATIENT
Start: 2018-05-08 | End: 2018-05-08

## 2018-05-08 RX ORDER — NICOTINE 21 MG/24HR
1 PATCH, TRANSDERMAL 24 HOURS TRANSDERMAL DAILY
Status: DISCONTINUED | OUTPATIENT
Start: 2018-05-08 | End: 2018-05-11 | Stop reason: HOSPADM

## 2018-05-08 RX ORDER — POTASSIUM CHLORIDE 20 MEQ/1
40 TABLET, EXTENDED RELEASE ORAL PRN
Status: DISCONTINUED | OUTPATIENT
Start: 2018-05-08 | End: 2018-05-08 | Stop reason: HOSPADM

## 2018-05-08 RX ADMIN — QUETIAPINE FUMARATE 200 MG: 200 TABLET ORAL at 20:16

## 2018-05-08 RX ADMIN — PANTOPRAZOLE SODIUM 40 MG: 40 TABLET, DELAYED RELEASE ORAL at 05:06

## 2018-05-08 RX ADMIN — LITHIUM CARBONATE 450 MG: 450 TABLET, EXTENDED RELEASE ORAL at 20:15

## 2018-05-08 RX ADMIN — POTASSIUM CHLORIDE 40 MEQ: 40 SOLUTION ORAL at 12:31

## 2018-05-08 RX ADMIN — LORAZEPAM 2 MG: 1 TABLET ORAL at 13:59

## 2018-05-08 RX ADMIN — LORAZEPAM 1 MG: 1 TABLET ORAL at 08:12

## 2018-05-08 RX ADMIN — ENOXAPARIN SODIUM 40 MG: 40 INJECTION SUBCUTANEOUS at 08:12

## 2018-05-08 RX ADMIN — GABAPENTIN 600 MG: 600 TABLET, FILM COATED ORAL at 20:15

## 2018-05-08 RX ADMIN — SODIUM CHLORIDE: 9 INJECTION, SOLUTION INTRAVENOUS at 01:36

## 2018-05-08 RX ADMIN — CHLORDIAZEPOXIDE HYDROCHLORIDE 5 MG: 5 CAPSULE ORAL at 20:16

## 2018-05-08 RX ADMIN — ONDANSETRON 4 MG: 2 INJECTION INTRAMUSCULAR; INTRAVENOUS at 08:37

## 2018-05-08 RX ADMIN — ONDANSETRON 4 MG: 2 INJECTION INTRAMUSCULAR; INTRAVENOUS at 14:27

## 2018-05-08 RX ADMIN — LORAZEPAM 2 MG: 1 TABLET ORAL at 16:29

## 2018-05-08 RX ADMIN — TRAZODONE HYDROCHLORIDE 100 MG: 100 TABLET ORAL at 20:16

## 2018-05-08 RX ADMIN — HYDROXYZINE HYDROCHLORIDE 50 MG: 25 TABLET, FILM COATED ORAL at 20:16

## 2018-05-08 ASSESSMENT — SLEEP AND FATIGUE QUESTIONNAIRES
DIFFICULTY ARISING: YES
SLEEP PATTERN: DIFFICULTY FALLING ASLEEP
AVERAGE NUMBER OF SLEEP HOURS: 4
RESTFUL SLEEP: NO
DO YOU USE A SLEEP AID: YES
DO YOU HAVE DIFFICULTY SLEEPING: YES
DIFFICULTY FALLING ASLEEP: YES
DIFFICULTY STAYING ASLEEP: YES

## 2018-05-08 ASSESSMENT — ENCOUNTER SYMPTOMS
SPUTUM PRODUCTION: 0
SHORTNESS OF BREATH: 0
COUGH: 0
WHEEZING: 0
ABDOMINAL PAIN: 0
VOMITING: 0
NAUSEA: 0
HEMOPTYSIS: 0

## 2018-05-08 ASSESSMENT — LIFESTYLE VARIABLES: HISTORY_ALCOHOL_USE: YES

## 2018-05-09 LAB
EKG ATRIAL RATE: 100 BPM
EKG ATRIAL RATE: 53 BPM
EKG ATRIAL RATE: 85 BPM
EKG P AXIS: 36 DEGREES
EKG P AXIS: 64 DEGREES
EKG P AXIS: 68 DEGREES
EKG P-R INTERVAL: 142 MS
EKG P-R INTERVAL: 142 MS
EKG P-R INTERVAL: 148 MS
EKG Q-T INTERVAL: 382 MS
EKG Q-T INTERVAL: 408 MS
EKG Q-T INTERVAL: 498 MS
EKG QRS DURATION: 80 MS
EKG QRS DURATION: 82 MS
EKG QRS DURATION: 82 MS
EKG QTC CALCULATION (BAZETT): 467 MS
EKG QTC CALCULATION (BAZETT): 485 MS
EKG QTC CALCULATION (BAZETT): 492 MS
EKG R AXIS: 29 DEGREES
EKG R AXIS: 30 DEGREES
EKG R AXIS: 32 DEGREES
EKG T AXIS: 33 DEGREES
EKG T AXIS: 33 DEGREES
EKG T AXIS: 35 DEGREES
EKG VENTRICULAR RATE: 100 BPM
EKG VENTRICULAR RATE: 53 BPM
EKG VENTRICULAR RATE: 85 BPM
LITHIUM DATE LAST DOSE: ABNORMAL
LITHIUM DOSE AMOUNT: ABNORMAL
LITHIUM DOSE TIME: 939
LITHIUM LEVEL: 0.4 MMOL/L (ref 0.6–1.2)

## 2018-05-09 PROCEDURE — 80178 ASSAY OF LITHIUM: CPT

## 2018-05-09 PROCEDURE — 6370000000 HC RX 637 (ALT 250 FOR IP): Performed by: PSYCHIATRY & NEUROLOGY

## 2018-05-09 PROCEDURE — 90792 PSYCH DIAG EVAL W/MED SRVCS: CPT | Performed by: PSYCHIATRY & NEUROLOGY

## 2018-05-09 PROCEDURE — 36415 COLL VENOUS BLD VENIPUNCTURE: CPT

## 2018-05-09 PROCEDURE — 1240000000 HC EMOTIONAL WELLNESS R&B

## 2018-05-09 RX ADMIN — GABAPENTIN 600 MG: 600 TABLET, FILM COATED ORAL at 07:41

## 2018-05-09 RX ADMIN — CHLORDIAZEPOXIDE HYDROCHLORIDE 5 MG: 5 CAPSULE ORAL at 07:41

## 2018-05-09 RX ADMIN — LITHIUM CARBONATE 450 MG: 450 TABLET, EXTENDED RELEASE ORAL at 07:41

## 2018-05-09 RX ADMIN — VENLAFAXINE HYDROCHLORIDE 150 MG: 150 CAPSULE, EXTENDED RELEASE ORAL at 07:41

## 2018-05-09 RX ADMIN — PANTOPRAZOLE SODIUM 40 MG: 40 TABLET, DELAYED RELEASE ORAL at 07:40

## 2018-05-09 RX ADMIN — QUETIAPINE FUMARATE 200 MG: 200 TABLET ORAL at 20:41

## 2018-05-09 RX ADMIN — LITHIUM CARBONATE 450 MG: 450 TABLET, EXTENDED RELEASE ORAL at 20:41

## 2018-05-09 RX ADMIN — TRAZODONE HYDROCHLORIDE 100 MG: 100 TABLET ORAL at 20:41

## 2018-05-09 RX ADMIN — HYDROXYZINE HYDROCHLORIDE 50 MG: 25 TABLET, FILM COATED ORAL at 20:41

## 2018-05-09 RX ADMIN — GABAPENTIN 600 MG: 600 TABLET, FILM COATED ORAL at 20:41

## 2018-05-09 RX ADMIN — CHLORDIAZEPOXIDE HYDROCHLORIDE 5 MG: 5 CAPSULE ORAL at 18:01

## 2018-05-09 ASSESSMENT — SLEEP AND FATIGUE QUESTIONNAIRES
DO YOU USE A SLEEP AID: YES
RESTFUL SLEEP: NO
DIFFICULTY ARISING: NO
AVERAGE NUMBER OF SLEEP HOURS: 4
SLEEP PATTERN: DIFFICULTY FALLING ASLEEP;RESTLESSNESS;NIGHTMARES/TERRORS
DIFFICULTY FALLING ASLEEP: YES
DO YOU HAVE DIFFICULTY SLEEPING: YES
DIFFICULTY STAYING ASLEEP: YES

## 2018-05-09 ASSESSMENT — PAIN DESCRIPTION - LOCATION: LOCATION: ABDOMEN

## 2018-05-09 ASSESSMENT — PAIN DESCRIPTION - PAIN TYPE: TYPE: CHRONIC PAIN

## 2018-05-09 ASSESSMENT — PATIENT HEALTH QUESTIONNAIRE - PHQ9: SUM OF ALL RESPONSES TO PHQ QUESTIONS 1-9: 8

## 2018-05-09 ASSESSMENT — LIFESTYLE VARIABLES: HISTORY_ALCOHOL_USE: YES

## 2018-05-09 ASSESSMENT — PAIN SCALES - GENERAL: PAINLEVEL_OUTOF10: 7

## 2018-05-10 VITALS
WEIGHT: 122 LBS | BODY MASS INDEX: 23.95 KG/M2 | HEIGHT: 60 IN | RESPIRATION RATE: 14 BRPM | TEMPERATURE: 98.2 F | HEART RATE: 88 BPM | SYSTOLIC BLOOD PRESSURE: 118 MMHG | DIASTOLIC BLOOD PRESSURE: 87 MMHG

## 2018-05-10 PROCEDURE — 6370000000 HC RX 637 (ALT 250 FOR IP): Performed by: PSYCHIATRY & NEUROLOGY

## 2018-05-10 PROCEDURE — 1240000000 HC EMOTIONAL WELLNESS R&B

## 2018-05-10 RX ADMIN — LITHIUM CARBONATE 450 MG: 450 TABLET, EXTENDED RELEASE ORAL at 20:35

## 2018-05-10 RX ADMIN — CHLORDIAZEPOXIDE HYDROCHLORIDE 5 MG: 5 CAPSULE ORAL at 09:07

## 2018-05-10 RX ADMIN — MAGNESIUM HYDROXIDE 30 ML: 400 SUSPENSION ORAL at 09:16

## 2018-05-10 RX ADMIN — GABAPENTIN 600 MG: 600 TABLET, FILM COATED ORAL at 08:46

## 2018-05-10 RX ADMIN — ALUMINUM HYDROXIDE, MAGNESIUM HYDROXIDE, AND SIMETHICONE 15 ML: 200; 200; 20 SUSPENSION ORAL at 12:38

## 2018-05-10 RX ADMIN — TRAZODONE HYDROCHLORIDE 100 MG: 100 TABLET ORAL at 20:35

## 2018-05-10 RX ADMIN — HYDROXYZINE HYDROCHLORIDE 50 MG: 25 TABLET, FILM COATED ORAL at 20:35

## 2018-05-10 RX ADMIN — QUETIAPINE FUMARATE 200 MG: 200 TABLET ORAL at 20:35

## 2018-05-10 RX ADMIN — CHLORDIAZEPOXIDE HYDROCHLORIDE 5 MG: 5 CAPSULE ORAL at 14:54

## 2018-05-10 RX ADMIN — GABAPENTIN 600 MG: 600 TABLET, FILM COATED ORAL at 20:35

## 2018-05-10 RX ADMIN — VENLAFAXINE HYDROCHLORIDE 150 MG: 150 CAPSULE, EXTENDED RELEASE ORAL at 08:46

## 2018-05-10 RX ADMIN — LITHIUM CARBONATE 450 MG: 450 TABLET, EXTENDED RELEASE ORAL at 08:46

## 2018-05-10 RX ADMIN — PANTOPRAZOLE SODIUM 40 MG: 40 TABLET, DELAYED RELEASE ORAL at 08:45

## 2018-05-11 PROCEDURE — 6370000000 HC RX 637 (ALT 250 FOR IP): Performed by: PSYCHIATRY & NEUROLOGY

## 2018-05-11 PROCEDURE — 5130000000 HC BRIDGE APPOINTMENT: Performed by: COUNSELOR

## 2018-05-11 PROCEDURE — 99238 HOSP IP/OBS DSCHRG MGMT 30/<: CPT | Performed by: PSYCHIATRY & NEUROLOGY

## 2018-05-11 RX ADMIN — PANTOPRAZOLE SODIUM 40 MG: 40 TABLET, DELAYED RELEASE ORAL at 07:51

## 2018-05-11 RX ADMIN — GABAPENTIN 600 MG: 600 TABLET, FILM COATED ORAL at 07:51

## 2018-05-11 RX ADMIN — ALUMINUM HYDROXIDE, MAGNESIUM HYDROXIDE, AND SIMETHICONE 15 ML: 200; 200; 20 SUSPENSION ORAL at 11:06

## 2018-05-11 RX ADMIN — VENLAFAXINE HYDROCHLORIDE 150 MG: 150 CAPSULE, EXTENDED RELEASE ORAL at 07:51

## 2018-05-11 RX ADMIN — LITHIUM CARBONATE 450 MG: 450 TABLET, EXTENDED RELEASE ORAL at 07:51

## 2018-06-05 ENCOUNTER — HOSPITAL ENCOUNTER (OUTPATIENT)
Age: 43
Setting detail: SPECIMEN
Discharge: HOME | End: 2018-06-05
Payer: MEDICARE

## 2018-06-05 LAB
ABSOLUTE EOS #: 0.17 K/UL (ref 0–0.4)
ABSOLUTE IMMATURE GRANULOCYTE: 0 K/UL (ref 0–0.3)
ABSOLUTE LYMPH #: 1.71 K/UL (ref 1–4.8)
ABSOLUTE MONO #: 0.29 K/UL (ref 0.1–0.8)
BASOPHILS # BLD: 1 % (ref 0–2)
BASOPHILS ABSOLUTE: 0.06 K/UL (ref 0–0.2)
DIFFERENTIAL TYPE: ABNORMAL
EOSINOPHILS RELATIVE PERCENT: 3 % (ref 1–4)
ESTIMATED AVERAGE GLUCOSE: 91 MG/DL
HBA1C MFR BLD: 4.8 % (ref 4–6)
HCT VFR BLD CALC: 45.2 % (ref 36.3–47.1)
HEMOGLOBIN: 13.1 G/DL (ref 11.9–15.1)
IMMATURE GRANULOCYTES: 0 %
LYMPHOCYTES # BLD: 30 % (ref 24–44)
MCH RBC QN AUTO: 24.9 PG (ref 25.2–33.5)
MCHC RBC AUTO-ENTMCNC: 29 G/DL (ref 28.4–34.8)
MCV RBC AUTO: 85.9 FL (ref 82.6–102.9)
MONOCYTES # BLD: 5 % (ref 1–7)
MORPHOLOGY: ABNORMAL
NRBC AUTOMATED: 0 PER 100 WBC
PDW BLD-RTO: 21.7 % (ref 11.8–14.4)
PLATELET # BLD: 281 K/UL (ref 138–453)
PLATELET ESTIMATE: ABNORMAL
PMV BLD AUTO: 10.1 FL (ref 8.1–13.5)
RBC # BLD: 5.26 M/UL (ref 3.95–5.11)
RBC # BLD: ABNORMAL 10*6/UL
SEG NEUTROPHILS: 61 % (ref 36–66)
SEGMENTED NEUTROPHILS ABSOLUTE COUNT: 3.47 K/UL (ref 1.8–7.7)
WBC # BLD: 5.7 K/UL (ref 3.5–11.3)
WBC # BLD: ABNORMAL 10*3/UL

## 2018-06-06 LAB
ALBUMIN SERPL-MCNC: 4 G/DL (ref 3.5–5.2)
ALBUMIN/GLOBULIN RATIO: 1.1 (ref 1–2.5)
ALP BLD-CCNC: 106 U/L (ref 35–104)
ALT SERPL-CCNC: 129 U/L (ref 5–33)
ANION GAP SERPL CALCULATED.3IONS-SCNC: 20 MMOL/L (ref 9–17)
AST SERPL-CCNC: 106 U/L
BILIRUB SERPL-MCNC: 0.18 MG/DL (ref 0.3–1.2)
BUN BLDV-MCNC: 5 MG/DL (ref 6–20)
BUN/CREAT BLD: ABNORMAL (ref 9–20)
CALCIUM SERPL-MCNC: 8.6 MG/DL (ref 8.6–10.4)
CHLORIDE BLD-SCNC: 106 MMOL/L (ref 98–107)
CHOLESTEROL/HDL RATIO: 4.5
CHOLESTEROL: 204 MG/DL
CO2: 19 MMOL/L (ref 20–31)
CREAT SERPL-MCNC: 0.64 MG/DL (ref 0.5–0.9)
GFR AFRICAN AMERICAN: >60 ML/MIN
GFR NON-AFRICAN AMERICAN: >60 ML/MIN
GFR SERPL CREATININE-BSD FRML MDRD: ABNORMAL ML/MIN/{1.73_M2}
GFR SERPL CREATININE-BSD FRML MDRD: ABNORMAL ML/MIN/{1.73_M2}
GLUCOSE BLD-MCNC: 85 MG/DL (ref 70–99)
HDLC SERPL-MCNC: 45 MG/DL
LDL CHOLESTEROL: 104 MG/DL (ref 0–130)
POTASSIUM SERPL-SCNC: 4.7 MMOL/L (ref 3.7–5.3)
SODIUM BLD-SCNC: 145 MMOL/L (ref 135–144)
THYROXINE, FREE: 0.92 NG/DL (ref 0.93–1.7)
TOTAL PROTEIN: 7.5 G/DL (ref 6.4–8.3)
TRIGL SERPL-MCNC: 274 MG/DL
TSH SERPL DL<=0.05 MIU/L-ACNC: 2.57 MIU/L (ref 0.3–5)
VITAMIN D 25-HYDROXY: 37.4 NG/ML (ref 30–100)
VLDLC SERPL CALC-MCNC: ABNORMAL MG/DL (ref 1–30)

## 2018-06-07 ENCOUNTER — HOSPITAL ENCOUNTER (INPATIENT)
Age: 43
LOS: 11 days | Discharge: HOME | DRG: 753 | End: 2018-06-18
Attending: EMERGENCY MEDICINE | Admitting: PSYCHIATRY & NEUROLOGY
Payer: MEDICARE

## 2018-06-07 DIAGNOSIS — R45.851 DEPRESSION WITH SUICIDAL IDEATION: Primary | ICD-10-CM

## 2018-06-07 DIAGNOSIS — F32.A DEPRESSION WITH SUICIDAL IDEATION: Primary | ICD-10-CM

## 2018-06-07 DIAGNOSIS — F31.9 BIPOLAR 1 DISORDER, DEPRESSED (HCC): ICD-10-CM

## 2018-06-07 DIAGNOSIS — F10.10 ALCOHOL ABUSE: ICD-10-CM

## 2018-06-07 PROCEDURE — 99285 EMERGENCY DEPT VISIT HI MDM: CPT

## 2018-06-07 PROCEDURE — 1240000000 HC EMOTIONAL WELLNESS R&B

## 2018-06-07 PROCEDURE — 6370000000 HC RX 637 (ALT 250 FOR IP): Performed by: PSYCHIATRY & NEUROLOGY

## 2018-06-07 PROCEDURE — 6370000000 HC RX 637 (ALT 250 FOR IP): Performed by: NURSE PRACTITIONER

## 2018-06-07 RX ORDER — LORAZEPAM 2 MG/ML
3 INJECTION INTRAMUSCULAR
Status: DISCONTINUED | OUTPATIENT
Start: 2018-06-07 | End: 2018-06-18 | Stop reason: HOSPADM

## 2018-06-07 RX ORDER — LITHIUM CARBONATE 450 MG
450 TABLET, EXTENDED RELEASE ORAL 2 TIMES DAILY
Status: DISCONTINUED | OUTPATIENT
Start: 2018-06-07 | End: 2018-06-18 | Stop reason: HOSPADM

## 2018-06-07 RX ORDER — GABAPENTIN 600 MG/1
600 TABLET ORAL 2 TIMES DAILY
Status: DISCONTINUED | OUTPATIENT
Start: 2018-06-07 | End: 2018-06-18 | Stop reason: HOSPADM

## 2018-06-07 RX ORDER — CHLORDIAZEPOXIDE HYDROCHLORIDE 25 MG/1
25 CAPSULE, GELATIN COATED ORAL EVERY 6 HOURS PRN
Status: DISPENSED | OUTPATIENT
Start: 2018-06-07 | End: 2018-06-12

## 2018-06-07 RX ORDER — OLANZAPINE 5 MG/1
5 TABLET ORAL
Status: ACTIVE | OUTPATIENT
Start: 2018-06-07 | End: 2018-06-07

## 2018-06-07 RX ORDER — OLANZAPINE 10 MG/1
10 INJECTION, POWDER, LYOPHILIZED, FOR SOLUTION INTRAMUSCULAR
Status: ACTIVE | OUTPATIENT
Start: 2018-06-07 | End: 2018-06-07

## 2018-06-07 RX ORDER — MAGNESIUM HYDROXIDE/ALUMINUM HYDROXICE/SIMETHICONE 120; 1200; 1200 MG/30ML; MG/30ML; MG/30ML
30 SUSPENSION ORAL EVERY 6 HOURS PRN
Status: DISCONTINUED | OUTPATIENT
Start: 2018-06-07 | End: 2018-06-18 | Stop reason: HOSPADM

## 2018-06-07 RX ORDER — QUETIAPINE FUMARATE 50 MG/1
50 TABLET, FILM COATED ORAL EVERY MORNING
Status: DISCONTINUED | OUTPATIENT
Start: 2018-06-08 | End: 2018-06-18 | Stop reason: HOSPADM

## 2018-06-07 RX ORDER — VENLAFAXINE HYDROCHLORIDE 150 MG/1
150 CAPSULE, EXTENDED RELEASE ORAL
Status: DISCONTINUED | OUTPATIENT
Start: 2018-06-08 | End: 2018-06-18 | Stop reason: HOSPADM

## 2018-06-07 RX ORDER — LORAZEPAM 2 MG/ML
4 INJECTION INTRAMUSCULAR
Status: DISCONTINUED | OUTPATIENT
Start: 2018-06-07 | End: 2018-06-18 | Stop reason: HOSPADM

## 2018-06-07 RX ORDER — NICOTINE 21 MG/24HR
1 PATCH, TRANSDERMAL 24 HOURS TRANSDERMAL DAILY
Status: DISCONTINUED | OUTPATIENT
Start: 2018-06-08 | End: 2018-06-12 | Stop reason: SDUPTHER

## 2018-06-07 RX ORDER — QUETIAPINE FUMARATE 200 MG/1
200 TABLET, FILM COATED ORAL NIGHTLY
Status: DISCONTINUED | OUTPATIENT
Start: 2018-06-07 | End: 2018-06-18 | Stop reason: HOSPADM

## 2018-06-07 RX ORDER — HYDROXYZINE HYDROCHLORIDE 25 MG/1
50 TABLET, FILM COATED ORAL 3 TIMES DAILY PRN
Status: DISCONTINUED | OUTPATIENT
Start: 2018-06-07 | End: 2018-06-18 | Stop reason: HOSPADM

## 2018-06-07 RX ORDER — LORAZEPAM 1 MG/1
2 TABLET ORAL
Status: DISCONTINUED | OUTPATIENT
Start: 2018-06-07 | End: 2018-06-18 | Stop reason: HOSPADM

## 2018-06-07 RX ORDER — LORAZEPAM 1 MG/1
3 TABLET ORAL
Status: DISCONTINUED | OUTPATIENT
Start: 2018-06-07 | End: 2018-06-18 | Stop reason: HOSPADM

## 2018-06-07 RX ORDER — LORAZEPAM 1 MG/1
1 TABLET ORAL
Status: DISCONTINUED | OUTPATIENT
Start: 2018-06-07 | End: 2018-06-18 | Stop reason: HOSPADM

## 2018-06-07 RX ORDER — QUETIAPINE FUMARATE 25 MG/1
50 TABLET, FILM COATED ORAL EVERY MORNING
Status: ON HOLD | COMMUNITY
End: 2018-06-18 | Stop reason: HOSPADM

## 2018-06-07 RX ORDER — M-VIT,TX,IRON,MINS/CALC/FOLIC 27MG-0.4MG
1 TABLET ORAL DAILY
Status: DISCONTINUED | OUTPATIENT
Start: 2018-06-08 | End: 2018-06-18 | Stop reason: HOSPADM

## 2018-06-07 RX ORDER — ACETAMINOPHEN 325 MG/1
650 TABLET ORAL EVERY 4 HOURS PRN
Status: DISCONTINUED | OUTPATIENT
Start: 2018-06-07 | End: 2018-06-18 | Stop reason: HOSPADM

## 2018-06-07 RX ORDER — LORAZEPAM 2 MG/ML
1 INJECTION INTRAMUSCULAR
Status: DISCONTINUED | OUTPATIENT
Start: 2018-06-07 | End: 2018-06-18 | Stop reason: HOSPADM

## 2018-06-07 RX ORDER — LORAZEPAM 2 MG/ML
2 INJECTION INTRAMUSCULAR
Status: DISCONTINUED | OUTPATIENT
Start: 2018-06-07 | End: 2018-06-18 | Stop reason: HOSPADM

## 2018-06-07 RX ORDER — TRAZODONE HYDROCHLORIDE 100 MG/1
100 TABLET ORAL NIGHTLY PRN
Status: DISCONTINUED | OUTPATIENT
Start: 2018-06-08 | End: 2018-06-18 | Stop reason: HOSPADM

## 2018-06-07 RX ORDER — TRAZODONE HYDROCHLORIDE 50 MG/1
50 TABLET ORAL NIGHTLY PRN
Status: DISCONTINUED | OUTPATIENT
Start: 2018-06-08 | End: 2018-06-07

## 2018-06-07 RX ORDER — BENZTROPINE MESYLATE 1 MG/ML
2 INJECTION INTRAMUSCULAR; INTRAVENOUS 2 TIMES DAILY PRN
Status: DISCONTINUED | OUTPATIENT
Start: 2018-06-07 | End: 2018-06-18 | Stop reason: HOSPADM

## 2018-06-07 RX ORDER — LORAZEPAM 1 MG/1
4 TABLET ORAL
Status: DISCONTINUED | OUTPATIENT
Start: 2018-06-07 | End: 2018-06-18 | Stop reason: HOSPADM

## 2018-06-07 RX ORDER — PANTOPRAZOLE SODIUM 40 MG/1
40 TABLET, DELAYED RELEASE ORAL 2 TIMES DAILY
Status: DISCONTINUED | OUTPATIENT
Start: 2018-06-07 | End: 2018-06-18 | Stop reason: HOSPADM

## 2018-06-07 RX ADMIN — HYDROXYZINE HYDROCHLORIDE 50 MG: 25 TABLET, FILM COATED ORAL at 22:58

## 2018-06-07 RX ADMIN — LITHIUM CARBONATE 450 MG: 450 TABLET, EXTENDED RELEASE ORAL at 23:21

## 2018-06-07 RX ADMIN — GABAPENTIN 600 MG: 600 TABLET, FILM COATED ORAL at 22:58

## 2018-06-07 RX ADMIN — QUETIAPINE FUMARATE 200 MG: 200 TABLET ORAL at 22:58

## 2018-06-07 RX ADMIN — CHLORDIAZEPOXIDE HYDROCHLORIDE 25 MG: 25 CAPSULE ORAL at 22:21

## 2018-06-07 ASSESSMENT — ENCOUNTER SYMPTOMS
NAUSEA: 0
ABDOMINAL PAIN: 0
SHORTNESS OF BREATH: 0
BACK PAIN: 0
EYE PAIN: 0
VOMITING: 0
EYE REDNESS: 0
COUGH: 0
RHINORRHEA: 0

## 2018-06-07 ASSESSMENT — PAIN - FUNCTIONAL ASSESSMENT: PAIN_FUNCTIONAL_ASSESSMENT: 0-10

## 2018-06-07 ASSESSMENT — LIFESTYLE VARIABLES: HISTORY_ALCOHOL_USE: YES

## 2018-06-07 ASSESSMENT — SLEEP AND FATIGUE QUESTIONNAIRES
DIFFICULTY STAYING ASLEEP: YES
DO YOU USE A SLEEP AID: YES
DO YOU HAVE DIFFICULTY SLEEPING: YES
SLEEP PATTERN: DIFFICULTY FALLING ASLEEP;DISTURBED/INTERRUPTED SLEEP;NIGHTMARES/TERRORS
AVERAGE NUMBER OF SLEEP HOURS: 4
DIFFICULTY ARISING: NO
DIFFICULTY FALLING ASLEEP: YES

## 2018-06-07 ASSESSMENT — PATIENT HEALTH QUESTIONNAIRE - PHQ9: SUM OF ALL RESPONSES TO PHQ QUESTIONS 1-9: 16

## 2018-06-07 ASSESSMENT — PAIN DESCRIPTION - LOCATION: LOCATION: ABDOMEN

## 2018-06-07 ASSESSMENT — PAIN DESCRIPTION - PAIN TYPE: TYPE: CHRONIC PAIN

## 2018-06-07 ASSESSMENT — PAIN SCALES - GENERAL: PAINLEVEL_OUTOF10: 6

## 2018-06-08 LAB
LITHIUM DATE LAST DOSE: NORMAL
LITHIUM DOSE AMOUNT: NORMAL
LITHIUM DOSE TIME: 2321
LITHIUM LEVEL: 1.1 MMOL/L (ref 0.6–1.2)

## 2018-06-08 PROCEDURE — 6370000000 HC RX 637 (ALT 250 FOR IP): Performed by: NURSE PRACTITIONER

## 2018-06-08 PROCEDURE — 90792 PSYCH DIAG EVAL W/MED SRVCS: CPT | Performed by: PSYCHIATRY & NEUROLOGY

## 2018-06-08 PROCEDURE — 36415 COLL VENOUS BLD VENIPUNCTURE: CPT

## 2018-06-08 PROCEDURE — 80178 ASSAY OF LITHIUM: CPT

## 2018-06-08 PROCEDURE — 6370000000 HC RX 637 (ALT 250 FOR IP): Performed by: PSYCHIATRY & NEUROLOGY

## 2018-06-08 PROCEDURE — 1240000000 HC EMOTIONAL WELLNESS R&B

## 2018-06-08 RX ADMIN — QUETIAPINE FUMARATE 50 MG: 50 TABLET ORAL at 08:34

## 2018-06-08 RX ADMIN — HYDROXYZINE HYDROCHLORIDE 50 MG: 25 TABLET, FILM COATED ORAL at 12:27

## 2018-06-08 RX ADMIN — PANTOPRAZOLE SODIUM 40 MG: 40 TABLET, DELAYED RELEASE ORAL at 20:49

## 2018-06-08 RX ADMIN — HYDROXYZINE HYDROCHLORIDE 50 MG: 25 TABLET, FILM COATED ORAL at 20:50

## 2018-06-08 RX ADMIN — LITHIUM CARBONATE 450 MG: 450 TABLET, EXTENDED RELEASE ORAL at 20:50

## 2018-06-08 RX ADMIN — CHLORDIAZEPOXIDE HYDROCHLORIDE 25 MG: 25 CAPSULE ORAL at 07:50

## 2018-06-08 RX ADMIN — QUETIAPINE FUMARATE 200 MG: 200 TABLET ORAL at 20:50

## 2018-06-08 RX ADMIN — LITHIUM CARBONATE 450 MG: 450 TABLET, EXTENDED RELEASE ORAL at 08:33

## 2018-06-08 RX ADMIN — ACETAMINOPHEN 650 MG: 325 TABLET ORAL at 14:10

## 2018-06-08 RX ADMIN — MULTIPLE VITAMINS W/ MINERALS TAB 1 TABLET: TAB at 08:34

## 2018-06-08 RX ADMIN — VENLAFAXINE HYDROCHLORIDE 150 MG: 150 CAPSULE, EXTENDED RELEASE ORAL at 08:34

## 2018-06-08 RX ADMIN — PANTOPRAZOLE SODIUM 40 MG: 40 TABLET, DELAYED RELEASE ORAL at 08:34

## 2018-06-08 RX ADMIN — CHLORDIAZEPOXIDE HYDROCHLORIDE 25 MG: 25 CAPSULE ORAL at 20:50

## 2018-06-08 RX ADMIN — GABAPENTIN 600 MG: 600 TABLET, FILM COATED ORAL at 20:50

## 2018-06-08 RX ADMIN — GABAPENTIN 600 MG: 600 TABLET, FILM COATED ORAL at 08:35

## 2018-06-08 RX ADMIN — CHLORDIAZEPOXIDE HYDROCHLORIDE 25 MG: 25 CAPSULE ORAL at 14:10

## 2018-06-08 ASSESSMENT — SLEEP AND FATIGUE QUESTIONNAIRES
DIFFICULTY ARISING: NO
SLEEP PATTERN: DIFFICULTY FALLING ASLEEP;RESTLESSNESS;DISTURBED/INTERRUPTED SLEEP;NIGHTMARES/TERRORS
DIFFICULTY FALLING ASLEEP: YES
RESTFUL SLEEP: NO
DO YOU USE A SLEEP AID: YES
DIFFICULTY STAYING ASLEEP: YES
AVERAGE NUMBER OF SLEEP HOURS: 8
DO YOU HAVE DIFFICULTY SLEEPING: YES

## 2018-06-08 ASSESSMENT — LIFESTYLE VARIABLES: HISTORY_ALCOHOL_USE: YES

## 2018-06-08 ASSESSMENT — PAIN SCALES - GENERAL
PAINLEVEL_OUTOF10: 3
PAINLEVEL_OUTOF10: 1

## 2018-06-09 PROCEDURE — 6370000000 HC RX 637 (ALT 250 FOR IP): Performed by: NURSE PRACTITIONER

## 2018-06-09 PROCEDURE — 1240000000 HC EMOTIONAL WELLNESS R&B

## 2018-06-09 PROCEDURE — 6370000000 HC RX 637 (ALT 250 FOR IP): Performed by: PSYCHIATRY & NEUROLOGY

## 2018-06-09 RX ADMIN — GABAPENTIN 600 MG: 600 TABLET, FILM COATED ORAL at 20:51

## 2018-06-09 RX ADMIN — MAGNESIUM HYDROXIDE 30 ML: 400 SUSPENSION ORAL at 21:11

## 2018-06-09 RX ADMIN — ALUMINUM HYDROXIDE, MAGNESIUM HYDROXIDE, AND SIMETHICONE 30 ML: 200; 200; 20 SUSPENSION ORAL at 17:36

## 2018-06-09 RX ADMIN — PANTOPRAZOLE SODIUM 40 MG: 40 TABLET, DELAYED RELEASE ORAL at 20:51

## 2018-06-09 RX ADMIN — TRAZODONE HYDROCHLORIDE 100 MG: 100 TABLET ORAL at 20:51

## 2018-06-09 RX ADMIN — PANTOPRAZOLE SODIUM 40 MG: 40 TABLET, DELAYED RELEASE ORAL at 09:04

## 2018-06-09 RX ADMIN — VENLAFAXINE HYDROCHLORIDE 150 MG: 150 CAPSULE, EXTENDED RELEASE ORAL at 09:04

## 2018-06-09 RX ADMIN — QUETIAPINE FUMARATE 50 MG: 50 TABLET ORAL at 09:04

## 2018-06-09 RX ADMIN — LITHIUM CARBONATE 450 MG: 450 TABLET, EXTENDED RELEASE ORAL at 09:04

## 2018-06-09 RX ADMIN — HYDROXYZINE HYDROCHLORIDE 50 MG: 25 TABLET, FILM COATED ORAL at 13:33

## 2018-06-09 RX ADMIN — LITHIUM CARBONATE 450 MG: 450 TABLET, EXTENDED RELEASE ORAL at 20:51

## 2018-06-09 RX ADMIN — CHLORDIAZEPOXIDE HYDROCHLORIDE 25 MG: 25 CAPSULE ORAL at 09:04

## 2018-06-09 RX ADMIN — MULTIPLE VITAMINS W/ MINERALS TAB 1 TABLET: TAB at 09:04

## 2018-06-09 RX ADMIN — HYDROXYZINE HYDROCHLORIDE 50 MG: 25 TABLET, FILM COATED ORAL at 20:51

## 2018-06-09 RX ADMIN — HYDROXYZINE HYDROCHLORIDE 50 MG: 25 TABLET, FILM COATED ORAL at 04:58

## 2018-06-09 RX ADMIN — GABAPENTIN 600 MG: 600 TABLET, FILM COATED ORAL at 09:04

## 2018-06-09 RX ADMIN — CHLORDIAZEPOXIDE HYDROCHLORIDE 25 MG: 25 CAPSULE ORAL at 17:18

## 2018-06-09 RX ADMIN — QUETIAPINE FUMARATE 200 MG: 200 TABLET ORAL at 20:51

## 2018-06-10 PROCEDURE — 1240000000 HC EMOTIONAL WELLNESS R&B

## 2018-06-10 PROCEDURE — 6370000000 HC RX 637 (ALT 250 FOR IP): Performed by: NURSE PRACTITIONER

## 2018-06-10 PROCEDURE — 6370000000 HC RX 637 (ALT 250 FOR IP): Performed by: PSYCHIATRY & NEUROLOGY

## 2018-06-10 RX ADMIN — CHLORDIAZEPOXIDE HYDROCHLORIDE 25 MG: 25 CAPSULE ORAL at 12:07

## 2018-06-10 RX ADMIN — CHLORDIAZEPOXIDE HYDROCHLORIDE 25 MG: 25 CAPSULE ORAL at 20:40

## 2018-06-10 RX ADMIN — ALUMINUM HYDROXIDE, MAGNESIUM HYDROXIDE, AND SIMETHICONE 30 ML: 200; 200; 20 SUSPENSION ORAL at 20:38

## 2018-06-10 RX ADMIN — QUETIAPINE FUMARATE 200 MG: 200 TABLET ORAL at 20:39

## 2018-06-10 RX ADMIN — VENLAFAXINE HYDROCHLORIDE 150 MG: 150 CAPSULE, EXTENDED RELEASE ORAL at 08:22

## 2018-06-10 RX ADMIN — GABAPENTIN 600 MG: 600 TABLET, FILM COATED ORAL at 20:38

## 2018-06-10 RX ADMIN — TRAZODONE HYDROCHLORIDE 100 MG: 100 TABLET ORAL at 20:38

## 2018-06-10 RX ADMIN — HYDROXYZINE HYDROCHLORIDE 50 MG: 25 TABLET, FILM COATED ORAL at 20:38

## 2018-06-10 RX ADMIN — ALUMINUM HYDROXIDE, MAGNESIUM HYDROXIDE, AND SIMETHICONE 30 ML: 200; 200; 20 SUSPENSION ORAL at 12:07

## 2018-06-10 RX ADMIN — MULTIPLE VITAMINS W/ MINERALS TAB 1 TABLET: TAB at 08:22

## 2018-06-10 RX ADMIN — HYDROXYZINE HYDROCHLORIDE 50 MG: 25 TABLET, FILM COATED ORAL at 08:22

## 2018-06-10 RX ADMIN — GABAPENTIN 600 MG: 600 TABLET, FILM COATED ORAL at 08:22

## 2018-06-10 RX ADMIN — LITHIUM CARBONATE 450 MG: 450 TABLET, EXTENDED RELEASE ORAL at 08:22

## 2018-06-10 RX ADMIN — PANTOPRAZOLE SODIUM 40 MG: 40 TABLET, DELAYED RELEASE ORAL at 20:38

## 2018-06-10 RX ADMIN — LITHIUM CARBONATE 450 MG: 450 TABLET, EXTENDED RELEASE ORAL at 20:38

## 2018-06-10 RX ADMIN — QUETIAPINE FUMARATE 50 MG: 50 TABLET ORAL at 08:22

## 2018-06-10 RX ADMIN — PANTOPRAZOLE SODIUM 40 MG: 40 TABLET, DELAYED RELEASE ORAL at 08:22

## 2018-06-11 PROCEDURE — 1240000000 HC EMOTIONAL WELLNESS R&B

## 2018-06-11 PROCEDURE — 6360000002 HC RX W HCPCS: Performed by: PSYCHIATRY & NEUROLOGY

## 2018-06-11 PROCEDURE — 99232 SBSQ HOSP IP/OBS MODERATE 35: CPT | Performed by: PSYCHIATRY & NEUROLOGY

## 2018-06-11 PROCEDURE — 6370000000 HC RX 637 (ALT 250 FOR IP): Performed by: PSYCHIATRY & NEUROLOGY

## 2018-06-11 PROCEDURE — 6370000000 HC RX 637 (ALT 250 FOR IP): Performed by: NURSE PRACTITIONER

## 2018-06-11 RX ORDER — ONDANSETRON 4 MG/1
4 TABLET, ORALLY DISINTEGRATING ORAL EVERY 8 HOURS PRN
Status: DISCONTINUED | OUTPATIENT
Start: 2018-06-11 | End: 2018-06-18 | Stop reason: HOSPADM

## 2018-06-11 RX ORDER — NICOTINE 21 MG/24HR
1 PATCH, TRANSDERMAL 24 HOURS TRANSDERMAL DAILY
Status: DISCONTINUED | OUTPATIENT
Start: 2018-06-11 | End: 2018-06-18 | Stop reason: HOSPADM

## 2018-06-11 RX ADMIN — PANTOPRAZOLE SODIUM 40 MG: 40 TABLET, DELAYED RELEASE ORAL at 08:25

## 2018-06-11 RX ADMIN — ACETAMINOPHEN 650 MG: 325 TABLET ORAL at 02:08

## 2018-06-11 RX ADMIN — QUETIAPINE FUMARATE 50 MG: 50 TABLET ORAL at 08:26

## 2018-06-11 RX ADMIN — MULTIPLE VITAMINS W/ MINERALS TAB 1 TABLET: TAB at 08:26

## 2018-06-11 RX ADMIN — CHLORDIAZEPOXIDE HYDROCHLORIDE 25 MG: 25 CAPSULE ORAL at 21:07

## 2018-06-11 RX ADMIN — LITHIUM CARBONATE 450 MG: 450 TABLET, EXTENDED RELEASE ORAL at 08:26

## 2018-06-11 RX ADMIN — VENLAFAXINE HYDROCHLORIDE 150 MG: 150 CAPSULE, EXTENDED RELEASE ORAL at 08:26

## 2018-06-11 RX ADMIN — PANTOPRAZOLE SODIUM 40 MG: 40 TABLET, DELAYED RELEASE ORAL at 21:07

## 2018-06-11 RX ADMIN — CHLORDIAZEPOXIDE HYDROCHLORIDE 25 MG: 25 CAPSULE ORAL at 08:26

## 2018-06-11 RX ADMIN — GABAPENTIN 600 MG: 600 TABLET, FILM COATED ORAL at 21:08

## 2018-06-11 RX ADMIN — LITHIUM CARBONATE 450 MG: 450 TABLET, EXTENDED RELEASE ORAL at 21:07

## 2018-06-11 RX ADMIN — ONDANSETRON 4 MG: 4 TABLET, ORALLY DISINTEGRATING ORAL at 17:51

## 2018-06-11 RX ADMIN — TRAZODONE HYDROCHLORIDE 100 MG: 100 TABLET ORAL at 21:07

## 2018-06-11 RX ADMIN — GABAPENTIN 600 MG: 600 TABLET, FILM COATED ORAL at 08:26

## 2018-06-11 RX ADMIN — QUETIAPINE FUMARATE 200 MG: 200 TABLET ORAL at 21:08

## 2018-06-11 RX ADMIN — HYDROXYZINE HYDROCHLORIDE 50 MG: 25 TABLET, FILM COATED ORAL at 21:07

## 2018-06-11 ASSESSMENT — PAIN SCALES - GENERAL: PAINLEVEL_OUTOF10: 3

## 2018-06-11 ASSESSMENT — PAIN - FUNCTIONAL ASSESSMENT
PAIN_FUNCTIONAL_ASSESSMENT: 0-10
PAIN_FUNCTIONAL_ASSESSMENT: 0-10

## 2018-06-11 ASSESSMENT — PAIN DESCRIPTION - DESCRIPTORS: DESCRIPTORS: ACHING;DISCOMFORT

## 2018-06-12 LAB
ABSOLUTE EOS #: 0.19 K/UL (ref 0–0.4)
ABSOLUTE IMMATURE GRANULOCYTE: ABNORMAL K/UL (ref 0–0.3)
ABSOLUTE LYMPH #: 2.62 K/UL (ref 1–4.8)
ABSOLUTE MONO #: 0.7 K/UL (ref 0.1–1.3)
ALBUMIN SERPL-MCNC: 3.5 G/DL (ref 3.5–5.2)
ALBUMIN/GLOBULIN RATIO: ABNORMAL (ref 1–2.5)
ALP BLD-CCNC: 80 U/L (ref 35–104)
ALT SERPL-CCNC: 101 U/L (ref 5–33)
ANION GAP SERPL CALCULATED.3IONS-SCNC: 7 MMOL/L (ref 9–17)
AST SERPL-CCNC: 82 U/L
BASOPHILS # BLD: 1 % (ref 0–2)
BASOPHILS ABSOLUTE: 0.06 K/UL (ref 0–0.2)
BILIRUB SERPL-MCNC: <0.15 MG/DL (ref 0.3–1.2)
BUN BLDV-MCNC: 9 MG/DL (ref 6–20)
BUN/CREAT BLD: ABNORMAL (ref 9–20)
CALCIUM SERPL-MCNC: 9.1 MG/DL (ref 8.6–10.4)
CHLORIDE BLD-SCNC: 103 MMOL/L (ref 98–107)
CO2: 30 MMOL/L (ref 20–31)
CREAT SERPL-MCNC: 0.87 MG/DL (ref 0.5–0.9)
DIFFERENTIAL TYPE: ABNORMAL
EOSINOPHILS RELATIVE PERCENT: 3 % (ref 0–4)
GFR AFRICAN AMERICAN: >60 ML/MIN
GFR NON-AFRICAN AMERICAN: >60 ML/MIN
GFR SERPL CREATININE-BSD FRML MDRD: ABNORMAL ML/MIN/{1.73_M2}
GFR SERPL CREATININE-BSD FRML MDRD: ABNORMAL ML/MIN/{1.73_M2}
GLUCOSE BLD-MCNC: 176 MG/DL (ref 70–99)
HCT VFR BLD CALC: 40.3 % (ref 36–46)
HEMOGLOBIN: 12.6 G/DL (ref 12–16)
IMMATURE GRANULOCYTES: ABNORMAL %
LYMPHOCYTES # BLD: 41 % (ref 24–44)
MCH RBC QN AUTO: 25.9 PG (ref 26–34)
MCHC RBC AUTO-ENTMCNC: 31.4 G/DL (ref 31–37)
MCV RBC AUTO: 82.6 FL (ref 80–100)
MONOCYTES # BLD: 11 % (ref 1–7)
MORPHOLOGY: ABNORMAL
NRBC AUTOMATED: ABNORMAL PER 100 WBC
PDW BLD-RTO: 21.2 % (ref 11.5–14.9)
PLATELET # BLD: 285 K/UL (ref 150–450)
PLATELET ESTIMATE: ABNORMAL
PMV BLD AUTO: 8.1 FL (ref 6–12)
POTASSIUM SERPL-SCNC: 5.2 MMOL/L (ref 3.7–5.3)
RBC # BLD: 4.88 M/UL (ref 4–5.2)
RBC # BLD: ABNORMAL 10*6/UL
SEG NEUTROPHILS: 44 % (ref 36–66)
SEGMENTED NEUTROPHILS ABSOLUTE COUNT: 2.83 K/UL (ref 1.3–9.1)
SODIUM BLD-SCNC: 140 MMOL/L (ref 135–144)
TOTAL PROTEIN: 7 G/DL (ref 6.4–8.3)
WBC # BLD: 6.4 K/UL (ref 3.5–11)
WBC # BLD: ABNORMAL 10*3/UL

## 2018-06-12 PROCEDURE — 36415 COLL VENOUS BLD VENIPUNCTURE: CPT

## 2018-06-12 PROCEDURE — 1240000000 HC EMOTIONAL WELLNESS R&B

## 2018-06-12 PROCEDURE — 6370000000 HC RX 637 (ALT 250 FOR IP): Performed by: PSYCHIATRY & NEUROLOGY

## 2018-06-12 PROCEDURE — 6370000000 HC RX 637 (ALT 250 FOR IP): Performed by: NURSE PRACTITIONER

## 2018-06-12 PROCEDURE — 80053 COMPREHEN METABOLIC PANEL: CPT

## 2018-06-12 PROCEDURE — 85025 COMPLETE CBC W/AUTO DIFF WBC: CPT

## 2018-06-12 PROCEDURE — 99221 1ST HOSP IP/OBS SF/LOW 40: CPT | Performed by: INTERNAL MEDICINE

## 2018-06-12 PROCEDURE — 99232 SBSQ HOSP IP/OBS MODERATE 35: CPT | Performed by: PSYCHIATRY & NEUROLOGY

## 2018-06-12 RX ADMIN — CHLORDIAZEPOXIDE HYDROCHLORIDE 25 MG: 25 CAPSULE ORAL at 20:49

## 2018-06-12 RX ADMIN — MULTIPLE VITAMINS W/ MINERALS TAB 1 TABLET: TAB at 08:40

## 2018-06-12 RX ADMIN — GABAPENTIN 600 MG: 600 TABLET, FILM COATED ORAL at 08:40

## 2018-06-12 RX ADMIN — QUETIAPINE FUMARATE 200 MG: 200 TABLET ORAL at 20:49

## 2018-06-12 RX ADMIN — LITHIUM CARBONATE 450 MG: 450 TABLET, EXTENDED RELEASE ORAL at 20:49

## 2018-06-12 RX ADMIN — GABAPENTIN 600 MG: 600 TABLET, FILM COATED ORAL at 20:49

## 2018-06-12 RX ADMIN — PANTOPRAZOLE SODIUM 40 MG: 40 TABLET, DELAYED RELEASE ORAL at 20:49

## 2018-06-12 RX ADMIN — CHLORDIAZEPOXIDE HYDROCHLORIDE 25 MG: 25 CAPSULE ORAL at 08:40

## 2018-06-12 RX ADMIN — VENLAFAXINE HYDROCHLORIDE 150 MG: 150 CAPSULE, EXTENDED RELEASE ORAL at 08:40

## 2018-06-12 RX ADMIN — LITHIUM CARBONATE 450 MG: 450 TABLET, EXTENDED RELEASE ORAL at 08:40

## 2018-06-12 RX ADMIN — PANTOPRAZOLE SODIUM 40 MG: 40 TABLET, DELAYED RELEASE ORAL at 07:23

## 2018-06-12 RX ADMIN — HYDROXYZINE HYDROCHLORIDE 50 MG: 25 TABLET, FILM COATED ORAL at 20:49

## 2018-06-12 RX ADMIN — TRAZODONE HYDROCHLORIDE 100 MG: 100 TABLET ORAL at 20:49

## 2018-06-12 RX ADMIN — QUETIAPINE FUMARATE 50 MG: 50 TABLET ORAL at 08:40

## 2018-06-13 LAB — AMYLASE: 74 U/L (ref 28–100)

## 2018-06-13 PROCEDURE — 36415 COLL VENOUS BLD VENIPUNCTURE: CPT

## 2018-06-13 PROCEDURE — 82150 ASSAY OF AMYLASE: CPT

## 2018-06-13 PROCEDURE — 6370000000 HC RX 637 (ALT 250 FOR IP): Performed by: PSYCHIATRY & NEUROLOGY

## 2018-06-13 PROCEDURE — 1240000000 HC EMOTIONAL WELLNESS R&B

## 2018-06-13 PROCEDURE — 99232 SBSQ HOSP IP/OBS MODERATE 35: CPT | Performed by: PSYCHIATRY & NEUROLOGY

## 2018-06-13 RX ADMIN — VENLAFAXINE HYDROCHLORIDE 150 MG: 150 CAPSULE, EXTENDED RELEASE ORAL at 08:39

## 2018-06-13 RX ADMIN — PANTOPRAZOLE SODIUM 40 MG: 40 TABLET, DELAYED RELEASE ORAL at 21:49

## 2018-06-13 RX ADMIN — QUETIAPINE FUMARATE 50 MG: 50 TABLET ORAL at 08:39

## 2018-06-13 RX ADMIN — TRAZODONE HYDROCHLORIDE 100 MG: 100 TABLET ORAL at 21:49

## 2018-06-13 RX ADMIN — LITHIUM CARBONATE 450 MG: 450 TABLET, EXTENDED RELEASE ORAL at 08:39

## 2018-06-13 RX ADMIN — PANTOPRAZOLE SODIUM 40 MG: 40 TABLET, DELAYED RELEASE ORAL at 08:39

## 2018-06-13 RX ADMIN — ACETAMINOPHEN 650 MG: 325 TABLET ORAL at 05:56

## 2018-06-13 RX ADMIN — GABAPENTIN 600 MG: 600 TABLET, FILM COATED ORAL at 21:49

## 2018-06-13 RX ADMIN — GABAPENTIN 600 MG: 600 TABLET, FILM COATED ORAL at 08:39

## 2018-06-13 RX ADMIN — MULTIPLE VITAMINS W/ MINERALS TAB 1 TABLET: TAB at 08:40

## 2018-06-13 RX ADMIN — QUETIAPINE FUMARATE 200 MG: 200 TABLET ORAL at 21:49

## 2018-06-13 RX ADMIN — LITHIUM CARBONATE 450 MG: 450 TABLET, EXTENDED RELEASE ORAL at 21:49

## 2018-06-13 RX ADMIN — HYDROXYZINE HYDROCHLORIDE 50 MG: 25 TABLET, FILM COATED ORAL at 21:49

## 2018-06-13 ASSESSMENT — PAIN SCALES - GENERAL
PAINLEVEL_OUTOF10: 3
PAINLEVEL_OUTOF10: 0

## 2018-06-14 PROCEDURE — 6370000000 HC RX 637 (ALT 250 FOR IP): Performed by: PSYCHIATRY & NEUROLOGY

## 2018-06-14 PROCEDURE — 99232 SBSQ HOSP IP/OBS MODERATE 35: CPT | Performed by: PSYCHIATRY & NEUROLOGY

## 2018-06-14 PROCEDURE — 1240000000 HC EMOTIONAL WELLNESS R&B

## 2018-06-14 PROCEDURE — 99231 SBSQ HOSP IP/OBS SF/LOW 25: CPT | Performed by: INTERNAL MEDICINE

## 2018-06-14 RX ADMIN — PANTOPRAZOLE SODIUM 40 MG: 40 TABLET, DELAYED RELEASE ORAL at 21:04

## 2018-06-14 RX ADMIN — HYDROXYZINE HYDROCHLORIDE 50 MG: 25 TABLET, FILM COATED ORAL at 21:04

## 2018-06-14 RX ADMIN — GABAPENTIN 600 MG: 600 TABLET, FILM COATED ORAL at 21:04

## 2018-06-14 RX ADMIN — LITHIUM CARBONATE 450 MG: 450 TABLET, EXTENDED RELEASE ORAL at 08:39

## 2018-06-14 RX ADMIN — VENLAFAXINE HYDROCHLORIDE 150 MG: 150 CAPSULE, EXTENDED RELEASE ORAL at 08:40

## 2018-06-14 RX ADMIN — TRAZODONE HYDROCHLORIDE 100 MG: 100 TABLET ORAL at 21:04

## 2018-06-14 RX ADMIN — GABAPENTIN 600 MG: 600 TABLET, FILM COATED ORAL at 08:40

## 2018-06-14 RX ADMIN — LITHIUM CARBONATE 450 MG: 450 TABLET, EXTENDED RELEASE ORAL at 21:04

## 2018-06-14 RX ADMIN — QUETIAPINE FUMARATE 50 MG: 50 TABLET ORAL at 08:40

## 2018-06-14 RX ADMIN — MULTIPLE VITAMINS W/ MINERALS TAB 1 TABLET: TAB at 08:40

## 2018-06-14 RX ADMIN — QUETIAPINE FUMARATE 200 MG: 200 TABLET ORAL at 21:04

## 2018-06-14 RX ADMIN — PANTOPRAZOLE SODIUM 40 MG: 40 TABLET, DELAYED RELEASE ORAL at 08:39

## 2018-06-15 PROCEDURE — 6370000000 HC RX 637 (ALT 250 FOR IP): Performed by: PSYCHIATRY & NEUROLOGY

## 2018-06-15 PROCEDURE — 1240000000 HC EMOTIONAL WELLNESS R&B

## 2018-06-15 RX ADMIN — GABAPENTIN 600 MG: 600 TABLET, FILM COATED ORAL at 08:20

## 2018-06-15 RX ADMIN — ACETAMINOPHEN 650 MG: 325 TABLET ORAL at 03:01

## 2018-06-15 RX ADMIN — PANTOPRAZOLE SODIUM 40 MG: 40 TABLET, DELAYED RELEASE ORAL at 21:19

## 2018-06-15 RX ADMIN — HYDROXYZINE HYDROCHLORIDE 50 MG: 25 TABLET, FILM COATED ORAL at 21:19

## 2018-06-15 RX ADMIN — QUETIAPINE FUMARATE 200 MG: 200 TABLET ORAL at 21:19

## 2018-06-15 RX ADMIN — GABAPENTIN 600 MG: 600 TABLET, FILM COATED ORAL at 21:19

## 2018-06-15 RX ADMIN — MULTIPLE VITAMINS W/ MINERALS TAB 1 TABLET: TAB at 08:20

## 2018-06-15 RX ADMIN — LITHIUM CARBONATE 450 MG: 450 TABLET, EXTENDED RELEASE ORAL at 08:20

## 2018-06-15 RX ADMIN — TRAZODONE HYDROCHLORIDE 100 MG: 100 TABLET ORAL at 21:19

## 2018-06-15 RX ADMIN — LITHIUM CARBONATE 450 MG: 450 TABLET, EXTENDED RELEASE ORAL at 21:19

## 2018-06-15 RX ADMIN — QUETIAPINE FUMARATE 50 MG: 50 TABLET ORAL at 08:20

## 2018-06-15 RX ADMIN — PANTOPRAZOLE SODIUM 40 MG: 40 TABLET, DELAYED RELEASE ORAL at 08:20

## 2018-06-15 RX ADMIN — VENLAFAXINE HYDROCHLORIDE 150 MG: 150 CAPSULE, EXTENDED RELEASE ORAL at 08:20

## 2018-06-15 ASSESSMENT — PAIN SCALES - GENERAL
PAINLEVEL_OUTOF10: 3
PAINLEVEL_OUTOF10: 1

## 2018-06-15 ASSESSMENT — PAIN DESCRIPTION - PAIN TYPE: TYPE: ACUTE PAIN

## 2018-06-15 ASSESSMENT — PAIN DESCRIPTION - LOCATION: LOCATION: LEG

## 2018-06-16 PROCEDURE — 1240000000 HC EMOTIONAL WELLNESS R&B

## 2018-06-16 PROCEDURE — 6370000000 HC RX 637 (ALT 250 FOR IP): Performed by: PSYCHIATRY & NEUROLOGY

## 2018-06-16 RX ADMIN — GABAPENTIN 600 MG: 600 TABLET, FILM COATED ORAL at 21:11

## 2018-06-16 RX ADMIN — LITHIUM CARBONATE 450 MG: 450 TABLET, EXTENDED RELEASE ORAL at 08:26

## 2018-06-16 RX ADMIN — GABAPENTIN 600 MG: 600 TABLET, FILM COATED ORAL at 08:26

## 2018-06-16 RX ADMIN — MULTIPLE VITAMINS W/ MINERALS TAB 1 TABLET: TAB at 08:26

## 2018-06-16 RX ADMIN — TRAZODONE HYDROCHLORIDE 100 MG: 100 TABLET ORAL at 21:11

## 2018-06-16 RX ADMIN — LITHIUM CARBONATE 450 MG: 450 TABLET, EXTENDED RELEASE ORAL at 21:11

## 2018-06-16 RX ADMIN — HYDROXYZINE HYDROCHLORIDE 50 MG: 25 TABLET, FILM COATED ORAL at 21:11

## 2018-06-16 RX ADMIN — PANTOPRAZOLE SODIUM 40 MG: 40 TABLET, DELAYED RELEASE ORAL at 08:25

## 2018-06-16 RX ADMIN — QUETIAPINE FUMARATE 50 MG: 50 TABLET ORAL at 08:26

## 2018-06-16 RX ADMIN — VENLAFAXINE HYDROCHLORIDE 150 MG: 150 CAPSULE, EXTENDED RELEASE ORAL at 08:26

## 2018-06-16 RX ADMIN — QUETIAPINE FUMARATE 200 MG: 200 TABLET ORAL at 21:11

## 2018-06-16 RX ADMIN — PANTOPRAZOLE SODIUM 40 MG: 40 TABLET, DELAYED RELEASE ORAL at 21:11

## 2018-06-17 PROCEDURE — 1240000000 HC EMOTIONAL WELLNESS R&B

## 2018-06-17 PROCEDURE — 6370000000 HC RX 637 (ALT 250 FOR IP): Performed by: PSYCHIATRY & NEUROLOGY

## 2018-06-17 PROCEDURE — 99232 SBSQ HOSP IP/OBS MODERATE 35: CPT | Performed by: PSYCHIATRY & NEUROLOGY

## 2018-06-17 RX ADMIN — QUETIAPINE FUMARATE 200 MG: 200 TABLET ORAL at 21:53

## 2018-06-17 RX ADMIN — GABAPENTIN 600 MG: 600 TABLET, FILM COATED ORAL at 08:27

## 2018-06-17 RX ADMIN — HYDROXYZINE HYDROCHLORIDE 50 MG: 25 TABLET, FILM COATED ORAL at 21:53

## 2018-06-17 RX ADMIN — PANTOPRAZOLE SODIUM 40 MG: 40 TABLET, DELAYED RELEASE ORAL at 21:53

## 2018-06-17 RX ADMIN — LITHIUM CARBONATE 450 MG: 450 TABLET, EXTENDED RELEASE ORAL at 21:53

## 2018-06-17 RX ADMIN — MULTIPLE VITAMINS W/ MINERALS TAB 1 TABLET: TAB at 08:27

## 2018-06-17 RX ADMIN — GABAPENTIN 600 MG: 600 TABLET, FILM COATED ORAL at 21:53

## 2018-06-17 RX ADMIN — TRAZODONE HYDROCHLORIDE 100 MG: 100 TABLET ORAL at 21:53

## 2018-06-17 RX ADMIN — PANTOPRAZOLE SODIUM 40 MG: 40 TABLET, DELAYED RELEASE ORAL at 08:27

## 2018-06-17 RX ADMIN — QUETIAPINE FUMARATE 50 MG: 50 TABLET ORAL at 08:27

## 2018-06-17 RX ADMIN — LITHIUM CARBONATE 450 MG: 450 TABLET, EXTENDED RELEASE ORAL at 08:27

## 2018-06-17 RX ADMIN — VENLAFAXINE HYDROCHLORIDE 150 MG: 150 CAPSULE, EXTENDED RELEASE ORAL at 08:27

## 2018-06-18 VITALS
WEIGHT: 126 LBS | TEMPERATURE: 98.1 F | RESPIRATION RATE: 18 BRPM | SYSTOLIC BLOOD PRESSURE: 100 MMHG | HEIGHT: 60 IN | OXYGEN SATURATION: 98 % | HEART RATE: 80 BPM | DIASTOLIC BLOOD PRESSURE: 64 MMHG | BODY MASS INDEX: 24.74 KG/M2

## 2018-06-18 PROCEDURE — 6370000000 HC RX 637 (ALT 250 FOR IP): Performed by: PSYCHIATRY & NEUROLOGY

## 2018-06-18 PROCEDURE — 99239 HOSP IP/OBS DSCHRG MGMT >30: CPT | Performed by: REGISTERED NURSE

## 2018-06-18 PROCEDURE — 5130000000 HC BRIDGE APPOINTMENT

## 2018-06-18 RX ORDER — QUETIAPINE FUMARATE 200 MG/1
200 TABLET, FILM COATED ORAL NIGHTLY
Qty: 14 TABLET | Refills: 0 | Status: ON HOLD | OUTPATIENT
Start: 2018-06-18 | End: 2018-08-02

## 2018-06-18 RX ORDER — QUETIAPINE FUMARATE 50 MG/1
50 TABLET, FILM COATED ORAL EVERY MORNING
Qty: 14 TABLET | Refills: 0 | Status: ON HOLD | OUTPATIENT
Start: 2018-06-19 | End: 2018-08-02

## 2018-06-18 RX ORDER — VENLAFAXINE HYDROCHLORIDE 150 MG/1
150 CAPSULE, EXTENDED RELEASE ORAL
Qty: 14 CAPSULE | Refills: 0 | Status: ON HOLD | OUTPATIENT
Start: 2018-06-19 | End: 2018-08-02

## 2018-06-18 RX ORDER — LITHIUM CARBONATE 450 MG
450 TABLET, EXTENDED RELEASE ORAL 2 TIMES DAILY
Qty: 28 TABLET | Refills: 0 | Status: ON HOLD | OUTPATIENT
Start: 2018-06-18 | End: 2018-08-02

## 2018-06-18 RX ORDER — TRAZODONE HYDROCHLORIDE 100 MG/1
100 TABLET ORAL NIGHTLY PRN
Qty: 14 TABLET | Refills: 0 | Status: ON HOLD | OUTPATIENT
Start: 2018-06-18 | End: 2018-08-02

## 2018-06-18 RX ADMIN — GABAPENTIN 600 MG: 600 TABLET, FILM COATED ORAL at 08:28

## 2018-06-18 RX ADMIN — PANTOPRAZOLE SODIUM 40 MG: 40 TABLET, DELAYED RELEASE ORAL at 08:28

## 2018-06-18 RX ADMIN — VENLAFAXINE HYDROCHLORIDE 150 MG: 150 CAPSULE, EXTENDED RELEASE ORAL at 08:28

## 2018-06-18 RX ADMIN — MULTIPLE VITAMINS W/ MINERALS TAB 1 TABLET: TAB at 08:28

## 2018-06-18 RX ADMIN — QUETIAPINE FUMARATE 50 MG: 50 TABLET ORAL at 08:28

## 2018-06-18 RX ADMIN — HYDROXYZINE HYDROCHLORIDE 50 MG: 25 TABLET, FILM COATED ORAL at 08:30

## 2018-06-18 RX ADMIN — LITHIUM CARBONATE 450 MG: 450 TABLET, EXTENDED RELEASE ORAL at 08:27

## 2018-06-23 ENCOUNTER — HOSPITAL ENCOUNTER (EMERGENCY)
Age: 43
Discharge: HOME | End: 2018-06-23
Attending: EMERGENCY MEDICINE
Payer: MEDICARE

## 2018-06-23 VITALS
HEART RATE: 85 BPM | SYSTOLIC BLOOD PRESSURE: 111 MMHG | OXYGEN SATURATION: 98 % | DIASTOLIC BLOOD PRESSURE: 85 MMHG | TEMPERATURE: 97.9 F | RESPIRATION RATE: 18 BRPM

## 2018-06-23 DIAGNOSIS — F10.920 ACUTE ALCOHOLIC INTOXICATION WITHOUT COMPLICATION (HCC): Primary | ICD-10-CM

## 2018-06-23 LAB
ACETAMINOPHEN LEVEL: <5 UG/ML (ref 10–30)
ETHANOL PERCENT: 0.27 %
ETHANOL: 266 MG/DL
SALICYLATE LEVEL: <1 MG/DL (ref 3–10)
TOXIC TRICYCLIC SC,BLOOD: NEGATIVE

## 2018-06-23 PROCEDURE — 99283 EMERGENCY DEPT VISIT LOW MDM: CPT

## 2018-06-23 PROCEDURE — G0480 DRUG TEST DEF 1-7 CLASSES: HCPCS

## 2018-06-23 PROCEDURE — 80307 DRUG TEST PRSMV CHEM ANLYZR: CPT

## 2018-06-30 ENCOUNTER — HOSPITAL ENCOUNTER (EMERGENCY)
Age: 43
Discharge: HOME | End: 2018-06-30
Attending: EMERGENCY MEDICINE
Payer: MEDICARE

## 2018-06-30 VITALS
HEART RATE: 71 BPM | DIASTOLIC BLOOD PRESSURE: 67 MMHG | TEMPERATURE: 97 F | WEIGHT: 140 LBS | RESPIRATION RATE: 16 BRPM | SYSTOLIC BLOOD PRESSURE: 90 MMHG | BODY MASS INDEX: 27.34 KG/M2 | OXYGEN SATURATION: 96 %

## 2018-06-30 DIAGNOSIS — F10.920 ACUTE ALCOHOLIC INTOXICATION WITHOUT COMPLICATION (HCC): Primary | ICD-10-CM

## 2018-06-30 LAB
ETHANOL PERCENT: 0.1 %
ETHANOL: 100 MG/DL

## 2018-06-30 PROCEDURE — 99285 EMERGENCY DEPT VISIT HI MDM: CPT

## 2018-06-30 PROCEDURE — G0480 DRUG TEST DEF 1-7 CLASSES: HCPCS

## 2018-06-30 ASSESSMENT — ENCOUNTER SYMPTOMS
STRIDOR: 0
WHEEZING: 0
RHINORRHEA: 0
VOMITING: 0
NAUSEA: 0

## 2018-06-30 NOTE — ED NOTES
Pt resting on stretcher, eyes closed, respirations even and non-labored. Pt updated on plan of care.      Keshawn Price RN  06/30/18 [id number]

## 2018-06-30 NOTE — ED PROVIDER NOTES
[facility] ED  Emergency Department Encounter  Emergency Medicine Resident     Pt Name: Pedro Carter  MRN: [id number]  [place] 1975  Date of evaluation: 6/30/18  PCP:  No primary care provider on file. CHIEF COMPLAINT       Chief Complaint   Patient presents with    Aphasia     per pt. friend, pt. had slurred speech and difficult to arouse, pt. reports drinking 1 glass of wine and taking psychiatric medications       HISTORY OF PRESENT ILLNESS  (Location/Symptom, Timing/Onset, Context/Setting, Quality, Duration, Modifying Factors, Severity.)      Pedro Carter is a 38 y.o. female who presents with Concern for a overdose. The patient's roommate found her sleeping in her bed, and was reportedly unresponsive thus they called EMS and brought her here for evaluation. Patient reports that she restarted taking her psych medicines over the past few days and also had a glass of wine this evening. She adamantly denies any suicidal or homicidal ideation. She reports she would not come to the hospital for any other reason. She denies any other drug use. PAST MEDICAL / SURGICAL / SOCIAL / FAMILY HISTORY      has a past medical history of Alcoholism ([address] Anxiety; Asthma; Depression; Pneumonia; Polysubstance abuse; Suicide attempt; and Suicide ideation. has a past surgical history that includes Hysterectomy; Carpal tunnel release; Hysterectomy, total abdominal; Cholecystectomy; and Stomach surgery. Social History     Social History    Marital status: Single     Spouse name: N/A    Number of children: 0    Years of education: 15     Occupational History    Not on file.      Social History Main Topics    Smoking status: Current Every Day Smoker     Packs/day: 1.00     Years: 30.00     Types: Cigarettes    Smokeless tobacco: Never Used      Comment: pt accepting of nicotine replacement    Alcohol use 14.4 oz/week     24 Cans of beer per week      Comment: daily    Drug AM: 47-year-old female presenting with family friend concerned that patient had an episode of unresponsiveness. Patient is alert, and oriented, she does have a mild slurring of her speech, but she answers questions appropriately. She denies any suicidal or homicidal ideation, reports that she had just been put back on her psych meds and they've made her sleepy as well as a glass of wine. We will obtain an alcohol level and reassess the patient for suicidal ideation and homicidal ideation when she is sober. ED Course as of Jun 30 0530   Sat Jun 30, 2018   0302 SW to call patient a cab, patient clinically sober. Denies SI/HI. [AF]      ED Course User Index  [AF] Raven Kellogg MD               PROCEDURES:  None    CONSULTS:  None    CRITICAL CARE:  None    FINAL IMPRESSION      1. Acute alcoholic intoxication without complication ([facility]        DISPOSITION / PLAN     DISPOSITION Decision To Discharge    PATIENT REFERRED TO:  No follow-up provider specified.     DISCHARGE MEDICATIONS:  Discharge Medication List as of 6/30/2018  2:39 AM          Raven Kellogg MD  Emergency Medicine Resident    (Please note that portions of this note were completed with a voice recognition program.  Efforts were made to edit the dictations but occasionally words are mis-transcribed.)       Raven Kellogg MD  06/30/18 [id number]

## 2018-06-30 NOTE — ED PROVIDER NOTES
[address]  ED  eMERGENCY dEPARTMENT eNCOUnter   Attending Attestation     Pt Name: Jossy Solitario  MRN: [id number]  [place] 1975  Date of evaluation: 6/30/18       Jossy Solitario is a 38 y.o. female who presents with Psychiatric Evaluation      History:Patient presents after her roommate thought that she was sleeping to. Patient apparently had been drinking alcohol and was a very deep sleep and was difficult to arouse. Patient here for evaluation. Patient denies suicidal or homicidal ideation. Patient says the only complaint she has that she is hungry and thirsty. No other complaints    Exam: Heart regular rhythm are regular. Lungs are clear to auscultation bilaterally. Abdomen is soft, nontender. Patient denies suicidal or homicidal ideation. Patient does not have a ride. We'll patient sober up here and discharged home when she is clinically sober. I performed a history and physical examination of the patient and discussed management with the resident. I reviewed the residents note and agree with the documented findings and plan of care. Any areas of disagreement are noted on the chart. I was personally present for the key portions of any procedures. I have documented in the chart those procedures where I was not present during the key portions. I have personally reviewed all images and agree with the resident's interpretation. I have reviewed the emergency nurses triage note. I agree with the chief complaint, past medical history, past surgical history, allergies, medications, social and family history as documented unless otherwise noted below. Documentation of the HPI, Physical Exam and Medical Decision Making performed by medical students or scribes is based on my personal performance of the HPI, PE and MDM.  For Phys Assistant/ Nurse Practitioner cases/documentation I have had a face to face evaluation of this patient and have completed at least one if not all key

## 2018-07-19 ENCOUNTER — HOSPITAL ENCOUNTER (OUTPATIENT)
Age: 43
Setting detail: SPECIMEN
Discharge: HOME | End: 2018-07-19
Payer: COMMERCIAL

## 2018-07-19 LAB
ABSOLUTE EOS #: 0.25 K/UL (ref 0–0.44)
ABSOLUTE IMMATURE GRANULOCYTE: 0.03 K/UL (ref 0–0.3)
ABSOLUTE LYMPH #: 2.44 K/UL (ref 1.1–3.7)
ABSOLUTE MONO #: 0.7 K/UL (ref 0.1–1.2)
ALBUMIN SERPL-MCNC: 4.4 G/DL (ref 3.5–5.2)
ALBUMIN/GLOBULIN RATIO: 1.1 (ref 1–2.5)
ALP BLD-CCNC: 87 U/L (ref 35–104)
ALT SERPL-CCNC: 29 U/L (ref 5–33)
ANION GAP SERPL CALCULATED.3IONS-SCNC: 12 MMOL/L (ref 9–17)
AST SERPL-CCNC: 31 U/L
BASOPHILS # BLD: 1 % (ref 0–2)
BASOPHILS ABSOLUTE: 0.05 K/UL (ref 0–0.2)
BILIRUB SERPL-MCNC: 0.45 MG/DL (ref 0.3–1.2)
BUN BLDV-MCNC: 10 MG/DL (ref 6–20)
BUN/CREAT BLD: ABNORMAL (ref 9–20)
CALCIUM SERPL-MCNC: 9.4 MG/DL (ref 8.6–10.4)
CHLORIDE BLD-SCNC: 102 MMOL/L (ref 98–107)
CO2: 26 MMOL/L (ref 20–31)
CREAT SERPL-MCNC: 1 MG/DL (ref 0.5–0.9)
DIFFERENTIAL TYPE: ABNORMAL
EOSINOPHILS RELATIVE PERCENT: 4 % (ref 1–4)
GFR AFRICAN AMERICAN: >60 ML/MIN
GFR NON-AFRICAN AMERICAN: >60 ML/MIN
GFR SERPL CREATININE-BSD FRML MDRD: ABNORMAL ML/MIN/{1.73_M2}
GFR SERPL CREATININE-BSD FRML MDRD: ABNORMAL ML/MIN/{1.73_M2}
GLUCOSE BLD-MCNC: 106 MG/DL (ref 70–99)
HAV IGM SER IA-ACNC: NONREACTIVE
HCT VFR BLD CALC: 47.1 % (ref 36.3–47.1)
HEMOGLOBIN: 13.4 G/DL (ref 11.9–15.1)
HEPATITIS B CORE IGM ANTIBODY: NONREACTIVE
HEPATITIS B SURFACE ANTIGEN: NONREACTIVE
HEPATITIS C ANTIBODY: REACTIVE
IMMATURE GRANULOCYTES: 0 %
LYMPHOCYTES # BLD: 36 % (ref 24–43)
MCH RBC QN AUTO: 25.6 PG (ref 25.2–33.5)
MCHC RBC AUTO-ENTMCNC: 28.5 G/DL (ref 28.4–34.8)
MCV RBC AUTO: 90.1 FL (ref 82.6–102.9)
MONOCYTES # BLD: 10 % (ref 3–12)
NRBC AUTOMATED: 0 PER 100 WBC
PDW BLD-RTO: 18.1 % (ref 11.8–14.4)
PLATELET # BLD: 312 K/UL (ref 138–453)
PLATELET ESTIMATE: ABNORMAL
PMV BLD AUTO: 10.5 FL (ref 8.1–13.5)
POTASSIUM SERPL-SCNC: 5.4 MMOL/L (ref 3.7–5.3)
RBC # BLD: 5.23 M/UL (ref 3.95–5.11)
RBC # BLD: ABNORMAL 10*6/UL
SEG NEUTROPHILS: 49 % (ref 36–65)
SEGMENTED NEUTROPHILS ABSOLUTE COUNT: 3.34 K/UL (ref 1.5–8.1)
SODIUM BLD-SCNC: 140 MMOL/L (ref 135–144)
TOTAL PROTEIN: 8.3 G/DL (ref 6.4–8.3)
WBC # BLD: 6.8 K/UL (ref 3.5–11.3)
WBC # BLD: ABNORMAL 10*3/UL

## 2018-07-24 LAB
DIRECT EXAM: ABNORMAL
HCV QUANTITATIVE: NORMAL
HEPATITIS C GENOTYPE: NORMAL
Lab: ABNORMAL
SPECIMEN DESCRIPTION: ABNORMAL
STATUS: ABNORMAL

## 2018-07-31 ENCOUNTER — HOSPITAL ENCOUNTER (INPATIENT)
Age: 43
LOS: 2 days | Discharge: HOME | DRG: 753 | End: 2018-08-02
Attending: PSYCHIATRY & NEUROLOGY | Admitting: PSYCHIATRY & NEUROLOGY
Payer: MEDICARE

## 2018-07-31 PROBLEM — F32.2 SEVERE DEPRESSION (HCC): Status: ACTIVE | Noted: 2018-07-31

## 2018-07-31 LAB
THYROXINE, FREE: 0.92 NG/DL (ref 0.93–1.7)
TSH SERPL DL<=0.05 MIU/L-ACNC: 2.04 MIU/L (ref 0.3–5)

## 2018-07-31 PROCEDURE — 6370000000 HC RX 637 (ALT 250 FOR IP): Performed by: NURSE PRACTITIONER

## 2018-07-31 PROCEDURE — 84443 ASSAY THYROID STIM HORMONE: CPT

## 2018-07-31 PROCEDURE — 1240000000 HC EMOTIONAL WELLNESS R&B

## 2018-07-31 PROCEDURE — 90792 PSYCH DIAG EVAL W/MED SRVCS: CPT | Performed by: NURSE PRACTITIONER

## 2018-07-31 PROCEDURE — 6370000000 HC RX 637 (ALT 250 FOR IP): Performed by: PSYCHIATRY & NEUROLOGY

## 2018-07-31 PROCEDURE — 36415 COLL VENOUS BLD VENIPUNCTURE: CPT

## 2018-07-31 PROCEDURE — 84439 ASSAY OF FREE THYROXINE: CPT

## 2018-07-31 RX ORDER — LITHIUM CARBONATE 450 MG
450 TABLET, EXTENDED RELEASE ORAL 2 TIMES DAILY
Status: DISCONTINUED | OUTPATIENT
Start: 2018-07-31 | End: 2018-08-02 | Stop reason: HOSPADM

## 2018-07-31 RX ORDER — TRAZODONE HYDROCHLORIDE 50 MG/1
50 TABLET ORAL NIGHTLY PRN
Status: DISCONTINUED | OUTPATIENT
Start: 2018-07-31 | End: 2018-08-01

## 2018-07-31 RX ORDER — ACETAMINOPHEN 325 MG/1
650 TABLET ORAL EVERY 4 HOURS PRN
Status: DISCONTINUED | OUTPATIENT
Start: 2018-07-31 | End: 2018-08-02 | Stop reason: HOSPADM

## 2018-07-31 RX ORDER — FOLIC ACID 1 MG/1
1 TABLET ORAL DAILY
Status: DISCONTINUED | OUTPATIENT
Start: 2018-07-31 | End: 2018-08-02 | Stop reason: HOSPADM

## 2018-07-31 RX ORDER — PANTOPRAZOLE SODIUM 40 MG/1
40 TABLET, DELAYED RELEASE ORAL
Status: DISCONTINUED | OUTPATIENT
Start: 2018-07-31 | End: 2018-08-02 | Stop reason: HOSPADM

## 2018-07-31 RX ORDER — NICOTINE 21 MG/24HR
1 PATCH, TRANSDERMAL 24 HOURS TRANSDERMAL DAILY
Status: DISCONTINUED | OUTPATIENT
Start: 2018-07-31 | End: 2018-08-02 | Stop reason: HOSPADM

## 2018-07-31 RX ORDER — OMEPRAZOLE 20 MG/1
20 CAPSULE, DELAYED RELEASE ORAL 2 TIMES DAILY
COMMUNITY
Start: 2018-07-31 | End: 2019-03-20

## 2018-07-31 RX ORDER — BENZTROPINE MESYLATE 1 MG/ML
2 INJECTION INTRAMUSCULAR; INTRAVENOUS 2 TIMES DAILY PRN
Status: DISCONTINUED | OUTPATIENT
Start: 2018-07-31 | End: 2018-08-02 | Stop reason: HOSPADM

## 2018-07-31 RX ORDER — QUETIAPINE FUMARATE 50 MG/1
50 TABLET, FILM COATED ORAL EVERY MORNING
Status: DISCONTINUED | OUTPATIENT
Start: 2018-07-31 | End: 2018-08-02 | Stop reason: HOSPADM

## 2018-07-31 RX ORDER — QUETIAPINE FUMARATE 200 MG/1
200 TABLET, FILM COATED ORAL NIGHTLY
Status: DISCONTINUED | OUTPATIENT
Start: 2018-07-31 | End: 2018-08-02 | Stop reason: HOSPADM

## 2018-07-31 RX ORDER — CHLORDIAZEPOXIDE HYDROCHLORIDE 25 MG/1
25 CAPSULE, GELATIN COATED ORAL 4 TIMES DAILY PRN
Status: DISCONTINUED | OUTPATIENT
Start: 2018-07-31 | End: 2018-08-01

## 2018-07-31 RX ORDER — MAGNESIUM HYDROXIDE/ALUMINUM HYDROXICE/SIMETHICONE 120; 1200; 1200 MG/30ML; MG/30ML; MG/30ML
30 SUSPENSION ORAL EVERY 6 HOURS PRN
Status: DISCONTINUED | OUTPATIENT
Start: 2018-07-31 | End: 2018-08-02 | Stop reason: HOSPADM

## 2018-07-31 RX ORDER — THIAMINE MONONITRATE (VIT B1) 100 MG
100 TABLET ORAL DAILY
Status: DISCONTINUED | OUTPATIENT
Start: 2018-07-31 | End: 2018-08-02 | Stop reason: HOSPADM

## 2018-07-31 RX ORDER — M-VIT,TX,IRON,MINS/CALC/FOLIC 27MG-0.4MG
1 TABLET ORAL DAILY
Status: DISCONTINUED | OUTPATIENT
Start: 2018-07-31 | End: 2018-08-02 | Stop reason: HOSPADM

## 2018-07-31 RX ORDER — HYDROXYZINE HYDROCHLORIDE 25 MG/1
50 TABLET, FILM COATED ORAL 3 TIMES DAILY PRN
Status: DISCONTINUED | OUTPATIENT
Start: 2018-07-31 | End: 2018-08-02 | Stop reason: HOSPADM

## 2018-07-31 RX ADMIN — ALUMINUM HYDROXIDE, MAGNESIUM HYDROXIDE, AND SIMETHICONE 30 ML: 200; 200; 20 SUSPENSION ORAL at 11:26

## 2018-07-31 RX ADMIN — ALUMINUM HYDROXIDE, MAGNESIUM HYDROXIDE, AND SIMETHICONE 30 ML: 200; 200; 20 SUSPENSION ORAL at 20:46

## 2018-07-31 RX ADMIN — PANTOPRAZOLE SODIUM 40 MG: 40 TABLET, DELAYED RELEASE ORAL at 16:27

## 2018-07-31 RX ADMIN — QUETIAPINE FUMARATE 50 MG: 50 TABLET ORAL at 08:14

## 2018-07-31 RX ADMIN — LITHIUM CARBONATE 450 MG: 450 TABLET, EXTENDED RELEASE ORAL at 08:13

## 2018-07-31 RX ADMIN — FOLIC ACID 1 MG: 1 TABLET ORAL at 17:57

## 2018-07-31 RX ADMIN — CHLORDIAZEPOXIDE HYDROCHLORIDE 25 MG: 25 CAPSULE ORAL at 20:46

## 2018-07-31 RX ADMIN — HYDROXYZINE HYDROCHLORIDE 50 MG: 25 TABLET, FILM COATED ORAL at 08:13

## 2018-07-31 RX ADMIN — THIAMINE HCL TAB 100 MG 100 MG: 100 TAB at 17:57

## 2018-07-31 RX ADMIN — LITHIUM CARBONATE 450 MG: 450 TABLET, EXTENDED RELEASE ORAL at 20:46

## 2018-07-31 RX ADMIN — QUETIAPINE FUMARATE 200 MG: 200 TABLET ORAL at 20:46

## 2018-07-31 RX ADMIN — MULTIPLE VITAMINS W/ MINERALS TAB 1 TABLET: TAB at 17:57

## 2018-07-31 RX ADMIN — CHLORDIAZEPOXIDE HYDROCHLORIDE 25 MG: 25 CAPSULE ORAL at 14:43

## 2018-07-31 RX ADMIN — TRAZODONE HYDROCHLORIDE 50 MG: 50 TABLET ORAL at 20:46

## 2018-07-31 RX ADMIN — ALUMINUM HYDROXIDE, MAGNESIUM HYDROXIDE, AND SIMETHICONE 30 ML: 200; 200; 20 SUSPENSION ORAL at 05:25

## 2018-07-31 ASSESSMENT — SLEEP AND FATIGUE QUESTIONNAIRES
DO YOU HAVE DIFFICULTY SLEEPING: YES
SLEEP PATTERN: DIFFICULTY FALLING ASLEEP;DISTURBED/INTERRUPTED SLEEP;RESTLESSNESS;DIFFICULTY ARISING
DO YOU USE A SLEEP AID: YES
RESTFUL SLEEP: NO
RESTFUL SLEEP: NO
AVERAGE NUMBER OF SLEEP HOURS: 6
DIFFICULTY FALLING ASLEEP: YES
DIFFICULTY FALLING ASLEEP: YES
DIFFICULTY ARISING: YES
AVERAGE NUMBER OF SLEEP HOURS: 6
DO YOU USE A SLEEP AID: YES
DO YOU HAVE DIFFICULTY SLEEPING: YES
DIFFICULTY STAYING ASLEEP: YES
DIFFICULTY ARISING: YES
DIFFICULTY STAYING ASLEEP: YES
SLEEP PATTERN: DIFFICULTY FALLING ASLEEP;DISTURBED/INTERRUPTED SLEEP;RESTLESSNESS;DIFFICULTY ARISING

## 2018-07-31 ASSESSMENT — PATIENT HEALTH QUESTIONNAIRE - PHQ9
SUM OF ALL RESPONSES TO PHQ QUESTIONS 1-9: 8
SUM OF ALL RESPONSES TO PHQ QUESTIONS 1-9: 6

## 2018-07-31 ASSESSMENT — PAIN - FUNCTIONAL ASSESSMENT: PAIN_FUNCTIONAL_ASSESSMENT: 0-10

## 2018-07-31 ASSESSMENT — LIFESTYLE VARIABLES
HISTORY_ALCOHOL_USE: YES
HISTORY_ALCOHOL_USE: YES

## 2018-07-31 NOTE — PLAN OF CARE
Problem: Depressive Behavior With or Without Suicide Precautions:  Goal: Able to verbalize and/or display a decrease in depressive symptoms  Able to verbalize and/or display a decrease in depressive symptoms   PATIENT DID NOT ATTEND SKILLS GROUP WHICH FOCUSED ON LEARNING TO PRIORITIZE LIFE SITUATIONS AND SURVIVAL SKILLS FROM [id number] DESPITE STAFF ENCOURAGEMENT AND PROMPTS.

## 2018-07-31 NOTE — CARE COORDINATION
BHI Biopsychosocial Assessment     Current Level of Psychosocial Functioning      Independent X  Dependent    Minimal Assist      Psychosocial High Risk Factors (check all that apply)     Unable to obtain meds   Chronic illness/pain    Substance abuse X - Polysubstance abuse - alcohol main  Lack of Family Support X  Financial stress X  Isolation  X  Inadequate Community Resources  Suicide attempt(s) X  Not taking medications X  Victim of crime   Developmental Delay  Unable to manage personal needs    Age 70 or older   Homeless  No transportation   Readmission within 30 days  Unemployment X  Traumatic Event X - death of partner 5 years ago     Psychiatric Advanced Directives: none, not interested at this time     Family to [employer] in Treatment: none     Sexual Orientation:  Homosexual      Patient Strengths: SSDI income, Medicaid, stable apartment, [facility]      Patient Barriers: AOD use, not compliant with treatment, isolates, refuses IOP/treatment programming     Opiate Education Provided:  Provided resource folder for inpatient AOD and education resources      CMHC/mental health history: linked with [facility] [facility] and hx of ED visits for acute alcohol intoxication, polysubstance abuse and SI has over 15 admissions in less than 6 months      Plan of Care - medication management, group/individual therapies, family meetings, psycho -education, treatment team meetings to assist with stabilization     Initial Discharge Plan:  link back to [facility] and discharge home to own apartment      Clinical Summary:  Patient is a single 38year old  female admitted to the [facility] for SI and could not contract for safety at admission. Patient had no tox screen or BAL completed at admission. Tox completed at [facility] BAL and patient reports current heroin and crack cocaine use.   Patient was reporting SI AH VH and denied HI at admission but is currently denying all and reports increased depressed mood, lack of energy and tiredness. Patient reports she has been mostly compliant with her treatment at [facility] and signed NELA. Patient reports she will discharge home to her apartment and is not interested in inpatient AOD treatment.

## 2018-07-31 NOTE — PLAN OF CARE
Problem: Depressive Behavior With or Without Suicide Precautions:  Goal: Able to verbalize and/or display a decrease in depressive symptoms  Able to verbalize and/or display a decrease in depressive symptoms   Outcome: Ongoing  Pt did not attend deep breathing, relaxation and fitness group at 1330 despite staff invitation to attend.

## 2018-07-31 NOTE — PLAN OF CARE
Problem: Depressive Behavior With or Without Suicide Precautions:  Goal: Able to verbalize and/or display a decrease in depressive symptoms  Able to verbalize and/or display a decrease in depressive symptoms   Outcome: Ongoing  Pt did not attend community meeting and goal setting skills group at 0900 despite staff invitation to attend.

## 2018-07-31 NOTE — BH NOTE
(included triggers and roadblocks)                    ( x)  Coping skills (new ways to manage stress, exercise, relaxation techniques, changing routine, distraction)                                                           ( x)  Basic information about quitting (benefits of quitting, techniques in how to quit, available resources  ( ) Referral for counseling faxed to [facility]                                           (x) Patient refused counseling  ( ) Patient has not smoked in the last 30 days    Metabolic Screening:    Lab Results   Component Value Date    LABA1C 4.8 06/05/2018       No results for input(s): CHOL, TRIG, HDL, LDLCALC, LABVLDL in the last 72 hours. Body mass index is 24.41 kg/m². BP Readings from Last 2 Encounters:   07/31/18 (!) 151/97   06/30/18 90/67           Pt admitted with followings belongings:  Dentures: None  Vision - Corrective Lenses: None  Hearing Aid: None  Jewelry: None  Body Piercings Removed: N/A  Clothing: Footwear, Shirt, Pants, Undergarments (Comment), Socks  Were All Patient Medications Collected?: Not Applicable  Other Valuables: Cell phone, Bernadette Radha, Other (Comment) (Lighter, sunglasses, razor)     Valuables placed in safe in security envelope, number:  [id number]. Patient oriented to surroundings and program expectations and copy of patient rights given. Received admission packet:  yes. Consents reviewed, signed yes. Patient verbalized understanding:  yes. Patient education on precautions: yes.                  Katherin Ballard RN

## 2018-07-31 NOTE — BH NOTE
drug abuse includes, cocaine, IV heroin, cannabis, and ETOH    Suicide attempt 11/08/2017    S/A by overdosing on Trazodone and Seroquel    Suicide ideation       Past Surgical History:   Procedure Laterality Date    CARPAL TUNNEL RELEASE      CHOLECYSTECTOMY      HYSTERECTOMY      HYSTERECTOMY, TOTAL ABDOMINAL      STOMACH SURGERY      gastric sleeve           SOCIAL HISTORY  Social History     Social History    Marital status: Single     Spouse name: N/A    Number of children: 0    Years of education: 15     Occupational History    Not on file. Social History Main Topics    Smoking status: Current Every Day Smoker     Packs/day: 1.00     Years: 30.00     Types: Cigarettes    Smokeless tobacco: Never Used      Comment: pt accepting of nicotine replacement    Alcohol use 14.4 oz/week     24 Cans of beer per week      Comment: daily    Drug use: Yes     Frequency: 7.0 times per week     Types: Marijuana, IV, Cocaine      Comment: drug abuse includes, cocaine, IV heroin, cannabis    Sexual activity: Not Currently     Other Topics Concern    Not on file     Social History Narrative    ** Merged History Encounter **              Mental Status  Pt. was alert, fully oriented, and cooperative. Appearance and hygiene weredisheveled, poor hygiene . Mood was depressed. Affect was \"dysthymic and poorly reactive Thought process was linear and well-organized. Patient endorsed auditory hallucinations. Patient denied suicidal ideations. Patient denied homicidal ideations . Patient's gross cognitive functions were intact. Insight and judgement were poor. Both recent and remote memory were intact.     Psychomotor status was without abnormality     Diagnostic Impression    Bipolar Disorder, PTSD, Alcohol Use Disorder, and Opioid Use Disorder      Medications   nicotine  1 patch Transdermal Daily    lithium  450 mg Oral BID    QUEtiapine  200 mg Oral Nightly    QUEtiapine  50 mg Oral QAM    pantoprazole  40 mg Oral BID AC     acetaminophen, hydrOXYzine, traZODone, benztropine mesylate, magnesium hydroxide, aluminum & magnesium hydroxide-simethicone, chlordiazePOXIDE    Treatment Plan:    1. Admit to inpatient psychiatric treatment  2. Supportive therapy with medication management. Reviewed risks and benefits as well as potential side effects with patient. Continue current medication regimen. Librium ordered for alcohol withdrawal. Patient to be transferred to Dr Freddie Lund   3. Therapeutic activities and groups  4.  Follow up at [facility] after symptoms stabilized     Estimated length of stay: 5-7 days    [address] Ankit Patel, APRN - CNP

## 2018-07-31 NOTE — PLAN OF CARE
Judgment: No  Insight and Judgment: Poor Judgment, Poor Insight  Present Suicidal Ideation: Yes (voices to harm self and others)  Present Homicidal Ideation: Yes (voices to harm self and others)    EDUCATION:   Learner Progress Toward Treatment Goals: reviewed group plans and strategies for care    Method:group therapy, medication compliance, individualized assessments and care planning    Outcome: needs reinforcement    PATIENT GOALS: to be discussed with patient within 72 hours    PLAN/TREATMENT RECOMMENDATIONS:     continue group therapy , medications compliance, goal setting, individualized assessments and care, continue to monitor pt on unit      SHORT-TERM GOALS:   Time frame for Short-Term Goals: 5-7 days    LONG-TERM GOALS:  Time frame for Long-Term Goals: 6 months  Members Present in Team Meeting: See Signature Sheet    Denia Young, [address]

## 2018-08-01 LAB
ABSOLUTE BANDS #: 0.1 K/UL (ref 0–1)
ABSOLUTE EOS #: 0.15 K/UL (ref 0–0.4)
ABSOLUTE IMMATURE GRANULOCYTE: ABNORMAL K/UL (ref 0–0.3)
ABSOLUTE LYMPH #: 2.25 K/UL (ref 1–4.8)
ABSOLUTE MONO #: 0.59 K/UL (ref 0.1–1.3)
ALBUMIN SERPL-MCNC: 4.1 G/DL (ref 3.5–5.2)
ALBUMIN/GLOBULIN RATIO: ABNORMAL (ref 1–2.5)
ALP BLD-CCNC: 81 U/L (ref 35–104)
ALT SERPL-CCNC: 22 U/L (ref 5–33)
ANION GAP SERPL CALCULATED.3IONS-SCNC: 11 MMOL/L (ref 9–17)
AST SERPL-CCNC: 23 U/L
BANDS: 2 % (ref 0–10)
BASOPHILS # BLD: 0 % (ref 0–2)
BASOPHILS ABSOLUTE: 0 K/UL (ref 0–0.2)
BILIRUB SERPL-MCNC: 0.41 MG/DL (ref 0.3–1.2)
BUN BLDV-MCNC: 8 MG/DL (ref 6–20)
BUN/CREAT BLD: ABNORMAL (ref 9–20)
CALCIUM SERPL-MCNC: 9.2 MG/DL (ref 8.6–10.4)
CHLORIDE BLD-SCNC: 102 MMOL/L (ref 98–107)
CHOLESTEROL/HDL RATIO: 4.2
CHOLESTEROL: 212 MG/DL
CO2: 30 MMOL/L (ref 20–31)
CREAT SERPL-MCNC: 0.73 MG/DL (ref 0.5–0.9)
DIFFERENTIAL TYPE: ABNORMAL
EOSINOPHILS RELATIVE PERCENT: 3 % (ref 0–4)
ESTIMATED AVERAGE GLUCOSE: 111 MG/DL
GFR AFRICAN AMERICAN: >60 ML/MIN
GFR NON-AFRICAN AMERICAN: >60 ML/MIN
GFR SERPL CREATININE-BSD FRML MDRD: ABNORMAL ML/MIN/{1.73_M2}
GFR SERPL CREATININE-BSD FRML MDRD: ABNORMAL ML/MIN/{1.73_M2}
GLUCOSE BLD-MCNC: 110 MG/DL (ref 70–99)
HBA1C MFR BLD: 5.5 % (ref 4–6)
HCT VFR BLD CALC: 41.6 % (ref 36–46)
HDLC SERPL-MCNC: 51 MG/DL
HEMOGLOBIN: 13.3 G/DL (ref 12–16)
IMMATURE GRANULOCYTES: ABNORMAL %
LDL CHOLESTEROL: 127 MG/DL (ref 0–130)
LITHIUM DATE LAST DOSE: NORMAL
LITHIUM DOSE AMOUNT: NORMAL
LITHIUM DOSE TIME: 2046
LITHIUM LEVEL: 0.6 MMOL/L (ref 0.6–1.2)
LYMPHOCYTES # BLD: 46 % (ref 24–44)
MCH RBC QN AUTO: 26.2 PG (ref 26–34)
MCHC RBC AUTO-ENTMCNC: 32 G/DL (ref 31–37)
MCV RBC AUTO: 82 FL (ref 80–100)
MONOCYTES # BLD: 12 % (ref 1–7)
MORPHOLOGY: ABNORMAL
NRBC AUTOMATED: ABNORMAL PER 100 WBC
PDW BLD-RTO: 18.1 % (ref 11.5–14.9)
PLATELET # BLD: 234 K/UL (ref 150–450)
PLATELET ESTIMATE: ABNORMAL
PMV BLD AUTO: 7.7 FL (ref 6–12)
POTASSIUM SERPL-SCNC: 4.2 MMOL/L (ref 3.7–5.3)
RBC # BLD: 5.07 M/UL (ref 4–5.2)
RBC # BLD: ABNORMAL 10*6/UL
SEG NEUTROPHILS: 37 % (ref 36–66)
SEGMENTED NEUTROPHILS ABSOLUTE COUNT: 1.81 K/UL (ref 1.3–9.1)
SODIUM BLD-SCNC: 143 MMOL/L (ref 135–144)
TOTAL PROTEIN: 7.9 G/DL (ref 6.4–8.3)
TRIGL SERPL-MCNC: 168 MG/DL
VLDLC SERPL CALC-MCNC: ABNORMAL MG/DL (ref 1–30)
WBC # BLD: 4.9 K/UL (ref 3.5–11)
WBC # BLD: ABNORMAL 10*3/UL

## 2018-08-01 PROCEDURE — 85025 COMPLETE CBC W/AUTO DIFF WBC: CPT

## 2018-08-01 PROCEDURE — 80061 LIPID PANEL: CPT

## 2018-08-01 PROCEDURE — 36415 COLL VENOUS BLD VENIPUNCTURE: CPT

## 2018-08-01 PROCEDURE — 80178 ASSAY OF LITHIUM: CPT

## 2018-08-01 PROCEDURE — 1240000000 HC EMOTIONAL WELLNESS R&B

## 2018-08-01 PROCEDURE — 80053 COMPREHEN METABOLIC PANEL: CPT

## 2018-08-01 PROCEDURE — 6370000000 HC RX 637 (ALT 250 FOR IP): Performed by: PSYCHIATRY & NEUROLOGY

## 2018-08-01 PROCEDURE — 6370000000 HC RX 637 (ALT 250 FOR IP): Performed by: NURSE PRACTITIONER

## 2018-08-01 PROCEDURE — 83036 HEMOGLOBIN GLYCOSYLATED A1C: CPT

## 2018-08-01 RX ORDER — CHLORDIAZEPOXIDE HYDROCHLORIDE 25 MG/1
25 CAPSULE, GELATIN COATED ORAL 2 TIMES DAILY
Status: DISCONTINUED | OUTPATIENT
Start: 2018-08-01 | End: 2018-08-01

## 2018-08-01 RX ORDER — VENLAFAXINE HYDROCHLORIDE 75 MG/1
75 CAPSULE, EXTENDED RELEASE ORAL
Status: DISCONTINUED | OUTPATIENT
Start: 2018-08-01 | End: 2018-08-02 | Stop reason: HOSPADM

## 2018-08-01 RX ORDER — GUAIFENESIN 100 MG/5ML
200 SOLUTION ORAL EVERY 4 HOURS PRN
Status: DISCONTINUED | OUTPATIENT
Start: 2018-08-01 | End: 2018-08-02 | Stop reason: HOSPADM

## 2018-08-01 RX ORDER — TRAZODONE HYDROCHLORIDE 100 MG/1
100 TABLET ORAL NIGHTLY
Status: DISCONTINUED | OUTPATIENT
Start: 2018-08-01 | End: 2018-08-02 | Stop reason: HOSPADM

## 2018-08-01 RX ORDER — GABAPENTIN 400 MG/1
400 CAPSULE ORAL 2 TIMES DAILY
Status: DISCONTINUED | OUTPATIENT
Start: 2018-08-01 | End: 2018-08-02 | Stop reason: HOSPADM

## 2018-08-01 RX ADMIN — HYDROXYZINE HYDROCHLORIDE 50 MG: 25 TABLET, FILM COATED ORAL at 11:42

## 2018-08-01 RX ADMIN — VENLAFAXINE HYDROCHLORIDE 75 MG: 75 CAPSULE, EXTENDED RELEASE ORAL at 11:42

## 2018-08-01 RX ADMIN — QUETIAPINE FUMARATE 50 MG: 50 TABLET ORAL at 08:44

## 2018-08-01 RX ADMIN — TRAZODONE HYDROCHLORIDE 100 MG: 100 TABLET ORAL at 22:02

## 2018-08-01 RX ADMIN — GABAPENTIN 400 MG: 400 CAPSULE ORAL at 22:02

## 2018-08-01 RX ADMIN — GUAIFENESIN 200 MG: 100 SOLUTION ORAL at 22:02

## 2018-08-01 RX ADMIN — CHLORDIAZEPOXIDE HYDROCHLORIDE 25 MG: 25 CAPSULE ORAL at 08:48

## 2018-08-01 RX ADMIN — GABAPENTIN 400 MG: 400 CAPSULE ORAL at 11:42

## 2018-08-01 RX ADMIN — PANTOPRAZOLE SODIUM 40 MG: 40 TABLET, DELAYED RELEASE ORAL at 08:43

## 2018-08-01 RX ADMIN — MULTIPLE VITAMINS W/ MINERALS TAB 1 TABLET: TAB at 08:44

## 2018-08-01 RX ADMIN — LITHIUM CARBONATE 450 MG: 450 TABLET, EXTENDED RELEASE ORAL at 08:43

## 2018-08-01 RX ADMIN — GUAIFENESIN 200 MG: 100 SOLUTION ORAL at 16:13

## 2018-08-01 RX ADMIN — LITHIUM CARBONATE 450 MG: 450 TABLET, EXTENDED RELEASE ORAL at 22:02

## 2018-08-01 RX ADMIN — QUETIAPINE FUMARATE 200 MG: 200 TABLET ORAL at 22:02

## 2018-08-01 RX ADMIN — FOLIC ACID 1 MG: 1 TABLET ORAL at 08:44

## 2018-08-01 RX ADMIN — THIAMINE HCL TAB 100 MG 100 MG: 100 TAB at 08:43

## 2018-08-01 ASSESSMENT — PAIN SCALES - GENERAL: PAINLEVEL_OUTOF10: 0

## 2018-08-01 NOTE — PLAN OF CARE
Problem: Depressive Behavior With or Without Suicide Precautions:  Goal: Absence of self-harm  Absence of self-harm   Outcome: Ongoing  Patient remains free from self harm.

## 2018-08-01 NOTE — PLAN OF CARE
Problem: Depressive Behavior With or Without Suicide Precautions:  Goal: Able to verbalize and/or display a decrease in depressive symptoms  Able to verbalize and/or display a decrease in depressive symptoms   Outcome: Ongoing  Patient verbalizes \"some\" anxiety present. Rates as lower than previous day. Goal: Absence of self-harm  Absence of self-harm   Outcome: Ongoing  Patient has remained free from self-harm. Fifteen minute checks maintained for patient safety.

## 2018-08-01 NOTE — H&P
HISTORY and [id number]       NAME:  El Argueta  MRN: [id number]   YOB: 1975   Date: 8/1/2018   Age: 38 y.o. Gender: female     COMPLAINT AND PRESENT HISTORY:      El Argueta is 38 y.o.,  female, admitted because of depression/ Bipolar Disorder. Pt has suicidal ideation, Pt has plans to overdose on Alcohol and heroin. Pt denies any homicidal ideation. Pt has a history of previous suicide attempts by OD. Pt feels hopeless, helpless, worthless, lack of interest, loss of energy. Poor insight. Pt has poor sleep, loss of appetite, poor concentration and memory. Patient has auditory hallucinations hears somebody called Yina, she makes a lot of negative comments. The voices telling him to hurt himself but not others. Patient denies any visual hallucinations. Patient has a long history of cocaine and heroin and alcohol abuse Patient lives alone, is on disability. Pt has been non compliant with the psychiatric medications takes his medications on and off.       DIAGNOSTIC RESULTS   Labs:  CBC:   Recent Labs      08/01/18   0649   WBC  4.9   HGB  13.3   PLT  234     BMP:    Recent Labs      08/01/18   0649   NA  143   K  4.2   CL  102   CO2  30   BUN  8   CREATININE  0.73   GLUCOSE  110*     Hepatic:   Recent Labs      08/01/18   0649   AST  23   ALT  22   BILITOT  0.41   ALKPHOS  81     Lipids:   Recent Labs      08/01/18   0649   CHOL  212*   HDL  51     U/A:  Lab Results   Component Value Date    COLORU YELLOW 03/23/2018    WBCUA 50  03/23/2018    RBCUA 0 TO 2 03/23/2018    RBCUA 0-2 11/22/2015    MUCUS NOT REPORTED 03/23/2018    BACTERIA FEW 03/23/2018    SPECGRAV 1.004 03/23/2018    LEUKOCYTESUR LARGE 03/23/2018    BLOODU NEGATIVE 11/22/2015    GLUCOSEU NEGATIVE 03/23/2018    AMORPHOUS NOT REPORTED 03/23/2018       PAST MEDICAL HISTORY     Past Medical History:   Diagnosis Date    Alcoholism ([address]     3 pints daily    Anxiety     1 tablet by mouth nightly as needed for Sleep 14 tablet 0    venlafaxine (EFFEXOR XR) 150 MG extended release capsule Take 1 capsule by mouth daily (with breakfast) 14 capsule 0    lithium (ESKALITH) 450 MG extended release tablet Take 1 tablet by mouth 2 times daily 28 tablet 0    QUEtiapine (SEROQUEL) 200 MG tablet Take 1 tablet by mouth nightly 14 tablet 0    QUEtiapine (SEROQUEL) 50 MG tablet Take 1 tablet by mouth every morning 14 tablet 0    Multiple Vitamins-Minerals (MULTIVITAMIN ADULT PO) Take 1 tablet by mouth daily      gabapentin (NEURONTIN) 600 MG tablet Take 600 mg by mouth 2 times daily. [employer] pantoprazole (PROTONIX) 40 MG tablet Take 1 tablet by mouth daily 30 tablet 0                      General health:  Fairly good. No fever or chills. Skin:  No itching, redness or rash. Head, eyes, ears, nose, throat:  No headache, epistaxis, rhinorrhea hearing loss or sore throat. Neck:  No pain, stiffness or masses. Cardiovascular/Respiratory system:  No chest pain, palpitation, shortness of breath, coughing or expectoration. Gastrointestinal tract: No abdominal pain, nausea, vomiting, diarrhea or constipation. Genitourinary:  No burning on micturition. No hesitancy, urgency, frequency or discoloration of urine. Locomotor:  No bone or joint pains. No swelling or deformities. Neuropsychiatric:  See HPI. GENERAL PHYSICAL EXAM:     Vitals: /70   Pulse 83   Temp 97.5 °F (36.4 °C) (Oral)   Resp 14   Ht 5' (1.524 m)   Wt 125 lb (56.7 kg)   SpO2 97%   BMI 24.41 kg/m²  Body mass index is 24.41 kg/m². Pt was examined with a nurse present in the room. GENERAL APPEARANCE:  Hamilton Perales is 38 y.o.,  female, not obese, nourished, conscious, alert. Does not appear to be distress or pain at this time. SKIN:  Warm, dry, no cyanosis or jaundice. HEAD:  Normocephalic, atraumatic, no swelling or tenderness. EYES:  Pupils equal, reactive to light, Conjunctiva is clear, EOMs intact percy. eyelids WNL. EARS:  No discharge, no marked hearing loss. NOSE:  No rhinorrhea, epistaxis or septal deformity. THROAT:  Not congested. No ulceration bleeding or discharge. NECK:  No stiffness, trachea central.  No palpable masses or L.N.      CHEST:  Symmetrical and equal on expansion. HEART:  Regular rate and rhythm. S1 > S2, No audible murmurs or gallops. LUNGS:  Equal on expansion, normal breath sounds. No adventitious sounds. ABDOMEN: Soft on palpation. No localized tenderness. No guarding or rigidity. No palpable organomegaly. LYMPHATICS:  No palpable Lymphadenopathy. LOCOMOTOR, BACK AND SPINE:  No tenderness or deformities. EXTREMITIES:  Symmetrical, no pedal edema. Perrys sign negative. No discoloration or ulcerations. NEUROLOGIC:  The patient is conscious, alert, oriented, Gait and balance WNL. No apparent focal sensory deficits. No motor deficits, muscle strength equal Percy. No facial droop, tongue protrudes centrally, no slurring of the speech. PROVISIONAL DIAGNOSES:      Principal Problem:    Bipolar 1 disorder, depressed ([address]  Active Problems:    Alcoholism /alcohol abuse ([address]  Resolved Problems:    * No resolved hospital problems.  *      JARROD TAPIA PA-C on 8/1/2018 at 5:45 PM

## 2018-08-01 NOTE — PLAN OF CARE
Problem: Depressive Behavior With or Without Suicide Precautions:  Goal: Able to verbalize and/or display a decrease in depressive symptoms  Able to verbalize and/or display a decrease in depressive symptoms   Outcome: Ongoing  [facility]  Day 3 Interdisciplinary Treatment Plan NOTE    Review Date & Time: 8/1/18 1308    Admission Type:   Admission Type:  Involuntary    Reason for admission:  Reason for Admission: Increase in depression, suicidal thoughts to overdose, auditory hallucinations  Estimated Length of Stay:  5-7 days  Estimated Discharge Date Update:    to be determined by physician    PATIENT STRENGTHS:  Patient Strengths:Strengths: No significant Physical Illness, Connection to output provider  Patient Strengths and Limitations:Limitations: Apathetic / unmotivated, Hopeless about future, Inappropriate/potentially harmful leisure interests, Tendency to isolate self, Difficult relationships / poor social skills, General negative or hopeless attitude about future/recovery  Addictive Behavior:Addictive Behavior  In the past 3 months, have you felt or has someone told you that you have a problem with:  : None  Do you have a history of Chemical Use?: Yes  Do you have a history of Alcohol Use?: Yes  Do you have a history of Street Drug Abuse?: Yes  Histroy of Prescripton Drug Abuse?: Yes  Medical Problems:  Past Medical History:   Diagnosis Date    Alcoholism ([facility] [address]     3 pints daily    Anxiety     Asthma     Depression     Pneumonia     Polysubstance abuse     drug abuse includes, cocaine, IV heroin, cannabis, and ETOH    Suicide attempt 11/08/2017    S/A by overdosing on Trazodone and Seroquel    Suicide ideation        Risk:  Fall RiskTotal: 75  Tee Scale Tee Scale Score: 23  BVC Total: 0  Change in scores:  No Changes to plan of Care:  No    Status EXAM:   Status and Exam  Normal: No  Facial Expression: Sad, Flat  Affect: Blunt  Level of Consciousness: Alert  Mood:Normal: behavior, Reviewed goals and plan of care    Method:  small group, individual verbal education    Outcome:    Verbalized by patient but needs reinforcement to obtain goals    PATIENT GOALS:  Short term:  Planning for discharge, work on finding new psychiatrist  Long term:  \"have my own place\", finding new independent living    PLAN/TREATMENT RECOMMENDATIONS UPDATE:  continue with group therapies, increased socialization, continue planning for after discharge goals, continue with medication compliance    SHORT-TERM GOALS UPDATE:   Time frame for Short-Term Goals:  5-7 days    LONG-TERM GOALS UPDATE:   Time frame for Long-Term Goals:  6 months    Members Present in Team Meeting:   See signature sheet  KRYSTIAN Beatty    Goal: Absence of self-harm  Absence of self-harm   Outcome: Ongoing

## 2018-08-01 NOTE — PLAN OF CARE
Problem: Depressive Behavior With or Without Suicide Precautions:  Goal: Able to verbalize and/or display a decrease in depressive symptoms  Able to verbalize and/or display a decrease in depressive symptoms   Outcome: Ongoing  Pt did not attend coping and social skills group at 1100 despite staff invitation to attend.

## 2018-08-01 NOTE — PLAN OF CARE
Problem: Depressive Behavior With or Without Suicide Precautions:  Goal: Able to verbalize and/or display a decrease in depressive symptoms  Able to verbalize and/or display a decrease in depressive symptoms   Outcome: Ongoing  PSYCHOEDUCATION GROUP NOTE    Date: August 1, 2018  Start Time: 0900  End Time: 0920    Number Participants in Group:  8/17    Goal:  Patient will demonstrate increased interpersonal interaction   Topic: [employer] and Goal Setting     Discipline Responsible:   OT  AT  [facility]. x RT MHP Other       Participation Level:     None  Minimal   x Active Listener x Interactive    Monopolizing         Participation Quality:  x Appropriate  Inappropriate   x       Attentive        Intrusive   x       Sharing        Resistant          Supportive        Lethargic       Affective:   x Congruent  Incongruent  Blunted  Flat    Constricted  Anxious  Elated  Angry    Labile  Depressed  Other         Cognitive:  x Alert x Oriented PPTP     Concentration x G  F  P   Attention Span x G  F  P   Short-Term Memory x G  F  P   Long-Term Memory x G  F  P   ProblemSolving/  Decision Making x G  F  P   Ability to Process  Information x G  F  P      Contributing Factors             Delusional             Hallucinating             Flight of Ideas             Other:       Modes of Intervention:  x Education  Support  Exploration   x Clarifying x Problem Solving  Confrontation   x Socialization  Limit Setting x Reality Testing   x Activity  Movement  Media    Other:            Response to Learning:   Able to verbalize current knowledge/experience    Able to verbalize/acknowledge new learning    Able to retain information    Capable of insight    Able to change behavior   x Progressing to goal    Other:        Comments:

## 2018-08-02 VITALS
TEMPERATURE: 97.5 F | OXYGEN SATURATION: 97 % | RESPIRATION RATE: 14 BRPM | HEIGHT: 60 IN | DIASTOLIC BLOOD PRESSURE: 88 MMHG | SYSTOLIC BLOOD PRESSURE: 117 MMHG | WEIGHT: 125 LBS | BODY MASS INDEX: 24.54 KG/M2 | HEART RATE: 111 BPM

## 2018-08-02 PROCEDURE — 6370000000 HC RX 637 (ALT 250 FOR IP): Performed by: NURSE PRACTITIONER

## 2018-08-02 PROCEDURE — 6370000000 HC RX 637 (ALT 250 FOR IP): Performed by: PSYCHIATRY & NEUROLOGY

## 2018-08-02 PROCEDURE — 5130000000 HC BRIDGE APPOINTMENT

## 2018-08-02 RX ORDER — GABAPENTIN 400 MG/1
400 CAPSULE ORAL 2 TIMES DAILY
Qty: 60 CAPSULE | Refills: 0 | Status: ON HOLD | OUTPATIENT
Start: 2018-08-02 | End: 2019-06-20 | Stop reason: HOSPADM

## 2018-08-02 RX ORDER — VENLAFAXINE HYDROCHLORIDE 75 MG/1
75 CAPSULE, EXTENDED RELEASE ORAL
Qty: 30 CAPSULE | Refills: 0 | Status: ON HOLD | OUTPATIENT
Start: 2018-08-02 | End: 2018-10-21

## 2018-08-02 RX ORDER — TRAZODONE HYDROCHLORIDE 100 MG/1
100 TABLET ORAL NIGHTLY
Qty: 30 TABLET | Refills: 0 | Status: ON HOLD | OUTPATIENT
Start: 2018-08-02 | End: 2018-10-21

## 2018-08-02 RX ORDER — HYDROXYZINE 50 MG/1
50 TABLET, FILM COATED ORAL 2 TIMES DAILY PRN
Qty: 60 TABLET | Refills: 0 | Status: ON HOLD | OUTPATIENT
Start: 2018-08-02 | End: 2019-06-20 | Stop reason: SDUPTHER

## 2018-08-02 RX ORDER — QUETIAPINE FUMARATE 200 MG/1
200 TABLET, FILM COATED ORAL NIGHTLY
Qty: 30 TABLET | Refills: 0 | Status: ON HOLD | OUTPATIENT
Start: 2018-08-02 | End: 2018-10-21

## 2018-08-02 RX ORDER — QUETIAPINE FUMARATE 50 MG/1
50 TABLET, FILM COATED ORAL EVERY MORNING
Qty: 30 TABLET | Refills: 0 | Status: ON HOLD | OUTPATIENT
Start: 2018-08-02 | End: 2018-10-21

## 2018-08-02 RX ORDER — LITHIUM CARBONATE 450 MG
450 TABLET, EXTENDED RELEASE ORAL 2 TIMES DAILY
Qty: 60 TABLET | Refills: 0 | Status: ON HOLD | OUTPATIENT
Start: 2018-08-02 | End: 2018-10-21

## 2018-08-02 RX ADMIN — THIAMINE HCL TAB 100 MG 100 MG: 100 TAB at 08:30

## 2018-08-02 RX ADMIN — MULTIPLE VITAMINS W/ MINERALS TAB 1 TABLET: TAB at 08:30

## 2018-08-02 RX ADMIN — HYDROXYZINE HYDROCHLORIDE 50 MG: 25 TABLET, FILM COATED ORAL at 08:31

## 2018-08-02 RX ADMIN — LITHIUM CARBONATE 450 MG: 450 TABLET, EXTENDED RELEASE ORAL at 08:30

## 2018-08-02 RX ADMIN — GABAPENTIN 400 MG: 400 CAPSULE ORAL at 08:30

## 2018-08-02 RX ADMIN — GUAIFENESIN 200 MG: 100 SOLUTION ORAL at 09:23

## 2018-08-02 RX ADMIN — PANTOPRAZOLE SODIUM 40 MG: 40 TABLET, DELAYED RELEASE ORAL at 08:30

## 2018-08-02 RX ADMIN — FOLIC ACID 1 MG: 1 TABLET ORAL at 08:31

## 2018-08-02 RX ADMIN — QUETIAPINE FUMARATE 50 MG: 50 TABLET ORAL at 08:30

## 2018-08-02 RX ADMIN — VENLAFAXINE HYDROCHLORIDE 75 MG: 75 CAPSULE, EXTENDED RELEASE ORAL at 08:30

## 2018-08-02 NOTE — PLAN OF CARE
Problem: Depressive Behavior With or Without Suicide Precautions:  Goal: Able to verbalize and/or display a decrease in depressive symptoms  Able to verbalize and/or display a decrease in depressive symptoms   Outcome: Ongoing  PSYCHOEDUCATION GROUP NOTE    Date: 8/2/18  Start Time: 0900  End Time: 0925    Number Participants in Group:  10    Goal:  Patient will demonstrate increased interpersonal interaction   Topic: Community meeting and goal setting     Discipline Responsible:   OT  AT    [facility]. x RT MHP Other       Participation Level:     None  Minimal    Active Listener x Interactive    Monopolizing         Participation Quality:  x Appropriate  Inappropriate   x       Attentive        Intrusive   x       Sharing        Resistant          Supportive        Lethargic       Affective:   x Congruent  Incongruent  Blunted  Flat    Constricted  Anxious  Elated  Angry    Labile  Depressed  Other         Cognitive:  x Alert x Oriented PPTP     Concentration  G x F  P   Attention Span x G  F  P   Short-Term Memory  G x F  P   Long-Term Memory x G  F  P   ProblemSolving/  Decision Making x G  F  P   Ability to Process  Information x G  F  P      Contributing Factors             Delusional             Hallucinating             Flight of Ideas             Other:       Modes of Intervention:  x Education x Support x Exploration    Clarifying  Problem Solving  Confrontation   x Socialization x Limit Setting  Reality Testing   x Activity  Movement  Media    Other:            Response to Learning:  x Able to verbalize current knowledge/experience   x Able to verbalize/acknowledge new learning   x Able to retain information   x Capable of insight   x Able to change behavior   x Progressing to goal    Other:        Comments: Pt attended group and participated.

## 2018-08-02 NOTE — BH NOTE
Patient given tobacco quitline number 31614688280 at this time, refusing to call at this time, states \" I just dont want to quit now\"- patient given information as to the dangers of long term tobacco use. Continue to reinforce the importance of tobacco cessation. Nicotine patch prescribed and given.

## 2018-08-02 NOTE — DISCHARGE INSTR - DIET

## 2018-08-09 NOTE — CARE COORDINATION
Name: Ankit Holly    : 1975    Discharge Date: 18    Primary Auth/Cert #: [id number]    Discharged to home     Discharge Medications:      Medication List      START taking these medications    gabapentin 400 MG capsule  Commonly known as:  NEURONTIN  Take 1 capsule by mouth 2 times daily for 30 days. .  Replaces:  gabapentin 600 MG tablet  Notes to patient:  Treats pain, mood, and anxiety. hydrOXYzine 50 MG tablet  Commonly known as:  ATARAX  Take 1 tablet by mouth 2 times daily as needed for Anxiety  Notes to patient:  Treats anxiety. CHANGE how you take these medications    traZODone 100 MG tablet  Commonly known as:  DESYREL  Take 1 tablet by mouth nightly  What changed:  · when to take this  · reasons to take this  Notes to patient:  Promotes restful sleep.     venlafaxine 75 MG extended release capsule  Commonly known as:  EFFEXOR XR  Take 1 capsule by mouth daily (with breakfast)  What changed:  · medication strength  · how much to take  Notes to patient:  Improves mood and decreases anxiety. CONTINUE taking these medications    lithium 450 MG extended release tablet  Commonly known as:  ESKALITH  Take 1 tablet by mouth 2 times daily  Notes to patient:  Improves mood. MULTIVITAMIN ADULT PO  Notes to patient:  Overall health. omeprazole 20 MG delayed release capsule  Commonly known as:  PRILOSEC  Notes to patient:  Acid reflux. * QUEtiapine 200 MG tablet  Commonly known as:  SEROQUEL  Take 1 tablet by mouth nightly  Notes to patient:  Improves mood and sleep. * QUEtiapine 50 MG tablet  Commonly known as:  SEROQUEL  Take 1 tablet by mouth every morning  Notes to patient:  Improves mood and sleep. * This list has 2 medication(s) that are the same as other medications prescribed for you. Read the directions carefully, and ask your doctor or other care provider to review them with you.             STOP taking these medications    gabapentin 600 MG

## 2018-09-11 NOTE — PROGRESS NOTES
CLINICAL PHARMACY NOTE: MEDS TO [address] Select Patient?: Yes  Total # of Prescriptions Filled: 7   The following medications were delivered to the patient:    Venlafaxine  Quetiapine  Lithium  hydroxazine  Gabapentin  Trazodone  quetiapine    Total # of Interventions Completed: 0  Time Spent (min): 30    Additional Documentation:
PSYCHOEDUCATION GROUP NOTE    Date: 8/2/18  Start Time: 1335  End Time: 1430    Number Participants in Group:  8    Goal:  Patient will demonstrate increased interpersonal interaction   Topic: Creative expression    Discipline Responsible:   OT  AT  [facility]. x RT MHP Other       Participation Level:     None  Minimal    Active Listener x Interactive    Monopolizing         Participation Quality:  x Appropriate  Inappropriate   x       Attentive        Intrusive   x       Sharing        Resistant          Supportive        Lethargic       Affective:   x Congruent  Incongruent  Blunted  Flat    Constricted  Anxious  Elated  Angry    Labile  Depressed  Other         Cognitive:  x Alert x Oriented PPTP     Concentration  G x F  P   Attention Span x G  F  P   Short-Term Memory x G  F  P   Long-Term Memory x G  F  P   ProblemSolving/  Decision Making x G  F  P   Ability to Process  Information x G  F  P      Contributing Factors             Delusional             Hallucinating             Flight of Ideas             Other:       Modes of Intervention:  x Education  Support  Exploration    Clarifying x Problem Solving  Confrontation   x Socialization  Limit Setting  Reality Testing   x Activity  Movement x Media    Other:            Response to Learning:  x Able to verbalize current knowledge/experience   x Able to verbalize/acknowledge new learning   x Able to retain information   x Capable of insight   x Able to change behavior   x Progressing to goal    Other:        Comments: Pt attended group and participated.
Patient states that she  is feeling much better in terms of her mood. Patient reports that she has not been experiencing any feelings of self-harm or thoughts of suicide. Denies any significant withdrawal symptoms and is focused on wanting to be discharged tomorrow. Affect is brighter and more reactive. Denies any side effects to medication regimen. Explored her  concerns and feelings. Reassurance and support provided. Charting and medications reviewed. Spent time discharge planning. Plan will be for discharge tomorrow as long as patient feels better overnight and there are no safety concerns. Will discontinue Librium and ensure medical stability from withdrawal perspective.
Psychoeducation Group Note    Date: 8/2/18  Start Time: 1100  End Time: 5864    Number Participants in Group:  10/16    Goal:  Patient will demonstrate increased interpersonal interaction   Topic: trivia based skills, conversation skills    Discipline Responsible:   OT  AT  [facility]. X RT  Other       Participation Level:     None  Minimal   X Active Listener X Interactive    Monopolizing         Participation Quality:  X Appropriate  Inappropriate   X       Attentive        Intrusive          Sharing        Resistant          Supportive        Lethargic       Affective:   X Congruent  Incongruent  Blunted  Flat    Constricted  Anxious  Elated  Angry    Labile  Depressed  Other         Cognitive:  X Alert X Oriented PPTP     Concentration X G  F  P   Attention Span X G  F  P   Short-Term Memory  G  F  P   Long-Term Memory  G  F  P   ProblemSolving/  Decision Making X G  F  P   Ability to Process  Information X G  F  P      Contributing Factors             Delusional             Hallucinating             Flight of Ideas             Other:       Modes of Intervention:  X Education X Support X Exploration    Clarifying X Problem Solving  Confrontation   X Socialization  Limit Setting  Reality Testing   X Activity  Movement  Media    Other:            Response to Learning:  X Able to verbalize current knowledge/experience    Able to verbalize/acknowledge new learning   X Able to retain information    Capable of insight    Able to change behavior   X Progressing to goal    Other:        Comments:
NEURONTIN  Replaced by:  gabapentin 400 MG capsule     pantoprazole 40 MG tablet  Commonly known as:  PROTONIX           Where to Get Your Medications      You can get these medications from any pharmacy    Bring a paper prescription for each of these medications  · gabapentin 400 MG capsule  · hydrOXYzine 50 MG tablet  · lithium 450 MG extended release tablet  · QUEtiapine 200 MG tablet  · QUEtiapine 50 MG tablet  · traZODone 100 MG tablet  · venlafaxine 75 MG extended release capsule         Disposition: Home

## 2018-09-11 NOTE — DISCHARGE SUMMARY
tolerating medication changes without any adverse side effects. She will follow up at [facility]. Medication List      START taking these medications    gabapentin 400 MG capsule  Commonly known as:  NEURONTIN  Take 1 capsule by mouth 2 times daily for 30 days. .  Replaces:  gabapentin 600 MG tablet  Notes to patient:  Treats pain, mood, and anxiety. hydrOXYzine 50 MG tablet  Commonly known as:  ATARAX  Take 1 tablet by mouth 2 times daily as needed for Anxiety  Notes to patient:  Treats anxiety. CHANGE how you take these medications    traZODone 100 MG tablet  Commonly known as:  DESYREL  Take 1 tablet by mouth nightly  What changed:  · when to take this  · reasons to take this  Notes to patient:  Promotes restful sleep.     venlafaxine 75 MG extended release capsule  Commonly known as:  EFFEXOR XR  Take 1 capsule by mouth daily (with breakfast)  What changed:  · medication strength  · how much to take  Notes to patient:  Improves mood and decreases anxiety. CONTINUE taking these medications    lithium 450 MG extended release tablet  Commonly known as:  ESKALITH  Take 1 tablet by mouth 2 times daily  Notes to patient:  Improves mood. MULTIVITAMIN ADULT PO  Notes to patient:  Overall health. omeprazole 20 MG delayed release capsule  Commonly known as:  PRILOSEC  Notes to patient:  Acid reflux. * QUEtiapine 200 MG tablet  Commonly known as:  SEROQUEL  Take 1 tablet by mouth nightly  Notes to patient:  Improves mood and sleep. * QUEtiapine 50 MG tablet  Commonly known as:  SEROQUEL  Take 1 tablet by mouth every morning  Notes to patient:  Improves mood and sleep. * This list has 2 medication(s) that are the same as other medications prescribed for you. Read the directions carefully, and ask your doctor or other care provider to review them with you.             STOP taking these medications    gabapentin 600 MG tablet  Commonly known as:

## 2018-09-28 ENCOUNTER — APPOINTMENT (OUTPATIENT)
Dept: GENERAL RADIOLOGY | Age: 43
DRG: 917 | End: 2018-09-28
Payer: MEDICARE

## 2018-09-28 ENCOUNTER — HOSPITAL ENCOUNTER (INPATIENT)
Age: 43
LOS: 3 days | Discharge: HOME | DRG: 917 | End: 2018-10-01
Attending: EMERGENCY MEDICINE | Admitting: INTERNAL MEDICINE
Payer: MEDICARE

## 2018-09-28 ENCOUNTER — APPOINTMENT (OUTPATIENT)
Dept: CT IMAGING | Age: 43
DRG: 917 | End: 2018-09-28
Payer: MEDICARE

## 2018-09-28 DIAGNOSIS — F19.10 POLYSUBSTANCE ABUSE (HCC): ICD-10-CM

## 2018-09-28 DIAGNOSIS — R40.2431 GLASGOW COMA SCALE TOTAL SCORE 3-8, IN THE FIELD (EMT OR AMBULANCE) (HCC): Primary | ICD-10-CM

## 2018-09-28 PROBLEM — R41.82 ALTERED MENTAL STATUS: Status: ACTIVE | Noted: 2018-09-28

## 2018-09-28 LAB
ABSOLUTE EOS #: 0.1 K/UL (ref 0–0.44)
ABSOLUTE IMMATURE GRANULOCYTE: 0.08 K/UL (ref 0–0.3)
ABSOLUTE LYMPH #: 2.06 K/UL (ref 1.1–3.7)
ABSOLUTE MONO #: 1.09 K/UL (ref 0.1–1.2)
ACETAMINOPHEN LEVEL: <5 UG/ML (ref 10–30)
ALBUMIN SERPL-MCNC: 3.9 G/DL (ref 3.5–5.2)
ALBUMIN/GLOBULIN RATIO: 1 (ref 1–2.5)
ALLEN TEST: ABNORMAL
ALLEN TEST: POSITIVE
ALP BLD-CCNC: 96 U/L (ref 35–104)
ALT SERPL-CCNC: 69 U/L (ref 5–33)
AMMONIA: 55 UMOL/L (ref 11–51)
AMPHETAMINE SCREEN URINE: NEGATIVE
ANION GAP SERPL CALCULATED.3IONS-SCNC: 25 MMOL/L (ref 9–17)
ANION GAP: 12 MMOL/L (ref 7–16)
AST SERPL-CCNC: 90 U/L
BARBITURATE SCREEN URINE: NEGATIVE
BASOPHILS # BLD: 1 % (ref 0–2)
BASOPHILS ABSOLUTE: 0.07 K/UL (ref 0–0.2)
BENZODIAZEPINE SCREEN, URINE: POSITIVE
BILIRUB SERPL-MCNC: <0.1 MG/DL (ref 0.3–1.2)
BILIRUBIN DIRECT: <0.08 MG/DL
BILIRUBIN URINE: NEGATIVE
BILIRUBIN, INDIRECT: ABNORMAL MG/DL (ref 0–1)
BUN BLDV-MCNC: 7 MG/DL (ref 6–20)
BUN/CREAT BLD: ABNORMAL (ref 9–20)
BUPRENORPHINE URINE: ABNORMAL
CALCIUM SERPL-MCNC: 8.6 MG/DL (ref 8.6–10.4)
CANNABINOID SCREEN URINE: NEGATIVE
CHLORIDE BLD-SCNC: 99 MMOL/L (ref 98–107)
CK MB: 4.7 NG/ML
CO2: 15 MMOL/L (ref 20–31)
COCAINE METABOLITE, URINE: POSITIVE
COLOR: YELLOW
COMMENT UA: ABNORMAL
CREAT SERPL-MCNC: 0.76 MG/DL (ref 0.5–0.9)
DIFFERENTIAL TYPE: ABNORMAL
EKG ATRIAL RATE: 156 BPM
EKG P AXIS: 57 DEGREES
EKG P-R INTERVAL: 90 MS
EKG Q-T INTERVAL: 312 MS
EKG QRS DURATION: 74 MS
EKG QTC CALCULATION (BAZETT): 502 MS
EKG R AXIS: 41 DEGREES
EKG T AXIS: 48 DEGREES
EKG VENTRICULAR RATE: 156 BPM
EOSINOPHILS RELATIVE PERCENT: 1 % (ref 1–4)
ETHANOL PERCENT: 0.07 %
ETHANOL: 69 MG/DL
FIO2: 40
FIO2: ABNORMAL
GFR AFRICAN AMERICAN: >60 ML/MIN
GFR NON-AFRICAN AMERICAN: >60 ML/MIN
GFR NON-AFRICAN AMERICAN: NORMAL ML/MIN
GFR SERPL CREATININE-BSD FRML MDRD: ABNORMAL ML/MIN/{1.73_M2}
GFR SERPL CREATININE-BSD FRML MDRD: ABNORMAL ML/MIN/{1.73_M2}
GFR SERPL CREATININE-BSD FRML MDRD: NORMAL ML/MIN
GFR SERPL CREATININE-BSD FRML MDRD: NORMAL ML/MIN/{1.73_M2}
GLOBULIN: ABNORMAL G/DL (ref 1.5–3.8)
GLUCOSE BLD-MCNC: 230 MG/DL (ref 70–99)
GLUCOSE BLD-MCNC: 253 MG/DL (ref 74–100)
GLUCOSE BLD-MCNC: 88 MG/DL (ref 65–105)
GLUCOSE URINE: ABNORMAL
HCG QUALITATIVE: NEGATIVE
HCO3 VENOUS: 19.1 MMOL/L (ref 22–29)
HCT VFR BLD CALC: 41.5 % (ref 36.3–47.1)
HEMOGLOBIN: 12.1 G/DL (ref 11.9–15.1)
IMMATURE GRANULOCYTES: 1 %
INR BLD: 0.9
KETONES, URINE: NEGATIVE
LACTIC ACID, WHOLE BLOOD: 1.6 MMOL/L (ref 0.7–2.1)
LACTIC ACID, WHOLE BLOOD: 5.3 MMOL/L (ref 0.7–2.1)
LEUKOCYTE ESTERASE, URINE: NEGATIVE
LIPASE: 25 U/L (ref 13–60)
LV EF: 55 %
LVEF MODALITY: NORMAL
LYMPHOCYTES # BLD: 18 % (ref 24–43)
MAGNESIUM: 2.2 MG/DL (ref 1.6–2.6)
MCH RBC QN AUTO: 24.9 PG (ref 25.2–33.5)
MCHC RBC AUTO-ENTMCNC: 29.2 G/DL (ref 28.4–34.8)
MCV RBC AUTO: 85.6 FL (ref 82.6–102.9)
MDMA URINE: ABNORMAL
METHADONE SCREEN, URINE: NEGATIVE
METHAMPHETAMINE, URINE: ABNORMAL
MODE: ABNORMAL
MODE: ABNORMAL
MONOCYTES # BLD: 10 % (ref 3–12)
MRSA, DNA, NASAL: NORMAL
MYOGLOBIN: 36 NG/ML (ref 25–58)
NEGATIVE BASE EXCESS, ART: 7 (ref 0–2)
NEGATIVE BASE EXCESS, VEN: 7 (ref 0–2)
NITRITE, URINE: NEGATIVE
NRBC AUTOMATED: 0 PER 100 WBC
O2 DEVICE/FLOW/%: ABNORMAL
O2 DEVICE/FLOW/%: ABNORMAL
O2 SAT, VEN: 96 % (ref 60–85)
OPIATES, URINE: NEGATIVE
OXYCODONE SCREEN URINE: NEGATIVE
PATIENT TEMP: ABNORMAL
PATIENT TEMP: ABNORMAL
PCO2, VEN: 39 MM HG (ref 41–51)
PDW BLD-RTO: 17.2 % (ref 11.8–14.4)
PH UA: 6 (ref 5–8)
PH VENOUS: 7.3 (ref 7.32–7.43)
PHENCYCLIDINE, URINE: NEGATIVE
PLATELET # BLD: 393 K/UL (ref 138–453)
PLATELET ESTIMATE: ABNORMAL
PMV BLD AUTO: 9.3 FL (ref 8.1–13.5)
PO2, VEN: 94.3 MM HG (ref 30–50)
POC CHLORIDE: 112 MMOL/L (ref 98–107)
POC CREATININE: 0.77 MG/DL (ref 0.51–1.19)
POC HCO3: 20 MMOL/L (ref 21–28)
POC HEMATOCRIT: 46 % (ref 36–46)
POC HEMOGLOBIN: 15.7 G/DL (ref 12–16)
POC IONIZED CALCIUM: 1.08 MMOL/L (ref 1.15–1.33)
POC LACTIC ACID: 7.21 MMOL/L (ref 0.56–1.39)
POC O2 SATURATION: 96 % (ref 94–98)
POC PCO2 TEMP: ABNORMAL MM HG
POC PCO2 TEMP: ABNORMAL MM HG
POC PCO2: 43.3 MM HG (ref 35–48)
POC PH TEMP: ABNORMAL
POC PH TEMP: ABNORMAL
POC PH: 7.27 (ref 7.35–7.45)
POC PO2 TEMP: ABNORMAL MM HG
POC PO2 TEMP: ABNORMAL MM HG
POC PO2: 92.1 MM HG (ref 83–108)
POC POTASSIUM: 3.5 MMOL/L (ref 3.5–4.5)
POC SODIUM: 143 MMOL/L (ref 138–146)
POC TROPONIN I: 0 NG/ML (ref 0–0.1)
POC TROPONIN INTERP: NORMAL
POSITIVE BASE EXCESS, ART: ABNORMAL (ref 0–3)
POSITIVE BASE EXCESS, VEN: ABNORMAL (ref 0–3)
POTASSIUM SERPL-SCNC: 3.5 MMOL/L (ref 3.7–5.3)
PROPOXYPHENE, URINE: ABNORMAL
PROTEIN UA: NEGATIVE
PROTHROMBIN TIME: 10.2 SEC (ref 9–12)
RBC # BLD: 4.85 M/UL (ref 3.95–5.11)
RBC # BLD: ABNORMAL 10*6/UL
SALICYLATE LEVEL: <1 MG/DL (ref 3–10)
SAMPLE SITE: ABNORMAL
SAMPLE SITE: ABNORMAL
SEG NEUTROPHILS: 69 % (ref 36–65)
SEGMENTED NEUTROPHILS ABSOLUTE COUNT: 7.88 K/UL (ref 1.5–8.1)
SODIUM BLD-SCNC: 139 MMOL/L (ref 135–144)
SPECIFIC GRAVITY UA: 1.01 (ref 1–1.03)
SPECIMEN DESCRIPTION: NORMAL
TCO2 (CALC), ART: 21 MMOL/L (ref 22–29)
TEST INFORMATION: ABNORMAL
TOTAL CO2, VENOUS: 20 MMOL/L (ref 23–30)
TOTAL PROTEIN: 7.9 G/DL (ref 6.4–8.3)
TOXIC TRICYCLIC SC,BLOOD: NEGATIVE
TRICYCLIC ANTIDEPRESSANTS, UR: ABNORMAL
TROPONIN INTERP: NORMAL
TROPONIN INTERP: NORMAL
TROPONIN T: <0.03 NG/ML
TROPONIN T: <0.03 NG/ML
TURBIDITY: CLEAR
URINE HGB: NEGATIVE
UROBILINOGEN, URINE: NORMAL
WBC # BLD: 11.3 K/UL (ref 3.5–11.3)
WBC # BLD: ABNORMAL 10*3/UL

## 2018-09-28 PROCEDURE — 6360000002 HC RX W HCPCS

## 2018-09-28 PROCEDURE — S0028 INJECTION, FAMOTIDINE, 20 MG: HCPCS | Performed by: STUDENT IN AN ORGANIZED HEALTH CARE EDUCATION/TRAINING PROGRAM

## 2018-09-28 PROCEDURE — 94770 HC ETCO2 MONITOR DAILY: CPT

## 2018-09-28 PROCEDURE — 93005 ELECTROCARDIOGRAM TRACING: CPT

## 2018-09-28 PROCEDURE — 99285 EMERGENCY DEPT VISIT HI MDM: CPT

## 2018-09-28 PROCEDURE — 93306 TTE W/DOPPLER COMPLETE: CPT

## 2018-09-28 PROCEDURE — 31500 INSERT EMERGENCY AIRWAY: CPT

## 2018-09-28 PROCEDURE — 36600 WITHDRAWAL OF ARTERIAL BLOOD: CPT

## 2018-09-28 PROCEDURE — 81003 URINALYSIS AUTO W/O SCOPE: CPT

## 2018-09-28 PROCEDURE — 6360000002 HC RX W HCPCS: Performed by: STUDENT IN AN ORGANIZED HEALTH CARE EDUCATION/TRAINING PROGRAM

## 2018-09-28 PROCEDURE — 71045 X-RAY EXAM CHEST 1 VIEW: CPT

## 2018-09-28 PROCEDURE — 36415 COLL VENOUS BLD VENIPUNCTURE: CPT

## 2018-09-28 PROCEDURE — G0480 DRUG TEST DEF 1-7 CLASSES: HCPCS

## 2018-09-28 PROCEDURE — 84295 ASSAY OF SERUM SODIUM: CPT

## 2018-09-28 PROCEDURE — 84484 ASSAY OF TROPONIN QUANT: CPT

## 2018-09-28 PROCEDURE — 82947 ASSAY GLUCOSE BLOOD QUANT: CPT

## 2018-09-28 PROCEDURE — 80178 ASSAY OF LITHIUM: CPT

## 2018-09-28 PROCEDURE — 70450 CT HEAD/BRAIN W/O DYE: CPT

## 2018-09-28 PROCEDURE — 85014 HEMATOCRIT: CPT

## 2018-09-28 PROCEDURE — 83690 ASSAY OF LIPASE: CPT

## 2018-09-28 PROCEDURE — 2580000003 HC RX 258: Performed by: STUDENT IN AN ORGANIZED HEALTH CARE EDUCATION/TRAINING PROGRAM

## 2018-09-28 PROCEDURE — 87641 MR-STAPH DNA AMP PROBE: CPT

## 2018-09-28 PROCEDURE — 82330 ASSAY OF CALCIUM: CPT

## 2018-09-28 PROCEDURE — 82435 ASSAY OF BLOOD CHLORIDE: CPT

## 2018-09-28 PROCEDURE — 82803 BLOOD GASES ANY COMBINATION: CPT

## 2018-09-28 PROCEDURE — 6360000002 HC RX W HCPCS: Performed by: EMERGENCY MEDICINE

## 2018-09-28 PROCEDURE — 94762 N-INVAS EAR/PLS OXIMTRY CONT: CPT

## 2018-09-28 PROCEDURE — 83735 ASSAY OF MAGNESIUM: CPT

## 2018-09-28 PROCEDURE — 96374 THER/PROPH/DIAG INJ IV PUSH: CPT

## 2018-09-28 PROCEDURE — 2500000003 HC RX 250 WO HCPCS

## 2018-09-28 PROCEDURE — 2500000003 HC RX 250 WO HCPCS: Performed by: STUDENT IN AN ORGANIZED HEALTH CARE EDUCATION/TRAINING PROGRAM

## 2018-09-28 PROCEDURE — 5A1945Z RESPIRATORY VENTILATION, 24-96 CONSECUTIVE HOURS: ICD-10-PCS | Performed by: INTERNAL MEDICINE

## 2018-09-28 PROCEDURE — 85025 COMPLETE CBC W/AUTO DIFF WBC: CPT

## 2018-09-28 PROCEDURE — 83605 ASSAY OF LACTIC ACID: CPT

## 2018-09-28 PROCEDURE — 82553 CREATINE MB FRACTION: CPT

## 2018-09-28 PROCEDURE — 84703 CHORIONIC GONADOTROPIN ASSAY: CPT

## 2018-09-28 PROCEDURE — 6370000000 HC RX 637 (ALT 250 FOR IP): Performed by: STUDENT IN AN ORGANIZED HEALTH CARE EDUCATION/TRAINING PROGRAM

## 2018-09-28 PROCEDURE — 94002 VENT MGMT INPAT INIT DAY: CPT

## 2018-09-28 PROCEDURE — 84132 ASSAY OF SERUM POTASSIUM: CPT

## 2018-09-28 PROCEDURE — 82140 ASSAY OF AMMONIA: CPT

## 2018-09-28 PROCEDURE — 80307 DRUG TEST PRSMV CHEM ANLYZR: CPT

## 2018-09-28 PROCEDURE — 2000000000 HC ICU R&B

## 2018-09-28 PROCEDURE — 72125 CT NECK SPINE W/O DYE: CPT

## 2018-09-28 PROCEDURE — 6370000000 HC RX 637 (ALT 250 FOR IP): Performed by: EMERGENCY MEDICINE

## 2018-09-28 PROCEDURE — 99291 CRITICAL CARE FIRST HOUR: CPT | Performed by: INTERNAL MEDICINE

## 2018-09-28 PROCEDURE — 83874 ASSAY OF MYOGLOBIN: CPT

## 2018-09-28 PROCEDURE — 2580000003 HC RX 258: Performed by: EMERGENCY MEDICINE

## 2018-09-28 PROCEDURE — 80076 HEPATIC FUNCTION PANEL: CPT

## 2018-09-28 PROCEDURE — 0BH17EZ INSERTION OF ENDOTRACHEAL AIRWAY INTO TRACHEA, VIA NATURAL OR ARTIFICIAL OPENING: ICD-10-PCS | Performed by: INTERNAL MEDICINE

## 2018-09-28 PROCEDURE — 80048 BASIC METABOLIC PNL TOTAL CA: CPT

## 2018-09-28 PROCEDURE — 82565 ASSAY OF CREATININE: CPT

## 2018-09-28 PROCEDURE — 85610 PROTHROMBIN TIME: CPT

## 2018-09-28 RX ORDER — SODIUM CHLORIDE 0.9 % (FLUSH) 0.9 %
10 SYRINGE (ML) INJECTION EVERY 12 HOURS SCHEDULED
Status: DISCONTINUED | OUTPATIENT
Start: 2018-09-28 | End: 2018-10-01 | Stop reason: HOSPADM

## 2018-09-28 RX ORDER — POTASSIUM CHLORIDE 20MEQ/15ML
40 LIQUID (ML) ORAL ONCE
Status: COMPLETED | OUTPATIENT
Start: 2018-09-28 | End: 2018-09-28

## 2018-09-28 RX ORDER — 0.9 % SODIUM CHLORIDE 0.9 %
1000 INTRAVENOUS SOLUTION INTRAVENOUS ONCE
Status: COMPLETED | OUTPATIENT
Start: 2018-09-28 | End: 2018-09-28

## 2018-09-28 RX ORDER — LORAZEPAM 2 MG/ML
1 INJECTION INTRAMUSCULAR ONCE
Status: COMPLETED | OUTPATIENT
Start: 2018-09-28 | End: 2018-09-28

## 2018-09-28 RX ORDER — SODIUM CHLORIDE 9 MG/ML
INJECTION, SOLUTION INTRAVENOUS CONTINUOUS
Status: DISCONTINUED | OUTPATIENT
Start: 2018-09-28 | End: 2018-09-28

## 2018-09-28 RX ORDER — PROPOFOL 10 MG/ML
INJECTION, EMULSION INTRAVENOUS
Status: COMPLETED
Start: 2018-09-28 | End: 2018-09-28

## 2018-09-28 RX ORDER — PROPOFOL 10 MG/ML
10 INJECTION, EMULSION INTRAVENOUS ONCE
Status: COMPLETED | OUTPATIENT
Start: 2018-09-28 | End: 2018-09-28

## 2018-09-28 RX ORDER — QUETIAPINE FUMARATE 200 MG/1
200 TABLET, FILM COATED ORAL ONCE
Status: COMPLETED | OUTPATIENT
Start: 2018-09-28 | End: 2018-09-28

## 2018-09-28 RX ORDER — SODIUM CHLORIDE 0.9 % (FLUSH) 0.9 %
10 SYRINGE (ML) INJECTION PRN
Status: DISCONTINUED | OUTPATIENT
Start: 2018-09-28 | End: 2018-10-01 | Stop reason: HOSPADM

## 2018-09-28 RX ORDER — DEXTROSE MONOHYDRATE 25 G/50ML
12.5 INJECTION, SOLUTION INTRAVENOUS PRN
Status: DISCONTINUED | OUTPATIENT
Start: 2018-09-28 | End: 2018-10-01 | Stop reason: HOSPADM

## 2018-09-28 RX ORDER — DEXTROSE AND SODIUM CHLORIDE 5; .9 G/100ML; G/100ML
INJECTION, SOLUTION INTRAVENOUS CONTINUOUS
Status: DISCONTINUED | OUTPATIENT
Start: 2018-09-28 | End: 2018-09-29

## 2018-09-28 RX ORDER — NICOTINE POLACRILEX 4 MG
15 LOZENGE BUCCAL PRN
Status: DISCONTINUED | OUTPATIENT
Start: 2018-09-28 | End: 2018-10-01 | Stop reason: HOSPADM

## 2018-09-28 RX ORDER — 0.9 % SODIUM CHLORIDE 0.9 %
500 INTRAVENOUS SOLUTION INTRAVENOUS ONCE
Status: COMPLETED | OUTPATIENT
Start: 2018-09-28 | End: 2018-09-28

## 2018-09-28 RX ORDER — 0.9 % SODIUM CHLORIDE 0.9 %
1000 INTRAVENOUS SOLUTION INTRAVENOUS ONCE
Status: DISCONTINUED | OUTPATIENT
Start: 2018-09-28 | End: 2018-09-28

## 2018-09-28 RX ORDER — CHLORHEXIDINE GLUCONATE 0.12 MG/ML
15 RINSE ORAL 2 TIMES DAILY
Status: DISCONTINUED | OUTPATIENT
Start: 2018-09-28 | End: 2018-09-29

## 2018-09-28 RX ORDER — ONDANSETRON 2 MG/ML
4 INJECTION INTRAMUSCULAR; INTRAVENOUS EVERY 6 HOURS PRN
Status: DISCONTINUED | OUTPATIENT
Start: 2018-09-28 | End: 2018-10-01 | Stop reason: HOSPADM

## 2018-09-28 RX ORDER — DEXTROSE MONOHYDRATE 50 MG/ML
100 INJECTION, SOLUTION INTRAVENOUS PRN
Status: DISCONTINUED | OUTPATIENT
Start: 2018-09-28 | End: 2018-10-01 | Stop reason: HOSPADM

## 2018-09-28 RX ORDER — FENTANYL CITRATE 50 UG/ML
INJECTION, SOLUTION INTRAMUSCULAR; INTRAVENOUS
Status: COMPLETED
Start: 2018-09-28 | End: 2018-09-28

## 2018-09-28 RX ORDER — PROPOFOL 10 MG/ML
10 INJECTION, EMULSION INTRAVENOUS
Status: DISCONTINUED | OUTPATIENT
Start: 2018-09-28 | End: 2018-09-29

## 2018-09-28 RX ORDER — FENTANYL CITRATE 50 UG/ML
100 INJECTION, SOLUTION INTRAMUSCULAR; INTRAVENOUS ONCE
Status: COMPLETED | OUTPATIENT
Start: 2018-09-28 | End: 2018-09-28

## 2018-09-28 RX ADMIN — CHLORHEXIDINE GLUCONATE 0.12% ORAL RINSE 15 ML: 1.2 LIQUID ORAL at 20:44

## 2018-09-28 RX ADMIN — FENTANYL CITRATE 100 MCG: 50 INJECTION INTRAMUSCULAR; INTRAVENOUS at 10:37

## 2018-09-28 RX ADMIN — Medication 75 MCG/HR: at 11:24

## 2018-09-28 RX ADMIN — SODIUM CHLORIDE: 9 INJECTION, SOLUTION INTRAVENOUS at 06:31

## 2018-09-28 RX ADMIN — Medication 2 MG/HR: at 10:02

## 2018-09-28 RX ADMIN — PROPOFOL 40 MCG/KG/MIN: 10 INJECTION, EMULSION INTRAVENOUS at 18:47

## 2018-09-28 RX ADMIN — LORAZEPAM 1 MG: 2 INJECTION INTRAMUSCULAR; INTRAVENOUS at 04:57

## 2018-09-28 RX ADMIN — PROPOFOL 20 MCG/KG/MIN: 10 INJECTION, EMULSION INTRAVENOUS at 04:24

## 2018-09-28 RX ADMIN — Medication 100 MCG/HR: at 21:14

## 2018-09-28 RX ADMIN — LORAZEPAM 1 MG: 2 INJECTION INTRAMUSCULAR; INTRAVENOUS at 04:59

## 2018-09-28 RX ADMIN — ENOXAPARIN SODIUM 40 MG: 40 INJECTION SUBCUTANEOUS at 09:41

## 2018-09-28 RX ADMIN — FAMOTIDINE 20 MG: 10 INJECTION INTRAVENOUS at 11:29

## 2018-09-28 RX ADMIN — Medication 10 ML: at 19:34

## 2018-09-28 RX ADMIN — SODIUM CHLORIDE 1000 ML: 9 INJECTION, SOLUTION INTRAVENOUS at 06:47

## 2018-09-28 RX ADMIN — SODIUM CHLORIDE 500 ML: 9 INJECTION, SOLUTION INTRAVENOUS at 18:47

## 2018-09-28 RX ADMIN — PROPOFOL 50 MCG/KG/MIN: 10 INJECTION, EMULSION INTRAVENOUS at 07:22

## 2018-09-28 RX ADMIN — SODIUM CHLORIDE 1000 ML: 9 INJECTION, SOLUTION INTRAVENOUS at 04:24

## 2018-09-28 RX ADMIN — QUETIAPINE FUMARATE 200 MG: 200 TABLET ORAL at 18:47

## 2018-09-28 RX ADMIN — PROPOFOL 35 MCG/KG/MIN: 10 INJECTION, EMULSION INTRAVENOUS at 14:06

## 2018-09-28 RX ADMIN — CHLORHEXIDINE GLUCONATE 0.12% ORAL RINSE 15 ML: 1.2 LIQUID ORAL at 09:41

## 2018-09-28 RX ADMIN — DEXTROSE AND SODIUM CHLORIDE: 5; 900 INJECTION, SOLUTION INTRAVENOUS at 11:29

## 2018-09-28 RX ADMIN — POTASSIUM CHLORIDE 40 MEQ: 40 SOLUTION ORAL at 10:02

## 2018-09-28 RX ADMIN — FENTANYL CITRATE 100 MCG: 50 INJECTION, SOLUTION INTRAMUSCULAR; INTRAVENOUS at 10:37

## 2018-09-28 ASSESSMENT — PULMONARY FUNCTION TESTS
PIF_VALUE: 18
PIF_VALUE: 17
PIF_VALUE: 18
PIF_VALUE: 16
PIF_VALUE: 18
PIF_VALUE: 18
PIF_VALUE: 17

## 2018-09-28 ASSESSMENT — PAIN SCALES - GENERAL
PAINLEVEL_OUTOF10: 8
PAINLEVEL_OUTOF10: 7
PAINLEVEL_OUTOF10: 0
PAINLEVEL_OUTOF10: 8

## 2018-09-28 NOTE — PLAN OF CARE
Problem: OXYGENATION/RESPIRATORY FUNCTION  Goal: Patient will maintain patent airway  Outcome: Ongoing  Pt's airway has remained patent throughout this shift via ET tube/mechanical ventilation. RN will continue on with plan of care. Goal: Patient will achieve/maintain normal respiratory rate/effort  Respiratory rate and effort will be within normal limits for the patient   Outcome: Ongoing      Problem: MECHANICAL VENTILATION  Goal: Patient will maintain patent airway  Outcome: Ongoing  Pt's airway has remained patent throughout this shift via ET tube/mechanical ventilation. RN will continue on with plan of care. Goal: Oral health is maintained or improved  Outcome: Ongoing  Oral health is being maintained q 2 hours and PRN per Rn/RT during this shift. RN will continue in with plan of care. Goal: ET tube will be managed safely  Outcome: Ongoing  ET tube is being managed safely this shift per Rn/RT. RN will continue on with plan of care. Problem: Restraint Use - Nonviolent/Non-Self-Destructive Behavior:  Goal: Absence of restraint indications  Absence of restraint indications   Outcome: Not Met This Shift  When restraints are released and on even, pt will attempt to reach for all lines and tubes when agitated. Pt will also attempt to sit straight up in bed. Restraints are still indicated during this shift. RN will continue on with plan of care. Goal: Absence of restraint-related injury  Absence of restraint-related injury   Outcome: Ongoing  Pt has remained free of any restraint related injuries during this shift. RN will continue on with plan of care. Problem: Injury - Risk of, Physical Injury:  Goal: Absence of physical injury  Absence of physical injury     Outcome: Ongoing  Patient has remained free from any physical injury during this shift. Goal: Will remain free from falls  Will remain free from falls     Outcome: Ongoing  Patient remains free from falls during this shift.  RN will continue

## 2018-09-28 NOTE — ED PROVIDER NOTES
insertion    Elevate Head of Bed    Spontaneous Awakening Trial (SAT)    Place OG Tube    Vital signs per unit routine    Telemetry monitoring    Daily weights    Intake and output    Tobacco cessation education    Place intermittent pneumatic compression device    Height and weight    Full Code    Inpatient consult to Critical Care    Inpatient consult to Dietitian    Capnography    Pulse oximetry, continuous    Spontaneous Breathing Trial (SBT)    Post Extubation Oxygen Therapy    Manual Mechanical Ventilation    Initiate Oxygen Therapy Protocol    Respiratory care evaluation only    POC Blood Gas    POCT troponin    Venous Blood Gas, POC    Creatinine W/GFR Point of Care    Lactic Acid, POC    POCT Glucose    Anion Gap (Calc) POC    POCT troponin    Arterial Blood Gas, POC    EKG 12 Lead    Insert/Change Asher Catheter    PATIENT STATUS (FROM ED OR OR/PROCEDURAL) Inpatient       MEDICATIONS ORDERED:  Orders Placed This Encounter   Medications    propofol 1000 MG/100ML injection     ONEYDA DE DIOS: cabinet override    0.9 % sodium chloride bolus    propofol 1000 MG/100ML injection    chlorhexidine (PERIDEX) 0.12 % solution 15 mL    LORazepam (ATIVAN) injection 1 mg    LORazepam (ATIVAN) injection 1 mg    sodium chloride flush 0.9 % injection 10 mL    sodium chloride flush 0.9 % injection 10 mL    magnesium hydroxide (MILK OF MAGNESIA) 400 MG/5ML suspension 30 mL    ondansetron (ZOFRAN) injection 4 mg    enoxaparin (LOVENOX) injection 40 mg    0.9 % sodium chloride infusion    propofol 1000 MG/100ML injection    midazolam (VERSED) 100 mg in dextrose 5% 100 mL infusion    0.9 % sodium chloride bolus       DDX: Drug intoxication versus subarachnoid bleed versus hepatic encephalopathy versus urinary tract infection versus C-spine fracture versus hypercarbia    Initial MDM/Plan: 37 y.o. female who presents with altered mental status.   Due to the patient's GCS of 4 on arrival the decision to intubate for airway protection was made.     DIAGNOSTIC RESULTS / EMERGENCY DEPARTMENT COURSE / MDM     LABS:  Labs Reviewed   CHLORIDE (POC) - Abnormal; Notable for the following:        Result Value    POC Chloride 112 (*)     All other components within normal limits   CALCIUM, IONIC (POC) - Abnormal; Notable for the following:     POC Ionized Calcium 1.08 (*)     All other components within normal limits   AMMONIA - Abnormal; Notable for the following:     Ammonia 55 (*)     All other components within normal limits   BASIC METABOLIC PANEL - Abnormal; Notable for the following:     Glucose 230 (*)     Potassium 3.5 (*)     CO2 15 (*)     Anion Gap 25 (*)     All other components within normal limits   CBC WITH AUTO DIFFERENTIAL - Abnormal; Notable for the following:     MCH 24.9 (*)     RDW 17.2 (*)     Seg Neutrophils 69 (*)     Lymphocytes 18 (*)     Immature Granulocytes 1 (*)     All other components within normal limits   TOX SCR, BLD, ED - Abnormal; Notable for the following:     Ethanol 69 (*)     Salicylate Lvl <1 (*)     Acetaminophen Level <5 (*)     All other components within normal limits   LACTIC ACID, WHOLE BLOOD - Abnormal; Notable for the following:     Lactic Acid, Whole Blood 5.3 (*)     All other components within normal limits   HEPATIC FUNCTION PANEL - Abnormal; Notable for the following:     ALT 69 (*)     AST 90 (*)     Total Bilirubin <0.10 (*)     All other components within normal limits   URINE DRUG SCREEN - Abnormal; Notable for the following:     Benzodiazepine Screen, Urine POSITIVE (*)     Cocaine Metabolite, Urine POSITIVE (*)     All other components within normal limits   URINALYSIS - Abnormal; Notable for the following:     Glucose, Ur 1+ (*)     All other components within normal limits   VENOUS BLOOD GAS, POINT OF CARE - Abnormal; Notable for the following:     pH, Jeremi 7.299 (*)     pCO2, Jeremi 39.0 (*)     pO2, Jeremi 94.3 (*)     HCO3, Venous 19.1 (*) preservation of the normal cervical lordosis. Vertebral bodies maintain normal height. No evidence of acute osseous fracture. No prevertebral soft tissue swelling. Mild cervical spondylosis. No acute intracranial abnormality. No acute osseous fracture of the cervical spine. Xr Chest Portable    Result Date: 9/28/2018  EXAMINATION: SINGLE XRAY VIEW OF THE CHEST 9/28/2018 4:01 am COMPARISON: None. HISTORY: ORDERING SYSTEM PROVIDED HISTORY: AMS TECHNOLOGIST PROVIDED HISTORY: AMS FINDINGS: Endotracheal tube terminates 5 mm above the josie. No pneumothorax. Subtle elevation left hemidiaphragm. The cardiomediastinal silhouette is within normal limits. Osseous structures without acute finding. Low lying endotracheal tube. Recommend retracting 2 cm. Otherwise, no acute cardiopulmonary process. EKG  EKG Interpretation    Interpreted by emergency department physician    Rhythm: Sinus rhythm, but Tachycardic  Rate: Tachycardic  Axis: normal  Conduction: normal  ST Segments: no acute change  T Waves: Nonspecific T wave flattening  Q Waves: no acute change    Clinical Impression: Sinus Tachycardia. All EKG's are interpreted by the Emergency Department Physician who either signs or Co-signs this chart in the absence of a cardiologist.        EMERGENCY DEPARTMENT COURSE:  ED Course as of Sep 28 0722   Fri Sep 28, 2018   0437 Lactic Acid, Whole Blood: (!) 5.3 [MS]      ED Course User Index  [MS] Rupinder Hernandez DO     Patient was finally stable and saturating well on high flow nonrebreather. Preoxygenation was performed with nasal cannula and nonrebreather. Patient was intubated for airway protection due to her decreased GCS in the manner described below. Propofol drip was started for sedation and titrated for sedation. Extensive laboratory testing was performed. This was mostly unremarkable aside from acidosis. Elevated lactic acid. Urine drug screen did show benzodiazepines and cocaine.

## 2018-09-28 NOTE — Clinical Note
Patient Class: Inpatient [101]   REQUIRED: Diagnosis: Altered mental status [780.97. ICD-9-CM]   Estimated Length of Stay: Estimated stay of more than 2 midnights   Future Attending Provider: Eve Bran [[id number]]

## 2018-09-28 NOTE — PROGRESS NOTES
Charting intubations and reintubations    ETT Size (e.g. 7.5) 7.5  ETT Placement (e.g. 23 cm at lips) 23  ETT secured with (commercial device name) Minerva    Tube placement verified by:  Auscultation (yes/no) yes  Positive color change on end tidal CO2 detector (yes/no)yes  CXR (yes/no) yes    Reason for intubation (jot a quick note/phrase e.g. Impending respiratory failure) OD- not protecting her airway    If difficult airway, was red tape placed (yes/no) n/a  If code situation, was rescue pod used? (yes/no) n/a    -- Notify charge therapist regarding all intubations, extubations, reintubations and self extubations    Erna Good  5:26 AM

## 2018-09-28 NOTE — PROGRESS NOTES
Nutrition Assessment    Type and Reason for Visit: Initial (vent, TF recs )    Nutrition Recommendations:Start nutrition as able. If TF needed, suggest Low Calorie, High Protein formula with goal of 40 ml/hr while on propofol at current rate. Will follow/monitor plans. Malnutrition Assessment:  · Malnutrition Status: Insufficient data    Nutrition Diagnosis:   · Problem: Inadequate oral intake  · Etiology: related to Impaired respiratory function-inability to consume food     Signs and symptoms:  as evidenced by NPO status due to medical condition    Nutrition Assessment:  · Subjective Assessment: Pt intubated. Consult received for TF recommendations d/t vent. · Current Nutrition Therapies:  · Oral Diet Orders: NPO   · Tube Feeding (TF) Orders: None  · Additional Calories: Propofol at 17.5 ml/nw=253 kcal/day   · Anthropometric Measures:  · Ht: 5' 4\" (162.6 cm)   · Current Body Wt: 128 lb 8.5 oz (58.3 kg)  · Ideal Body Wt: 120 lb (54.4 kg), % Ideal Body 107%  · BMI Classification: BMI 18.5 - 24.9 Normal Weight  · Comparative Standards (Estimated Nutrition Needs):  · Estimated Daily Total Kcal: 7345-9164 kcal per day  · Estimated Daily Protein (g): 75-90 g pro per day     Estimated Intake vs Estimated Needs:  Less     Nutrition Risk Level: High    Nutrition Interventions:   Start nutrition as able. If TF needed, suggest Low Calorie, High Protein formula with goal of 40 ml/hr while on propofol at current rate. Continued Inpatient Monitoring, Education Not Indicated    Nutrition Evaluation:   · Evaluation: Goals set   · Goals: meet % of estimated nutrition needs    · Monitoring: NPO Status, Weight, Comparative Standards, Pertinent Labs    See Adult Nutrition Doc Flowsheet for more detail.      Electronically signed by Jennifer Garcia RD, LD on 9/28/18 at 2:17 PM    Contact Number: [phone]

## 2018-09-28 NOTE — PLAN OF CARE
Problem: Nutrition  Goal: Optimal nutrition therapy  Outcome: Ongoing  Nutrition Problem: Inadequate oral intake  Intervention: Food and/or Nutrient Delivery: Start nutrition as able. If TF needed, suggest Low Calorie, High Protein formula with goal of 40 ml/hr while on propofol at current rate.    Nutritional Goals: meet % of estimated nutrition needs

## 2018-09-28 NOTE — FLOWSHEET NOTE
SPIRITUAL CARE DEPARTMENT - [facility]  PROGRESS NOTE    Shift date: 9/27/2018  Shift day: Thursday   Shift # 3    Room # ED14  Name: Lily Horner  ([address] [place] [address],[address])         Age: 37 y.o. Gender: female          Druze: None   Place of Tenriism: None    Referral: ED Alert    Admit Date & Time: 9/28/2018  3:44 AM    PATIENT/EVENT DESCRIPTION:  Lily Horner is a 37 y.o. female who arrived via EMS to ED14 due to suspected \"overdose. \" Per report, patient was \"down for 5 - 10 minutes. \" Patient remained unresponsive in ED14 and medical team made decision to \"intubate. \" TPD was present upon patient's arrival.    SPIRITUAL ASSESSMENT/INTERVENTION:   was present when patient arrived to [facility]. Patient arrived with no information.  worked alongside Shira Vidal, who later confirmed her identity. ED HUC located patient's facesheet from previous hospital visit.  shared information with ED Registration. Patient's sister, Mark Berman ([phone]), was listed as patient's emergency contact.  called Andrea Godoy who confirmed her arrival to the ED.  facilitated medical update with patient's sister and brother-in-law. Per sister, patient has experienced \"the death of her partner, mother, and father, in the last three years. \" Patient's sister and brother-in-law spoke of the hardship patient has experienced as well as their own struggles due to patient's illnessess.  provided comfort, support, and hospitality to family.  escorted them to first floor waiting room and informed bedside nurse of their location. SPIRITUAL CARE FOLLOW-UP PLAN:  Chaplains will remain available to offer spiritual and emotional support as needed.       Electronically signed by Rosalinda Christopher on 9/28/2018 at 7:41 AM.  [facility]  [phone]

## 2018-09-28 NOTE — ED NOTES
Bed: 14  Expected date:   Expected time:   Means of arrival:   Comments:  STEFANI Cheek  09/28/18 [id number]

## 2018-09-29 PROBLEM — T50.901A DRUG OVERDOSE, ACCIDENTAL OR UNINTENTIONAL, INITIAL ENCOUNTER: Status: ACTIVE | Noted: 2018-09-29

## 2018-09-29 PROBLEM — G92.8 TOXIC METABOLIC ENCEPHALOPATHY: Status: ACTIVE | Noted: 2018-09-28

## 2018-09-29 PROBLEM — J96.01 ACUTE RESPIRATORY FAILURE WITH HYPOXIA AND HYPERCAPNIA (HCC): Status: ACTIVE | Noted: 2018-09-29

## 2018-09-29 PROBLEM — J01.90 ACUTE RHINOSINUSITIS: Status: ACTIVE | Noted: 2018-09-29

## 2018-09-29 PROBLEM — J96.02 ACUTE RESPIRATORY FAILURE WITH HYPOXIA AND HYPERCAPNIA (HCC): Status: ACTIVE | Noted: 2018-09-29

## 2018-09-29 LAB
ABSOLUTE EOS #: 0 K/UL (ref 0–0.4)
ABSOLUTE IMMATURE GRANULOCYTE: 0 K/UL (ref 0–0.3)
ABSOLUTE LYMPH #: 1.7 K/UL (ref 1–4.8)
ABSOLUTE MONO #: 1.57 K/UL (ref 0.1–0.8)
ALLEN TEST: POSITIVE
ANION GAP SERPL CALCULATED.3IONS-SCNC: 10 MMOL/L (ref 9–17)
BASOPHILS # BLD: 0 % (ref 0–2)
BASOPHILS ABSOLUTE: 0 K/UL (ref 0–0.2)
BUN BLDV-MCNC: 5 MG/DL (ref 6–20)
BUN/CREAT BLD: ABNORMAL (ref 9–20)
CALCIUM SERPL-MCNC: 8 MG/DL (ref 8.6–10.4)
CHLORIDE BLD-SCNC: 109 MMOL/L (ref 98–107)
CO2: 22 MMOL/L (ref 20–31)
CREAT SERPL-MCNC: 0.5 MG/DL (ref 0.5–0.9)
CULTURE: NORMAL
DIFFERENTIAL TYPE: ABNORMAL
DIRECT EXAM: NORMAL
EOSINOPHILS RELATIVE PERCENT: 0 % (ref 1–4)
FIO2: 30
GFR AFRICAN AMERICAN: >60 ML/MIN
GFR NON-AFRICAN AMERICAN: >60 ML/MIN
GFR SERPL CREATININE-BSD FRML MDRD: ABNORMAL ML/MIN/{1.73_M2}
GFR SERPL CREATININE-BSD FRML MDRD: ABNORMAL ML/MIN/{1.73_M2}
GLUCOSE BLD-MCNC: 101 MG/DL (ref 65–105)
GLUCOSE BLD-MCNC: 108 MG/DL (ref 65–105)
GLUCOSE BLD-MCNC: 117 MG/DL (ref 70–99)
GLUCOSE BLD-MCNC: 130 MG/DL (ref 74–100)
GLUCOSE BLD-MCNC: 132 MG/DL (ref 65–105)
GLUCOSE BLD-MCNC: 141 MG/DL (ref 65–105)
GLUCOSE BLD-MCNC: 91 MG/DL (ref 65–105)
HCT VFR BLD CALC: 35.3 % (ref 36.3–47.1)
HEMOGLOBIN: 10.2 G/DL (ref 11.9–15.1)
IMMATURE GRANULOCYTES: 0 %
LITHIUM DATE LAST DOSE: ABNORMAL
LITHIUM DOSE AMOUNT: ABNORMAL
LITHIUM DOSE TIME: ABNORMAL
LITHIUM LEVEL: 0.4 MMOL/L (ref 0.6–1.2)
LYMPHOCYTES # BLD: 13 % (ref 24–44)
Lab: NORMAL
MCH RBC QN AUTO: 24.9 PG (ref 25.2–33.5)
MCHC RBC AUTO-ENTMCNC: 28.9 G/DL (ref 28.4–34.8)
MCV RBC AUTO: 86.1 FL (ref 82.6–102.9)
MODE: ABNORMAL
MONOCYTES # BLD: 12 % (ref 1–7)
MORPHOLOGY: ABNORMAL
NEGATIVE BASE EXCESS, ART: 1 (ref 0–2)
NRBC AUTOMATED: 0 PER 100 WBC
O2 DEVICE/FLOW/%: ABNORMAL
PATIENT TEMP: ABNORMAL
PDW BLD-RTO: 17.5 % (ref 11.8–14.4)
PLATELET # BLD: 314 K/UL (ref 138–453)
PLATELET ESTIMATE: ABNORMAL
PMV BLD AUTO: 9.7 FL (ref 8.1–13.5)
POC HCO3: 25.3 MMOL/L (ref 21–28)
POC O2 SATURATION: 94 % (ref 94–98)
POC PCO2 TEMP: ABNORMAL MM HG
POC PCO2: 45.7 MM HG (ref 35–48)
POC PH TEMP: ABNORMAL
POC PH: 7.35 (ref 7.35–7.45)
POC PO2 TEMP: ABNORMAL MM HG
POC PO2: 73.8 MM HG (ref 83–108)
POSITIVE BASE EXCESS, ART: ABNORMAL (ref 0–3)
POTASSIUM SERPL-SCNC: 3.6 MMOL/L (ref 3.7–5.3)
RBC # BLD: 4.1 M/UL (ref 3.95–5.11)
RBC # BLD: ABNORMAL 10*6/UL
SAMPLE SITE: ABNORMAL
SEG NEUTROPHILS: 75 % (ref 36–66)
SEGMENTED NEUTROPHILS ABSOLUTE COUNT: 9.83 K/UL (ref 1.8–7.7)
SODIUM BLD-SCNC: 141 MMOL/L (ref 135–144)
SPECIMEN DESCRIPTION: NORMAL
STATUS: NORMAL
TCO2 (CALC), ART: 27 MMOL/L (ref 22–29)
WBC # BLD: 13.1 K/UL (ref 3.5–11.3)
WBC # BLD: ABNORMAL 10*3/UL

## 2018-09-29 PROCEDURE — 2580000003 HC RX 258: Performed by: EMERGENCY MEDICINE

## 2018-09-29 PROCEDURE — 2580000003 HC RX 258: Performed by: HOSPITALIST

## 2018-09-29 PROCEDURE — 2700000000 HC OXYGEN THERAPY PER DAY

## 2018-09-29 PROCEDURE — 36415 COLL VENOUS BLD VENIPUNCTURE: CPT

## 2018-09-29 PROCEDURE — 99291 CRITICAL CARE FIRST HOUR: CPT | Performed by: INTERNAL MEDICINE

## 2018-09-29 PROCEDURE — S0028 INJECTION, FAMOTIDINE, 20 MG: HCPCS | Performed by: STUDENT IN AN ORGANIZED HEALTH CARE EDUCATION/TRAINING PROGRAM

## 2018-09-29 PROCEDURE — 6370000000 HC RX 637 (ALT 250 FOR IP)

## 2018-09-29 PROCEDURE — 36600 WITHDRAWAL OF ARTERIAL BLOOD: CPT

## 2018-09-29 PROCEDURE — 2500000003 HC RX 250 WO HCPCS: Performed by: STUDENT IN AN ORGANIZED HEALTH CARE EDUCATION/TRAINING PROGRAM

## 2018-09-29 PROCEDURE — 6360000002 HC RX W HCPCS: Performed by: STUDENT IN AN ORGANIZED HEALTH CARE EDUCATION/TRAINING PROGRAM

## 2018-09-29 PROCEDURE — 86403 PARTICLE AGGLUT ANTBDY SCRN: CPT

## 2018-09-29 PROCEDURE — 87205 SMEAR GRAM STAIN: CPT

## 2018-09-29 PROCEDURE — 6360000002 HC RX W HCPCS: Performed by: EMERGENCY MEDICINE

## 2018-09-29 PROCEDURE — 2580000003 HC RX 258: Performed by: STUDENT IN AN ORGANIZED HEALTH CARE EDUCATION/TRAINING PROGRAM

## 2018-09-29 PROCEDURE — 85025 COMPLETE CBC W/AUTO DIFF WBC: CPT

## 2018-09-29 PROCEDURE — 6370000000 HC RX 637 (ALT 250 FOR IP): Performed by: HOSPITALIST

## 2018-09-29 PROCEDURE — 82803 BLOOD GASES ANY COMBINATION: CPT

## 2018-09-29 PROCEDURE — 94003 VENT MGMT INPAT SUBQ DAY: CPT

## 2018-09-29 PROCEDURE — 6360000002 HC RX W HCPCS: Performed by: HOSPITALIST

## 2018-09-29 PROCEDURE — 6370000000 HC RX 637 (ALT 250 FOR IP): Performed by: STUDENT IN AN ORGANIZED HEALTH CARE EDUCATION/TRAINING PROGRAM

## 2018-09-29 PROCEDURE — 94770 HC ETCO2 MONITOR DAILY: CPT

## 2018-09-29 PROCEDURE — 51798 US URINE CAPACITY MEASURE: CPT

## 2018-09-29 PROCEDURE — 6370000000 HC RX 637 (ALT 250 FOR IP): Performed by: EMERGENCY MEDICINE

## 2018-09-29 PROCEDURE — 94660 CPAP INITIATION&MGMT: CPT

## 2018-09-29 PROCEDURE — 87070 CULTURE OTHR SPECIMN AEROBIC: CPT

## 2018-09-29 PROCEDURE — 94762 N-INVAS EAR/PLS OXIMTRY CONT: CPT

## 2018-09-29 PROCEDURE — 94640 AIRWAY INHALATION TREATMENT: CPT

## 2018-09-29 PROCEDURE — 51701 INSERT BLADDER CATHETER: CPT

## 2018-09-29 PROCEDURE — 80048 BASIC METABOLIC PNL TOTAL CA: CPT

## 2018-09-29 PROCEDURE — 2000000000 HC ICU R&B

## 2018-09-29 PROCEDURE — 87077 CULTURE AEROBIC IDENTIFY: CPT

## 2018-09-29 PROCEDURE — 87185 SC STD ENZYME DETCJ PER NZM: CPT

## 2018-09-29 RX ORDER — ALBUTEROL SULFATE 90 UG/1
2 AEROSOL, METERED RESPIRATORY (INHALATION) EVERY 4 HOURS PRN
Status: ON HOLD | COMMUNITY
End: 2019-10-15 | Stop reason: SDUPTHER

## 2018-09-29 RX ORDER — OMEPRAZOLE 20 MG/1
20 CAPSULE, DELAYED RELEASE ORAL 2 TIMES DAILY
Status: ON HOLD | COMMUNITY
End: 2019-10-15 | Stop reason: SDUPTHER

## 2018-09-29 RX ORDER — FENTANYL CITRATE 50 UG/ML
50 INJECTION, SOLUTION INTRAMUSCULAR; INTRAVENOUS
Status: DISCONTINUED | OUTPATIENT
Start: 2018-09-29 | End: 2018-10-01 | Stop reason: HOSPADM

## 2018-09-29 RX ORDER — CITALOPRAM 20 MG/1
20 TABLET ORAL DAILY
Status: ON HOLD | COMMUNITY
End: 2018-10-01 | Stop reason: HOSPADM

## 2018-09-29 RX ORDER — DEXTROSE AND SODIUM CHLORIDE 5; .45 G/100ML; G/100ML
INJECTION, SOLUTION INTRAVENOUS CONTINUOUS
Status: DISCONTINUED | OUTPATIENT
Start: 2018-09-29 | End: 2018-09-30

## 2018-09-29 RX ORDER — GABAPENTIN 600 MG/1
600 TABLET ORAL 2 TIMES DAILY
Status: ON HOLD | COMMUNITY
End: 2018-10-01 | Stop reason: HOSPADM

## 2018-09-29 RX ORDER — LITHIUM CARBONATE 450 MG
450 TABLET, EXTENDED RELEASE ORAL 2 TIMES DAILY
Status: ON HOLD | COMMUNITY
End: 2018-10-01 | Stop reason: HOSPADM

## 2018-09-29 RX ORDER — TRAZODONE HYDROCHLORIDE 100 MG/1
100 TABLET ORAL NIGHTLY
Status: ON HOLD | COMMUNITY
End: 2018-10-01 | Stop reason: HOSPADM

## 2018-09-29 RX ORDER — HYDROXYZINE 50 MG/1
50 TABLET, FILM COATED ORAL 3 TIMES DAILY PRN
Status: ON HOLD | COMMUNITY
End: 2018-10-01

## 2018-09-29 RX ORDER — IPRATROPIUM BROMIDE AND ALBUTEROL SULFATE 2.5; .5 MG/3ML; MG/3ML
1 SOLUTION RESPIRATORY (INHALATION)
Status: DISCONTINUED | OUTPATIENT
Start: 2018-09-29 | End: 2018-10-01 | Stop reason: HOSPADM

## 2018-09-29 RX ORDER — FENTANYL CITRATE 50 UG/ML
75 INJECTION, SOLUTION INTRAMUSCULAR; INTRAVENOUS
Status: DISCONTINUED | OUTPATIENT
Start: 2018-09-29 | End: 2018-10-01 | Stop reason: HOSPADM

## 2018-09-29 RX ORDER — VENLAFAXINE 75 MG/1
75 TABLET ORAL DAILY
Status: ON HOLD | COMMUNITY
End: 2018-10-01

## 2018-09-29 RX ORDER — QUETIAPINE FUMARATE 200 MG/1
200 TABLET, FILM COATED ORAL NIGHTLY
Status: ON HOLD | COMMUNITY
End: 2018-10-01

## 2018-09-29 RX ORDER — DEXAMETHASONE SODIUM PHOSPHATE 4 MG/ML
4 INJECTION, SOLUTION INTRA-ARTICULAR; INTRALESIONAL; INTRAMUSCULAR; INTRAVENOUS; SOFT TISSUE EVERY 6 HOURS
Status: DISCONTINUED | OUTPATIENT
Start: 2018-09-29 | End: 2018-09-30

## 2018-09-29 RX ORDER — ALBUTEROL SULFATE 2.5 MG/3ML
2.5 SOLUTION RESPIRATORY (INHALATION) EVERY 6 HOURS PRN
Status: DISCONTINUED | OUTPATIENT
Start: 2018-09-29 | End: 2018-10-01 | Stop reason: HOSPADM

## 2018-09-29 RX ADMIN — CHLORHEXIDINE GLUCONATE 0.12% ORAL RINSE 15 ML: 1.2 LIQUID ORAL at 08:38

## 2018-09-29 RX ADMIN — INSULIN LISPRO 2 UNITS: 100 INJECTION, SOLUTION INTRAVENOUS; SUBCUTANEOUS at 17:47

## 2018-09-29 RX ADMIN — IPRATROPIUM BROMIDE AND ALBUTEROL SULFATE 1 AMPULE: .5; 3 SOLUTION RESPIRATORY (INHALATION) at 15:40

## 2018-09-29 RX ADMIN — FENTANYL CITRATE 50 MCG: 50 INJECTION INTRAMUSCULAR; INTRAVENOUS at 12:48

## 2018-09-29 RX ADMIN — DEXTROSE AND SODIUM CHLORIDE: 5; 900 INJECTION, SOLUTION INTRAVENOUS at 07:34

## 2018-09-29 RX ADMIN — DEXAMETHASONE SODIUM PHOSPHATE 4 MG: 4 INJECTION, SOLUTION INTRAMUSCULAR; INTRAVENOUS at 11:36

## 2018-09-29 RX ADMIN — IPRATROPIUM BROMIDE AND ALBUTEROL SULFATE 1 AMPULE: .5; 3 SOLUTION RESPIRATORY (INHALATION) at 12:45

## 2018-09-29 RX ADMIN — FAMOTIDINE 20 MG: 10 INJECTION INTRAVENOUS at 08:39

## 2018-09-29 RX ADMIN — ENOXAPARIN SODIUM 40 MG: 40 INJECTION SUBCUTANEOUS at 08:38

## 2018-09-29 RX ADMIN — PROPOFOL 15 MCG/KG/MIN: 10 INJECTION, EMULSION INTRAVENOUS at 04:02

## 2018-09-29 RX ADMIN — Medication 10 MG/HR: at 09:47

## 2018-09-29 RX ADMIN — FENTANYL CITRATE 50 MCG: 50 INJECTION INTRAMUSCULAR; INTRAVENOUS at 23:03

## 2018-09-29 RX ADMIN — RACEPINEPHRINE HYDROCHLORIDE 11.25 MG: 11.25 SOLUTION RESPIRATORY (INHALATION) at 10:52

## 2018-09-29 RX ADMIN — Medication 125 MCG/HR: at 07:57

## 2018-09-29 RX ADMIN — Medication 10 ML: at 23:03

## 2018-09-29 RX ADMIN — IPRATROPIUM BROMIDE AND ALBUTEROL SULFATE 1 AMPULE: .5; 3 SOLUTION RESPIRATORY (INHALATION) at 20:42

## 2018-09-29 RX ADMIN — DEXAMETHASONE SODIUM PHOSPHATE 4 MG: 4 INJECTION, SOLUTION INTRAMUSCULAR; INTRAVENOUS at 23:03

## 2018-09-29 RX ADMIN — RACEPINEPHRINE HYDROCHLORIDE 11.25 MG: 11.25 SOLUTION RESPIRATORY (INHALATION) at 23:41

## 2018-09-29 RX ADMIN — DEXTROSE AND SODIUM CHLORIDE: 5; 450 INJECTION, SOLUTION INTRAVENOUS at 11:21

## 2018-09-29 RX ADMIN — ALBUTEROL SULFATE 2.5 MG: 2.5 SOLUTION RESPIRATORY (INHALATION) at 23:41

## 2018-09-29 RX ADMIN — FENTANYL CITRATE 50 MCG: 50 INJECTION INTRAMUSCULAR; INTRAVENOUS at 20:43

## 2018-09-29 RX ADMIN — DEXAMETHASONE SODIUM PHOSPHATE 4 MG: 4 INJECTION, SOLUTION INTRAMUSCULAR; INTRAVENOUS at 17:15

## 2018-09-29 RX ADMIN — CEFTRIAXONE SODIUM 1 G: 1 INJECTION, POWDER, FOR SOLUTION INTRAMUSCULAR; INTRAVENOUS at 11:36

## 2018-09-29 RX ADMIN — Medication 10 ML: at 08:39

## 2018-09-29 ASSESSMENT — PULMONARY FUNCTION TESTS
PIF_VALUE: 16
PIF_VALUE: 19
PIF_VALUE: 22
PIF_VALUE: 19
PIF_VALUE: 20

## 2018-09-29 ASSESSMENT — PAIN SCALES - GENERAL
PAINLEVEL_OUTOF10: 6
PAINLEVEL_OUTOF10: 6
PAINLEVEL_OUTOF10: 0

## 2018-09-29 NOTE — PROGRESS NOTES
[address] [place] [address]      Department of Internal Medicine - Staff Internal Medicine Service      ICU-CRITICAL CARE PROGRESS NOTE      Date and time: 9/29/2018 10:36 AM  Patient's name:  Lenora Adan  Medical Record Number: [id number]  Patient's account/billing number: [de-identified]  Patient's YOB: 1975  Age: 37 y.o. Date of Admission: 9/28/2018  3:44 AM  Length of stay during current admission: 1    Primary Care Physician: No primary care provider on file. ICU Attending Physician:   [x]Dr. Nahid Barrientos  []Dr. Pallavi Flores  []Dr. Tamir Cordero  []Dr. Radha Lozano    Code Status: Full Code  Chief complaint: Altered mental status  Reason for ICU admission: Acute respiratory failure following drug overdose    SUBJECTIVE:     OVERNIGHT EVENTS:      Remain hypotensive and received 500cc overnight  Awake and sedated on Versed/Propofol and fentanyl  Following commands  Moderate to large yellow secretion.   Temp 38 degrees          AWAKE & FOLLOWING COMMANDS:  [] No   [] Yes    CURRENT VENTILATION STATUS:     [x] Ventilator  [] BIPAP  [] Nasal Cannula [] Room Air      IF INTUBATED, ET TUBE MARKING AT LOWER LIP:       cms    SECRETIONS Amount:  [] Small [x] Moderate  [] Large  [] None  Color:     [] White [x] Colored  [] Bloody    SEDATION:  RAAS Score:  [] Propofol gtt  [] Versed gtt [] Ativan gtt    [] fentanyl      PARALYZED:  [] No    [] Yes    DIARRHEA:                [] No                [] Yes  (C. Difficile status: [] positive                                                                                                                       [] negative                                                                                                                     [] pending)    VASOPRESSORS:  [x] No    [] Yes    If yes -   [] Levophed       [] Dopamine     [] Vasopressin       [] Dobutamine  [] Phenylephrine         [] Epinephrine    CENTRAL LINES:     [x] No   [] Yes   (Date of Insertion:   )

## 2018-09-29 NOTE — PROGRESS NOTES
Pt with periods of apnea, pt placed on NIV as charted, sats improved with switch to NIV. Heliox continues inline with vent.

## 2018-09-29 NOTE — CARE COORDINATION
Attempted to met with patient regarding transition plan. Patient is intubated at this time, no family present. Will revisit at a later time.

## 2018-09-30 LAB
ABSOLUTE EOS #: <0.03 K/UL (ref 0–0.44)
ABSOLUTE IMMATURE GRANULOCYTE: 0.06 K/UL (ref 0–0.3)
ABSOLUTE LYMPH #: 1.09 K/UL (ref 1.1–3.7)
ABSOLUTE MONO #: 0.98 K/UL (ref 0.1–1.2)
BASOPHILS # BLD: 0 % (ref 0–2)
BASOPHILS ABSOLUTE: <0.03 K/UL (ref 0–0.2)
DIFFERENTIAL TYPE: ABNORMAL
EOSINOPHILS RELATIVE PERCENT: 0 % (ref 1–4)
GLUCOSE BLD-MCNC: 131 MG/DL (ref 65–105)
GLUCOSE BLD-MCNC: 180 MG/DL (ref 65–105)
HCT VFR BLD CALC: 34.4 % (ref 36.3–47.1)
HEMOGLOBIN: 10.1 G/DL (ref 11.9–15.1)
IMMATURE GRANULOCYTES: 1 %
LYMPHOCYTES # BLD: 9 % (ref 24–43)
MCH RBC QN AUTO: 24.8 PG (ref 25.2–33.5)
MCHC RBC AUTO-ENTMCNC: 29.4 G/DL (ref 28.4–34.8)
MCV RBC AUTO: 84.5 FL (ref 82.6–102.9)
MONOCYTES # BLD: 9 % (ref 3–12)
NRBC AUTOMATED: 0 PER 100 WBC
PDW BLD-RTO: 16.7 % (ref 11.8–14.4)
PLATELET # BLD: 328 K/UL (ref 138–453)
PLATELET ESTIMATE: ABNORMAL
PMV BLD AUTO: 10.1 FL (ref 8.1–13.5)
RBC # BLD: 4.07 M/UL (ref 3.95–5.11)
RBC # BLD: ABNORMAL 10*6/UL
SEG NEUTROPHILS: 81 % (ref 36–65)
SEGMENTED NEUTROPHILS ABSOLUTE COUNT: 9.45 K/UL (ref 1.5–8.1)
WBC # BLD: 11.6 K/UL (ref 3.5–11.3)
WBC # BLD: ABNORMAL 10*3/UL

## 2018-09-30 PROCEDURE — 94640 AIRWAY INHALATION TREATMENT: CPT

## 2018-09-30 PROCEDURE — 6360000002 HC RX W HCPCS: Performed by: INTERNAL MEDICINE

## 2018-09-30 PROCEDURE — 36415 COLL VENOUS BLD VENIPUNCTURE: CPT

## 2018-09-30 PROCEDURE — 6370000000 HC RX 637 (ALT 250 FOR IP): Performed by: STUDENT IN AN ORGANIZED HEALTH CARE EDUCATION/TRAINING PROGRAM

## 2018-09-30 PROCEDURE — 85025 COMPLETE CBC W/AUTO DIFF WBC: CPT

## 2018-09-30 PROCEDURE — 6360000002 HC RX W HCPCS: Performed by: HOSPITALIST

## 2018-09-30 PROCEDURE — 82947 ASSAY GLUCOSE BLOOD QUANT: CPT

## 2018-09-30 PROCEDURE — G0008 ADMIN INFLUENZA VIRUS VAC: HCPCS | Performed by: INTERNAL MEDICINE

## 2018-09-30 PROCEDURE — 2580000003 HC RX 258: Performed by: STUDENT IN AN ORGANIZED HEALTH CARE EDUCATION/TRAINING PROGRAM

## 2018-09-30 PROCEDURE — 90686 IIV4 VACC NO PRSV 0.5 ML IM: CPT | Performed by: INTERNAL MEDICINE

## 2018-09-30 PROCEDURE — 6360000002 HC RX W HCPCS: Performed by: EMERGENCY MEDICINE

## 2018-09-30 PROCEDURE — 6370000000 HC RX 637 (ALT 250 FOR IP): Performed by: EMERGENCY MEDICINE

## 2018-09-30 PROCEDURE — 99233 SBSQ HOSP IP/OBS HIGH 50: CPT | Performed by: INTERNAL MEDICINE

## 2018-09-30 PROCEDURE — 2580000003 HC RX 258: Performed by: EMERGENCY MEDICINE

## 2018-09-30 PROCEDURE — 2580000003 HC RX 258: Performed by: HOSPITALIST

## 2018-09-30 PROCEDURE — 2500000003 HC RX 250 WO HCPCS: Performed by: STUDENT IN AN ORGANIZED HEALTH CARE EDUCATION/TRAINING PROGRAM

## 2018-09-30 PROCEDURE — S0028 INJECTION, FAMOTIDINE, 20 MG: HCPCS | Performed by: STUDENT IN AN ORGANIZED HEALTH CARE EDUCATION/TRAINING PROGRAM

## 2018-09-30 PROCEDURE — 1200000000 HC SEMI PRIVATE

## 2018-09-30 PROCEDURE — 6370000000 HC RX 637 (ALT 250 FOR IP): Performed by: HOSPITALIST

## 2018-09-30 RX ORDER — NICOTINE 21 MG/24HR
1 PATCH, TRANSDERMAL 24 HOURS TRANSDERMAL DAILY
Status: DISCONTINUED | OUTPATIENT
Start: 2018-09-30 | End: 2018-09-30

## 2018-09-30 RX ORDER — MAGNESIUM HYDROXIDE/ALUMINUM HYDROXICE/SIMETHICONE 120; 1200; 1200 MG/30ML; MG/30ML; MG/30ML
30 SUSPENSION ORAL
Status: COMPLETED | OUTPATIENT
Start: 2018-09-30 | End: 2018-09-30

## 2018-09-30 RX ORDER — CEPHALEXIN 500 MG/1
500 CAPSULE ORAL EVERY 12 HOURS SCHEDULED
Status: DISCONTINUED | OUTPATIENT
Start: 2018-10-01 | End: 2018-10-01 | Stop reason: HOSPADM

## 2018-09-30 RX ORDER — NICOTINE 21 MG/24HR
1 PATCH, TRANSDERMAL 24 HOURS TRANSDERMAL DAILY
Status: DISCONTINUED | OUTPATIENT
Start: 2018-09-30 | End: 2018-10-01 | Stop reason: HOSPADM

## 2018-09-30 RX ADMIN — IPRATROPIUM BROMIDE AND ALBUTEROL SULFATE 1 AMPULE: .5; 3 SOLUTION RESPIRATORY (INHALATION) at 09:47

## 2018-09-30 RX ADMIN — DEXAMETHASONE SODIUM PHOSPHATE 4 MG: 4 INJECTION, SOLUTION INTRAMUSCULAR; INTRAVENOUS at 11:03

## 2018-09-30 RX ADMIN — INFLUENZA A VIRUS A/MICHIGAN/45/2015 X-275 (H1N1) ANTIGEN (FORMALDEHYDE INACTIVATED), INFLUENZA A VIRUS A/SINGAPORE/INFIMH-16-0019/2016 IVR-186 (H3N2) ANTIGEN (FORMALDEHYDE INACTIVATED), INFLUENZA B VIRUS B/PHUKET/3073/2013 ANTIGEN (FORMALDEHYDE INACTIVATED), AND INFLUENZA B VIRUS B/MARYLAND/15/2016 BX-69A ANTIGEN (FORMALDEHYDE INACTIVATED) 0.5 ML: 15; 15; 15; 15 INJECTION, SUSPENSION INTRAMUSCULAR at 11:02

## 2018-09-30 RX ADMIN — CEFTRIAXONE SODIUM 1 G: 1 INJECTION, POWDER, FOR SOLUTION INTRAMUSCULAR; INTRAVENOUS at 11:02

## 2018-09-30 RX ADMIN — ONDANSETRON 4 MG: 2 INJECTION INTRAMUSCULAR; INTRAVENOUS at 13:13

## 2018-09-30 RX ADMIN — IPRATROPIUM BROMIDE AND ALBUTEROL SULFATE 1 AMPULE: .5; 3 SOLUTION RESPIRATORY (INHALATION) at 13:07

## 2018-09-30 RX ADMIN — IPRATROPIUM BROMIDE AND ALBUTEROL SULFATE 1 AMPULE: .5; 3 SOLUTION RESPIRATORY (INHALATION) at 16:50

## 2018-09-30 RX ADMIN — DEXAMETHASONE SODIUM PHOSPHATE 4 MG: 4 INJECTION, SOLUTION INTRAMUSCULAR; INTRAVENOUS at 06:32

## 2018-09-30 RX ADMIN — PROCHLORPERAZINE EDISYLATE 5 MG: 5 INJECTION INTRAMUSCULAR; INTRAVENOUS at 19:40

## 2018-09-30 RX ADMIN — Medication 10 ML: at 09:11

## 2018-09-30 RX ADMIN — FENTANYL CITRATE 75 MCG: 50 INJECTION INTRAMUSCULAR; INTRAVENOUS at 19:23

## 2018-09-30 RX ADMIN — FENTANYL CITRATE 75 MCG: 50 INJECTION INTRAMUSCULAR; INTRAVENOUS at 13:41

## 2018-09-30 RX ADMIN — FENTANYL CITRATE 75 MCG: 50 INJECTION INTRAMUSCULAR; INTRAVENOUS at 11:30

## 2018-09-30 RX ADMIN — ALUMINUM HYDROXIDE, MAGNESIUM HYDROXIDE, AND SIMETHICONE 30 ML: 200; 200; 20 SUSPENSION ORAL at 17:39

## 2018-09-30 RX ADMIN — FENTANYL CITRATE 75 MCG: 50 INJECTION INTRAMUSCULAR; INTRAVENOUS at 01:53

## 2018-09-30 RX ADMIN — INSULIN LISPRO 2 UNITS: 100 INJECTION, SOLUTION INTRAVENOUS; SUBCUTANEOUS at 01:48

## 2018-09-30 RX ADMIN — ENOXAPARIN SODIUM 40 MG: 40 INJECTION SUBCUTANEOUS at 09:11

## 2018-09-30 RX ADMIN — ONDANSETRON 4 MG: 2 INJECTION INTRAMUSCULAR; INTRAVENOUS at 19:07

## 2018-09-30 RX ADMIN — ALBUTEROL SULFATE 2.5 MG: 2.5 SOLUTION RESPIRATORY (INHALATION) at 05:38

## 2018-09-30 RX ADMIN — FAMOTIDINE 20 MG: 10 INJECTION INTRAVENOUS at 09:05

## 2018-09-30 RX ADMIN — IPRATROPIUM BROMIDE AND ALBUTEROL SULFATE 1 AMPULE: .5; 3 SOLUTION RESPIRATORY (INHALATION) at 20:36

## 2018-09-30 RX ADMIN — ONDANSETRON 4 MG: 2 INJECTION INTRAMUSCULAR; INTRAVENOUS at 10:57

## 2018-09-30 RX ADMIN — FENTANYL CITRATE 50 MCG: 50 INJECTION INTRAMUSCULAR; INTRAVENOUS at 06:32

## 2018-09-30 RX ADMIN — DEXTROSE AND SODIUM CHLORIDE: 5; 450 INJECTION, SOLUTION INTRAVENOUS at 00:17

## 2018-09-30 ASSESSMENT — PAIN SCALES - GENERAL
PAINLEVEL_OUTOF10: 6
PAINLEVEL_OUTOF10: 0
PAINLEVEL_OUTOF10: 7

## 2018-09-30 NOTE — PROGRESS NOTES
hypoxia and hypercapnia (HCC)    Acute rhinosinusitis  Resolved Problems:    * No resolved hospital problems. *        Plan:  1. Tests / Labs to be followed up:-   [x] No   2.  psychiatry consult for suicidal ideation  3. Suicidal precautions  4.  continue duoneb and BIPAP as needed for respiratory distress   5. Continue keflex 500 mg BID for acute rhino sinusitis. Follow up respiratory culture  6.  lovenox for DVT prophylaxis  7.   PT/OT evaluation        Barb Kaye MD             9/30/2018, 5:41 PM

## 2018-09-30 NOTE — PLAN OF CARE
Problem: Injury - Risk of, Physical Injury:  Goal: Absence of physical injury  Absence of physical injury     Outcome: Ongoing    Goal: Will remain free from falls  Will remain free from falls     Outcome: Ongoing      Problem: Mood - Altered:  Goal: Mood stable  Mood stable    Outcome: Ongoing    Goal: Absence of abusive behavior  Absence of abusive behavior    Outcome: Ongoing    Goal: Verbalizations of feeling emotionally comfortable while being cared for will increase  Verbalizations of feeling emotionally comfortable while being cared for will increase    Outcome: Ongoing      Problem: Sleep Pattern Disturbance:  Goal: Appears well-rested  Appears well-rested    Outcome: Ongoing      Problem: Risk for Impaired Skin Integrity  Goal: Tissue integrity - skin and mucous membranes  Structural intactness and normal physiological function of skin and  mucous membranes.    Outcome: Ongoing      Problem: Falls - Risk of:  Goal: Will remain free from falls  Will remain free from falls     Outcome: Ongoing    Goal: Absence of physical injury  Absence of physical injury     Outcome: Ongoing

## 2018-09-30 NOTE — PLAN OF CARE
Problem: OXYGENATION/RESPIRATORY FUNCTION  Goal: Patient will maintain patent airway  Outcome: Ongoing    Goal: Patient will achieve/maintain normal respiratory rate/effort  Respiratory rate and effort will be within normal limits for the patient   Outcome: Ongoing      Problem: SKIN INTEGRITY  Goal: Skin integrity is maintained or improved  Outcome: Ongoing      Problem: Injury - Risk of, Physical Injury:  Goal: Absence of physical injury  Absence of physical injury     Outcome: Met This Shift    Goal: Will remain free from falls  Will remain free from falls     Outcome: Met This Shift      Problem: Mood - Altered:  Goal: Mood stable  Mood stable    Outcome: Ongoing    Goal: Absence of abusive behavior  Absence of abusive behavior    Outcome: Ongoing    Goal: Verbalizations of feeling emotionally comfortable while being cared for will increase  Verbalizations of feeling emotionally comfortable while being cared for will increase    Outcome: Ongoing      Problem: Risk for Impaired Skin Integrity  Goal: Tissue integrity - skin and mucous membranes  Structural intactness and normal physiological function of skin and  mucous membranes.    Outcome: Ongoing      Problem: Falls - Risk of:  Goal: Will remain free from falls  Will remain free from falls     Outcome: Met This Shift    Goal: Absence of physical injury  Absence of physical injury     Outcome: Met This Shift

## 2018-09-30 NOTE — PLAN OF CARE
Problem: Confusion - Acute:  Goal: Absence of continued neurological deterioration signs and symptoms  Absence of continued neurological deterioration signs and symptoms    Outcome: Met This Shift    Goal: Mental status will be restored to baseline  Mental status will be restored to baseline    Outcome: Met This Shift

## 2018-10-01 VITALS
OXYGEN SATURATION: 94 % | SYSTOLIC BLOOD PRESSURE: 121 MMHG | HEART RATE: 52 BPM | TEMPERATURE: 97.2 F | HEIGHT: 64 IN | BODY MASS INDEX: 21.94 KG/M2 | DIASTOLIC BLOOD PRESSURE: 81 MMHG | WEIGHT: 128.53 LBS | RESPIRATION RATE: 96 BRPM

## 2018-10-01 PROBLEM — J01.90 ACUTE RHINOSINUSITIS: Status: ACTIVE | Noted: 2018-10-01

## 2018-10-01 LAB
ABSOLUTE EOS #: 0 K/UL (ref 0–0.44)
ABSOLUTE IMMATURE GRANULOCYTE: 0.14 K/UL (ref 0–0.3)
ABSOLUTE LYMPH #: 3.13 K/UL (ref 1.1–3.7)
ABSOLUTE MONO #: 1.5 K/UL (ref 0.1–1.2)
ANION GAP SERPL CALCULATED.3IONS-SCNC: 9 MMOL/L (ref 9–17)
BASOPHILS # BLD: 0 % (ref 0–2)
BASOPHILS ABSOLUTE: 0 K/UL (ref 0–0.2)
BUN BLDV-MCNC: 5 MG/DL (ref 6–20)
BUN/CREAT BLD: ABNORMAL (ref 9–20)
CALCIUM SERPL-MCNC: 8.5 MG/DL (ref 8.6–10.4)
CHLORIDE BLD-SCNC: 108 MMOL/L (ref 98–107)
CO2: 25 MMOL/L (ref 20–31)
CREAT SERPL-MCNC: 0.57 MG/DL (ref 0.5–0.9)
DIFFERENTIAL TYPE: ABNORMAL
EOSINOPHILS RELATIVE PERCENT: 0 % (ref 1–4)
GFR AFRICAN AMERICAN: >60 ML/MIN
GFR NON-AFRICAN AMERICAN: >60 ML/MIN
GFR SERPL CREATININE-BSD FRML MDRD: ABNORMAL ML/MIN/{1.73_M2}
GFR SERPL CREATININE-BSD FRML MDRD: ABNORMAL ML/MIN/{1.73_M2}
GLUCOSE BLD-MCNC: 87 MG/DL (ref 70–99)
HCT VFR BLD CALC: 37.7 % (ref 36.3–47.1)
HEMOGLOBIN: 10.6 G/DL (ref 11.9–15.1)
IMMATURE GRANULOCYTES: 1 %
LYMPHOCYTES # BLD: 23 % (ref 24–43)
MAGNESIUM: 2.1 MG/DL (ref 1.6–2.6)
MCH RBC QN AUTO: 24.9 PG (ref 25.2–33.5)
MCHC RBC AUTO-ENTMCNC: 28.1 G/DL (ref 28.4–34.8)
MCV RBC AUTO: 88.7 FL (ref 82.6–102.9)
MONOCYTES # BLD: 11 % (ref 3–12)
MORPHOLOGY: ABNORMAL
NRBC AUTOMATED: 0 PER 100 WBC
PDW BLD-RTO: 16.9 % (ref 11.8–14.4)
PLATELET # BLD: 377 K/UL (ref 138–453)
PLATELET ESTIMATE: ABNORMAL
PMV BLD AUTO: 9.6 FL (ref 8.1–13.5)
POTASSIUM SERPL-SCNC: 3.4 MMOL/L (ref 3.7–5.3)
RBC # BLD: 4.25 M/UL (ref 3.95–5.11)
RBC # BLD: ABNORMAL 10*6/UL
SEG NEUTROPHILS: 65 % (ref 36–65)
SEGMENTED NEUTROPHILS ABSOLUTE COUNT: 8.83 K/UL (ref 1.5–8.1)
SODIUM BLD-SCNC: 142 MMOL/L (ref 135–144)
WBC # BLD: 13.6 K/UL (ref 3.5–11.3)
WBC # BLD: ABNORMAL 10*3/UL

## 2018-10-01 PROCEDURE — G8978 MOBILITY CURRENT STATUS: HCPCS

## 2018-10-01 PROCEDURE — S0028 INJECTION, FAMOTIDINE, 20 MG: HCPCS | Performed by: STUDENT IN AN ORGANIZED HEALTH CARE EDUCATION/TRAINING PROGRAM

## 2018-10-01 PROCEDURE — 83735 ASSAY OF MAGNESIUM: CPT

## 2018-10-01 PROCEDURE — 2580000003 HC RX 258: Performed by: EMERGENCY MEDICINE

## 2018-10-01 PROCEDURE — 97165 OT EVAL LOW COMPLEX 30 MIN: CPT

## 2018-10-01 PROCEDURE — 85025 COMPLETE CBC W/AUTO DIFF WBC: CPT

## 2018-10-01 PROCEDURE — 97530 THERAPEUTIC ACTIVITIES: CPT

## 2018-10-01 PROCEDURE — 6370000000 HC RX 637 (ALT 250 FOR IP): Performed by: STUDENT IN AN ORGANIZED HEALTH CARE EDUCATION/TRAINING PROGRAM

## 2018-10-01 PROCEDURE — 6360000002 HC RX W HCPCS: Performed by: EMERGENCY MEDICINE

## 2018-10-01 PROCEDURE — 97535 SELF CARE MNGMENT TRAINING: CPT

## 2018-10-01 PROCEDURE — 99223 1ST HOSP IP/OBS HIGH 75: CPT | Performed by: INTERNAL MEDICINE

## 2018-10-01 PROCEDURE — 94640 AIRWAY INHALATION TREATMENT: CPT

## 2018-10-01 PROCEDURE — 90792 PSYCH DIAG EVAL W/MED SRVCS: CPT | Performed by: NURSE PRACTITIONER

## 2018-10-01 PROCEDURE — 6370000000 HC RX 637 (ALT 250 FOR IP): Performed by: HOSPITALIST

## 2018-10-01 PROCEDURE — G8988 SELF CARE GOAL STATUS: HCPCS

## 2018-10-01 PROCEDURE — 36415 COLL VENOUS BLD VENIPUNCTURE: CPT

## 2018-10-01 PROCEDURE — G8980 MOBILITY D/C STATUS: HCPCS

## 2018-10-01 PROCEDURE — G8987 SELF CARE CURRENT STATUS: HCPCS

## 2018-10-01 PROCEDURE — 6360000002 HC RX W HCPCS: Performed by: HOSPITALIST

## 2018-10-01 PROCEDURE — 2500000003 HC RX 250 WO HCPCS: Performed by: STUDENT IN AN ORGANIZED HEALTH CARE EDUCATION/TRAINING PROGRAM

## 2018-10-01 PROCEDURE — 6370000000 HC RX 637 (ALT 250 FOR IP): Performed by: NURSE PRACTITIONER

## 2018-10-01 PROCEDURE — 94762 N-INVAS EAR/PLS OXIMTRY CONT: CPT

## 2018-10-01 PROCEDURE — 80048 BASIC METABOLIC PNL TOTAL CA: CPT

## 2018-10-01 PROCEDURE — G8979 MOBILITY GOAL STATUS: HCPCS

## 2018-10-01 PROCEDURE — 97162 PT EVAL MOD COMPLEX 30 MIN: CPT

## 2018-10-01 PROCEDURE — G8989 SELF CARE D/C STATUS: HCPCS

## 2018-10-01 RX ORDER — GABAPENTIN 300 MG/1
300 CAPSULE ORAL 3 TIMES DAILY
Qty: 90 CAPSULE | Refills: 0 | Status: SHIPPED | OUTPATIENT
Start: 2018-10-01 | End: 2018-10-01

## 2018-10-01 RX ORDER — POTASSIUM CHLORIDE 20 MEQ/1
40 TABLET, EXTENDED RELEASE ORAL ONCE
Status: COMPLETED | OUTPATIENT
Start: 2018-10-01 | End: 2018-10-01

## 2018-10-01 RX ORDER — TRAZODONE HYDROCHLORIDE 100 MG/1
100 TABLET ORAL NIGHTLY PRN
Status: DISCONTINUED | OUTPATIENT
Start: 2018-10-01 | End: 2018-10-01 | Stop reason: HOSPADM

## 2018-10-01 RX ORDER — HYDROXYZINE 50 MG/1
50 TABLET, FILM COATED ORAL 3 TIMES DAILY PRN
Qty: 90 TABLET | Refills: 0 | Status: ON HOLD | OUTPATIENT
Start: 2018-10-01 | End: 2018-10-21

## 2018-10-01 RX ORDER — LORAZEPAM 2 MG/ML
2 INJECTION INTRAMUSCULAR
Status: DISCONTINUED | OUTPATIENT
Start: 2018-10-01 | End: 2018-10-01 | Stop reason: HOSPADM

## 2018-10-01 RX ORDER — GABAPENTIN 300 MG/1
300 CAPSULE ORAL 3 TIMES DAILY
Qty: 90 CAPSULE | Refills: 0 | Status: ON HOLD | OUTPATIENT
Start: 2018-10-01 | End: 2019-12-20

## 2018-10-01 RX ORDER — CALCIUM CARBONATE 200(500)MG
500 TABLET,CHEWABLE ORAL 3 TIMES DAILY PRN
Status: DISCONTINUED | OUTPATIENT
Start: 2018-10-01 | End: 2018-10-01 | Stop reason: HOSPADM

## 2018-10-01 RX ORDER — SODIUM CHLORIDE 0.9 % (FLUSH) 0.9 %
10 SYRINGE (ML) INJECTION EVERY 12 HOURS SCHEDULED
Status: DISCONTINUED | OUTPATIENT
Start: 2018-10-01 | End: 2018-10-01 | Stop reason: HOSPADM

## 2018-10-01 RX ORDER — LORAZEPAM 1 MG/1
2 TABLET ORAL
Status: DISCONTINUED | OUTPATIENT
Start: 2018-10-01 | End: 2018-10-01 | Stop reason: HOSPADM

## 2018-10-01 RX ORDER — GABAPENTIN 300 MG/1
300 CAPSULE ORAL 3 TIMES DAILY
Status: DISCONTINUED | OUTPATIENT
Start: 2018-10-01 | End: 2018-10-01 | Stop reason: HOSPADM

## 2018-10-01 RX ORDER — DOCUSATE SODIUM 100 MG/1
100 CAPSULE, LIQUID FILLED ORAL DAILY
Status: DISCONTINUED | OUTPATIENT
Start: 2018-10-01 | End: 2018-10-01 | Stop reason: HOSPADM

## 2018-10-01 RX ORDER — CLONIDINE HYDROCHLORIDE 0.1 MG/1
0.1 TABLET ORAL 2 TIMES DAILY
Status: DISCONTINUED | OUTPATIENT
Start: 2018-10-01 | End: 2018-10-01 | Stop reason: HOSPADM

## 2018-10-01 RX ORDER — VENLAFAXINE HYDROCHLORIDE 150 MG/1
150 CAPSULE, EXTENDED RELEASE ORAL
Status: DISCONTINUED | OUTPATIENT
Start: 2018-10-01 | End: 2018-10-01 | Stop reason: HOSPADM

## 2018-10-01 RX ORDER — SODIUM CHLORIDE 0.9 % (FLUSH) 0.9 %
10 SYRINGE (ML) INJECTION PRN
Status: DISCONTINUED | OUTPATIENT
Start: 2018-10-01 | End: 2018-10-01 | Stop reason: HOSPADM

## 2018-10-01 RX ORDER — FLUTICASONE PROPIONATE 50 MCG
1 SPRAY, SUSPENSION (ML) NASAL DAILY
Qty: 1 BOTTLE | Refills: 0 | Status: SHIPPED | OUTPATIENT
Start: 2018-10-02 | End: 2018-10-01

## 2018-10-01 RX ORDER — CEPHALEXIN 500 MG/1
500 CAPSULE ORAL EVERY 12 HOURS SCHEDULED
Qty: 8 CAPSULE | Refills: 0 | Status: SHIPPED | OUTPATIENT
Start: 2018-10-01 | End: 2018-10-01

## 2018-10-01 RX ORDER — ECHINACEA PURPUREA EXTRACT 125 MG
1 TABLET ORAL PRN
Qty: 1 BOTTLE | Refills: 0 | Status: SHIPPED | OUTPATIENT
Start: 2018-10-01 | End: 2018-10-01

## 2018-10-01 RX ORDER — TRAZODONE HYDROCHLORIDE 50 MG/1
25 TABLET ORAL ONCE
Status: COMPLETED | OUTPATIENT
Start: 2018-10-01 | End: 2018-10-01

## 2018-10-01 RX ORDER — ECHINACEA PURPUREA EXTRACT 125 MG
1 TABLET ORAL PRN
Status: DISCONTINUED | OUTPATIENT
Start: 2018-10-01 | End: 2018-10-01 | Stop reason: HOSPADM

## 2018-10-01 RX ORDER — CALCIUM CARBONATE 200(500)MG
500 TABLET,CHEWABLE ORAL ONCE
Status: COMPLETED | OUTPATIENT
Start: 2018-10-01 | End: 2018-10-01

## 2018-10-01 RX ORDER — ECHINACEA PURPUREA EXTRACT 125 MG
1 TABLET ORAL PRN
Qty: 1 BOTTLE | Refills: 0 | Status: ON HOLD | OUTPATIENT
Start: 2018-10-01 | End: 2019-10-15 | Stop reason: SDUPTHER

## 2018-10-01 RX ORDER — FLUTICASONE PROPIONATE 50 MCG
1 SPRAY, SUSPENSION (ML) NASAL DAILY
Qty: 1 BOTTLE | Refills: 0 | Status: ON HOLD | OUTPATIENT
Start: 2018-10-02 | End: 2019-10-15 | Stop reason: SDUPTHER

## 2018-10-01 RX ORDER — VENLAFAXINE 75 MG/1
75 TABLET ORAL DAILY
Qty: 90 TABLET | Refills: 0 | Status: ON HOLD | OUTPATIENT
Start: 2018-10-01 | End: 2018-10-21

## 2018-10-01 RX ORDER — LORAZEPAM 1 MG/1
4 TABLET ORAL
Status: DISCONTINUED | OUTPATIENT
Start: 2018-10-01 | End: 2018-10-01 | Stop reason: HOSPADM

## 2018-10-01 RX ORDER — FLUTICASONE PROPIONATE 50 MCG
1 SPRAY, SUSPENSION (ML) NASAL DAILY
Status: DISCONTINUED | OUTPATIENT
Start: 2018-10-01 | End: 2018-10-01 | Stop reason: HOSPADM

## 2018-10-01 RX ORDER — QUETIAPINE FUMARATE 200 MG/1
200 TABLET, FILM COATED ORAL NIGHTLY
Status: DISCONTINUED | OUTPATIENT
Start: 2018-10-01 | End: 2018-10-01 | Stop reason: HOSPADM

## 2018-10-01 RX ORDER — LORAZEPAM 1 MG/1
3 TABLET ORAL
Status: DISCONTINUED | OUTPATIENT
Start: 2018-10-01 | End: 2018-10-01 | Stop reason: HOSPADM

## 2018-10-01 RX ORDER — HYDROXYZINE 50 MG/1
50 TABLET, FILM COATED ORAL 3 TIMES DAILY PRN
Qty: 90 TABLET | Refills: 0 | Status: SHIPPED | OUTPATIENT
Start: 2018-10-01 | End: 2018-10-01

## 2018-10-01 RX ORDER — QUETIAPINE FUMARATE 200 MG/1
200 TABLET, FILM COATED ORAL NIGHTLY
Qty: 60 TABLET | Refills: 3 | Status: ON HOLD | OUTPATIENT
Start: 2018-10-01 | End: 2019-06-20 | Stop reason: SDUPTHER

## 2018-10-01 RX ORDER — CEPHALEXIN 500 MG/1
500 CAPSULE ORAL EVERY 12 HOURS SCHEDULED
Qty: 8 CAPSULE | Refills: 0 | Status: SHIPPED | OUTPATIENT
Start: 2018-10-01 | End: 2018-10-05

## 2018-10-01 RX ORDER — ALBUTEROL SULFATE 90 UG/1
2 AEROSOL, METERED RESPIRATORY (INHALATION) EVERY 4 HOURS PRN
Status: DISCONTINUED | OUTPATIENT
Start: 2018-10-01 | End: 2018-10-01 | Stop reason: HOSPADM

## 2018-10-01 RX ORDER — LORAZEPAM 2 MG/ML
1 INJECTION INTRAMUSCULAR
Status: DISCONTINUED | OUTPATIENT
Start: 2018-10-01 | End: 2018-10-01 | Stop reason: HOSPADM

## 2018-10-01 RX ORDER — CITALOPRAM 20 MG/1
20 TABLET ORAL DAILY
Status: DISCONTINUED | OUTPATIENT
Start: 2018-10-01 | End: 2018-10-01

## 2018-10-01 RX ORDER — HYDROXYZINE 50 MG/1
50 TABLET, FILM COATED ORAL 3 TIMES DAILY PRN
Status: DISCONTINUED | OUTPATIENT
Start: 2018-10-01 | End: 2018-10-01 | Stop reason: HOSPADM

## 2018-10-01 RX ORDER — LORAZEPAM 1 MG/1
1 TABLET ORAL
Status: DISCONTINUED | OUTPATIENT
Start: 2018-10-01 | End: 2018-10-01 | Stop reason: HOSPADM

## 2018-10-01 RX ORDER — ACETAMINOPHEN 325 MG/1
650 TABLET ORAL EVERY 4 HOURS PRN
Status: DISCONTINUED | OUTPATIENT
Start: 2018-10-01 | End: 2018-10-01 | Stop reason: HOSPADM

## 2018-10-01 RX ORDER — LORAZEPAM 2 MG/ML
4 INJECTION INTRAMUSCULAR
Status: DISCONTINUED | OUTPATIENT
Start: 2018-10-01 | End: 2018-10-01 | Stop reason: HOSPADM

## 2018-10-01 RX ORDER — LORAZEPAM 2 MG/ML
3 INJECTION INTRAMUSCULAR
Status: DISCONTINUED | OUTPATIENT
Start: 2018-10-01 | End: 2018-10-01 | Stop reason: HOSPADM

## 2018-10-01 RX ORDER — VENLAFAXINE 75 MG/1
75 TABLET ORAL DAILY
Qty: 90 TABLET | Refills: 0 | Status: SHIPPED | OUTPATIENT
Start: 2018-10-01 | End: 2018-10-01

## 2018-10-01 RX ADMIN — ANTACID TABLETS 500 MG: 500 TABLET, CHEWABLE ORAL at 02:46

## 2018-10-01 RX ADMIN — FENTANYL CITRATE 50 MCG: 50 INJECTION INTRAMUSCULAR; INTRAVENOUS at 01:58

## 2018-10-01 RX ADMIN — GABAPENTIN 300 MG: 300 CAPSULE ORAL at 13:48

## 2018-10-01 RX ADMIN — TRAZODONE HYDROCHLORIDE 25 MG: 50 TABLET ORAL at 02:16

## 2018-10-01 RX ADMIN — Medication 10 ML: at 09:01

## 2018-10-01 RX ADMIN — CEPHALEXIN 500 MG: 500 CAPSULE ORAL at 09:00

## 2018-10-01 RX ADMIN — CITALOPRAM 20 MG: 20 TABLET, FILM COATED ORAL at 09:00

## 2018-10-01 RX ADMIN — FLUTICASONE PROPIONATE 1 SPRAY: 50 SPRAY, METERED NASAL at 08:56

## 2018-10-01 RX ADMIN — FENTANYL CITRATE 50 MCG: 50 INJECTION INTRAMUSCULAR; INTRAVENOUS at 05:30

## 2018-10-01 RX ADMIN — ACETAMINOPHEN 650 MG: 325 TABLET ORAL at 08:09

## 2018-10-01 RX ADMIN — DOCUSATE SODIUM 100 MG: 100 CAPSULE, LIQUID FILLED ORAL at 09:00

## 2018-10-01 RX ADMIN — GABAPENTIN 300 MG: 300 CAPSULE ORAL at 11:00

## 2018-10-01 RX ADMIN — IPRATROPIUM BROMIDE AND ALBUTEROL SULFATE 1 AMPULE: .5; 3 SOLUTION RESPIRATORY (INHALATION) at 13:13

## 2018-10-01 RX ADMIN — FAMOTIDINE 20 MG: 10 INJECTION INTRAVENOUS at 08:59

## 2018-10-01 RX ADMIN — VENLAFAXINE HYDROCHLORIDE 150 MG: 150 CAPSULE, EXTENDED RELEASE ORAL at 11:00

## 2018-10-01 RX ADMIN — CLONIDINE HYDROCHLORIDE 0.1 MG: 0.1 TABLET ORAL at 11:06

## 2018-10-01 RX ADMIN — ENOXAPARIN SODIUM 40 MG: 40 INJECTION SUBCUTANEOUS at 09:00

## 2018-10-01 RX ADMIN — POTASSIUM CHLORIDE 40 MEQ: 20 TABLET, EXTENDED RELEASE ORAL at 09:12

## 2018-10-01 ASSESSMENT — PAIN SCALES - GENERAL
PAINLEVEL_OUTOF10: 7
PAINLEVEL_OUTOF10: 3
PAINLEVEL_OUTOF10: 0
PAINLEVEL_OUTOF10: 7
PAINLEVEL_OUTOF10: 8

## 2018-10-01 ASSESSMENT — PAIN DESCRIPTION - PAIN TYPE: TYPE: ACUTE PAIN

## 2018-10-01 ASSESSMENT — PAIN DESCRIPTION - LOCATION: LOCATION: HEAD

## 2018-10-01 NOTE — PROGRESS NOTES
Smoking Cessation - topics covered   []  Health Risks  []  Benefits of Quitting   []  Smoking Cessation  []  Patient has no history of tobacco use  []  Patient is former smoker. []  No need for tobacco cessation education. []  Booklet given  []  Patient verbalizes understanding. []  Patient denies need for tobacco cessation education. Admitting diagnosis precludes optimal reception of tobacco cessation education. Will defer at this time.   Sherri Benites  7:46 AM

## 2018-10-01 NOTE — PROGRESS NOTES
MOBILIZE SECRETIONS    [x]   ASSESS BREATH SOUNDS  [x]   ASSESS SPUTUM PRODUCTION  [x]   COUGH AND DEEP BREATHING  [x]  IMPLEMENT SECRETION MANAGEMENT PROTOCOL  [x]   PATIENT EDUCATION AS NEEDED    PATIENT REFUSES TO WEAR BIPAP     [x] Risks and benefits explained to patient   [x] Patient refuses to wear Bipap stating \"I'm not sleeping with that thing on my face. \"  [x] Patient verbalizes understanding of information presented.

## 2018-10-01 NOTE — PROGRESS NOTES
IV removed without complication. Pressure and dressing applied. Discharge instructions read and discussed with patient. Patient is aware of appointment at [facility] Monday morning. She also has information regarding prescription changes. She will call [facility] for prescription delivery. Patient is awaiting cab to take her to her private residence. She will follow up with [facility] and with Internal medicine regarding sinus infection. Patient counseled to take all of her Keflex.

## 2018-10-01 NOTE — PROGRESS NOTES
assistance  Rolling to Right: Stand by assistance  Supine to Sit: Stand by assistance  Sit to Supine: Stand by assistance  Scooting: Stand by assistance  Transfers  Sit to Stand: Stand by assistance  Stand to sit: Stand by assistance  Ambulation  Ambulation?: Yes  Ambulation 1  Surface: level tile  Device: No Device  Assistance: Stand by assistance  Distance: amb 350 ft without a device x SBA   Ascend/descend 10 steps with SBA  Balance  Posture: Good  Sitting - Static: Good  Sitting - Dynamic: Good  Standing - Static: Good  Standing - Dynamic: Good        Assessment   Assessment: The pt ambulated well without a device and has no PT needs at this time  Prognosis: Good  Decision Making: Medium Complexity  Patient Education: PT  POC, goals  REQUIRES PT FOLLOW UP: No  Activity Tolerance  Activity Tolerance: Patient Tolerated treatment well         Plan        G-Code  PT G-Codes  Functional Assessment Tool Used: [facility]-PAC  Score: 24  Functional Limitation: Mobility: Walking and moving around  Mobility: Walking and Moving Around Current Status (): 0 percent impaired, limited or restricted  Mobility: Walking and Moving Around Goal Status (): 0 percent impaired, limited or restricted  Mobility: Walking and Moving Around Discharge Status (): 0 percent impaired, limited or restricted  OutComes Score                                           AM-PAC Score  AM-PAC Inpatient Mobility Raw Score : 24  AM-PAC Inpatient T-Scale Score : 61.14  Mobility Inpatient CMS 0-100% Score: 0  Mobility Inpatient CMS G-Code Modifier : CH          Goals  Short term goals  Time Frame for Short term goals: No PT needs at this time    DC PT       Therapy Time   Individual Concurrent Group Co-treatment   Time In 0950         Time Out 1013         Minutes Thanh [address] Gurmeet, PT

## 2018-10-01 NOTE — PROGRESS NOTES
Shower/Tub: Tub/Shower unit, Curtain  Bathroom Toilet: Standard  Bathroom Equipment: Built-in shower seat, Grab bars in shower  Bathroom Accessibility: Accessible  Home Equipment:  (no medical equipment)  Receives Help From: Friend(s), Family (pt reports having a supportive sister and girlfriend)  ADL Assistance: Independent  Homemaking Assistance: Independent  Homemaking Responsibilities: Yes (all home management shared with girlfriend)  Meal Prep Responsibility: Secondary  Laundry Responsibility: Secondary  Cleaning Responsibility: Secondary  Shopping Responsibility: Secondary  Ambulation Assistance: Independent  Transfer Assistance: Independent  Active : Yes  Mode of Transportation: Car  Occupation: On disability  Type of occupation: [address]: Likes to go to [employer], watch [employer]-loves [employer] and [employer]     Objective   Vision: Impaired  Vision Exceptions: Wears glasses for reading  Hearing: Within functional limits    Orientation  Overall Orientation Status: Within Functional Limits     Balance  Sitting Balance: Independent (pt sat at eob with good unsupported sitting balance ~ 25 min)  Standing Balance: Independent (pt completed static standing 3 min and 3 min dynamic standing I)  Standing Balance  Sit to stand: Stand by assistance  Stand to sit: Stand by assistance  Functional Mobility  Functional - Mobility Device: No device  Activity: To/from bathroom  Assist Level:  Independent  Toilet Transfers  Toilet - Technique: Ambulating  Equipment Used: Standard toilet  Toilet Transfer: Stand by assistance  ADL  Feeding: Independent  Grooming: Independent  UE Bathing: Independent  LE Bathing: Independent  UE Dressing: Independent  LE Dressing: Independent  Toileting: Independent  Tone RUE  RUE Tone: Normotonic  Tone LUE  LUE Tone: Normotonic  Coordination  Movements Are Fluid And Coordinated: Yes     Bed mobility  Rolling to Left: Independent  Rolling to Right: devices: Call light within reach; Left in bed (Guard present upon exit)         Plan   Plan  Times per week: OT Eval and Treat only    G-Code  OT G-codes  Functional Assessment Tool Used: [facility]  Score: 24/24  Functional Limitation: Self care  Self Care Current Status (): 0 percent impaired, limited or restricted  Self Care Goal Status (): 0 percent impaired, limited or restricted  Self Care Discharge Status (): 0 percent impaired, limited or restricted          AM-PAC Score      How much help from another person does the pt currently need? Unable A Lot A Little None   1. Putting on and taking off regular lower body clothing? 1      2      3       4   2. Bathing (including washing, rinsing, drying)? 1      2      3      4   3. Toileting, which includes using toilet, bedpan, or urinal?      1      2        3      4   4. Putting on and taking off regular upper body clothing? 1      2      3       4   5. Taking care of personal grooming such as brushing teeth? 1      2      3      4   6. Eating meals? 1      2       3       4     1. Unable = Total/Dependent Assist  2. A Lot = Maximum/Moderate Assist  3. A Little = Minimum/Contact Guard Assist/Supervision  4.  None= Modified [place]/Independent    Raw Score Scale Score Scale Score Standard Error Approximate Degree of Functional Impairment     6 17.07 3.74 100%   7 20.13 3.68 92%   8 22.86 3.43 86%   9 25.33 3.17 80%   10 27.31 2.96 75%   11 29.04 2.79 70%   12 30.60 2.68 67%   13 32.03 2.62 63%   14 33.39 2.61 60%   15 34.69 2.65 56%   16 35.96 2.71 53%   17 37.26 2.82 50%   18 38.66 2.97 47%   19 40.22 3.20 43%   20 42.03 3.55 38%   21 44.27 4.08 33%   22 47.10 4.81 26%   23 51.12 5.88 16%   24 57.54 7.36 0%        Therapy Time   Individual Concurrent Group Co-treatment   Time In 1015         Time Out 1051         Minutes 36              Discharge recommendations discussed with patient during initial

## 2018-10-01 NOTE — DISCHARGE SUMMARY
Maria Luisa    Note that over 30 minutes was spent in preparing discharge papers, discussing discharge with patient, medication review, etc.    Ebony Gonzalez MD PGY 3  Internal Medicine Resident   [facility]
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
## 2018-10-01 NOTE — PROGRESS NOTES
hours. Pancreatic functions:No results for input(s): LACTA, AMYLASE in the last 72 hours. S. Lactic Acid: No results for input(s): LACTA in the last 72 hours. Cardiac enzymes:  Recent Labs      09/28/18   1114   CKMB  4.7     BNP:No results for input(s): BNP in the last 72 hours. Lipid profile: No results for input(s): CHOL, TRIG, HDL, LDLCALC in the last 72 hours. Invalid input(s): LDL  Blood Gases: No results found for: PH, PCO2, PO2, HCO3, O2SAT  Thyroid functions: No results found for: TSH     Imaging/Diagonstics:    ASSESSMENT     Active Problems:    Toxic metabolic encephalopathy    Drug overdose, accidental or unintentional, initial encounter    Acute respiratory failure with hypoxia and hypercapnia (HCC)    Acute rhinosinusitis  Resolved Problems:    * No resolved hospital problems. *        PLAN      1. Toxic metabolic encephalopathy 2/2 drug overdose resolved - mentation at baseline. Monitor for opiate withdrawal. Fentanyl 50-75 mcg every 2 hrs as needed. Required 2 doses of 50 mcg overnight  2. Acute respiratory failure resolved, extubated 9/29/2018 - saturating well on room air, use BIPAP if needed  3. Acute rhino sinusitis - continue keflex 500 mg BID, flonase, nasal saline, tylenol as needed  4. Suicidal ideation - Follow up psychiatry recommendations. Continue suicide precautions  5. Asthma - continue duoneb and albuterol as needed  6. lovenox for DVT prophylaxis  7.  PT/OT evaluation      Jostin Brown MD  PGY-2, Internal medicine resident  [place], [place]

## 2018-10-01 NOTE — PLAN OF CARE
Problem: Injury - Risk of, Physical Injury:  Goal: Absence of physical injury  Absence of physical injury     Outcome: Met This Shift  Patient is free from falls this shift. Fall risk assessment performed. Bed is in lowest position. Call light and telephone are within reach. Nonskid socks are applied. Call light used appropriately. Toilet offered ahead of need. Bed alarm applied if applicable. Guard provided at bedside. Suicide precautions. Goal: Will remain free from falls  Will remain free from falls     Outcome: Met This Shift  Patient is free from falls this shift. Fall risk assessment performed. Bed is in lowest position. Call light and telephone are within reach. Nonskid socks are applied. Call light used appropriately. Toilet offered ahead of need. Bed alarm applied if applicable. Problem: Mood - Altered:  Goal: Mood stable  Mood stable    Outcome: Met This Shift    Goal: Absence of abusive behavior  Absence of abusive behavior    Outcome: Met This Shift    Goal: Verbalizations of feeling emotionally comfortable while being cared for will increase  Verbalizations of feeling emotionally comfortable while being cared for will increase    Outcome: Met This Shift      Problem: Psychomotor Activity - Altered:  Goal: Absence of psychomotor disturbance signs and symptoms  Absence of psychomotor disturbance signs and symptoms    Outcome: Met This Shift      Problem: Risk for Impaired Skin Integrity  Goal: Tissue integrity - skin and mucous membranes  Structural intactness and normal physiological function of skin and  mucous membranes. Outcome: Met This Shift  Skin assessment performed. Patient turned every 2 hours and/or encouraged to turn and move. Patient's brief changed as needed if applicable. Skin protection applied as needed. Problem: Falls - Risk of:  Goal: Will remain free from falls  Will remain free from falls     Outcome: Met This Shift  Patient is free from falls this shift.  Fall risk

## 2018-10-02 LAB
CULTURE: ABNORMAL
DIRECT EXAM: ABNORMAL
Lab: ABNORMAL
SPECIMEN DESCRIPTION: ABNORMAL
STATUS: ABNORMAL

## 2018-10-13 ENCOUNTER — APPOINTMENT (OUTPATIENT)
Dept: GENERAL RADIOLOGY | Age: 43
End: 2018-10-13
Payer: MEDICARE

## 2018-10-13 ENCOUNTER — HOSPITAL ENCOUNTER (EMERGENCY)
Age: 43
Discharge: HOME | End: 2018-10-13
Attending: EMERGENCY MEDICINE
Payer: MEDICARE

## 2018-10-13 VITALS
TEMPERATURE: 97.9 F | OXYGEN SATURATION: 100 % | WEIGHT: 130 LBS | BODY MASS INDEX: 25.52 KG/M2 | HEART RATE: 74 BPM | HEIGHT: 60 IN | RESPIRATION RATE: 16 BRPM | DIASTOLIC BLOOD PRESSURE: 80 MMHG | SYSTOLIC BLOOD PRESSURE: 116 MMHG

## 2018-10-13 DIAGNOSIS — S91.012A LACERATION OF LEFT ANKLE, INITIAL ENCOUNTER: Primary | ICD-10-CM

## 2018-10-13 PROCEDURE — 73610 X-RAY EXAM OF ANKLE: CPT

## 2018-10-13 PROCEDURE — 99284 EMERGENCY DEPT VISIT MOD MDM: CPT

## 2018-10-13 PROCEDURE — 12001 RPR S/N/AX/GEN/TRNK 2.5CM/<: CPT

## 2018-10-13 PROCEDURE — 2500000003 HC RX 250 WO HCPCS: Performed by: PHYSICIAN ASSISTANT

## 2018-10-13 RX ORDER — LIDOCAINE HYDROCHLORIDE 10 MG/ML
20 INJECTION, SOLUTION EPIDURAL; INFILTRATION; INTRACAUDAL; PERINEURAL ONCE
Status: COMPLETED | OUTPATIENT
Start: 2018-10-13 | End: 2018-10-13

## 2018-10-13 RX ORDER — LIDOCAINE/RACEPINEP/TETRACAINE 4-0.05-0.5
SOLUTION WITH PREFILLED APPLICATOR (ML) TOPICAL ONCE
Status: COMPLETED | OUTPATIENT
Start: 2018-10-13 | End: 2018-10-13

## 2018-10-13 RX ORDER — LIDOCAINE HYDROCHLORIDE 10 MG/ML
20 INJECTION, SOLUTION INFILTRATION; PERINEURAL ONCE
Status: DISCONTINUED | OUTPATIENT
Start: 2018-10-13 | End: 2018-10-13 | Stop reason: CLARIF

## 2018-10-13 RX ADMIN — LIDOCAINE HYDROCHLORIDE 20 ML: 10 INJECTION, SOLUTION EPIDURAL; INFILTRATION; INTRACAUDAL; PERINEURAL at 13:30

## 2018-10-13 RX ADMIN — Medication: at 12:20

## 2018-10-13 ASSESSMENT — PAIN DESCRIPTION - LOCATION: LOCATION: ANKLE

## 2018-10-13 ASSESSMENT — PAIN SCALES - GENERAL: PAINLEVEL_OUTOF10: 8

## 2018-10-13 ASSESSMENT — PAIN DESCRIPTION - PAIN TYPE: TYPE: ACUTE PAIN

## 2018-10-13 NOTE — ED PROVIDER NOTES
reconstruction, and/or weight based adjustment of the mA/kV was utilized to reduce the radiation dose to as low as reasonably achievable. COMPARISON: None. HISTORY: ORDERING SYSTEM PROVIDED HISTORY: Altered mental status TECHNOLOGIST PROVIDED HISTORY: FINDINGS: CT BRAIN: BRAIN/VENTRICLES: There is no acute intracranial hemorrhage, mass effect or midline shift. No abnormal extra-axial fluid collection. The gray-white differentiation is maintained without evidence of an acute infarct. There is no evidence of hydrocephalus. ORBITS: The visualized portion of the orbits demonstrate no acute abnormality. SINUSES: Mild paranasal sinus mucosal thickening. SOFT TISSUES/SKULL:  No acute abnormality of the visualized skull or soft tissues. CERVICAL SPINE: There is preservation of the normal cervical lordosis. Vertebral bodies maintain normal height. No evidence of acute osseous fracture. No prevertebral soft tissue swelling. Mild cervical spondylosis. No acute intracranial abnormality. No acute osseous fracture of the cervical spine. Ct Cervical Spine Wo Contrast    Result Date: 9/28/2018  EXAMINATION: CT OF THE HEAD WITHOUT CONTRAST; CT OF THE CERVICAL SPINE WITHOUT CONTRAST 9/28/2018 4:58 am TECHNIQUE: CT of the head was performed without the administration of intravenous contrast. Dose modulation, iterative reconstruction, and/or weight based adjustment of the mA/kV was utilized to reduce the radiation dose to as low as reasonably achievable.; CT of the cervical spine was performed without the administration of intravenous contrast. Multiplanar reformatted images are provided for review. Dose modulation, iterative reconstruction, and/or weight based adjustment of the mA/kV was utilized to reduce the radiation dose to as low as reasonably achievable. COMPARISON: None.  HISTORY: ORDERING SYSTEM PROVIDED HISTORY: Altered mental status TECHNOLOGIST PROVIDED HISTORY: FINDINGS: CT BRAIN: BRAIN/VENTRICLES: There is no acute intracranial hemorrhage, mass effect or midline shift. No abnormal extra-axial fluid collection. The gray-white differentiation is maintained without evidence of an acute infarct. There is no evidence of hydrocephalus. ORBITS: The visualized portion of the orbits demonstrate no acute abnormality. SINUSES: Mild paranasal sinus mucosal thickening. SOFT TISSUES/SKULL:  No acute abnormality of the visualized skull or soft tissues. CERVICAL SPINE: There is preservation of the normal cervical lordosis. Vertebral bodies maintain normal height. No evidence of acute osseous fracture. No prevertebral soft tissue swelling. Mild cervical spondylosis. No acute intracranial abnormality. No acute osseous fracture of the cervical spine. Xr Chest Portable    Result Date: 9/28/2018  EXAMINATION: SINGLE XRAY VIEW OF THE CHEST 9/28/2018 10:57 am COMPARISON: Chest x-ray on 09/28/2018 HISTORY: ORDERING SYSTEM PROVIDED HISTORY: fluid overload TECHNOLOGIST PROVIDED HISTORY: fluid overload FINDINGS: The endotracheal tube measures 11 mm above the josie. The enteric tube is coiled in the gastric fundus. Cardiac mediastinal silhouettes appear similar. Central vascular congestion. Left basilar atelectasis. No pneumothorax is identified. Osseous structures appear similar. Dextroscoliotic deformity in the lower thoracic spine. Several wires overlie the patient's chest.     Central vascular congestion, without overt interstitial pulmonary edema. Endotracheal tube measures 11 mm above the josie. Enteric tube is coiled in the gastric fundus. Xr Chest Portable    Result Date: 9/28/2018  EXAMINATION: SINGLE XRAY VIEW OF THE CHEST 9/28/2018 4:01 am COMPARISON: None. HISTORY: ORDERING SYSTEM PROVIDED HISTORY: AMS TECHNOLOGIST PROVIDED HISTORY: AMS FINDINGS: Endotracheal tube terminates 5 mm above the josie. No pneumothorax. Subtle elevation left hemidiaphragm.   The cardiomediastinal silhouette is within normal

## 2018-10-13 NOTE — ED PROVIDER NOTES
[address] ED  eMERGENCY dEPARTMENT eNCOUnter   Independent Attestation     Pt Name: Lilian Dong  MRN: [id number]  [place] 1975  Date of evaluation: 10/13/18     Lilian Dong is a 37 y.o. female with CC: Ankle Pain      Based on the medical record the care appears appropriate. I was personally available for consultation in the Emergency Department.     Florentin Orozco MD  Attending Emergency Physician                    Florentin Orozco MD  10/13/18 [id number]

## 2018-10-20 ENCOUNTER — HOSPITAL ENCOUNTER (INPATIENT)
Age: 43
LOS: 1 days | Discharge: LEFT BEFORE DISCHARGE | DRG: 894 | End: 2018-10-21
Attending: EMERGENCY MEDICINE | Admitting: INTERNAL MEDICINE
Payer: MEDICARE

## 2018-10-20 DIAGNOSIS — K70.10 ALCOHOLIC HEPATITIS WITHOUT ASCITES: ICD-10-CM

## 2018-10-20 DIAGNOSIS — R19.5 GUAIAC POSITIVE STOOLS: Primary | ICD-10-CM

## 2018-10-20 PROBLEM — F10.930 ALCOHOL WITHDRAWAL, UNCOMPLICATED (HCC): Status: ACTIVE | Noted: 2018-10-20

## 2018-10-20 PROBLEM — E87.29 ALCOHOLIC KETOACIDOSIS: Status: ACTIVE | Noted: 2018-10-20

## 2018-10-20 LAB
-: ABNORMAL
ABSOLUTE EOS #: 0.04 K/UL (ref 0–0.44)
ABSOLUTE IMMATURE GRANULOCYTE: <0.03 K/UL (ref 0–0.3)
ABSOLUTE LYMPH #: 2.63 K/UL (ref 1.1–3.7)
ABSOLUTE MONO #: 0.49 K/UL (ref 0.1–1.2)
ACETAMINOPHEN LEVEL: <5 UG/ML (ref 10–30)
ALBUMIN SERPL-MCNC: 4 G/DL (ref 3.5–5.2)
ALBUMIN/GLOBULIN RATIO: 0.9 (ref 1–2.5)
ALP BLD-CCNC: 144 U/L (ref 35–104)
ALT SERPL-CCNC: 122 U/L (ref 5–33)
AMORPHOUS: ABNORMAL
AMPHETAMINE SCREEN URINE: NEGATIVE
ANION GAP SERPL CALCULATED.3IONS-SCNC: 20 MMOL/L (ref 9–17)
AST SERPL-CCNC: 153 U/L
BACTERIA: ABNORMAL
BARBITURATE SCREEN URINE: NEGATIVE
BASOPHILS # BLD: 1 % (ref 0–2)
BASOPHILS ABSOLUTE: 0.03 K/UL (ref 0–0.2)
BENZODIAZEPINE SCREEN, URINE: NEGATIVE
BILIRUB SERPL-MCNC: 0.35 MG/DL (ref 0.3–1.2)
BILIRUBIN DIRECT: 0.15 MG/DL
BILIRUBIN URINE: NEGATIVE
BILIRUBIN, INDIRECT: 0.2 MG/DL (ref 0–1)
BUN BLDV-MCNC: 5 MG/DL (ref 6–20)
BUN/CREAT BLD: ABNORMAL (ref 9–20)
BUPRENORPHINE URINE: NORMAL
CALCIUM SERPL-MCNC: 8.8 MG/DL (ref 8.6–10.4)
CANNABINOID SCREEN URINE: NEGATIVE
CASTS UA: ABNORMAL /LPF (ref 0–8)
CHLORIDE BLD-SCNC: 101 MMOL/L (ref 98–107)
CO2: 21 MMOL/L (ref 20–31)
COCAINE METABOLITE, URINE: NEGATIVE
COLOR: YELLOW
COMMENT UA: ABNORMAL
CREAT SERPL-MCNC: 0.52 MG/DL (ref 0.5–0.9)
CRYSTALS, UA: ABNORMAL /HPF
DIFFERENTIAL TYPE: ABNORMAL
EOSINOPHILS RELATIVE PERCENT: 1 % (ref 1–4)
EPITHELIAL CELLS UA: ABNORMAL /HPF (ref 0–5)
ETHANOL PERCENT: 0.2 %
ETHANOL: 198 MG/DL
GFR AFRICAN AMERICAN: >60 ML/MIN
GFR NON-AFRICAN AMERICAN: >60 ML/MIN
GFR SERPL CREATININE-BSD FRML MDRD: ABNORMAL ML/MIN/{1.73_M2}
GFR SERPL CREATININE-BSD FRML MDRD: ABNORMAL ML/MIN/{1.73_M2}
GLOBULIN: ABNORMAL G/DL (ref 1.5–3.8)
GLUCOSE BLD-MCNC: 92 MG/DL (ref 70–99)
GLUCOSE URINE: NEGATIVE
HCT VFR BLD CALC: 40.4 % (ref 36.3–47.1)
HEMOGLOBIN: 12.5 G/DL (ref 11.9–15.1)
IMMATURE GRANULOCYTES: 0 %
INR BLD: 0.9
KETONES, URINE: ABNORMAL
LEUKOCYTE ESTERASE, URINE: NEGATIVE
LIPASE: 39 U/L (ref 13–60)
LYMPHOCYTES # BLD: 45 % (ref 24–43)
MCH RBC QN AUTO: 24.3 PG (ref 25.2–33.5)
MCHC RBC AUTO-ENTMCNC: 30.9 G/DL (ref 28.4–34.8)
MCV RBC AUTO: 78.6 FL (ref 82.6–102.9)
MDMA URINE: NORMAL
METHADONE SCREEN, URINE: NEGATIVE
METHAMPHETAMINE, URINE: NORMAL
MONOCYTES # BLD: 8 % (ref 3–12)
MUCUS: ABNORMAL
NITRITE, URINE: NEGATIVE
NRBC AUTOMATED: 0 PER 100 WBC
OPIATES, URINE: NEGATIVE
OTHER OBSERVATIONS UA: ABNORMAL
OXYCODONE SCREEN URINE: NEGATIVE
PARTIAL THROMBOPLASTIN TIME: 26 SEC (ref 20.5–30.5)
PDW BLD-RTO: 17.3 % (ref 11.8–14.4)
PH UA: 6 (ref 5–8)
PHENCYCLIDINE, URINE: NEGATIVE
PHOSPHORUS: 3.1 MG/DL (ref 2.6–4.5)
PLATELET # BLD: 225 K/UL (ref 138–453)
PLATELET ESTIMATE: ABNORMAL
PMV BLD AUTO: 9.9 FL (ref 8.1–13.5)
POTASSIUM SERPL-SCNC: 3.7 MMOL/L (ref 3.7–5.3)
PROPOXYPHENE, URINE: NORMAL
PROTEIN UA: NEGATIVE
PROTHROMBIN TIME: 9.8 SEC (ref 9–12)
RBC # BLD: 5.14 M/UL (ref 3.95–5.11)
RBC # BLD: ABNORMAL 10*6/UL
RBC UA: ABNORMAL /HPF (ref 0–4)
RENAL EPITHELIAL, UA: ABNORMAL /HPF
SALICYLATE LEVEL: <1 MG/DL (ref 3–10)
SEG NEUTROPHILS: 45 % (ref 36–65)
SEGMENTED NEUTROPHILS ABSOLUTE COUNT: 2.62 K/UL (ref 1.5–8.1)
SODIUM BLD-SCNC: 142 MMOL/L (ref 135–144)
SPECIFIC GRAVITY UA: 1.01 (ref 1–1.03)
T4 TOTAL: 6.8 UG/DL (ref 4.5–12)
TEST INFORMATION: NORMAL
TOTAL PROTEIN: 8.3 G/DL (ref 6.4–8.3)
TOXIC TRICYCLIC SC,BLOOD: NEGATIVE
TRICHOMONAS: ABNORMAL
TRICYCLIC ANTIDEPRESSANTS, UR: NORMAL
TSH SERPL DL<=0.05 MIU/L-ACNC: 1.09 MIU/L (ref 0.3–5)
TURBIDITY: CLEAR
URINE HGB: ABNORMAL
UROBILINOGEN, URINE: NORMAL
WBC # BLD: 5.8 K/UL (ref 3.5–11.3)
WBC # BLD: ABNORMAL 10*3/UL
WBC UA: ABNORMAL /HPF (ref 0–5)
YEAST: ABNORMAL

## 2018-10-20 PROCEDURE — 96361 HYDRATE IV INFUSION ADD-ON: CPT

## 2018-10-20 PROCEDURE — 84100 ASSAY OF PHOSPHORUS: CPT

## 2018-10-20 PROCEDURE — 83550 IRON BINDING TEST: CPT

## 2018-10-20 PROCEDURE — 96375 TX/PRO/DX INJ NEW DRUG ADDON: CPT

## 2018-10-20 PROCEDURE — 6360000002 HC RX W HCPCS: Performed by: STUDENT IN AN ORGANIZED HEALTH CARE EDUCATION/TRAINING PROGRAM

## 2018-10-20 PROCEDURE — 80076 HEPATIC FUNCTION PANEL: CPT

## 2018-10-20 PROCEDURE — HZ2ZZZZ DETOXIFICATION SERVICES FOR SUBSTANCE ABUSE TREATMENT: ICD-10-PCS | Performed by: INTERNAL MEDICINE

## 2018-10-20 PROCEDURE — 99285 EMERGENCY DEPT VISIT HI MDM: CPT

## 2018-10-20 PROCEDURE — 2060000000 HC ICU INTERMEDIATE R&B

## 2018-10-20 PROCEDURE — 80307 DRUG TEST PRSMV CHEM ANLYZR: CPT

## 2018-10-20 PROCEDURE — 84443 ASSAY THYROID STIM HORMONE: CPT

## 2018-10-20 PROCEDURE — G0480 DRUG TEST DEF 1-7 CLASSES: HCPCS

## 2018-10-20 PROCEDURE — 85610 PROTHROMBIN TIME: CPT

## 2018-10-20 PROCEDURE — 82746 ASSAY OF FOLIC ACID SERUM: CPT

## 2018-10-20 PROCEDURE — 87088 URINE BACTERIA CULTURE: CPT

## 2018-10-20 PROCEDURE — 85025 COMPLETE CBC W/AUTO DIFF WBC: CPT

## 2018-10-20 PROCEDURE — 96374 THER/PROPH/DIAG INJ IV PUSH: CPT

## 2018-10-20 PROCEDURE — 82607 VITAMIN B-12: CPT

## 2018-10-20 PROCEDURE — 87086 URINE CULTURE/COLONY COUNT: CPT

## 2018-10-20 PROCEDURE — 6360000002 HC RX W HCPCS

## 2018-10-20 PROCEDURE — 80048 BASIC METABOLIC PNL TOTAL CA: CPT

## 2018-10-20 PROCEDURE — 87186 SC STD MICRODIL/AGAR DIL: CPT

## 2018-10-20 PROCEDURE — 81001 URINALYSIS AUTO W/SCOPE: CPT

## 2018-10-20 PROCEDURE — 2580000003 HC RX 258: Performed by: STUDENT IN AN ORGANIZED HEALTH CARE EDUCATION/TRAINING PROGRAM

## 2018-10-20 PROCEDURE — 85730 THROMBOPLASTIN TIME PARTIAL: CPT

## 2018-10-20 PROCEDURE — 6370000000 HC RX 637 (ALT 250 FOR IP): Performed by: STUDENT IN AN ORGANIZED HEALTH CARE EDUCATION/TRAINING PROGRAM

## 2018-10-20 PROCEDURE — 83540 ASSAY OF IRON: CPT

## 2018-10-20 PROCEDURE — 2580000003 HC RX 258: Performed by: EMERGENCY MEDICINE

## 2018-10-20 PROCEDURE — 84436 ASSAY OF TOTAL THYROXINE: CPT

## 2018-10-20 PROCEDURE — 83690 ASSAY OF LIPASE: CPT

## 2018-10-20 PROCEDURE — 82728 ASSAY OF FERRITIN: CPT

## 2018-10-20 RX ORDER — SODIUM CHLORIDE 0.9 % (FLUSH) 0.9 %
10 SYRINGE (ML) INJECTION EVERY 12 HOURS SCHEDULED
Status: DISCONTINUED | OUTPATIENT
Start: 2018-10-20 | End: 2018-10-21 | Stop reason: HOSPADM

## 2018-10-20 RX ORDER — LORAZEPAM 2 MG/1
2 TABLET ORAL
Status: DISCONTINUED | OUTPATIENT
Start: 2018-10-20 | End: 2018-10-21

## 2018-10-20 RX ORDER — SODIUM CHLORIDE 9 MG/ML
INJECTION, SOLUTION INTRAVENOUS CONTINUOUS
Status: DISCONTINUED | OUTPATIENT
Start: 2018-10-20 | End: 2018-10-21 | Stop reason: HOSPADM

## 2018-10-20 RX ORDER — THIAMINE HYDROCHLORIDE 100 MG/ML
100 INJECTION, SOLUTION INTRAMUSCULAR; INTRAVENOUS ONCE
Status: COMPLETED | OUTPATIENT
Start: 2018-10-20 | End: 2018-10-20

## 2018-10-20 RX ORDER — THIAMINE MONONITRATE (VIT B1) 100 MG
100 TABLET ORAL DAILY
Status: DISCONTINUED | OUTPATIENT
Start: 2018-10-21 | End: 2018-10-21 | Stop reason: HOSPADM

## 2018-10-20 RX ORDER — LORAZEPAM 2 MG/ML
3 INJECTION INTRAMUSCULAR
Status: DISCONTINUED | OUTPATIENT
Start: 2018-10-20 | End: 2018-10-21

## 2018-10-20 RX ORDER — LORAZEPAM 2 MG/ML
INJECTION INTRAMUSCULAR
Status: COMPLETED
Start: 2018-10-20 | End: 2018-10-20

## 2018-10-20 RX ORDER — ONDANSETRON 2 MG/ML
4 INJECTION INTRAMUSCULAR; INTRAVENOUS EVERY 6 HOURS PRN
Status: DISCONTINUED | OUTPATIENT
Start: 2018-10-20 | End: 2018-10-21 | Stop reason: HOSPADM

## 2018-10-20 RX ORDER — LORAZEPAM 2 MG/ML
4 INJECTION INTRAMUSCULAR
Status: DISCONTINUED | OUTPATIENT
Start: 2018-10-20 | End: 2018-10-21

## 2018-10-20 RX ORDER — LORAZEPAM 2 MG/ML
1 INJECTION INTRAMUSCULAR
Status: DISCONTINUED | OUTPATIENT
Start: 2018-10-20 | End: 2018-10-21

## 2018-10-20 RX ORDER — ONDANSETRON 2 MG/ML
4 INJECTION INTRAMUSCULAR; INTRAVENOUS ONCE
Status: COMPLETED | OUTPATIENT
Start: 2018-10-20 | End: 2018-10-20

## 2018-10-20 RX ORDER — ACETAMINOPHEN 325 MG/1
650 TABLET ORAL EVERY 4 HOURS PRN
Status: DISCONTINUED | OUTPATIENT
Start: 2018-10-20 | End: 2018-10-21 | Stop reason: HOSPADM

## 2018-10-20 RX ORDER — MULTIVITAMIN WITH FOLIC ACID 400 MCG
1 TABLET ORAL DAILY
Status: DISCONTINUED | OUTPATIENT
Start: 2018-10-21 | End: 2018-10-21 | Stop reason: HOSPADM

## 2018-10-20 RX ORDER — LORAZEPAM 2 MG/ML
2 INJECTION INTRAMUSCULAR
Status: DISCONTINUED | OUTPATIENT
Start: 2018-10-20 | End: 2018-10-21

## 2018-10-20 RX ORDER — LORAZEPAM 0.5 MG/1
1 TABLET ORAL
Status: DISCONTINUED | OUTPATIENT
Start: 2018-10-20 | End: 2018-10-21

## 2018-10-20 RX ORDER — LORAZEPAM 2 MG/1
4 TABLET ORAL
Status: DISCONTINUED | OUTPATIENT
Start: 2018-10-20 | End: 2018-10-21

## 2018-10-20 RX ORDER — FENTANYL CITRATE 50 UG/ML
25 INJECTION, SOLUTION INTRAMUSCULAR; INTRAVENOUS ONCE
Status: COMPLETED | OUTPATIENT
Start: 2018-10-20 | End: 2018-10-20

## 2018-10-20 RX ORDER — SODIUM CHLORIDE 0.9 % (FLUSH) 0.9 %
10 SYRINGE (ML) INJECTION PRN
Status: DISCONTINUED | OUTPATIENT
Start: 2018-10-20 | End: 2018-10-21 | Stop reason: HOSPADM

## 2018-10-20 RX ORDER — 0.9 % SODIUM CHLORIDE 0.9 %
1000 INTRAVENOUS SOLUTION INTRAVENOUS ONCE
Status: COMPLETED | OUTPATIENT
Start: 2018-10-20 | End: 2018-10-20

## 2018-10-20 RX ORDER — FOLIC ACID 1 MG/1
1 TABLET ORAL DAILY
Status: DISCONTINUED | OUTPATIENT
Start: 2018-10-21 | End: 2018-10-21 | Stop reason: HOSPADM

## 2018-10-20 RX ADMIN — FENTANYL CITRATE 25 MCG: 50 INJECTION INTRAMUSCULAR; INTRAVENOUS at 19:44

## 2018-10-20 RX ADMIN — ONDANSETRON 4 MG: 2 INJECTION INTRAMUSCULAR; INTRAVENOUS at 18:09

## 2018-10-20 RX ADMIN — LORAZEPAM 1 MG: 0.5 TABLET ORAL at 23:41

## 2018-10-20 RX ADMIN — THIAMINE HYDROCHLORIDE 100 MG: 100 INJECTION, SOLUTION INTRAMUSCULAR; INTRAVENOUS at 18:11

## 2018-10-20 RX ADMIN — SODIUM CHLORIDE 1000 ML: 9 INJECTION, SOLUTION INTRAVENOUS at 18:06

## 2018-10-20 RX ADMIN — LORAZEPAM 1 MG: 2 INJECTION INTRAMUSCULAR; INTRAVENOUS at 22:37

## 2018-10-20 RX ADMIN — SODIUM CHLORIDE: 9 INJECTION, SOLUTION INTRAVENOUS at 21:42

## 2018-10-20 RX ADMIN — SODIUM CHLORIDE: 9 INJECTION, SOLUTION INTRAVENOUS at 23:38

## 2018-10-20 ASSESSMENT — PAIN SCALES - GENERAL
PAINLEVEL_OUTOF10: 4
PAINLEVEL_OUTOF10: 8
PAINLEVEL_OUTOF10: 7

## 2018-10-20 ASSESSMENT — ENCOUNTER SYMPTOMS
SORE THROAT: 1
EYE PAIN: 0
ABDOMINAL PAIN: 1
NAUSEA: 1
BACK PAIN: 0
VOMITING: 1
SHORTNESS OF BREATH: 0
DIARRHEA: 0
COUGH: 1

## 2018-10-20 ASSESSMENT — PAIN DESCRIPTION - FREQUENCY: FREQUENCY: CONTINUOUS

## 2018-10-20 ASSESSMENT — PAIN DESCRIPTION - DESCRIPTORS: DESCRIPTORS: PRESSURE

## 2018-10-20 ASSESSMENT — PAIN DESCRIPTION - ORIENTATION: ORIENTATION: MID;UPPER

## 2018-10-20 ASSESSMENT — PAIN DESCRIPTION - LOCATION: LOCATION: ABDOMEN

## 2018-10-20 NOTE — ED PROVIDER NOTES
diff contact isolation    OT eval and treat    PT evaluation and treat    Initiate Oxygen Therapy Protocol    Respiratory care evaluation only    HHN Treatment    MDI Treatment    PATIENT STATUS (FROM ED OR OR/PROCEDURAL) Inpatient    PATIENT STATUS (FROM ED OR OR/PROCEDURAL) Inpatient    Alcohol and or drug assessment    Seizure precautions    Fall precautions       MEDICATIONS ORDERED:  Orders Placed This Encounter   Medications    0.9 % sodium chloride bolus    thiamine (B-1) injection 100 mg    ondansetron (ZOFRAN) injection 4 mg    fentaNYL (SUBLIMAZE) injection 25 mcg    0.9 % sodium chloride infusion    sodium chloride flush 0.9 % injection 10 mL    sodium chloride flush 0.9 % injection 10 mL    ondansetron (ZOFRAN) injection 4 mg    enoxaparin (LOVENOX) injection 40 mg    vitamin B-1 (THIAMINE) tablet 100 mg    multivitamin 1 tablet    folic acid (FOLVITE) tablet 1 mg    DISCONTD: LORazepam (ATIVAN) tablet 1 mg    DISCONTD: LORazepam (ATIVAN) injection 1 mg    DISCONTD: LORazepam (ATIVAN) tablet 2 mg    DISCONTD: LORazepam (ATIVAN) injection 2 mg    DISCONTD: LORazepam (ATIVAN) tablet 3 mg    DISCONTD: LORazepam (ATIVAN) injection 3 mg    DISCONTD: LORazepam (ATIVAN) tablet 4 mg    DISCONTD: LORazepam (ATIVAN) injection 4 mg    acetaminophen (TYLENOL) tablet 650 mg    LORazepam (ATIVAN) 2 MG/ML injection     MICHELLE BARKER: cabinet override    sodium chloride 0.9 % 9,204 mL with folic acid 1 mg, adult multi-vitamin with vitamin k 10 mL, thiamine 100 mg    albuterol (PROVENTIL) nebulizer solution 2.5 mg    mometasone-formoterol (DULERA) 200-5 MCG/ACT inhaler 2 puff    chlordiazePOXIDE (LIBRIUM) capsule 25 mg    OR Linked Order Group     LORazepam (ATIVAN) injection 1 mg     LORazepam (ATIVAN) injection 2 mg    gabapentin (NEURONTIN) capsule 300 mg    albuterol sulfate  (90 Base) MCG/ACT inhaler 2 puff    DISCONTD: hydrOXYzine (ATARAX) tablet 50 mg    QUEtiapine REPORTED    Lipase   Result Value Ref Range    Lipase 39 13 - 60 U/L   Protime-INR   Result Value Ref Range    Protime 9.8 9.0 - 12.0 sec    INR 0.9    APTT   Result Value Ref Range    PTT 26.0 20.5 - 30.5 sec   Urinalysis, reflex to microscopic   Result Value Ref Range    Color, UA YELLOW YEL    Turbidity UA CLEAR CLEAR    Glucose, Ur NEGATIVE NEG    Bilirubin Urine NEGATIVE NEG    Ketones, Urine TRACE (A) NEG    Specific Gravity, UA 1.013 1.005 - 1.030    Urine Hgb TRACE (A) NEG    pH, UA 6.0 5.0 - 8.0    Protein, UA NEGATIVE NEG    Urobilinogen, Urine Normal NORM    Nitrite, Urine NEGATIVE NEG    Leukocyte Esterase, Urine NEGATIVE NEG    Urinalysis Comments NOT REPORTED    Hepatic Function Panel   Result Value Ref Range    Alb 4.0 3.5 - 5.2 g/dL    Alkaline Phosphatase 144 (H) 35 - 104 U/L     (H) 5 - 33 U/L     (H) <32 U/L    Total Bilirubin 0.35 0.3 - 1.2 mg/dL    Bilirubin, Direct 0.15 <0.31 mg/dL    Bilirubin, Indirect 0.20 0.00 - 1.00 mg/dL    Total Protein 8.3 6.4 - 8.3 g/dL    Globulin NOT REPORTED 1.5 - 3.8 g/dL    Albumin/Globulin Ratio 0.9 (L) 1.0 - 2.5   TSH without Reflex   Result Value Ref Range    TSH 1.09 0.30 - 5.00 mIU/L   T4   Result Value Ref Range    T4, Total 6.8 4.5 - 12.0 ug/dL   TOX SCR, BLD, ED   Result Value Ref Range    Ethanol 198 (H) <10 mg/dL    Ethanol percent 1.491 %    Salicylate Lvl <1 (L) 3 - 10 mg/dL    Acetaminophen Level <5 (L) 10 - 30 ug/mL    Toxic Tricyclic Sc,Blood NEGATIVE NEG   Microscopic Urinalysis   Result Value Ref Range    -          WBC, UA 5 TO 10 0 - 5 /HPF    RBC, UA None 0 - 4 /HPF    Casts UA 2 TO 5 HYALINE 0 - 8 /LPF    Crystals UA NOT REPORTED NONE /HPF    Epithelial Cells UA 5 TO 10 0 - 5 /HPF    Renal Epithelial, Urine NOT REPORTED 0 /HPF    Bacteria, UA FEW (A) NONE    Mucus, UA NOT REPORTED NONE    Trichomonas, UA NOT REPORTED NONE    Amorphous, UA NOT REPORTED NONE    Other Observations UA NOT REPORTED NREQ    Yeast, UA NOT REPORTED NONE

## 2018-10-21 VITALS
WEIGHT: 136.24 LBS | SYSTOLIC BLOOD PRESSURE: 135 MMHG | HEART RATE: 93 BPM | DIASTOLIC BLOOD PRESSURE: 91 MMHG | BODY MASS INDEX: 26.75 KG/M2 | HEIGHT: 60 IN | OXYGEN SATURATION: 98 % | TEMPERATURE: 98.4 F | RESPIRATION RATE: 21 BRPM

## 2018-10-21 LAB
FERRITIN: 59 UG/L (ref 13–150)
FOLATE: 14.1 NG/ML
IRON SATURATION: 71 % (ref 20–55)
IRON: 385 UG/DL (ref 37–145)
TOTAL IRON BINDING CAPACITY: 539 UG/DL (ref 250–450)
UNSATURATED IRON BINDING CAPACITY: 154 UG/DL (ref 112–347)
VITAMIN B-12: 718 PG/ML (ref 232–1245)

## 2018-10-21 PROCEDURE — 6360000002 HC RX W HCPCS: Performed by: STUDENT IN AN ORGANIZED HEALTH CARE EDUCATION/TRAINING PROGRAM

## 2018-10-21 PROCEDURE — 6370000000 HC RX 637 (ALT 250 FOR IP): Performed by: STUDENT IN AN ORGANIZED HEALTH CARE EDUCATION/TRAINING PROGRAM

## 2018-10-21 PROCEDURE — 94640 AIRWAY INHALATION TREATMENT: CPT

## 2018-10-21 PROCEDURE — 2500000003 HC RX 250 WO HCPCS: Performed by: STUDENT IN AN ORGANIZED HEALTH CARE EDUCATION/TRAINING PROGRAM

## 2018-10-21 PROCEDURE — 99223 1ST HOSP IP/OBS HIGH 75: CPT | Performed by: INTERNAL MEDICINE

## 2018-10-21 PROCEDURE — 2580000003 HC RX 258: Performed by: STUDENT IN AN ORGANIZED HEALTH CARE EDUCATION/TRAINING PROGRAM

## 2018-10-21 RX ORDER — CHLORDIAZEPOXIDE HYDROCHLORIDE 25 MG/1
25 CAPSULE, GELATIN COATED ORAL 3 TIMES DAILY
Status: DISCONTINUED | OUTPATIENT
Start: 2018-10-21 | End: 2018-10-21 | Stop reason: HOSPADM

## 2018-10-21 RX ORDER — LORAZEPAM 2 MG/ML
2 INJECTION INTRAMUSCULAR EVERY 4 HOURS PRN
Status: DISCONTINUED | OUTPATIENT
Start: 2018-10-21 | End: 2018-10-21 | Stop reason: HOSPADM

## 2018-10-21 RX ORDER — ALBUTEROL SULFATE 90 UG/1
2 AEROSOL, METERED RESPIRATORY (INHALATION) EVERY 4 HOURS PRN
Status: DISCONTINUED | OUTPATIENT
Start: 2018-10-21 | End: 2018-10-21 | Stop reason: HOSPADM

## 2018-10-21 RX ORDER — HYDROXYZINE 50 MG/1
50 TABLET, FILM COATED ORAL 3 TIMES DAILY PRN
Status: DISCONTINUED | OUTPATIENT
Start: 2018-10-21 | End: 2018-10-21 | Stop reason: HOSPADM

## 2018-10-21 RX ORDER — LORAZEPAM 2 MG/ML
1 INJECTION INTRAMUSCULAR EVERY 4 HOURS PRN
Status: DISCONTINUED | OUTPATIENT
Start: 2018-10-21 | End: 2018-10-21 | Stop reason: HOSPADM

## 2018-10-21 RX ORDER — GABAPENTIN 300 MG/1
300 CAPSULE ORAL 3 TIMES DAILY
Status: DISCONTINUED | OUTPATIENT
Start: 2018-10-21 | End: 2018-10-21 | Stop reason: HOSPADM

## 2018-10-21 RX ORDER — HYDROXYZINE 50 MG/1
50 TABLET, FILM COATED ORAL 2 TIMES DAILY PRN
Status: DISCONTINUED | OUTPATIENT
Start: 2018-10-21 | End: 2018-10-21

## 2018-10-21 RX ORDER — QUETIAPINE FUMARATE 200 MG/1
200 TABLET, FILM COATED ORAL NIGHTLY
Status: DISCONTINUED | OUTPATIENT
Start: 2018-10-21 | End: 2018-10-21 | Stop reason: HOSPADM

## 2018-10-21 RX ORDER — CALCIUM CARBONATE 200(500)MG
500 TABLET,CHEWABLE ORAL 3 TIMES DAILY PRN
Status: DISCONTINUED | OUTPATIENT
Start: 2018-10-21 | End: 2018-10-21 | Stop reason: HOSPADM

## 2018-10-21 RX ADMIN — ENOXAPARIN SODIUM 40 MG: 40 INJECTION SUBCUTANEOUS at 09:01

## 2018-10-21 RX ADMIN — LORAZEPAM 1 MG: 2 INJECTION INTRAMUSCULAR; INTRAVENOUS at 03:22

## 2018-10-21 RX ADMIN — LORAZEPAM 2 MG: 2 INJECTION INTRAMUSCULAR; INTRAVENOUS at 02:05

## 2018-10-21 RX ADMIN — LORAZEPAM 2 MG: 2 INJECTION INTRAMUSCULAR; INTRAVENOUS at 00:37

## 2018-10-21 RX ADMIN — CHLORDIAZEPOXIDE HYDROCHLORIDE 25 MG: 25 CAPSULE ORAL at 12:39

## 2018-10-21 RX ADMIN — LORAZEPAM 2 MG: 2 INJECTION INTRAMUSCULAR; INTRAVENOUS at 12:31

## 2018-10-21 RX ADMIN — ANTACID TABLETS 500 MG: 500 TABLET, CHEWABLE ORAL at 18:22

## 2018-10-21 RX ADMIN — THERA TABS 1 TABLET: TAB at 09:01

## 2018-10-21 RX ADMIN — LORAZEPAM 1 MG: 2 INJECTION INTRAMUSCULAR; INTRAVENOUS at 09:13

## 2018-10-21 RX ADMIN — Medication 100 MG: at 09:01

## 2018-10-21 RX ADMIN — ONDANSETRON 4 MG: 2 INJECTION INTRAMUSCULAR; INTRAVENOUS at 12:31

## 2018-10-21 RX ADMIN — LORAZEPAM 1 MG: 2 INJECTION INTRAMUSCULAR; INTRAVENOUS at 06:06

## 2018-10-21 RX ADMIN — FOLIC ACID: 5 INJECTION, SOLUTION INTRAMUSCULAR; INTRAVENOUS; SUBCUTANEOUS at 09:01

## 2018-10-21 RX ADMIN — ALBUTEROL SULFATE 2.5 MG: 5 SOLUTION RESPIRATORY (INHALATION) at 02:15

## 2018-10-21 RX ADMIN — FOLIC ACID 1 MG: 1 TABLET ORAL at 09:01

## 2018-10-21 RX ADMIN — MOMETASONE FUROATE AND FORMOTEROL FUMARATE DIHYDRATE 2 PUFF: 200; 5 AEROSOL RESPIRATORY (INHALATION) at 07:22

## 2018-10-21 RX ADMIN — GABAPENTIN 300 MG: 300 CAPSULE ORAL at 14:35

## 2018-10-22 LAB
CULTURE: ABNORMAL
Lab: ABNORMAL
ORGANISM: ABNORMAL
SPECIMEN DESCRIPTION: ABNORMAL
STATUS: ABNORMAL

## 2018-11-27 ENCOUNTER — HOSPITAL ENCOUNTER (EMERGENCY)
Age: 43
Discharge: HOME | End: 2018-11-28
Attending: EMERGENCY MEDICINE
Payer: MEDICARE

## 2018-11-27 ENCOUNTER — APPOINTMENT (OUTPATIENT)
Dept: GENERAL RADIOLOGY | Age: 43
End: 2018-11-27
Payer: MEDICARE

## 2018-11-27 VITALS
OXYGEN SATURATION: 97 % | WEIGHT: 129 LBS | SYSTOLIC BLOOD PRESSURE: 109 MMHG | BODY MASS INDEX: 25.19 KG/M2 | TEMPERATURE: 98.1 F | RESPIRATION RATE: 18 BRPM | DIASTOLIC BLOOD PRESSURE: 73 MMHG | HEART RATE: 85 BPM

## 2018-11-27 DIAGNOSIS — R10.12 LEFT UPPER QUADRANT PAIN: ICD-10-CM

## 2018-11-27 DIAGNOSIS — R11.2 NON-INTRACTABLE VOMITING WITH NAUSEA, UNSPECIFIED VOMITING TYPE: Primary | ICD-10-CM

## 2018-11-27 LAB
-: ABNORMAL
ABSOLUTE EOS #: 0 K/UL (ref 0–0.4)
ABSOLUTE IMMATURE GRANULOCYTE: 0.17 K/UL (ref 0–0.3)
ABSOLUTE LYMPH #: 1.46 K/UL (ref 1–4.8)
ABSOLUTE MONO #: 0.26 K/UL (ref 0.1–0.8)
ALBUMIN SERPL-MCNC: 4 G/DL (ref 3.5–5.2)
ALBUMIN/GLOBULIN RATIO: 1.1 (ref 1–2.5)
ALP BLD-CCNC: 101 U/L (ref 35–104)
ALT SERPL-CCNC: 57 U/L (ref 5–33)
AMORPHOUS: ABNORMAL
ANION GAP SERPL CALCULATED.3IONS-SCNC: 15 MMOL/L (ref 9–17)
AST SERPL-CCNC: 51 U/L
BACTERIA: ABNORMAL
BASOPHILS # BLD: 0 % (ref 0–2)
BASOPHILS ABSOLUTE: 0 K/UL (ref 0–0.2)
BILIRUB SERPL-MCNC: 0.24 MG/DL (ref 0.3–1.2)
BILIRUBIN DIRECT: 0.11 MG/DL
BILIRUBIN URINE: NEGATIVE
BILIRUBIN, INDIRECT: 0.13 MG/DL (ref 0–1)
BUN BLDV-MCNC: 11 MG/DL (ref 6–20)
BUN/CREAT BLD: ABNORMAL (ref 9–20)
CALCIUM SERPL-MCNC: 8.7 MG/DL (ref 8.6–10.4)
CASTS UA: ABNORMAL /LPF (ref 0–8)
CHLORIDE BLD-SCNC: 107 MMOL/L (ref 98–107)
CO2: 18 MMOL/L (ref 20–31)
COLOR: YELLOW
CREAT SERPL-MCNC: 0.98 MG/DL (ref 0.5–0.9)
CRYSTALS, UA: ABNORMAL /HPF
DIFFERENTIAL TYPE: ABNORMAL
EOSINOPHILS RELATIVE PERCENT: 0 % (ref 1–4)
EPITHELIAL CELLS UA: ABNORMAL /HPF (ref 0–5)
ETHANOL PERCENT: 0.11 %
ETHANOL: 109 MG/DL
GFR AFRICAN AMERICAN: >60 ML/MIN
GFR NON-AFRICAN AMERICAN: >60 ML/MIN
GFR SERPL CREATININE-BSD FRML MDRD: ABNORMAL ML/MIN/{1.73_M2}
GFR SERPL CREATININE-BSD FRML MDRD: ABNORMAL ML/MIN/{1.73_M2}
GLOBULIN: ABNORMAL G/DL (ref 1.5–3.8)
GLUCOSE BLD-MCNC: 95 MG/DL (ref 70–99)
GLUCOSE URINE: NEGATIVE
HCT VFR BLD CALC: 42.6 % (ref 36.3–47.1)
HEMOGLOBIN: 12.7 G/DL (ref 11.9–15.1)
IMMATURE GRANULOCYTES: 2 %
KETONES, URINE: ABNORMAL
LACTIC ACID, WHOLE BLOOD: 3.8 MMOL/L (ref 0.7–2.1)
LEUKOCYTE ESTERASE, URINE: NEGATIVE
LIPASE: 42 U/L (ref 13–60)
LYMPHOCYTES # BLD: 17 % (ref 24–44)
MCH RBC QN AUTO: 24 PG (ref 25.2–33.5)
MCHC RBC AUTO-ENTMCNC: 29.8 G/DL (ref 28.4–34.8)
MCV RBC AUTO: 80.4 FL (ref 82.6–102.9)
MONOCYTES # BLD: 3 % (ref 1–7)
MORPHOLOGY: ABNORMAL
MUCUS: ABNORMAL
NITRITE, URINE: NEGATIVE
NRBC AUTOMATED: 0 PER 100 WBC
OTHER OBSERVATIONS UA: ABNORMAL
PDW BLD-RTO: 20.4 % (ref 11.8–14.4)
PH UA: 6 (ref 5–8)
PLATELET # BLD: 438 K/UL (ref 138–453)
PLATELET ESTIMATE: ABNORMAL
PMV BLD AUTO: 10.2 FL (ref 8.1–13.5)
POTASSIUM SERPL-SCNC: 3.7 MMOL/L (ref 3.7–5.3)
PROTEIN UA: NEGATIVE
RBC # BLD: 5.3 M/UL (ref 3.95–5.11)
RBC # BLD: ABNORMAL 10*6/UL
RBC UA: ABNORMAL /HPF (ref 0–4)
RENAL EPITHELIAL, UA: ABNORMAL /HPF
SEG NEUTROPHILS: 78 % (ref 36–66)
SEGMENTED NEUTROPHILS ABSOLUTE COUNT: 6.71 K/UL (ref 1.8–7.7)
SODIUM BLD-SCNC: 140 MMOL/L (ref 135–144)
SPECIFIC GRAVITY UA: 1.02 (ref 1–1.03)
TOTAL PROTEIN: 7.7 G/DL (ref 6.4–8.3)
TRICHOMONAS: ABNORMAL
TURBIDITY: CLEAR
URINE HGB: NEGATIVE
UROBILINOGEN, URINE: NORMAL
WBC # BLD: 8.6 K/UL (ref 3.5–11.3)
WBC # BLD: ABNORMAL 10*3/UL
WBC UA: ABNORMAL /HPF (ref 0–5)
YEAST: ABNORMAL

## 2018-11-27 PROCEDURE — 81001 URINALYSIS AUTO W/SCOPE: CPT

## 2018-11-27 PROCEDURE — 96372 THER/PROPH/DIAG INJ SC/IM: CPT

## 2018-11-27 PROCEDURE — 2500000003 HC RX 250 WO HCPCS: Performed by: EMERGENCY MEDICINE

## 2018-11-27 PROCEDURE — G0480 DRUG TEST DEF 1-7 CLASSES: HCPCS

## 2018-11-27 PROCEDURE — 83690 ASSAY OF LIPASE: CPT

## 2018-11-27 PROCEDURE — 85025 COMPLETE CBC W/AUTO DIFF WBC: CPT

## 2018-11-27 PROCEDURE — 87086 URINE CULTURE/COLONY COUNT: CPT

## 2018-11-27 PROCEDURE — 6360000002 HC RX W HCPCS: Performed by: EMERGENCY MEDICINE

## 2018-11-27 PROCEDURE — 80048 BASIC METABOLIC PNL TOTAL CA: CPT

## 2018-11-27 PROCEDURE — 74022 RADEX COMPL AQT ABD SERIES: CPT

## 2018-11-27 PROCEDURE — 6370000000 HC RX 637 (ALT 250 FOR IP): Performed by: EMERGENCY MEDICINE

## 2018-11-27 PROCEDURE — 83605 ASSAY OF LACTIC ACID: CPT

## 2018-11-27 PROCEDURE — 2580000003 HC RX 258: Performed by: EMERGENCY MEDICINE

## 2018-11-27 PROCEDURE — 96374 THER/PROPH/DIAG INJ IV PUSH: CPT

## 2018-11-27 PROCEDURE — 96375 TX/PRO/DX INJ NEW DRUG ADDON: CPT

## 2018-11-27 PROCEDURE — S0028 INJECTION, FAMOTIDINE, 20 MG: HCPCS | Performed by: EMERGENCY MEDICINE

## 2018-11-27 PROCEDURE — 99284 EMERGENCY DEPT VISIT MOD MDM: CPT

## 2018-11-27 PROCEDURE — 80076 HEPATIC FUNCTION PANEL: CPT

## 2018-11-27 RX ORDER — DICYCLOMINE HYDROCHLORIDE 10 MG/ML
20 INJECTION INTRAMUSCULAR ONCE
Status: COMPLETED | OUTPATIENT
Start: 2018-11-27 | End: 2018-11-27

## 2018-11-27 RX ORDER — ONDANSETRON 2 MG/ML
4 INJECTION INTRAMUSCULAR; INTRAVENOUS ONCE
Status: COMPLETED | OUTPATIENT
Start: 2018-11-27 | End: 2018-11-27

## 2018-11-27 RX ORDER — 0.9 % SODIUM CHLORIDE 0.9 %
1000 INTRAVENOUS SOLUTION INTRAVENOUS ONCE
Status: COMPLETED | OUTPATIENT
Start: 2018-11-27 | End: 2018-11-27

## 2018-11-27 RX ORDER — METOCLOPRAMIDE HYDROCHLORIDE 5 MG/ML
10 INJECTION INTRAMUSCULAR; INTRAVENOUS ONCE
Status: COMPLETED | OUTPATIENT
Start: 2018-11-27 | End: 2018-11-27

## 2018-11-27 RX ORDER — DIPHENHYDRAMINE HCL 25 MG
25 TABLET ORAL ONCE
Status: COMPLETED | OUTPATIENT
Start: 2018-11-27 | End: 2018-11-27

## 2018-11-27 RX ORDER — 0.9 % SODIUM CHLORIDE 0.9 %
1000 INTRAVENOUS SOLUTION INTRAVENOUS ONCE
Status: COMPLETED | OUTPATIENT
Start: 2018-11-27 | End: 2018-11-28

## 2018-11-27 RX ADMIN — SODIUM CHLORIDE 1000 ML: 9 INJECTION, SOLUTION INTRAVENOUS at 23:18

## 2018-11-27 RX ADMIN — METOCLOPRAMIDE 10 MG: 5 INJECTION, SOLUTION INTRAMUSCULAR; INTRAVENOUS at 22:19

## 2018-11-27 RX ADMIN — DICYCLOMINE HYDROCHLORIDE 20 MG: 20 INJECTION, SOLUTION INTRAMUSCULAR at 21:29

## 2018-11-27 RX ADMIN — SODIUM CHLORIDE 1000 ML: 9 INJECTION, SOLUTION INTRAVENOUS at 21:30

## 2018-11-27 RX ADMIN — ONDANSETRON 4 MG: 2 INJECTION INTRAMUSCULAR; INTRAVENOUS at 21:29

## 2018-11-27 RX ADMIN — FAMOTIDINE 20 MG: 10 INJECTION, SOLUTION INTRAVENOUS at 22:19

## 2018-11-27 RX ADMIN — DIPHENHYDRAMINE HCL 25 MG: 25 TABLET ORAL at 22:19

## 2018-11-27 ASSESSMENT — PAIN DESCRIPTION - ORIENTATION: ORIENTATION: RIGHT

## 2018-11-27 ASSESSMENT — PAIN DESCRIPTION - LOCATION: LOCATION: ABDOMEN

## 2018-11-27 ASSESSMENT — PAIN DESCRIPTION - DESCRIPTORS: DESCRIPTORS: DISCOMFORT

## 2018-11-27 ASSESSMENT — PAIN SCALES - GENERAL: PAINLEVEL_OUTOF10: 10

## 2018-11-27 ASSESSMENT — PAIN DESCRIPTION - PAIN TYPE: TYPE: ACUTE PAIN

## 2018-11-28 LAB
CULTURE: NORMAL
LACTIC ACID, WHOLE BLOOD: 1.4 MMOL/L (ref 0.7–2.1)
Lab: NORMAL
SPECIMEN DESCRIPTION: NORMAL
STATUS: NORMAL

## 2018-11-28 PROCEDURE — 6360000002 HC RX W HCPCS: Performed by: EMERGENCY MEDICINE

## 2018-11-28 PROCEDURE — 96376 TX/PRO/DX INJ SAME DRUG ADON: CPT

## 2018-11-28 PROCEDURE — 83605 ASSAY OF LACTIC ACID: CPT

## 2018-11-28 RX ORDER — ONDANSETRON 2 MG/ML
4 INJECTION INTRAMUSCULAR; INTRAVENOUS ONCE
Status: COMPLETED | OUTPATIENT
Start: 2018-11-28 | End: 2018-11-28

## 2018-11-28 RX ORDER — ONDANSETRON 4 MG/1
4 TABLET, ORALLY DISINTEGRATING ORAL EVERY 8 HOURS PRN
Qty: 8 TABLET | Refills: 0 | Status: ON HOLD | OUTPATIENT
Start: 2018-11-28 | End: 2019-10-15 | Stop reason: SDUPTHER

## 2018-11-28 RX ORDER — DICYCLOMINE HYDROCHLORIDE 10 MG/1
10 CAPSULE ORAL EVERY 6 HOURS PRN
Qty: 20 CAPSULE | Refills: 0 | Status: ON HOLD | OUTPATIENT
Start: 2018-11-28 | End: 2019-10-15 | Stop reason: SDUPTHER

## 2018-11-28 RX ADMIN — ONDANSETRON 4 MG: 2 INJECTION INTRAMUSCULAR; INTRAVENOUS at 01:37

## 2018-11-28 ASSESSMENT — ENCOUNTER SYMPTOMS
DIARRHEA: 1
EYE REDNESS: 0
RHINORRHEA: 0
SHORTNESS OF BREATH: 0
COUGH: 0
SORE THROAT: 0
EYE DISCHARGE: 0
BLOOD IN STOOL: 0
NAUSEA: 1
ABDOMINAL PAIN: 1
VOMITING: 1
CHEST TIGHTNESS: 0

## 2018-11-28 NOTE — ED NOTES
Patient is asking for food, no boxed lunches in the department. Will offer crackers and juice temporarily. Patient is asking for assistance home as well. No SW till 1:00, patient made aware.       Jacquelin Villalobos RN  11/28/18 0020

## 2018-11-28 NOTE — ED PROVIDER NOTES
Dr Minerva Bonner sign out, abdominal pain elevated lactate, wbc normal, fluids given,   Probable admit,      Elan Morales, DO  11/28/18 0058    Pt tolerating liquids, lactate improved, wbc normal,        Elan Morales, DO  11/28/18 6559

## 2018-11-28 NOTE — ED PROVIDER NOTES
capsule Take 1 capsule by mouth 2 times daily for 30 days. . 8/2/18 9/1/18  Klarissa Wild MD   omeprazole (PRILOSEC) 20 MG delayed release capsule Take 20 mg by mouth 2 times daily 7/31/18   Historical Provider, MD   Multiple Vitamins-Minerals (MULTIVITAMIN ADULT PO) Take 1 tablet by mouth daily    Historical Provider, MD       REVIEW OF SYSTEMS    (2-9 systems for level 4, 10 or more for level 5)      Review of Systems   Constitutional: Negative for chills and fever. HENT: Negative for congestion, rhinorrhea and sore throat. Eyes: Negative for discharge and redness. Respiratory: Negative for cough, chest tightness and shortness of breath. Cardiovascular: Negative for chest pain. Gastrointestinal: Positive for abdominal pain, diarrhea, nausea and vomiting. Negative for blood in stool. Endocrine: Negative for polydipsia and polyuria. Genitourinary: Negative for difficulty urinating, dysuria, flank pain, hematuria, vaginal bleeding, vaginal discharge and vaginal pain. Musculoskeletal: Negative for neck pain and neck stiffness. Skin: Negative for rash and wound. Allergic/Immunologic: Negative for immunocompromised state. Neurological: Negative for weakness, numbness and headaches. Hematological: Negative for adenopathy. Psychiatric/Behavioral: Negative for agitation and behavioral problems. The patient is nervous/anxious. PHYSICAL EXAM   (up to 7 for level 4, 8 or more for level 5)      INITIAL VITALS:   /73   Pulse 85   Temp 98.1 °F (36.7 °C) (Oral)   Resp 18   Wt 129 lb (58.5 kg)   SpO2 97%   BMI 25.19 kg/m²     Physical Exam   Constitutional: She is oriented to person, place, and time. She appears well-developed and well-nourished. No distress. Appears slightly uncomfortable, but nontoxic-appearing, normal vitals   HENT:   Head: Normocephalic and atraumatic. Eyes: Pupils are equal, round, and reactive to light.  Conjunctivae and EOM are normal.   Neck: Normal range of motion. Neck supple. No JVD present. No tracheal deviation present. Cardiovascular: Normal rate, regular rhythm, normal heart sounds and intact distal pulses. Pulmonary/Chest: Effort normal and breath sounds normal. No stridor. No respiratory distress. She has no wheezes. She has no rales. She exhibits no tenderness. Abdominal: Soft. Bowel sounds are normal. She exhibits no distension. There is tenderness. There is no rebound and no guarding. Abdomen soft, nondistended, mild tenderness to palpation left upper quadrant, non-peritoneal, no rebound tenderness or guarding, normal bowel sounds   Musculoskeletal: Normal range of motion. She exhibits no edema. Neurological: She is alert and oriented to person, place, and time. No cranial nerve deficit. Skin: Skin is warm and dry. Capillary refill takes less than 2 seconds. No rash noted. Psychiatric: She has a normal mood and affect.        DIFFERENTIAL  DIAGNOSIS     PLAN (LABS / IMAGING / EKG):  Orders Placed This Encounter   Procedures    Urine Culture    XR Acute Abd Series Chest 1 VW    CBC Auto Differential    Ethanol    Lactic Acid, Whole Blood    Hepatic Function Panel    Lipase    Urinalysis with Microscopic    Basic Metabolic Panel    Lactic Acid, Whole Blood       MEDICATIONS ORDERED:  Orders Placed This Encounter   Medications    dicyclomine (BENTYL) injection 20 mg    ondansetron (ZOFRAN) injection 4 mg    0.9 % sodium chloride bolus    metoclopramide (REGLAN) injection 10 mg    diphenhydrAMINE (BENADRYL) tablet 25 mg    famotidine (PEPCID) injection 20 mg    0.9 % sodium chloride bolus    ondansetron (ZOFRAN) injection 4 mg    dicyclomine (BENTYL) 10 MG capsule     Sig: Take 1 capsule by mouth every 6 hours as needed (cramps)     Dispense:  20 capsule     Refill:  0    ondansetron (ZOFRAN ODT) 4 MG disintegrating tablet     Sig: Take 1 tablet by mouth every 8 hours as needed for Nausea     Dispense:  8 tablet     Refill: worsen      DISCHARGE MEDICATIONS:  Discharge Medication List as of 11/28/2018  1:32 AM      START taking these medications    Details   dicyclomine (BENTYL) 10 MG capsule Take 1 capsule by mouth every 6 hours as needed (cramps), Disp-20 capsule, R-0Print      ondansetron (ZOFRAN ODT) 4 MG disintegrating tablet Take 1 tablet by mouth every 8 hours as needed for Nausea, Disp-8 tablet, R-0Print             Madiha Joshi DO  Emergency Medicine Resident    (Please note that portions of this note were completed with a voice recognition program.  Effortswere made to edit the dictations but occasionally words are mis-transcribed.)     Madiha Joshi DO  11/28/18 0700

## 2018-11-28 NOTE — ED PROVIDER NOTES
[facility]     Emergency Department     Faculty Note/ Attestation      Pt Name: Brooks Salmon                                       MRN: [id number]  [place] 1975  Date of evaluation: 11/27/2018    Patients PCP:    No primary care provider on file. Attestation  I performed a history and physical examination of the patient and discussed management with the resident. I reviewed the residents note and agree with the documented findings and plan of care. Any areas of disagreement are noted on the chart. I was personally present for the key portions of any procedures. I have documented in the chart those procedures where I was not present during the key portions. I have reviewed the emergency nurses triage note. I agree with the chief complaint, past medical history, past surgical history, allergies, medications, social and family history as documented unless otherwise noted below. For Physician Assistant/ Nurse Practitioner cases/documentation I have personally evaluated this patient and have completed at least one if not all key elements of the E/M (history, physical exam, and MDM). Additional findings are as noted.     Initial Screens:             Vitals:    Vitals:    11/27/18 2045   BP: 123/84   Pulse: 98   Resp: 15   Temp: 98.1 °F (36.7 °C)   TempSrc: Oral   SpO2: 100%   Weight: 129 lb (58.5 kg)       CHIEF COMPLAINT       Chief Complaint   Patient presents with    Abdominal Pain     right HX of Hep C seen by PCP and told to come to ED    Nausea    Emesis       The pt has abdominal pain     DIAGNOSTIC RESULTS     RADIOLOGY:   XR Acute Abd Series Chest 1 VW    (Results Pending)       LABS:  Labs Reviewed   URINE CULTURE   BASIC METABOLIC PANEL   CBC WITH AUTO DIFFERENTIAL   ETHANOL   LACTIC ACID, WHOLE BLOOD   HEPATIC FUNCTION PANEL   LIPASE   URINALYSIS WITH MICROSCOPIC       EMERGENCY DEPARTMENT COURSE:     -------------------------  BP: 123/84, Temp: 98.1 °F (36.7 °C),

## 2018-11-28 NOTE — ED NOTES
Patient presents to ED with LUQ pain that has been going on for 5 days. Patient states that she has been seeing her PCP but states that she was supposed to get blood work done today but did not feel very well and started vomiting. Patient states the pain is cramping and sharp, constant. Patient states the pain does not radiate anywhere. Patient has had nausea, vomiting and decreased PO intake. Patient has also had some episodes of diarrhea. Patient has tenderness noted to LUQ. Patient has bowel sounds present with soft abdomen. Patients VSS, HX of Hep C with admission to hospital in the last 6 months for abdominal pain. PIV access obtained. Patient remains monitored.       Liss Oliveros RN  11/27/18 6380

## 2019-03-20 ENCOUNTER — HOSPITAL ENCOUNTER (EMERGENCY)
Age: 44
Discharge: HOME | End: 2019-03-21
Attending: EMERGENCY MEDICINE
Payer: MEDICARE

## 2019-03-20 VITALS
WEIGHT: 140 LBS | RESPIRATION RATE: 16 BRPM | DIASTOLIC BLOOD PRESSURE: 77 MMHG | TEMPERATURE: 97.6 F | BODY MASS INDEX: 27.48 KG/M2 | HEART RATE: 70 BPM | SYSTOLIC BLOOD PRESSURE: 124 MMHG | OXYGEN SATURATION: 100 % | HEIGHT: 60 IN

## 2019-03-20 DIAGNOSIS — F10.920 ACUTE ALCOHOLIC INTOXICATION WITHOUT COMPLICATION (HCC): Primary | ICD-10-CM

## 2019-03-20 LAB
ABSOLUTE EOS #: 0.3 K/UL (ref 0–0.4)
ABSOLUTE IMMATURE GRANULOCYTE: ABNORMAL K/UL (ref 0–0.3)
ABSOLUTE LYMPH #: 3.4 K/UL (ref 1–4.8)
ABSOLUTE MONO #: 0.6 K/UL (ref 0.1–1.3)
ACETAMINOPHEN LEVEL: <5 UG/ML (ref 10–30)
ALBUMIN SERPL-MCNC: 3.9 G/DL (ref 3.5–5.2)
ALBUMIN/GLOBULIN RATIO: ABNORMAL (ref 1–2.5)
ALP BLD-CCNC: 66 U/L (ref 35–104)
ALT SERPL-CCNC: 48 U/L (ref 5–33)
ANION GAP SERPL CALCULATED.3IONS-SCNC: 16 MMOL/L (ref 9–17)
AST SERPL-CCNC: 40 U/L
BASOPHILS # BLD: 1 % (ref 0–2)
BASOPHILS ABSOLUTE: 0.1 K/UL (ref 0–0.2)
BILIRUB SERPL-MCNC: 0.17 MG/DL (ref 0.3–1.2)
BUN BLDV-MCNC: 4 MG/DL (ref 6–20)
BUN/CREAT BLD: ABNORMAL (ref 9–20)
CALCIUM SERPL-MCNC: 9 MG/DL (ref 8.6–10.4)
CHLORIDE BLD-SCNC: 107 MMOL/L (ref 98–107)
CO2: 19 MMOL/L (ref 20–31)
CREAT SERPL-MCNC: 0.73 MG/DL (ref 0.5–0.9)
DIFFERENTIAL TYPE: ABNORMAL
EOSINOPHILS RELATIVE PERCENT: 3 % (ref 0–4)
ETHANOL PERCENT: 0.17 %
ETHANOL: 175 MG/DL
GFR AFRICAN AMERICAN: >60 ML/MIN
GFR NON-AFRICAN AMERICAN: >60 ML/MIN
GFR SERPL CREATININE-BSD FRML MDRD: ABNORMAL ML/MIN/{1.73_M2}
GFR SERPL CREATININE-BSD FRML MDRD: ABNORMAL ML/MIN/{1.73_M2}
GLUCOSE BLD-MCNC: 124 MG/DL (ref 70–99)
HCT VFR BLD CALC: 39.1 % (ref 36–46)
HEMOGLOBIN: 12.2 G/DL (ref 12–16)
IMMATURE GRANULOCYTES: ABNORMAL %
LITHIUM DATE LAST DOSE: ABNORMAL
LITHIUM DOSE AMOUNT: ABNORMAL
LITHIUM DOSE TIME: ABNORMAL
LITHIUM LEVEL: 0.5 MMOL/L (ref 0.6–1.2)
LYMPHOCYTES # BLD: 41 % (ref 24–44)
MCH RBC QN AUTO: 25 PG (ref 26–34)
MCHC RBC AUTO-ENTMCNC: 31.1 G/DL (ref 31–37)
MCV RBC AUTO: 80.5 FL (ref 80–100)
MONOCYTES # BLD: 7 % (ref 1–7)
NRBC AUTOMATED: ABNORMAL PER 100 WBC
PDW BLD-RTO: 19.9 % (ref 11.5–14.9)
PLATELET # BLD: 312 K/UL (ref 150–450)
PLATELET ESTIMATE: ABNORMAL
PMV BLD AUTO: 7.5 FL (ref 6–12)
POTASSIUM SERPL-SCNC: 3.3 MMOL/L (ref 3.7–5.3)
RBC # BLD: 4.86 M/UL (ref 4–5.2)
RBC # BLD: ABNORMAL 10*6/UL
SALICYLATE LEVEL: <1 MG/DL (ref 3–10)
SEG NEUTROPHILS: 48 % (ref 36–66)
SEGMENTED NEUTROPHILS ABSOLUTE COUNT: 4 K/UL (ref 1.3–9.1)
SODIUM BLD-SCNC: 142 MMOL/L (ref 135–144)
TOTAL PROTEIN: 7.5 G/DL (ref 6.4–8.3)
WBC # BLD: 8.4 K/UL (ref 3.5–11)
WBC # BLD: ABNORMAL 10*3/UL

## 2019-03-20 PROCEDURE — G0480 DRUG TEST DEF 1-7 CLASSES: HCPCS

## 2019-03-20 PROCEDURE — 80307 DRUG TEST PRSMV CHEM ANLYZR: CPT

## 2019-03-20 PROCEDURE — 85025 COMPLETE CBC W/AUTO DIFF WBC: CPT

## 2019-03-20 PROCEDURE — 80178 ASSAY OF LITHIUM: CPT

## 2019-03-20 PROCEDURE — 80053 COMPREHEN METABOLIC PANEL: CPT

## 2019-03-20 PROCEDURE — 99284 EMERGENCY DEPT VISIT MOD MDM: CPT

## 2019-03-20 PROCEDURE — 6370000000 HC RX 637 (ALT 250 FOR IP): Performed by: EMERGENCY MEDICINE

## 2019-03-20 RX ORDER — QUETIAPINE FUMARATE 100 MG/1
100 TABLET, FILM COATED ORAL DAILY
Status: ON HOLD | COMMUNITY
End: 2019-06-20 | Stop reason: HOSPADM

## 2019-03-20 RX ORDER — LORAZEPAM 1 MG/1
1 TABLET ORAL ONCE
Status: COMPLETED | OUTPATIENT
Start: 2019-03-20 | End: 2019-03-20

## 2019-03-20 RX ORDER — LITHIUM CARBONATE 450 MG
450 TABLET, EXTENDED RELEASE ORAL 2 TIMES DAILY
Status: ON HOLD | COMMUNITY
End: 2019-06-20 | Stop reason: SDUPTHER

## 2019-03-20 RX ORDER — NICOTINE 21 MG/24HR
1 PATCH, TRANSDERMAL 24 HOURS TRANSDERMAL ONCE
Status: DISCONTINUED | OUTPATIENT
Start: 2019-03-20 | End: 2019-03-21 | Stop reason: HOSPADM

## 2019-03-20 RX ADMIN — LORAZEPAM 1 MG: 1 TABLET ORAL at 21:28

## 2019-04-04 ENCOUNTER — TELEPHONE (OUTPATIENT)
Dept: GASTROENTEROLOGY | Age: 44
End: 2019-04-04

## 2019-04-06 ENCOUNTER — TELEPHONE (OUTPATIENT)
Dept: GASTROENTEROLOGY | Age: 44
End: 2019-04-06

## 2019-04-16 NOTE — TELEPHONE ENCOUNTER
[place] [facility] mailed back np questionnaire, scanned. Called to schedule, no answer/no voicemail. Will need new patient office visit due to age and symptoms.

## 2019-05-31 ENCOUNTER — HOSPITAL ENCOUNTER (EMERGENCY)
Age: 44
Discharge: HOME | End: 2019-06-01
Attending: EMERGENCY MEDICINE
Payer: MEDICARE

## 2019-05-31 DIAGNOSIS — F10.920 ACUTE ALCOHOLIC INTOXICATION WITHOUT COMPLICATION (HCC): Primary | ICD-10-CM

## 2019-05-31 LAB
ACETAMINOPHEN LEVEL: <5 UG/ML (ref 10–30)
ETHANOL PERCENT: 0.39 %
ETHANOL: 385 MG/DL
SALICYLATE LEVEL: <1 MG/DL (ref 3–10)
TOXIC TRICYCLIC SC,BLOOD: NEGATIVE

## 2019-05-31 PROCEDURE — 99285 EMERGENCY DEPT VISIT HI MDM: CPT

## 2019-05-31 PROCEDURE — G0480 DRUG TEST DEF 1-7 CLASSES: HCPCS

## 2019-05-31 PROCEDURE — 80307 DRUG TEST PRSMV CHEM ANLYZR: CPT

## 2019-05-31 ASSESSMENT — ENCOUNTER SYMPTOMS
SHORTNESS OF BREATH: 0
ABDOMINAL PAIN: 0

## 2019-05-31 NOTE — ED PROVIDER NOTES
[facility] ED  Emergency Department Encounter  Emergency Medicine Resident     Patient Name: Deshawn Cornell  MRN: [id number]  [place]: 1975  Date of evaluation: 5/31/19  PCP:  Darien Shell MD    [address] [place] [address]       Chief Complaint   Patient presents with    Aggressive Behavior     Pt to ED per EMS, EMS called out by patients girlfriend for \"illness\" in route pt combative and hitting EMS staff, states had a lot to drink today, did crack last night, pt is homicidal and suicidal with plan to OD on pills, pt has been off her meds for several months, hallucinations present    Illness    Suicidal       HISTORY OF PRESENT ILLNESS  (Location/Symptom, Timing/Onset, Context/Setting, Quality, Duration, Modifying Factors, Severity.)      Deshawn Cornell is a 37 y.o. female who presents with multiple complaints. Patient arrived by EMS was called by her girlfriend. Patient arrived combative, screaming profanities. On assessment, the patient states that she is suicidal, and wants to overdose but has not attempted to do so today. Patient is also homicidal, and wants to use a knife to stab her sister and girlfriend. Patient denies any recreational drugs, but admits to drinking today. Patient also reports hallucinations but cannot tell me If they are visual or auditory. PAST MEDICAL / SURGICAL / SOCIAL / FAMILY HISTORY      has a past medical history of Alcoholic hepatitis without ascites, Alcoholism ([address] Anxiety, Asthma, Depression, Pneumonia, Polysubstance abuse ([address] Suicide attempt ([address] and Suicide ideation. has a past surgical history that includes Hysterectomy; Carpal tunnel release; Hysterectomy, total abdominal; Cholecystectomy; and Stomach surgery.     Social History     Socioeconomic History    Marital status: Single     Spouse name: Not on file    Number of children: 0    Years of education: 15    Highest education level: Not on file   Occupational History    Not on file   Social Needs    Financial resource strain: Not on file    Food insecurity:     Worry: Not on file     Inability: Not on file    Transportation needs:     Medical: Not on file     Non-medical: Not on file   Tobacco Use    Smoking status: Current Every Day Smoker     Packs/day: 1.00     Years: 30.00     Pack years: 30.00     Types: Cigarettes    Smokeless tobacco: Never Used    Tobacco comment: pt accepting of nicotine replacement   Substance and Sexual Activity    Alcohol use: Yes     Alcohol/week: 25.2 oz     Types: 42 Cans of beer per week     Comment: friends states \"a lot of everything\" daily    Drug use: Yes     Frequency: 7.0 times per week     Types: Marijuana, IV, Cocaine     Comment: drug abuse includes, cocaine, IV heroin, cannabis    Sexual activity: Not Currently   Lifestyle    Physical activity:     Days per week: Not on file     Minutes per session: Not on file    Stress: Not on file   Relationships    Social connections:     Talks on phone: Not on file     Gets together: Not on file     Attends Baptism service: Not on file     Active member of club or organization: Not on file     Attends meetings of clubs or organizations: Not on file     Relationship status: Not on file    Intimate partner violence:     Fear of current or ex partner: Not on file     Emotionally abused: Not on file     Physically abused: Not on file     Forced sexual activity: Not on file   Other Topics Concern    Not on file   Social History Narrative    ** Merged History Encounter **         ** Merged History Encounter **            Family History   Problem Relation Age of Onset    Brain Cancer Mother     Cancer Mother         \"female cancer\"    Heart Disease Father        Allergies:  Morphine; Morphine and related; Azithromycin; Morphine; Nsaids; and Zithromax [azithromycin]    Home Medications:  Prior to Admission medications    Medication Sig Start Date End Date Taking?  Authorizing Provider   lithium (ESKALITH) 450 MG extended release tablet Take 450 mg by mouth 2 times daily    Historical Provider, MD   QUEtiapine (SEROQUEL) 100 MG tablet Take 100 mg by mouth daily Indications: in morring    Historical Provider, MD   dicyclomine (BENTYL) 10 MG capsule Take 1 capsule by mouth every 6 hours as needed (cramps) 11/28/18   Butch Velasco,    ondansetron (ZOFRAN ODT) 4 MG disintegrating tablet Take 1 tablet by mouth every 8 hours as needed for Nausea 11/28/18   Butch Velasco DO   gabapentin (NEURONTIN) 300 MG capsule Take 1 capsule by mouth 3 times daily for 30 days. . 10/1/18 10/31/18  Lady Leanna MD   fluticasone (FLONASE) 50 MCG/ACT nasal spray 1 spray by Each Nare route daily 10/2/18   Lady Leanna MD   sodium chloride (OCEAN) 0.65 % nasal spray 1 spray by Nasal route as needed for Congestion 10/1/18   Lady Leanna MD   QUEtiapine (SEROQUEL) 200 MG tablet Take 1 tablet by mouth nightly  Patient taking differently: Take 400 mg by mouth nightly  10/1/18   Lady Leanna MD   omeprazole (PRILOSEC) 20 MG delayed release capsule Take 20 mg by mouth 2 times daily    Historical Provider, MD   albuterol sulfate  (90 Base) MCG/ACT inhaler Inhale 2 puffs into the lungs every 4 hours as needed for Wheezing    Historical Provider, MD   hydrOXYzine (ATARAX) 50 MG tablet Take 1 tablet by mouth 2 times daily as needed for Anxiety 8/2/18   Kendra Samuel MD   gabapentin (NEURONTIN) 400 MG capsule Take 1 capsule by mouth 2 times daily for 30 days. . 8/2/18 9/1/18  Kendra Samuel MD   Multiple Vitamins-Minerals (MULTIVITAMIN ADULT PO) Take 1 tablet by mouth daily    Historical Provider, MD       REVIEW OF SYSTEMS    (2-9 systems for level 4, 10 or more for level 5)      Review of Systems   Respiratory: Negative for shortness of breath. Cardiovascular: Negative for chest pain. Gastrointestinal: Negative for abdominal pain.    Psychiatric/Behavioral: Positive for behavioral problems and suicidal ideas.       PHYSICAL EXAM   (up to 7 for level 4, 8 or more for level 5)      INITIAL VITALS:   /65   Pulse 86   Temp 97.7 °F (36.5 °C)   Resp 16   Ht 5' 3\" (1.6 m)   Wt 140 lb (63.5 kg)   SpO2 99%   BMI 24.80 kg/m²     Physical Exam   Constitutional: She is oriented to person, place, and time. She appears well-developed and well-nourished. HENT:   Head: Normocephalic and atraumatic. Eyes: Right eye exhibits no discharge. Left eye exhibits no discharge. Neck: No tracheal deviation present. Cardiovascular: Regular rhythm and normal heart sounds. tachycardic   Pulmonary/Chest: Effort normal and breath sounds normal. No stridor. No respiratory distress. She has no wheezes. She has no rales. Abdominal: Soft. Bowel sounds are normal. She exhibits no distension. There is no tenderness. There is no guarding. Musculoskeletal: Normal range of motion. She exhibits no deformity. Neurological: She is alert and oriented to person, place, and time. Skin: Skin is warm and dry. She is not diaphoretic. Psychiatric: She is withdrawn and actively hallucinating. She expresses homicidal and suicidal ideation. She expresses suicidal plans and homicidal plans. WORK-UP     PLAN (LABS / IMAGING /EKG):  Orders Placed This Encounter   Procedures    TOX SCR, BLD, ED    Urine Drug Screen       MEDICATIONS ORDERED:  No orders of the defined types were placed in this encounter.       DIAGNOSTIC RESULTS / EMERGENCY DEPARTMENT COURSE /MDM / DIFFERENTIAL DIAGNOSIS     LABS:  Results for orders placed or performed during the hospital encounter of 05/31/19   TOX SCR, BLD, ED   Result Value Ref Range    Ethanol 385 (HH) <10 mg/dL    Ethanol percent 0.385 (H) <4.198 %    Salicylate Lvl <1 (L) 3 - 10 mg/dL    Acetaminophen Level <5 (L) 10 - 30 ug/mL    Toxic Tricyclic Sc,Blood NEGATIVE NEGATIVE       RADIOLOGY:  None    EKG  None    All EKG's are interpreted by the Emergency Department Physician who either signs or Co-signs this chart in the absence of a cardiologist.    DIFFERENTIAL DIAGNOSIS:  Suicidal +/- plan, homicidal +/- plan, medication non compliance, etoh intoxication, drug use    EMERGENCY DEPARTMENT COURSE & MDM:  37 y.o. female presents with a chief complaint of embedded behavior, suicidal and homicidal ideation with plan. Patient is also hallucinating. Vitals notable for hypertension and borderline tachycardia, otherwise stable. Patient arrived to the emergency department screaming profanities and combative, however once placed into a private room with female only staff, she has calmed down and is answering questions appropriately. No need for sedation at this time. We'll get alcohol level, toxicology panel and drug screen to further assess. Anticipate psychiatric evaluation once patient is sober. ED Course as of Thee 01 1640   Fri May 31, 2019   6259 Sober time 5am   Ethanol(!!): 385 [BJ]   Sat Jun 01, 2019   0058 Patient signed out to Dr. Lisa Zavala    [BJ]      ED Course User Index  [BJ] Greta Fowler DO       PROCEDURES:  None    CONSULTS:  None    CRITICAL CARE:  Please see attending physician note. FINAL IMPRESSION      1.  Acute alcoholic intoxication without complication [facility])          DISPOSITION / PLAN     DISPOSITION Decision To Discharge 06/01/2019 05:03:28 AM    PATIENT REFERRED TO:  Renny Casanova MD  [address] [place] [address]  [phone]    Schedule an appointment as soon as possible for a visit       [facility] ED  [address] [place] [address]  [phone]    As needed, If symptoms persist or worsen      DISCHARGE MEDICATIONS:  Discharge Medication List as of 6/1/2019  5:03 AM          Greta Fowler DO  Emergency Medicine Resident    (Please note that portions ofthis note were completed with a voice recognition program.  Efforts were made to edit the dictations but occasionally words are mis-transcribed.)        Greta Fowler DO  Resident  06/01/19 1640

## 2019-05-31 NOTE — ED PROVIDER NOTES
I performed a history and physical examination of the patient and discussed management with the resident. I reviewed the residents note and agree with the documented findings and plan of care. Any areas of disagreement are noted on the chart. I was personally present for the key portions of any procedures. I have documented in the chart those procedures where I was not present during the key portions. I have reviewed the emergency nurses triage note. I agree with the chief complaint, past medical history, past surgical history, allergies, medications, social and family history as documented unless otherwise noted below. Documentation of the HPI, Physical Exam and Medical Decision Making performed by medical students or scribes is based on my personal performance of the HPI, PE and MDM. For Phys Assistant/ Nurse Practitioner cases/documentation I have personally evaluated this patient and have completed at least one if not all key elements of the E/M (history, physical exam, and MDM). I find the patient's history and physical exam are consistent with the NP/PA documentation. I agree with the care provided, treatment rendered, disposition and followup plan. Additional findings are as noted. Gisele Avila. Raul Pratt MD  Attending Emergency  Physician    EMS [facility] AND [facility]. PATIENT WITH HX OF PSYCHIATRIC DISORDER, ETOH, CRACK ABUSE. PATIENT SLEEPING QUIETLY, EASILY AWAKENED, THEN AWAKE, ALERT, COOPERATIVE, RESPONSIVE. GCS-15. ADMITS TO ETOH TODAY, CRACK SEV DAYS AGO. NO HEADACHE, NAUSEA, VOMITING, CHEST/ABD PAIN. NO HX OF INJURY OR TRAUMA. ORIENTED INITIALLY  TO PERSON ONLY-THOUGHT SHE WAS AT HOME. SPEECH MILDLY DYSARTHRIC. NORMAL COMPREHENSION. LUNGS CLEAR TIFFANIE. CARDIAC-S1S2, RRR, NO MRG. ABD SOFT, NONDISTENDED, NONTENDER NORMAL BOWEL SOUNDS. SCALP ATRAUMATIC/NONTENDER. PERRL, EOMI. NECK NONTENDER. MOVING ALL EXTR'S NORMALLY. IMP-ETOH INTOXICATION.    PLAN-AWAIT SOBRIETY, REASSESS. Ally Hunter MD  05/31/19 6005      [id number]. WILL AWAIT SOBRIETY AND REASSESS. Ally Hunter MD  05/31/19 2040  SIGNED OUT TO DR. CATALAN.       Ally Hunter MD  06/01/19 2004

## 2019-05-31 NOTE — ED NOTES
Pt to ED per EMS, EMS called out by patients girlfriend for \"illness\" in route pt combative and hitting EMS staff, states had a lot to drink today, did crack last night, pt is homicidal and suicidal with plan to OD on pills, pt has been off her meds for several months, hallucinations present     Merlin Apgar, RN  [phone]

## 2019-05-31 NOTE — ED NOTES
search:     Persons present during search:   Results of search and disposition:       Searchers Name: security     These items or items similar should be removed from the room:   [] Chairs   [] Telephone   [] Trash cans and liners   [] Plastic utensils (order Patient Safety tray)   [] Empty or remove Sharps containers   [] All personal clothing/belongings removed   [] All unnecessary lead wires, electrical cords, draw cords, etc.   [] Flowers and plants   [] Double check for lighters, matches, razors, any glass items etc that can be used as weapons. Person completing Checklist: Fernanda Cerna       GENERAL INFORMATION     Y  N     [x] [] Has the patient been informed that they are on a watch and what that means? [x] [] Can the patient get out of Bed without nursing assistance? [x] [] Can the patient use the restroom without nursing assistance? [x] [] Can the patient walk the halls to [place] their legs? \"   [] [x] Does the patient have metal utensils? [x] [] Have the patient's belongings been placed out of control of the patient? [x] [] Have the patient and his/her belongings been checked for contraband? [x] [] Is the patient under any visitor restrictions? If Yes, explain:   [] [x] Is the patient under an alias? Alias Name:   Authorized visitors (no more than two are to be on the list)   Name/Relationship:   Name/Relationship:    Name of Staff member that you  Received this information from?:     General Description:    Jeaneth Hayes female 37 y.o. Admission weight: 140 lb (63.5 kg) Height: 5' 3\" (160 cm)  Race: [x]  [] Black  []   []   [] [place] [] Other  Facial Hair:  [] Yes  [x] No  If yes, please describe: Identifying Marks (i.e. Visible tattoos, scars, etc... ):     NURSING CARE PLAN    Nursing Diagnosis: Risk of Self Directed Harm  [] Actual  [x] Potential  Date Started: 5/31/19      Etiological Factors: (related to)  [x] Expressed or implied suicidal ideation/behavior  [x] Depression  [] Suicide attempt      [x] Low self-esteem  [x] Hallucinations      [x] Feeling of Hopelessness  [x] Substance abuse or withdrawal    [] Dysfunctional family  [] Major traumatic event, eg., divorce, etc   [] Excessive stress/anxiety    5/31/19    Expected Outcomes    Patient will:   [x] Patient will remain safe for the duration of their stay   [x] Patient's environment will be safe, eg. Free of potential suicide weapons   [] Verbalize Recovery from suicidal episode and improvement in self-worth   [x] Discuss feeling that precipitated suicide attempt/thoughts/behavior   [] Will describe available resources for crisis prevention and management   [] Will verbalize positive coping skills     Nursing Intervention   [x] Assessment and Observations hourly   [x] Suicide Precautions implemented with patient, should be 1:1 observation   [x] Document observation [id number] and RN assessment hourly   [] Consult physician for:    [] Psychiatric consult    [] Pharmacological therapy    [] Other:    [x] Patient search completed by security   [x] Initiated appropriate safety protocols by removing from the patient's environment anything that could be used to inflict self injury, eg. Order safe tray, snap gown, etc   [x] Maintain open, warm, caring, non-judgmental attitude/manner towards patient   [] Discuss advantages and disadvantages of existing coping methods/skills   [x] Assist and educate patient with identifying present strengths and coping skills   [x] Keep patient informed regarding plan of care and provide clear concise explanations. Provide the patient/family education information as well as telephone numbers and other information about crisis centers, hot lines, and counselors.     Discharge Planning:   [] Referral  [] Groups [] Health agencies  [] Other:          Allyssa Hall RN  05/31/19 5422

## 2019-06-01 VITALS
HEIGHT: 63 IN | WEIGHT: 140 LBS | DIASTOLIC BLOOD PRESSURE: 65 MMHG | SYSTOLIC BLOOD PRESSURE: 110 MMHG | HEART RATE: 86 BPM | BODY MASS INDEX: 24.8 KG/M2 | TEMPERATURE: 97.7 F | OXYGEN SATURATION: 99 % | RESPIRATION RATE: 16 BRPM

## 2019-06-01 NOTE — ED PROVIDER NOTES
EMERGENCY MEDICINE SIGN-OUT    EMERGENCY DEPARTMENT COURSE:     The pt taken in sign out she was extremely intoxicated on arrival stating she was going to kill someone but could not pull together a name. Will need re evaluated when sober for SI/HI. Current Medications:   Previous Medications    ALBUTEROL SULFATE  (90 BASE) MCG/ACT INHALER    Inhale 2 puffs into the lungs every 4 hours as needed for Wheezing    DICYCLOMINE (BENTYL) 10 MG CAPSULE    Take 1 capsule by mouth every 6 hours as needed (cramps)    FLUTICASONE (FLONASE) 50 MCG/ACT NASAL SPRAY    1 spray by Each Nare route daily    GABAPENTIN (NEURONTIN) 300 MG CAPSULE    Take 1 capsule by mouth 3 times daily for 30 days. Remona Mellow GABAPENTIN (NEURONTIN) 400 MG CAPSULE    Take 1 capsule by mouth 2 times daily for 30 days. Remona Mellow     HYDROXYZINE (ATARAX) 50 MG TABLET    Take 1 tablet by mouth 2 times daily as needed for Anxiety    LITHIUM (ESKALITH) 450 MG EXTENDED RELEASE TABLET    Take 450 mg by mouth 2 times daily    MULTIPLE VITAMINS-MINERALS (MULTIVITAMIN ADULT PO)    Take 1 tablet by mouth daily    OMEPRAZOLE (PRILOSEC) 20 MG DELAYED RELEASE CAPSULE    Take 20 mg by mouth 2 times daily    ONDANSETRON (ZOFRAN ODT) 4 MG DISINTEGRATING TABLET    Take 1 tablet by mouth every 8 hours as needed for Nausea    QUETIAPINE (SEROQUEL) 100 MG TABLET    Take 100 mg by mouth daily Indications: in morring    QUETIAPINE (SEROQUEL) 200 MG TABLET    Take 1 tablet by mouth nightly    SODIUM CHLORIDE (OCEAN) 0.65 % NASAL SPRAY    1 spray by Nasal route as needed for Congestion       Likely Diagnosis and Pending tests   Pending Sober reeval.    Probable DISPOSITION:       Pending re eval.  Possible home vs psych evalVanjaci James, DO  6/1/2019  12:29 AM            Matt Bedolla, DO  06/01/19 0030

## 2019-06-01 NOTE — ED NOTES
Met with pt at bedside at time of sobriety. Pt reports that she had an appointment at [facility] at 1900, but ended up in the hospital instead and does not know how. Pt is interested in rescheduling appointment at [facility]. Pt reports that she is seen at [facility] and has an appointment on 06/12/2019 with her psychiatrist. Pt reports having medications at home. Pt denies any suicidal ideation, homicidal ideation, hallucinations, delusions or psychosis. Pt has no other concerns at this time.       Kevin Restrepo [place]  06/01/19 8629

## 2019-06-01 NOTE — DISCHARGE INSTR - COC
Continuity of Care Form    Patient Name: Elvin Marroquin   :  1975  MRN:  [id number]    Admit date:  2019  Discharge date:  ***    Code Status Order: Prior   Advance Directives:     Admitting Physician:  No admitting provider for patient encounter. PCP: Benito Oliva MD    Discharging Nurse: [facility] Unit/Room#: [address]  Discharging Unit Phone Number: ***    Emergency Contact:   Extended Emergency Contact Information  Primary Emergency Contact: Jennifer Hull   [address] Phone: [phone]  Relation: Brother/Sister  Hearing or visual needs: None  Other needs: None  Preferred language: English   needed?  No  Secondary Emergency Contact: Lacy Mclain   [address] Phone: [phone]  Relation: Other    Past Surgical History:  Past Surgical History:   Procedure Laterality Date    CARPAL TUNNEL RELEASE      CHOLECYSTECTOMY      HYSTERECTOMY      HYSTERECTOMY, TOTAL ABDOMINAL      STOMACH SURGERY      gastric sleeve       Immunization History:   Immunization History   Administered Date(s) Administered    Influenza Vaccine, unspecified formulation 2016    Influenza, Quadv, 3 yrs and older, IM, PF (Fluzone 3 yrs and older or Afluria 5 yrs and older) 2017    Influenza, [place], 6 mo and older, IM, PF (Flulaval, Fluarix) 2018    Tdap (Boostrix, Adacel) 10/30/2017       Active Problems:  Patient Active Problem List   Diagnosis Code    History of gastric surgery (gastric sleeve ) Z98.890    Smoker unmotivated to quit F17.200    Alcoholism /alcohol abuse ([address] F10.20    Pneumonia of both lower lobes due to infectious organism ([address] J18.1    Asthma J45.909    Bipolar affective disorder, current episode mixed ([address] F31.60    Heroin dependence ([address] F11.20    Chronic post-traumatic stress disorder (PTSD) F43.12    Confusion R41.0    Major depression, recurrent ([address] F33.9    Depressed bipolar I disorder ([address] F31.9    Pneumonia J18.9    Polysubstance abuse ([facility] F19.10    Bipolar I disorder, current or most recent episode depressed, with psychotic features with mood-congruent psychotic features ([address] F31.5    Intentional drug overdose ([facility] T50.902A    Drug overdose, intentional self-harm, initial encounter ([facility] T50.902A    MDD (major depressive disorder), recurrent episode ([address] F33.9    Bipolar 1 disorder, depressed ([address] F31.9    Depression with suicidal ideation F32.9, R45.851    Severe depression ([address] F32.2    Toxic metabolic encephalopathy U39    Drug overdose, accidental or unintentional, initial encounter T50.901A    Acute respiratory failure with hypoxia and hypercapnia (HCC) J96.01, J96.02    Acute rhinosinusitis J01.90    Polysubstance abuse ([facility] F19.10    Post traumatic stress disorder F43.10    Dysthymia F34.1    Acute rhinosinusitis J01.90    Edison coma scale total score 3-8 (HCC) G99.8049    Alcoholic hepatitis without ascites K70.10    Alcohol withdrawal, uncomplicated (HCC) F00.495    Alcoholic ketoacidosis J13.5       Isolation/Infection:   Isolation          No Isolation            Nurse Assessment:  Last Vital Signs: BP (!) 139/95   Pulse 100   Temp 97.7 °F (36.5 °C)   Resp 20   Ht 5' 3\" (1.6 m)   Wt 140 lb (63.5 kg)   SpO2 100%   BMI 24.80 kg/m²     Last documented pain score (0-10 scale):    Last Weight:   Wt Readings from Last 1 Encounters:   05/31/19 140 lb (63.5 kg)     Mental Status:  {IP PT MENTAL STATUS:20030}    IV Access:  {[facility] IV ACCESS:852692181}    Nursing Mobility/ADLs:  Walking   {CHP DME FGNL:679094260}  Transfer  {CHP DME FCAS:091134744}  Bathing  {CHP DME BGYE:687262615}  Dressing  {CHP DME TCOW:873842613}  Toileting  {CHP DME FCMA:128206662}  Feeding  {P DME BVEE:769464140}  Med Admin  {[facility] RNKI:770439583}  Med Delivery   {[facility] MED Delivery:699623334}    Wound Care Documentation and Therapy:        Elimination:  Continence: · Bowel: {YES / LR:15661}  · Bladder: {YES / RI:84633}  Urinary Catheter: {Urinary Catheter:227826492}   Colostomy/Ileostomy/Ileal Conduit: {YES / XK:25801}       Date of Last BM: ***  No intake or output data in the 24 hours ending 19 0058  No intake/output data recorded.     Safety Concerns:     [address] [facility] Safety Concerns:581637812}    Impairments/Disabilities:      [address] [facility] Impairments/Disabilities:978846321}    Nutrition Therapy:  Current Nutrition Therapy:   [facility] Diet List:099497554}    Routes of Feeding: {CHP DME Other Feedings:227205640}  Liquids: {Slp liquid thickness:97025}  Daily Fluid Restriction: {CHP DME Yes amt example:561735541}  Last Modified Barium Swallow with Video (Video Swallowing Test): {Done Not Done ABDU:156706787}    Treatments at the Time of Hospital Discharge:   Respiratory Treatments: ***  Oxygen Therapy:  {Therapy; copd oxygen:13457}  Ventilator:    { CC Vent WYHN:822001880}    Rehab Therapies: {THERAPEUTIC INTERVENTION:2004261639}  Weight Bearing Status/Restrictions: [address] [place] [address] Weight Bearin}  Other Medical Equipment (for information only, NOT a DME order):  {EQUIPMENT:495354116}  Other Treatments: ***    Patient's personal belongings (please select all that are sent with patient):  {CHP DME Belongings:226855777}    RN SIGNATURE:  {Esignature:586106972}    CASE MANAGEMENT/SOCIAL WORK SECTION    Inpatient Status Date: ***    Readmission Risk Assessment Score:  Readmission Risk              Risk of Unplanned Readmission:        0           Discharging to Facility/ Agency   · Name:   · Address:  · Phone:  · Fax:    Dialysis Facility (if applicable)   · Name:  · Address:  · Dialysis Schedule:  · Phone:  · Fax:    / signature: {Esignature:358663098}    PHYSICIAN SECTION    Prognosis: {Prognosis:3749215440}    Condition at Discharge: [address] Patient Condition:203284458}    Rehab Potential (if transferring to Rehab): {Prognosis:8761608010}    Recommended Labs or Other Treatments After Discharge: ***    Physician Certification: I certify the above information and transfer of Oanh Liriano  is necessary for the continuing treatment of the diagnosis listed and that she requires {Admit to Appropriate Level of Care:92324} for {GREATER/LESS:452274474} 30 days.      Update Admission H&P: {CHP DME Changes in OBRRU:756920441}    PHYSICIAN SIGNATURE:  {Esignature:041762002}
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
## 2019-06-11 ENCOUNTER — HOSPITAL ENCOUNTER (INPATIENT)
Age: 44
LOS: 9 days | Discharge: HOME | DRG: 885 | End: 2019-06-21
Attending: EMERGENCY MEDICINE | Admitting: PSYCHIATRY & NEUROLOGY
Payer: MEDICARE

## 2019-06-11 DIAGNOSIS — R45.851 SUICIDAL IDEATION: Primary | ICD-10-CM

## 2019-06-11 PROCEDURE — 84703 CHORIONIC GONADOTROPIN ASSAY: CPT

## 2019-06-11 PROCEDURE — 80307 DRUG TEST PRSMV CHEM ANLYZR: CPT

## 2019-06-11 PROCEDURE — 85025 COMPLETE CBC W/AUTO DIFF WBC: CPT

## 2019-06-11 PROCEDURE — 99285 EMERGENCY DEPT VISIT HI MDM: CPT

## 2019-06-11 PROCEDURE — G0480 DRUG TEST DEF 1-7 CLASSES: HCPCS

## 2019-06-11 PROCEDURE — 80053 COMPREHEN METABOLIC PANEL: CPT

## 2019-06-11 PROCEDURE — 36415 COLL VENOUS BLD VENIPUNCTURE: CPT

## 2019-06-11 RX ORDER — TRAZODONE HYDROCHLORIDE 50 MG/1
50 TABLET ORAL NIGHTLY
Status: ON HOLD | COMMUNITY
End: 2019-06-20 | Stop reason: HOSPADM

## 2019-06-12 PROBLEM — F31.9 BIPOLAR AFFECTIVE DISORDER (HCC): Status: ACTIVE | Noted: 2019-06-12

## 2019-06-12 PROBLEM — F31.9 BIPOLAR 1 DISORDER (HCC): Status: ACTIVE | Noted: 2019-06-12

## 2019-06-12 LAB
ABSOLUTE EOS #: 0.12 K/UL (ref 0–0.4)
ABSOLUTE IMMATURE GRANULOCYTE: ABNORMAL K/UL (ref 0–0.3)
ABSOLUTE LYMPH #: 3.36 K/UL (ref 1–4.8)
ABSOLUTE MONO #: 0.52 K/UL (ref 0.1–1.3)
ACETAMINOPHEN LEVEL: <5 UG/ML (ref 10–30)
ALBUMIN SERPL-MCNC: 4.1 G/DL (ref 3.5–5.2)
ALBUMIN/GLOBULIN RATIO: ABNORMAL (ref 1–2.5)
ALP BLD-CCNC: 79 U/L (ref 35–104)
ALT SERPL-CCNC: 82 U/L (ref 5–33)
AMPHETAMINE SCREEN URINE: NEGATIVE
ANION GAP SERPL CALCULATED.3IONS-SCNC: 15 MMOL/L (ref 9–17)
AST SERPL-CCNC: 94 U/L
BARBITURATE SCREEN URINE: NEGATIVE
BASOPHILS # BLD: 0 % (ref 0–2)
BASOPHILS ABSOLUTE: 0 K/UL (ref 0–0.2)
BENZODIAZEPINE SCREEN, URINE: POSITIVE
BILIRUB SERPL-MCNC: 0.17 MG/DL (ref 0.3–1.2)
BILIRUBIN URINE: NEGATIVE
BUN BLDV-MCNC: 5 MG/DL (ref 6–20)
BUN/CREAT BLD: ABNORMAL (ref 9–20)
BUPRENORPHINE URINE: ABNORMAL
CALCIUM SERPL-MCNC: 9.1 MG/DL (ref 8.6–10.4)
CANNABINOID SCREEN URINE: NEGATIVE
CHLORIDE BLD-SCNC: 106 MMOL/L (ref 98–107)
CO2: 23 MMOL/L (ref 20–31)
COCAINE METABOLITE, URINE: NEGATIVE
COLOR: YELLOW
COMMENT UA: NORMAL
CREAT SERPL-MCNC: 0.71 MG/DL (ref 0.5–0.9)
DIFFERENTIAL TYPE: ABNORMAL
EOSINOPHILS RELATIVE PERCENT: 2 % (ref 0–4)
ETHANOL PERCENT: 0.27 %
ETHANOL: 269 MG/DL
GFR AFRICAN AMERICAN: >60 ML/MIN
GFR NON-AFRICAN AMERICAN: >60 ML/MIN
GFR SERPL CREATININE-BSD FRML MDRD: ABNORMAL ML/MIN/{1.73_M2}
GFR SERPL CREATININE-BSD FRML MDRD: ABNORMAL ML/MIN/{1.73_M2}
GLUCOSE BLD-MCNC: 129 MG/DL (ref 70–99)
GLUCOSE URINE: NEGATIVE
HCG QUALITATIVE: NEGATIVE
HCT VFR BLD CALC: 41.1 % (ref 36–46)
HEMOGLOBIN: 12.9 G/DL (ref 12–16)
IMMATURE GRANULOCYTES: ABNORMAL %
KETONES, URINE: NEGATIVE
LEUKOCYTE ESTERASE, URINE: NEGATIVE
LYMPHOCYTES # BLD: 58 % (ref 24–44)
MCH RBC QN AUTO: 24.5 PG (ref 26–34)
MCHC RBC AUTO-ENTMCNC: 31.3 G/DL (ref 31–37)
MCV RBC AUTO: 78.2 FL (ref 80–100)
MDMA URINE: ABNORMAL
METHADONE SCREEN, URINE: NEGATIVE
METHAMPHETAMINE, URINE: ABNORMAL
MONOCYTES # BLD: 9 % (ref 1–7)
MORPHOLOGY: ABNORMAL
NITRITE, URINE: NEGATIVE
NRBC AUTOMATED: ABNORMAL PER 100 WBC
OPIATES, URINE: NEGATIVE
OXYCODONE SCREEN URINE: NEGATIVE
PDW BLD-RTO: 19.8 % (ref 11.5–14.9)
PH UA: 7 (ref 5–8)
PHENCYCLIDINE, URINE: NEGATIVE
PLATELET # BLD: 321 K/UL (ref 150–450)
PLATELET ESTIMATE: ABNORMAL
PMV BLD AUTO: 8 FL (ref 6–12)
POTASSIUM SERPL-SCNC: 4 MMOL/L (ref 3.7–5.3)
PROPOXYPHENE, URINE: ABNORMAL
PROTEIN UA: NEGATIVE
RBC # BLD: 5.25 M/UL (ref 4–5.2)
RBC # BLD: ABNORMAL 10*6/UL
SALICYLATE LEVEL: <1 MG/DL (ref 3–10)
SEG NEUTROPHILS: 31 % (ref 36–66)
SEGMENTED NEUTROPHILS ABSOLUTE COUNT: 1.8 K/UL (ref 1.3–9.1)
SODIUM BLD-SCNC: 144 MMOL/L (ref 135–144)
SPECIFIC GRAVITY UA: 1.01 (ref 1–1.03)
TEST INFORMATION: ABNORMAL
TOTAL PROTEIN: 8.1 G/DL (ref 6.4–8.3)
TRICYCLIC ANTIDEPRESSANTS, UR: ABNORMAL
TURBIDITY: CLEAR
URINE HGB: NEGATIVE
UROBILINOGEN, URINE: NORMAL
WBC # BLD: 5.8 K/UL (ref 3.5–11)
WBC # BLD: ABNORMAL 10*3/UL

## 2019-06-12 PROCEDURE — 80307 DRUG TEST PRSMV CHEM ANLYZR: CPT

## 2019-06-12 PROCEDURE — 6370000000 HC RX 637 (ALT 250 FOR IP): Performed by: PSYCHIATRY & NEUROLOGY

## 2019-06-12 PROCEDURE — 1240000000 HC EMOTIONAL WELLNESS R&B

## 2019-06-12 PROCEDURE — 81003 URINALYSIS AUTO W/O SCOPE: CPT

## 2019-06-12 PROCEDURE — 6370000000 HC RX 637 (ALT 250 FOR IP): Performed by: EMERGENCY MEDICINE

## 2019-06-12 RX ORDER — QUETIAPINE FUMARATE 200 MG/1
200 TABLET, FILM COATED ORAL NIGHTLY
Status: DISCONTINUED | OUTPATIENT
Start: 2019-06-12 | End: 2019-06-14

## 2019-06-12 RX ORDER — DIPHENHYDRAMINE HCL 25 MG
25 TABLET ORAL NIGHTLY PRN
Status: DISCONTINUED | OUTPATIENT
Start: 2019-06-12 | End: 2019-06-21 | Stop reason: HOSPADM

## 2019-06-12 RX ORDER — LORAZEPAM 1 MG/1
2 TABLET ORAL ONCE
Status: COMPLETED | OUTPATIENT
Start: 2019-06-12 | End: 2019-06-12

## 2019-06-12 RX ORDER — NICOTINE 21 MG/24HR
1 PATCH, TRANSDERMAL 24 HOURS TRANSDERMAL DAILY
Status: DISCONTINUED | OUTPATIENT
Start: 2019-06-12 | End: 2019-06-21 | Stop reason: HOSPADM

## 2019-06-12 RX ORDER — TRAZODONE HYDROCHLORIDE 50 MG/1
50 TABLET ORAL NIGHTLY
Status: DISCONTINUED | OUTPATIENT
Start: 2019-06-12 | End: 2019-06-18

## 2019-06-12 RX ORDER — OXCARBAZEPINE 150 MG/1
150 TABLET, FILM COATED ORAL 2 TIMES DAILY
Status: ON HOLD | COMMUNITY
End: 2019-06-20 | Stop reason: SDUPTHER

## 2019-06-12 RX ORDER — FLUTICASONE PROPIONATE 50 MCG
1 SPRAY, SUSPENSION (ML) NASAL DAILY
Status: DISCONTINUED | OUTPATIENT
Start: 2019-06-12 | End: 2019-06-21 | Stop reason: HOSPADM

## 2019-06-12 RX ORDER — PANTOPRAZOLE SODIUM 40 MG/1
40 TABLET, DELAYED RELEASE ORAL
Status: DISCONTINUED | OUTPATIENT
Start: 2019-06-13 | End: 2019-06-12

## 2019-06-12 RX ORDER — HYDROXYZINE 50 MG/1
50 TABLET, FILM COATED ORAL 2 TIMES DAILY PRN
Status: DISCONTINUED | OUTPATIENT
Start: 2019-06-12 | End: 2019-06-21 | Stop reason: HOSPADM

## 2019-06-12 RX ORDER — ACETAMINOPHEN 325 MG/1
650 TABLET ORAL EVERY 4 HOURS PRN
Status: DISCONTINUED | OUTPATIENT
Start: 2019-06-12 | End: 2019-06-21 | Stop reason: HOSPADM

## 2019-06-12 RX ORDER — GABAPENTIN 300 MG/1
300 CAPSULE ORAL 3 TIMES DAILY
Status: DISCONTINUED | OUTPATIENT
Start: 2019-06-12 | End: 2019-06-21 | Stop reason: HOSPADM

## 2019-06-12 RX ORDER — OXCARBAZEPINE 300 MG/1
150 TABLET, FILM COATED ORAL 2 TIMES DAILY
Status: DISCONTINUED | OUTPATIENT
Start: 2019-06-12 | End: 2019-06-21 | Stop reason: HOSPADM

## 2019-06-12 RX ORDER — ALBUTEROL SULFATE 90 UG/1
2 AEROSOL, METERED RESPIRATORY (INHALATION) EVERY 4 HOURS PRN
Status: DISCONTINUED | OUTPATIENT
Start: 2019-06-12 | End: 2019-06-21 | Stop reason: HOSPADM

## 2019-06-12 RX ORDER — LITHIUM CARBONATE 450 MG
450 TABLET, EXTENDED RELEASE ORAL 2 TIMES DAILY
Status: DISCONTINUED | OUTPATIENT
Start: 2019-06-12 | End: 2019-06-21 | Stop reason: HOSPADM

## 2019-06-12 RX ORDER — DICYCLOMINE HYDROCHLORIDE 10 MG/1
10 CAPSULE ORAL EVERY 6 HOURS PRN
Status: DISCONTINUED | OUTPATIENT
Start: 2019-06-12 | End: 2019-06-21 | Stop reason: HOSPADM

## 2019-06-12 RX ORDER — THIAMINE MONONITRATE (VIT B1) 100 MG
100 TABLET ORAL DAILY
Status: DISCONTINUED | OUTPATIENT
Start: 2019-06-12 | End: 2019-06-21 | Stop reason: HOSPADM

## 2019-06-12 RX ORDER — M-VIT,TX,IRON,MINS/CALC/FOLIC 27MG-0.4MG
1 TABLET ORAL DAILY
Status: DISCONTINUED | OUTPATIENT
Start: 2019-06-12 | End: 2019-06-21 | Stop reason: HOSPADM

## 2019-06-12 RX ORDER — MAGNESIUM HYDROXIDE/ALUMINUM HYDROXICE/SIMETHICONE 120; 1200; 1200 MG/30ML; MG/30ML; MG/30ML
30 SUSPENSION ORAL PRN
Status: DISCONTINUED | OUTPATIENT
Start: 2019-06-12 | End: 2019-06-21 | Stop reason: HOSPADM

## 2019-06-12 RX ORDER — ONDANSETRON 4 MG/1
4 TABLET, ORALLY DISINTEGRATING ORAL EVERY 8 HOURS PRN
Status: DISCONTINUED | OUTPATIENT
Start: 2019-06-12 | End: 2019-06-21 | Stop reason: HOSPADM

## 2019-06-12 RX ORDER — PANTOPRAZOLE SODIUM 40 MG/1
40 TABLET, DELAYED RELEASE ORAL
Status: DISCONTINUED | OUTPATIENT
Start: 2019-06-12 | End: 2019-06-21 | Stop reason: HOSPADM

## 2019-06-12 RX ORDER — BENZTROPINE MESYLATE 1 MG/ML
2 INJECTION INTRAMUSCULAR; INTRAVENOUS 2 TIMES DAILY PRN
Status: DISCONTINUED | OUTPATIENT
Start: 2019-06-12 | End: 2019-06-21 | Stop reason: HOSPADM

## 2019-06-12 RX ORDER — CHLORDIAZEPOXIDE HYDROCHLORIDE 25 MG/1
25 CAPSULE, GELATIN COATED ORAL 2 TIMES DAILY
Status: COMPLETED | OUTPATIENT
Start: 2019-06-12 | End: 2019-06-16

## 2019-06-12 RX ADMIN — GABAPENTIN 300 MG: 300 CAPSULE ORAL at 14:32

## 2019-06-12 RX ADMIN — ALUMINUM HYDROXIDE, MAGNESIUM HYDROXIDE, AND SIMETHICONE 30 ML: 200; 200; 20 SUSPENSION ORAL at 17:27

## 2019-06-12 RX ADMIN — QUETIAPINE FUMARATE 200 MG: 200 TABLET ORAL at 20:47

## 2019-06-12 RX ADMIN — OXCARBAZEPINE 150 MG: 300 TABLET, FILM COATED ORAL at 20:47

## 2019-06-12 RX ADMIN — LORAZEPAM 2 MG: 1 TABLET ORAL at 09:22

## 2019-06-12 RX ADMIN — GABAPENTIN 300 MG: 300 CAPSULE ORAL at 20:47

## 2019-06-12 RX ADMIN — THIAMINE HCL TAB 100 MG 100 MG: 100 TAB at 12:37

## 2019-06-12 RX ADMIN — TRAZODONE HYDROCHLORIDE 50 MG: 50 TABLET ORAL at 20:47

## 2019-06-12 RX ADMIN — CHLORDIAZEPOXIDE HYDROCHLORIDE 25 MG: 25 CAPSULE ORAL at 12:37

## 2019-06-12 RX ADMIN — OXCARBAZEPINE 150 MG: 300 TABLET, FILM COATED ORAL at 12:38

## 2019-06-12 RX ADMIN — LITHIUM CARBONATE 450 MG: 450 TABLET, EXTENDED RELEASE ORAL at 20:47

## 2019-06-12 RX ADMIN — CHLORDIAZEPOXIDE HYDROCHLORIDE 25 MG: 25 CAPSULE ORAL at 20:47

## 2019-06-12 RX ADMIN — ONDANSETRON 4 MG: 4 TABLET, ORALLY DISINTEGRATING ORAL at 18:38

## 2019-06-12 RX ADMIN — PANTOPRAZOLE SODIUM 40 MG: 40 TABLET, DELAYED RELEASE ORAL at 17:45

## 2019-06-12 RX ADMIN — MULTIPLE VITAMINS W/ MINERALS TAB 1 TABLET: TAB at 12:38

## 2019-06-12 RX ADMIN — LITHIUM CARBONATE 450 MG: 450 TABLET, EXTENDED RELEASE ORAL at 12:37

## 2019-06-12 ASSESSMENT — LIFESTYLE VARIABLES
HISTORY_ALCOHOL_USE: YES
HISTORY_ALCOHOL_USE: YES

## 2019-06-12 ASSESSMENT — ENCOUNTER SYMPTOMS
ABDOMINAL PAIN: 0
VOMITING: 0
DIARRHEA: 0
EYE REDNESS: 0
BACK PAIN: 0
COUGH: 0
SORE THROAT: 0
EYE PAIN: 0
SHORTNESS OF BREATH: 0
NAUSEA: 0
CHEST TIGHTNESS: 0
CONSTIPATION: 0

## 2019-06-12 ASSESSMENT — SLEEP AND FATIGUE QUESTIONNAIRES
DO YOU USE A SLEEP AID: YES
SLEEP PATTERN: DIFFICULTY FALLING ASLEEP
DIFFICULTY FALLING ASLEEP: YES
DO YOU HAVE DIFFICULTY SLEEPING: YES
DIFFICULTY FALLING ASLEEP: YES
RESTFUL SLEEP: NO
AVERAGE NUMBER OF SLEEP HOURS: 7
RESTFUL SLEEP: NO
DO YOU USE A SLEEP AID: YES
SLEEP PATTERN: DIFFICULTY FALLING ASLEEP
AVERAGE NUMBER OF SLEEP HOURS: 7
DIFFICULTY ARISING: YES
DO YOU HAVE DIFFICULTY SLEEPING: YES
DIFFICULTY ARISING: YES
DIFFICULTY STAYING ASLEEP: NO
DIFFICULTY STAYING ASLEEP: NO

## 2019-06-12 ASSESSMENT — PAIN SCALES - GENERAL: PAINLEVEL_OUTOF10: 0

## 2019-06-12 ASSESSMENT — PATIENT HEALTH QUESTIONNAIRE - PHQ9: SUM OF ALL RESPONSES TO PHQ QUESTIONS 1-9: 13
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
## 2019-06-12 NOTE — GROUP NOTE
Group Therapy Note    Date: June 12    Group Start Time: 1100  Group End Time: 3375  Group Topic: Psychotherapy    GORGE Jaramillo LPC        Group Therapy Note    Patient declined to attend psychotherapy at 1100 am despite encouragement. Patient was offered to meet after group for a 1:1 and refused.               Signature:  Amadou Vaughan LPC

## 2019-06-12 NOTE — BH NOTE
`[facility]  Admission Note     Admission Type:   Admission Type: Voluntary    Reason for admission:  Reason for Admission: Suicidal ideation to overdose and cut wrists.     PATIENT STRENGTHS:  Strengths: Connection to output provider, Social Skills    Patient Strengths and Limitations:  Limitations: Difficulty problem solving/relies on others to help solve problems, Inappropriate/potentially harmful leisure interests    Addictive Behavior:   Addictive Behavior  In the past 3 months, have you felt or has someone told you that you have a problem with:  : None  Do you have a history of Chemical Use?: No  Do you have a history of Alcohol Use?: Yes  Do you have a history of Street Drug Abuse?: No  Histroy of Prescripton Drug Abuse?: No    Medical Problems:   Past Medical History:   Diagnosis Date    Alcoholic hepatitis without ascites 10/20/2018    Alcoholism ([facility]     3 pints daily    Anxiety     Asthma     Depression     Pneumonia     Polysubstance abuse ([facility]     drug abuse includes, cocaine, IV heroin, cannabis, and ETOH    Suicide attempt ([facility] 11/08/2017    S/A by overdosing on Trazodone and Seroquel    Suicide ideation        Status EXAM:  Status and Exam  Normal: No  Facial Expression: Flat  Affect: Blunt  Level of Consciousness: Alert  Mood:Normal: No  Mood: Depressed, Anxious  Motor Activity:Normal: Yes  Interview Behavior: Cooperative  Preception: Palmer to Person, Eulice Rell to Time, Palmer to Place, Palmer to Situation  Thought Content:Normal: No  Thought Content: Poverty of Content  Hallucinations: None  Delusions: No  Memory:Normal: Yes  Insight and Judgment: No  Insight and Judgment: Poor Judgment, Poor Insight  Present Suicidal Ideation: No  Present Homicidal Ideation: No    Tobacco Screening:  Practical Counseling, on admission, ainsley X, if applicable and completed (first 3 are required if patient doesn't refuse):            ( )  Recognizing danger situations (included triggers and Patient denies auditory and visual hallucinations. Patient admits to use of alcohol, but denies drug use. Patient was cooperative with the interview. Patient was oriented to the unit.                 James Howe RN

## 2019-06-12 NOTE — BH NOTE
Patient given tobacco quitline number 92633078910 at this time, refusing to call at this time, states \" I just dont want to quit now\"- patient given information as to the dangers of long term tobacco use. Continue to reinforce the importance of tobacco cessation.

## 2019-06-12 NOTE — ED NOTES
Patient is appears to be intoxicated. Writer will determine appropriate time for psychiatric evaluation time based on blood alcohol level results. Patient is currently sleeping in the recliner.

## 2019-06-12 NOTE — ED NOTES
Patient sleeping in chair of KAELYN, respirations even non-labored.       Luis Buenrostro, STEFANI  06/12/19 3146

## 2019-06-12 NOTE — ED PROVIDER NOTES
MCG/ACT INHALER    Inhale 2 puffs into the lungs every 4 hours as needed for Wheezing    DICYCLOMINE (BENTYL) 10 MG CAPSULE    Take 1 capsule by mouth every 6 hours as needed (cramps)    FLUTICASONE (FLONASE) 50 MCG/ACT NASAL SPRAY    1 spray by Each Nare route daily    GABAPENTIN (NEURONTIN) 300 MG CAPSULE    Take 1 capsule by mouth 3 times daily for 30 days. Herstirsol Broach GABAPENTIN (NEURONTIN) 400 MG CAPSULE    Take 1 capsule by mouth 2 times daily for 30 days. Herstirsol Broach HYDROXYZINE (ATARAX) 50 MG TABLET    Take 1 tablet by mouth 2 times daily as needed for Anxiety    LITHIUM (ESKALITH) 450 MG EXTENDED RELEASE TABLET    Take 450 mg by mouth 2 times daily    MULTIPLE VITAMINS-MINERALS (MULTIVITAMIN ADULT PO)    Take 1 tablet by mouth daily    OMEPRAZOLE (PRILOSEC) 20 MG DELAYED RELEASE CAPSULE    Take 20 mg by mouth 2 times daily    ONDANSETRON (ZOFRAN ODT) 4 MG DISINTEGRATING TABLET    Take 1 tablet by mouth every 8 hours as needed for Nausea    QUETIAPINE (SEROQUEL) 100 MG TABLET    Take 100 mg by mouth daily Indications: in morring    QUETIAPINE (SEROQUEL) 200 MG TABLET    Take 1 tablet by mouth nightly    SODIUM CHLORIDE (OCEAN) 0.65 % NASAL SPRAY    1 spray by Nasal route as needed for Congestion    TRAZODONE (DESYREL) 50 MG TABLET    Take 50 mg by mouth nightly     ALLERGIES     is allergic to morphine; morphine and related; azithromycin; morphine; nsaids; and zithromax [azithromycin]. FAMILY HISTORY     indicated that her mother is . She indicated that her father is . SOCIALHISTORY      reports that she has been smoking cigarettes. She has a 30.00 pack-year smoking history. She has never used smokeless tobacco. She reports that she drinks about 25.2 oz of alcohol per week. She reports that she has current or past drug history. Drugs: Marijuana, IV, and Cocaine. Frequency: 7.00 times per week.   PHYSICAL EXAM     INITIAL VITALS: /82   Pulse 109   Temp 97.8 °F (36.6 °C) (Oral)   Resp 16   Ht 5' (1.524 m)   Wt 130 lb (59 kg)   SpO2 98%   BMI 25.39 kg/m²    Physical Exam   Constitutional: She is oriented to person, place, and time. She appears well-nourished. Sleeping in exam chair in no acute distress. HENT:   Head: Normocephalic and atraumatic. Right Ear: External ear normal.   Left Ear: External ear normal.   Mouth/Throat: Oropharynx is clear and moist.   Eyes: Pupils are equal, round, and reactive to light. Conjunctivae and EOM are normal. Left eye exhibits no discharge. Neck: Normal range of motion. Neck supple. No JVD present. No tracheal deviation present. Cardiovascular: Normal rate, regular rhythm and normal heart sounds. Pulmonary/Chest: Effort normal and breath sounds normal. No stridor. No respiratory distress. Abdominal: Soft. Bowel sounds are normal. There is no tenderness. Musculoskeletal: Normal range of motion. She exhibits no tenderness or deformity. Neurological: She is alert and oriented to person, place, and time. No cranial nerve deficit. Coordination normal.   Skin: Skin is warm and dry. Psychiatric:   Tearful. Nursing note and vitals reviewed. MEDICAL DECISION MAKING:   Assessment:  37 y.o. female presents with suicidal ideations    ED Course/MDM:   Patient arrived hemodynamically stable and in no acute distress. Patient's previous imaging and studies reviewed. Orders and consult placed as above. Labs reviewed. Ethanol elevated. ASA, APAP negative. Re-evaluated when sober and persistent SI. We will give PO ativan given previous history of alcohol w/d but not actively withdrawing at this time. Medically clear. Plan to admit to [facility].           Procedures    DIAGNOSTIC RESULTS   EKG: All EKG's are interpreted by the Emergency Department Physician who either signs or Co-signs this chart inthe absence of a cardiologist.      RADIOLOGY:All plain film, CT, MRI, and formal ultrasound images (except ED bedside ultrasound) are read by the radiologist, see

## 2019-06-12 NOTE — GROUP NOTE
Group Therapy Note    Date: June 12    Group Start Time: 1330  Group End Time: 2048  Group Topic: Recovery    [facility]    PORFIRIO Albarado, FREDERICK    patient refused to attend Recovery group at 1:30 pm after encouragement from staff.   1:1 talk time provided as alternative to group session            Signature:  PORFIRIO Albarado LSW

## 2019-06-12 NOTE — ED NOTES
This writer was provided report from night YARITZA. Patient sober at 0730. Writer met with patient to re-evaluate current mental health needs. Patient discloses she still feels upset. Patient states she had plans take pills and cut herself last night. Patient states a friend came over and was able to encourage her to come to the hospital. Patient reports she continues to have suicidal ideations with a plan. Patient states she is unsure of what \"set her off\" but states she still feels depressed. YARITZA will consult with Dr. Emmy Busch.

## 2019-06-13 LAB
ABSOLUTE EOS #: 0.2 K/UL (ref 0–0.4)
ABSOLUTE IMMATURE GRANULOCYTE: ABNORMAL K/UL (ref 0–0.3)
ABSOLUTE LYMPH #: 1.5 K/UL (ref 1–4.8)
ABSOLUTE MONO #: 0.8 K/UL (ref 0.1–1.3)
ALBUMIN SERPL-MCNC: 3.6 G/DL (ref 3.5–5.2)
ALBUMIN/GLOBULIN RATIO: ABNORMAL (ref 1–2.5)
ALP BLD-CCNC: 65 U/L (ref 35–104)
ALT SERPL-CCNC: 59 U/L (ref 5–33)
ANION GAP SERPL CALCULATED.3IONS-SCNC: 8 MMOL/L (ref 9–17)
AST SERPL-CCNC: 60 U/L
BASOPHILS # BLD: 1 % (ref 0–2)
BASOPHILS ABSOLUTE: 0 K/UL (ref 0–0.2)
BILIRUB SERPL-MCNC: 0.32 MG/DL (ref 0.3–1.2)
BUN BLDV-MCNC: 8 MG/DL (ref 6–20)
BUN/CREAT BLD: ABNORMAL (ref 9–20)
CALCIUM SERPL-MCNC: 8.8 MG/DL (ref 8.6–10.4)
CHLORIDE BLD-SCNC: 103 MMOL/L (ref 98–107)
CHOLESTEROL/HDL RATIO: 3.5
CHOLESTEROL: 196 MG/DL
CO2: 29 MMOL/L (ref 20–31)
CREAT SERPL-MCNC: 0.67 MG/DL (ref 0.5–0.9)
DIFFERENTIAL TYPE: ABNORMAL
EOSINOPHILS RELATIVE PERCENT: 3 % (ref 0–4)
GFR AFRICAN AMERICAN: >60 ML/MIN
GFR NON-AFRICAN AMERICAN: >60 ML/MIN
GFR SERPL CREATININE-BSD FRML MDRD: ABNORMAL ML/MIN/{1.73_M2}
GFR SERPL CREATININE-BSD FRML MDRD: ABNORMAL ML/MIN/{1.73_M2}
GLUCOSE BLD-MCNC: 113 MG/DL (ref 70–99)
HCT VFR BLD CALC: 39.8 % (ref 36–46)
HDLC SERPL-MCNC: 56 MG/DL
HEMOGLOBIN: 12.3 G/DL (ref 12–16)
IMMATURE GRANULOCYTES: ABNORMAL %
LDL CHOLESTEROL: 120 MG/DL (ref 0–130)
LYMPHOCYTES # BLD: 26 % (ref 24–44)
MCH RBC QN AUTO: 24.1 PG (ref 26–34)
MCHC RBC AUTO-ENTMCNC: 30.9 G/DL (ref 31–37)
MCV RBC AUTO: 78 FL (ref 80–100)
MONOCYTES # BLD: 14 % (ref 1–7)
NRBC AUTOMATED: ABNORMAL PER 100 WBC
PDW BLD-RTO: 19.1 % (ref 11.5–14.9)
PLATELET # BLD: 281 K/UL (ref 150–450)
PLATELET ESTIMATE: ABNORMAL
PMV BLD AUTO: 7.6 FL (ref 6–12)
POTASSIUM SERPL-SCNC: 4.2 MMOL/L (ref 3.7–5.3)
RBC # BLD: 5.1 M/UL (ref 4–5.2)
RBC # BLD: ABNORMAL 10*6/UL
SEG NEUTROPHILS: 56 % (ref 36–66)
SEGMENTED NEUTROPHILS ABSOLUTE COUNT: 3.3 K/UL (ref 1.3–9.1)
SODIUM BLD-SCNC: 140 MMOL/L (ref 135–144)
TOTAL PROTEIN: 7 G/DL (ref 6.4–8.3)
TRIGL SERPL-MCNC: 99 MG/DL
VLDLC SERPL CALC-MCNC: NORMAL MG/DL (ref 1–30)
WBC # BLD: 5.9 K/UL (ref 3.5–11)
WBC # BLD: ABNORMAL 10*3/UL

## 2019-06-13 PROCEDURE — 85025 COMPLETE CBC W/AUTO DIFF WBC: CPT

## 2019-06-13 PROCEDURE — 36415 COLL VENOUS BLD VENIPUNCTURE: CPT

## 2019-06-13 PROCEDURE — 1240000000 HC EMOTIONAL WELLNESS R&B

## 2019-06-13 PROCEDURE — 80061 LIPID PANEL: CPT

## 2019-06-13 PROCEDURE — 80053 COMPREHEN METABOLIC PANEL: CPT

## 2019-06-13 PROCEDURE — 6370000000 HC RX 637 (ALT 250 FOR IP): Performed by: PSYCHIATRY & NEUROLOGY

## 2019-06-13 RX ADMIN — GABAPENTIN 300 MG: 300 CAPSULE ORAL at 08:25

## 2019-06-13 RX ADMIN — QUETIAPINE FUMARATE 200 MG: 200 TABLET ORAL at 21:11

## 2019-06-13 RX ADMIN — PANTOPRAZOLE SODIUM 40 MG: 40 TABLET, DELAYED RELEASE ORAL at 16:39

## 2019-06-13 RX ADMIN — CHLORDIAZEPOXIDE HYDROCHLORIDE 25 MG: 25 CAPSULE ORAL at 21:12

## 2019-06-13 RX ADMIN — ONDANSETRON 4 MG: 4 TABLET, ORALLY DISINTEGRATING ORAL at 18:38

## 2019-06-13 RX ADMIN — LITHIUM CARBONATE 450 MG: 450 TABLET, EXTENDED RELEASE ORAL at 21:12

## 2019-06-13 RX ADMIN — CHLORDIAZEPOXIDE HYDROCHLORIDE 25 MG: 25 CAPSULE ORAL at 08:25

## 2019-06-13 RX ADMIN — GABAPENTIN 300 MG: 300 CAPSULE ORAL at 21:12

## 2019-06-13 RX ADMIN — GABAPENTIN 300 MG: 300 CAPSULE ORAL at 13:39

## 2019-06-13 RX ADMIN — THIAMINE HCL TAB 100 MG 100 MG: 100 TAB at 08:25

## 2019-06-13 RX ADMIN — PANTOPRAZOLE SODIUM 40 MG: 40 TABLET, DELAYED RELEASE ORAL at 08:25

## 2019-06-13 RX ADMIN — OXCARBAZEPINE 150 MG: 300 TABLET, FILM COATED ORAL at 01:50

## 2019-06-13 RX ADMIN — LITHIUM CARBONATE 450 MG: 450 TABLET, EXTENDED RELEASE ORAL at 08:25

## 2019-06-13 RX ADMIN — OXCARBAZEPINE 150 MG: 300 TABLET, FILM COATED ORAL at 08:25

## 2019-06-13 RX ADMIN — HYDROXYZINE HYDROCHLORIDE 50 MG: 50 TABLET, FILM COATED ORAL at 11:00

## 2019-06-13 RX ADMIN — FLUTICASONE PROPIONATE 1 SPRAY: 50 SPRAY, METERED NASAL at 08:24

## 2019-06-13 RX ADMIN — MULTIPLE VITAMINS W/ MINERALS TAB 1 TABLET: TAB at 08:25

## 2019-06-13 RX ADMIN — TRAZODONE HYDROCHLORIDE 50 MG: 50 TABLET ORAL at 21:12

## 2019-06-13 NOTE — GROUP NOTE
Group Therapy Note    Date: June 13    Group Start Time: 0845  Group End Time: 4233  Group Topic: Community Meeting    GORGE WOODS    Fred Benoit, [address]    Group Therapy Note    Attendees: 7         Patient's Goal:  \"Attend groups, talk to doctor, talk to \"     Notes:  Pt attended group and participated appropriately. Status After Intervention:  Improved    Participation Level:  Active Listener and Interactive    Participation Quality: Appropriate, Attentive and Sharing      Speech:  normal      Thought Process/Content: Logical      Affective Functioning: Congruent      Mood: euthymic      Level of consciousness:  Alert, Oriented x4 and Attentive      Response to Learning: Able to verbalize current knowledge/experience, Able to verbalize/acknowledge new learning, Able to retain information, Capable of insight and Progressing to goal      Endings: None Reported    Modes of Intervention: Education, Support, Socialization, Exploration, Clarifying, Limit-setting and Reality-testing      Discipline Responsible: Psychoeducational Specialist      Signature:  KRYSTIAN Wilcox

## 2019-06-13 NOTE — GROUP NOTE
Group Therapy Note    Date: June 13    Group Start Time: 1100  Group End Time: 1140  Group Topic: Psychoeducation    [facility] CHUCK Prajapati, CTRS    Group Therapy Note    Attendees: 8       Patient's Goal:  Pt will demonstrate improved interpersonal skills    Notes:  Pt attended group and participated appropriately and without assistance    Status After Intervention:  Improved    Participation Level:  Active Listener and Interactive    Participation Quality: Appropriate, Attentive and Sharing      Speech:  normal      Thought Process/Content: Logical  Linear      Affective Functioning: Congruent      Mood: euthymic      Level of consciousness:  Alert, Oriented x4 and Attentive      Response to Learning: Able to verbalize current knowledge/experience, Able to verbalize/acknowledge new learning, Able to retain information, Capable of insight, Able to change behavior and Progressing to goal      Endings: None Reported    Modes of Intervention: Education, Support, Socialization, Exploration and Problem-solving      Discipline Responsible: Psychoeducational Specialist      Signature:  Christa Prajapati, [address]

## 2019-06-13 NOTE — PLAN OF CARE
Patient admits to fleeting SI at this time. Patient states she is able to contract for safety and feels safe here. Q 15 min safety checks continue at this time. Patient is medication compliant and out and social with peers. Patient has been attending groups today. Patient has been participating in her care planning.

## 2019-06-13 NOTE — GROUP NOTE
Group Therapy Note    Date: June 13    Group Start Time: 1600  Group End Time: 1630  Group Topic: Healthy Living/Wellness    [facility] CHUCK España LPN        Group Therapy Note    Attendees: 9         Patient's Goal:  To decrease stress & anxiety. Notes:      Status After Intervention:  Improved    Participation Level:  Active Listener    Participation Quality: Appropriate      Speech:  normal      Thought Process/Content: Logical      Affective Functioning: Flat      Mood: anxious      Level of consciousness:  Oriented x4      Response to Learning: Able to retain information      Endings: None Reported    Modes of Intervention: Support      Discipline Responsible: Licensed Practical Nurse      Signature:  Leopoldo Alvine, LPN

## 2019-06-13 NOTE — PLAN OF CARE
Pt denies SI HI this shift no hallucinations. Pt remains in bed most of shift, able to verbalize needs and attends to ADLs. No behavioral outbursts. Pt is tremulous and reports anxiety.

## 2019-06-13 NOTE — H&P
HISTORY and Dusty Leger [address]       NAME:  Zo Dawn  MRN: [id number]   YOB: 1975   Date: 6/13/2019   Age: 37 y.o. Gender: female     COMPLAINT AND PRESENT HISTORY:      Zo Dawn is 37 y.o.,  female, admitted because of depression/ Bipolar Disorder. States that she was refused outpatient treatment by the facility she went to. Patient has been experiencing suicidal ideations thinking of overdosing on pills. Patient denies any homicidal ideations, has a history of previous suicide attempts by overdosing. Patient has poor sleep only 4 to 5 hours a night, patient has poor appetite not much in the last 5 days, patient has poor concentration and memory. Patient denies any auditory visual hallucinations, has been noncompliant with her medications only takes them in the morning forgets to take them at night usually gets drunk. Patient consumes 1/5 of vodka and 5 tall boys of beer per day. Patient occasionally uses marijuana, lives alone is on disability.     DIAGNOSTIC RESULTS   Labs:  CBC:   Recent Labs     06/11/19 2322 06/13/19  0636   WBC 5.8 5.9   HGB 12.9 12.3    281     BMP:    Recent Labs     06/11/19 2322 06/13/19  0636    140   K 4.0 4.2    103   CO2 23 29   BUN 5* 8   CREATININE 0.71 0.67   GLUCOSE 129* 113*     Hepatic:   Recent Labs     06/11/19 2322 06/13/19  0636   AST 94* 60*   ALT 82* 59*   BILITOT 0.17* 0.32   ALKPHOS 79 65     Lipids:   Recent Labs     06/13/19  0636   CHOL 196   HDL 56     U/A:  Lab Results   Component Value Date    COLORU YELLOW 06/12/2019    WBCUA 5 TO 10 11/27/2018    RBCUA None 11/27/2018    RBCUA 0-2 11/22/2015    MUCUS NOT REPORTED 11/27/2018    BACTERIA FEW 11/27/2018    SPECGRAV 1.015 06/12/2019    LEUKOCYTESUR NEGATIVE 06/12/2019    BLOODU NEGATIVE 11/22/2015    GLUCOSEU NEGATIVE 06/12/2019    AMORPHOUS NOT REPORTED 11/27/2018       PAST MEDICAL HISTORY     Past Medical History: Diagnosis Date    Alcoholic hepatitis without ascites 10/20/2018    Alcoholism ([facility]     3 pints daily    Anxiety     Asthma     Depression     Pneumonia     Polysubstance abuse ([facility]     drug abuse includes, cocaine, IV heroin, cannabis, and ETOH    Suicide attempt ([facility] 11/08/2017    S/A by overdosing on Trazodone and Seroquel    Suicide ideation        SURGICAL HISTORY       Past Surgical History:   Procedure Laterality Date    CARPAL TUNNEL RELEASE      CHOLECYSTECTOMY      HYSTERECTOMY      HYSTERECTOMY, TOTAL ABDOMINAL      STOMACH SURGERY      gastric sleeve       FAMILY HISTORY       Family History   Problem Relation Age of Onset    Brain Cancer Mother     Cancer Mother         \"female cancer\"    Heart Disease Father        SOCIAL HISTORY       Social History     Socioeconomic History    Marital status: Single     Spouse name: None    Number of children: 0    Years of education: 15    Highest education level: None   Occupational History    None   Social Needs    Financial resource strain: None    Food insecurity:     Worry: None     Inability: None    Transportation needs:     Medical: None     Non-medical: None   Tobacco Use    Smoking status: Current Every Day Smoker     Packs/day: 1.00     Years: 30.00     Pack years: 30.00     Types: Cigarettes    Smokeless tobacco: Never Used    Tobacco comment: pt accepting of nicotine replacement   Substance and Sexual Activity    Alcohol use:  Yes     Alcohol/week: 25.2 oz     Types: 42 Cans of beer per week     Comment: friends states \"a lot of everything\" daily    Drug use: Yes     Frequency: 7.0 times per week     Types: Marijuana, IV, Cocaine     Comment: drug abuse includes, cocaine, IV heroin, cannabis    Sexual activity: Not Currently   Lifestyle    Physical activity:     Days per week: None     Minutes per session: None    Stress: None   Relationships    Social connections:     Talks on phone: None     Gets together: None Attends Rastafari service: None     Active member of club or organization: None     Attends meetings of clubs or organizations: None     Relationship status: None    Intimate partner violence:     Fear of current or ex partner: None     Emotionally abused: None     Physically abused: None     Forced sexual activity: None   Other Topics Concern    None   Social History Narrative    ** Merged History Encounter **         ** Merged History Encounter **                REVIEW OF SYSTEMS      Allergies   Allergen Reactions    Morphine Anaphylaxis    Morphine And Related      Itching, nausea    Azithromycin     Morphine     Nsaids      contraindicated    Zithromax [Azithromycin] Swelling       No current facility-administered medications on file prior to encounter. Current Outpatient Medications on File Prior to Encounter   Medication Sig Dispense Refill    OXcarbazepine (TRILEPTAL) 150 MG tablet Take 150 mg by mouth 2 times daily      traZODone (DESYREL) 50 MG tablet Take 50 mg by mouth nightly      lithium (ESKALITH) 450 MG extended release tablet Take 450 mg by mouth 2 times daily      QUEtiapine (SEROQUEL) 200 MG tablet Take 1 tablet by mouth nightly 60 tablet 3    omeprazole (PRILOSEC) 20 MG delayed release capsule Take 20 mg by mouth 2 times daily      QUEtiapine (SEROQUEL) 100 MG tablet Take 100 mg by mouth daily Indications: in morring      dicyclomine (BENTYL) 10 MG capsule Take 1 capsule by mouth every 6 hours as needed (cramps) 20 capsule 0    ondansetron (ZOFRAN ODT) 4 MG disintegrating tablet Take 1 tablet by mouth every 8 hours as needed for Nausea 8 tablet 0    gabapentin (NEURONTIN) 300 MG capsule Take 1 capsule by mouth 3 times daily for 30 days. . 90 capsule 0    fluticasone (FLONASE) 50 MCG/ACT nasal spray 1 spray by Each Nare route daily 1 Bottle 0    sodium chloride (OCEAN) 0.65 % nasal spray 1 spray by Nasal route as needed for Congestion 1 Bottle 0    albuterol sulfate HFA central.  No palpable masses or L.N.      CHEST:  Symmetrical and equal on expansion. HEART:  Regular rate and rhythm. S1 > S2, No audible murmurs or gallops. LUNGS:  Equal on expansion, normal breath sounds. No adventitious sounds. ABDOMEN:  Obese. Soft on palpation. No localized tenderness. No guarding or rigidity. No palpable organomegaly. LYMPHATICS:  No palpable cervical Lymphadenopathy. LOCOMOTOR, BACK AND SPINE:  No tenderness or deformities. EXTREMITIES:  Symmetrical, no pretibial edema. Perrys sign negative. No discoloration or ulcerations. NEUROLOGIC:  The patient is conscious, alert, oriented,Cranial nerve II-XII intact, taste was not examined. No apparent focal sensory or motor deficits. Muscle strength equal Percy. No facial droop, tongue protrudes centrally, no slurring of the speech. PROVISIONAL DIAGNOSES:      Active Problems:    Bipolar 1 disorder ([facility])    B[facility] disorder ([facility]  Resolved Problems:    * No resolved hospital problems.  *      JARROD TAPIA PA-C on 6/13/2019 at 11:21 AM

## 2019-06-13 NOTE — GROUP NOTE
Group Therapy Note    Date: June 13    Group Start Time: 1000  Group End Time: 6438  Group Topic: Recreational    [address], [address]        Group Therapy Note    Attendees: 7/18         Patient's Goal:  Decision making. Notes: Patient presented with two choices and patient had to chose the answer that best fit themselves. Status After Intervention:  Improved    Participation Level:  Active Listener and Interactive    Participation Quality: Appropriate and Attentive      Speech:  normal      Thought Process/Content: Logical      Affective Functioning: Congruent      Mood: euphoric      Level of consciousness:  Alert and Oriented x4      Response to Learning: Able to verbalize current knowledge/experience      Endings: None Reported    Modes of Intervention: Education, Support and Socialization      Discipline Responsible: Psychoeducational Specialist      Signature:  Cy Soliz

## 2019-06-13 NOTE — GROUP NOTE
Group Therapy Note    Date: June 13    Group Start Time: 1330  Group End Time: 4313  Group Topic: Healthy Living/Wellness    [facility] KRYSTIAN Damon        Group Therapy Note    Attendees: 8/18         Patient's Goal:  To identify stressors and increase coping skills    Status After Intervention:  Improved    Participation Level:  Active Listener and Interactive    Participation Quality: Appropriate and Attentive      Speech:  normal      Thought Process/Content: Logical      Affective Functioning: Congruent      Mood: euphoric      Level of consciousness:  Alert and Oriented x4      Response to Learning: Able to verbalize current knowledge/experience and Able to verbalize/acknowledge new learning      Endings: None Reported    Modes of Intervention: Support and Socialization      Discipline Responsible: Psychoeducational Specialist      Signature:  Trey Pizano

## 2019-06-13 NOTE — BH NOTE
patient refused to attend Wellness  group at 1600  after encouragement from staff.   1:1 talk time provided as alternative to group session

## 2019-06-13 NOTE — PLAN OF CARE
[facility]  Initial Interdisciplinary Treatment Plan NO      Original treatment plan Date & Time: 6/13/19    Admission Type:  Admission Type: Voluntary    Reason for admission:   Reason for Admission: Suicidal ideation to overdose and cut wrists.     Estimated Length of Stay:  5-7days  Estimated Discharge Date: to be determined by physician    PATIENT STRENGTHS:  Patient Strengths:Strengths: Connection to output provider, Social Skills  Patient Strengths and Limitations:Limitations: Inappropriate/potentially harmful leisure interests, Lacks leisure interests, Multiple barriers to leisure interests, Tendency to isolate self  Addictive Behavior: Addictive Behavior  In the past 3 months, have you felt or has someone told you that you have a problem with:  : None  Do you have a history of Chemical Use?: No  Do you have a history of Alcohol Use?: Yes  Do you have a history of Street Drug Abuse?: Yes  Histroy of Prescripton Drug Abuse?: No  Medical Problems:  Past Medical History:   Diagnosis Date    Alcoholic hepatitis without ascites 10/20/2018    Alcoholism ([facility]     3 pints daily    Anxiety     Asthma     Depression     Pneumonia     Polysubstance abuse ([facility]     drug abuse includes, cocaine, IV heroin, cannabis, and ETOH    Suicide attempt ([facility] 11/08/2017    S/A by overdosing on Trazodone and Seroquel    Suicide ideation      Status EXAM:Status and Exam  Normal: No  Facial Expression: Avoids Gaze  Affect: Blunt  Level of Consciousness: Alert  Mood:Normal: No  Mood: Depressed, Helpless, Sad  Motor Activity:Normal: No  Motor Activity: Decreased  Interview Behavior: Cooperative  Preception: [place] to Person, [place]ora Stare to Time, [place] to Place, [place] to Situation  Attention:Normal: No  Attention: Unable to Concentrate  Thought Processes: Circumstantial  Thought Content:Normal: No  Thought Content: Preoccupations  Hallucinations: None  Delusions: No  Memory:Normal: Yes  Insight and Judgment: No  Insight and

## 2019-06-14 PROCEDURE — 1240000000 HC EMOTIONAL WELLNESS R&B

## 2019-06-14 PROCEDURE — 6370000000 HC RX 637 (ALT 250 FOR IP): Performed by: PSYCHIATRY & NEUROLOGY

## 2019-06-14 RX ORDER — VENLAFAXINE HYDROCHLORIDE 75 MG/1
75 CAPSULE, EXTENDED RELEASE ORAL
Status: DISCONTINUED | OUTPATIENT
Start: 2019-06-14 | End: 2019-06-21 | Stop reason: HOSPADM

## 2019-06-14 RX ORDER — QUETIAPINE FUMARATE 100 MG/1
100 TABLET, FILM COATED ORAL NIGHTLY
Status: DISCONTINUED | OUTPATIENT
Start: 2019-06-14 | End: 2019-06-19

## 2019-06-14 RX ORDER — CHLORDIAZEPOXIDE HYDROCHLORIDE 25 MG/1
25 CAPSULE, GELATIN COATED ORAL ONCE
Status: COMPLETED | OUTPATIENT
Start: 2019-06-14 | End: 2019-06-14

## 2019-06-14 RX ADMIN — GABAPENTIN 300 MG: 300 CAPSULE ORAL at 08:17

## 2019-06-14 RX ADMIN — PANTOPRAZOLE SODIUM 40 MG: 40 TABLET, DELAYED RELEASE ORAL at 08:17

## 2019-06-14 RX ADMIN — VENLAFAXINE HYDROCHLORIDE 75 MG: 75 CAPSULE, EXTENDED RELEASE ORAL at 13:26

## 2019-06-14 RX ADMIN — MULTIPLE VITAMINS W/ MINERALS TAB 1 TABLET: TAB at 08:17

## 2019-06-14 RX ADMIN — PANTOPRAZOLE SODIUM 40 MG: 40 TABLET, DELAYED RELEASE ORAL at 16:53

## 2019-06-14 RX ADMIN — TRAZODONE HYDROCHLORIDE 50 MG: 50 TABLET ORAL at 20:56

## 2019-06-14 RX ADMIN — THIAMINE HCL TAB 100 MG 100 MG: 100 TAB at 08:17

## 2019-06-14 RX ADMIN — OXCARBAZEPINE 150 MG: 300 TABLET, FILM COATED ORAL at 20:55

## 2019-06-14 RX ADMIN — GABAPENTIN 300 MG: 300 CAPSULE ORAL at 13:26

## 2019-06-14 RX ADMIN — LITHIUM CARBONATE 450 MG: 450 TABLET, EXTENDED RELEASE ORAL at 20:55

## 2019-06-14 RX ADMIN — OXCARBAZEPINE 150 MG: 300 TABLET, FILM COATED ORAL at 08:17

## 2019-06-14 RX ADMIN — QUETIAPINE FUMARATE 100 MG: 100 TABLET ORAL at 20:55

## 2019-06-14 RX ADMIN — DICYCLOMINE HYDROCHLORIDE 10 MG: 10 CAPSULE ORAL at 08:18

## 2019-06-14 RX ADMIN — CHLORDIAZEPOXIDE HYDROCHLORIDE 25 MG: 25 CAPSULE ORAL at 20:56

## 2019-06-14 RX ADMIN — GABAPENTIN 300 MG: 300 CAPSULE ORAL at 20:55

## 2019-06-14 RX ADMIN — HYDROXYZINE HYDROCHLORIDE 50 MG: 50 TABLET, FILM COATED ORAL at 12:42

## 2019-06-14 RX ADMIN — CHLORDIAZEPOXIDE HYDROCHLORIDE 25 MG: 25 CAPSULE ORAL at 13:27

## 2019-06-14 RX ADMIN — FLUTICASONE PROPIONATE 1 SPRAY: 50 SPRAY, METERED NASAL at 08:17

## 2019-06-14 RX ADMIN — LITHIUM CARBONATE 450 MG: 450 TABLET, EXTENDED RELEASE ORAL at 08:17

## 2019-06-14 RX ADMIN — CHLORDIAZEPOXIDE HYDROCHLORIDE 25 MG: 25 CAPSULE ORAL at 08:17

## 2019-06-14 ASSESSMENT — PAIN SCALES - GENERAL
PAINLEVEL_OUTOF10: 7
PAINLEVEL_OUTOF10: 5

## 2019-06-14 NOTE — GROUP NOTE
Group Therapy Note    Date: June 14    Group Start Time: 1330  Group End Time: 2825  Group Topic: Recreational    [address], CTR[facility]    Patient refused to attend Recreational / Leetta Snooks group at 1330 after encouragement from staff. 1:1 talk time offered as alternative to group session but pt declined.      Signature:  Roberth Bryan, [address]

## 2019-06-14 NOTE — GROUP NOTE
Group Therapy Note    Date: June 13    Group Start Time: 2030  Group End Time: 2050  Group Topic: Wrap-Up    [facility] CHUCK Costa LPN        Group Therapy    Patient refused to attend wrap up Group after encouragement from staff. 1:1 talk time offered but patient refused.       Signature:  Latisha Costa LPN

## 2019-06-14 NOTE — PLAN OF CARE
Problem: Altered Mood, Depressive Behavior:  Goal: Able to verbalize and/or display a decrease in depressive symptoms  Description  Able to verbalize and/or display a decrease in depressive symptoms  6/13/2019 2240 by Tavia Cervantes LPN  Outcome: Ongoing  Note:   Pt admits to depression at this time, Pt is out and selectively social.    Problem: Altered Mood, Depressive Behavior:  Goal: Absence of self-harm  Description  Absence of self-harm  6/13/2019 2240 by Taiva Cervantes LPN  Outcome: Ongoing  Note:   Pt denies thoughts of self-harm at this time, Pt is cooperative and med compliant. Problem: Altered Mood, Depressive Behavior:  Goal: Participates in care planning  Description  Participates in care planning  6/13/2019 2240 by Tavia Cervantes LPN  Outcome: Ongoing  Note:   Pt participates in care planning. Problem: Substance Abuse:  Goal: Absence of drug withdrawal signs and symptoms  Description  Absence of drug withdrawal signs and symptoms  6/13/2019 2240 by Tavia Cervantes LPN  Outcome: Ongoing  Note:   Pt denies having drug withdrawal signs and symptoms at this time, Pt states that she feels a lot better now.

## 2019-06-14 NOTE — GROUP NOTE
Group Therapy Note    Date: June 14    Group Start Time: 1000  Group End Time: 1050  Group Topic: Recreational    [facility] CHUCK Bryan, CTRS    Patient refused to attend Recreational / Creative Expressions group at 1000 after encouragement from staff. 1:1 talk time offered as alternative to group session but pt declined.      Signature:  Roberth Bryan, [address]

## 2019-06-14 NOTE — PROGRESS NOTES
She is still quite depressed, despondent, overwhelmed, and having frequent suicidal thoughts to overdose. She reports continued intense alcohol withdrawal symptoms- requests afternoon Librium dose in addition today. She displays continued severe tremor,  Affect remains flat and poorly reactive. Patient has been withdrawn and isolative. Patient complains of high level of racing thoughts driving feelings of anxiety. Feeling hopeless and helpless. She reports significant sedation throughout the day, worst in the morning- discussed trying to lower Seroquel dose as she has been spending most of the day isolated to bed. Explored her  concerns and support provided. There is no identifiable safe alternative other than continued hospitalization. Charting and medications reviewed.  Will give three doses of Librium today, then go back to bid dosing, decrease Seroquel to 100 mg qhs, start Effexor XR 75 mg daily, continue Lithium, Trileptal.

## 2019-06-14 NOTE — PROGRESS NOTES
Psychiatric Admission Note         Dalia Willams is a 37 y.o. female who was admitted from the ED where she reported that she was feeling severely depressed, despondent, hopeless with thoughts of committing suicide by intentional overdose of pills or cutting herself with a knife. She states that she has been increasingly labile in terms of mood, having difficulty with impulse control, lashing out against girlfriend. She reports very low mood, low energy, anhedonia, hopeless thinking. She reports noncompliance with psychiatric medications. Denies hallucinations or paranoia. Allergies:  Morphine; Morphine and related; Azithromycin; Morphine; Nsaids; and Zithromax [azithromycin]     History of Substance Abuse     Heavy daily alcohol use    Past Psychiatric History     Patient reports current outpatient psychiatric linkage. . Reported history of psychiatric inpatient hospitalizations. Reported history of suicide attempts. Family History of psychiatric disorders    Family history: positive for bipolar illness      Medical History     Past Medical History:   Diagnosis Date    Alcoholic hepatitis without ascites 10/20/2018    Alcoholism ([facility]     3 pints daily    Anxiety     Asthma     Depression     Pneumonia     Polysubstance abuse ([facility]     drug abuse includes, cocaine, IV heroin, cannabis, and ETOH    Suicide attempt ([facility] 11/08/2017    S/A by overdosing on Trazodone and Seroquel    Suicide ideation         Mental Status  Pt. was alert, fully oriented, and cooperative. Appearance and hygiene weredisheveled, poor hygiene . Mood was depressed. Affect was blunted\". Thought process was linear and well-organized. Patient denied any hallucinations or paranoia. Patient endorsed suicidal ideations. Patient denied homicidal ideations . Patient's gross cognitive functions were intact. Insight and judgement were poor. Both recent and remote memory were intact.     Psychomotor status was without abnormality     Diagnostic Impression    Bipolar affective disorder without psychosis. Alcohol dependence      Medications   chlordiazePOXIDE  25 mg Oral Once    venlafaxine  75 mg Oral Daily with breakfast    QUEtiapine  100 mg Oral Nightly    OXcarbazepine  150 mg Oral BID    therapeutic multivitamin-minerals  1 tablet Oral Daily    lithium  450 mg Oral BID    gabapentin  300 mg Oral TID    fluticasone  1 spray Each Nostril Daily    traZODone  50 mg Oral Nightly    chlordiazePOXIDE  25 mg Oral BID    thiamine  100 mg Oral Daily    pantoprazole  40 mg Oral BID AC    nicotine  1 patch Transdermal Daily     ondansetron, hydrOXYzine, dicyclomine, albuterol sulfate HFA, sodium chloride, acetaminophen, benztropine mesylate, magnesium hydroxide, aluminum & magnesium hydroxide-simethicone, diphenhydrAMINE    Treatment Plan:    1. Admit to inpatient psychiatric treatment  2. Supportive therapy with medication management. Reviewed risks and benefits as well as potential side effects with patient. Will restart Seroquel, Lithium, Trileptal, use Librium to mitigate risk of alcohol withdrawal.  3. Therapeutic activities and groups  4.  Follow up at [facility] after symptoms stabilized    Estimated length of stay: 5-7 days    Jeffy Jacques MD  Psychiatrist.

## 2019-06-14 NOTE — BH NOTE
Patient refused to attend wellness group at 1600 after encouragement from staff.   1:1 talk time offered as alternative to group session

## 2019-06-14 NOTE — GROUP NOTE
Group Therapy Note    Date: June 14    Group Start Time: 0900  Group End Time: 0930  Group Topic: Community Meeting    [facility] ESDRAS Pagan, [address]    patient refused to attend community meeting and goal setting group at [phone] after encouragement from staff. 1:1 talk time provided as alternative to group session.

## 2019-06-14 NOTE — GROUP NOTE
Group Therapy Note    Date: June 14    Group Start Time: 1430  Group End Time: 4840  Group Topic: Recreational    [address], CTRS        Group Therapy Note    Attendees: 2/15    patient refused to attend leisure education group at [phone] after encouragement from staff. 1:1 talk time provided as alternative to group session.

## 2019-06-14 NOTE — GROUP NOTE
Group Therapy Note    Date: June 14    Group Start Time: 1100  Group End Time: 5920  Group Topic: Recreational    [facility] BHKRYSTIAN Dougherty        Group Therapy Note    Attendees: 3/18    patient refused to attend LEARNING HOW TO BUDGET group at [phone] after encouragement from staff. 1:1 talk time provided as alternative to group session .

## 2019-06-15 PROCEDURE — 1240000000 HC EMOTIONAL WELLNESS R&B

## 2019-06-15 PROCEDURE — 6370000000 HC RX 637 (ALT 250 FOR IP): Performed by: PSYCHIATRY & NEUROLOGY

## 2019-06-15 RX ADMIN — OXCARBAZEPINE 150 MG: 300 TABLET, FILM COATED ORAL at 08:06

## 2019-06-15 RX ADMIN — LITHIUM CARBONATE 450 MG: 450 TABLET, EXTENDED RELEASE ORAL at 08:06

## 2019-06-15 RX ADMIN — VENLAFAXINE HYDROCHLORIDE 75 MG: 75 CAPSULE, EXTENDED RELEASE ORAL at 08:06

## 2019-06-15 RX ADMIN — GABAPENTIN 300 MG: 300 CAPSULE ORAL at 08:05

## 2019-06-15 RX ADMIN — LITHIUM CARBONATE 450 MG: 450 TABLET, EXTENDED RELEASE ORAL at 20:38

## 2019-06-15 RX ADMIN — QUETIAPINE FUMARATE 100 MG: 100 TABLET ORAL at 20:38

## 2019-06-15 RX ADMIN — THIAMINE HCL TAB 100 MG 100 MG: 100 TAB at 08:06

## 2019-06-15 RX ADMIN — OXCARBAZEPINE 150 MG: 300 TABLET, FILM COATED ORAL at 20:38

## 2019-06-15 RX ADMIN — PANTOPRAZOLE SODIUM 40 MG: 40 TABLET, DELAYED RELEASE ORAL at 08:06

## 2019-06-15 RX ADMIN — MULTIPLE VITAMINS W/ MINERALS TAB 1 TABLET: TAB at 08:06

## 2019-06-15 RX ADMIN — GABAPENTIN 300 MG: 300 CAPSULE ORAL at 20:38

## 2019-06-15 RX ADMIN — CHLORDIAZEPOXIDE HYDROCHLORIDE 25 MG: 25 CAPSULE ORAL at 08:06

## 2019-06-15 RX ADMIN — CHLORDIAZEPOXIDE HYDROCHLORIDE 25 MG: 25 CAPSULE ORAL at 20:38

## 2019-06-15 RX ADMIN — PANTOPRAZOLE SODIUM 40 MG: 40 TABLET, DELAYED RELEASE ORAL at 17:01

## 2019-06-15 RX ADMIN — TRAZODONE HYDROCHLORIDE 50 MG: 50 TABLET ORAL at 20:38

## 2019-06-15 RX ADMIN — GABAPENTIN 300 MG: 300 CAPSULE ORAL at 14:17

## 2019-06-15 NOTE — GROUP NOTE
Group Therapy Note    Date: Shayy 15    Group Start Time: 0900  Group End Time: 0930  Group Topic: Community Meeting    1200 [facility], 29 [facility] Therapy Note    Attendees: 3/15         Patient's Goal:  Encouraged to set a daily goal.    Status After Intervention:  Improved    Participation Level:  Active Listener and Interactive    Participation Quality: Appropriate and Attentive      Speech:  normal      Thought Process/Content: Logical      Affective Functioning: Congruent      Mood: euphoric      Level of consciousness:  Alert and Oriented x4      Response to Learning: Able to verbalize current knowledge/experience and Able to verbalize/acknowledge new learning      Endings: None Reported    Modes of Intervention: Education, Support and Socialization      Discipline Responsible: Psychoeducational Specialist      Signature:  Micha Driscoll

## 2019-06-15 NOTE — PLAN OF CARE
patient refused to attend Psychoeducational group at 1000 after encouragement from staff.   1:1 talk time provided as alternative to group session

## 2019-06-15 NOTE — PLAN OF CARE
Pt. Denies any suicidal thoughts and hallucinations. Admits to some anxiety and depression due to not knowing what program she will attend when she gets discharged. Pt. Has been out in the dayroom, social with select peers. Pt. Is cooperative with staff and cooperative with medications.

## 2019-06-15 NOTE — GROUP NOTE
Group Therapy Note    Date: Shayy 15    Group Start Time: 1100  Group End Time: 1130  Group Topic: Healthy Living/Wellness    CZ BHKIRSTEN C    Marcy Chan LPN        Group Therapy Note    Attendees: 2/15         Patient's Goal:  Understand Gratitude         Status After Intervention:  Improved    Participation Level:  Active Listener and Interactive    Participation Quality: Appropriate, Attentive, Sharing and Supportive      Speech:  normal      Thought Process/Content: Logical      Affective Functioning: Flat      Mood: depressed      Level of consciousness:  Oriented x4      Response to Learning: Able to verbalize current knowledge/experience, Able to verbalize/acknowledge new learning, Able to retain information, Capable of insight, Able to change behavior and Progressing to goal      Endings: None Reported    Modes of Intervention: Education, Support and Socialization      Discipline Responsible: Licensed Practical Nurse      Signature:  Marcy Chan LPN

## 2019-06-15 NOTE — PLAN OF CARE
Patient is out and social with peers, patient denies any suicidal ideations or hallucinations. Patient took all ordered medications and attended night groups q15 min safety checks.

## 2019-06-15 NOTE — GROUP NOTE
Group Therapy Note    Date: June 14    Group Start Time: 2130  Group End Time: 2215  Group Topic: Wrap-Up        Discipline Responsible:   OT  AT   x Nsg.  RT  Other       Participation Level:     None  Minimal   x Active Listener x Interactive    Monopolizing         Participation Quality:  x Appropriate  Inappropriate   x       Attentive        Intrusive   x       Sharing        Resistant   x       Supportive        Lethargic       Affective:   x Congruent  Incongruent  Blunted  Flat    Constricted  Anxious  Elated  Angry    Labile  Depressed  Other         Cognitive:  x Alert  Oriented PPTP     Concentration x G  F  P   Attention Span x G  F  P   Short-Term Memory x G  F  P   Long-Term Memory x G  F  P   ProblemSolving/  Decision Making x G  F  P   Ability to Process  Information x G  F  P      Contributing Factors             Delusional             Hallucinating             Flight of Ideas             Other:       Modes of Intervention:  x Education  Support  Exploration   x Clarifying  Problem Solving  Confrontation    Socialization  Limit Setting  Reality Testing    Activity  Movement  Media    Other:            Response to Learning:  x Able to verbalize current knowledge/experience   x Able to verbalize/acknowledge new learning    Able to retain information    Capable of insight    Able to change behavior    Progressing to goal    Other:        Comments:

## 2019-06-15 NOTE — GROUP NOTE
Group Therapy Note    Date: Shayy 15    Group Start Time: 1600  Group End Time: 1625  Group Topic: Discharge Planning  [address]        Group Therapy Note    Attendees: 4/15         Patient's Goal:  Pt able to verbalize plans for after discharge. Status After Intervention:  Unchanged    Participation Level:  Active Listener and Interactive    Participation Quality: Appropriate, Attentive and Sharing      Speech:  normal      Thought Process/Content: Logical      Affective Functioning: Congruent      Mood: euthymic      Level of consciousness:  Alert, Oriented x4 and Attentive      Response to Learning: Able to verbalize current knowledge/experience and Able to verbalize/acknowledge new learning      Endings: None Reported    Modes of Intervention: Education, Support and Socialization      Discipline Responsible: Behavorial Health Tech      Signature:  Jonah Cho

## 2019-06-15 NOTE — GROUP NOTE
Group Therapy Note    Date: Shayy 15    Group Start Time: 0900  Group End Time: 0930  Group Topic: Community Meeting    [address], 29 [facility] Therapy Note    Attendees: 3/15    patient refused to attend community meeting and goal setting  group at  [phone] after encouragement from staff. 1:1 talk time provided as alternative to group session .

## 2019-06-15 NOTE — GROUP NOTE
Group Therapy Note    Date: Shayy 15    Group Start Time: 1330  Group End Time: 1451  Group Topic: Recreational    [address], [facility]        Group Therapy Note    Attendees: 2/15    patient refused to attend leisure education group at [phone] after encouragement from staff. 1:1 talk time provided as alternative to group session.

## 2019-06-16 PROCEDURE — 6370000000 HC RX 637 (ALT 250 FOR IP): Performed by: PSYCHIATRY & NEUROLOGY

## 2019-06-16 PROCEDURE — 1240000000 HC EMOTIONAL WELLNESS R&B

## 2019-06-16 RX ADMIN — OXCARBAZEPINE 150 MG: 300 TABLET, FILM COATED ORAL at 08:46

## 2019-06-16 RX ADMIN — MULTIPLE VITAMINS W/ MINERALS TAB 1 TABLET: TAB at 08:47

## 2019-06-16 RX ADMIN — GABAPENTIN 300 MG: 300 CAPSULE ORAL at 08:46

## 2019-06-16 RX ADMIN — THIAMINE HCL TAB 100 MG 100 MG: 100 TAB at 08:45

## 2019-06-16 RX ADMIN — TRAZODONE HYDROCHLORIDE 50 MG: 50 TABLET ORAL at 20:53

## 2019-06-16 RX ADMIN — CHLORDIAZEPOXIDE HYDROCHLORIDE 25 MG: 25 CAPSULE ORAL at 20:53

## 2019-06-16 RX ADMIN — GABAPENTIN 300 MG: 300 CAPSULE ORAL at 20:53

## 2019-06-16 RX ADMIN — LITHIUM CARBONATE 450 MG: 450 TABLET, EXTENDED RELEASE ORAL at 20:53

## 2019-06-16 RX ADMIN — CHLORDIAZEPOXIDE HYDROCHLORIDE 25 MG: 25 CAPSULE ORAL at 08:47

## 2019-06-16 RX ADMIN — OXCARBAZEPINE 150 MG: 300 TABLET, FILM COATED ORAL at 20:53

## 2019-06-16 RX ADMIN — QUETIAPINE FUMARATE 100 MG: 100 TABLET ORAL at 20:53

## 2019-06-16 RX ADMIN — PANTOPRAZOLE SODIUM 40 MG: 40 TABLET, DELAYED RELEASE ORAL at 17:19

## 2019-06-16 RX ADMIN — PANTOPRAZOLE SODIUM 40 MG: 40 TABLET, DELAYED RELEASE ORAL at 08:46

## 2019-06-16 RX ADMIN — GABAPENTIN 300 MG: 300 CAPSULE ORAL at 14:15

## 2019-06-16 RX ADMIN — LITHIUM CARBONATE 450 MG: 450 TABLET, EXTENDED RELEASE ORAL at 08:46

## 2019-06-16 RX ADMIN — HYDROXYZINE HYDROCHLORIDE 50 MG: 50 TABLET, FILM COATED ORAL at 20:53

## 2019-06-16 RX ADMIN — ACETAMINOPHEN 650 MG: 325 TABLET, FILM COATED ORAL at 04:01

## 2019-06-16 RX ADMIN — HYDROXYZINE HYDROCHLORIDE 50 MG: 50 TABLET, FILM COATED ORAL at 08:47

## 2019-06-16 RX ADMIN — ACETAMINOPHEN 650 MG: 325 TABLET, FILM COATED ORAL at 11:27

## 2019-06-16 RX ADMIN — VENLAFAXINE HYDROCHLORIDE 75 MG: 75 CAPSULE, EXTENDED RELEASE ORAL at 08:47

## 2019-06-16 ASSESSMENT — PAIN SCALES - GENERAL
PAINLEVEL_OUTOF10: 8
PAINLEVEL_OUTOF10: 3
PAINLEVEL_OUTOF10: 7
PAINLEVEL_OUTOF10: 5
PAINLEVEL_OUTOF10: 6

## 2019-06-16 ASSESSMENT — PAIN DESCRIPTION - PAIN TYPE
TYPE: ACUTE PAIN
TYPE: ACUTE PAIN

## 2019-06-16 ASSESSMENT — PAIN DESCRIPTION - LOCATION
LOCATION: ARM
LOCATION: ELBOW

## 2019-06-16 ASSESSMENT — PAIN DESCRIPTION - DESCRIPTORS: DESCRIPTORS: TENDER

## 2019-06-16 ASSESSMENT — PAIN DESCRIPTION - ORIENTATION
ORIENTATION: RIGHT
ORIENTATION: RIGHT
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
## 2019-06-16 NOTE — GROUP NOTE
Group Therapy Note    Date: June 16    Group Start Time: 1600  Group End Time: 1630  Group Topic: Group Therapy      Pedrito Phillip RN        Group Therapy Note    Attendees: 16     Patient participated in 1600 safety check group.        Signature:  Pedrito Phillip RN

## 2019-06-16 NOTE — PLAN OF CARE
Problem: Altered Mood, Depressive Behavior:  Goal: Able to verbalize and/or display a decrease in depressive symptoms  Description  Able to verbalize and/or display a decrease in depressive symptoms  Outcome: Ongoing  Note:   Patient denies suicidal thoughts and hallucinations today. She still rates her depression 8/10. She has been isolative to her room this evening and controlled. Q15min safety checks continue     Problem: Altered Mood, Depressive Behavior:  Goal: Absence of self-harm  Description  Absence of self-harm  Outcome: Ongoing  Note:   Patient denies thoughts of self harm at this time. Patient agrees to seek out staff if they begin having thoughts of harming self or they need to talk.  Q15min safety checks continue      Problem: Substance Abuse:  Goal: Absence of drug withdrawal signs and symptoms  Description  Absence of drug withdrawal signs and symptoms  Outcome: Ongoing  Note:   Patient denies signs and symptoms of withdrawal

## 2019-06-16 NOTE — GROUP NOTE
Group Therapy Note    Date: June 16    Group Start Time: 1330  Group End Time: 2021  Group Topic: Recreational    [address], [facility]        Group Therapy Note    Attendees:4/17    patient refused to attend creative expressions  group at [phone] after encouragement from staff. 1:1 talk time provided as alternative to group session.

## 2019-06-16 NOTE — GROUP NOTE
Group Therapy Note    Date: June 16    Group Start Time: 1100  Group End Time: 0671  Group Topic: Psychoeducation    FREDERICK Parekh        Group Therapy Note    Attendees: 7/17    1:1 denied. Despite staff encouragement, PT refused to attend 11:00am psychoteducation therapy group.              Signature:  FREDERICK Davis

## 2019-06-16 NOTE — GROUP NOTE
Group Therapy Note    Date: June 16    Group Start Time: 1015  Group End Time: 2274  Group Topic: Group Therapy      Tk Sanchez RN        Group Therapy Note    Attendees: 4     Patient did not attend 1000 group therapy.  1:1 talk time offered in place of group therapy, patient refused       Signature:  Tk Sanchez, RN

## 2019-06-17 LAB — AFP: 5.3 UG/L

## 2019-06-17 PROCEDURE — 6370000000 HC RX 637 (ALT 250 FOR IP): Performed by: PSYCHIATRY & NEUROLOGY

## 2019-06-17 PROCEDURE — 1240000000 HC EMOTIONAL WELLNESS R&B

## 2019-06-17 PROCEDURE — 36415 COLL VENOUS BLD VENIPUNCTURE: CPT

## 2019-06-17 PROCEDURE — 87522 HEPATITIS C REVRS TRNSCRPJ: CPT

## 2019-06-17 PROCEDURE — 87902 NFCT AGT GNTYP ALYS HEP C: CPT

## 2019-06-17 PROCEDURE — 82105 ALPHA-FETOPROTEIN SERUM: CPT

## 2019-06-17 RX ADMIN — THIAMINE HCL TAB 100 MG 100 MG: 100 TAB at 08:25

## 2019-06-17 RX ADMIN — OXCARBAZEPINE 150 MG: 300 TABLET, FILM COATED ORAL at 08:25

## 2019-06-17 RX ADMIN — LITHIUM CARBONATE 450 MG: 450 TABLET, EXTENDED RELEASE ORAL at 20:54

## 2019-06-17 RX ADMIN — LITHIUM CARBONATE 450 MG: 450 TABLET, EXTENDED RELEASE ORAL at 08:25

## 2019-06-17 RX ADMIN — GABAPENTIN 300 MG: 300 CAPSULE ORAL at 13:42

## 2019-06-17 RX ADMIN — VENLAFAXINE HYDROCHLORIDE 75 MG: 75 CAPSULE, EXTENDED RELEASE ORAL at 08:25

## 2019-06-17 RX ADMIN — HYDROXYZINE HYDROCHLORIDE 50 MG: 50 TABLET, FILM COATED ORAL at 11:16

## 2019-06-17 RX ADMIN — MULTIPLE VITAMINS W/ MINERALS TAB 1 TABLET: TAB at 08:25

## 2019-06-17 RX ADMIN — QUETIAPINE FUMARATE 100 MG: 100 TABLET ORAL at 20:54

## 2019-06-17 RX ADMIN — OXCARBAZEPINE 150 MG: 300 TABLET, FILM COATED ORAL at 20:54

## 2019-06-17 RX ADMIN — GABAPENTIN 300 MG: 300 CAPSULE ORAL at 20:53

## 2019-06-17 RX ADMIN — PANTOPRAZOLE SODIUM 40 MG: 40 TABLET, DELAYED RELEASE ORAL at 16:08

## 2019-06-17 RX ADMIN — GABAPENTIN 300 MG: 300 CAPSULE ORAL at 08:24

## 2019-06-17 RX ADMIN — TRAZODONE HYDROCHLORIDE 50 MG: 50 TABLET ORAL at 20:54

## 2019-06-17 RX ADMIN — MAGNESIUM HYDROXIDE 30 ML: 400 SUSPENSION ORAL at 20:58

## 2019-06-17 RX ADMIN — DICYCLOMINE HYDROCHLORIDE 10 MG: 10 CAPSULE ORAL at 20:58

## 2019-06-17 RX ADMIN — PANTOPRAZOLE SODIUM 40 MG: 40 TABLET, DELAYED RELEASE ORAL at 08:25
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
## 2019-06-17 NOTE — GROUP NOTE
Group Therapy Note    Date: June 17    Group Start Time: 1340  Group End Time: 0881  Group Topic: Recreational    [facility]    KRYSTIAN Turner    Group Therapy Note    Attendees: 7      Patient's Goal:  Pt will demonstrate improved interpersonal skills    Notes:  Pt attended group and participated. Status After Intervention:  Improved    Participation Level:  Active Listener and Interactive    Participation Quality: Appropriate, Attentive, Sharing and Supportive      Speech:  normal      Thought Process/Content: Logical      Affective Functioning: Constricted/Restricted      Mood: euthymic      Level of consciousness:  Alert, Oriented x4 and Attentive      Response to Learning: Able to verbalize current knowledge/experience, Able to verbalize/acknowledge new learning, Able to retain information, Capable of insight, Able to change behavior and Progressing to goal      Endings: None Reported    Modes of Intervention: Education, Support, Socialization, Exploration and Activity      Discipline Responsible: Psychoeducational Specialist      Signature:  Abdi Santiago [place]
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
## 2019-06-17 NOTE — CARE COORDINATION
Chemical Dependency  Consult Summary      Patient was provided PHQ-9, AUDIT and DAST Screening:      AUDIT Score:  27  DAST Score:  0  PHQ-9 Score: 13    Patient meets DSM-V criteria for:    F10.20 Alcohol Use Disorder Severe, AEB   1. Substance is often taken in larger amounts and/or over a longer period than the patient intended. 2. Persistent attempts or one or more unsuccessful efforts made to cut down or control substance use. 3. A great deal of time is spent in activities necessary to obtain the substance, use the substance, or recover from effects. 4. Craving or strong desire or urge to use the substance   6. Continued substance use despite having persistent or recurrent social or interpersonal problem caused or exacerbated by the effects of the substance. 7. Important social, occupational or recreational activities given up or reduced because of substance use. 8. Recurrent substance use in situations in which it is physically hazardous. 9. Substance use is continued despite knowledge of having a persistent or recurrent physical or psychological problem that is likely to have been caused or exacerbated by the substance. 10. Tolerance, as defined by either of the following:   a. Markedly increased amounts of the substance in order to achieve intoxication or desired effect;   11. Withdrawal, as manifested by either of the following:   a. The characteristic withdrawal syndrome for the substance    F14.20 Cocaine Use Disorder Severe, AEB  1. Substance is often taken in larger amounts and/or over a longer period than the patient intended. 2. Persistent attempts or one or more unsuccessful efforts made to cut down or control substance use. 3. A great deal of time is spent in activities necessary to obtain the substance, use the substance, or recover from effects. 4. Craving or strong desire or urge to use the substance   6.  Continued substance use despite having persistent or recurrent social or interpersonal problem caused or exacerbated by the effects of the substance. 7. Important social, occupational or recreational activities given up or reduced because of substance use. 8. Recurrent substance use in situations in which it is physically hazardous. 9. Substance use is continued despite knowledge of having a persistent or recurrent physical or psychological problem that is likely to have been caused or exacerbated by the substance. 10. Tolerance, as defined by either of the following:   a. Markedly increased amounts of the substance in order to achieve intoxication or desired effect;     F11.20 Opioid Use Disorder Severe- In Early Remission, AEB   1. Substance is often taken in larger amounts and/or over a longer period than the patient intended. 2. Persistent attempts or one or more unsuccessful efforts made to cut down or control substance use. 3. A great deal of time is spent in activities necessary to obtain the substance, use the substance, or recover from effects. 4. Craving or strong desire or urge to use the substance   6. Continued substance use despite having persistent or recurrent social or interpersonal problem caused or exacerbated by the effects of the substance. 7. Important social, occupational or recreational activities given up or reduced because of substance use. 8. Recurrent substance use in situations in which it is physically hazardous. 9. Substance use is continued despite knowledge of having a persistent or recurrent physical or psychological problem that is likely to have been caused or exacerbated by the substance. 10. Tolerance, as defined by either of the following:   a. Markedly increased amounts of the substance in order to achieve intoxication or desired effect;   11. Withdrawal, as manifested by either of the following:   a.  The characteristic withdrawal syndrome for the substance      Brief Education Was Provided    Patient was receptive    Brief Intervention Is Provided (Only for AUDIT or DAST)     Patient reports readiness to decrease and/or stop use and a plan was discussed     Summary and Recommendations/Referrals for Brief and/or Specialized Treatment Provided to Patient     CD  met with pt on this date per unit  referral made on 6/12/19. Pt presented: Ox4, Pt was cooperative and alert during assessment. Homicidal Ideation: denies    Suicidal Ideation: denies    DOC: Alcohol, crack cocaine, heroin. Pt reports first use of alcohol at age 13, crack age 27, heroin age 34. [facility] Challenger Pt reports last of alcohol as 6/11/19, crack \"2 weeks ago\" and heroin last use in October 2018 following an accidental overdose. Route/ Amount/ Frequency: Drinks 1/5 of vodka and several 24oz beers per day. Smokes crack 5x+ per week. Withdrawal Symptoms: Shakes, sweats, tremors upon hospital admission. Denies w/d symptoms at this time. Previous AOD/Dual Tx: Engaged in [facility] program through [address] [place] [address] in 2017 for 5 months. Longest amount of sobriety: 5 months while at [facility]    Issues related to substance use: decreased mental and physical health, suicidal ideations and attempts, relationship issues, homelessness, engaging in illegal activity to obtain money. Stage of Change: contemplations, \"I want to stop using drugs\". LOC Recommended: Inpatient 3.5. And/Or Recovery Housing while engaging in 2.1 intensive outpatient. Relapse Prevention Plan:   Pt reports she is currently living at [facility] and has [facility]. [facility] [employer] as payee for [employer]. Pt reports she has struggled with substance abuse and dependency since she was 10years old, first rehab at age 14. Pt reports death of significant other in 2014 and parents in 2017 as contributing factors to AOD use. AOD and Dual TX levels of care were explained and discussed with pt. Pt reports she is most interested in outpatient at [address] [place] [address] or [facility].  This writer provided pt with a list of inpatient facilities and encouraged pt to contact them for availability. Pt encouraged to attend follow up appointment at [facility] to continue medication management. Pt encouraged to engage in sober support meetings and activities. Pt provided with AA, NA, CA, HA schedules for [facility]..       Conducted by:  Paz MONROY, LSW, PHILIP III

## 2019-06-17 NOTE — GROUP NOTE
Group Therapy Note    Date: June 17    Group Start Time: 1100  Group End Time: 1736  Group Topic: Psychotherapy    GORGE Sosa LPC        Group Therapy Note    Patient declined to attend psychotherapy at 1100 am despite encouragement. Patient was offered 1:1 and refused.               Signature:  Olinda Macdonald LPC

## 2019-06-17 NOTE — PLAN OF CARE
Patient states her anxiety and depression are feeling worse today. Patient is medication compliant and has been out and social with selective peers. Patient attends groups. Patient denies SI at this time and agrees to approach staff if having any thoughts to harm self or others. Q 15 min safety checks continue at this time.

## 2019-06-17 NOTE — GROUP NOTE
Group Therapy Note    Date: June 17    Group Start Time: 0900  Group End Time: 0915  Group Topic: Community Meeting    GORGE WOODS    KRYSTIAN Soares    Group Therapy Note    Attendees: 5         Patient's Goal:  \"Call [facility], attend groups\"    Notes:  Pt attended group and participated appropriately. Status After Intervention:  Improved    Participation Level:  Active Listener and Interactive    Participation Quality: Appropriate, Attentive and Sharing      Speech:  normal      Thought Process/Content: Logical      Affective Functioning: Congruent      Mood: euthymic      Level of consciousness:  Alert, Oriented x4 and Attentive      Response to Learning: Able to verbalize current knowledge/experience, Able to verbalize/acknowledge new learning, Able to retain information, Capable of insight and Progressing to goal      Endings: None Reported    Modes of Intervention: Education, Support, Socialization, Exploration, Clarifying, Limit-setting and Reality-testing      Discipline Responsible: Psychoeducational Specialist      Signature:  KRYSTIAN Soares Message sent to pt advising she should remove ring for 5 -7 days to allow bleed, then replace ring.

## 2019-06-17 NOTE — GROUP NOTE
Group Therapy Note    Date: June 17    Group Start Time: 1600  Group End Time: 1630  Group Topic: Group Therapy      Mekhi Mayes RN        Group Therapy Note    Attendees: 8    Patient did not attend 1600 group therapy. Patient refused 1:1 talk time in place of group therapy.        Signature:  Mekhi Mayes RN

## 2019-06-17 NOTE — GROUP NOTE
Group Therapy Note    Date: June 17    Group Start Time: 1100  Group End Time: 3852  Group Topic: Relapse Prevention    [facility]        Group Therapy Note    Attendees: 1/9         Patient's Goal:  Develop a discharge plan to maintain sobriety and avoid rehospitalization. Notes:  Patient and staff met for a 1:1, patient spoke about triggers to alcohol abuse. Patient was tearful when speaking about significant other's passing. Patient blames self for multiple situations in her life that were out of her control. Patient spoke about options with discharge planning. Patient states \"if I go to [facility] I have sober support and I can go to sober [facility]. \" patient is encouraged to develop a pros and cons list to staying in [place] for [facility] housing versus going to [facility]. Patient expressed increased anxiety with patients on the unit and was offered comfort and support. Status After Intervention:  Improved    Participation Level:  Active Listener and Interactive    Participation Quality: Appropriate, Attentive and Sharing      Speech:  normal      Thought Process/Content: Logical      Affective Functioning: Congruent      Mood: euphoric      Level of consciousness:  Alert and Oriented x4      Response to Learning: Able to verbalize current knowledge/experience, Able to verbalize/acknowledge new learning and Able to retain information      Endings: None Reported    Modes of Intervention: Education, Exploration and Problem-solving      Discipline Responsible: Psychoeducational Specialist      Signature:  Latonya Oakes

## 2019-06-18 ENCOUNTER — APPOINTMENT (OUTPATIENT)
Dept: ULTRASOUND IMAGING | Age: 44
DRG: 885 | End: 2019-06-18
Payer: MEDICARE

## 2019-06-18 ENCOUNTER — ANESTHESIA EVENT (OUTPATIENT)
Dept: OPERATING ROOM | Age: 44
End: 2019-06-18

## 2019-06-18 PROBLEM — R41.0 CONFUSION: Status: RESOLVED | Noted: 2018-02-11 | Resolved: 2019-06-18

## 2019-06-18 PROBLEM — J96.02 ACUTE RESPIRATORY FAILURE WITH HYPOXIA AND HYPERCAPNIA (HCC): Status: RESOLVED | Noted: 2018-09-29 | Resolved: 2019-06-18

## 2019-06-18 PROBLEM — J96.01 ACUTE RESPIRATORY FAILURE WITH HYPOXIA AND HYPERCAPNIA (HCC): Status: RESOLVED | Noted: 2018-09-29 | Resolved: 2019-06-18

## 2019-06-18 PROBLEM — J18.9 PNEUMONIA: Status: RESOLVED | Noted: 2018-03-23 | Resolved: 2019-06-18

## 2019-06-18 PROBLEM — J01.90 ACUTE RHINOSINUSITIS: Status: RESOLVED | Noted: 2018-10-01 | Resolved: 2019-06-18

## 2019-06-18 PROBLEM — R76.8 HEPATITIS C ANTIBODY POSITIVE IN BLOOD: Status: ACTIVE | Noted: 2019-06-18

## 2019-06-18 PROBLEM — J01.90 ACUTE RHINOSINUSITIS: Status: RESOLVED | Noted: 2018-09-29 | Resolved: 2019-06-18

## 2019-06-18 LAB
DIRECT EXAM: ABNORMAL
DIRECT EXAM: ABNORMAL
Lab: ABNORMAL
SPECIMEN DESCRIPTION: ABNORMAL

## 2019-06-18 PROCEDURE — 99222 1ST HOSP IP/OBS MODERATE 55: CPT | Performed by: INTERNAL MEDICINE

## 2019-06-18 PROCEDURE — 6370000000 HC RX 637 (ALT 250 FOR IP): Performed by: PSYCHIATRY & NEUROLOGY

## 2019-06-18 PROCEDURE — 76705 ECHO EXAM OF ABDOMEN: CPT

## 2019-06-18 PROCEDURE — APPNB30 APP NON BILLABLE TIME 0-30 MINS: Performed by: NURSE PRACTITIONER

## 2019-06-18 PROCEDURE — 1240000000 HC EMOTIONAL WELLNESS R&B

## 2019-06-18 RX ORDER — QUETIAPINE FUMARATE 100 MG/1
100 TABLET, FILM COATED ORAL NIGHTLY PRN
Status: DISCONTINUED | OUTPATIENT
Start: 2019-06-18 | End: 2019-06-19

## 2019-06-18 RX ADMIN — MULTIPLE VITAMINS W/ MINERALS TAB 1 TABLET: TAB at 08:06

## 2019-06-18 RX ADMIN — LITHIUM CARBONATE 450 MG: 450 TABLET, EXTENDED RELEASE ORAL at 08:06

## 2019-06-18 RX ADMIN — THIAMINE HCL TAB 100 MG 100 MG: 100 TAB at 08:07

## 2019-06-18 RX ADMIN — GABAPENTIN 300 MG: 300 CAPSULE ORAL at 20:08

## 2019-06-18 RX ADMIN — QUETIAPINE FUMARATE 100 MG: 100 TABLET ORAL at 20:09

## 2019-06-18 RX ADMIN — OXCARBAZEPINE 150 MG: 300 TABLET, FILM COATED ORAL at 20:08

## 2019-06-18 RX ADMIN — OXCARBAZEPINE 150 MG: 300 TABLET, FILM COATED ORAL at 08:06

## 2019-06-18 RX ADMIN — LITHIUM CARBONATE 450 MG: 450 TABLET, EXTENDED RELEASE ORAL at 20:08

## 2019-06-18 RX ADMIN — PANTOPRAZOLE SODIUM 40 MG: 40 TABLET, DELAYED RELEASE ORAL at 08:06

## 2019-06-18 RX ADMIN — VENLAFAXINE HYDROCHLORIDE 75 MG: 75 CAPSULE, EXTENDED RELEASE ORAL at 08:07

## 2019-06-18 RX ADMIN — PANTOPRAZOLE SODIUM 40 MG: 40 TABLET, DELAYED RELEASE ORAL at 17:22

## 2019-06-18 RX ADMIN — GABAPENTIN 300 MG: 300 CAPSULE ORAL at 08:07

## 2019-06-18 RX ADMIN — GABAPENTIN 300 MG: 300 CAPSULE ORAL at 13:27

## 2019-06-18 RX ADMIN — HYDROXYZINE HYDROCHLORIDE 50 MG: 50 TABLET, FILM COATED ORAL at 20:09

## 2019-06-18 NOTE — PLAN OF CARE
PRE CONSULT ROUNDING NOTE  HPI  37year old female with pmh of SI, bipolar disorder. Our service was consulted for hepatitis c and \"problems due to gastric sleeve surgery\". She states she has had untreated hepatitis c for several years. She has a hx of IVDA and states she has been clean since October of 2018. She had gastric sleeve surgery in 2012 in [address] [place] [address]. She has had nausea with emesis and GERD intermittently for one year and uses prilosec and zofran with some relief. She has had multiple EGD's with dilation with the last being approximately two years ago. She was told that she is unable to have any more dilations. She currently denies fevers, dysphagia, weight loss, abdominal pain or diarrhea. She has not had any recent abdominal imaging. Labs are significant for ALT 59, AST 60. Endoscopy no colonoscopy, multiple egd's with dilation by Dr Joaquin Prado ([facility]) no reports available  Family unknown pt is adopted  Social drinks 1/5 vodka per day in addition to 2 \"tall boys\", hx IVDA states no drug use since October of 2018, smokes cigarettes and marijuana  BP 98/68   Pulse 70   Temp 98.1 °F (36.7 °C) (Oral)   Resp 14   Ht 5' (1.524 m)   Wt 132 lb (59.9 kg)   SpO2 97%   BMI 25.78 kg/m²     ROS meds labs imaging and past medical records were reviewed    Exam  A&OX3 appropriate  Multiple tattoos  [id number]  Lungs clear bilaterally  BSX4 active non tender non distended  No edema or jaundice    Assessment  Hepatitis c antibody in blood  Elevated lft's  Alcohol and drug abuse  Nausea and vomiting with hx of gastric sleeve 2012    Plan  AFP, liver US hepatitis genotyping and viral load ordered  She will need outpatient tx of hepatitis c once discharged but she was informed that the alcohol and drug abuse needs to stop; needs rehab  Continue the ppi and will discuss EGD with Dr Martin Lenz  Antiemetics prn    Formal gi consult to follow  . Lord Jarek Nguyen, APRN - CNP

## 2019-06-18 NOTE — GROUP NOTE
Group Therapy Note    Date: June 18    Group Start Time: 1100  Group End Time: 1130  Group Topic: Psychotherapy    GORGE Goodman LPC        Group Therapy Note    Patient declined to attend psychotherapy at 1100 am despite encouragement. Patient  was offered to meet for a 1:1 after group and refused.                 Signature:  Shabnam Edgar LPC
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
## 2019-06-18 NOTE — PLAN OF CARE
Problem: Altered Mood, Depressive Behavior:  Goal: Absence of self-harm  Description  Absence of self-harm  6/17/2019 2150 by Darvin Nation LPN  Outcome: Ongoing  Note:   No self-harm, falls, or injuries at this time. 15 minute visual safety checks maintained. Staff will continue to monitor patient and provide support. Problem: Substance Abuse:  Goal: Absence of drug withdrawal signs and symptoms  Description  Absence of drug withdrawal signs and symptoms  6/17/2019 2150 by Darvin Nation LPN  Outcome: Ongoing  Note:   Patient admits to some drug withdrawal symptoms such as cramping. Bentyl PRN was administered; patient satisfied and tolerated well.

## 2019-06-18 NOTE — CONSULTS
at 06/18/19 0806    lithium (ESKALITH) extended release tablet 450 mg, 450 mg, Oral, BID, Gayle Silvestre MD, 450 mg at 06/18/19 0806    hydrOXYzine (ATARAX) tablet 50 mg, 50 mg, Oral, BID PRN, Gayle Silvestre MD, 50 mg at 06/17/19 1116    gabapentin (NEURONTIN) capsule 300 mg, 300 mg, Oral, TID, Gayle Silvestre MD, 300 mg at 06/18/19 1327    fluticasone (FLONASE) 50 MCG/ACT nasal spray 1 spray, 1 spray, Each Nostril, Daily, Gayle Silvestre MD, 1 spray at 06/14/19 0817    dicyclomine (BENTYL) capsule 10 mg, 10 mg, Oral, Q6H PRN, Gayle Silvestre MD, 10 mg at 06/17/19 2058    albuterol sulfate  (90 Base) MCG/ACT inhaler 2 puff, 2 puff, Inhalation, Q4H PRN, Gayle Silvestre MD    sodium chloride (OCEAN, BABY AYR) 0.65 % nasal spray 1 spray, 1 spray, Nasal, PRN, Gayle Silvestre MD    acetaminophen (TYLENOL) tablet 650 mg, 650 mg, Oral, Q4H PRN, Gayle Silvestre MD, 650 mg at 06/16/19 1127    benztropine mesylate (COGENTIN) injection 2 mg, 2 mg, Intramuscular, BID PRN, Gayle Silvestre MD    magnesium hydroxide (MILK OF MAGNESIA) 400 MG/5ML suspension 30 mL, 30 mL, Oral, Daily PRN, Gayle Silvestre MD, 30 mL at 06/17/19 2058    aluminum & magnesium hydroxide-simethicone (MAALOX) 200-200-20 MG/5ML suspension 30 mL, 30 mL, Oral, PRN, Gayle Silvestre MD, 30 mL at 06/12/19 1727    diphenhydrAMINE (BENADRYL) tablet 25 mg, 25 mg, Oral, Nightly PRN, Gayle Silvestre MD    vitamin B-1 (THIAMINE) tablet 100 mg, 100 mg, Oral, Daily, Gayle Silvestre MD, 100 mg at 06/18/19 0807    pantoprazole (PROTONIX) tablet 40 mg, 40 mg, Oral, BID AC, Gayle Silvestre MD, 40 mg at 06/18/19 0806    nicotine (NICODERM CQ) 14 MG/24HR 1 patch, 1 patch, Transdermal, Daily, Gayle Silvestre MD, 1 patch at 06/18/19 1888       ALLERGIES:      Allergies   Allergen Reactions    Morphine Anaphylaxis    Morphine And Related      Itching, nausea    Azithromycin     Morphine     Nsaids      contraindicated    Zithromax [Azithromycin] Swelling FAMILY HISTORY: The patient's family history was reviewed. SOCIAL HISTORY:   Social History     Socioeconomic History    Marital status: Single     Spouse name: Not on file    Number of children: 0    Years of education: 15    Highest education level: Not on file   Occupational History    Not on file   Social Needs    Financial resource strain: Not on file    Food insecurity:     Worry: Not on file     Inability: Not on file    Transportation needs:     Medical: Not on file     Non-medical: Not on file   Tobacco Use    Smoking status: Current Every Day Smoker     Packs/day: 1.00     Years: 30.00     Pack years: 30.00     Types: Cigarettes    Smokeless tobacco: Never Used    Tobacco comment: pt accepting of nicotine replacement   Substance and Sexual Activity    Alcohol use:  Yes     Alcohol/week: 25.2 oz     Types: 42 Cans of beer per week     Comment: friends states \"a lot of everything\" daily    Drug use: Yes     Frequency: 7.0 times per week     Types: Marijuana, IV, Cocaine     Comment: drug abuse includes, cocaine, IV heroin, cannabis    Sexual activity: Not Currently   Lifestyle    Physical activity:     Days per week: Not on file     Minutes per session: Not on file    Stress: Not on file   Relationships    Social connections:     Talks on phone: Not on file     Gets together: Not on file     Attends Hinduism service: Not on file     Active member of club or organization: Not on file     Attends meetings of clubs or organizations: Not on file     Relationship status: Not on file    Intimate partner violence:     Fear of current or ex partner: Not on file     Emotionally abused: Not on file     Physically abused: Not on file     Forced sexual activity: Not on file   Other Topics Concern    Not on file   Social History Narrative    ** Merged History Encounter **         ** Merged History Encounter **              REVIEW OF SYSTEMS: A 12-point review of systems was obtained and pertinent positives andnegatives were enumerated above in the history of present illness. All other reviewed systems / symptoms were negative. Review of Systems      PHYSICAL EXAMINATION: Vital signs reviewed per the nursing documentation. BP 98/68   Pulse 70   Temp 98.1 °F (36.7 °C) (Oral)   Resp 14   Ht 5' (1.524 m)   Wt 132 lb (59.9 kg)   SpO2 97%   BMI 25.78 kg/m²    [unfilled]   Body mass index is 25.78 kg/m². General:  A O x 3 in NAD   Psych: . Normal affect. Mentation normal   HEENT: PERRLA. Clear conjunctivae and sclerae. Moist oral mucosae, no lesions orulcers. The neck is supple, without lymphadenopathy or jugular venous distension. No masses. Normal thyroid. Cardiovascular: S1 S2 RRR no rubs or murmurs. Pulmonary: clear BL. No accessory muscle usage. Abdominal Exam: Soft, NT ND, no hepato or spleno megaly, +BS, no ascites. No groin masses or lymphadenopathy. Extremities: No edema. Skin: Warm skin. No skin rash. No spider nevi palmar erythema naildystrophy. Joint: No joint swelling or deformity. Neurological: intact sensory. DTR+.  No asterixis     LABORATORY DATA: Reviewed   Lab Results   Component Value Date    WBC 5.9 06/13/2019    HGB 12.3 06/13/2019    HCT 39.8 06/13/2019    MCV 78.0 (L) 06/13/2019     06/13/2019     06/13/2019    K 4.2 06/13/2019     06/13/2019    CO2 29 06/13/2019    BUN 8 06/13/2019    CREATININE 0.67 06/13/2019    LABALBU 3.6 06/13/2019    BILITOT 0.32 06/13/2019    ALKPHOS 65 06/13/2019    AST 60 (H) 06/13/2019    ALT 59 (H) 06/13/2019    INR 0.9 10/20/2018      Lab Results   Component Value Date    RBC 5.10 06/13/2019    HGB 12.3 06/13/2019    MCV 78.0 (L) 06/13/2019    MCH 24.1 (L) 06/13/2019    MCHC 30.9 (L) 06/13/2019    RDW 19.1 (H) 06/13/2019    MPV 7.6 06/13/2019    BASOPCT 1 06/13/2019    LYMPHSABS 1.50 06/13/2019    MONOSABS 0.80 06/13/2019    NEUTROABS 3.30 06/13/2019    EOSABS 0.20 06/13/2019    BASOSABS 0.00 06/13/2019          DIAGNOSTIC TESTING:   Us Liver    Result Date: 6/18/2019  EXAMINATION: RIGHT UPPER QUADRANT ULTRASOUND 6/18/2019 10:54 am COMPARISON: CT abdomen and pelvis March 16, 2018 HISTORY: ORDERING SYSTEM PROVIDED HISTORY: ABNORMAL LIVER FUNCTION TESTS FINDINGS: LIVER:  There is diffuse increased hepatic parenchymal echogenicity most commonly related to hepatic steatosis. No focal liver lesion. No intrahepatic biliary ductal dilatation. The portal vein demonstrates normal direction and flow. BILIARY SYSTEM:  The gallbladder is surgically absent. Common bile duct is within normal limits measuring 6 mm. RIGHT KIDNEY: The right kidney is grossly unremarkable without evidence of hydronephrosis. PANCREAS:  Visualized portions of the pancreas are unremarkable. OTHER: No evidence of right upper quadrant ascites. Hepatic steatosis versus diffuse hepatocellular process. Patent portal vein. No ascites. IMPRESSION: Ms. Chuck Baires is a 37 y.o. female with dysphagia. Nausea and vomiting. Elevated LFTs. Hepatitis C and body positivity. We discussed differential diagnosis. Plan for EGD with possible dilation. Hepatitis serologies. Quit Alcohol and drugs. Thank you for allowing me to participate in the care of Ms. Chuck Baires. For any further questions please do not hesitate to contact me.        MD Jimmie Orantes

## 2019-06-18 NOTE — GROUP NOTE
Group Therapy Note    Date: June 18    Group Start Time: 1430  Group End Time: [address], CTRS    Patient refused to attend Recreational Therapy / Social Skills group at 1430 after encouragement from staff. 1:1 talk time offered as alternative to group session but pt declined.

## 2019-06-18 NOTE — GROUP NOTE
Group Therapy Note    Date: June 18    Group Start Time: 0900  Group End Time: 0930  Group Topic: Community Meeting    [address], [address]        Group Therapy Note    Attendees: 6/24         Patient's Goal:  To set daily goals    Status After Intervention:  Improved    Participation Level:  Active Listener and Interactive    Participation Quality: Appropriate and Attentive      Speech:  normal      Thought Process/Content: Logical      Affective Functioning: Congruent      Mood: euphoric      Level of consciousness:  Alert and Oriented x4      Response to Learning: Able to verbalize current knowledge/experience and Able to verbalize/acknowledge new learning      Endings: None Reported    Modes of Intervention: Education and Socialization      Discipline Responsible: Psychoeducational Specialist      Signature:  Harpreet Betancourt

## 2019-06-18 NOTE — PLAN OF CARE
Case discussed with Dr Mejia Garcia will plan for egd in am, npo after midnight . Verito Long, APRN - CNP

## 2019-06-18 NOTE — PROGRESS NOTES
Patient reports significant improvement in withdrawal symptoms. She reports some continued feelings of nausea, general malaise, no significant tremor at this point. She had reports some improvement in appetite, reports sleep has been okay even with lower dose of Seroquel at 100 mg. She reports some continued feelings of very low mood, low energy, hopeless thinking. She expresses frustration with long-term inability to maintain sobriety. She has continued to isolate, coming out for meals and some groups. Patient does not feel safe for discharge at this time. She is working with  and treatment options. Charting medications reviewed.   Will continue Seroquel, Effexor, lithium, Trileptal.

## 2019-06-18 NOTE — GROUP NOTE
Group Therapy Note    Date: June 18    Group Start Time: 1330  Group End Time: 0297  Group Topic: Cognitive Skills    [facility] ESDRAS Ahmadi, NIDAS        Group Therapy Note    Attendees: 7/22    patient refused to attend self esteem group at [phone] after encouragement from staff. 1:1 talk time provided as alternative to group session.

## 2019-06-18 NOTE — GROUP NOTE
Group Therapy Note    Date: June 18    Group Start Time: 1600  Group End Time: 1627  Group Topic: Healthy Living/Wellness    [facility] CHUCK Rodriguez RN        Group Therapy Note    Attendees: 8/24         Patient's Goal:  Smoking Cessation    Notes:      Status After Intervention:  Improved    Participation Level: Interactive    Participation Quality: Appropriate      Speech:  normal      Thought Process/Content: Logical      Affective Functioning: Congruent      Mood: stable      Level of consciousness:  Alert and Oriented x4      Response to Learning: Able to verbalize/acknowledge new learning      Endings: None Reported    Modes of Intervention: Education      Discipline Responsible: Registered Nurse      Signature:  Jeff Rodriguez RN

## 2019-06-19 ENCOUNTER — ANESTHESIA (OUTPATIENT)
Dept: OPERATING ROOM | Age: 44
End: 2019-06-19

## 2019-06-19 VITALS
OXYGEN SATURATION: 100 % | TEMPERATURE: 96.8 F | RESPIRATION RATE: 17 BRPM | SYSTOLIC BLOOD PRESSURE: 95 MMHG | DIASTOLIC BLOOD PRESSURE: 52 MMHG

## 2019-06-19 PROCEDURE — 1240000000 HC EMOTIONAL WELLNESS R&B

## 2019-06-19 PROCEDURE — 3700000000 HC ANESTHESIA ATTENDED CARE: Performed by: INTERNAL MEDICINE

## 2019-06-19 PROCEDURE — 2709999900 HC NON-CHARGEABLE SUPPLY: Performed by: INTERNAL MEDICINE

## 2019-06-19 PROCEDURE — 7100000000 HC PACU RECOVERY - FIRST 15 MIN: Performed by: INTERNAL MEDICINE

## 2019-06-19 PROCEDURE — 2580000003 HC RX 258: Performed by: ANESTHESIOLOGY

## 2019-06-19 PROCEDURE — 88342 IMHCHEM/IMCYTCHM 1ST ANTB: CPT

## 2019-06-19 PROCEDURE — 88305 TISSUE EXAM BY PATHOLOGIST: CPT

## 2019-06-19 PROCEDURE — 3700000001 HC ADD 15 MINUTES (ANESTHESIA): Performed by: INTERNAL MEDICINE

## 2019-06-19 PROCEDURE — 6360000002 HC RX W HCPCS: Performed by: NURSE ANESTHETIST, CERTIFIED REGISTERED

## 2019-06-19 PROCEDURE — 6370000000 HC RX 637 (ALT 250 FOR IP): Performed by: INTERNAL MEDICINE

## 2019-06-19 PROCEDURE — 7100000001 HC PACU RECOVERY - ADDTL 15 MIN: Performed by: INTERNAL MEDICINE

## 2019-06-19 PROCEDURE — 6370000000 HC RX 637 (ALT 250 FOR IP): Performed by: PSYCHIATRY & NEUROLOGY

## 2019-06-19 PROCEDURE — 43239 EGD BIOPSY SINGLE/MULTIPLE: CPT | Performed by: INTERNAL MEDICINE

## 2019-06-19 PROCEDURE — 3609012400 HC EGD TRANSORAL BIOPSY SINGLE/MULTIPLE: Performed by: INTERNAL MEDICINE

## 2019-06-19 PROCEDURE — 0DB68ZX EXCISION OF STOMACH, VIA NATURAL OR ARTIFICIAL OPENING ENDOSCOPIC, DIAGNOSTIC: ICD-10-PCS | Performed by: INTERNAL MEDICINE

## 2019-06-19 PROCEDURE — 2500000003 HC RX 250 WO HCPCS: Performed by: NURSE ANESTHETIST, CERTIFIED REGISTERED

## 2019-06-19 RX ORDER — SODIUM CHLORIDE, SODIUM LACTATE, POTASSIUM CHLORIDE, CALCIUM CHLORIDE 600; 310; 30; 20 MG/100ML; MG/100ML; MG/100ML; MG/100ML
INJECTION, SOLUTION INTRAVENOUS CONTINUOUS
Status: DISCONTINUED | OUTPATIENT
Start: 2019-06-19 | End: 2019-06-21 | Stop reason: HOSPADM

## 2019-06-19 RX ORDER — QUETIAPINE FUMARATE 200 MG/1
200 TABLET, FILM COATED ORAL NIGHTLY
Status: DISCONTINUED | OUTPATIENT
Start: 2019-06-19 | End: 2019-06-21 | Stop reason: HOSPADM

## 2019-06-19 RX ORDER — LIDOCAINE HYDROCHLORIDE 20 MG/ML
INJECTION, SOLUTION EPIDURAL; INFILTRATION; INTRACAUDAL; PERINEURAL PRN
Status: DISCONTINUED | OUTPATIENT
Start: 2019-06-19 | End: 2019-06-19 | Stop reason: SDUPTHER

## 2019-06-19 RX ORDER — PROPOFOL 10 MG/ML
INJECTION, EMULSION INTRAVENOUS PRN
Status: DISCONTINUED | OUTPATIENT
Start: 2019-06-19 | End: 2019-06-19 | Stop reason: SDUPTHER

## 2019-06-19 RX ORDER — MIDAZOLAM HYDROCHLORIDE 1 MG/ML
INJECTION INTRAMUSCULAR; INTRAVENOUS PRN
Status: DISCONTINUED | OUTPATIENT
Start: 2019-06-19 | End: 2019-06-19 | Stop reason: SDUPTHER

## 2019-06-19 RX ADMIN — PROPOFOL 50 MG: 10 INJECTION, EMULSION INTRAVENOUS at 11:42

## 2019-06-19 RX ADMIN — LIDOCAINE HYDROCHLORIDE 100 MG: 20 INJECTION, SOLUTION EPIDURAL; INFILTRATION; INTRACAUDAL at 11:36

## 2019-06-19 RX ADMIN — THIAMINE HCL TAB 100 MG 100 MG: 100 TAB at 14:58

## 2019-06-19 RX ADMIN — OXCARBAZEPINE 150 MG: 300 TABLET, FILM COATED ORAL at 21:22

## 2019-06-19 RX ADMIN — LITHIUM CARBONATE 450 MG: 450 TABLET, EXTENDED RELEASE ORAL at 21:22

## 2019-06-19 RX ADMIN — DIPHENHYDRAMINE HCL 25 MG: 25 TABLET ORAL at 02:55

## 2019-06-19 RX ADMIN — PROPOFOL 50 MG: 10 INJECTION, EMULSION INTRAVENOUS at 11:39

## 2019-06-19 RX ADMIN — HYDROXYZINE HYDROCHLORIDE 50 MG: 50 TABLET, FILM COATED ORAL at 21:22

## 2019-06-19 RX ADMIN — QUETIAPINE FUMARATE 100 MG: 100 TABLET ORAL at 02:55

## 2019-06-19 RX ADMIN — FLUTICASONE PROPIONATE 1 SPRAY: 50 SPRAY, METERED NASAL at 15:01

## 2019-06-19 RX ADMIN — VENLAFAXINE HYDROCHLORIDE 75 MG: 75 CAPSULE, EXTENDED RELEASE ORAL at 14:57

## 2019-06-19 RX ADMIN — SODIUM CHLORIDE, POTASSIUM CHLORIDE, SODIUM LACTATE AND CALCIUM CHLORIDE: 600; 310; 30; 20 INJECTION, SOLUTION INTRAVENOUS at 10:06

## 2019-06-19 RX ADMIN — PROPOFOL 50 MG: 10 INJECTION, EMULSION INTRAVENOUS at 11:40

## 2019-06-19 RX ADMIN — PROPOFOL 30 MG: 10 INJECTION, EMULSION INTRAVENOUS at 11:45

## 2019-06-19 RX ADMIN — LITHIUM CARBONATE 450 MG: 450 TABLET, EXTENDED RELEASE ORAL at 15:00

## 2019-06-19 RX ADMIN — PANTOPRAZOLE SODIUM 40 MG: 40 TABLET, DELAYED RELEASE ORAL at 15:05

## 2019-06-19 RX ADMIN — PROPOFOL 20 MG: 10 INJECTION, EMULSION INTRAVENOUS at 11:48

## 2019-06-19 RX ADMIN — QUETIAPINE FUMARATE 200 MG: 200 TABLET ORAL at 21:22

## 2019-06-19 RX ADMIN — MIDAZOLAM 2 MG: 1 INJECTION INTRAMUSCULAR; INTRAVENOUS at 11:35

## 2019-06-19 RX ADMIN — OXCARBAZEPINE 150 MG: 300 TABLET, FILM COATED ORAL at 15:02

## 2019-06-19 RX ADMIN — GABAPENTIN 300 MG: 300 CAPSULE ORAL at 21:22

## 2019-06-19 RX ADMIN — DIPHENHYDRAMINE HCL 25 MG: 25 TABLET ORAL at 21:22

## 2019-06-19 RX ADMIN — GABAPENTIN 300 MG: 300 CAPSULE ORAL at 15:01

## 2019-06-19 RX ADMIN — MULTIPLE VITAMINS W/ MINERALS TAB 1 TABLET: TAB at 14:57

## 2019-06-19 ASSESSMENT — PULMONARY FUNCTION TESTS
PIF_VALUE: 0
PIF_VALUE: 1
PIF_VALUE: 0

## 2019-06-19 ASSESSMENT — LIFESTYLE VARIABLES: SMOKING_STATUS: 1

## 2019-06-19 NOTE — ANESTHESIA PRE PROCEDURE
Department of Anesthesiology  Preprocedure Note       Name:  Donavan Sutherland   Age:  37 y.o.  :  1975                                          MRN:  [id number]         Date:  2019      Surgeon: Rip Paiz):  Betty Hernandes MD    Procedure: EGD (N/A Esophagus)    Medications prior to admission:   Prior to Admission medications    Medication Sig Start Date End Date Taking? Authorizing Provider   OXcarbazepine (TRILEPTAL) 150 MG tablet Take 150 mg by mouth 2 times daily   Yes Historical Provider, MD   traZODone (DESYREL) 50 MG tablet Take 50 mg by mouth nightly   Yes Historical Provider, MD   lithium (ESKALITH) 450 MG extended release tablet Take 450 mg by mouth 2 times daily   Yes Historical Provider, MD   QUEtiapine (SEROQUEL) 200 MG tablet Take 1 tablet by mouth nightly 10/1/18  Yes Jamie Benitez MD   omeprazole (PRILOSEC) 20 MG delayed release capsule Take 20 mg by mouth 2 times daily   Yes Historical Provider, MD   QUEtiapine (SEROQUEL) 100 MG tablet Take 100 mg by mouth daily Indications: in morring    Historical Provider, MD   dicyclomine (BENTYL) 10 MG capsule Take 1 capsule by mouth every 6 hours as needed (cramps) 18   Brittanie Way DO   ondansetron (ZOFRAN ODT) 4 MG disintegrating tablet Take 1 tablet by mouth every 8 hours as needed for Nausea 18   Brittanie Way DO   gabapentin (NEURONTIN) 300 MG capsule Take 1 capsule by mouth 3 times daily for 30 days. . 10/1/18 10/31/18  Jamie Benitez MD   fluticasone (FLONASE) 50 MCG/ACT nasal spray 1 spray by Each Nare route daily 10/2/18   Jamie Benitez MD   sodium chloride (OCEAN) 0.65 % nasal spray 1 spray by Nasal route as needed for Congestion 10/1/18   Jamie Benitez MD   albuterol sulfate  (90 Base) MCG/ACT inhaler Inhale 2 puffs into the lungs every 4 hours as needed for Wheezing    Historical Provider, MD   hydrOXYzine (ATARAX) 50 MG tablet Take 1 tablet by mouth 2 times daily as needed for Anxiety 18 1127    benztropine mesylate (COGENTIN) injection 2 mg  2 mg Intramuscular BID PRN Zoe London MD        magnesium hydroxide (MILK OF MAGNESIA) 400 MG/5ML suspension 30 mL  30 mL Oral Daily PRN Zoe London MD   30 mL at 06/17/19 2058    aluminum & magnesium hydroxide-simethicone (MAALOX) 200-200-20 MG/5ML suspension 30 mL  30 mL Oral PRN oZe London MD   30 mL at 06/12/19 1727    diphenhydrAMINE (BENADRYL) tablet 25 mg  25 mg Oral Nightly PRN Zoe London MD   25 mg at 06/19/19 0255    vitamin B-1 (THIAMINE) tablet 100 mg  100 mg Oral Daily Zoe London MD   Stopped at 06/19/19 0857    pantoprazole (PROTONIX) tablet 40 mg  40 mg Oral BID AC Zoe London MD   Stopped at 06/19/19 0857    nicotine (NICODERM CQ) 14 MG/24HR 1 patch  1 patch Transdermal Daily Zoe London MD   Stopped at 06/19/19 8633       Allergies:     Allergies   Allergen Reactions    Morphine Anaphylaxis    Morphine And Related      Itching, nausea    Azithromycin     Morphine     Nsaids      contraindicated    Zithromax [Azithromycin] Swelling       Problem List:    Patient Active Problem List   Diagnosis Code    History of gastric surgery (gastric sleeve 2012) Z98.890    Smoker unmotivated to quit F17.200    Alcoholism /alcohol abuse ([address] F10.20    Pneumonia of both lower lobes due to infectious organism ([address] J18.1    Asthma J45.909    Bipolar affective disorder, current episode mixed ([address] F31.60    Heroin dependence ([address] F11.20    Chronic post-traumatic stress disorder (PTSD) F43.12    Major depression, recurrent ([address] F33.9    Depressed bipolar I disorder ([address] F31.9    Polysubstance abuse ([address] F19.10    Bipolar I disorder, current or most recent episode depressed, with psychotic features with mood-congruent psychotic features ([address] F31.5    Intentional drug overdose ([address] T50.902A    Drug overdose, intentional self-harm, initial encounter ([address] T50.904I    MDD (major depressive disorder), recurrent episode ([facility] F33.9    Bipolar 1 disorder, depressed ([facility] F31.9    Depression with suicidal ideation F32.9, R45.851    Severe depression ([facility] F32.2    Toxic metabolic encephalopathy G73    Drug overdose, accidental or unintentional, initial encounter T50.901A    Polysubstance abuse ([facility] F19.10    Post traumatic stress disorder F43.10    Dysthymia F34.1    [place] coma scale total score 3-8 ([facility] Q09.3733    Alcoholic hepatitis without ascites K70.10    Alcohol withdrawal, uncomplicated (HCC) Z68.332    Alcoholic ketoacidosis X36.8    Bipolar 1 disorder (HCC) F31.9    Bipolar affective disorder (HCC) F31.9    Hepatitis C antibody positive in blood R76.8    Nausea and vomiting R11.2       Past Medical History:        Diagnosis Date    Alcoholic hepatitis without ascites 10/20/2018    Alcoholism (HCC)     3 pints daily    Anxiety     Asthma     Depression     Pneumonia     Polysubstance abuse ([facility]     drug abuse includes, cocaine, IV heroin, cannabis, and ETOH    Suicide attempt ([facility] 11/08/2017    S/A by overdosing on Trazodone and Seroquel    Suicide ideation        Past Surgical History:        Procedure Laterality Date    CARPAL TUNNEL RELEASE      CHOLECYSTECTOMY      HYSTERECTOMY      HYSTERECTOMY, TOTAL ABDOMINAL      STOMACH SURGERY      gastric sleeve       Social History:    Social History     Tobacco Use    Smoking status: Current Every Day Smoker     Packs/day: 1.00     Years: 30.00     Pack years: 30.00     Types: Cigarettes    Smokeless tobacco: Never Used    Tobacco comment: pt accepting of nicotine replacement   Substance Use Topics    Alcohol use:  Yes     Alcohol/week: 25.2 oz     Types: 42 Cans of beer per week     Comment: friends states \"a lot of everything\" daily                                Ready to quit: Not Answered  Counseling given: Not Answered  Comment: pt accepting of nicotine replacement      Vital Signs (Current):   Vitals:    06/17/19 2145 06/18/19 0727 06/18/19 1954 06/19/19 0745   BP: 109/71 98/68 102/66 94/61   Pulse: 67 70 73 64   Resp: 14 14 14 14   Temp: 98.1 °F (36.7 °C) 98.1 °F (36.7 °C) 97.9 °F (36.6 °C) 97.8 °F (36.6 °C)   TempSrc: Oral Oral Oral Oral   SpO2:       Weight:       Height:                                                  BP Readings from Last 3 Encounters:   06/19/19 94/61   06/01/19 110/65   03/20/19 124/77       NPO Status:                                                                                 BMI:   Wt Readings from Last 3 Encounters:   06/12/19 132 lb (59.9 kg)   05/31/19 140 lb (63.5 kg)   03/20/19 140 lb (63.5 kg)     Body mass index is 25.78 kg/m². CBC:   Lab Results   Component Value Date    WBC 5.9 06/13/2019    RBC 5.10 06/13/2019    HGB 12.3 06/13/2019    HCT 39.8 06/13/2019    MCV 78.0 06/13/2019    RDW 19.1 06/13/2019     06/13/2019       CMP:   Lab Results   Component Value Date     06/13/2019    K 4.2 06/13/2019     06/13/2019    CO2 29 06/13/2019    BUN 8 06/13/2019    CREATININE 0.67 06/13/2019    GFRAA >60 06/13/2019    LABGLOM >60 06/13/2019    LABGLOM >90 11/22/2015    GLUCOSE 113 06/13/2019    PROT 7.0 06/13/2019    CALCIUM 8.8 06/13/2019    BILITOT 0.32 06/13/2019    ALKPHOS 65 06/13/2019    AST 60 06/13/2019    ALT 59 06/13/2019       POC Tests: No results for input(s): POCGLU, POCNA, POCK, POCCL, POCBUN, POCHEMO, POCHCT in the last 72 hours.     Coags:   Lab Results   Component Value Date    PROTIME 9.8 10/20/2018    INR 0.9 10/20/2018    APTT 26.0 10/20/2018       HCG (If Applicable):   Lab Results   Component Value Date    PREGTESTUR NEGATIVE 11/22/2015    HCGQUANT 2 03/22/2018        ABGs:   Lab Results   Component Value Date    PHART 7.397 03/23/2018    PO2ART 77.3 03/23/2018    CNF6IER 53.5 03/23/2018    NCR2JJR 32.9 03/23/2018    H8VEOVKS 93.7 03/23/2018        Type & Screen (If Applicable):  No results found for: LABABO, [address]    Anesthesia Evaluation  Patient summary reviewed and Nursing notes reviewed no history of anesthetic complications:   Airway: Mallampati: III  TM distance: >3 FB   Neck ROM: limited  Mouth opening: > = 3 FB Dental:    (+) caps      Pulmonary:normal exam    (+) asthma: current smoker                           Cardiovascular:Negative CV ROS                      Neuro/Psych:   (+) psychiatric history: stable with treatment            GI/Hepatic/Renal:   (+) hepatitis: C, liver disease:,           Endo/Other:                      ROS comment: Polysubstance abuse Abdominal:           Vascular:                                        Anesthesia Plan      MAC     ASA 3             Anesthetic plan and risks discussed with patient. Plan discussed with CRNA.                   Ronald Burleson MD   6/19/2019

## 2019-06-19 NOTE — PLAN OF CARE
Patient voiced increased depression with fleeting suicidal thoughts;patient contracts for safety. Patient remain safe while on the unit. No sign or symptoms of alcohol withdrawal. 15 MIN safety checks.

## 2019-06-19 NOTE — PROGRESS NOTES
Patient reports that she is continuing to struggle with high level of anxiety, intermittent panic symptoms. She has been stressed over plan for EGD tomorrow per GI doctor, has had a history of multiple GI procedures. Patient reports that she does notice some gradual improvement subjective sense of mood, still is frustrated with current situation, feeling that she needs residential treatment to regain sobriety. She reports that she had really bad nightmare last night which kept her up afterwards- discussed discontinuing Trazodone and having prn Seroquel if scheduled dose doesn't get her to sleep  Affect does appear brighter and more appropriately reactive. She has been out of room more, attending more groups. She displays improvement in organization and spontaneity of thought process. Charting medications reviewed. Therapeutic support provided. Will continue Seroquel, Effexor, lithium, Trileptal, trazodone, patient will get EGD done tomorrow.

## 2019-06-19 NOTE — PLAN OF CARE
Pt denies all at this time. Pt has been out in dayroom social with peers. Pt MCBC.  Q 15 minutes safety checks maintained

## 2019-06-19 NOTE — GROUP NOTE
Group Therapy Note    Date: June 19    Group Start Time: 0900  Group End Time: 0920  Group Topic: [id number] [facility]    Patient refused to attend community meeting and goal setting group at 0900 after encouragement from staff. 1:1 talk time offered as alternative to group session but pt declined.

## 2019-06-19 NOTE — GROUP NOTE
Group Therapy Note    Date: June 19    Group Start Time: 1330  Group End Time: 5829  Group Topic: Recovery    [facility]    PORFIRIO Malagon LSW      patient refused to attend Recovery group at 1:30 pm after encouragement from staff.   1:1 talk time provided as alternative to group session      Signature:  PORFIRIO Malagon LSW

## 2019-06-19 NOTE — GROUP NOTE
Group Therapy Note    Date: June 19    Group Start Time: 1100  Group End Time: 1130  Group Topic: Psychotherapy    [facility] CHUCK Adkins LPC        Group Therapy Note    Patient declined to attend psychotherapy at 1100 am despite encouragement. Patient was offered a 1:1 to meet after group and patient declined.                  Signature:  Omer Knight LPC

## 2019-06-19 NOTE — ANESTHESIA POSTPROCEDURE EVALUATION
Department of Anesthesiology  Postprocedure Note    Patient: Gabino Garcia  MRN: [id number]  YOB: 1975  Date of evaluation: 6/19/2019  Time:  1:58 PM     Procedure Summary     Date:  06/19/19 Room / Location:  [address] / [address]    Anesthesia Start:  3225 Anesthesia Stop:  [phone]    Procedure:  EGD BIOPSY (N/A Esophagus) Diagnosis:  (ABDOMINAL PAIN)    Surgeon:  Fatou Ramos MD Responsible Provider:  Hever Valderrama MD    Anesthesia Type:  MAC ASA Status:  3          Anesthesia Type: MAC    Nader Phase I: Nader Score: 10    Nader Phase II:      Last vitals: Reviewed and per EMR flowsheets.        Anesthesia Post Evaluation    Comments: POST- ANESTHESIA EVALUATION       Pt Name: Gabino Garcia  MRN: [id number]  YOB: 1975  Date of evaluation: 6/19/2019  Time:  1:58 PM      /74   Pulse 52   Temp 97.2 °F (36.2 °C)   Resp 16   Ht 5' (1.524 m)   Wt 132 lb (59.9 kg)   SpO2 97%   BMI 25.78 kg/m²      Consciousness Level  Awake  Cardiopulmonary Status  Stable  Pain Adequately Treated YES  Nausea / Vomiting  NO  Adequate Hydration  YES  Anesthesia Related Complications NONE      Electronically signed by Hever Valderrama MD on 6/19/2019 at 1:58 PM

## 2019-06-19 NOTE — OP NOTE
[facility]     PROCEDURE DATE: 06/19/19    REFERRING PHYSICIAN: No ref. provider found     PRIMARY CARE PROVIDER: Chemo Tobias MD    ATTENDING PHYSICIAN: Carlos A Rain MD     HISTORY: Ms. Oanh Liriano is a 37 y.o. female who presents to the [place] [address] Endoscopy unit for upper endoscopy. The patient's clinical history is remarkable for n/v. Hx of sleeve gastrectomy with some stenosis requiring repeat dilations. She is currently medically stable and appropriate for the planned procedure. PREOPERATIVE DIAGNOSIS: nausea and vomiting. PROCEDURES:   1) Transoral Upper Endoscopy, biopsy. POSTOPERATIVE DIAGNOSIS:   Distorted mid stomach  Mild gastritis in proximal stomach  Evidence of prior sleeve gastrectomy  Mildly tortuous esophagus     MEDICATIONS:   MAC per anesthesia    EBL: minimal    INSTRUMENT: Olympus GIF-H190 flexible Gastroscope. PREPARATION: The nature and character of the procedure as well as risks, benefits, and alternatives were discussed with the patient and informed consent was obtained. Complications were said to include, but were not limited to: medication allergy, medication reaction, cardiovascular and respiratory problems, bleeding, perforation, infection, and/or missed diagnosis. Following arrival in the endoscopy room, the patient was placed in the left lateral decubitus position and final time-out accomplished in the presence of the nursing staff. Baseline vital signs were obtained and reviewed, and IV sedation was subsequently initiated. FINDINGS:   Esophagus: The esophagus was inspected to the Z-line. The endoscopic exam showed mildly tortuous esophagus. Stomach: The stomach was inspected in both forward and retroflex fashion and was appropriately distensible. The cardia, fundus, incisura, antrum and pylorus were identified via direct visualization. The endoscopic exam showed evidence of prior sleeve gastrectomy.  Mild erythema and fine vessel prominence was noted in proximal stomach- Bx obtained. Mid stomach was distorted with luminal narrowing. Duodenum: The proximal small bowel was inspected through the bulb, sweep, and second portion of the duodenum. The endoscopic exam showed no pathology. RECOMMENDATIONS:   1) Transfer back to the floor for further management as per primary care team.   2) Follow up on pathology report. 3) Upper GI series. May need CT scan accordingly. 4) Clear liquid diet today. 5) Protonix 40 mg daily.            Finesse Ku MD [facility]

## 2019-06-20 ENCOUNTER — APPOINTMENT (OUTPATIENT)
Dept: CT IMAGING | Age: 44
DRG: 885 | End: 2019-06-20
Payer: MEDICARE

## 2019-06-20 ENCOUNTER — APPOINTMENT (OUTPATIENT)
Dept: GENERAL RADIOLOGY | Age: 44
DRG: 885 | End: 2019-06-20
Payer: MEDICARE

## 2019-06-20 LAB — SURGICAL PATHOLOGY REPORT: NORMAL

## 2019-06-20 PROCEDURE — 6370000000 HC RX 637 (ALT 250 FOR IP): Performed by: PSYCHIATRY & NEUROLOGY

## 2019-06-20 PROCEDURE — 99232 SBSQ HOSP IP/OBS MODERATE 35: CPT | Performed by: INTERNAL MEDICINE

## 2019-06-20 PROCEDURE — 74177 CT ABD & PELVIS W/CONTRAST: CPT

## 2019-06-20 PROCEDURE — 2580000003 HC RX 258: Performed by: INTERNAL MEDICINE

## 2019-06-20 PROCEDURE — 6360000004 HC RX CONTRAST MEDICATION: Performed by: INTERNAL MEDICINE

## 2019-06-20 PROCEDURE — 2500000003 HC RX 250 WO HCPCS: Performed by: INTERNAL MEDICINE

## 2019-06-20 PROCEDURE — 1240000000 HC EMOTIONAL WELLNESS R&B

## 2019-06-20 PROCEDURE — APPNB30 APP NON BILLABLE TIME 0-30 MINS: Performed by: NURSE PRACTITIONER

## 2019-06-20 PROCEDURE — 74247 FL UGI W AIR CONTRAST: CPT

## 2019-06-20 RX ORDER — 0.9 % SODIUM CHLORIDE 0.9 %
80 INTRAVENOUS SOLUTION INTRAVENOUS ONCE
Status: COMPLETED | OUTPATIENT
Start: 2019-06-20 | End: 2019-06-20

## 2019-06-20 RX ORDER — HYDROXYZINE 50 MG/1
50 TABLET, FILM COATED ORAL 2 TIMES DAILY PRN
Qty: 60 TABLET | Refills: 0 | Status: ON HOLD | OUTPATIENT
Start: 2019-06-20 | End: 2019-10-15 | Stop reason: SDUPTHER

## 2019-06-20 RX ORDER — LITHIUM CARBONATE 450 MG
450 TABLET, EXTENDED RELEASE ORAL 2 TIMES DAILY
Qty: 60 TABLET | Refills: 0 | Status: ON HOLD | OUTPATIENT
Start: 2019-06-20 | End: 2019-10-15 | Stop reason: SDUPTHER

## 2019-06-20 RX ORDER — SODIUM CHLORIDE 0.9 % (FLUSH) 0.9 %
10 SYRINGE (ML) INJECTION PRN
Status: DISCONTINUED | OUTPATIENT
Start: 2019-06-20 | End: 2019-06-21 | Stop reason: HOSPADM

## 2019-06-20 RX ORDER — VENLAFAXINE HYDROCHLORIDE 75 MG/1
75 CAPSULE, EXTENDED RELEASE ORAL
Qty: 30 CAPSULE | Refills: 0 | Status: ON HOLD | OUTPATIENT
Start: 2019-06-21 | End: 2019-10-15 | Stop reason: SDUPTHER

## 2019-06-20 RX ORDER — OXCARBAZEPINE 150 MG/1
150 TABLET, FILM COATED ORAL 2 TIMES DAILY
Qty: 60 TABLET | Refills: 0 | Status: ON HOLD | OUTPATIENT
Start: 2019-06-20 | End: 2019-10-15 | Stop reason: SDUPTHER

## 2019-06-20 RX ORDER — QUETIAPINE FUMARATE 200 MG/1
200 TABLET, FILM COATED ORAL NIGHTLY
Qty: 30 TABLET | Refills: 0 | Status: ON HOLD | OUTPATIENT
Start: 2019-06-20 | End: 2019-10-15 | Stop reason: SDUPTHER

## 2019-06-20 RX ADMIN — BARIUM SULFATE 140 ML: 980 POWDER, FOR SUSPENSION ORAL at 09:14

## 2019-06-20 RX ADMIN — GABAPENTIN 300 MG: 300 CAPSULE ORAL at 20:33

## 2019-06-20 RX ADMIN — HYDROXYZINE HYDROCHLORIDE 50 MG: 50 TABLET, FILM COATED ORAL at 20:33

## 2019-06-20 RX ADMIN — SODIUM CHLORIDE 80 ML: 9 INJECTION, SOLUTION INTRAVENOUS at 16:38

## 2019-06-20 RX ADMIN — DICYCLOMINE HYDROCHLORIDE 10 MG: 10 CAPSULE ORAL at 09:57

## 2019-06-20 RX ADMIN — IOVERSOL 75 ML: 741 INJECTION INTRA-ARTERIAL; INTRAVENOUS at 16:38

## 2019-06-20 RX ADMIN — LITHIUM CARBONATE 450 MG: 450 TABLET, EXTENDED RELEASE ORAL at 20:34

## 2019-06-20 RX ADMIN — LITHIUM CARBONATE 450 MG: 450 TABLET, EXTENDED RELEASE ORAL at 09:56

## 2019-06-20 RX ADMIN — OXCARBAZEPINE 150 MG: 300 TABLET, FILM COATED ORAL at 09:56

## 2019-06-20 RX ADMIN — GABAPENTIN 300 MG: 300 CAPSULE ORAL at 15:14

## 2019-06-20 RX ADMIN — PANTOPRAZOLE SODIUM 40 MG: 40 TABLET, DELAYED RELEASE ORAL at 16:49

## 2019-06-20 RX ADMIN — OXCARBAZEPINE 150 MG: 300 TABLET, FILM COATED ORAL at 20:33

## 2019-06-20 RX ADMIN — Medication 10 ML: at 16:38

## 2019-06-20 RX ADMIN — IOHEXOL 50 ML: 240 INJECTION, SOLUTION INTRATHECAL; INTRAVASCULAR; INTRAVENOUS; ORAL at 16:38

## 2019-06-20 RX ADMIN — PANTOPRAZOLE SODIUM 40 MG: 40 TABLET, DELAYED RELEASE ORAL at 09:56

## 2019-06-20 RX ADMIN — QUETIAPINE FUMARATE 200 MG: 200 TABLET ORAL at 20:33

## 2019-06-20 RX ADMIN — FLUTICASONE PROPIONATE 1 SPRAY: 50 SPRAY, METERED NASAL at 09:57

## 2019-06-20 RX ADMIN — VENLAFAXINE HYDROCHLORIDE 75 MG: 75 CAPSULE, EXTENDED RELEASE ORAL at 09:56

## 2019-06-20 RX ADMIN — MULTIPLE VITAMINS W/ MINERALS TAB 1 TABLET: TAB at 09:56

## 2019-06-20 RX ADMIN — THIAMINE HCL TAB 100 MG 100 MG: 100 TAB at 09:56

## 2019-06-20 RX ADMIN — DIPHENHYDRAMINE HCL 25 MG: 25 TABLET ORAL at 20:34

## 2019-06-20 RX ADMIN — BARIUM SULFATE 160 ML: 960 POWDER, FOR SUSPENSION ORAL at 09:13

## 2019-06-20 RX ADMIN — GABAPENTIN 300 MG: 300 CAPSULE ORAL at 09:55

## 2019-06-20 NOTE — GROUP NOTE
Group Therapy Note    Date: June 20    Group Start Time: 1430  Group End Time: 0101  Group Topic: Psychoeducation    [facility]    KRYSTIAN Salvador    Group Therapy Note    Attendees: 11         Patient's Goal:  Pt will demonstrate increased problem-solving skills. Notes:  Pt attended group and participated appropriately. Status After Intervention:  Improved    Participation Level:  Active Listener and Interactive    Participation Quality: Appropriate, Attentive and Sharing      Speech:  normal      Thought Process/Content: Logical      Affective Functioning: Congruent      Mood: euthymic      Level of consciousness:  Alert, Oriented x4 and Attentive      Response to Learning: Able to verbalize current knowledge/experience, Able to verbalize/acknowledge new learning, Able to retain information, Capable of insight and Progressing to goal      Endings: None Reported    Modes of Intervention: Education, Support, Socialization, Exploration, Problem-solving and Reality-testing      Discipline Responsible: Psychoeducational Specialist      Signature:  KRYSTIAN Bryan

## 2019-06-20 NOTE — GROUP NOTE
Group Therapy Note    Date: June 20    Group Start Time: 1330  Group End Time: 1158  Group Topic: Recreational    [address], [facility]        Group Therapy Note    Attendees: 10/24    patient refused to attend leisure education group at [id number] after encouragement from staff. 1:1 talk time provided as alternative to group session.

## 2019-06-20 NOTE — GROUP NOTE
Group Therapy Note    Date: June 20    Group Start Time: 1000  Group End Time: 0008  Group Topic: Recreational    [facility] KRYSTIAN Mcgowan        Group Therapy Note    Attendees: 2/10    patient refused to attend self assessment group at [phone] after encouragement from staff. 1:1 talk time provided as alternative to group session.

## 2019-06-20 NOTE — PROGRESS NOTES
Patient was off the unit today for EGD. She has been having GI symptoms, GI doctor recommended scope. She has been continuing to report to staff continuing to have feelings of increased depression, suicidal thoughts. Patient reported that she has been continuing to have difficulty initiating and maintaining sleep. Charting and medications were reviewed. Discussed with nursing staff. Will increase dose of Seroquel to 200 mg nightly, continue lithium, Trileptal, Effexor XR.

## 2019-06-20 NOTE — PROGRESS NOTES
CLINICAL PHARMACY NOTE: MEDS TO [address] Select Patient?: No  Total # of Prescriptions Filled: 3   The following medications were delivered to the patient:  · Hydroxyzine  · Venlafaxine  · Lithium  ·   Total # of Interventions Completed: 0  Time Spent (min): 30    Additional Documentation:

## 2019-06-20 NOTE — BH NOTE
Pt was escorted via 2 staff to testing this morning. Pt went to CT scan. Pt arrived back on unit with staff escort, no signs of distress noted. Pt ate breakfast upon return and denies any concerns at this time. Pt states she will follow up  upon discharge.

## 2019-06-20 NOTE — GROUP NOTE
Group Therapy Note    Date: June 20    Group Start Time: 1100  Group End Time: 6934  Group Topic: Group Therapy    [facility]    PORFIRIO Ramey, LSW        Group Therapy Note    Attendees: 3         Patient's Goal:  Increase interpersonal relationship skills    Notes:  Pt was an active participant in group discussion    Status After Intervention:  Unchanged    Participation Level:  Active Listener and Interactive    Participation Quality: Appropriate, Attentive, Sharing and Supportive      Speech:  normal      Thought Process/Content: Logical      Affective Functioning: Congruent      Mood: anxious      Level of consciousness:  Alert, Oriented x4 and Attentive      Response to Learning: Capable of insight and Progressing to goal      Endings: None Reported    Modes of Intervention: Support, Socialization, Exploration and Clarifying      Discipline Responsible: /Counselor      Signature:  PORFIRIO Ramey, BEAUW

## 2019-06-20 NOTE — PLAN OF CARE
Problem: Suicide risk  Goal: Provide patient with safe environment  Outcome: Ongoing   Patient denies suicidal and homicidal thoughts. Patient denies auditory and visual hallucinations. Patient is taking meds and eating well. Problem: Altered Mood, Depressive Behavior:  Goal: Able to verbalize and/or display a decrease in depressive symptoms  Outcome: Ongoing   Pt is flat upon approach. Pt is attending groups and trying to work on discharge planning. Pt is not happy where she is currently staying and hoping to get away from there. Pt is attending some groups and is out in day area. Problem: Substance Abuse:  Goal: Absence of drug withdrawal signs and symptoms  Outcome: Ongoing     Pt is showing no active signs of withdraw. Pt agrees to seek out staff if she feels any symptoms or changes. Problem: Pain:  Goal: Pain level will decrease  Outcome: Ongoing   Pt denies pain upon assessment.

## 2019-06-20 NOTE — PLAN OF CARE
[facility] Interdisciplinary Treatment Plan Note     Review Date & Time: 6/20/19 1313    Admission Type:   Admission Type: Voluntary    Reason for admission:  Reason for Admission: Suicidal ideation to overdose and cut wrists.     Estimated Length of Stay:  8-14 days  Estimated Discharge Date Update: to be determined by physician    PATIENT STRENGTHS:  Patient Strengths:Strengths: Connection to output provider, Social Skills  Patient Strengths and Limitations:Limitations: Inappropriate/potentially harmful leisure interests, Lacks leisure interests, Multiple barriers to leisure interests, Tendency to isolate self  Addictive Behavior:Addictive Behavior  In the past 3 months, have you felt or has someone told you that you have a problem with:  : None  Do you have a history of Chemical Use?: No  Do you have a history of Alcohol Use?: Yes  Do you have a history of Street Drug Abuse?: Yes  Histroy of Prescripton Drug Abuse?: No  Medical Problems:   Past Medical History:   Diagnosis Date    Alcoholic hepatitis without ascites 10/20/2018    Alcoholism ([facility] [address]     3 pints daily    Anxiety     Asthma     Depression     Pneumonia     Polysubstance abuse ([facility] [address]     drug abuse includes, cocaine, IV heroin, cannabis, and ETOH    Suicide attempt ([facility] [address] 11/08/2017    S/A by overdosing on Trazodone and Seroquel    Suicide ideation        Risk:  Fall RiskTotal: 83  Tee Scale Tee Scale Score: 22  BVC Total: 0    Change in scores:  No. Changes to plan of Care:  No    Status EXAM:   Status and Exam  Normal: No  Facial Expression: Flat, Worried  Affect: Appropriate  Level of Consciousness: Alert  Mood:Normal: No  Mood: Anxious, Depressed  Motor Activity:Normal: Yes  Motor Activity: Decreased  Interview Behavior: Cooperative, Evasive  Preception: [place] to Person, [place] to Time, [place] to Place, [place] to Situation  Attention:Normal: No  Attention: Distractible  Thought Processes: Blocking  Thought Content:Normal: No  Thought Content: Preoccupations, Poverty of Content  Hallucinations: None  Delusions: No  Delusions: (none)  Memory:Normal: No  Memory: Poor Remote  Insight and Judgment: No  Insight and Judgment: Poor Judgment, Poor Insight  Present Suicidal Ideation: No  Present Homicidal Ideation: No    Daily Assessment Last Entry:   Daily Sleep (WDL): Within Defined Limits         Patient Currently in Pain: Denies  Daily Nutrition (WDL): Within Defined Limits    Patient Monitoring:  Frequency of Checks: 4 times per hour, close    Psychiatric Symptoms:   Depression Symptoms  Depression Symptoms: Feelings of hopelessess, Loss of interest  Anxiety Symptoms  Anxiety Symptoms: Generalized  Ingrid Symptoms  Ingrid Symptoms: No problems reported or observed. Psychosis Symptoms  Delusion Type: No problems reported or observed. Suicide Risk CSSR-S:  1) Within the past month, have you wished you were dead or wished you could go to sleep and not wake up? : Yes  2) Have you actually had any thoughts of killing yourself? : Yes  3) Have you been thinking about how you might kill yourself? : Yes  5) Have you started to work out or worked out the details of how to kill yourself? Do you intend to carry out this plan? : No  6) Have you ever done anything, started to do anything, or prepared to do anything to end your life?: No  Change in result:  Change in plan of care:     EDUCATION:   Learner Progress Toward Treatment Goals: Reviewed results and recommendations of this team, reviewed group plan and strategies, reviewed signs, symptoms and risk of self harm and violent behavior, reviewed goals and plan of care. Method:  individual education, small group, verbal education. Outcome:  Pt verbalized understanding, but needs reinforcement to obtain goals. PATIENT GOALS:  Short term:  Continue testing for physical health issues, talk to supports, plan for discharge Friday June 21  Long term:   Follow up with [facility] ACT team and case manager    PLAN/TREATMENT RECOMMENDATIONS UPDATE:   Continue with group therapies, education of coping skills   Continue to monitor patient on unit. Medications provided/medication compliance by patient. Continue for plans to obtain long term goals after discharge.     SHORT-TERM GOALS UPDATE:  Time frame for Short-Term Goals:  8-14days     LONG-TERM GOALS UPDATE:  Time frame for Long-Term Goals:  6 months    Members Present in Team Meeting:   See signature sheet  Severo Gil

## 2019-06-20 NOTE — PROGRESS NOTES
[facility]    Gastroenterology Daily Progress Note      Patient:   Yoon Funes   :    1975   Facility:   [facility]  Date:     2019  Consultant:   Janes Pelaez CNP      SUBJECTIVE  37 y.o. female admitted 2019 with Bipolar 1 disorder ([facility] [address] [F31.9]  Bipolar affective disorder ([facility] [address] [F31.9] and seen for positive hepatitis c in the blood and nausea with vomiting. The pt was seen and examined. She is s/p EGD yesterday that showed a distorted mid stomach with mild gastritis and a mildly tortuous esophagus. Her biopsy is still pending. Upper GI study shows stenosis of the distal esophagus with delayed emptying. No emesis overnight and currently denies abdominal pain.         OBJECTIVE  Scheduled Meds:   QUEtiapine  200 mg Oral Nightly    venlafaxine  75 mg Oral Daily with breakfast    OXcarbazepine  150 mg Oral BID    therapeutic multivitamin-minerals  1 tablet Oral Daily    lithium  450 mg Oral BID    gabapentin  300 mg Oral TID    fluticasone  1 spray Each Nostril Daily    thiamine  100 mg Oral Daily    pantoprazole  40 mg Oral BID AC    nicotine  1 patch Transdermal Daily       Vital Signs:  BP (!) 84/59   Pulse 67   Temp 98.4 °F (36.9 °C) (Oral)   Resp 15   Ht 5' (1.524 m)   Wt 132 lb (59.9 kg)   SpO2 97%   BMI 25.78 kg/m²      Physical Exam:   General appearance: Alert & oriented, NAD  Lungs: CTA bilaterally    Heart: S1S2 RRR  Abdomen: Soft, Nontender, Not distended, BS WNL  Skin: No jaundice, No clubbing, No cyanosis    Lab and Imaging Review   FINDINGS:UGI with air 19   There is narrowing of the distal esophagus immediately proximal to the   gastroesophageal junction spanning approximately 2.2 cm.  The narrowing of   the distal esophagus is approximately 50%.  There is delayed emptying of   thick liquid consistency from the esophagus into the stomach.  There was   contrast retention for least several minutes of thick liquid BE for clearing

## 2019-06-21 VITALS
TEMPERATURE: 97.6 F | OXYGEN SATURATION: 97 % | BODY MASS INDEX: 25.91 KG/M2 | SYSTOLIC BLOOD PRESSURE: 106 MMHG | DIASTOLIC BLOOD PRESSURE: 74 MMHG | HEART RATE: 58 BPM | HEIGHT: 60 IN | RESPIRATION RATE: 14 BRPM | WEIGHT: 132 LBS

## 2019-06-21 LAB
HCV QUANTITATIVE: NORMAL
HEPATITIS C GENOTYPE: NORMAL

## 2019-06-21 PROCEDURE — 6370000000 HC RX 637 (ALT 250 FOR IP): Performed by: PSYCHIATRY & NEUROLOGY

## 2019-06-21 PROCEDURE — APPNB30 APP NON BILLABLE TIME 0-30 MINS: Performed by: NURSE PRACTITIONER

## 2019-06-21 PROCEDURE — 5130000000 HC BRIDGE APPOINTMENT

## 2019-06-21 RX ADMIN — THIAMINE HCL TAB 100 MG 100 MG: 100 TAB at 08:29

## 2019-06-21 RX ADMIN — LITHIUM CARBONATE 450 MG: 450 TABLET, EXTENDED RELEASE ORAL at 08:29

## 2019-06-21 RX ADMIN — PANTOPRAZOLE SODIUM 40 MG: 40 TABLET, DELAYED RELEASE ORAL at 06:32

## 2019-06-21 RX ADMIN — GABAPENTIN 300 MG: 300 CAPSULE ORAL at 08:29

## 2019-06-21 RX ADMIN — VENLAFAXINE HYDROCHLORIDE 75 MG: 75 CAPSULE, EXTENDED RELEASE ORAL at 08:29

## 2019-06-21 RX ADMIN — ACETAMINOPHEN 650 MG: 325 TABLET, FILM COATED ORAL at 01:13

## 2019-06-21 RX ADMIN — OXCARBAZEPINE 150 MG: 300 TABLET, FILM COATED ORAL at 08:30

## 2019-06-21 RX ADMIN — MULTIPLE VITAMINS W/ MINERALS TAB 1 TABLET: TAB at 08:29

## 2019-06-21 ASSESSMENT — PAIN SCALES - GENERAL
PAINLEVEL_OUTOF10: 10
PAINLEVEL_OUTOF10: 10
PAINLEVEL_OUTOF10: 4

## 2019-06-21 ASSESSMENT — PAIN DESCRIPTION - ORIENTATION: ORIENTATION: LEFT

## 2019-06-21 ASSESSMENT — PAIN DESCRIPTION - DESCRIPTORS: DESCRIPTORS: ACHING

## 2019-06-21 ASSESSMENT — PAIN DESCRIPTION - PAIN TYPE: TYPE: ACUTE PAIN

## 2019-06-21 ASSESSMENT — PAIN DESCRIPTION - ONSET: ONSET: AWAKENED FROM SLEEP

## 2019-06-21 ASSESSMENT — PAIN DESCRIPTION - FREQUENCY: FREQUENCY: CONTINUOUS

## 2019-06-21 ASSESSMENT — PAIN DESCRIPTION - LOCATION: LOCATION: SHOULDER

## 2019-06-21 NOTE — GROUP NOTE
Group Therapy Note    Date: June 21    Group Start Time: 1330  Group End Time: 8773  Group Topic: Recreational    [facility]    Patient refused to attend creative expression group at 1330 after encouragement from staff. 1:1 talk time offered as alternative to group session.

## 2019-06-21 NOTE — PROGRESS NOTES
Patient refused to attend wrap-up Group after encouragement from staff. 1:1 talk time offered but patient refused.

## 2019-06-21 NOTE — BH NOTE
Patient given tobacco quitline number 08094592757 at this time, refusing to call at this time, states \" I just dont want to quit now\"- patient given information as to the dangers of long term tobacco use. Continue to reinforce the importance of tobacco cessation.

## 2019-06-21 NOTE — GROUP NOTE
Group Therapy Note    Date: June 21    Group Start Time: 0900  Group End Time: 0930  Group Topic: Community Meeting    [address], [facility] Therapy Note    Attendees: 7/23    patient refused to attend goal setting and community meeting group at  [facility] [address] after encouragement from staff. 1:1 talk time provided as alternative to group session.

## 2019-06-21 NOTE — CARE COORDINATION
Bridge Appointment completed: Reviewed Discharge Instructions with patient. Patient verbalizes understanding and agreement with the discharge plan using the teachback method. Discharge Arrangements: Pt is scheduled to follow-up with the [facility]. Guardian notified: N/A  Discharge destination/address: Pt plans to return home. Transported by: Pt will be transported via [employer] cab.

## 2019-06-21 NOTE — BH NOTE
[facility]  Discharge Note    Pt discharged with followings belongings:   Dentures: None  Vision - Corrective Lenses: None  Hearing Aid: None  Jewelry: None  Body Piercings Removed: N/A  Clothing: (BAGGED ON CART)  Were All Patient Medications Collected?: Not Applicable  Other Valuables: None   Valuables sent home with patient. Valuables retrieved from safe, Security envelope number:  [id number] and returned to patient. Patient left department with Departure Mode: In cab via Mobility at Departure: Ambulatory, discharged to Discharged to: Private Residence. Patient education on aftercare instructions: yes  [facility] Patient verbalize understanding of AVS:  yes.     Status EXAM upon discharge:  Status and Exam  Normal: Yes  Facial Expression: Brightened  Affect: Appropriate  Level of Consciousness: Alert  Mood:Normal: No  Mood: Anxious  Motor Activity:Normal: Yes  Motor Activity: Decreased  Interview Behavior: Cooperative  Preception: [place] to Person, Aishwarya Victorina to Time, [place] to Place, [place] to Situation  Attention:Normal: No  Attention: Distractible  Thought Processes: Circumstantial  Thought Content:Normal: No  Thought Content: Preoccupations  Hallucinations: None  Delusions: No  Delusions: (none)  Memory:Normal: Yes  Memory: Poor Remote  Insight and Judgment: No  Insight and Judgment: Poor Insight  Present Suicidal Ideation: No  Present Homicidal Ideation: No      Metabolic Screening:    Lab Results   Component Value Date    LABA1C 5.5 08/01/2018       Lab Results   Component Value Date    CHOL 196 06/13/2019    CHOL 212 (H) 08/01/2018    CHOL 204 (H) 06/05/2018     Lab Results   Component Value Date    TRIG 99 06/13/2019    TRIG 168 (H) 08/01/2018    TRIG 274 (H) 06/05/2018    TRIG 158 (H) 04/27/2017     Lab Results   Component Value Date    HDL 56 06/13/2019    HDL 51 08/01/2018    HDL 45 06/05/2018    HDL 26 (L) 10/23/2017     No components found for: LDLCAL  No results found for: Madeleine Blakely RN

## 2019-06-21 NOTE — PROGRESS NOTES
Patient states that she  is feeling much better in terms of her mood. Patient reports that she has not been experiencing any feelings of self-harm or thoughts of suicide. Affect is brighter and more reactive. She had an EGD performed yesterday, had a emptying study done this morning, encouraged patient to follow-up as an outpatient with GI doctors. Denies any side effects to medication regimen. Explored her  concerns and feelings. Reassurance and support provided. Charting and medications reviewed. Spent time discharge planning. Plan will be for discharge tomorrow as long as patient feels better overnight and there are no safety concerns.

## 2019-06-21 NOTE — PROGRESS NOTES
[facility]    Gastroenterology Daily Progress Note      Patient:   Enoc Bradley   :    1975   Facility:   [facility]  Date:     2019  Consultant:   Tony Salas CNP      SUBJECTIVE  37 y.o. female admitted 2019 with Bipolar 1 disorder ([facility] [address] [F31.9]  Bipolar affective disorder ([facility] [address] [F31.9] and seen for  Positive hepatitis c antibody and gastritis. The pt was seen and examined. . CT showed thickening in the distal esophagus. She has no complaints this morning and is tolerating food. OBJECTIVE  Scheduled Meds:   QUEtiapine  200 mg Oral Nightly    venlafaxine  75 mg Oral Daily with breakfast    OXcarbazepine  150 mg Oral BID    therapeutic multivitamin-minerals  1 tablet Oral Daily    lithium  450 mg Oral BID    gabapentin  300 mg Oral TID    fluticasone  1 spray Each Nostril Daily    thiamine  100 mg Oral Daily    pantoprazole  40 mg Oral BID AC    nicotine  1 patch Transdermal Daily       Vital Signs:  /73   Pulse 74   Temp 98.4 °F (36.9 °C) (Oral)   Resp 14   Ht 5' (1.524 m)   Wt 132 lb (59.9 kg)   SpO2 97%   BMI 25.78 kg/m²      Physical Exam:   General appearance: Alert & oriented, NAD  Lungs: CTA bilaterally    Heart: S1S2 RRR  Abdomen: Soft, Nontender, Not distended, BS WNL  Skin: No jaundice, No clubbing, No cyanosis    Lab and Imaging Review   Lab and Imaging Review   FINDINGS:UGI with air 19   There is narrowing of the distal esophagus immediately proximal to the   gastroesophageal junction spanning approximately 2.2 cm.  The narrowing of   the distal esophagus is approximately 50%.  There is delayed emptying of   thick liquid consistency from the esophagus into the stomach.  There was   contrast retention for least several minutes of thick liquid BE for clearing   into the stomach.  The stomach is small in caliber compatible with known   history of previous gastric sleeve.  Stomach otherwise grossly unremarkable. Proximal small bowel is unremarkable.           Impression   Stenosis of the distal esophagus which results in at least 50% narrowing   immediately proximal to the gastroesophageal junction.  Partial obstruction   and delayed emptying of thick and thin barium.  Secondary esophageal   dysmotility.              FINDINGS:liver us 6/18/19   LIVER: Camilo Pimentel is diffuse increased hepatic parenchymal echogenicity most   commonly related to hepatic steatosis.  No focal liver lesion.  No   intrahepatic biliary ductal dilatation.  The portal vein demonstrates normal   direction and flow.       BILIARY SYSTEM:  The gallbladder is surgically absent.       Common bile duct is within normal limits measuring 6 mm.       RIGHT KIDNEY: The right kidney is grossly unremarkable without evidence of   hydronephrosis.       PANCREAS:  Visualized portions of the pancreas are unremarkable.       OTHER: No evidence of right upper quadrant ascites.           Impression   Hepatic steatosis versus diffuse hepatocellular process.  Patent portal vein. No ascites.          [facility]      PROCEDURE DATE: 06/19/19      ATTENDING PHYSICIAN: Megha Shin MD       PROCEDURES:   1) Transoral Upper Endoscopy, biopsy.      POSTOPERATIVE DIAGNOSIS:   Distorted mid stomach  Mild gastritis in proximal stomach  Evidence of prior sleeve gastrectomy  Mildly tortuous esophagus         FINDINGS:   Esophagus: The esophagus was inspected to the Z-line. The endoscopic exam showed mildly tortuous esophagus.      Stomach: The stomach was inspected in both forward and retroflex fashion and was appropriately distensible. The cardia, fundus, incisura, antrum and pylorus were identified via direct visualization. The endoscopic exam showed evidence of prior sleeve gastrectomy. Mild erythema and fine vessel prominence was noted in proximal stomach- Bx obtained.  Mid stomach was distorted with luminal narrowing.      Duodenum: The proximal small bowel  Detail of the right kidney is markedly limited.  There is no acute   abnormality       GI/Bowel: Detail is limited due to a large amount of artifact related to the   hyperdensity of the contrast in the colon.  No disproportionate small-bowel   distention is noted. Danella Gell is no pneumatosis.       Pelvis: Urinary bladder is normal.  A large amount of stool is noted in the   rectum and distal sigmoid colon.  Sigmoid colon is redundant. Danella Gell is very   high density contrast noted in the proximal sigmoid colon giving rise to   artifact and limitation.  There is no definitive wall thickening.  There is   no fluid collection in the pelvis.       Peritoneum/Retroperitoneum: Vascular calcifications are noted. Danella Gell is no   aneurysm of the aorta.  Small lymph nodes are noted, greatest in the aquiles   hepatis region. Danella Gell is no new adenopathy.  Mesenteric vessels are patent       Bones/Soft Tissues: Degenerative changes in the lumbar spine are noted.           Impression   Postop changes in the distal esophagus are noted.       Slight limitation due to the artifact related to the dense contrast in the   colon       Otherwise no acute abnormality in the abdomen or pelvis       Patient Name: Truman Vargas   MR#: [id number]   Specimen #[id number]         Final Diagnosis   STOMACH, MUCOSA, BIOPSY:          FRAGMENTS OF BENIGN GASTRIC MUCOSA SHOWING MILD CHRONIC   INFLAMMATION. EVALUATION OF IMMUNOSTAIN FOR HELICOBACTER PYLORI TO FOLLOW IN AN   ADDENDUM. Diagnosis Comment   One of the fragments of the gastric mucosa has some features of a   fundic gland polyp.  Additional correlation with endoscopic findings   is suggested.      ASSESSMENT/PLAN;  1. Dysphagia with nausea and emesis; improved s/p egd   -pt biopsy results show possible fundic gland polyp  -antiemetics prn   -continue the ppi  -may need follow up with surgeon if symptoms persist    2.  Hepatitis c antibody positive  -follow up tx with Dr Dot Cueva once

## 2019-06-21 NOTE — GROUP NOTE
Group Therapy Note    Date: June 21    Group Start Time: 1000  Group End Time: 1089  Group Topic: Recreational    STCZ ESDRAS C    Juany Butts, KRYSTIAN    Group Therapy Note    Attendees: 5    Patient's Goal:  Pt will demonstrate improved interpersonal skills    Notes:  Pt attended group and participated. Status After Intervention:  Improved    Participation Level:  Active Listener and Interactive    Participation Quality: Appropriate, Sharing and Supportive      Speech:  normal      Thought Process/Content: Logical  Linear      Affective Functioning: Congruent      Mood: euthymic      Level of consciousness:  Alert, Oriented x4 and Attentive      Response to Learning: Able to verbalize current knowledge/experience, Able to verbalize/acknowledge new learning, Able to retain information, Capable of insight, Able to change behavior and Progressing to goal      Endings: None Reported    Modes of Intervention: Education, Support, Socialization, Exploration, Problem-solving and Activity      Discipline Responsible: Psychoeducational Specialist      Signature:  Eric Parr

## 2019-07-23 NOTE — DISCHARGE SUMMARY
Helps with pain     QUEtiapine 200 MG tablet  Commonly known as:  SEROQUEL  Take 1 tablet by mouth nightly  What changed:  Another medication with the same name was removed. Continue taking this medication, and follow the directions you see here.   Notes to patient:  Helps clear thoughts        CONTINUE taking these medications    albuterol sulfate  (90 Base) MCG/ACT inhaler  Notes to patient:  Helps with breathing     dicyclomine 10 MG capsule  Commonly known as:  BENTYL  Take 1 capsule by mouth every 6 hours as needed (cramps)  Notes to patient:  Helps with stomach pain     fluticasone 50 MCG/ACT nasal spray  Commonly known as:  FLONASE  1 spray by Each Nare route daily  Notes to patient:  Helps with allergies     hydrOXYzine 50 MG tablet  Commonly known as:  ATARAX  Take 1 tablet by mouth 2 times daily as needed for Anxiety  Notes to patient:  Help with acute anxiety     lithium 450 MG extended release tablet  Commonly known as:  ESKALITH  Take 1 tablet by mouth 2 times daily  Notes to patient:  Helps balance mood     MULTIVITAMIN ADULT PO  Notes to patient:  Vitamin supplement     omeprazole 20 MG delayed release capsule  Commonly known as:  PRILOSEC  Notes to patient:  Helps with indigestion     ondansetron 4 MG disintegrating tablet  Commonly known as:  ZOFRAN-ODT  Take 1 tablet by mouth every 8 hours as needed for Nausea  Notes to patient:  Helps with nausea     OXcarbazepine 150 MG tablet  Commonly known as:  TRILEPTAL  Take 1 tablet by mouth 2 times daily  Notes to patient:  Mood stabilizer     sodium chloride 0.65 % nasal spray  Commonly known as:  OCEAN  1 spray by Nasal route as needed for Congestion  Notes to patient:  Helps with nasal congestion        STOP taking these medications    traZODone 50 MG tablet  Commonly known as:  DESYREL           Where to Get Your Medications      These medications were sent to [facility], [address] [facility] [address] [facility] [address] [phone]  [address] [facility] [address]    Phone:  [phone]   · hydrOXYzine 50 MG tablet  · lithium 450 MG extended release tablet  · OXcarbazepine 150 MG tablet  · QUEtiapine 200 MG tablet  · venlafaxine 75 MG extended release capsule         Disposition: Home    Discharge Date: 6/21/2019

## 2019-10-13 ENCOUNTER — APPOINTMENT (OUTPATIENT)
Dept: CT IMAGING | Age: 44
DRG: 312 | End: 2019-10-13
Payer: MEDICARE

## 2019-10-13 ENCOUNTER — HOSPITAL ENCOUNTER (INPATIENT)
Age: 44
LOS: 2 days | Discharge: OTHER INSTITUTION | DRG: 312 | End: 2019-10-15
Attending: EMERGENCY MEDICINE | Admitting: INTERNAL MEDICINE
Payer: MEDICARE

## 2019-10-13 DIAGNOSIS — R55 SYNCOPE, UNSPECIFIED SYNCOPE TYPE: Primary | ICD-10-CM

## 2019-10-13 DIAGNOSIS — F10.930 ALCOHOL WITHDRAWAL SYNDROME WITHOUT COMPLICATION (HCC): ICD-10-CM

## 2019-10-13 DIAGNOSIS — R53.81 PHYSICAL DEBILITY: ICD-10-CM

## 2019-10-13 LAB
ABSOLUTE EOS #: 0.23 K/UL (ref 0–0.4)
ABSOLUTE IMMATURE GRANULOCYTE: 0 K/UL (ref 0–0.3)
ABSOLUTE LYMPH #: 2.1 K/UL (ref 1–4.8)
ABSOLUTE MONO #: 0.38 K/UL (ref 0.1–0.8)
ALBUMIN SERPL-MCNC: 2.8 G/DL (ref 3.5–5.2)
ALBUMIN/GLOBULIN RATIO: 0.8 (ref 1–2.5)
ALLEN TEST: ABNORMAL
ALP BLD-CCNC: 39 U/L (ref 35–104)
ALT SERPL-CCNC: 21 U/L (ref 5–33)
AMMONIA: 41 UMOL/L (ref 11–51)
ANION GAP SERPL CALCULATED.3IONS-SCNC: 7 MMOL/L (ref 9–17)
ANION GAP: 10 MMOL/L (ref 7–16)
AST SERPL-CCNC: 23 U/L
BASOPHILS # BLD: 0 % (ref 0–2)
BASOPHILS ABSOLUTE: 0 K/UL (ref 0–0.2)
BILIRUB SERPL-MCNC: <0.1 MG/DL (ref 0.3–1.2)
BUN BLDV-MCNC: 9 MG/DL (ref 6–20)
BUN/CREAT BLD: ABNORMAL (ref 9–20)
CALCIUM SERPL-MCNC: 7.5 MG/DL (ref 8.6–10.4)
CHLORIDE BLD-SCNC: 110 MMOL/L (ref 98–107)
CO2: 22 MMOL/L (ref 20–31)
CREAT SERPL-MCNC: 0.66 MG/DL (ref 0.5–0.9)
DIFFERENTIAL TYPE: ABNORMAL
EOSINOPHILS RELATIVE PERCENT: 3 % (ref 1–4)
ETHANOL PERCENT: <0.01 %
ETHANOL: <10 MG/DL
FIO2: ABNORMAL
GFR AFRICAN AMERICAN: >60 ML/MIN
GFR NON-AFRICAN AMERICAN: >60 ML/MIN
GFR NON-AFRICAN AMERICAN: >60 ML/MIN
GFR SERPL CREATININE-BSD FRML MDRD: >60 ML/MIN
GFR SERPL CREATININE-BSD FRML MDRD: ABNORMAL ML/MIN/{1.73_M2}
GFR SERPL CREATININE-BSD FRML MDRD: ABNORMAL ML/MIN/{1.73_M2}
GFR SERPL CREATININE-BSD FRML MDRD: NORMAL ML/MIN/{1.73_M2}
GLUCOSE BLD-MCNC: 84 MG/DL (ref 74–100)
GLUCOSE BLD-MCNC: 86 MG/DL (ref 70–99)
HCO3 VENOUS: 25.5 MMOL/L (ref 22–29)
HCT VFR BLD CALC: 36 % (ref 36.3–47.1)
HEMOGLOBIN: 10 G/DL (ref 11.9–15.1)
IMMATURE GRANULOCYTES: 0 %
LYMPHOCYTES # BLD: 28 % (ref 24–44)
MCH RBC QN AUTO: 24.1 PG (ref 25.2–33.5)
MCHC RBC AUTO-ENTMCNC: 27.8 G/DL (ref 28.4–34.8)
MCV RBC AUTO: 86.7 FL (ref 82.6–102.9)
MODE: ABNORMAL
MONOCYTES # BLD: 5 % (ref 1–7)
MORPHOLOGY: ABNORMAL
MORPHOLOGY: ABNORMAL
NEGATIVE BASE EXCESS, VEN: ABNORMAL (ref 0–2)
NRBC AUTOMATED: 0 PER 100 WBC
O2 DEVICE/FLOW/%: ABNORMAL
O2 SAT, VEN: 21 % (ref 60–85)
PATIENT TEMP: ABNORMAL
PCO2, VEN: 45.7 MM HG (ref 41–51)
PDW BLD-RTO: 17.9 % (ref 11.8–14.4)
PH VENOUS: 7.35 (ref 7.32–7.43)
PLATELET # BLD: 234 K/UL (ref 138–453)
PLATELET ESTIMATE: ABNORMAL
PMV BLD AUTO: 10.2 FL (ref 8.1–13.5)
PO2, VEN: 16.7 MM HG (ref 30–50)
POC CHLORIDE: 109 MMOL/L (ref 98–107)
POC CREATININE: 0.72 MG/DL (ref 0.51–1.19)
POC HEMATOCRIT: 33 % (ref 36–46)
POC HEMOGLOBIN: 11.3 G/DL (ref 12–16)
POC IONIZED CALCIUM: 1.12 MMOL/L (ref 1.15–1.33)
POC LACTIC ACID: 0.75 MMOL/L (ref 0.56–1.39)
POC PCO2 TEMP: ABNORMAL MM HG
POC PH TEMP: ABNORMAL
POC PO2 TEMP: ABNORMAL MM HG
POC POTASSIUM: 3.7 MMOL/L (ref 3.5–4.5)
POC SODIUM: 144 MMOL/L (ref 138–146)
POSITIVE BASE EXCESS, VEN: 0 (ref 0–3)
POTASSIUM SERPL-SCNC: 3.8 MMOL/L (ref 3.7–5.3)
RBC # BLD: 4.15 M/UL (ref 3.95–5.11)
RBC # BLD: ABNORMAL 10*6/UL
SAMPLE SITE: ABNORMAL
SEG NEUTROPHILS: 64 % (ref 36–66)
SEGMENTED NEUTROPHILS ABSOLUTE COUNT: 4.79 K/UL (ref 1.8–7.7)
SODIUM BLD-SCNC: 139 MMOL/L (ref 135–144)
TOTAL CO2, VENOUS: 27 MMOL/L (ref 23–30)
TOTAL PROTEIN: 6.2 G/DL (ref 6.4–8.3)
TROPONIN INTERP: NORMAL
TROPONIN T: NORMAL NG/ML
TROPONIN, HIGH SENSITIVITY: <6 NG/L (ref 0–14)
WBC # BLD: 7.5 K/UL (ref 3.5–11.3)
WBC # BLD: ABNORMAL 10*3/UL

## 2019-10-13 PROCEDURE — 80178 ASSAY OF LITHIUM: CPT

## 2019-10-13 PROCEDURE — 83605 ASSAY OF LACTIC ACID: CPT

## 2019-10-13 PROCEDURE — 84132 ASSAY OF SERUM POTASSIUM: CPT

## 2019-10-13 PROCEDURE — 2580000003 HC RX 258: Performed by: STUDENT IN AN ORGANIZED HEALTH CARE EDUCATION/TRAINING PROGRAM

## 2019-10-13 PROCEDURE — G0378 HOSPITAL OBSERVATION PER HR: HCPCS

## 2019-10-13 PROCEDURE — 82140 ASSAY OF AMMONIA: CPT

## 2019-10-13 PROCEDURE — G0480 DRUG TEST DEF 1-7 CLASSES: HCPCS

## 2019-10-13 PROCEDURE — 82435 ASSAY OF BLOOD CHLORIDE: CPT

## 2019-10-13 PROCEDURE — 82330 ASSAY OF CALCIUM: CPT

## 2019-10-13 PROCEDURE — 96375 TX/PRO/DX INJ NEW DRUG ADDON: CPT

## 2019-10-13 PROCEDURE — 82947 ASSAY GLUCOSE BLOOD QUANT: CPT

## 2019-10-13 PROCEDURE — 82803 BLOOD GASES ANY COMBINATION: CPT

## 2019-10-13 PROCEDURE — 86038 ANTINUCLEAR ANTIBODIES: CPT

## 2019-10-13 PROCEDURE — 2500000003 HC RX 250 WO HCPCS: Performed by: STUDENT IN AN ORGANIZED HEALTH CARE EDUCATION/TRAINING PROGRAM

## 2019-10-13 PROCEDURE — 84484 ASSAY OF TROPONIN QUANT: CPT

## 2019-10-13 PROCEDURE — 82565 ASSAY OF CREATININE: CPT

## 2019-10-13 PROCEDURE — 85014 HEMATOCRIT: CPT

## 2019-10-13 PROCEDURE — 96365 THER/PROPH/DIAG IV INF INIT: CPT

## 2019-10-13 PROCEDURE — 81001 URINALYSIS AUTO W/SCOPE: CPT

## 2019-10-13 PROCEDURE — 86235 NUCLEAR ANTIGEN ANTIBODY: CPT

## 2019-10-13 PROCEDURE — 80053 COMPREHEN METABOLIC PANEL: CPT

## 2019-10-13 PROCEDURE — 6360000002 HC RX W HCPCS: Performed by: STUDENT IN AN ORGANIZED HEALTH CARE EDUCATION/TRAINING PROGRAM

## 2019-10-13 PROCEDURE — 6360000004 HC RX CONTRAST MEDICATION: Performed by: STUDENT IN AN ORGANIZED HEALTH CARE EDUCATION/TRAINING PROGRAM

## 2019-10-13 PROCEDURE — 84295 ASSAY OF SERUM SODIUM: CPT

## 2019-10-13 PROCEDURE — 70450 CT HEAD/BRAIN W/O DYE: CPT

## 2019-10-13 PROCEDURE — 2060000000 HC ICU INTERMEDIATE R&B

## 2019-10-13 PROCEDURE — 74177 CT ABD & PELVIS W/CONTRAST: CPT

## 2019-10-13 PROCEDURE — 85025 COMPLETE CBC W/AUTO DIFF WBC: CPT

## 2019-10-13 PROCEDURE — 86225 DNA ANTIBODY NATIVE: CPT

## 2019-10-13 PROCEDURE — 99285 EMERGENCY DEPT VISIT HI MDM: CPT

## 2019-10-13 PROCEDURE — 93005 ELECTROCARDIOGRAM TRACING: CPT | Performed by: STUDENT IN AN ORGANIZED HEALTH CARE EDUCATION/TRAINING PROGRAM

## 2019-10-13 RX ORDER — ONDANSETRON 2 MG/ML
4 INJECTION INTRAMUSCULAR; INTRAVENOUS ONCE
Status: COMPLETED | OUTPATIENT
Start: 2019-10-13 | End: 2019-10-13

## 2019-10-13 RX ORDER — 0.9 % SODIUM CHLORIDE 0.9 %
1000 INTRAVENOUS SOLUTION INTRAVENOUS ONCE
Status: COMPLETED | OUTPATIENT
Start: 2019-10-13 | End: 2019-10-13

## 2019-10-13 RX ADMIN — ONDANSETRON 4 MG: 2 INJECTION INTRAMUSCULAR; INTRAVENOUS at 22:38

## 2019-10-13 RX ADMIN — IOHEXOL 75 ML: 350 INJECTION, SOLUTION INTRAVENOUS at 22:06

## 2019-10-13 RX ADMIN — SODIUM CHLORIDE 1000 ML: 9 INJECTION, SOLUTION INTRAVENOUS at 22:38

## 2019-10-13 RX ADMIN — FOLIC ACID: 5 INJECTION, SOLUTION INTRAMUSCULAR; INTRAVENOUS; SUBCUTANEOUS at 22:23

## 2019-10-13 RX ADMIN — SODIUM CHLORIDE 1000 ML: 9 INJECTION, SOLUTION INTRAVENOUS at 21:30

## 2019-10-14 ENCOUNTER — APPOINTMENT (OUTPATIENT)
Dept: MRI IMAGING | Age: 44
DRG: 312 | End: 2019-10-14
Payer: MEDICARE

## 2019-10-14 PROBLEM — D50.9 NORMOCYTIC HYPOCHROMIC ANEMIA: Status: ACTIVE | Noted: 2019-10-14

## 2019-10-14 PROBLEM — J45.20 MILD INTERMITTENT ASTHMA WITHOUT COMPLICATION: Status: ACTIVE | Noted: 2019-10-14

## 2019-10-14 LAB
-: NORMAL
AMORPHOUS: NORMAL
AMPHETAMINE SCREEN URINE: NEGATIVE
ANION GAP SERPL CALCULATED.3IONS-SCNC: 9 MMOL/L (ref 9–17)
BACTERIA: NORMAL
BARBITURATE SCREEN URINE: NEGATIVE
BENZODIAZEPINE SCREEN, URINE: POSITIVE
BILIRUBIN URINE: NEGATIVE
BNP INTERPRETATION: NORMAL
BUN BLDV-MCNC: 6 MG/DL (ref 6–20)
BUN/CREAT BLD: ABNORMAL (ref 9–20)
BUPRENORPHINE URINE: ABNORMAL
CALCIUM SERPL-MCNC: 8.3 MG/DL (ref 8.6–10.4)
CANNABINOID SCREEN URINE: NEGATIVE
CASTS UA: NORMAL /LPF (ref 0–8)
CHLORIDE BLD-SCNC: 113 MMOL/L (ref 98–107)
CO2: 22 MMOL/L (ref 20–31)
COCAINE METABOLITE, URINE: NEGATIVE
COLOR: YELLOW
CORTISOL COLLECTION INFO: NORMAL
CORTISOL: 6.7 UG/DL (ref 2.7–18.4)
CREAT SERPL-MCNC: 0.57 MG/DL (ref 0.5–0.9)
CRYSTALS, UA: NORMAL /HPF
EKG ATRIAL RATE: 61 BPM
EKG P AXIS: 62 DEGREES
EKG P-R INTERVAL: 150 MS
EKG Q-T INTERVAL: 444 MS
EKG QRS DURATION: 84 MS
EKG QTC CALCULATION (BAZETT): 446 MS
EKG R AXIS: 35 DEGREES
EKG T AXIS: 39 DEGREES
EKG VENTRICULAR RATE: 61 BPM
EPITHELIAL CELLS UA: NORMAL /HPF (ref 0–5)
GFR AFRICAN AMERICAN: >60 ML/MIN
GFR NON-AFRICAN AMERICAN: >60 ML/MIN
GFR SERPL CREATININE-BSD FRML MDRD: ABNORMAL ML/MIN/{1.73_M2}
GFR SERPL CREATININE-BSD FRML MDRD: ABNORMAL ML/MIN/{1.73_M2}
GLUCOSE BLD-MCNC: 75 MG/DL (ref 70–99)
GLUCOSE URINE: NEGATIVE
HCT VFR BLD CALC: 38 % (ref 36.3–47.1)
HEMOGLOBIN: 10.7 G/DL (ref 11.9–15.1)
KETONES, URINE: NEGATIVE
LEUKOCYTE ESTERASE, URINE: NEGATIVE
LITHIUM DATE LAST DOSE: NORMAL
LITHIUM DOSE AMOUNT: NORMAL
LITHIUM DOSE TIME: NORMAL
LITHIUM LEVEL: 0.7 MMOL/L (ref 0.6–1.2)
LV EF: 60 %
LVEF MODALITY: NORMAL
MAGNESIUM: 2.3 MG/DL (ref 1.6–2.6)
MCH RBC QN AUTO: 23.9 PG (ref 25.2–33.5)
MCHC RBC AUTO-ENTMCNC: 28.2 G/DL (ref 28.4–34.8)
MCV RBC AUTO: 85 FL (ref 82.6–102.9)
MDMA URINE: ABNORMAL
METHADONE SCREEN, URINE: NEGATIVE
METHAMPHETAMINE, URINE: ABNORMAL
MUCUS: NORMAL
NITRITE, URINE: NEGATIVE
NRBC AUTOMATED: 0 PER 100 WBC
OPIATES, URINE: NEGATIVE
OTHER OBSERVATIONS UA: NORMAL
OXYCODONE SCREEN URINE: NEGATIVE
PDW BLD-RTO: 17.9 % (ref 11.8–14.4)
PH UA: 7 (ref 5–8)
PHENCYCLIDINE, URINE: NEGATIVE
PLATELET # BLD: 271 K/UL (ref 138–453)
PMV BLD AUTO: 9.9 FL (ref 8.1–13.5)
POTASSIUM SERPL-SCNC: 4.3 MMOL/L (ref 3.7–5.3)
PRO-BNP: 79 PG/ML
PROPOXYPHENE, URINE: ABNORMAL
PROTEIN UA: NEGATIVE
RBC # BLD: 4.47 M/UL (ref 3.95–5.11)
RBC UA: NORMAL /HPF (ref 0–4)
RENAL EPITHELIAL, UA: NORMAL /HPF
SODIUM BLD-SCNC: 144 MMOL/L (ref 135–144)
SPECIFIC GRAVITY UA: 1.02 (ref 1–1.03)
TEST INFORMATION: ABNORMAL
TRICHOMONAS: NORMAL
TRICYCLIC ANTIDEPRESSANTS, UR: ABNORMAL
TROPONIN INTERP: NORMAL
TROPONIN INTERP: NORMAL
TROPONIN T: NORMAL NG/ML
TROPONIN T: NORMAL NG/ML
TROPONIN, HIGH SENSITIVITY: <6 NG/L (ref 0–14)
TROPONIN, HIGH SENSITIVITY: <6 NG/L (ref 0–14)
TURBIDITY: CLEAR
URINE HGB: NEGATIVE
UROBILINOGEN, URINE: NORMAL
WBC # BLD: 8.8 K/UL (ref 3.5–11.3)
WBC UA: NORMAL /HPF (ref 0–5)
YEAST: NORMAL

## 2019-10-14 PROCEDURE — 80307 DRUG TEST PRSMV CHEM ANLYZR: CPT

## 2019-10-14 PROCEDURE — 83735 ASSAY OF MAGNESIUM: CPT

## 2019-10-14 PROCEDURE — 6370000000 HC RX 637 (ALT 250 FOR IP): Performed by: NURSE PRACTITIONER

## 2019-10-14 PROCEDURE — 97535 SELF CARE MNGMENT TRAINING: CPT

## 2019-10-14 PROCEDURE — 84484 ASSAY OF TROPONIN QUANT: CPT

## 2019-10-14 PROCEDURE — 96372 THER/PROPH/DIAG INJ SC/IM: CPT

## 2019-10-14 PROCEDURE — 2580000003 HC RX 258: Performed by: NURSE PRACTITIONER

## 2019-10-14 PROCEDURE — 93010 ELECTROCARDIOGRAM REPORT: CPT | Performed by: INTERNAL MEDICINE

## 2019-10-14 PROCEDURE — 6360000004 HC RX CONTRAST MEDICATION: Performed by: STUDENT IN AN ORGANIZED HEALTH CARE EDUCATION/TRAINING PROGRAM

## 2019-10-14 PROCEDURE — 6360000002 HC RX W HCPCS: Performed by: NURSE PRACTITIONER

## 2019-10-14 PROCEDURE — 82533 TOTAL CORTISOL: CPT

## 2019-10-14 PROCEDURE — 93306 TTE W/DOPPLER COMPLETE: CPT

## 2019-10-14 PROCEDURE — A9576 INJ PROHANCE MULTIPACK: HCPCS | Performed by: STUDENT IN AN ORGANIZED HEALTH CARE EDUCATION/TRAINING PROGRAM

## 2019-10-14 PROCEDURE — 36415 COLL VENOUS BLD VENIPUNCTURE: CPT

## 2019-10-14 PROCEDURE — 99223 1ST HOSP IP/OBS HIGH 75: CPT | Performed by: STUDENT IN AN ORGANIZED HEALTH CARE EDUCATION/TRAINING PROGRAM

## 2019-10-14 PROCEDURE — 6370000000 HC RX 637 (ALT 250 FOR IP): Performed by: INTERNAL MEDICINE

## 2019-10-14 PROCEDURE — 95819 EEG AWAKE AND ASLEEP: CPT

## 2019-10-14 PROCEDURE — 70553 MRI BRAIN STEM W/O & W/DYE: CPT

## 2019-10-14 PROCEDURE — 97162 PT EVAL MOD COMPLEX 30 MIN: CPT

## 2019-10-14 PROCEDURE — 97530 THERAPEUTIC ACTIVITIES: CPT

## 2019-10-14 PROCEDURE — APPSS60 APP SPLIT SHARED TIME 46-60 MINUTES: Performed by: NURSE PRACTITIONER

## 2019-10-14 PROCEDURE — G0378 HOSPITAL OBSERVATION PER HR: HCPCS

## 2019-10-14 PROCEDURE — 2060000000 HC ICU INTERMEDIATE R&B

## 2019-10-14 PROCEDURE — 83880 ASSAY OF NATRIURETIC PEPTIDE: CPT

## 2019-10-14 PROCEDURE — 85027 COMPLETE CBC AUTOMATED: CPT

## 2019-10-14 PROCEDURE — 97166 OT EVAL MOD COMPLEX 45 MIN: CPT

## 2019-10-14 PROCEDURE — 80048 BASIC METABOLIC PNL TOTAL CA: CPT

## 2019-10-14 PROCEDURE — 99222 1ST HOSP IP/OBS MODERATE 55: CPT | Performed by: INTERNAL MEDICINE

## 2019-10-14 RX ORDER — LORAZEPAM 0.5 MG/1
0.5 TABLET ORAL EVERY 4 HOURS PRN
Status: DISCONTINUED | OUTPATIENT
Start: 2019-10-14 | End: 2019-10-15 | Stop reason: HOSPADM

## 2019-10-14 RX ORDER — NICOTINE 21 MG/24HR
1 PATCH, TRANSDERMAL 24 HOURS TRANSDERMAL DAILY PRN
Status: DISCONTINUED | OUTPATIENT
Start: 2019-10-14 | End: 2019-10-15 | Stop reason: HOSPADM

## 2019-10-14 RX ORDER — ACETAMINOPHEN 325 MG/1
650 TABLET ORAL EVERY 4 HOURS PRN
Status: DISCONTINUED | OUTPATIENT
Start: 2019-10-14 | End: 2019-10-15 | Stop reason: HOSPADM

## 2019-10-14 RX ORDER — GABAPENTIN 300 MG/1
300 CAPSULE ORAL 3 TIMES DAILY
Status: DISCONTINUED | OUTPATIENT
Start: 2019-10-14 | End: 2019-10-15 | Stop reason: HOSPADM

## 2019-10-14 RX ORDER — POTASSIUM CHLORIDE 20 MEQ/1
40 TABLET, EXTENDED RELEASE ORAL PRN
Status: DISCONTINUED | OUTPATIENT
Start: 2019-10-14 | End: 2019-10-15 | Stop reason: HOSPADM

## 2019-10-14 RX ORDER — COSYNTROPIN 0.25 MG/ML
250 INJECTION, POWDER, FOR SOLUTION INTRAMUSCULAR; INTRAVENOUS ONCE
Status: COMPLETED | OUTPATIENT
Start: 2019-10-15 | End: 2019-10-15

## 2019-10-14 RX ORDER — POTASSIUM CHLORIDE 7.45 MG/ML
10 INJECTION INTRAVENOUS PRN
Status: DISCONTINUED | OUTPATIENT
Start: 2019-10-14 | End: 2019-10-15 | Stop reason: HOSPADM

## 2019-10-14 RX ORDER — SODIUM CHLORIDE 0.9 % (FLUSH) 0.9 %
10 SYRINGE (ML) INJECTION EVERY 12 HOURS SCHEDULED
Status: DISCONTINUED | OUTPATIENT
Start: 2019-10-14 | End: 2019-10-15 | Stop reason: HOSPADM

## 2019-10-14 RX ORDER — ONDANSETRON 2 MG/ML
4 INJECTION INTRAMUSCULAR; INTRAVENOUS EVERY 6 HOURS PRN
Status: DISCONTINUED | OUTPATIENT
Start: 2019-10-14 | End: 2019-10-15 | Stop reason: HOSPADM

## 2019-10-14 RX ORDER — SODIUM CHLORIDE 0.9 % (FLUSH) 0.9 %
10 SYRINGE (ML) INJECTION PRN
Status: DISCONTINUED | OUTPATIENT
Start: 2019-10-14 | End: 2019-10-15 | Stop reason: HOSPADM

## 2019-10-14 RX ORDER — FLUTICASONE PROPIONATE 50 MCG
1 SPRAY, SUSPENSION (ML) NASAL DAILY
Status: DISCONTINUED | OUTPATIENT
Start: 2019-10-14 | End: 2019-10-15 | Stop reason: HOSPADM

## 2019-10-14 RX ORDER — MAGNESIUM SULFATE 1 G/100ML
1 INJECTION INTRAVENOUS PRN
Status: DISCONTINUED | OUTPATIENT
Start: 2019-10-14 | End: 2019-10-15 | Stop reason: HOSPADM

## 2019-10-14 RX ORDER — PANTOPRAZOLE SODIUM 40 MG/1
40 TABLET, DELAYED RELEASE ORAL
Status: DISCONTINUED | OUTPATIENT
Start: 2019-10-14 | End: 2019-10-15 | Stop reason: HOSPADM

## 2019-10-14 RX ORDER — M-VIT,TX,IRON,MINS/CALC/FOLIC 27MG-0.4MG
1 TABLET ORAL DAILY
Status: DISCONTINUED | OUTPATIENT
Start: 2019-10-14 | End: 2019-10-15 | Stop reason: HOSPADM

## 2019-10-14 RX ORDER — SODIUM CHLORIDE 9 MG/ML
INJECTION, SOLUTION INTRAVENOUS CONTINUOUS
Status: DISCONTINUED | OUTPATIENT
Start: 2019-10-14 | End: 2019-10-15 | Stop reason: HOSPADM

## 2019-10-14 RX ORDER — DICYCLOMINE HYDROCHLORIDE 10 MG/1
10 CAPSULE ORAL EVERY 6 HOURS PRN
Status: DISCONTINUED | OUTPATIENT
Start: 2019-10-14 | End: 2019-10-15 | Stop reason: HOSPADM

## 2019-10-14 RX ORDER — LITHIUM CARBONATE 450 MG
450 TABLET, EXTENDED RELEASE ORAL 2 TIMES DAILY
Status: DISCONTINUED | OUTPATIENT
Start: 2019-10-14 | End: 2019-10-15 | Stop reason: HOSPADM

## 2019-10-14 RX ADMIN — SODIUM CHLORIDE: 9 INJECTION, SOLUTION INTRAVENOUS at 00:46

## 2019-10-14 RX ADMIN — FLUTICASONE PROPIONATE 1 SPRAY: 50 SPRAY, METERED NASAL at 10:51

## 2019-10-14 RX ADMIN — MULTIPLE VITAMINS W/ MINERALS TAB 1 TABLET: TAB at 08:10

## 2019-10-14 RX ADMIN — LITHIUM CARBONATE 450 MG: 450 TABLET, EXTENDED RELEASE ORAL at 01:15

## 2019-10-14 RX ADMIN — GABAPENTIN 300 MG: 300 CAPSULE ORAL at 14:23

## 2019-10-14 RX ADMIN — ACETAMINOPHEN 650 MG: 325 TABLET ORAL at 05:12

## 2019-10-14 RX ADMIN — GADOTERIDOL 13 ML: 279.3 INJECTION, SOLUTION INTRAVENOUS at 12:58

## 2019-10-14 RX ADMIN — PANTOPRAZOLE SODIUM 40 MG: 40 TABLET, DELAYED RELEASE ORAL at 16:39

## 2019-10-14 RX ADMIN — GABAPENTIN 300 MG: 300 CAPSULE ORAL at 21:13

## 2019-10-14 RX ADMIN — LORAZEPAM 0.5 MG: 0.5 TABLET ORAL at 20:11

## 2019-10-14 RX ADMIN — LITHIUM CARBONATE 450 MG: 450 TABLET, EXTENDED RELEASE ORAL at 20:11

## 2019-10-14 RX ADMIN — GABAPENTIN 300 MG: 300 CAPSULE ORAL at 08:10

## 2019-10-14 RX ADMIN — PANTOPRAZOLE SODIUM 40 MG: 40 TABLET, DELAYED RELEASE ORAL at 07:27

## 2019-10-14 RX ADMIN — ENOXAPARIN SODIUM 40 MG: 40 INJECTION SUBCUTANEOUS at 08:09

## 2019-10-14 RX ADMIN — Medication 10 ML: at 20:11

## 2019-10-14 RX ADMIN — LORAZEPAM 0.5 MG: 0.5 TABLET ORAL at 11:51

## 2019-10-14 ASSESSMENT — PAIN DESCRIPTION - DESCRIPTORS: DESCRIPTORS: PRESSURE

## 2019-10-14 ASSESSMENT — PAIN DESCRIPTION - PAIN TYPE
TYPE: ACUTE PAIN

## 2019-10-14 ASSESSMENT — PAIN SCALES - GENERAL
PAINLEVEL_OUTOF10: 4
PAINLEVEL_OUTOF10: 4
PAINLEVEL_OUTOF10: 1
PAINLEVEL_OUTOF10: 4
PAINLEVEL_OUTOF10: 0
PAINLEVEL_OUTOF10: 0
PAINLEVEL_OUTOF10: 3

## 2019-10-14 ASSESSMENT — PAIN DESCRIPTION - LOCATION
LOCATION: ABDOMEN;GENERALIZED
LOCATION: HEAD

## 2019-10-14 ASSESSMENT — ENCOUNTER SYMPTOMS
SHORTNESS OF BREATH: 0
SORE THROAT: 0
COUGH: 0
ABDOMINAL PAIN: 0
NAUSEA: 1
DIARRHEA: 0
VOMITING: 0

## 2019-10-15 VITALS
OXYGEN SATURATION: 97 % | RESPIRATION RATE: 17 BRPM | TEMPERATURE: 98.9 F | SYSTOLIC BLOOD PRESSURE: 107 MMHG | DIASTOLIC BLOOD PRESSURE: 68 MMHG | BODY MASS INDEX: 23.58 KG/M2 | HEIGHT: 65 IN | WEIGHT: 141.54 LBS | HEART RATE: 60 BPM

## 2019-10-15 PROBLEM — R55 SYNCOPE: Status: RESOLVED | Noted: 2019-10-13 | Resolved: 2019-10-15

## 2019-10-15 LAB
CORTISOL COLLECTION INFO: ABNORMAL
CORTISOL COLLECTION INFO: ABNORMAL
CORTISOL: 29.6 UG/DL (ref 2.7–18.4)
CORTISOL: 32.5 UG/DL (ref 2.7–18.4)
EKG ATRIAL RATE: 63 BPM
EKG ATRIAL RATE: 63 BPM
EKG P AXIS: 45 DEGREES
EKG P AXIS: 45 DEGREES
EKG P-R INTERVAL: 160 MS
EKG P-R INTERVAL: 164 MS
EKG Q-T INTERVAL: 440 MS
EKG Q-T INTERVAL: 444 MS
EKG QRS DURATION: 64 MS
EKG QRS DURATION: 78 MS
EKG QTC CALCULATION (BAZETT): 450 MS
EKG QTC CALCULATION (BAZETT): 454 MS
EKG R AXIS: 10 DEGREES
EKG R AXIS: 21 DEGREES
EKG T AXIS: 15 DEGREES
EKG T AXIS: 28 DEGREES
EKG VENTRICULAR RATE: 63 BPM
EKG VENTRICULAR RATE: 63 BPM
TROPONIN INTERP: NORMAL
TROPONIN T: NORMAL NG/ML
TROPONIN, HIGH SENSITIVITY: <6 NG/L (ref 0–14)

## 2019-10-15 PROCEDURE — G0378 HOSPITAL OBSERVATION PER HR: HCPCS

## 2019-10-15 PROCEDURE — 99232 SBSQ HOSP IP/OBS MODERATE 35: CPT | Performed by: STUDENT IN AN ORGANIZED HEALTH CARE EDUCATION/TRAINING PROGRAM

## 2019-10-15 PROCEDURE — 93010 ELECTROCARDIOGRAM REPORT: CPT | Performed by: INTERNAL MEDICINE

## 2019-10-15 PROCEDURE — 93005 ELECTROCARDIOGRAM TRACING: CPT

## 2019-10-15 PROCEDURE — 6370000000 HC RX 637 (ALT 250 FOR IP): Performed by: INTERNAL MEDICINE

## 2019-10-15 PROCEDURE — 84484 ASSAY OF TROPONIN QUANT: CPT

## 2019-10-15 PROCEDURE — 84439 ASSAY OF FREE THYROXINE: CPT

## 2019-10-15 PROCEDURE — 6360000002 HC RX W HCPCS: Performed by: NURSE PRACTITIONER

## 2019-10-15 PROCEDURE — 95819 EEG AWAKE AND ASLEEP: CPT | Performed by: PSYCHIATRY & NEUROLOGY

## 2019-10-15 PROCEDURE — 6370000000 HC RX 637 (ALT 250 FOR IP): Performed by: NURSE PRACTITIONER

## 2019-10-15 PROCEDURE — 82533 TOTAL CORTISOL: CPT

## 2019-10-15 PROCEDURE — 36415 COLL VENOUS BLD VENIPUNCTURE: CPT

## 2019-10-15 PROCEDURE — 82728 ASSAY OF FERRITIN: CPT

## 2019-10-15 PROCEDURE — 83550 IRON BINDING TEST: CPT

## 2019-10-15 PROCEDURE — 83540 ASSAY OF IRON: CPT

## 2019-10-15 PROCEDURE — 99239 HOSP IP/OBS DSCHRG MGMT >30: CPT | Performed by: INTERNAL MEDICINE

## 2019-10-15 PROCEDURE — 96372 THER/PROPH/DIAG INJ SC/IM: CPT

## 2019-10-15 PROCEDURE — 96375 TX/PRO/DX INJ NEW DRUG ADDON: CPT

## 2019-10-15 PROCEDURE — 6360000002 HC RX W HCPCS: Performed by: INTERNAL MEDICINE

## 2019-10-15 PROCEDURE — 84443 ASSAY THYROID STIM HORMONE: CPT

## 2019-10-15 RX ORDER — ECHINACEA PURPUREA EXTRACT 125 MG
1 TABLET ORAL PRN
Qty: 1 BOTTLE | Refills: 0 | Status: ON HOLD | OUTPATIENT
Start: 2019-10-15 | End: 2020-07-10 | Stop reason: SDUPTHER

## 2019-10-15 RX ORDER — DICYCLOMINE HYDROCHLORIDE 10 MG/1
10 CAPSULE ORAL EVERY 6 HOURS PRN
Qty: 40 CAPSULE | Refills: 0 | Status: ON HOLD | OUTPATIENT
Start: 2019-10-15 | End: 2020-07-10 | Stop reason: SDUPTHER

## 2019-10-15 RX ORDER — OMEPRAZOLE 20 MG/1
20 CAPSULE, DELAYED RELEASE ORAL 2 TIMES DAILY
Qty: 60 CAPSULE | Refills: 0 | Status: ON HOLD | OUTPATIENT
Start: 2019-10-15 | End: 2020-07-10 | Stop reason: SDUPTHER

## 2019-10-15 RX ORDER — GABAPENTIN 300 MG/1
300 CAPSULE ORAL 3 TIMES DAILY
Qty: 90 CAPSULE | Refills: 0 | Status: ON HOLD | OUTPATIENT
Start: 2019-10-15 | End: 2019-12-18

## 2019-10-15 RX ORDER — ALBUTEROL SULFATE 90 UG/1
2 AEROSOL, METERED RESPIRATORY (INHALATION) EVERY 4 HOURS PRN
Qty: 1 INHALER | Refills: 0 | Status: ON HOLD | OUTPATIENT
Start: 2019-10-15 | End: 2020-07-10 | Stop reason: SDUPTHER

## 2019-10-15 RX ORDER — ELECTROLYTES/DEXTROSE
1 SOLUTION, ORAL ORAL DAILY
Qty: 30 TABLET | Refills: 0 | Status: ON HOLD | OUTPATIENT
Start: 2019-10-15 | End: 2020-07-10 | Stop reason: SDUPTHER

## 2019-10-15 RX ORDER — OXCARBAZEPINE 150 MG/1
150 TABLET, FILM COATED ORAL 2 TIMES DAILY
Qty: 60 TABLET | Refills: 0 | Status: ON HOLD | OUTPATIENT
Start: 2019-10-15 | End: 2020-07-10 | Stop reason: SDUPTHER

## 2019-10-15 RX ORDER — LITHIUM CARBONATE 450 MG
450 TABLET, EXTENDED RELEASE ORAL 2 TIMES DAILY
Qty: 60 TABLET | Refills: 0 | Status: ON HOLD | OUTPATIENT
Start: 2019-10-15 | End: 2020-07-10 | Stop reason: SDUPTHER

## 2019-10-15 RX ORDER — HYDROXYZINE 50 MG/1
50 TABLET, FILM COATED ORAL 2 TIMES DAILY PRN
Qty: 60 TABLET | Refills: 0 | Status: ON HOLD | OUTPATIENT
Start: 2019-10-15 | End: 2020-07-10 | Stop reason: SDUPTHER

## 2019-10-15 RX ORDER — ONDANSETRON 4 MG/1
4 TABLET, ORALLY DISINTEGRATING ORAL EVERY 8 HOURS PRN
Qty: 30 TABLET | Refills: 0 | Status: ON HOLD | OUTPATIENT
Start: 2019-10-15 | End: 2020-07-10 | Stop reason: SDUPTHER

## 2019-10-15 RX ORDER — QUETIAPINE FUMARATE 200 MG/1
200 TABLET, FILM COATED ORAL NIGHTLY
Qty: 30 TABLET | Refills: 0 | Status: ON HOLD | OUTPATIENT
Start: 2019-10-15 | End: 2020-07-10 | Stop reason: SDUPTHER

## 2019-10-15 RX ORDER — VENLAFAXINE HYDROCHLORIDE 75 MG/1
75 CAPSULE, EXTENDED RELEASE ORAL
Qty: 30 CAPSULE | Refills: 0 | Status: ON HOLD | OUTPATIENT
Start: 2019-10-15 | End: 2020-07-10 | Stop reason: SDUPTHER

## 2019-10-15 RX ORDER — FLUTICASONE PROPIONATE 50 MCG
1 SPRAY, SUSPENSION (ML) NASAL DAILY
Qty: 1 BOTTLE | Refills: 0 | Status: ON HOLD | OUTPATIENT
Start: 2019-10-15 | End: 2020-07-10 | Stop reason: SDUPTHER

## 2019-10-15 RX ADMIN — ENOXAPARIN SODIUM 40 MG: 40 INJECTION SUBCUTANEOUS at 08:09

## 2019-10-15 RX ADMIN — LITHIUM CARBONATE 450 MG: 450 TABLET, EXTENDED RELEASE ORAL at 08:09

## 2019-10-15 RX ADMIN — LORAZEPAM 0.5 MG: 0.5 TABLET ORAL at 06:16

## 2019-10-15 RX ADMIN — MULTIPLE VITAMINS W/ MINERALS TAB 1 TABLET: TAB at 08:09

## 2019-10-15 RX ADMIN — PANTOPRAZOLE SODIUM 40 MG: 40 TABLET, DELAYED RELEASE ORAL at 06:16

## 2019-10-15 RX ADMIN — COSYNTROPIN 250 MCG: 0.25 INJECTION, POWDER, LYOPHILIZED, FOR SOLUTION INTRAMUSCULAR; INTRAVENOUS at 08:05

## 2019-10-15 RX ADMIN — LORAZEPAM 0.5 MG: 0.5 TABLET ORAL at 01:35

## 2019-10-15 RX ADMIN — GABAPENTIN 300 MG: 300 CAPSULE ORAL at 08:09

## 2019-10-15 RX ADMIN — FLUTICASONE PROPIONATE 1 SPRAY: 50 SPRAY, METERED NASAL at 08:11

## 2019-10-15 ASSESSMENT — PAIN DESCRIPTION - PAIN TYPE: TYPE: CHRONIC PAIN

## 2019-10-15 ASSESSMENT — PAIN DESCRIPTION - LOCATION: LOCATION: GENERALIZED

## 2019-10-15 ASSESSMENT — PAIN SCALES - GENERAL: PAINLEVEL_OUTOF10: 2

## 2019-10-16 LAB
ANA REFERENCE RANGE:: ABNORMAL
ANTI DNA DOUBLE STRANDED: 27 IU/ML
ANTI JO-1 IGG: 12 U/ML
ANTI RNP AB: 148 U/ML
ANTI SSA: 6 U/ML
ANTI SSB: 11 U/ML
ANTI-CENTROMERE: 9 U/ML
ANTI-NUCLEAR ANTIBODY (ANA): POSITIVE
ANTI-SCLERODERMA: 10 U/ML
ANTI-SMITH: 12 U/ML
CORTISOL COLLECTION INFO: ABNORMAL
CORTISOL: 19.3 UG/DL (ref 2.7–18.4)
FERRITIN: 13 UG/L (ref 13–150)
HISTONE ANTIBODY: 28 U/ML
IRON SATURATION: 6 % (ref 20–55)
IRON: 27 UG/DL (ref 37–145)
THYROXINE, FREE: 0.69 NG/DL (ref 0.93–1.7)
TOTAL IRON BINDING CAPACITY: 479 UG/DL (ref 250–450)
TSH SERPL DL<=0.05 MIU/L-ACNC: 5.04 MIU/L (ref 0.3–5)
UNSATURATED IRON BINDING CAPACITY: 452 UG/DL (ref 112–347)

## 2019-10-25 ENCOUNTER — TELEPHONE (OUTPATIENT)
Dept: GASTROENTEROLOGY | Age: 44
End: 2019-10-25

## 2019-12-17 ENCOUNTER — HOSPITAL ENCOUNTER (INPATIENT)
Age: 44
LOS: 3 days | Discharge: HOME | DRG: 101 | End: 2019-12-20
Attending: EMERGENCY MEDICINE | Admitting: FAMILY MEDICINE
Payer: MEDICARE

## 2019-12-17 ENCOUNTER — APPOINTMENT (OUTPATIENT)
Dept: CT IMAGING | Age: 44
DRG: 101 | End: 2019-12-17
Payer: MEDICARE

## 2019-12-17 DIAGNOSIS — R29.898 WEAKNESS OF RIGHT LOWER EXTREMITY: ICD-10-CM

## 2019-12-17 DIAGNOSIS — F19.90 DRUG USE: ICD-10-CM

## 2019-12-17 DIAGNOSIS — R56.9 SEIZURE-LIKE ACTIVITY (HCC): Primary | ICD-10-CM

## 2019-12-17 LAB
-: ABNORMAL
ABSOLUTE EOS #: 0.19 K/UL (ref 0–0.4)
ABSOLUTE IMMATURE GRANULOCYTE: 0.06 K/UL (ref 0–0.3)
ABSOLUTE LYMPH #: 2.5 K/UL (ref 1–4.8)
ABSOLUTE MONO #: 0.58 K/UL (ref 0.1–0.8)
ACETAMINOPHEN LEVEL: <5 UG/ML (ref 10–30)
ALLEN TEST: ABNORMAL
AMORPHOUS: ABNORMAL
ANION GAP SERPL CALCULATED.3IONS-SCNC: 16 MMOL/L (ref 9–17)
ANION GAP: 14 MMOL/L (ref 7–16)
BACTERIA: ABNORMAL
BASOPHILS # BLD: 1 % (ref 0–2)
BASOPHILS ABSOLUTE: 0.06 K/UL (ref 0–0.2)
BILIRUBIN URINE: NEGATIVE
BUN BLDV-MCNC: 6 MG/DL (ref 6–20)
BUN/CREAT BLD: ABNORMAL (ref 9–20)
CALCIUM SERPL-MCNC: 7.6 MG/DL (ref 8.6–10.4)
CASTS UA: ABNORMAL /LPF (ref 0–8)
CHLORIDE BLD-SCNC: 112 MMOL/L (ref 98–107)
CO2: 17 MMOL/L (ref 20–31)
COLOR: YELLOW
CREAT SERPL-MCNC: 0.42 MG/DL (ref 0.5–0.9)
CRYSTALS, UA: ABNORMAL /HPF
DIFFERENTIAL TYPE: ABNORMAL
EOSINOPHILS RELATIVE PERCENT: 3 % (ref 1–4)
EPITHELIAL CELLS UA: ABNORMAL /HPF (ref 0–5)
ETHANOL PERCENT: 0.13 %
ETHANOL: 129 MG/DL
FIO2: ABNORMAL
GFR AFRICAN AMERICAN: >60 ML/MIN
GFR NON-AFRICAN AMERICAN: >60 ML/MIN
GFR NON-AFRICAN AMERICAN: >60 ML/MIN
GFR SERPL CREATININE-BSD FRML MDRD: >60 ML/MIN
GFR SERPL CREATININE-BSD FRML MDRD: ABNORMAL ML/MIN/{1.73_M2}
GFR SERPL CREATININE-BSD FRML MDRD: ABNORMAL ML/MIN/{1.73_M2}
GFR SERPL CREATININE-BSD FRML MDRD: NORMAL ML/MIN/{1.73_M2}
GLUCOSE BLD-MCNC: 81 MG/DL (ref 74–100)
GLUCOSE BLD-MCNC: 85 MG/DL (ref 70–99)
GLUCOSE URINE: NEGATIVE
HCO3 VENOUS: 19.2 MMOL/L (ref 22–29)
HCT VFR BLD CALC: 38.4 % (ref 36.3–47.1)
HEMOGLOBIN: 11.6 G/DL (ref 11.9–15.1)
IMMATURE GRANULOCYTES: 1 %
KETONES, URINE: NEGATIVE
LEUKOCYTE ESTERASE, URINE: NEGATIVE
LYMPHOCYTES # BLD: 39 % (ref 24–44)
MCH RBC QN AUTO: 24.1 PG (ref 25.2–33.5)
MCHC RBC AUTO-ENTMCNC: 30.2 G/DL (ref 28.4–34.8)
MCV RBC AUTO: 79.8 FL (ref 82.6–102.9)
MODE: ABNORMAL
MONOCYTES # BLD: 9 % (ref 1–7)
MORPHOLOGY: ABNORMAL
MORPHOLOGY: ABNORMAL
MUCUS: ABNORMAL
NEGATIVE BASE EXCESS, VEN: 4 (ref 0–2)
NITRITE, URINE: NEGATIVE
NRBC AUTOMATED: 0 PER 100 WBC
O2 DEVICE/FLOW/%: ABNORMAL
O2 SAT, VEN: 98 % (ref 60–85)
OTHER OBSERVATIONS UA: ABNORMAL
PATIENT TEMP: ABNORMAL
PCO2, VEN: 28.9 MM HG (ref 41–51)
PDW BLD-RTO: 20.7 % (ref 11.8–14.4)
PH UA: 5 (ref 5–8)
PH VENOUS: 7.43 (ref 7.32–7.43)
PLATELET # BLD: 327 K/UL (ref 138–453)
PLATELET ESTIMATE: ABNORMAL
PMV BLD AUTO: 9.6 FL (ref 8.1–13.5)
PO2, VEN: 93.9 MM HG (ref 30–50)
POC CHLORIDE: 113 MMOL/L (ref 98–107)
POC CREATININE: 0.7 MG/DL (ref 0.51–1.19)
POC HEMATOCRIT: 34 % (ref 36–46)
POC HEMOGLOBIN: 11.5 G/DL (ref 12–16)
POC IONIZED CALCIUM: 0.97 MMOL/L (ref 1.15–1.33)
POC LACTIC ACID: 2.02 MMOL/L (ref 0.56–1.39)
POC PCO2 TEMP: ABNORMAL MM HG
POC PH TEMP: ABNORMAL
POC PO2 TEMP: ABNORMAL MM HG
POC POTASSIUM: 3.9 MMOL/L (ref 3.5–4.5)
POC SODIUM: 146 MMOL/L (ref 138–146)
POSITIVE BASE EXCESS, VEN: ABNORMAL (ref 0–3)
POTASSIUM SERPL-SCNC: 4 MMOL/L (ref 3.7–5.3)
PROTEIN UA: NEGATIVE
RBC # BLD: 4.81 M/UL (ref 3.95–5.11)
RBC # BLD: ABNORMAL 10*6/UL
RBC UA: ABNORMAL /HPF (ref 0–4)
RENAL EPITHELIAL, UA: ABNORMAL /HPF
SALICYLATE LEVEL: <1 MG/DL (ref 3–10)
SAMPLE SITE: ABNORMAL
SEG NEUTROPHILS: 47 % (ref 36–66)
SEGMENTED NEUTROPHILS ABSOLUTE COUNT: 3.01 K/UL (ref 1.8–7.7)
SODIUM BLD-SCNC: 145 MMOL/L (ref 135–144)
SPECIFIC GRAVITY UA: 1.03 (ref 1–1.03)
TOTAL CO2, VENOUS: 20 MMOL/L (ref 23–30)
TOXIC TRICYCLIC SC,BLOOD: NEGATIVE
TRICHOMONAS: ABNORMAL
TROPONIN INTERP: NORMAL
TROPONIN T: NORMAL NG/ML
TROPONIN, HIGH SENSITIVITY: <6 NG/L (ref 0–14)
TURBIDITY: CLEAR
URINE HGB: NEGATIVE
UROBILINOGEN, URINE: NORMAL
WBC # BLD: 6.4 K/UL (ref 3.5–11.3)
WBC # BLD: ABNORMAL 10*3/UL
WBC UA: ABNORMAL /HPF (ref 0–5)
YEAST: ABNORMAL

## 2019-12-17 PROCEDURE — 81001 URINALYSIS AUTO W/SCOPE: CPT

## 2019-12-17 PROCEDURE — 99222 1ST HOSP IP/OBS MODERATE 55: CPT | Performed by: NURSE PRACTITIONER

## 2019-12-17 PROCEDURE — 70450 CT HEAD/BRAIN W/O DYE: CPT

## 2019-12-17 PROCEDURE — 2060000000 HC ICU INTERMEDIATE R&B

## 2019-12-17 PROCEDURE — 82565 ASSAY OF CREATININE: CPT

## 2019-12-17 PROCEDURE — G0480 DRUG TEST DEF 1-7 CLASSES: HCPCS

## 2019-12-17 PROCEDURE — 82435 ASSAY OF BLOOD CHLORIDE: CPT

## 2019-12-17 PROCEDURE — 85025 COMPLETE CBC W/AUTO DIFF WBC: CPT

## 2019-12-17 PROCEDURE — 93005 ELECTROCARDIOGRAM TRACING: CPT | Performed by: EMERGENCY MEDICINE

## 2019-12-17 PROCEDURE — 84484 ASSAY OF TROPONIN QUANT: CPT

## 2019-12-17 PROCEDURE — 82803 BLOOD GASES ANY COMBINATION: CPT

## 2019-12-17 PROCEDURE — 82947 ASSAY GLUCOSE BLOOD QUANT: CPT

## 2019-12-17 PROCEDURE — 84132 ASSAY OF SERUM POTASSIUM: CPT

## 2019-12-17 PROCEDURE — 83605 ASSAY OF LACTIC ACID: CPT

## 2019-12-17 PROCEDURE — 82330 ASSAY OF CALCIUM: CPT

## 2019-12-17 PROCEDURE — 6370000000 HC RX 637 (ALT 250 FOR IP): Performed by: EMERGENCY MEDICINE

## 2019-12-17 PROCEDURE — 85014 HEMATOCRIT: CPT

## 2019-12-17 PROCEDURE — 84295 ASSAY OF SERUM SODIUM: CPT

## 2019-12-17 PROCEDURE — 80048 BASIC METABOLIC PNL TOTAL CA: CPT

## 2019-12-17 PROCEDURE — 80307 DRUG TEST PRSMV CHEM ANLYZR: CPT

## 2019-12-17 PROCEDURE — 6360000004 HC RX CONTRAST MEDICATION: Performed by: EMERGENCY MEDICINE

## 2019-12-17 PROCEDURE — 99285 EMERGENCY DEPT VISIT HI MDM: CPT

## 2019-12-17 PROCEDURE — 70496 CT ANGIOGRAPHY HEAD: CPT

## 2019-12-17 PROCEDURE — 2580000003 HC RX 258: Performed by: STUDENT IN AN ORGANIZED HEALTH CARE EDUCATION/TRAINING PROGRAM

## 2019-12-17 RX ORDER — 0.9 % SODIUM CHLORIDE 0.9 %
1000 INTRAVENOUS SOLUTION INTRAVENOUS ONCE
Status: COMPLETED | OUTPATIENT
Start: 2019-12-17 | End: 2019-12-17

## 2019-12-17 RX ORDER — CALCIUM CARBONATE 200(500)MG
750 TABLET,CHEWABLE ORAL ONCE
Status: COMPLETED | OUTPATIENT
Start: 2019-12-17 | End: 2019-12-17

## 2019-12-17 RX ADMIN — SODIUM CHLORIDE 1000 ML: 9 INJECTION, SOLUTION INTRAVENOUS at 18:34

## 2019-12-17 RX ADMIN — IOHEXOL 75 ML: 350 INJECTION, SOLUTION INTRAVENOUS at 18:16

## 2019-12-17 RX ADMIN — ANTACID TABLETS 750 MG: 500 TABLET, CHEWABLE ORAL at 21:03

## 2019-12-17 ASSESSMENT — ENCOUNTER SYMPTOMS
NAUSEA: 1
SORE THROAT: 0
ABDOMINAL PAIN: 0
DIARRHEA: 0
SHORTNESS OF BREATH: 0
VOMITING: 1

## 2019-12-18 ENCOUNTER — APPOINTMENT (OUTPATIENT)
Dept: MRI IMAGING | Age: 44
DRG: 101 | End: 2019-12-18
Payer: MEDICARE

## 2019-12-18 PROBLEM — R76.8 HEPATITIS C ANTIBODY POSITIVE IN BLOOD: Status: RESOLVED | Noted: 2019-06-18 | Resolved: 2019-12-18

## 2019-12-18 PROBLEM — F33.9 MDD (MAJOR DEPRESSIVE DISORDER), RECURRENT EPISODE (HCC): Status: RESOLVED | Noted: 2018-05-08 | Resolved: 2019-12-18

## 2019-12-18 PROBLEM — E87.29 ALCOHOLIC KETOACIDOSIS: Status: RESOLVED | Noted: 2018-10-20 | Resolved: 2019-12-18

## 2019-12-18 PROBLEM — F10.939 ALCOHOL WITHDRAWAL SYNDROME WITH COMPLICATION (HCC): Status: ACTIVE | Noted: 2018-10-20

## 2019-12-18 PROBLEM — Z98.890 HISTORY OF GASTRIC SURGERY: Status: RESOLVED | Noted: 2017-04-25 | Resolved: 2019-12-18

## 2019-12-18 PROBLEM — F31.9 BIPOLAR AFFECTIVE DISORDER (HCC): Status: RESOLVED | Noted: 2019-06-12 | Resolved: 2019-12-18

## 2019-12-18 PROBLEM — J18.9 PNEUMONIA OF BOTH LOWER LOBES DUE TO INFECTIOUS ORGANISM: Status: RESOLVED | Noted: 2017-04-25 | Resolved: 2019-12-18

## 2019-12-18 PROBLEM — G83.84 TODD'S PARESIS (HCC): Status: ACTIVE | Noted: 2019-12-18

## 2019-12-18 PROBLEM — G92.8 TOXIC METABOLIC ENCEPHALOPATHY: Status: RESOLVED | Noted: 2018-09-28 | Resolved: 2019-12-18

## 2019-12-18 PROBLEM — T50.901A DRUG OVERDOSE, ACCIDENTAL OR UNINTENTIONAL, INITIAL ENCOUNTER: Status: RESOLVED | Noted: 2018-09-29 | Resolved: 2019-12-18

## 2019-12-18 PROBLEM — F17.200 SMOKER UNMOTIVATED TO QUIT: Status: RESOLVED | Noted: 2017-04-25 | Resolved: 2019-12-18

## 2019-12-18 PROBLEM — T50.902A INTENTIONAL DRUG OVERDOSE (HCC): Status: RESOLVED | Noted: 2018-05-07 | Resolved: 2019-12-18

## 2019-12-18 PROBLEM — T50.902A DRUG OVERDOSE, INTENTIONAL SELF-HARM, INITIAL ENCOUNTER (HCC): Status: RESOLVED | Noted: 2018-05-08 | Resolved: 2019-12-18

## 2019-12-18 LAB
ALBUMIN SERPL-MCNC: 3 G/DL (ref 3.5–5.2)
ALBUMIN/GLOBULIN RATIO: 1 (ref 1–2.5)
ALP BLD-CCNC: 64 U/L (ref 35–104)
ALT SERPL-CCNC: 33 U/L (ref 5–33)
AMPHETAMINE SCREEN URINE: NEGATIVE
ANION GAP SERPL CALCULATED.3IONS-SCNC: 10 MMOL/L (ref 9–17)
AST SERPL-CCNC: 34 U/L
BARBITURATE SCREEN URINE: NEGATIVE
BENZODIAZEPINE SCREEN, URINE: NEGATIVE
BILIRUB SERPL-MCNC: 0.29 MG/DL (ref 0.3–1.2)
BUN BLDV-MCNC: 6 MG/DL (ref 6–20)
BUN/CREAT BLD: ABNORMAL (ref 9–20)
BUPRENORPHINE URINE: ABNORMAL
CALCIUM SERPL-MCNC: 7.6 MG/DL (ref 8.6–10.4)
CANNABINOID SCREEN URINE: NEGATIVE
CHLORIDE BLD-SCNC: 110 MMOL/L (ref 98–107)
CO2: 22 MMOL/L (ref 20–31)
COCAINE METABOLITE, URINE: POSITIVE
CREAT SERPL-MCNC: 0.45 MG/DL (ref 0.5–0.9)
GFR AFRICAN AMERICAN: >60 ML/MIN
GFR NON-AFRICAN AMERICAN: >60 ML/MIN
GFR SERPL CREATININE-BSD FRML MDRD: ABNORMAL ML/MIN/{1.73_M2}
GFR SERPL CREATININE-BSD FRML MDRD: ABNORMAL ML/MIN/{1.73_M2}
GLUCOSE BLD-MCNC: 84 MG/DL (ref 70–99)
HCT VFR BLD CALC: 35.5 % (ref 36.3–47.1)
HEMOGLOBIN: 10.4 G/DL (ref 11.9–15.1)
INR BLD: 1
MCH RBC QN AUTO: 23.6 PG (ref 25.2–33.5)
MCHC RBC AUTO-ENTMCNC: 29.3 G/DL (ref 28.4–34.8)
MCV RBC AUTO: 80.7 FL (ref 82.6–102.9)
MDMA URINE: ABNORMAL
METHADONE SCREEN, URINE: NEGATIVE
METHAMPHETAMINE, URINE: ABNORMAL
NRBC AUTOMATED: 0 PER 100 WBC
OPIATES, URINE: NEGATIVE
OXYCODONE SCREEN URINE: NEGATIVE
PDW BLD-RTO: 20.6 % (ref 11.8–14.4)
PHENCYCLIDINE, URINE: NEGATIVE
PLATELET # BLD: 278 K/UL (ref 138–453)
PMV BLD AUTO: 9.6 FL (ref 8.1–13.5)
POTASSIUM SERPL-SCNC: 3.7 MMOL/L (ref 3.7–5.3)
PROPOXYPHENE, URINE: ABNORMAL
PROTHROMBIN TIME: 10.4 SEC (ref 9–12)
RBC # BLD: 4.4 M/UL (ref 3.95–5.11)
SODIUM BLD-SCNC: 142 MMOL/L (ref 135–144)
TEST INFORMATION: ABNORMAL
TOTAL PROTEIN: 6.1 G/DL (ref 6.4–8.3)
TRICYCLIC ANTIDEPRESSANTS, UR: ABNORMAL
TSH SERPL DL<=0.05 MIU/L-ACNC: 2.53 MIU/L (ref 0.3–5)
WBC # BLD: 5.8 K/UL (ref 3.5–11.3)

## 2019-12-18 PROCEDURE — 36415 COLL VENOUS BLD VENIPUNCTURE: CPT

## 2019-12-18 PROCEDURE — 80053 COMPREHEN METABOLIC PANEL: CPT

## 2019-12-18 PROCEDURE — 99232 SBSQ HOSP IP/OBS MODERATE 35: CPT | Performed by: INTERNAL MEDICINE

## 2019-12-18 PROCEDURE — 84443 ASSAY THYROID STIM HORMONE: CPT

## 2019-12-18 PROCEDURE — 1200000000 HC SEMI PRIVATE

## 2019-12-18 PROCEDURE — 97166 OT EVAL MOD COMPLEX 45 MIN: CPT

## 2019-12-18 PROCEDURE — 97535 SELF CARE MNGMENT TRAINING: CPT

## 2019-12-18 PROCEDURE — 97530 THERAPEUTIC ACTIVITIES: CPT

## 2019-12-18 PROCEDURE — 6370000000 HC RX 637 (ALT 250 FOR IP): Performed by: NURSE PRACTITIONER

## 2019-12-18 PROCEDURE — 2580000003 HC RX 258: Performed by: NURSE PRACTITIONER

## 2019-12-18 PROCEDURE — 99222 1ST HOSP IP/OBS MODERATE 55: CPT | Performed by: PSYCHIATRY & NEUROLOGY

## 2019-12-18 PROCEDURE — 95819 EEG AWAKE AND ASLEEP: CPT

## 2019-12-18 PROCEDURE — 6360000002 HC RX W HCPCS: Performed by: NURSE PRACTITIONER

## 2019-12-18 PROCEDURE — 80307 DRUG TEST PRSMV CHEM ANLYZR: CPT

## 2019-12-18 PROCEDURE — 97161 PT EVAL LOW COMPLEX 20 MIN: CPT

## 2019-12-18 PROCEDURE — 85610 PROTHROMBIN TIME: CPT

## 2019-12-18 PROCEDURE — 85027 COMPLETE CBC AUTOMATED: CPT

## 2019-12-18 RX ORDER — LORAZEPAM 2 MG/1
4 TABLET ORAL
Status: DISCONTINUED | OUTPATIENT
Start: 2019-12-18 | End: 2019-12-20 | Stop reason: HOSPADM

## 2019-12-18 RX ORDER — FLUTICASONE PROPIONATE 50 MCG
1 SPRAY, SUSPENSION (ML) NASAL DAILY
Status: DISCONTINUED | OUTPATIENT
Start: 2019-12-18 | End: 2019-12-20 | Stop reason: HOSPADM

## 2019-12-18 RX ORDER — HYDROXYZINE 50 MG/1
50 TABLET, FILM COATED ORAL 2 TIMES DAILY PRN
Status: DISCONTINUED | OUTPATIENT
Start: 2019-12-18 | End: 2019-12-20 | Stop reason: HOSPADM

## 2019-12-18 RX ORDER — ALBUTEROL SULFATE 90 UG/1
2 AEROSOL, METERED RESPIRATORY (INHALATION) EVERY 4 HOURS PRN
Status: DISCONTINUED | OUTPATIENT
Start: 2019-12-18 | End: 2019-12-20 | Stop reason: HOSPADM

## 2019-12-18 RX ORDER — ACETAMINOPHEN 325 MG/1
650 TABLET ORAL EVERY 4 HOURS PRN
Status: DISCONTINUED | OUTPATIENT
Start: 2019-12-18 | End: 2019-12-20 | Stop reason: HOSPADM

## 2019-12-18 RX ORDER — M-VIT,TX,IRON,MINS/CALC/FOLIC 27MG-0.4MG
1 TABLET ORAL DAILY
Status: DISCONTINUED | OUTPATIENT
Start: 2019-12-18 | End: 2019-12-20 | Stop reason: HOSPADM

## 2019-12-18 RX ORDER — SODIUM CHLORIDE 0.9 % (FLUSH) 0.9 %
10 SYRINGE (ML) INJECTION PRN
Status: DISCONTINUED | OUTPATIENT
Start: 2019-12-18 | End: 2019-12-20 | Stop reason: HOSPADM

## 2019-12-18 RX ORDER — SODIUM CHLORIDE 9 MG/ML
INJECTION, SOLUTION INTRAVENOUS CONTINUOUS
Status: DISCONTINUED | OUTPATIENT
Start: 2019-12-18 | End: 2019-12-20 | Stop reason: HOSPADM

## 2019-12-18 RX ORDER — DICYCLOMINE HYDROCHLORIDE 10 MG/1
10 CAPSULE ORAL EVERY 6 HOURS PRN
Status: DISCONTINUED | OUTPATIENT
Start: 2019-12-18 | End: 2019-12-20 | Stop reason: HOSPADM

## 2019-12-18 RX ORDER — ONDANSETRON 2 MG/ML
4 INJECTION INTRAMUSCULAR; INTRAVENOUS EVERY 6 HOURS PRN
Status: DISCONTINUED | OUTPATIENT
Start: 2019-12-18 | End: 2019-12-20 | Stop reason: HOSPADM

## 2019-12-18 RX ORDER — OXCARBAZEPINE 150 MG/1
150 TABLET, FILM COATED ORAL 2 TIMES DAILY
Status: DISCONTINUED | OUTPATIENT
Start: 2019-12-18 | End: 2019-12-20 | Stop reason: HOSPADM

## 2019-12-18 RX ORDER — LITHIUM CARBONATE 450 MG
450 TABLET, EXTENDED RELEASE ORAL 2 TIMES DAILY
Status: DISCONTINUED | OUTPATIENT
Start: 2019-12-18 | End: 2019-12-20 | Stop reason: HOSPADM

## 2019-12-18 RX ORDER — LORAZEPAM 1 MG/1
1 TABLET ORAL
Status: DISCONTINUED | OUTPATIENT
Start: 2019-12-18 | End: 2019-12-20 | Stop reason: HOSPADM

## 2019-12-18 RX ORDER — 0.9 % SODIUM CHLORIDE 0.9 %
1000 INTRAVENOUS SOLUTION INTRAVENOUS ONCE
Status: COMPLETED | OUTPATIENT
Start: 2019-12-18 | End: 2019-12-19

## 2019-12-18 RX ORDER — NICOTINE 21 MG/24HR
1 PATCH, TRANSDERMAL 24 HOURS TRANSDERMAL DAILY PRN
Status: DISCONTINUED | OUTPATIENT
Start: 2019-12-18 | End: 2019-12-20 | Stop reason: HOSPADM

## 2019-12-18 RX ORDER — SODIUM CHLORIDE 0.9 % (FLUSH) 0.9 %
10 SYRINGE (ML) INJECTION EVERY 12 HOURS SCHEDULED
Status: DISCONTINUED | OUTPATIENT
Start: 2019-12-18 | End: 2019-12-20 | Stop reason: HOSPADM

## 2019-12-18 RX ORDER — PANTOPRAZOLE SODIUM 40 MG/1
40 TABLET, DELAYED RELEASE ORAL
Status: DISCONTINUED | OUTPATIENT
Start: 2019-12-18 | End: 2019-12-18

## 2019-12-18 RX ORDER — LORAZEPAM 2 MG/ML
2 INJECTION INTRAMUSCULAR
Status: DISCONTINUED | OUTPATIENT
Start: 2019-12-18 | End: 2019-12-20 | Stop reason: HOSPADM

## 2019-12-18 RX ORDER — LORAZEPAM 2 MG/1
2 TABLET ORAL
Status: DISCONTINUED | OUTPATIENT
Start: 2019-12-18 | End: 2019-12-20 | Stop reason: HOSPADM

## 2019-12-18 RX ORDER — THIAMINE MONONITRATE (VIT B1) 100 MG
100 TABLET ORAL DAILY
Status: DISCONTINUED | OUTPATIENT
Start: 2019-12-18 | End: 2019-12-20 | Stop reason: HOSPADM

## 2019-12-18 RX ORDER — QUETIAPINE FUMARATE 200 MG/1
200 TABLET, FILM COATED ORAL NIGHTLY
Status: DISCONTINUED | OUTPATIENT
Start: 2019-12-18 | End: 2019-12-20 | Stop reason: HOSPADM

## 2019-12-18 RX ORDER — VENLAFAXINE HYDROCHLORIDE 75 MG/1
75 CAPSULE, EXTENDED RELEASE ORAL
Status: DISCONTINUED | OUTPATIENT
Start: 2019-12-18 | End: 2019-12-20 | Stop reason: HOSPADM

## 2019-12-18 RX ORDER — ECHINACEA PURPUREA EXTRACT 125 MG
1 TABLET ORAL PRN
Status: DISCONTINUED | OUTPATIENT
Start: 2019-12-18 | End: 2019-12-20 | Stop reason: HOSPADM

## 2019-12-18 RX ORDER — PANTOPRAZOLE SODIUM 20 MG/1
20 TABLET, DELAYED RELEASE ORAL
Status: DISCONTINUED | OUTPATIENT
Start: 2019-12-18 | End: 2019-12-20 | Stop reason: HOSPADM

## 2019-12-18 RX ORDER — LORAZEPAM 2 MG/ML
2 INJECTION INTRAMUSCULAR EVERY 4 HOURS PRN
Status: DISCONTINUED | OUTPATIENT
Start: 2019-12-18 | End: 2019-12-20 | Stop reason: HOSPADM

## 2019-12-18 RX ORDER — LORAZEPAM 2 MG/ML
1 INJECTION INTRAMUSCULAR
Status: DISCONTINUED | OUTPATIENT
Start: 2019-12-18 | End: 2019-12-20 | Stop reason: HOSPADM

## 2019-12-18 RX ORDER — LORAZEPAM 2 MG/ML
3 INJECTION INTRAMUSCULAR
Status: DISCONTINUED | OUTPATIENT
Start: 2019-12-18 | End: 2019-12-20 | Stop reason: HOSPADM

## 2019-12-18 RX ORDER — LORAZEPAM 2 MG/ML
4 INJECTION INTRAMUSCULAR
Status: DISCONTINUED | OUTPATIENT
Start: 2019-12-18 | End: 2019-12-20 | Stop reason: HOSPADM

## 2019-12-18 RX ORDER — 0.9 % SODIUM CHLORIDE 0.9 %
1000 INTRAVENOUS SOLUTION INTRAVENOUS ONCE
Status: COMPLETED | OUTPATIENT
Start: 2019-12-18 | End: 2019-12-18

## 2019-12-18 RX ADMIN — SODIUM CHLORIDE: 9 INJECTION, SOLUTION INTRAVENOUS at 00:38

## 2019-12-18 RX ADMIN — LITHIUM CARBONATE 450 MG: 450 TABLET, EXTENDED RELEASE ORAL at 00:33

## 2019-12-18 RX ADMIN — OXCARBAZEPINE 150 MG: 150 TABLET, FILM COATED ORAL at 00:29

## 2019-12-18 RX ADMIN — LORAZEPAM 1 MG: 1 TABLET ORAL at 00:29

## 2019-12-18 RX ADMIN — OXCARBAZEPINE 150 MG: 150 TABLET, FILM COATED ORAL at 20:01

## 2019-12-18 RX ADMIN — LITHIUM CARBONATE 450 MG: 450 TABLET, EXTENDED RELEASE ORAL at 20:01

## 2019-12-18 RX ADMIN — Medication 100 MG: at 08:37

## 2019-12-18 RX ADMIN — OXCARBAZEPINE 150 MG: 150 TABLET, FILM COATED ORAL at 08:37

## 2019-12-18 RX ADMIN — QUETIAPINE FUMARATE 200 MG: 200 TABLET ORAL at 20:01

## 2019-12-18 RX ADMIN — VENLAFAXINE HYDROCHLORIDE 75 MG: 75 CAPSULE, EXTENDED RELEASE ORAL at 08:38

## 2019-12-18 RX ADMIN — HYDROXYZINE HYDROCHLORIDE 50 MG: 50 TABLET ORAL at 08:37

## 2019-12-18 RX ADMIN — DICYCLOMINE HYDROCHLORIDE 10 MG: 10 CAPSULE ORAL at 08:38

## 2019-12-18 RX ADMIN — SODIUM CHLORIDE 1000 ML: 9 INJECTION, SOLUTION INTRAVENOUS at 02:15

## 2019-12-18 RX ADMIN — PANTOPRAZOLE SODIUM 40 MG: 40 TABLET, DELAYED RELEASE ORAL at 08:38

## 2019-12-18 RX ADMIN — MULTIPLE VITAMINS W/ MINERALS TAB 1 TABLET: TAB at 08:37

## 2019-12-18 RX ADMIN — QUETIAPINE FUMARATE 200 MG: 200 TABLET ORAL at 00:29

## 2019-12-18 RX ADMIN — LORAZEPAM 1 MG: 1 TABLET ORAL at 15:20

## 2019-12-18 RX ADMIN — LITHIUM CARBONATE 450 MG: 450 TABLET, EXTENDED RELEASE ORAL at 08:38

## 2019-12-18 RX ADMIN — SODIUM CHLORIDE 1000 ML: 9 INJECTION, SOLUTION INTRAVENOUS at 23:00

## 2019-12-18 RX ADMIN — ENOXAPARIN SODIUM 40 MG: 40 INJECTION SUBCUTANEOUS at 08:37

## 2019-12-18 ASSESSMENT — PAIN DESCRIPTION - DESCRIPTORS
DESCRIPTORS: NUMBNESS
DESCRIPTORS: HEADACHE

## 2019-12-18 ASSESSMENT — ENCOUNTER SYMPTOMS
COUGH: 0
SHORTNESS OF BREATH: 0
ABDOMINAL PAIN: 0
BLOOD IN STOOL: 0
NAUSEA: 0
CONSTIPATION: 0
WHEEZING: 0
STRIDOR: 0
DIARRHEA: 0
VOMITING: 0

## 2019-12-18 ASSESSMENT — PAIN DESCRIPTION - PAIN TYPE
TYPE: ACUTE PAIN
TYPE: ACUTE PAIN

## 2019-12-18 ASSESSMENT — PAIN DESCRIPTION - ORIENTATION: ORIENTATION: RIGHT

## 2019-12-18 ASSESSMENT — PAIN DESCRIPTION - FREQUENCY: FREQUENCY: INTERMITTENT

## 2019-12-18 ASSESSMENT — PAIN SCALES - GENERAL
PAINLEVEL_OUTOF10: 4
PAINLEVEL_OUTOF10: 4

## 2019-12-18 ASSESSMENT — PAIN DESCRIPTION - ONSET: ONSET: ON-GOING

## 2019-12-18 ASSESSMENT — PAIN DESCRIPTION - LOCATION
LOCATION: LEG
LOCATION: HEAD

## 2019-12-18 ASSESSMENT — PAIN DESCRIPTION - PROGRESSION
CLINICAL_PROGRESSION: GRADUALLY IMPROVING

## 2019-12-18 ASSESSMENT — PAIN - FUNCTIONAL ASSESSMENT: PAIN_FUNCTIONAL_ASSESSMENT: ACTIVITIES ARE NOT PREVENTED

## 2019-12-19 ENCOUNTER — APPOINTMENT (OUTPATIENT)
Dept: MRI IMAGING | Age: 44
DRG: 101 | End: 2019-12-19
Payer: MEDICARE

## 2019-12-19 LAB
ABSOLUTE EOS #: 0.2 K/UL (ref 0–0.4)
ABSOLUTE IMMATURE GRANULOCYTE: 0 K/UL (ref 0–0.3)
ABSOLUTE LYMPH #: 1.79 K/UL (ref 1–4.8)
ABSOLUTE MONO #: 0.46 K/UL (ref 0.1–0.8)
ALBUMIN SERPL-MCNC: 3.5 G/DL (ref 3.5–5.2)
ANION GAP SERPL CALCULATED.3IONS-SCNC: 11 MMOL/L (ref 9–17)
BASOPHILS # BLD: 1 % (ref 0–2)
BASOPHILS ABSOLUTE: 0.05 K/UL (ref 0–0.2)
BUN BLDV-MCNC: 4 MG/DL (ref 6–20)
BUN/CREAT BLD: ABNORMAL (ref 9–20)
CALCIUM SERPL-MCNC: 8.2 MG/DL (ref 8.6–10.4)
CHLORIDE BLD-SCNC: 113 MMOL/L (ref 98–107)
CO2: 20 MMOL/L (ref 20–31)
CREAT SERPL-MCNC: 0.42 MG/DL (ref 0.5–0.9)
DIFFERENTIAL TYPE: ABNORMAL
EKG ATRIAL RATE: 74 BPM
EKG P AXIS: 50 DEGREES
EKG P-R INTERVAL: 144 MS
EKG Q-T INTERVAL: 414 MS
EKG QRS DURATION: 78 MS
EKG QTC CALCULATION (BAZETT): 459 MS
EKG R AXIS: 22 DEGREES
EKG T AXIS: 26 DEGREES
EKG VENTRICULAR RATE: 74 BPM
EOSINOPHILS RELATIVE PERCENT: 4 % (ref 1–4)
GFR AFRICAN AMERICAN: >60 ML/MIN
GFR NON-AFRICAN AMERICAN: >60 ML/MIN
GFR SERPL CREATININE-BSD FRML MDRD: ABNORMAL ML/MIN/{1.73_M2}
GFR SERPL CREATININE-BSD FRML MDRD: ABNORMAL ML/MIN/{1.73_M2}
GLUCOSE BLD-MCNC: 106 MG/DL (ref 70–99)
HCT VFR BLD CALC: 37.1 % (ref 36.3–47.1)
HEMOGLOBIN: 10.5 G/DL (ref 11.9–15.1)
IMMATURE GRANULOCYTES: 0 %
LYMPHOCYTES # BLD: 35 % (ref 24–44)
MAGNESIUM: 2.2 MG/DL (ref 1.6–2.6)
MCH RBC QN AUTO: 23.6 PG (ref 25.2–33.5)
MCHC RBC AUTO-ENTMCNC: 28.3 G/DL (ref 28.4–34.8)
MCV RBC AUTO: 83.6 FL (ref 82.6–102.9)
MONOCYTES # BLD: 9 % (ref 1–7)
MORPHOLOGY: ABNORMAL
NRBC AUTOMATED: 0 PER 100 WBC
PDW BLD-RTO: 20.6 % (ref 11.8–14.4)
PHOSPHORUS: 3.3 MG/DL (ref 2.6–4.5)
PLATELET # BLD: 282 K/UL (ref 138–453)
PLATELET ESTIMATE: ABNORMAL
PMV BLD AUTO: 9.5 FL (ref 8.1–13.5)
POTASSIUM SERPL-SCNC: 4.1 MMOL/L (ref 3.7–5.3)
RBC # BLD: 4.44 M/UL (ref 3.95–5.11)
RBC # BLD: ABNORMAL 10*6/UL
SEG NEUTROPHILS: 51 % (ref 36–66)
SEGMENTED NEUTROPHILS ABSOLUTE COUNT: 2.6 K/UL (ref 1.8–7.7)
SODIUM BLD-SCNC: 144 MMOL/L (ref 135–144)
WBC # BLD: 5.1 K/UL (ref 3.5–11.3)
WBC # BLD: ABNORMAL 10*3/UL

## 2019-12-19 PROCEDURE — 1200000000 HC SEMI PRIVATE

## 2019-12-19 PROCEDURE — 6370000000 HC RX 637 (ALT 250 FOR IP): Performed by: NURSE PRACTITIONER

## 2019-12-19 PROCEDURE — 99231 SBSQ HOSP IP/OBS SF/LOW 25: CPT | Performed by: INTERNAL MEDICINE

## 2019-12-19 PROCEDURE — 2580000003 HC RX 258: Performed by: STUDENT IN AN ORGANIZED HEALTH CARE EDUCATION/TRAINING PROGRAM

## 2019-12-19 PROCEDURE — 70553 MRI BRAIN STEM W/O & W/DYE: CPT

## 2019-12-19 PROCEDURE — 2580000003 HC RX 258: Performed by: NURSE PRACTITIONER

## 2019-12-19 PROCEDURE — 99232 SBSQ HOSP IP/OBS MODERATE 35: CPT | Performed by: PSYCHIATRY & NEUROLOGY

## 2019-12-19 PROCEDURE — 95819 EEG AWAKE AND ASLEEP: CPT | Performed by: PSYCHIATRY & NEUROLOGY

## 2019-12-19 PROCEDURE — A9576 INJ PROHANCE MULTIPACK: HCPCS | Performed by: STUDENT IN AN ORGANIZED HEALTH CARE EDUCATION/TRAINING PROGRAM

## 2019-12-19 PROCEDURE — 83735 ASSAY OF MAGNESIUM: CPT

## 2019-12-19 PROCEDURE — 6360000002 HC RX W HCPCS: Performed by: NURSE PRACTITIONER

## 2019-12-19 PROCEDURE — 93010 ELECTROCARDIOGRAM REPORT: CPT | Performed by: INTERNAL MEDICINE

## 2019-12-19 PROCEDURE — 85025 COMPLETE CBC W/AUTO DIFF WBC: CPT

## 2019-12-19 PROCEDURE — 80069 RENAL FUNCTION PANEL: CPT

## 2019-12-19 PROCEDURE — 36415 COLL VENOUS BLD VENIPUNCTURE: CPT

## 2019-12-19 PROCEDURE — 6360000004 HC RX CONTRAST MEDICATION: Performed by: STUDENT IN AN ORGANIZED HEALTH CARE EDUCATION/TRAINING PROGRAM

## 2019-12-19 RX ORDER — SODIUM CHLORIDE 0.9 % (FLUSH) 0.9 %
10 SYRINGE (ML) INJECTION 2 TIMES DAILY
Status: DISCONTINUED | OUTPATIENT
Start: 2019-12-19 | End: 2019-12-20 | Stop reason: HOSPADM

## 2019-12-19 RX ADMIN — SODIUM CHLORIDE, PRESERVATIVE FREE 10 ML: 5 INJECTION INTRAVENOUS at 22:40

## 2019-12-19 RX ADMIN — PANTOPRAZOLE SODIUM 20 MG: 20 TABLET, DELAYED RELEASE ORAL at 21:38

## 2019-12-19 RX ADMIN — LORAZEPAM 2 MG: 2 TABLET ORAL at 14:21

## 2019-12-19 RX ADMIN — ENOXAPARIN SODIUM 40 MG: 40 INJECTION SUBCUTANEOUS at 09:03

## 2019-12-19 RX ADMIN — VENLAFAXINE HYDROCHLORIDE 75 MG: 75 CAPSULE, EXTENDED RELEASE ORAL at 09:05

## 2019-12-19 RX ADMIN — GADOTERIDOL 11 ML: 279.3 INJECTION, SOLUTION INTRAVENOUS at 13:33

## 2019-12-19 RX ADMIN — ACETAMINOPHEN 650 MG: 325 TABLET ORAL at 09:02

## 2019-12-19 RX ADMIN — OXCARBAZEPINE 150 MG: 150 TABLET, FILM COATED ORAL at 09:03

## 2019-12-19 RX ADMIN — LITHIUM CARBONATE 450 MG: 450 TABLET, EXTENDED RELEASE ORAL at 09:03

## 2019-12-19 RX ADMIN — QUETIAPINE FUMARATE 200 MG: 200 TABLET ORAL at 21:38

## 2019-12-19 RX ADMIN — LORAZEPAM 1 MG: 2 INJECTION INTRAMUSCULAR; INTRAVENOUS at 21:49

## 2019-12-19 RX ADMIN — LITHIUM CARBONATE 450 MG: 450 TABLET, EXTENDED RELEASE ORAL at 21:38

## 2019-12-19 RX ADMIN — Medication 10 ML: at 21:39

## 2019-12-19 RX ADMIN — Medication 100 MG: at 09:02

## 2019-12-19 RX ADMIN — SODIUM CHLORIDE, PRESERVATIVE FREE 10 ML: 5 INJECTION INTRAVENOUS at 14:22

## 2019-12-19 RX ADMIN — OXCARBAZEPINE 150 MG: 150 TABLET, FILM COATED ORAL at 21:38

## 2019-12-19 RX ADMIN — MULTIPLE VITAMINS W/ MINERALS TAB 1 TABLET: TAB at 09:03

## 2019-12-19 RX ADMIN — PANTOPRAZOLE SODIUM 20 MG: 20 TABLET, DELAYED RELEASE ORAL at 09:03

## 2019-12-19 ASSESSMENT — PAIN DESCRIPTION - PROGRESSION

## 2019-12-19 ASSESSMENT — PAIN SCALES - GENERAL
PAINLEVEL_OUTOF10: 0
PAINLEVEL_OUTOF10: 3
PAINLEVEL_OUTOF10: 3

## 2019-12-19 ASSESSMENT — PAIN DESCRIPTION - LOCATION: LOCATION: HEAD

## 2019-12-19 ASSESSMENT — PAIN DESCRIPTION - FREQUENCY: FREQUENCY: INTERMITTENT

## 2019-12-19 ASSESSMENT — PAIN - FUNCTIONAL ASSESSMENT: PAIN_FUNCTIONAL_ASSESSMENT: ACTIVITIES ARE NOT PREVENTED

## 2019-12-19 ASSESSMENT — PAIN DESCRIPTION - DESCRIPTORS: DESCRIPTORS: HEADACHE

## 2019-12-19 ASSESSMENT — PAIN DESCRIPTION - PAIN TYPE: TYPE: ACUTE PAIN

## 2019-12-20 VITALS
DIASTOLIC BLOOD PRESSURE: 71 MMHG | TEMPERATURE: 98.1 F | HEIGHT: 60 IN | BODY MASS INDEX: 24.54 KG/M2 | SYSTOLIC BLOOD PRESSURE: 108 MMHG | OXYGEN SATURATION: 99 % | HEART RATE: 66 BPM | RESPIRATION RATE: 18 BRPM | WEIGHT: 125 LBS

## 2019-12-20 LAB
ABSOLUTE EOS #: 0.3 K/UL (ref 0–0.4)
ABSOLUTE IMMATURE GRANULOCYTE: 0 K/UL (ref 0–0.3)
ABSOLUTE LYMPH #: 2.16 K/UL (ref 1–4.8)
ABSOLUTE MONO #: 0.42 K/UL (ref 0.1–0.8)
ALBUMIN SERPL-MCNC: 3.6 G/DL (ref 3.5–5.2)
ANION GAP SERPL CALCULATED.3IONS-SCNC: 10 MMOL/L (ref 9–17)
BASOPHILS # BLD: 1 % (ref 0–2)
BASOPHILS ABSOLUTE: 0.06 K/UL (ref 0–0.2)
BUN BLDV-MCNC: 7 MG/DL (ref 6–20)
BUN/CREAT BLD: ABNORMAL (ref 9–20)
CALCIUM SERPL-MCNC: 8.5 MG/DL (ref 8.6–10.4)
CHLORIDE BLD-SCNC: 105 MMOL/L (ref 98–107)
CO2: 24 MMOL/L (ref 20–31)
CREAT SERPL-MCNC: 0.64 MG/DL (ref 0.5–0.9)
DIFFERENTIAL TYPE: ABNORMAL
EOSINOPHILS RELATIVE PERCENT: 5 % (ref 1–4)
GFR AFRICAN AMERICAN: >60 ML/MIN
GFR NON-AFRICAN AMERICAN: >60 ML/MIN
GFR SERPL CREATININE-BSD FRML MDRD: ABNORMAL ML/MIN/{1.73_M2}
GFR SERPL CREATININE-BSD FRML MDRD: ABNORMAL ML/MIN/{1.73_M2}
GLUCOSE BLD-MCNC: 172 MG/DL (ref 70–99)
HCT VFR BLD CALC: 38.3 % (ref 36.3–47.1)
HEMOGLOBIN: 11.1 G/DL (ref 11.9–15.1)
IMMATURE GRANULOCYTES: 0 %
LYMPHOCYTES # BLD: 36 % (ref 24–44)
MAGNESIUM: 2.4 MG/DL (ref 1.6–2.6)
MCH RBC QN AUTO: 23.9 PG (ref 25.2–33.5)
MCHC RBC AUTO-ENTMCNC: 29 G/DL (ref 28.4–34.8)
MCV RBC AUTO: 82.5 FL (ref 82.6–102.9)
MONOCYTES # BLD: 7 % (ref 1–7)
MORPHOLOGY: ABNORMAL
MORPHOLOGY: ABNORMAL
NRBC AUTOMATED: 0 PER 100 WBC
PDW BLD-RTO: 20.4 % (ref 11.8–14.4)
PHOSPHORUS: 3.7 MG/DL (ref 2.6–4.5)
PLATELET # BLD: 334 K/UL (ref 138–453)
PLATELET ESTIMATE: ABNORMAL
PMV BLD AUTO: 9.6 FL (ref 8.1–13.5)
POTASSIUM SERPL-SCNC: 4.6 MMOL/L (ref 3.7–5.3)
RBC # BLD: 4.64 M/UL (ref 3.95–5.11)
RBC # BLD: ABNORMAL 10*6/UL
SEG NEUTROPHILS: 51 % (ref 36–66)
SEGMENTED NEUTROPHILS ABSOLUTE COUNT: 3.06 K/UL (ref 1.8–7.7)
SODIUM BLD-SCNC: 139 MMOL/L (ref 135–144)
WBC # BLD: 6 K/UL (ref 3.5–11.3)
WBC # BLD: ABNORMAL 10*3/UL

## 2019-12-20 PROCEDURE — 6360000002 HC RX W HCPCS: Performed by: NURSE PRACTITIONER

## 2019-12-20 PROCEDURE — 99232 SBSQ HOSP IP/OBS MODERATE 35: CPT | Performed by: PSYCHIATRY & NEUROLOGY

## 2019-12-20 PROCEDURE — 83735 ASSAY OF MAGNESIUM: CPT

## 2019-12-20 PROCEDURE — 76937 US GUIDE VASCULAR ACCESS: CPT

## 2019-12-20 PROCEDURE — 85025 COMPLETE CBC W/AUTO DIFF WBC: CPT

## 2019-12-20 PROCEDURE — 36415 COLL VENOUS BLD VENIPUNCTURE: CPT

## 2019-12-20 PROCEDURE — 99239 HOSP IP/OBS DSCHRG MGMT >30: CPT | Performed by: INTERNAL MEDICINE

## 2019-12-20 PROCEDURE — 2580000003 HC RX 258: Performed by: STUDENT IN AN ORGANIZED HEALTH CARE EDUCATION/TRAINING PROGRAM

## 2019-12-20 PROCEDURE — 6370000000 HC RX 637 (ALT 250 FOR IP): Performed by: NURSE PRACTITIONER

## 2019-12-20 PROCEDURE — 97116 GAIT TRAINING THERAPY: CPT

## 2019-12-20 PROCEDURE — 80069 RENAL FUNCTION PANEL: CPT

## 2019-12-20 RX ORDER — GABAPENTIN 300 MG/1
300 CAPSULE ORAL 3 TIMES DAILY
Qty: 30 CAPSULE | Refills: 0 | Status: ON HOLD | OUTPATIENT
Start: 2019-12-20 | End: 2020-11-02

## 2019-12-20 RX ORDER — LANOLIN ALCOHOL/MO/W.PET/CERES
100 CREAM (GRAM) TOPICAL DAILY
Qty: 30 TABLET | Refills: 0 | Status: ON HOLD | OUTPATIENT
Start: 2019-12-21 | End: 2020-07-10 | Stop reason: SDUPTHER

## 2019-12-20 RX ADMIN — SODIUM CHLORIDE, PRESERVATIVE FREE 10 ML: 5 INJECTION INTRAVENOUS at 09:23

## 2019-12-20 RX ADMIN — MULTIPLE VITAMINS W/ MINERALS TAB 1 TABLET: TAB at 09:19

## 2019-12-20 RX ADMIN — LORAZEPAM 2 MG: 2 TABLET ORAL at 09:23

## 2019-12-20 RX ADMIN — PANTOPRAZOLE SODIUM 20 MG: 20 TABLET, DELAYED RELEASE ORAL at 09:23

## 2019-12-20 RX ADMIN — VENLAFAXINE HYDROCHLORIDE 75 MG: 75 CAPSULE, EXTENDED RELEASE ORAL at 09:19

## 2019-12-20 RX ADMIN — ENOXAPARIN SODIUM 40 MG: 40 INJECTION SUBCUTANEOUS at 09:19

## 2019-12-20 RX ADMIN — Medication 100 MG: at 09:19

## 2019-12-20 RX ADMIN — LITHIUM CARBONATE 450 MG: 450 TABLET, EXTENDED RELEASE ORAL at 09:19

## 2019-12-20 RX ADMIN — OXCARBAZEPINE 150 MG: 150 TABLET, FILM COATED ORAL at 09:19

## 2019-12-20 ASSESSMENT — PAIN SCALES - GENERAL: PAINLEVEL_OUTOF10: 0

## 2020-01-15 ENCOUNTER — HOSPITAL ENCOUNTER (INPATIENT)
Age: 45
LOS: 1 days | Discharge: HOME | DRG: 897 | End: 2020-01-16
Attending: EMERGENCY MEDICINE | Admitting: INTERNAL MEDICINE
Payer: MEDICARE

## 2020-01-15 ENCOUNTER — APPOINTMENT (OUTPATIENT)
Dept: CT IMAGING | Age: 45
DRG: 897 | End: 2020-01-15
Payer: MEDICARE

## 2020-01-15 LAB
ABSOLUTE EOS #: 0.58 K/UL (ref 0–0.4)
ABSOLUTE IMMATURE GRANULOCYTE: 0 K/UL (ref 0–0.3)
ABSOLUTE LYMPH #: 3.52 K/UL (ref 1–4.8)
ABSOLUTE MONO #: 0.72 K/UL (ref 0.1–0.8)
ACETAMINOPHEN LEVEL: <5 UG/ML (ref 10–30)
ALBUMIN SERPL-MCNC: 4.1 G/DL (ref 3.5–5.2)
ALBUMIN/GLOBULIN RATIO: 1.1 (ref 1–2.5)
ALP BLD-CCNC: 73 U/L (ref 35–104)
ALT SERPL-CCNC: 41 U/L (ref 5–33)
ANION GAP SERPL CALCULATED.3IONS-SCNC: 19 MMOL/L (ref 9–17)
AST SERPL-CCNC: 50 U/L
ATYPICAL LYMPHOCYTE ABSOLUTE COUNT: 0.36 K/UL
ATYPICAL LYMPHOCYTES: 5 %
BASOPHILS # BLD: 0 % (ref 0–2)
BASOPHILS ABSOLUTE: 0 K/UL (ref 0–0.2)
BILIRUB SERPL-MCNC: <0.1 MG/DL (ref 0.3–1.2)
BUN BLDV-MCNC: 5 MG/DL (ref 6–20)
BUN/CREAT BLD: ABNORMAL (ref 9–20)
CALCIUM SERPL-MCNC: 8.7 MG/DL (ref 8.6–10.4)
CHLORIDE BLD-SCNC: 106 MMOL/L (ref 98–107)
CO2: 18 MMOL/L (ref 20–31)
CREAT SERPL-MCNC: 0.58 MG/DL (ref 0.5–0.9)
DIFFERENTIAL TYPE: ABNORMAL
EOSINOPHILS RELATIVE PERCENT: 8 % (ref 1–4)
ETHANOL PERCENT: 0.31 %
ETHANOL: 309 MG/DL
GFR AFRICAN AMERICAN: >60 ML/MIN
GFR NON-AFRICAN AMERICAN: >60 ML/MIN
GFR SERPL CREATININE-BSD FRML MDRD: ABNORMAL ML/MIN/{1.73_M2}
GFR SERPL CREATININE-BSD FRML MDRD: ABNORMAL ML/MIN/{1.73_M2}
GLUCOSE BLD-MCNC: 109 MG/DL (ref 70–99)
HCT VFR BLD CALC: 39.7 % (ref 36.3–47.1)
HEMOGLOBIN: 11.9 G/DL (ref 11.9–15.1)
IMMATURE GRANULOCYTES: 0 %
LIPASE: 56 U/L (ref 13–60)
LITHIUM DATE LAST DOSE: ABNORMAL
LITHIUM DOSE AMOUNT: ABNORMAL
LITHIUM DOSE TIME: ABNORMAL
LITHIUM LEVEL: <0.1 MMOL/L (ref 0.6–1.2)
LYMPHOCYTES # BLD: 49 % (ref 24–44)
MCH RBC QN AUTO: 23.6 PG (ref 25.2–33.5)
MCHC RBC AUTO-ENTMCNC: 30 G/DL (ref 28.4–34.8)
MCV RBC AUTO: 78.8 FL (ref 82.6–102.9)
MONOCYTES # BLD: 10 % (ref 1–7)
MORPHOLOGY: ABNORMAL
NRBC AUTOMATED: 0 PER 100 WBC
PDW BLD-RTO: 18.7 % (ref 11.8–14.4)
PLATELET # BLD: 344 K/UL (ref 138–453)
PLATELET ESTIMATE: ABNORMAL
PMV BLD AUTO: 9.1 FL (ref 8.1–13.5)
POTASSIUM SERPL-SCNC: 3.9 MMOL/L (ref 3.7–5.3)
RBC # BLD: 5.04 M/UL (ref 3.95–5.11)
RBC # BLD: ABNORMAL 10*6/UL
SALICYLATE LEVEL: <1 MG/DL (ref 3–10)
SEG NEUTROPHILS: 28 % (ref 36–66)
SEGMENTED NEUTROPHILS ABSOLUTE COUNT: 2.02 K/UL (ref 1.8–7.7)
SODIUM BLD-SCNC: 143 MMOL/L (ref 135–144)
TOTAL PROTEIN: 8 G/DL (ref 6.4–8.3)
TOXIC TRICYCLIC SC,BLOOD: NEGATIVE
WBC # BLD: 7.2 K/UL (ref 3.5–11.3)
WBC # BLD: ABNORMAL 10*3/UL

## 2020-01-15 PROCEDURE — 99285 EMERGENCY DEPT VISIT HI MDM: CPT

## 2020-01-15 PROCEDURE — 93005 ELECTROCARDIOGRAM TRACING: CPT | Performed by: STUDENT IN AN ORGANIZED HEALTH CARE EDUCATION/TRAINING PROGRAM

## 2020-01-15 PROCEDURE — 6360000002 HC RX W HCPCS: Performed by: STUDENT IN AN ORGANIZED HEALTH CARE EDUCATION/TRAINING PROGRAM

## 2020-01-15 PROCEDURE — G0480 DRUG TEST DEF 1-7 CLASSES: HCPCS

## 2020-01-15 PROCEDURE — 2060000000 HC ICU INTERMEDIATE R&B

## 2020-01-15 PROCEDURE — 80053 COMPREHEN METABOLIC PANEL: CPT

## 2020-01-15 PROCEDURE — 83690 ASSAY OF LIPASE: CPT

## 2020-01-15 PROCEDURE — 80178 ASSAY OF LITHIUM: CPT

## 2020-01-15 PROCEDURE — 80183 DRUG SCRN QUANT OXCARBAZEPIN: CPT

## 2020-01-15 PROCEDURE — 96374 THER/PROPH/DIAG INJ IV PUSH: CPT

## 2020-01-15 PROCEDURE — 70450 CT HEAD/BRAIN W/O DYE: CPT

## 2020-01-15 PROCEDURE — 80307 DRUG TEST PRSMV CHEM ANLYZR: CPT

## 2020-01-15 PROCEDURE — 85025 COMPLETE CBC W/AUTO DIFF WBC: CPT

## 2020-01-15 RX ORDER — OXCARBAZEPINE 150 MG/1
150 TABLET, FILM COATED ORAL ONCE
Status: COMPLETED | OUTPATIENT
Start: 2020-01-15 | End: 2020-01-16

## 2020-01-15 RX ORDER — LORAZEPAM 2 MG/ML
2 INJECTION INTRAMUSCULAR ONCE
Status: COMPLETED | OUTPATIENT
Start: 2020-01-15 | End: 2020-01-15

## 2020-01-15 RX ORDER — 0.9 % SODIUM CHLORIDE 0.9 %
1000 INTRAVENOUS SOLUTION INTRAVENOUS ONCE
Status: COMPLETED | OUTPATIENT
Start: 2020-01-15 | End: 2020-01-16

## 2020-01-15 RX ADMIN — LORAZEPAM 2 MG: 2 INJECTION INTRAMUSCULAR; INTRAVENOUS at 20:32

## 2020-01-15 ASSESSMENT — ENCOUNTER SYMPTOMS
NAUSEA: 0
SHORTNESS OF BREATH: 0
ABDOMINAL PAIN: 0
VOMITING: 0
BACK PAIN: 0
COUGH: 0

## 2020-01-16 VITALS
HEIGHT: 60 IN | HEART RATE: 71 BPM | DIASTOLIC BLOOD PRESSURE: 68 MMHG | SYSTOLIC BLOOD PRESSURE: 121 MMHG | BODY MASS INDEX: 27.09 KG/M2 | OXYGEN SATURATION: 100 % | TEMPERATURE: 98.1 F | WEIGHT: 138 LBS | RESPIRATION RATE: 23 BRPM

## 2020-01-16 PROBLEM — F10.920 ALCOHOLIC INTOXICATION WITHOUT COMPLICATION (HCC): Status: ACTIVE | Noted: 2020-01-16

## 2020-01-16 LAB
EKG ATRIAL RATE: 112 BPM
EKG P AXIS: 62 DEGREES
EKG P-R INTERVAL: 152 MS
EKG Q-T INTERVAL: 352 MS
EKG QRS DURATION: 80 MS
EKG QTC CALCULATION (BAZETT): 480 MS
EKG R AXIS: 39 DEGREES
EKG T AXIS: 47 DEGREES
EKG VENTRICULAR RATE: 112 BPM
ETHANOL PERCENT: 0.09 %
ETHANOL: 87 MG/DL
GLUCOSE BLD-MCNC: 102 MG/DL (ref 65–105)

## 2020-01-16 PROCEDURE — 97162 PT EVAL MOD COMPLEX 30 MIN: CPT

## 2020-01-16 PROCEDURE — 6370000000 HC RX 637 (ALT 250 FOR IP): Performed by: STUDENT IN AN ORGANIZED HEALTH CARE EDUCATION/TRAINING PROGRAM

## 2020-01-16 PROCEDURE — 82947 ASSAY GLUCOSE BLOOD QUANT: CPT

## 2020-01-16 PROCEDURE — 96375 TX/PRO/DX INJ NEW DRUG ADDON: CPT

## 2020-01-16 PROCEDURE — 2580000003 HC RX 258: Performed by: STUDENT IN AN ORGANIZED HEALTH CARE EDUCATION/TRAINING PROGRAM

## 2020-01-16 PROCEDURE — 6370000000 HC RX 637 (ALT 250 FOR IP): Performed by: NURSE PRACTITIONER

## 2020-01-16 PROCEDURE — 94761 N-INVAS EAR/PLS OXIMETRY MLT: CPT

## 2020-01-16 PROCEDURE — 2500000003 HC RX 250 WO HCPCS: Performed by: NURSE PRACTITIONER

## 2020-01-16 PROCEDURE — 96361 HYDRATE IV INFUSION ADD-ON: CPT

## 2020-01-16 PROCEDURE — 93010 ELECTROCARDIOGRAM REPORT: CPT | Performed by: INTERNAL MEDICINE

## 2020-01-16 PROCEDURE — 2580000003 HC RX 258: Performed by: NURSE PRACTITIONER

## 2020-01-16 PROCEDURE — 6360000002 HC RX W HCPCS: Performed by: NURSE PRACTITIONER

## 2020-01-16 PROCEDURE — 99223 1ST HOSP IP/OBS HIGH 75: CPT | Performed by: INTERNAL MEDICINE

## 2020-01-16 PROCEDURE — 96372 THER/PROPH/DIAG INJ SC/IM: CPT

## 2020-01-16 PROCEDURE — 97530 THERAPEUTIC ACTIVITIES: CPT

## 2020-01-16 PROCEDURE — G0480 DRUG TEST DEF 1-7 CLASSES: HCPCS

## 2020-01-16 RX ORDER — POTASSIUM CHLORIDE 20 MEQ/1
40 TABLET, EXTENDED RELEASE ORAL PRN
Status: DISCONTINUED | OUTPATIENT
Start: 2020-01-16 | End: 2020-01-16 | Stop reason: HOSPADM

## 2020-01-16 RX ORDER — LORAZEPAM 2 MG/1
4 TABLET ORAL
Status: DISCONTINUED | OUTPATIENT
Start: 2020-01-16 | End: 2020-01-16

## 2020-01-16 RX ORDER — POTASSIUM CHLORIDE 7.45 MG/ML
10 INJECTION INTRAVENOUS PRN
Status: DISCONTINUED | OUTPATIENT
Start: 2020-01-16 | End: 2020-01-16 | Stop reason: HOSPADM

## 2020-01-16 RX ORDER — SODIUM CHLORIDE 0.9 % (FLUSH) 0.9 %
10 SYRINGE (ML) INJECTION PRN
Status: DISCONTINUED | OUTPATIENT
Start: 2020-01-16 | End: 2020-01-16 | Stop reason: HOSPADM

## 2020-01-16 RX ORDER — SODIUM CHLORIDE 9 MG/ML
INJECTION, SOLUTION INTRAVENOUS CONTINUOUS
Status: DISCONTINUED | OUTPATIENT
Start: 2020-01-16 | End: 2020-01-16 | Stop reason: HOSPADM

## 2020-01-16 RX ORDER — SODIUM CHLORIDE 0.9 % (FLUSH) 0.9 %
10 SYRINGE (ML) INJECTION EVERY 12 HOURS SCHEDULED
Status: DISCONTINUED | OUTPATIENT
Start: 2020-01-16 | End: 2020-01-16 | Stop reason: HOSPADM

## 2020-01-16 RX ORDER — NICOTINE 21 MG/24HR
1 PATCH, TRANSDERMAL 24 HOURS TRANSDERMAL DAILY
Status: DISCONTINUED | OUTPATIENT
Start: 2020-01-16 | End: 2020-01-16 | Stop reason: HOSPADM

## 2020-01-16 RX ORDER — LORAZEPAM 0.5 MG/1
1 TABLET ORAL
Status: DISCONTINUED | OUTPATIENT
Start: 2020-01-16 | End: 2020-01-16

## 2020-01-16 RX ORDER — LORAZEPAM 2 MG/ML
1 INJECTION INTRAMUSCULAR
Status: DISCONTINUED | OUTPATIENT
Start: 2020-01-16 | End: 2020-01-16 | Stop reason: HOSPADM

## 2020-01-16 RX ORDER — ECHINACEA PURPUREA EXTRACT 125 MG
1 TABLET ORAL PRN
Status: DISCONTINUED | OUTPATIENT
Start: 2020-01-16 | End: 2020-01-16 | Stop reason: HOSPADM

## 2020-01-16 RX ORDER — PANTOPRAZOLE SODIUM 40 MG/1
40 TABLET, DELAYED RELEASE ORAL
Status: DISCONTINUED | OUTPATIENT
Start: 2020-01-16 | End: 2020-01-16 | Stop reason: HOSPADM

## 2020-01-16 RX ORDER — LORAZEPAM 2 MG/ML
2 INJECTION INTRAMUSCULAR
Status: DISCONTINUED | OUTPATIENT
Start: 2020-01-16 | End: 2020-01-16

## 2020-01-16 RX ORDER — FLUTICASONE PROPIONATE 50 MCG
1 SPRAY, SUSPENSION (ML) NASAL DAILY
Status: DISCONTINUED | OUTPATIENT
Start: 2020-01-16 | End: 2020-01-16 | Stop reason: HOSPADM

## 2020-01-16 RX ORDER — ONDANSETRON 2 MG/ML
4 INJECTION INTRAMUSCULAR; INTRAVENOUS EVERY 6 HOURS PRN
Status: DISCONTINUED | OUTPATIENT
Start: 2020-01-16 | End: 2020-01-16 | Stop reason: HOSPADM

## 2020-01-16 RX ORDER — LORAZEPAM 2 MG/ML
1 INJECTION INTRAMUSCULAR
Status: DISCONTINUED | OUTPATIENT
Start: 2020-01-16 | End: 2020-01-16

## 2020-01-16 RX ORDER — VENLAFAXINE HYDROCHLORIDE 75 MG/1
75 CAPSULE, EXTENDED RELEASE ORAL
Status: DISCONTINUED | OUTPATIENT
Start: 2020-01-16 | End: 2020-01-16 | Stop reason: HOSPADM

## 2020-01-16 RX ORDER — LORAZEPAM 2 MG/ML
3 INJECTION INTRAMUSCULAR
Status: DISCONTINUED | OUTPATIENT
Start: 2020-01-16 | End: 2020-01-16

## 2020-01-16 RX ORDER — ACETAMINOPHEN 325 MG/1
650 TABLET ORAL EVERY 4 HOURS PRN
Status: DISCONTINUED | OUTPATIENT
Start: 2020-01-16 | End: 2020-01-16 | Stop reason: HOSPADM

## 2020-01-16 RX ORDER — QUETIAPINE FUMARATE 200 MG/1
200 TABLET, FILM COATED ORAL NIGHTLY
Status: DISCONTINUED | OUTPATIENT
Start: 2020-01-16 | End: 2020-01-16 | Stop reason: HOSPADM

## 2020-01-16 RX ORDER — LORAZEPAM 2 MG/ML
4 INJECTION INTRAMUSCULAR
Status: DISCONTINUED | OUTPATIENT
Start: 2020-01-16 | End: 2020-01-16

## 2020-01-16 RX ORDER — ALBUTEROL SULFATE 90 UG/1
2 AEROSOL, METERED RESPIRATORY (INHALATION) EVERY 4 HOURS PRN
Status: DISCONTINUED | OUTPATIENT
Start: 2020-01-16 | End: 2020-01-16 | Stop reason: HOSPADM

## 2020-01-16 RX ORDER — LORAZEPAM 0.5 MG/1
2 TABLET ORAL
Status: DISCONTINUED | OUTPATIENT
Start: 2020-01-16 | End: 2020-01-16

## 2020-01-16 RX ADMIN — PANTOPRAZOLE SODIUM 40 MG: 40 TABLET, DELAYED RELEASE ORAL at 08:52

## 2020-01-16 RX ADMIN — OXCARBAZEPINE 150 MG: 150 TABLET, FILM COATED ORAL at 00:04

## 2020-01-16 RX ADMIN — LORAZEPAM 4 MG: 2 TABLET ORAL at 05:09

## 2020-01-16 RX ADMIN — VENLAFAXINE HYDROCHLORIDE 75 MG: 75 CAPSULE, EXTENDED RELEASE ORAL at 08:53

## 2020-01-16 RX ADMIN — SODIUM CHLORIDE, PRESERVATIVE FREE 10 ML: 5 INJECTION INTRAVENOUS at 08:54

## 2020-01-16 RX ADMIN — FOLIC ACID: 5 INJECTION, SOLUTION INTRAMUSCULAR; INTRAVENOUS; SUBCUTANEOUS at 08:52

## 2020-01-16 RX ADMIN — SODIUM CHLORIDE 1000 ML: 9 INJECTION, SOLUTION INTRAVENOUS at 00:08

## 2020-01-16 RX ADMIN — LORAZEPAM 1 MG: 0.5 TABLET ORAL at 09:02

## 2020-01-16 RX ADMIN — ENOXAPARIN SODIUM 40 MG: 40 INJECTION SUBCUTANEOUS at 08:53

## 2020-01-16 ASSESSMENT — PAIN SCALES - GENERAL: PAINLEVEL_OUTOF10: 0

## 2020-01-16 NOTE — ED NOTES
Pt now awake, alert & oriented, no seizure activity noted, pt notified of admission plans, agrees with plan, pt set up with box lunch, states that she has not eaten for 3 days, repeat ETOH drawn     [facility], [facility]  01/16/20 6811

## 2020-01-16 NOTE — CARE COORDINATION
Discharge [address] [facility]  Clinical Case Management Department  Written by:  Wynne RN    Patient Name: Dee Lu  Attending Provider: No att. providers found  Admit Date: 1/15/2020  8:21 PM  MRN: [id number]  Account: [de-identified]                     : 1975  Discharge Date: 2020      Disposition: home    Wynne RN

## 2020-01-16 NOTE — ED NOTES
Report given to RN on [facility], RN denies any further questions.  Pt ready for floor when room is clean      Mustapha Enriquez RN  01/16/20 4025

## 2020-01-16 NOTE — ED PROVIDER NOTES
[address] [facility]     Emergency Department     Faculty Attestation    I performed a history and physical examination of the patient and discussed management with the resident. I reviewed the residents note and agree with the documented findings and plan of care. Any areas of disagreement are noted on the chart. I was personally present for the key portions of any procedures. I have documented in the chart those procedures where I was not present during the key portions. I have reviewed the emergency nurses triage note. I agree with the chief complaint, past medical history, past surgical history, allergies, medications, social and family history as documented unless otherwise noted below. For Physician Assistant/ Nurse Practitioner cases/documentation I have personally evaluated this patient and have completed at least one if not all key elements of the E/M (history, physical exam, and MDM). Additional findings are as noted.       Primary Care Physician:  Julius Plaza MD    CHIEF COMPLAINT       Chief Complaint   Patient presents with    Seizures     noncompliant with seizure medication    Alcohol Intoxication       RECENT VITALS:   Temp: 96.7 °F (35.9 °C),  Pulse: 103, Resp: 22, BP: 117/83    LABS:  Labs Reviewed   CBC WITH AUTO DIFFERENTIAL - Abnormal; Notable for the following components:       Result Value    MCV 78.8 (*)     MCH 23.6 (*)     RDW 18.7 (*)     Seg Neutrophils 28 (*)     Lymphocytes 49 (*)     Monocytes 10 (*)     Eosinophils % 8 (*)     Absolute Eos # 0.58 (*)     All other components within normal limits   COMPREHENSIVE METABOLIC PANEL - Abnormal; Notable for the following components:    Glucose 109 (*)     BUN 5 (*)     CO2 18 (*)     Anion Gap 19 (*)     ALT 41 (*)     AST 50 (*)     Total Bilirubin <0.10 (*)     All other components within normal limits   TOX SCR, BLD, ED - Abnormal; Notable for the following components:    Ethanol 309 (*)     Ethanol percent 0.309 (*) Salicylate Lvl <1 (*)     Acetaminophen Level <5 (*)     All other components within normal limits   LITHIUM LEVEL - Abnormal; Notable for the following components:    Lithium Lvl <0.1 (*)     All other components within normal limits   LIPASE   OXCARBAZEPINE LEVEL       Radiology  CT HEAD WO CONTRAST    (Results Pending)         Attending Physician Additional  Notes    The patient is a 49-year-old female with history of seizures and alcohol abuse. The patient reports that she has been admitted in the past for her seizures and is prescribed Trileptal but has not followed up with neurology or a family doctor since discharge. The patient states that she has not taken her Trileptal for the past 3 days and has been on a wesley. She states that she was drinking alcohol today and had a witnessed seizure. The patient has history of heroin abuse but has not used heroin for the past several months. She is combative and postictal upon arrival.  She requests a female physician. Shortly after arrival the patient had a tonic-clonic seizure that terminated after she was given IV Ativan. She has been postictal since that time and unable to answer most questions. The patient arrives tachycardic and tachypneic. She was given IV fluids with improvement in heart rate. While postictal she had some episodes of hypoxia and was placed on supplemental oxygen with improvement in oxygen saturation. Heart regular rate and rhythm on my exam.  Lungs clear to auscultation. Abdomen soft nontender. Patient moves all extremities. No hemotympanum, baird sign, raccoon eyes or septal hematoma. No signs of trauma. Capillary fill less than 2 seconds. CBC is unremarkable. CMP shows bicarb of 18 with anion gap of 19. LFTs slightly elevated but grossly unchanged from baseline. Ethanol 309. Lipase normal.  CT head pending.     EKG 2047 sinus tachycardia, rate 112 bpm, normal axis, normal intervals, no ST elevation or depression, no T

## 2020-01-16 NOTE — ED NOTES
Pt has witnessed 2min seizure by writer.  Verbal order to give ativan 1mg IV     Sujata Soria RN  01/15/20 2032

## 2020-01-16 NOTE — ED PROVIDER NOTES
[facility]  ED  Emergency Department Encounter  Emergency Medicine Resident     Pt Name: Sriram Damian  MRN: [id number]  [place] 1975  Date of evaluation: 1/15/20  PCP:  Silvano Prater MD    [address] [place] [address]       Chief Complaint   Patient presents with    Seizures     noncompliant with seizure medication    Alcohol Intoxication       HISTORY OFPRESENT ILLNESS  (Location/Symptom, Timing/Onset, Context/Setting, Quality, Duration, Modifying Factors,Severity.)      Sriram Damian is a 53 y.o. female who presents with alcohol intoxication and seizure. Patient with past medical history of alcoholism, depression, hepatitis C, suicidal ideation, depression. Patient states that her friend called today for a witnessed seizure. Patient states that she has been drinking alcohol daily, unsure of how much today. She does not remember what happened, woke up with EMS around her. Patient initially cooperative, however on transport patient got extremely agitated, combative requiring restraints. Patient is on Trileptal, states that she has not taken for the last 3 days. She has not followed up with neurology since being diagnosed with seizures. She denies any drugs. She currently denies any chest pain, shortness of breath, abdominal pain, headache, vision changes, numbness, weakness, tingling. Patient is requesting to go home at this time, is clearly intoxicated, understanding that we need to do further work-up and evaluation. Patient unsure if she lost consciousness or hit her head. She is denying neck pain, back pain. Unsure if she bit her tongue or had loss of bowel or bladder function. Patient arrived being held down by multiple [employer], [id number] police officers and EMS. Patient put in restraints for both patient and staff safety.     PAST MEDICAL / SURGICAL / SOCIAL / FAMILY HISTORY      has a past medical history of Alcoholic hepatitis without ascites, Alcoholic ketoacidosis, Alcoholism ([facility] Anxiety, Asthma, Bipolar affective disorder ([facility] Depression, Drug overdose, accidental or unintentional, initial encounter, Drug overdose, intentional self-harm, initial encounter ([facility] Lisa coma scale total score 3-8 ([facility] Hepatitis C antibody positive in blood, History of gastric surgery (gastric sleeve 2012), Intentional drug overdose ([facility] MDD (major depressive disorder), recurrent episode ([facility] Pneumonia, Polysubstance abuse ([facility] Post traumatic stress disorder, Seizure ([facility] Smoker unmotivated to quit, Suicide attempt [facility]), Suicide ideation, and Toxic metabolic encephalopathy. has a past surgical history that includes Hysterectomy; Carpal tunnel release; Hysterectomy, total abdominal; Cholecystectomy; Stomach surgery; and Upper gastrointestinal endoscopy (N/A, 6/19/2019).      Social History     Socioeconomic History    Marital status: Single     Spouse name: Not on file    Number of children: 0    Years of education: 15    Highest education level: Not on file   Occupational History    Not on file   Social Needs    Financial resource strain: Not on file    Food insecurity:     Worry: Not on file     Inability: Not on file    Transportation needs:     Medical: Not on file     Non-medical: Not on file   Tobacco Use    Smoking status: Current Every Day Smoker     Packs/day: 1.00     Years: 30.00     Pack years: 30.00     Types: Cigarettes    Smokeless tobacco: Never Used   Substance and Sexual Activity    Alcohol use: Not Currently     Alcohol/week: 42.0 standard drinks     Types: 42 Cans of beer per week     Comment: detoxing of alcohol x5 days    Drug use: Yes     Frequency: 7.0 times per week     Types: Marijuana, IV, Cocaine     Comment: drug abuse includes, cocaine, IV heroin, cannabis    Sexual activity: Not Currently   Lifestyle    Physical activity:     Days per week: Not on file     Minutes per session: Not on file    Stress: Not on file   Relationships    Social daily 10/15/19   Wan Shoulder, DO   omeprazole (PRILOSEC) 20 MG delayed release capsule Take 1 capsule by mouth 2 times daily 10/15/19   Wan Shoulder, DO   ondansetron (ZOFRAN ODT) 4 MG disintegrating tablet Take 1 tablet by mouth every 8 hours as needed for Nausea 10/15/19   Mina Antoinette Muñoz, DO   OXcarbazepine (TRILEPTAL) 150 MG tablet Take 1 tablet by mouth 2 times daily 10/15/19   Mina Antoinette Muñoz, DO   QUEtiapine (SEROQUEL) 200 MG tablet Take 1 tablet by mouth nightly 10/15/19   Wan Shoulder, DO   sodium chloride (OCEAN) 0.65 % nasal spray 1 spray by Nasal route as needed for Congestion 10/15/19   Wan Shoulder, DO   venlafaxine (EFFEXOR XR) 75 MG extended release capsule Take 1 capsule by mouth daily (with breakfast) 10/15/19   Wan Shoulder, DO       REVIEW OFSYSTEMS    (2-9 systems for level 4, 10 or more for level 5)      Review of Systems   Constitutional: Negative for activity change, appetite change, chills, fatigue and fever. Eyes: Negative for visual disturbance. Respiratory: Negative for cough and shortness of breath. Cardiovascular: Negative for chest pain. Gastrointestinal: Negative for abdominal pain, nausea and vomiting. Musculoskeletal: Negative for back pain and neck pain. Skin: Negative for wound. Neurological: Positive for seizures. Negative for dizziness, weakness and headaches. Psychiatric/Behavioral: Positive for agitation. PHYSICAL EXAM   (up to 7 for level 4, 8 or more forlevel 5)      INITIAL VITALS:   ED Triage Vitals   BP Temp Temp Source Pulse Resp SpO2 Height Weight   01/15/20 2025 01/15/20 2036 01/15/20 2036 01/15/20 2025 01/15/20 2025 01/15/20 2025 -- --   (!) 136/93 96.7 °F (35.9 °C) Axillary 122 22 97 %         Physical Exam  Vitals signs and nursing note reviewed. Constitutional:       Appearance: Normal appearance. She is well-developed. She is not diaphoretic.       Comments: agitated   HENT:      Head: extremely agitated, requiring at least 5-6 EMS personnel, police officers restraining her. Patient was placed in four-point restraints for her safety and staff safety. Patient given 2 mg Ativan. Patient refusing to have male physician, therefore myself and Dr. Chandan Blandon have signed on to take care of the patient. On physical exam abdomen is soft, nontender, nondistended. Lungs are clear to auscultation bilaterally. Cardiac regular rate and rhythm. No lower extremity swelling or calf tenderness. No obvious signs of trauma. No midline cervical, thoracic, lumbar tenderness. No gross motor or sensory deficits, moving all extremities equally. Shortly after patient's arrival she had a tonic-clonic seizure which lasted approximately 30 seconds, was given Ativan. She was postictal and became hypotensive and hypoxic. Patient was started on liter of fluids and placed on nasal cannula with improvement. .  She is no longer agitated and comfortable with female physicians and nurses in the room. Patient resting comfortably, removed from four-point restraints. We will plan for basic labs, tox screen, CT head. LFTs consistent with alcohol abuse. Slightly elevated anion gap, likely secondary to lactic acidosis secondary to multiple seizures. Talk screen positive for alcohol of 309, otherwise negative. Lithium level negative. ED Course as of Jan 16 0137   Wed Irineo 15, 2020   2135 Ethanol(!!): 309 [LW]   2236 CT head negative. [LW]   65 Spoke with neurology, they state that patient does not need to be loaded with medications at this time. They believe that this is possibly related to withdrawal versus provoked seizures while intoxicated. Did explain that patient is currently intoxicated, was drinking today up until her seizure and I do not believe that this is withdrawal seizures. We will plan to admit to [facility]. [LW]   3793 Spoke with [facility], agreeable for admission.     [LW]   Thu Jan 16, 2020 7126 Signed out to the oncoming resident awaiting transfer to the floor.    [LW]      ED Course User Index  [LW] Estel Risk, DO     We will plan to admit the patient to [facility] given multiple seizures. Patient eating, drinking and resting complaint bed at this time, awaiting transfer to the floor. Sober time 3:30 AM.    DIAGNOSTIC RESULTS / EMERGENCYDEPARTMENT COURSE / MDM     LABS:  Labs Reviewed   CBC WITH AUTO DIFFERENTIAL - Abnormal; Notable for the following components:       Result Value    MCV 78.8 (*)     MCH 23.6 (*)     RDW 18.7 (*)     Seg Neutrophils 28 (*)     Lymphocytes 49 (*)     Monocytes 10 (*)     Eosinophils % 8 (*)     Absolute Eos # 0.58 (*)     All other components within normal limits   COMPREHENSIVE METABOLIC PANEL - Abnormal; Notable for the following components:    Glucose 109 (*)     BUN 5 (*)     CO2 18 (*)     Anion Gap 19 (*)     ALT 41 (*)     AST 50 (*)     Total Bilirubin <0.10 (*)     All other components within normal limits   TOX SCR, BLD, ED - Abnormal; Notable for the following components:    Ethanol 309 (*)     Ethanol percent 2.838 (*)     Salicylate Lvl <1 (*)     Acetaminophen Level <5 (*)     All other components within normal limits   LITHIUM LEVEL - Abnormal; Notable for the following components:    Lithium Lvl <0.1 (*)     All other components within normal limits   LIPASE   OXCARBAZEPINE LEVEL           Ct Head Wo Contrast    Result Date: 1/15/2020  EXAMINATION: CT OF THE HEAD WITHOUT CONTRAST  1/15/2020 9:35 pm TECHNIQUE: CT of the head was performed without the administration of intravenous contrast. Dose modulation, iterative reconstruction, and/or weight based adjustment of the mA/kV was utilized to reduce the radiation dose to as low as reasonably achievable.  COMPARISON: MRI brain 12/19/2019 HISTORY: ORDERING SYSTEM PROVIDED HISTORY: Seizures TECHNOLOGIST PROVIDED HISTORY: Seizures Is the patient pregnant?->No Reason for Exam: seizures Acuity: Acute Type of Exam: Initial FINDINGS: BRAIN/VENTRICLES: There is no acute intracranial hemorrhage, mass effect or midline shift. No abnormal extra-axial fluid collection. The gray-white differentiation is maintained without evidence of an acute infarct. There is no evidence of hydrocephalus. ORBITS: The visualized portion of the orbits demonstrate no acute abnormality. SINUSES: Mild ethmoid sinus mucosal thickening. Mastoids are grossly clear. SOFT TISSUES/SKULL:  No acute abnormality of the visualized skull or soft tissues. No acute intracranial abnormality. EKG  EKG Interpretation    Interpreted by me    Rhythm: normal sinus   Rate: Tachycardia  Axis: normal  Ectopy: none  Conduction: normal  ST Segments: no acute change  T Waves: no acute change  Q Waves: none    Clinical Impression: Nonspecific EKG, unchanged from EKG on 12/17/2019 except for new tachycardia    All EKG's are interpreted by the Emergency Department Physicianwho either signs or Co-signs this chart in the absence of a cardiologist.      PROCEDURES:  None    CONSULTS:  IP CONSULT TO NEUROLOGY  IP CONSULT TO HOSPITALIST  IP CONSULT TO DIETITIAN  IP CONSULT TO SOCIAL WORK  IP CONSULT TO SPIRITUAL SERVICES  IP CONSULT TO NEUROLOGY    CRITICAL CARE:  Please see attending note    FINAL IMPRESSION      1. Seizure ([address]    2.  Acute alcoholic intoxication without complication [facility])          DISPOSITION / PLAN     DISPOSITION Admitted 01/15/2020 11:45:17 PM      PATIENT REFERRED TO:  Lori Rocha MD  [address] [place] [address] S  [phone]            DISCHARGE MEDICATIONS:  New Prescriptions    No medications on file       Jim Gonzáles DO  Emergency Medicine Resident    (Please note that portions of this note were completed with a voice recognition program.Efforts were made to edit the dictations but occasionally words are mis-transcribed.)       Jim Gonzáles DO  Resident  01/16/20 5201 [facility]   Resident  01/16/20 2588

## 2020-01-16 NOTE — ED NOTES
Pt resting on cot NAD noted, RR non labored. Cardiac monitor maintained.  Call light in reach   Will continue to monitor      Howard Gottlieb RN  01/16/20 0203

## 2020-01-16 NOTE — PROGRESS NOTES
Physical Therapy    Facility/Department: [facility] NEURO  Initial Assessment    NAME: Mila Rivero  : 1975  MRN: [id number]    Date of Service: 2020  Chief Complaint:           Chief Complaint   Patient presents with    Seizures       noncompliant with seizure medication    Alcohol Intoxication         History Obtained From:      patient, electronic medical record     History of Present Illness:      Mila Rivero is a 53 y.o. F with hx of alcohol abuse, seizures who presented with seizure episodes. Had witnessed seizure at home by friend. Had previous extensive workup last admission for seizure, determined to be due to alcohol.      In ER ethanol level elevated. Neurology contacted, seizures provoked due to alcohol intoxication. Previous MRI and EEG unremarkable. No need for AEDs or further testing recommended. Admitted for alcohol intoxication.      Discussed alcohol use with patient, not interested in quitting at this time. Does not want to go through withdrawals or rehab, would like to continue alcohol use. Discharge Recommendations: Further therapy recommended at discharge. PT Equipment Recommendations  Equipment recommendations listed below are based on what the patient would need if they were able to return to prior living arrangements at the time of discharge. Equipment Needed: Yes  Mobility Devices: Walker    Assessment   Body structures, Functions, Activity limitations: Decreased functional mobility ; Decreased endurance;Decreased strength;Decreased balance;Decreased safe awareness  Assessment: Pt performed bed mobility with modified independence. Pt performed transfers with contact guard assistance. Pt ambulated 150 ft with contact guard assistance and RW. Pt would benefit from continued skilled physical therapy to improve strength, balance, gait, and endurance.    Prognosis: Good  Decision Making: Medium Complexity  PT Education: Goals;PT Role;General Safety;Plan of Care;Gait Training;Transfer Training;Functional Mobility Training  REQUIRES PT FOLLOW UP: Yes  Activity Tolerance  Activity Tolerance: Patient Tolerated treatment well       Patient Diagnosis(es): The primary encounter diagnosis was Seizure ([facility] A diagnosis of Acute alcoholic intoxication without complication (HCC) was also pertinent to this visit. has a past medical history of Alcoholic hepatitis without ascites, Alcoholic ketoacidosis, Alcoholism ([facility] Anxiety, Asthma, Bipolar affective disorder ([facility] Depression, Drug overdose, accidental or unintentional, initial encounter, Drug overdose, intentional self-harm, initial encounter ([facility] [place] coma scale total score 3-8 ([facility] Hepatitis C antibody positive in blood, History of gastric surgery (gastric sleeve 2012), Intentional drug overdose ([facility] MDD (major depressive disorder), recurrent episode ([facility] Pneumonia, Polysubstance abuse ([facility] Post traumatic stress disorder, Seizure ([facility] Smoker unmotivated to quit, Suicide attempt [facility]), Suicide ideation, and Toxic metabolic encephalopathy. has a past surgical history that includes Hysterectomy; Carpal tunnel release; Hysterectomy, total abdominal; Cholecystectomy; Stomach surgery; and Upper gastrointestinal endoscopy (N/A, 6/19/2019). Restrictions  Restrictions/Precautions  Restrictions/Precautions: Fall Risk, Up as Tolerated, Seizure  Required Braces or Orthoses?: No  Vision/Hearing  Vision: Within Functional Limits  Vision Exceptions: Wears glasses for reading  Hearing: Exceptions to WFL(Partial deafness in right ear)     Subjective  General  Patient assessed for rehabilitation services?: Yes  Family / Caregiver Present: No  Follows Commands: Within Functional Limits  General Comment  Comments: Pt is lying in bed upon arrival. Pt is agreeable to physical therapy.    Pain Screening  Patient Currently in Pain: No  Vital Signs  Patient Currently in Pain: No       Orientation  Orientation  Overall Orientation Status: Within Functional Limits  Social/Functional History  Social/Functional History  Lives With: Alone  Type of Home: Apartment  Home Layout: One level  Home Access: Stairs to enter with rails  Entrance Stairs - Number of Steps:  7(There is an elevator that she normally uses. )  Entrance Stairs - Rails: Both  Bathroom Shower/Tub: Tub/Shower unit  Bathroom Toilet: Standard  Bathroom Equipment: Grab bars in shower  Home Equipment: [employer] Help From: Friend(s)  ADL Assistance: Independent  Homemaking Assistance: Independent([employer] friends help with cooking and cleaning)  Homemaking Responsibilities: Yes  Ambulation Assistance: Needs assistance(Pt reports that she usually uses a cane )  Transfer Assistance: Independent  Active : No  Mode of Transportation: Bus, Walk  Occupation: On disability  Cognition   Cognition  Overall Cognitive Status: Exceptions  Safety Judgement: Decreased awareness of need for safety;Decreased awareness of need for assistance    Objective     Observation/Palpation  Posture: Fair  Observation: Full body tremors noted; forward head posture    AROM RLE (degrees)  RLE AROM: WFL  AROM LLE (degrees)  LLE AROM : WFL  AROM RUE (degrees)  RUE AROM : WFL  AROM LUE (degrees)  LUE AROM : WFL  Strength RLE  Strength RLE: WFL  Strength LLE  Strength LLE: WFL  Strength RUE  Strength RUE: WFL  Strength LUE  Strength LUE: WFL  Strength Other  Other: Strength UE grossly 4+/5; strength LE grossly 4+/5  Tone RLE  RLE Tone: Normotonic  Tone LLE  LLE Tone: Normotonic  Motor Control  Gross Motor?: Exceptions  Comments: Motor control impaired due to UE tremors  Coordination  Rapid Alternating Movements: Normal  Finger to Nose: Normal  Sensation  Overall Sensation Status: Impaired(Light touch diminished on right foot when compared bilaterally)  Bed mobility  Supine to Sit: Modified independent(HOB elevated and use of bed rail)  Comment: Pt left sitting up in recliner at end of therapy session  Transfers  Sit to Stand: Contact guard assistance  Stand to sit: Contact guard assistance  Comment: Pt was impulsive with transfers. Ambulation  Ambulation?: Yes  More Ambulation?: No  Ambulation 1  Surface: level tile  Device: Rolling Walker  Assistance: Contact guard assistance  Gait Deviations: Slow Katie;Deviated path;Decreased step length  Distance: 150 ft   Comments: Pt demonstrates poor safety awareness during ambulation. Required verbal cues to not run into the wall with walker. Pt reports that she feels like she is walking slower and more unsteady when compared to her baseline. Stairs/Curb  Stairs?: No     Balance  Posture: Fair  Sitting - Static: Good  Sitting - Dynamic: Good  Standing - Static: Good;-  Standing - Dynamic: Fair;+  Comments: Standing balance assessed with RW        Plan   Plan  Times per week: 5 times per week   Current Treatment Recommendations: Strengthening, Gait Training, Patient/Caregiver Education & Training, Stair training, Balance Training, Neuromuscular Re-education, Functional Mobility Training, Endurance Training, Transfer Training, Safety Education & Training  Safety Devices  Type of devices: All fall risk precautions in place, Nurse notified, Left in chair, Call light within reach, Gait belt                                                    AM-PAC Score  AM-PAC Inpatient Mobility Raw Score : 20 (01/16/20 1030)  AM-PAC Inpatient T-Scale Score : 47.67 (01/16/20 1030)  Mobility Inpatient CMS 0-100% Score: 35.83 (01/16/20 1030)  Mobility Inpatient CMS G-Code Modifier : Davin Burn (01/16/20 1030)          Goals  Short term goals  Time Frame for Short term goals: 10 visits  Short term goal 1: Pt will be able to perform transfers independently. Short term goal 2: Pt will be able to amb 300 ft independently with least restrictive device. Short term goal 3: Pt will be improve standing balance to good.    Short term goal 4: Pt will be able to ascend/descend 7 stairs independtly with use of railing. Patient Goals   Patient goals : To go home       Therapy Time   Individual Concurrent Group Co-treatment   Time In 0900         Time Out 0928         Minutes 28         Timed Code Treatment Minutes: 12 Minutes       STAR Harris This treatment/evaluation completed by signing SPT. Signing PT agrees with treatment and documentation.

## 2020-01-16 NOTE — H&P
[facility]    HISTORY AND PHYSICAL EXAMINATION            Date:   1/16/2020  Patient name:  Dougie Dietrich  Date of admission:  1/15/2020  8:21 PM  MRN:   [id number]  Account:  [de-identified]  YOB: 1975  PCP:    Lashell Cabral MD  Room:   6230/9748-12  Code Status:    Full Code    Chief Complaint:     Chief Complaint   Patient presents with    Seizures     noncompliant with seizure medication    Alcohol Intoxication       History Obtained From:     patient, electronic medical record    History of Present Illness:     Dougie Dietrich is a 53 y.o. F with hx of alcohol abuse, seizures who presented with seizure episodes. Had witnessed seizure at home by friend. Had previous extensive workup last admission for seizure, determined to be due to alcohol. In ER ethanol level elevated. Neurology contacted, seizures provoked due to alcohol intoxication. Previous MRI and EEG unremarkable. No need for AEDs or further testing recommended. Admitted for alcohol intoxication. Discussed alcohol use with patient, not interested in quitting at this time. Does not want to go through withdrawals or rehab, would like to continue alcohol use.      Past Medical History:     Past Medical History:   Diagnosis Date    Alcoholic hepatitis without ascites 87/88/8637    Alcoholic ketoacidosis 73/84/2456    Alcoholism ([address]     3 pints daily    Anxiety     Asthma     Bipolar affective disorder ([address] 6/12/2019    Depression     Drug overdose, accidental or unintentional, initial encounter 9/29/2018    Drug overdose, intentional self-harm, initial encounter ([address] 5/8/2018    Lisa coma scale total score 3-8 ([address]     Hepatitis C antibody positive in blood 6/18/2019    History of gastric surgery (gastric sleeve 2012) 4/25/2017    Intentional drug overdose ([address] 5/7/2018    MDD (major depressive disorder), recurrent episode ([address] 5/8/2018    Pneumonia     [facility] abuse [facility]     drug abuse includes, cocaine, IV heroin, cannabis, and ETOH    Post traumatic stress disorder     Seizure ([facility] 12/17/2019    Smoker unmotivated to quit 4/25/2017    Suicide attempt ([facility] 11/08/2017    S/A by overdosing on Trazodone and Seroquel    Suicide ideation     Toxic metabolic encephalopathy 6/70/6057        Past Surgical History:     Past Surgical History:   Procedure Laterality Date    CARPAL TUNNEL RELEASE      CHOLECYSTECTOMY      HYSTERECTOMY      HYSTERECTOMY, TOTAL ABDOMINAL      STOMACH SURGERY      gastric sleeve    UPPER GASTROINTESTINAL ENDOSCOPY N/A 6/19/2019    EGD BIOPSY performed by Silviano Hernandez MD at [address]        Medications Prior to Admission:     Prior to Admission medications    Medication Sig Start Date End Date Taking? Authorizing Provider   gabapentin (NEURONTIN) 300 MG capsule Take 1 capsule by mouth 3 times daily for 10 days.  12/20/19 12/30/19  Ilene Muñoz, DO   vitamin B-1 100 MG tablet Take 1 tablet by mouth daily 12/21/19   Ilene Muñoz DO   albuterol sulfate  (90 Base) MCG/ACT inhaler Inhale 2 puffs into the lungs every 4 hours as needed for Wheezing 10/15/19   Hildred Bacca, DO   dicyclomine (BENTYL) 10 MG capsule Take 1 capsule by mouth every 6 hours as needed (cramps) 10/15/19   Hildred Bacca, DO   fluticasone (FLONASE) 50 MCG/ACT nasal spray 1 spray by Each Nostril route daily 10/15/19   Hildrjuan Bacca, DO   hydrOXYzine (ATARAX) 50 MG tablet Take 1 tablet by mouth 2 times daily as needed for Anxiety 10/15/19   Hiled Bacca, DO   lithium (ESKALITH) 450 MG extended release tablet Take 1 tablet by mouth 2 times daily 10/15/19   Ilene Muñoz DO   Multiple Vitamins-Minerals (MULTIVITAMIN ADULT) TABS Take 1 tablet by mouth daily 10/15/19   Ilene Muñoz DO   omeprazole (PRILOSEC) 20 MG delayed release capsule Take 1 capsule by mouth 2 times daily 10/15/19   Isaac Muñoz, DO   ondansetron (ZOFRAN ODT) 4 MG disintegrating tablet Take 1 tablet by mouth every 8 hours as needed for Nausea 10/15/19   Isaac Muñoz DO   OXcarbazepine (TRILEPTAL) 150 MG tablet Take 1 tablet by mouth 2 times daily 10/15/19   Isaac Muñoz, DO   QUEtiapine (SEROQUEL) 200 MG tablet Take 1 tablet by mouth nightly 10/15/19   Isaac Muñoz DO   sodium chloride (OCEAN) 0.65 % nasal spray 1 spray by Nasal route as needed for Congestion 10/15/19   [facility], DO   venlafaxine (EFFEXOR XR) 75 MG extended release capsule Take 1 capsule by mouth daily (with breakfast) 10/15/19   Casey [place], DO        Allergies:     Morphine; Morphine and related; Azithromycin; Morphine; Nsaids; and Zithromax [azithromycin]    Social History:     Tobacco:    reports that she has been smoking cigarettes. She has a 30.00 pack-year smoking history. She has never used smokeless tobacco.  Alcohol:      reports previous alcohol use of about 42.0 standard drinks of alcohol per week. Drug Use:  reports current drug use. Frequency: 7.00 times per week. Drugs: Marijuana, IV, and Cocaine. Family History:     Family History   Problem Relation Age of Onset    Brain Cancer Mother     Cancer Mother         \"female cancer\"    Heart Disease Father        Review of Systems:     Positive and Negative as described in HPI.     CONSTITUTIONAL:  negative for fevers, chills  HEENT:  negative for vision, hearing changes, runny nose, throat pain  RESPIRATORY:  negative for shortness of breath, cough, congestion, wheezing  CARDIOVASCULAR:  negative for chest pain, palpitations  GASTROINTESTINAL:  negative for nausea, vomiting, diarrhea  GENITOURINARY:  negative for difficulty of urination, burning with urination, frequency   INTEGUMENT:  negative for rash, skin lesions, easy bruising  HEMATOLOGIC/LYMPHATIC:  negative for swelling/edema   ALLERGIC/IMMUNOLOGIC:  negative for urticaria , itching  ENDOCRINE:  negative increase in drinking, increase in urination, hot or cold intolerance  MUSCULOSKELETAL:  negative joint pains, muscle aches, swelling of joints  NEUROLOGICAL:  negative for headaches, positive for seizures   BEHAVIOR/PSYCH:  negative for depression, anxiety    Physical Exam:   /68   Pulse 71   Temp 98.1 °F (36.7 °C) (Oral)   Resp 23   Ht 5' (1.524 m)   Wt 138 lb (62.6 kg)   SpO2 100%   BMI 26.95 kg/m²   Temp (24hrs), Av.5 °F (36.4 °C), Min:96.7 °F (35.9 °C), Max:98.1 °F (36.7 °C)    Recent Labs     20  0755   POCGLU 102     No intake or output data in the 24 hours ending 20 0911    General Appearance: alert, no acute distress, mild tremors   Mental status: oriented to person, place, and time  Head: normocephalic, atraumatic  Eye: no icterus, redness, pupils equal and reactive, extraocular eye movements intact, conjunctiva clear  Ear: normal external ear, no discharge, hearing intact  Nose: no drainage noted  Mouth: mucous membranes moist  Neck: supple, no carotid bruits  Lungs: Bilateral equal air entry, clear to ausculation, no wheezing  Cardiovascular: normal rate, regular rhythm  Abdomen: Soft, nontender, nondistended, normal bowel sounds  Neurologic: There are no new focal motor or sensory deficits, normal muscle tone and bulk, no abnormal sensation, normal speech, cranial nerves II through XII grossly intact  Skin: No gross lesions, rashes, bruising or bleeding on exposed skin area  Extremities: peripheral pulses palpable, no edema or calf pain with palpation  Psych: normal affect    Investigations:      Laboratory Testing:  Recent Results (from the past 24 hour(s))   EKG 12 Lead    Collection Time: 01/15/20  8:47 PM   Result Value Ref Range    Ventricular Rate 112 BPM    Atrial Rate 112 BPM    P-R Interval 152 ms    QRS Duration 80 ms    Q-T Interval 352 ms    QTc Calculation (Bazett) 480 ms    P Axis 62 degrees    R Axis 39 degrees    T Axis 47 degrees   CBC WITH AUTO DIFFERENTIAL    Collection Time: 01/15/20  8:49 PM   Result Value Ref Range    WBC 7.2 3.5 - 11.3 k/uL    RBC 5.04 3.95 - 5.11 m/uL    Hemoglobin 11.9 11.9 - 15.1 g/dL    Hematocrit 39.7 36.3 - 47.1 %    MCV 78.8 (L) 82.6 - 102.9 fL    MCH 23.6 (L) 25.2 - 33.5 pg    MCHC 30.0 28.4 - 34.8 g/dL    RDW 18.7 (H) 11.8 - 14.4 %    Platelets 246 009 - 961 k/uL    MPV 9.1 8.1 - 13.5 fL    NRBC Automated 0.0 0.0 per 100 WBC    Differential Type NOT REPORTED     WBC Morphology NOT REPORTED     RBC Morphology NOT REPORTED     Platelet Estimate NOT REPORTED     Immature Granulocytes 0 0 %    Seg Neutrophils 28 (L) 36 - 66 %    Lymphocytes 49 (H) 24 - 44 %    Atypical Lymphocytes 5 %    Monocytes 10 (H) 1 - 7 %    Eosinophils % 8 (H) 1 - 4 %    Basophils 0 0 - 2 %    Absolute Immature Granulocyte 0.00 0.00 - 0.30 k/uL    Segs Absolute 2.02 1.8 - 7.7 k/uL    Absolute Lymph # 3.52 1.0 - 4.8 k/uL    Atypical Lymphocytes Absolute 0.36 k/uL    Absolute Mono # 0.72 0.1 - 0.8 k/uL    Absolute Eos # 0.58 (H) 0.0 - 0.4 k/uL    Basophils Absolute 0.00 0.0 - 0.2 k/uL    Morphology ANISOCYTOSIS PRESENT     Morphology MICROCYTOSIS PRESENT     Morphology 1+ ELLIPTOCYTES    Comprehensive Metabolic Panel    Collection Time: 01/15/20  8:49 PM   Result Value Ref Range    Glucose 109 (H) 70 - 99 mg/dL    BUN 5 (L) 6 - 20 mg/dL    CREATININE 0.58 0.50 - 0.90 mg/dL    Bun/Cre Ratio NOT REPORTED 9 - 20    Calcium 8.7 8.6 - 10.4 mg/dL    Sodium 143 135 - 144 mmol/L    Potassium 3.9 3.7 - 5.3 mmol/L    Chloride 106 98 - 107 mmol/L    CO2 18 (L) 20 - 31 mmol/L    Anion Gap 19 (H) 9 - 17 mmol/L    Alkaline Phosphatase 73 35 - 104 U/L    ALT 41 (H) 5 - 33 U/L    AST 50 (H) <32 U/L    Total Bilirubin <0.10 (L) 0.3 - 1.2 mg/dL    Total Protein 8.0 6.4 - 8.3 g/dL    Alb 4.1 3.5 - 5.2 g/dL    Albumin/Globulin Ratio 1.1 1.0 - 2.5    GFR Non-African American >60 >60 mL/min    GFR African American >60 >60 mL/min    GFR Comment GFR Staging NOT REPORTED    TOX SCR, BLD, ED    Collection Time: 01/15/20  8:49 PM   Result Value Ref Range    Ethanol 309 (HH) <10 mg/dL    Ethanol percent 0.309 (H) <2.195 %    Salicylate Lvl <1 (L) 3 - 10 mg/dL    Acetaminophen Level <5 (L) 10 - 30 ug/mL    Toxic Tricyclic Sc,Blood NEGATIVE NEGATIVE   LITHIUM LEVEL    Collection Time: 01/15/20  8:49 PM   Result Value Ref Range    Lithium Lvl <0.1 (L) 0.6 - 1.2 mmol/L    Lithium Dose Amount NOT REPORTED     Lithium Date Last Dose NOT REPORTED     Lithium Dose Time NOT REPORTED    LIPASE    Collection Time: 01/15/20  8:49 PM   Result Value Ref Range    Lipase 56 13 - 60 U/L   Blood alcohol level    Collection Time: 01/16/20  3:44 AM   Result Value Ref Range    Ethanol 87 (H) <10 mg/dL    Ethanol percent 0.087 (H) <0.010 %   POC Glucose Fingerstick    Collection Time: 01/16/20  7:55 AM   Result Value Ref Range    POC Glucose 102 65 - 105 mg/dL       Imaging/Diagnostics:  Ct Head Wo Contrast    Result Date: 1/15/2020  No acute intracranial abnormality. Assessment :      Hospital Problems           Last Modified POA    * (Principal) Alcoholic intoxication without complication ([facility] 6/28/0580 Yes    Alcoholism /alcohol abuse ([facility] 1/16/2020 Yes    Major depression, recurrent ([address] 1/16/2020 Yes    Polysubstance abuse ([address] 1/16/2020 Yes    Bipolar 1 disorder ([address] 1/16/2020 Yes    Alcohol related seizure ([address] 1/16/2020 Yes          Plan:     - Labs and medications reviewed, previous admission reviewed  - Seizure related to alcohol use  - Previous MRI and EEG normal - no need for AEDs per neurology  - Continue home psych meds, continue follow up with psych outpatient  - Compliance with home medications    Discussed alcohol rehab with patient, not interested in rehab at this time, had gone through previously but relapsed. Plans to continue drinking at this time, does not want to quit.      - DC CIWA, start alcohol supplements to avoid withdrawals  - Ethanol level decreased  - DC planning         Consultations:   Jose Carlos Michel HOSPITALIST  IP CONSULT TO Hugh Luna MD  1/16/2020  9:11 AM    Copy sent to Dr. Sherri Tillman MD

## 2020-01-16 NOTE — ED NOTES
Pt resting on stretcher. NAD noted. RR even and nonlabored. Call light within reach. Will continue to monitor.        Todd Talley RN  01/15/20 [id number]

## 2020-01-16 NOTE — ED NOTES
Pt resting on cot NAD noted, RR non labored. Pt denies any needs at this time. Cardiac monitor maintained. Will continue to monitor.       Gary Moss RN  01/15/20 8208

## 2020-01-16 NOTE — ED PROVIDER NOTES
eating, drinking, and back to baseline. ED Course as of Jan 16 0133   Wed Irineo 15, 2020   2135 Ethanol(!!): 309 [LW]   2236 CT head negative. [LW]   65 Spoke with neurology, they state that patient does not need to be loaded with medications at this time. They believe that this is possibly related to withdrawal versus provoked seizures while intoxicated. Did explain that patient is currently intoxicated, was drinking today up until her seizure and I do not believe that this is withdrawal seizures. We will [facility]. [LW]   [facility]. [LW]   Thu Jan 16, 2020   0123 Signed out to the oncoming resident awaiting transfer to the floor.    [LW]      ED Course User Index  [LW] Asif Solorzano DO         OUTSTANDING TASKS / RECOMMENDATIONS:    1. Awaiting bed     FINAL IMPRESSION:     1. Seizure ([facility]    2.  Acute alcoholic intoxication without complication ([facility]        DISPOSITION:         DISPOSITION:  []  Discharge   []  Transfer -    [x]  Admission - Intermed   []  Against Medical Advice   []  Eloped   FOLLOW-UP: Ivy Gaytan MD  [address] [place] [id number] [phone]           DISCHARGE MEDICATIONS: New Prescriptions    No medications on file           Giovanni An MD  Emergency Medicine Resident  [address] [facility]       Giovanni An MD  Resident  01/16/20 3237

## 2020-01-16 NOTE — ED NOTES
Pt to ED via EMS aggressive with staff and put in restraints. Pt had a witnessed seizure at home and witnessed by EMS. Pt has hx of seizures and is non compliant with meds. Pt also stated she has been drinking beer and liquor  for 3 days . ED team and [employer] police at bedside   Pt trying to grab and bite staff. Pt given 2mg IM ativan per verbal order. Pt placed on monitor.    Will continue to monitor        Kush Linton RN  01/15/20 [id number]

## 2020-01-17 NOTE — DISCHARGE SUMMARY
[facility]    Discharge Summary     Patient ID: Yunior Borden  :  1975   MRN: [id number]     ACCOUNT:  [de-identified]   Patient's PCP: Juanis Shelley MD  Admit Date: 1/15/2020   Discharge Date: 2020  Length of Stay: 1  Code Status:  Prior  Admitting Physician: Fiordaliza Guevara MD  Discharge Physician: Fiordaliza Guevara MD     Active Discharge Diagnoses:     Hospital Problem Lists:  Principal Problem:    Alcoholic intoxication without complication [facility])  Active Problems:    Alcoholism /alcohol abuse ([facility]    Major depression, recurrent ([facility]    Polysubstance abuse ([facility]    Bipolar 1 disorder ([facility]    Alcohol related seizure ([facility]  Resolved Problems:    * No resolved hospital problems. *      Admission Condition:  fair     Discharged Condition: stable    Hospital Stay:     Hospital Course:   Yunior Borden is a 53 y.o. F with hx of alcohol abuse, seizures who presented with seizure episodes. Had witnessed seizure at home by friend. Had previous extensive workup last admission for seizure, determined to be due to alcohol.      In ER ethanol level elevated. Neurology contacted, seizures provoked due to alcohol intoxication. Previous MRI and EEG unremarkable. No need for AEDs or further testing recommended. Admitted for alcohol intoxication.      Discussed alcohol use with patient, not interested in quitting at this time. Does not want to go through withdrawals or rehab, would like to continue alcohol use.  DC home        Significant therapeutic interventions: as above    Significant Diagnostic Studies:   Labs / Micro:  CBC:   Lab Results   Component Value Date    WBC 7.2 01/15/2020    RBC 5.04 01/15/2020    HGB 11.9 01/15/2020    HCT 39.7 01/15/2020    MCV 78.8 01/15/2020    MCH 23.6 01/15/2020    MCHC 30.0 01/15/2020    RDW 18.7 01/15/2020     01/15/2020     BMP:    Lab Results   Component Value Date    GLUCOSE 109 01/15/2020    NA 143 01/15/2020    K 3.9 01/15/2020     01/15/2020    CO2 18 01/15/2020    ANIONGAP 19 01/15/2020    BUN 5 01/15/2020    CREATININE 0.58 01/15/2020    BUNCRER NOT REPORTED 01/15/2020    CALCIUM 8.7 01/15/2020    LABGLOM >60 01/15/2020    LABGLOM >90 11/22/2015    GFRAA >60 01/15/2020    GFR      01/15/2020    GFR NOT REPORTED 01/15/2020     HFP:    Lab Results   Component Value Date    PROT 8.0 01/15/2020     CMP:    Lab Results   Component Value Date    GLUCOSE 109 01/15/2020     01/15/2020    K 3.9 01/15/2020     01/15/2020    CO2 18 01/15/2020    BUN 5 01/15/2020    CREATININE 0.58 01/15/2020    ANIONGAP 19 01/15/2020    ALKPHOS 73 01/15/2020    ALT 41 01/15/2020    AST 50 01/15/2020    BILITOT <0.10 01/15/2020    LABALBU 4.1 01/15/2020    ALBUMIN 1.1 01/15/2020    LABGLOM >60 01/15/2020    LABGLOM >90 11/22/2015    GFRAA >60 01/15/2020    GFR      01/15/2020    GFR NOT REPORTED 01/15/2020    PROT 8.0 01/15/2020    CALCIUM 8.7 01/15/2020     PT/INR:    Lab Results   Component Value Date    PROTIME 10.4 12/18/2019    INR 1.0 12/18/2019     PTT:   Lab Results   Component Value Date    APTT 26.0 10/20/2018     FLP:    Lab Results   Component Value Date    CHOL 196 06/13/2019    TRIG 99 06/13/2019    HDL 56 06/13/2019     U/A:    Lab Results   Component Value Date    COLORU YELLOW 12/17/2019    TURBIDITY CLEAR 12/17/2019    SPECGRAV 1.031 12/17/2019    HGBUR NEGATIVE 12/17/2019    PHUR 5.0 12/17/2019    PROTEINU NEGATIVE 12/17/2019    GLUCOSEU NEGATIVE 12/17/2019    KETUA NEGATIVE 12/17/2019    BILIRUBINUR NEGATIVE 12/17/2019    UROBILINOGEN Normal 12/17/2019    NITRU NEGATIVE 12/17/2019    LEUKOCYTESUR NEGATIVE 12/17/2019     TSH:    Lab Results   Component Value Date    TSH 2.53 12/18/2019        Radiology:  Ct Head Wo Contrast    Result Date: 1/15/2020  No acute intracranial abnormality.        Consultations:    Consults:     Final Specialist Recommendations/Findings:   IP CONSULT TO HOSPITALIST  IP CONSULT TO DIETITIAN  IP CONSULT TO SOCIAL WORK  IP CONSULT TO SPIRITUAL SERVICES      The patient was seen and examined on day of discharge and this discharge summary is in conjunction with any daily progress note from day of discharge. Discharge plan:     Disposition: Home    Physician Follow Up:     Rupinder Carballo MD  [address] [place] [address]  [phone]             Requiring Further Evaluation/Follow Up POST HOSPITALIZATION/Incidental Findings:     Diet: regular diet    Activity: As tolerated    Instructions to Patient: follow up with alcohol rehab     Discharge Medications:      Medication List      CONTINUE taking these medications    albuterol sulfate  (90 Base) MCG/ACT inhaler  Inhale 2 puffs into the lungs every 4 hours as needed for Wheezing     dicyclomine 10 MG capsule  Commonly known as:  BENTYL  Take 1 capsule by mouth every 6 hours as needed (cramps)     fluticasone 50 MCG/ACT nasal spray  Commonly known as:  FLONASE  1 spray by Each Nostril route daily     gabapentin 300 MG capsule  Commonly known as:  NEURONTIN  Take 1 capsule by mouth 3 times daily for 10 days.      hydrOXYzine 50 MG tablet  Commonly known as:  ATARAX  Take 1 tablet by mouth 2 times daily as needed for Anxiety     lithium 450 MG extended release tablet  Commonly known as:  ESKALITH  Take 1 tablet by mouth 2 times daily     MULTIVITAMIN ADULT Tabs  Take 1 tablet by mouth daily     omeprazole 20 MG delayed release capsule  Commonly known as:  PRILOSEC  Take 1 capsule by mouth 2 times daily     ondansetron 4 MG disintegrating tablet  Commonly known as:  ZOFRAN ODT  Take 1 tablet by mouth every 8 hours as needed for Nausea     OXcarbazepine 150 MG tablet  Commonly known as:  TRILEPTAL  Take 1 tablet by mouth 2 times daily     QUEtiapine 200 MG tablet  Commonly known as:  SEROQUEL  Take 1 tablet by mouth nightly     sodium chloride 0.65 % nasal spray  Commonly known as:  OCEAN  1 spray by Nasal route

## 2020-01-20 LAB — OXCARBAZEPINE: <1 UG/ML (ref 3–35)

## 2020-07-08 ENCOUNTER — APPOINTMENT (OUTPATIENT)
Dept: CT IMAGING | Age: 45
DRG: 897 | End: 2020-07-08
Payer: MEDICARE

## 2020-07-08 ENCOUNTER — HOSPITAL ENCOUNTER (INPATIENT)
Age: 45
LOS: 2 days | Discharge: HOME | DRG: 897 | End: 2020-07-10
Attending: EMERGENCY MEDICINE | Admitting: INTERNAL MEDICINE
Payer: MEDICARE

## 2020-07-08 ENCOUNTER — APPOINTMENT (OUTPATIENT)
Dept: GENERAL RADIOLOGY | Age: 45
DRG: 897 | End: 2020-07-08
Payer: MEDICARE

## 2020-07-08 PROBLEM — R56.9 SEIZURE (HCC): Status: ACTIVE | Noted: 2020-07-08

## 2020-07-08 LAB
% CKMB: 1.8 % (ref 0–3)
ABSOLUTE EOS #: 0.13 K/UL (ref 0–0.44)
ABSOLUTE IMMATURE GRANULOCYTE: 0.04 K/UL (ref 0–0.3)
ABSOLUTE LYMPH #: 2.88 K/UL (ref 1.1–3.7)
ABSOLUTE MONO #: 0.69 K/UL (ref 0.1–1.2)
ACETAMINOPHEN LEVEL: <5 UG/ML (ref 10–30)
ALLEN TEST: ABNORMAL
ANION GAP SERPL CALCULATED.3IONS-SCNC: 19 MMOL/L (ref 9–17)
ANION GAP: 8 MMOL/L (ref 7–16)
BASOPHILS # BLD: 1 % (ref 0–2)
BASOPHILS ABSOLUTE: 0.07 K/UL (ref 0–0.2)
BUN BLDV-MCNC: 6 MG/DL (ref 6–20)
BUN/CREAT BLD: ABNORMAL (ref 9–20)
CALCIUM SERPL-MCNC: 8.2 MG/DL (ref 8.6–10.4)
CHLORIDE BLD-SCNC: 103 MMOL/L (ref 98–107)
CK MB: 1.9 NG/ML
CKMB INTERPRETATION: ABNORMAL
CO2: 20 MMOL/L (ref 20–31)
CREAT SERPL-MCNC: 0.56 MG/DL (ref 0.5–0.9)
DIFFERENTIAL TYPE: ABNORMAL
EOSINOPHILS RELATIVE PERCENT: 2 % (ref 1–4)
ETHANOL PERCENT: 0.29 %
ETHANOL: 288 MG/DL
FIO2: ABNORMAL
GFR AFRICAN AMERICAN: >60 ML/MIN
GFR NON-AFRICAN AMERICAN: >60 ML/MIN
GFR NON-AFRICAN AMERICAN: >60 ML/MIN
GFR SERPL CREATININE-BSD FRML MDRD: >60 ML/MIN
GFR SERPL CREATININE-BSD FRML MDRD: ABNORMAL ML/MIN/{1.73_M2}
GFR SERPL CREATININE-BSD FRML MDRD: ABNORMAL ML/MIN/{1.73_M2}
GFR SERPL CREATININE-BSD FRML MDRD: NORMAL ML/MIN/{1.73_M2}
GLUCOSE BLD-MCNC: 93 MG/DL (ref 70–99)
GLUCOSE BLD-MCNC: 96 MG/DL (ref 74–100)
HCO3 VENOUS: 23.8 MMOL/L (ref 22–29)
HCT VFR BLD CALC: 43 % (ref 36.3–47.1)
HEMOGLOBIN: 13.2 G/DL (ref 11.9–15.1)
IMMATURE GRANULOCYTES: 1 %
INR BLD: 0.9
LYMPHOCYTES # BLD: 41 % (ref 24–43)
MCH RBC QN AUTO: 27 PG (ref 25.2–33.5)
MCHC RBC AUTO-ENTMCNC: 30.7 G/DL (ref 28.4–34.8)
MCV RBC AUTO: 88.1 FL (ref 82.6–102.9)
MODE: ABNORMAL
MONOCYTES # BLD: 10 % (ref 3–12)
MYOGLOBIN: 36 NG/ML (ref 25–58)
NEGATIVE BASE EXCESS, VEN: 1 (ref 0–2)
NRBC AUTOMATED: 0 PER 100 WBC
O2 DEVICE/FLOW/%: ABNORMAL
O2 SAT, VEN: 86 % (ref 60–85)
PARTIAL THROMBOPLASTIN TIME: 22.2 SEC (ref 20.5–30.5)
PATIENT TEMP: ABNORMAL
PCO2, VEN: 40.5 MM HG (ref 41–51)
PDW BLD-RTO: 15.7 % (ref 11.8–14.4)
PH VENOUS: 7.38 (ref 7.32–7.43)
PLATELET # BLD: 356 K/UL (ref 138–453)
PLATELET ESTIMATE: ABNORMAL
PMV BLD AUTO: 9.1 FL (ref 8.1–13.5)
PO2, VEN: 52.3 MM HG (ref 30–50)
POC CHLORIDE: 112 MMOL/L (ref 98–107)
POC CREATININE: 0.59 MG/DL (ref 0.51–1.19)
POC HEMATOCRIT: 45 % (ref 36–46)
POC HEMOGLOBIN: 15.3 G/DL (ref 12–16)
POC IONIZED CALCIUM: 1.03 MMOL/L (ref 1.15–1.33)
POC LACTIC ACID: 2.48 MMOL/L (ref 0.56–1.39)
POC PCO2 TEMP: ABNORMAL MM HG
POC PH TEMP: ABNORMAL
POC PO2 TEMP: ABNORMAL MM HG
POC POTASSIUM: 4.6 MMOL/L (ref 3.5–4.5)
POC SODIUM: 144 MMOL/L (ref 138–146)
POSITIVE BASE EXCESS, VEN: ABNORMAL (ref 0–3)
POTASSIUM SERPL-SCNC: 4.3 MMOL/L (ref 3.7–5.3)
PROTHROMBIN TIME: 9.6 SEC (ref 9–12)
RBC # BLD: 4.88 M/UL (ref 3.95–5.11)
RBC # BLD: ABNORMAL 10*6/UL
SALICYLATE LEVEL: <1 MG/DL (ref 3–10)
SAMPLE SITE: ABNORMAL
SEG NEUTROPHILS: 45 % (ref 36–65)
SEGMENTED NEUTROPHILS ABSOLUTE COUNT: 3.17 K/UL (ref 1.5–8.1)
SODIUM BLD-SCNC: 142 MMOL/L (ref 135–144)
TOTAL CK: 107 U/L (ref 26–192)
TOTAL CO2, VENOUS: 25 MMOL/L (ref 23–30)
TOXIC TRICYCLIC SC,BLOOD: NEGATIVE
TROPONIN INTERP: ABNORMAL
TROPONIN T: ABNORMAL NG/ML
TROPONIN, HIGH SENSITIVITY: <6 NG/L (ref 0–14)
VITAMIN D 25-HYDROXY: 36.5 NG/ML (ref 30–100)
WBC # BLD: 7 K/UL (ref 3.5–11.3)
WBC # BLD: ABNORMAL 10*3/UL

## 2020-07-08 PROCEDURE — 82550 ASSAY OF CK (CPK): CPT

## 2020-07-08 PROCEDURE — 99285 EMERGENCY DEPT VISIT HI MDM: CPT

## 2020-07-08 PROCEDURE — 96365 THER/PROPH/DIAG IV INF INIT: CPT

## 2020-07-08 PROCEDURE — 82565 ASSAY OF CREATININE: CPT

## 2020-07-08 PROCEDURE — 71045 X-RAY EXAM CHEST 1 VIEW: CPT

## 2020-07-08 PROCEDURE — 6360000004 HC RX CONTRAST MEDICATION: Performed by: EMERGENCY MEDICINE

## 2020-07-08 PROCEDURE — 2580000003 HC RX 258: Performed by: INTERNAL MEDICINE

## 2020-07-08 PROCEDURE — 99223 1ST HOSP IP/OBS HIGH 75: CPT | Performed by: PSYCHIATRY & NEUROLOGY

## 2020-07-08 PROCEDURE — 82947 ASSAY GLUCOSE BLOOD QUANT: CPT

## 2020-07-08 PROCEDURE — 80076 HEPATIC FUNCTION PANEL: CPT

## 2020-07-08 PROCEDURE — 96375 TX/PRO/DX INJ NEW DRUG ADDON: CPT

## 2020-07-08 PROCEDURE — 96366 THER/PROPH/DIAG IV INF ADDON: CPT

## 2020-07-08 PROCEDURE — 6370000000 HC RX 637 (ALT 250 FOR IP): Performed by: INTERNAL MEDICINE

## 2020-07-08 PROCEDURE — 83874 ASSAY OF MYOGLOBIN: CPT

## 2020-07-08 PROCEDURE — 82553 CREATINE MB FRACTION: CPT

## 2020-07-08 PROCEDURE — 72125 CT NECK SPINE W/O DYE: CPT

## 2020-07-08 PROCEDURE — 85014 HEMATOCRIT: CPT

## 2020-07-08 PROCEDURE — 82330 ASSAY OF CALCIUM: CPT

## 2020-07-08 PROCEDURE — 84295 ASSAY OF SERUM SODIUM: CPT

## 2020-07-08 PROCEDURE — 83605 ASSAY OF LACTIC ACID: CPT

## 2020-07-08 PROCEDURE — 85025 COMPLETE CBC W/AUTO DIFF WBC: CPT

## 2020-07-08 PROCEDURE — 84484 ASSAY OF TROPONIN QUANT: CPT

## 2020-07-08 PROCEDURE — 85730 THROMBOPLASTIN TIME PARTIAL: CPT

## 2020-07-08 PROCEDURE — 85610 PROTHROMBIN TIME: CPT

## 2020-07-08 PROCEDURE — 82435 ASSAY OF BLOOD CHLORIDE: CPT

## 2020-07-08 PROCEDURE — 84132 ASSAY OF SERUM POTASSIUM: CPT

## 2020-07-08 PROCEDURE — 93005 ELECTROCARDIOGRAM TRACING: CPT | Performed by: STUDENT IN AN ORGANIZED HEALTH CARE EDUCATION/TRAINING PROGRAM

## 2020-07-08 PROCEDURE — 82746 ASSAY OF FOLIC ACID SERUM: CPT

## 2020-07-08 PROCEDURE — 80048 BASIC METABOLIC PNL TOTAL CA: CPT

## 2020-07-08 PROCEDURE — 70496 CT ANGIOGRAPHY HEAD: CPT

## 2020-07-08 PROCEDURE — 1200000000 HC SEMI PRIVATE

## 2020-07-08 PROCEDURE — 83930 ASSAY OF BLOOD OSMOLALITY: CPT

## 2020-07-08 PROCEDURE — 82607 VITAMIN B-12: CPT

## 2020-07-08 PROCEDURE — 82306 VITAMIN D 25 HYDROXY: CPT

## 2020-07-08 PROCEDURE — 6360000002 HC RX W HCPCS: Performed by: STUDENT IN AN ORGANIZED HEALTH CARE EDUCATION/TRAINING PROGRAM

## 2020-07-08 PROCEDURE — 80183 DRUG SCRN QUANT OXCARBAZEPIN: CPT

## 2020-07-08 PROCEDURE — 2500000003 HC RX 250 WO HCPCS: Performed by: INTERNAL MEDICINE

## 2020-07-08 PROCEDURE — 80307 DRUG TEST PRSMV CHEM ANLYZR: CPT

## 2020-07-08 PROCEDURE — 82803 BLOOD GASES ANY COMBINATION: CPT

## 2020-07-08 PROCEDURE — 6360000002 HC RX W HCPCS: Performed by: INTERNAL MEDICINE

## 2020-07-08 PROCEDURE — G0480 DRUG TEST DEF 1-7 CLASSES: HCPCS

## 2020-07-08 PROCEDURE — 70450 CT HEAD/BRAIN W/O DYE: CPT

## 2020-07-08 RX ORDER — LORAZEPAM 0.5 MG/1
2 TABLET ORAL
Status: DISCONTINUED | OUTPATIENT
Start: 2020-07-08 | End: 2020-07-10

## 2020-07-08 RX ORDER — ACETAMINOPHEN 650 MG/1
650 SUPPOSITORY RECTAL EVERY 6 HOURS PRN
Status: DISCONTINUED | OUTPATIENT
Start: 2020-07-08 | End: 2020-07-11 | Stop reason: HOSPADM

## 2020-07-08 RX ORDER — METHYLPREDNISOLONE SODIUM SUCCINATE 125 MG/2ML
125 INJECTION, POWDER, LYOPHILIZED, FOR SOLUTION INTRAMUSCULAR; INTRAVENOUS EVERY 6 HOURS
Status: COMPLETED | OUTPATIENT
Start: 2020-07-08 | End: 2020-07-09

## 2020-07-08 RX ORDER — SODIUM CHLORIDE 0.9 % (FLUSH) 0.9 %
10 SYRINGE (ML) INJECTION PRN
Status: CANCELLED | OUTPATIENT
Start: 2020-07-08

## 2020-07-08 RX ORDER — ACETAMINOPHEN 325 MG/1
650 TABLET ORAL EVERY 6 HOURS PRN
Status: DISCONTINUED | OUTPATIENT
Start: 2020-07-08 | End: 2020-07-11 | Stop reason: HOSPADM

## 2020-07-08 RX ORDER — M-VIT,TX,IRON,MINS/CALC/FOLIC 27MG-0.4MG
1 TABLET ORAL DAILY
Status: DISCONTINUED | OUTPATIENT
Start: 2020-07-09 | End: 2020-07-11 | Stop reason: HOSPADM

## 2020-07-08 RX ORDER — SODIUM CHLORIDE 0.9 % (FLUSH) 0.9 %
10 SYRINGE (ML) INJECTION EVERY 12 HOURS SCHEDULED
Status: CANCELLED | OUTPATIENT
Start: 2020-07-08

## 2020-07-08 RX ORDER — OXCARBAZEPINE 150 MG/1
150 TABLET, FILM COATED ORAL 2 TIMES DAILY
Status: DISCONTINUED | OUTPATIENT
Start: 2020-07-08 | End: 2020-07-11 | Stop reason: HOSPADM

## 2020-07-08 RX ORDER — THIAMINE MONONITRATE (VIT B1) 100 MG
100 TABLET ORAL DAILY
Status: DISCONTINUED | OUTPATIENT
Start: 2020-07-09 | End: 2020-07-11 | Stop reason: HOSPADM

## 2020-07-08 RX ORDER — LORAZEPAM 2 MG/ML
1 INJECTION INTRAMUSCULAR
Status: DISCONTINUED | OUTPATIENT
Start: 2020-07-08 | End: 2020-07-10

## 2020-07-08 RX ORDER — ACETAMINOPHEN 325 MG/1
650 TABLET ORAL EVERY 4 HOURS PRN
Status: DISCONTINUED | OUTPATIENT
Start: 2020-07-08 | End: 2020-07-08 | Stop reason: SDUPTHER

## 2020-07-08 RX ORDER — LORAZEPAM 2 MG/ML
4 INJECTION INTRAMUSCULAR
Status: DISCONTINUED | OUTPATIENT
Start: 2020-07-08 | End: 2020-07-09

## 2020-07-08 RX ORDER — LORAZEPAM 2 MG/ML
3 INJECTION INTRAMUSCULAR
Status: DISCONTINUED | OUTPATIENT
Start: 2020-07-08 | End: 2020-07-09

## 2020-07-08 RX ORDER — LORAZEPAM 2 MG/1
4 TABLET ORAL
Status: DISCONTINUED | OUTPATIENT
Start: 2020-07-08 | End: 2020-07-09

## 2020-07-08 RX ORDER — FOLIC ACID 1 MG/1
1 TABLET ORAL DAILY
Status: DISCONTINUED | OUTPATIENT
Start: 2020-07-09 | End: 2020-07-11 | Stop reason: HOSPADM

## 2020-07-08 RX ORDER — QUETIAPINE FUMARATE 200 MG/1
200 TABLET, FILM COATED ORAL NIGHTLY
Status: DISCONTINUED | OUTPATIENT
Start: 2020-07-08 | End: 2020-07-11 | Stop reason: HOSPADM

## 2020-07-08 RX ORDER — LORAZEPAM 0.5 MG/1
1 TABLET ORAL
Status: DISCONTINUED | OUTPATIENT
Start: 2020-07-08 | End: 2020-07-10

## 2020-07-08 RX ORDER — GABAPENTIN 300 MG/1
300 CAPSULE ORAL 3 TIMES DAILY
Status: DISCONTINUED | OUTPATIENT
Start: 2020-07-08 | End: 2020-07-11 | Stop reason: HOSPADM

## 2020-07-08 RX ORDER — LORAZEPAM 2 MG/ML
2 INJECTION INTRAMUSCULAR
Status: DISCONTINUED | OUTPATIENT
Start: 2020-07-08 | End: 2020-07-10

## 2020-07-08 RX ORDER — ONDANSETRON 2 MG/ML
4 INJECTION INTRAMUSCULAR; INTRAVENOUS EVERY 8 HOURS PRN
Status: DISCONTINUED | OUTPATIENT
Start: 2020-07-08 | End: 2020-07-11 | Stop reason: HOSPADM

## 2020-07-08 RX ORDER — ALBUTEROL SULFATE 2.5 MG/3ML
2.5 SOLUTION RESPIRATORY (INHALATION) EVERY 4 HOURS PRN
Status: DISCONTINUED | OUTPATIENT
Start: 2020-07-08 | End: 2020-07-11 | Stop reason: HOSPADM

## 2020-07-08 RX ORDER — MAGNESIUM SULFATE 1 G/100ML
1 INJECTION INTRAVENOUS
Status: COMPLETED | OUTPATIENT
Start: 2020-07-08 | End: 2020-07-08

## 2020-07-08 RX ORDER — POLYETHYLENE GLYCOL 3350 17 G/17G
17 POWDER, FOR SOLUTION ORAL DAILY PRN
Status: DISCONTINUED | OUTPATIENT
Start: 2020-07-08 | End: 2020-07-11 | Stop reason: HOSPADM

## 2020-07-08 RX ORDER — PANTOPRAZOLE SODIUM 40 MG/1
40 TABLET, DELAYED RELEASE ORAL
Status: DISCONTINUED | OUTPATIENT
Start: 2020-07-09 | End: 2020-07-11 | Stop reason: HOSPADM

## 2020-07-08 RX ORDER — LORAZEPAM 2 MG/ML
2 INJECTION INTRAMUSCULAR
Status: DISCONTINUED | OUTPATIENT
Start: 2020-07-08 | End: 2020-07-08

## 2020-07-08 RX ORDER — 0.9 % SODIUM CHLORIDE 0.9 %
500 INTRAVENOUS SOLUTION INTRAVENOUS ONCE
Status: COMPLETED | OUTPATIENT
Start: 2020-07-08 | End: 2020-07-08

## 2020-07-08 RX ORDER — NICOTINE 21 MG/24HR
1 PATCH, TRANSDERMAL 24 HOURS TRANSDERMAL DAILY
Status: DISCONTINUED | OUTPATIENT
Start: 2020-07-09 | End: 2020-07-11 | Stop reason: HOSPADM

## 2020-07-08 RX ADMIN — IOHEXOL 90 ML: 350 INJECTION, SOLUTION INTRAVENOUS at 13:24

## 2020-07-08 RX ADMIN — MAGNESIUM SULFATE HEPTAHYDRATE 1 G: 1 INJECTION, SOLUTION INTRAVENOUS at 14:15

## 2020-07-08 RX ADMIN — METHYLPREDNISOLONE SODIUM SUCCINATE 125 MG: 125 INJECTION, POWDER, FOR SOLUTION INTRAMUSCULAR; INTRAVENOUS at 22:19

## 2020-07-08 RX ADMIN — ENOXAPARIN SODIUM 40 MG: 40 INJECTION SUBCUTANEOUS at 22:19

## 2020-07-08 RX ADMIN — METHYLPREDNISOLONE SODIUM SUCCINATE 125 MG: 125 INJECTION, POWDER, FOR SOLUTION INTRAMUSCULAR; INTRAVENOUS at 14:15

## 2020-07-08 RX ADMIN — FOLIC ACID: 5 INJECTION, SOLUTION INTRAMUSCULAR; INTRAVENOUS; SUBCUTANEOUS at 15:32

## 2020-07-08 RX ADMIN — SODIUM CHLORIDE 500 ML: 9 INJECTION, SOLUTION INTRAVENOUS at 14:30

## 2020-07-08 RX ADMIN — OXCARBAZEPINE 150 MG: 150 TABLET, FILM COATED ORAL at 14:30

## 2020-07-08 RX ADMIN — OXCARBAZEPINE 150 MG: 150 TABLET, FILM COATED ORAL at 22:18

## 2020-07-08 RX ADMIN — MAGNESIUM SULFATE HEPTAHYDRATE 1 G: 1 INJECTION, SOLUTION INTRAVENOUS at 15:05

## 2020-07-08 RX ADMIN — LORAZEPAM 2 MG: 2 INJECTION INTRAMUSCULAR at 20:47

## 2020-07-08 ASSESSMENT — ENCOUNTER SYMPTOMS
COUGH: 0
SHORTNESS OF BREATH: 0
EYE PAIN: 0
NAUSEA: 0
FACIAL SWELLING: 0
BACK PAIN: 0
VOMITING: 0
ABDOMINAL PAIN: 0

## 2020-07-08 ASSESSMENT — PATIENT HEALTH QUESTIONNAIRE - PHQ9: SUM OF ALL RESPONSES TO PHQ QUESTIONS 1-9: 15

## 2020-07-08 NOTE — CONSULTS
[facility] Neurology   [address] [facility]  Neurology Consult Note      Reason for Consult: Right-sided upper and lower extremity weakness and numbness, seizures, headaches, alcohol intoxication  Requesting Physician:  Amrita Salas DO    CHIEF COMPLAINT: Seizures, fall, headaches, right-sided upper and lower extremity weakness and numbness, alcohol intoxication    History Obtained From:  patient, electronic medical record       HISTORY OF PRESENT ILLNESS:              The patient is a 53 y.o.  right-handed female with PMH of seizure (no medication, list says Trileptal but she says is for there mood), substance abuse, MDD, Depression, Bipolar (take lithium), asthma who presents with episodes of seizures, fall, headaches and right sided weakness and numbness and alcohol intoxication                 Patient refers that yesterday night she was in her house and suddenly had an episode of seizure but doesn't remember anythign from it. She refer she fell and had no LOC from it. After the event she started having right sided weakness and numbness but didn't do anything for it. This morning she woke up, apparently drank 1 pint of vodka and refer had another event and was brought by EMS. In ED patient had another seizure like event. Due to symptoms of right upper and lower extremity weakness and numbness stroke alert was called. Stroke team evaluated patient with CT CT head and neck which were unremarkable with only some mild right vertebral artery origin stenosis. Patient in ED was found to have an alcohol level of 288.     As per old notes: Patient had multiple admission (Oct 2019, Dec 2019) for symptoms resembling what she got today with seizures and right sided weakness, numbness. MRI's done in past and EEG has been unremarkable.  Most of the time patient was found intoxicated with alcohol + cocaine.     SmokinPPD  Alcohol: 3 beer toppers a day  Illicit Drugs: Cocaine and Heroin-last time about 2 weeks ago    Past Medical History:        Diagnosis Date    Alcoholic hepatitis without ascites 10/77/0825    Alcoholic ketoacidosis 99/50/0254    Alcoholism ([facility]     3 pints daily    Anxiety     Asthma     Bipolar affective disorder ([facility] 6/12/2019    Depression     Drug overdose, accidental or unintentional, initial encounter 9/29/2018    Drug overdose, intentional self-harm, initial encounter ([facility] 5/8/2018    Cameron coma scale total score 3-8 ([facility]     Hepatitis C antibody positive in blood 6/18/2019    History of gastric surgery (gastric sleeve 2012) 4/25/2017    Intentional drug overdose ([facility] 5/7/2018    MDD (major depressive disorder), recurrent episode ([facility] 5/8/2018    Pneumonia     Polysubstance abuse ([facility]     drug abuse includes, cocaine, IV heroin, cannabis, and ETOH    Post traumatic stress disorder     Seizure ([facility] 12/17/2019    Smoker unmotivated to quit 4/25/2017    Suicide attempt ([facility] 11/08/2017    S/A by overdosing on Trazodone and Seroquel    Suicide ideation     Toxic metabolic encephalopathy 0/78/0543     Past Surgical History:        Procedure Laterality Date    CARPAL TUNNEL RELEASE      CHOLECYSTECTOMY      HYSTERECTOMY      HYSTERECTOMY, TOTAL ABDOMINAL      STOMACH SURGERY      gastric sleeve    UPPER GASTROINTESTINAL ENDOSCOPY N/A 6/19/2019    EGD BIOPSY performed by Duong Pedersen MD at [address]     Current Medications:   Current Facility-Administered Medications: LORazepam (ATIVAN) injection 2 mg, 2 mg, Intravenous, Q1H PRN  OXcarbazepine (TRILEPTAL) tablet 150 mg, 150 mg, Oral, BID  methylPREDNISolone sodium (SOLU-MEDROL) injection 125 mg, 125 mg, Intravenous, Q6H  magnesium sulfate 1 g in dextrose 5% 100 mL IVPB, 1 g, Intravenous, Q1H  0.9 % sodium chloride bolus, 500 mL, Intravenous, Once  sodium chloride 0.9 % 8,790 mL with folic acid 1 mg, adult multi-vitamin with vitamin k 10 mL, thiamine 100 mg, , Intravenous, Once  Allergies: heard   MENTAL STATUS:  Alert, oriented x3, mild confusion due to event but able to recall or other events in the past.  Some mild dysarthria but looks more mumbled due to intoxication.   No aphasia   CRANIAL NERVES: II     -      Visual fields intact to confrontation  III,IV,VI -  PERR, EOMs full, no ptosis  V     -     decreased light touch and pinprick sensation on right side of the face V1-V3  VII    -     Normal facial symmetry  VIII   -     Intact hearing   IX,X -     Symmetrical palate  XI    -     Asymmetrical shoulder shrug on right upper extremity  XII   -     Midline tongue, no atrophy    MOTOR FUNCTION: RUE: Significant for good strength of grade 4/5 in proximal and distal muscle groups   LUE: Significant for good strength of grade 5/5 in proximal and distal muscle groups   RLE: Significant for good strength of grade 1/5 in proximal and distal muscle groups   LLE: Significant for good strength of grade 5/5 in proximal and distal muscle groups      Normal bulk, normal tone and no involuntary movements, no tremor   SENSORY FUNCTION:  Normal light touch and pinprick sensation on the left upper and lower extremity but decreased sensation to light touch and pinprick sensation on the right upper and lower extremity   CEREBELLAR FUNCTION:  No dysmetria or ataxia seen on the left upper and lower extremity but due to weakness on the right upper and lower extremity will not be done   REFLEX FUNCTION:  Symmetric in upper and lower extremities, no Babinski sign   STATION and GAIT  not done due to weakness on the right side              Additional Examination Elements and Findings:     LUNGS:  CTA H/N  CARDIOVASCULAR:  RRR, S1, S2    DATA  Recent Results (from the past 24 hour(s))   Venous Blood Gas, POC    Collection Time: 07/08/20  1:01 PM   Result Value Ref Range    pH, Jeremi 7.376 7.320 - 7.430    pCO2, Jeremi 40.5 (L) 41.0 - 51.0 mm Hg    pO2, Jeremi 52.3 (H) 30.0 - 50.0 mm Hg    HCO3, Venous 23.8 22.0 - 29.0 mmol/L Total CO2, Venous 25 23.0 - 30.0 mmol/L    Negative Base Excess, Jeremi 1 0.0 - 2.0    Positive Base Excess, Jeremi NOT REPORTED 0.0 - 3.0    O2 Sat, Jeremi 86 (H) 60.0 - 85.0 %    O2 Device/Flow/% NOT REPORTED     Gurmeet Test NOT REPORTED     Sample Site NOT REPORTED     Mode NOT REPORTED     FIO2 NOT REPORTED     Pt Temp NOT REPORTED     POC pH Temp NOT REPORTED     POC pCO2 Temp NOT REPORTED mm Hg    POC pO2 Temp NOT REPORTED mm Hg   Hemoglobin and hematocrit, blood    Collection Time: 07/08/20  1:01 PM   Result Value Ref Range    POC Hemoglobin 15.3 12.0 - 16.0 g/dL    POC Hematocrit 45 36 - 46 %   Creatinine W/GFR Point of Care    Collection Time: 07/08/20  1:01 PM   Result Value Ref Range    POC Creatinine 0.59 0.51 - 1.19 mg/dL    GFR Comment >60 >60 mL/min    GFR Non-African American >60 >60 mL/min    GFR Comment         SODIUM (POC)    Collection Time: 07/08/20  1:01 PM   Result Value Ref Range    POC Sodium 144 138 - 146 mmol/L   POTASSIUM (POC)    Collection Time: 07/08/20  1:01 PM   Result Value Ref Range    POC Potassium 4.6 (H) 3.5 - 4.5 mmol/L   CHLORIDE (POC)    Collection Time: 07/08/20  1:01 PM   Result Value Ref Range    POC Chloride 112 (H) 98 - 107 mmol/L   CALCIUM, IONIC (POC)    Collection Time: 07/08/20  1:01 PM   Result Value Ref Range    POC Ionized Calcium 1.03 (L) 1.15 - 1.33 mmol/L   Lactic Acid, POC    Collection Time: 07/08/20  1:01 PM   Result Value Ref Range    POC Lactic Acid 2.48 (H) 0.56 - 1.39 mmol/L   POCT Glucose    Collection Time: 07/08/20  1:01 PM   Result Value Ref Range    POC Glucose 96 74 - 100 mg/dL   Anion Gap (Calc) POC    Collection Time: 07/08/20  1:01 PM   Result Value Ref Range    Anion Gap 8 7 - 16 mmol/L   STROKE PANEL    Collection Time: 07/08/20  1:08 PM   Result Value Ref Range    Glucose 93 70 - 99 mg/dL    BUN 6 6 - 20 mg/dL    CREATININE 0.56 0.50 - 0.90 mg/dL    Bun/Cre Ratio NOT REPORTED 9 - 20    Calcium 8.2 (L) 8.6 - 10.4 mg/dL    Sodium 142 135 - 144 mmol/L Potassium 4.3 3.7 - 5.3 mmol/L    Chloride 103 98 - 107 mmol/L    CO2 20 20 - 31 mmol/L    Anion Gap 19 (H) 9 - 17 mmol/L    GFR Non-African American >60 >60 mL/min    GFR African American >60 >60 mL/min    GFR Comment          GFR Staging NOT REPORTED     WBC 7.0 3.5 - 11.3 k/uL    RBC 4.88 3.95 - 5.11 m/uL    Hemoglobin 13.2 11.9 - 15.1 g/dL    Hematocrit 43.0 36.3 - 47.1 %    MCV 88.1 82.6 - 102.9 fL    MCH 27.0 25.2 - 33.5 pg    MCHC 30.7 28.4 - 34.8 g/dL    RDW 15.7 (H) 11.8 - 14.4 %    Platelets 932 012 - 921 k/uL    MPV 9.1 8.1 - 13.5 fL    NRBC Automated 0.0 0.0 per 100 WBC    Total  26 - 192 U/L    CK-MB 1.9 <5.4 ng/mL    % CKMB 1.8 0.0 - 3.0 %    CKMB Interpretation NORMAL ISOENZYME PATTERN     Differential Type NOT REPORTED     Seg Neutrophils 45 36 - 65 %    Lymphocytes 41 24 - 43 %    Monocytes 10 3 - 12 %    Eosinophils % 2 1 - 4 %    Basophils 1 0 - 2 %    Immature Granulocytes 1 (H) 0 %    Segs Absolute 3.17 1.50 - 8.10 k/uL    Absolute Lymph # 2.88 1.10 - 3.70 k/uL    Absolute Mono # 0.69 0.10 - 1.20 k/uL    Absolute Eos # 0.13 0.00 - 0.44 k/uL    Basophils Absolute 0.07 0.00 - 0.20 k/uL    Absolute Immature Granulocyte 0.04 0.00 - 0.30 k/uL    WBC Morphology NOT REPORTED     RBC Morphology ANISOCYTOSIS PRESENT     Platelet Estimate NOT REPORTED     Myoglobin 36 25 - 58 ng/mL    Protime 9.6 9.0 - 12.0 sec    INR 0.9     PTT 22.2 20.5 - 30.5 sec    Troponin, High Sensitivity <6 0 - 14 ng/L    Troponin T NOT REPORTED <0.03 ng/mL    Troponin Interp NOT REPORTED    Acetaminophen Level    Collection Time: 07/08/20  1:08 PM   Result Value Ref Range    Acetaminophen Level <5 (L) 10 - 30 ug/mL   Ethanol    Collection Time: 07/08/20  1:08 PM   Result Value Ref Range    Ethanol 288 (H) <10 mg/dL    Ethanol percent 0.288 (H) <6.865 %   Salicylate    Collection Time: 07/08/20  1:08 PM   Result Value Ref Range    Salicylate Lvl <1 (L) 3 - 10 mg/dL   TOXIC TRICYCLIC SC,B    Collection Time: 07/08/20  1:08 PM   Result Value Ref Range    Toxic Tricyclic Sc,Blood NEGATIVE NEGATIVE     IMAGING    1. Head CT WO  Impression   No acute intracranial abnormality.         2. Cervical CT WO  Impression   No acute abnormality of the cervical spine.         3. CTA H/N  Impression   Mild narrowing at the right vertebral artery origin, unchanged.  Otherwise   unremarkable CTA of the head and neck.         MRI brain 12/19/2019: No acute intracranial abnormalities.       EEG 12/18/19: Unremarkable    IMPRESSION     Case of a 53 y.o. female who presents with seizure, headache, fall and right sided weakness and numbness. Patient refers having a seizure yesterday and falling and afterward having right sided weakness and numbness. Next morning patient had another seizure and brought here.     // Delroy's Paralysis vs ischemic stroke vs complex migraine //              Patient with other times coming to ED with same complaints and found with unremarkable MRI's and EEG's and symptoms resolving in matter of hours. As per notes and list of medication patient is suppose to be on Trileptal 150mg PO BID but not taking it as it should. Also patient refers her trileptal is ore for her mood than seizures. Could be a breakthrough seizure with Delroy's paralysis manifestation.                  Patient also refer having headache since yesterday and today and could have neurologic manifestation of right sided weakness and numbness. Will start some medications for headaches.                 Patient also with factors for ischemic stroke(smoking, alcohol, illicit drugs) but less likely due to presentation. Due to finding of mild narrowing of the right vertebral artery origin that is unchanged will recommend to start patient on a baby aspirin 81 mg p.o. daily     // Intoxication //               Patient drank 1 pint of Vodka this morning. Alcohol level of 288. Will evaluate patient with UDS. RECOMMENDATIONS:   1.  Internal medicine and psychiatry to evaluate patient -there is no history by staff ED that the patient was supposed to be evaluated by psych  2. Recommend restarting Trileptal 150 mg p.o. twice daily  3. Start aspirin 81 mg p.o. daily -if no contraindication by primary team  4. Start atorvastatin 40 mg p.o. nightly - in other notes patient should be on this medication but is not on the actual list of medications  5. Vitamin B12, folate, vitamin D levels evaluation  6. Urinalysis and urine drug screen evaluation  7. Start vitamin replacements  8. CIWA protocol  9. Lorazepam 1 mg IV every 10 minutes as needed for seizures. Give if seizure last more than 2 minutes or if patient has 2 or more seizures with no return to baseline. 10. Seizure precautions  11. Will recommend magnesium sulfate 2 g once  12. We will recommend Solu-Medrol 125 mg IV every 8 for 3 doses  13. Neurochecks  14. We will hold at the moment brain MRI with and without or any EEGs since patient had prior MRIs and EEGs that were unremarkable. Thank you for the consultation. Will follow.  Case discussed with Zora Juan MD [facility]  Adult General Neurology Resident PGY-4  7/8/2020 at 2:33 PM

## 2020-07-08 NOTE — FLOWSHEET NOTE
the patient.        Electronically signed by Adrien Kaur, on 7/8/2020 at 1:51 PM.  [facility]  [phone]

## 2020-07-08 NOTE — ED PROVIDER NOTES
shift. Gray-white differentiation is maintained without evidence of acute infarct. No abnormal extra-axial fluid collections. The ventricles are proportional to the cerebral sulci. ORBITS: The visualized portion of the orbits demonstrate no acute abnormality. SINUSES: There is a right maxillary mucous retention cyst.  Thickened secretions are noted in the nasal cavity. The rest of the visualized paranasal sinuses and mastoid air cells are clear. SOFT TISSUES/SKULL:  The calvarium is intact. No appreciable scalp soft tissue swelling. Calcifications are noted in the palatine tonsils, compatible with sequela of remote infection. No acute intracranial abnormality. Ct Cervical Spine Wo Contrast    Result Date: 7/8/2020  EXAMINATION: CT OF THE CERVICAL SPINE WITHOUT CONTRAST 7/8/2020 1:09 pm TECHNIQUE: CT of the cervical spine was performed without the administration of intravenous contrast. Multiplanar reformatted images are provided for review. Dose modulation, iterative reconstruction, and/or weight based adjustment of the mA/kV was utilized to reduce the radiation dose to as low as reasonably achievable. COMPARISON: 09/28/2018. HISTORY: ORDERING SYSTEM PROVIDED HISTORY: fall, LOC, numbness TECHNOLOGIST PROVIDED HISTORY: fall, LOC, numbness Is the patient pregnant?->No Reason for Exam: pt states she cant move. stroke alert FINDINGS: BONES/ALIGNMENT: There is no acute fracture or traumatic malalignment. DEGENERATIVE CHANGES: Mild multilevel degenerative disc disease. The facet joints are unremarkable. SOFT TISSUES: There is no prevertebral soft tissue swelling. No acute abnormality of the cervical spine.      Xr Chest Portable    Result Date: 7/8/2020  EXAMINATION: ONE XRAY VIEW OF THE CHEST 7/8/2020 2:27 pm COMPARISON: Acute abdominal series 11/27/2018 HISTORY: ORDERING SYSTEM PROVIDED HISTORY: Fall TECHNOLOGIST PROVIDED HISTORY: Fall Reason for Exam: Fall one week ago/ chest pain/ asthma/  AP erect/ port. Acuity: Unknown Type of Exam: Unknown FINDINGS: Clear lungs. No definite findings of pneumothorax or pleural effusion. Normal mediastinal, hilar, and cardiac contours. No obvious acute fracture. Unchanged remote fracture of the posterior left 7th rib. Joints maintain anatomic alignment. No acute findings in the chest.     Cta Head Neck W Contrast    Result Date: 7/8/2020  EXAMINATION: CTA OF THE HEAD AND NECK WITH CONTRAST 7/8/2020 1:09 pm: TECHNIQUE: CTA of the head and neck was performed with the administration of intravenous contrast. Multiplanar reformatted images are provided for review. MIP images are provided for review. Stenosis of the internal carotid arteries measured using NASCET criteria. Dose modulation, iterative reconstruction, and/or weight based adjustment of the mA/kV was utilized to reduce the radiation dose to as low as reasonably achievable. COMPARISON: 12/17/2019. CT head 7/8/2020. HISTORY: ORDERING SYSTEM PROVIDED HISTORY: Stroke alert TECHNOLOGIST PROVIDED HISTORY: Stroke alert Cannot move. Acute. Initial exam FINDINGS: CTA NECK: AORTIC ARCH/ARCH VESSELS: There is a normal branch pattern of the aortic arch. The innominate and subclavian arteries are patent. CAROTID ARTERIES: The arteries in the neck are tortuous which may be related to underlying hypertension. The common carotid and internal carotid arteries are patent without evidence of a flow-limiting stenosis or dissection. VERTEBRAL ARTERIES: Fibrocalcific plaque is noted at the right vertebral artery origin with areas of mild narrowing. The left vertebral artery is patent. SOFT TISSUES: Gravity dependent atelectasis is noted in the lungs. Paraseptal emphysema is noted. No superior mediastinal adenopathy. The thyroid, submandibular, and parotid glands are unremarkable. The parapharyngeal fat spaces are preserved. Calcifications are noted in the palatine tonsils, likely related to sequela of remote infection.   There is no laryngeal or pharyngeal mass. No cervical adenopathy or soft tissue swelling. BONES: There is a right maxillary mucous retention cyst.  The patient has several teeth missing. Degenerative changes are noted along the spine. No fracture. CTA HEAD: ANTERIOR CIRCULATION: Bilateral P comms are noted. The internal carotid, anterior cerebral, and middle cerebral arteries are unremarkable. POSTERIOR CIRCULATION: The posterior cerebral and basilar arteries are patent. OTHER: No gross dural venous sinus thrombosis. BRAIN: No areas of abnormal enhancement. Mild narrowing at the right vertebral artery origin, unchanged. Otherwise unremarkable CTA of the head and neck. RECENT VITALS:     Temp: 98 °F (36.7 °C),  Pulse: 104, Resp: 17, BP: 113/75, SpO2: 93 %    Ga Avila is a 53 y.o. female who presents with complaint of seizures, ETOH. Unilateral leg weakness, may be consistent with her prior seizure disorder such as a Delroy's paralysis although this is unclear. Does have seizure history. Neuro has been consulted. Plan is admission to medicine service with neuro following as consult    OUTSTANDING TASKS / RECOMMENDATIONS:    1.  Disposition made by previous attending and nothing to do      Milli Powell MD, [facility]  Attending Emergency Physician  [facility] ED        Pedro Pablo Dawkins MD  07/08/20 1910

## 2020-07-08 NOTE — PROGRESS NOTES
SENIOR NOTE    Dequan Johnson is a 53year old female with a past medical history of Seizures, Bipolar Disorder, Asthma, Polysubstance Abuse, Alcoholism and Depression. She presented to the emergency department today via EMS after having a witnessed tonic clonic seizure lasting roughly 60 seconds. Based on chart review and the patients story it appears she had a fall last night followed by a seizure and again seized today. She was found by EMS today with seizure like activity and alcohol intoxication. On presentation she complained of right sided numbness and weakness which resolved on its own. Admits to tremors and headache believing she is currently in ETOH withdrawal. Denies fevers, chills, chest pain, shortness of breath, abdominal pain, nausea or vomiting. Admits to not taking trileptal at home as she is supposed to. She admits to drinking 6 - 9 24 ounce beers daily and up to 1 pint of vodka daily. Last drink today at 1030. In the emergency department she was afebrile and hemodynamically stable. Labs demonstrated elevated anion gap however normal bicarb. POC Lactic acid was elevated 2.48. ETOH 0.288, Acetaminophen < 5, VBG was within normal limits. Stroke alert was called due to patients right sided weakness and numbness. CT head demonstrated no acute abnormality. CT C Spine demonstrated no acute process. CTA head and neck demonstrated mild narrowing of the right vertebral artery which was unchanged from previous. Neurology with recommendations for Trileptal 150 mg BID, Aspirin 81 mg daily, Atorvastatin 40 mg nightly, Vit B62, Folic Acid, Vitamin D levels, UA and Utox, CIWA. Neurology states patient has had numerous encounters in the past for similar symptoms with negative MRI and EEG so no MRI or EEG at this time. Assessment and Plan:    1. Seizure  - Neurology Consultation  - CT Head, CT C Spine and CTA Head and Neck negative for acute changes. - Trileptal 150 mg BID. - Aspirin 81 mg daily.    - Atorvastatin 40 mg nightly. - Vitamin Q29  - Folic Acid  - Vitamin D level within normal limits. - Lorazepam 1 mg IV q 10 minutes for seizures if seizure last more then 2 minutes. - Seizure Precautions. - Magnesium Sulfate   - Neuro checks  - Solu-Medrol 125 mg IV q 8 hours for 3 doses. - No MRI or EEG at this time per Neuro. 2. Alcohol Intoxication  - ETOH . 288  - States she drank beer and vodka this morning   - Last drink 1000  - 6-9 24 ounce beers daily with up to 1 pint of vodka. - MV  - Thiamine  - Folic acid    3. Acute Alcohol Withdrawal  - History of withdrawal  - CIWA  - MV  - Thiamine  - Folic Acid    4. Tobacco Use Disorder  - Encourage Smoking Cessation. 5. Polysubstance Abuse Disorder  - Utox  - Blood tox. - Social work for possible rehab.     6. Medication noncompliance. - Lithium level < 0.1.   - Oxacarbazepine level < 1    7. Lactic Acidosis  - POC Lactic Acid Elevated 2.48  - Will follow with Lactic acid, whole blood. - Likely secondary to ETOH use.   - -5.1 osmolar gap. - r/o Ethanol or Ethylene Glycol.

## 2020-07-08 NOTE — ED NOTES
Pt back to room from CT. Dr. Alicia Clayton with Stroke Team at bedside.       Brisa Ludwig, RN  07/08/20 2016

## 2020-07-08 NOTE — ED PROVIDER NOTES
[facility] ED  Emergency Department Encounter  EmergencyMedicine Resident     Pt Clementina Carrasco  MRN: [id number]  Birthdate 1975  Date of evaluation: 7/8/20  PCP:  Meghan Agarwal MD    [address] [place] [address]       Chief Complaint   Patient presents with    Seizures    Alcohol Intoxication       HISTORY OF PRESENT ILLNESS  (Location/Symptom, Timing/Onset, Context/Setting, Quality, Duration, Modifying Factors, Severity.)      Shayy Butcher is a 53 y.o. female who presents with seizure, alcohol intoxication, and complaints of numbness and weakness of the right leg. Patient had a witnessed seizure upon arrival to the emergency department by nursing staff lasting approximately 60 seconds. Patient states that she was drinking yesterday and earlier today. Believes that she had a fall last night but is uncertain. Also uncertain if she lost consciousness. Denies any anticoagulant use. Denies any other drug use. Patient reports that she has had seizures in the past but denies being on any antiepileptic medication or ever being evaluated by neurology. She is complaining of a headache at this time. Denies any neck pain. Denies any chest pain, abdominal pain, vision changes, shortness of breath or bleeding.     PAST MEDICAL / SURGICAL / SOCIAL / FAMILY HISTORY      has a past medical history of Alcoholic hepatitis without ascites, Alcoholic ketoacidosis, Alcoholism ([address] Anxiety, Asthma, Bipolar affective disorder ([address] Depression, Drug overdose, accidental or unintentional, initial encounter, Drug overdose, intentional self-harm, initial encounter ([address] [place] coma scale total score 3-8 ([address] Hepatitis C antibody positive in blood, History of gastric surgery (gastric sleeve 2012), Intentional drug overdose ([address] MDD (major depressive disorder), recurrent episode ([address] Pneumonia, Polysubstance abuse ([address] Post traumatic stress disorder, Seizure ([address] Smoker unmotivated to quit, Suicide attempt [facility]), Suicide ideation, and Toxic metabolic encephalopathy. has a past surgical history that includes Hysterectomy; Carpal tunnel release; Hysterectomy, total abdominal; Cholecystectomy; Stomach surgery; and Upper gastrointestinal endoscopy (N/A, 6/19/2019).       Social History     Socioeconomic History    Marital status: Single     Spouse name: Not on file    Number of children: 0    Years of education: 15    Highest education level: Not on file   Occupational History    Not on file   Social Needs    Financial resource strain: Not on file    Food insecurity     Worry: Not on file     Inability: Not on file    Transportation needs     Medical: Not on file     Non-medical: Not on file   Tobacco Use    Smoking status: Current Every Day Smoker     Packs/day: 1.00     Years: 30.00     Pack years: 30.00     Types: Cigarettes    Smokeless tobacco: Never Used   Substance and Sexual Activity    Alcohol use: Not Currently     Alcohol/week: 42.0 standard drinks     Types: 42 Cans of beer per week     Comment: detoxing of alcohol x5 days    Drug use: Yes     Frequency: 7.0 times per week     Types: Marijuana, IV, Cocaine     Comment: drug abuse includes, cocaine, IV heroin, cannabis    Sexual activity: Not Currently   Lifestyle    Physical activity     Days per week: Not on file     Minutes per session: Not on file    Stress: Not on file   Relationships    Social connections     Talks on phone: Not on file     Gets together: Not on file     Attends Yazdanism service: Not on file     Active member of club or organization: Not on file     Attends meetings of clubs or organizations: Not on file     Relationship status: Not on file    Intimate partner violence     Fear of current or ex partner: Not on file     Emotionally abused: Not on file     Physically abused: Not on file     Forced sexual activity: Not on file   Other Topics Concern    Not on file   Social History Narrative    ** Merged History Encounter **         ** Merged History Encounter **            Family History   Problem Relation Age of Onset    Brain Cancer Mother     Cancer Mother         \"female cancer\"    Heart Disease Father        Allergies:  Morphine; Morphine and related; Azithromycin; Morphine; Nsaids; and Zithromax [azithromycin]    Home Medications:  Prior to Admission medications    Medication Sig Start Date End Date Taking? Authorizing Provider   gabapentin (NEURONTIN) 300 MG capsule Take 1 capsule by mouth 3 times daily for 10 days.  12/20/19 12/30/19  [facility] Wanda, DO   vitamin B-1 100 MG tablet Take 1 tablet by mouth daily 12/21/19   Dufm Bucker Hashara, DO   albuterol sulfate  (90 Base) MCG/ACT inhaler Inhale 2 puffs into the lungs every 4 hours as needed for Wheezing 10/15/19   Yobani Show, DO   dicyclomine (BENTYL) 10 MG capsule Take 1 capsule by mouth every 6 hours as needed (cramps) 10/15/19   Yobani Show, DO   fluticasone (FLONASE) 50 MCG/ACT nasal spray 1 spray by Each Nostril route daily 10/15/19   Yobani Show, DO   hydrOXYzine (ATARAX) 50 MG tablet Take 1 tablet by mouth 2 times daily as needed for Anxiety 10/15/19   Yobani Show, DO   lithium (ESKALITH) 450 MG extended release tablet Take 1 tablet by mouth 2 times daily 10/15/19   Dufm Bucker Hashara, DO   Multiple Vitamins-Minerals (MULTIVITAMIN ADULT) TABS Take 1 tablet by mouth daily 10/15/19   Yobani Show, DO   omeprazole (PRILOSEC) 20 MG delayed release capsule Take 1 capsule by mouth 2 times daily 10/15/19   Dufm Bucker Hashara, DO   ondansetron (ZOFRAN ODT) 4 MG disintegrating tablet Take 1 tablet by mouth every 8 hours as needed for Nausea 10/15/19   Dufm Bucker Hashara, DO   OXcarbazepine (TRILEPTAL) 150 MG tablet Take 1 tablet by mouth 2 times daily 10/15/19   [facility]   QUEtiapine (SEROQUEL) 200 MG tablet Take 1 tablet by mouth nightly 10/15/19   [facility] Wanda, DO   sodium chloride (OCEAN) 0.65 % nasal spray 1 spray by Nasal route as needed for Congestion 10/15/19   Nate Muñoz, DO   venlafaxine (EFFEXOR XR) 75 MG extended release capsule Take 1 capsule by mouth daily (with breakfast) 10/15/19   Nate Muñoz, DO       REVIEW OF SYSTEMS    (2-9 systems for level 4, 10 or more for level 5)      Review of Systems   Constitutional: Positive for fatigue. HENT: Negative for facial swelling and hearing loss. Eyes: Negative for pain and visual disturbance. Respiratory: Negative for cough and shortness of breath. Cardiovascular: Negative for chest pain. Gastrointestinal: Negative for abdominal pain, nausea and vomiting. Endocrine: Negative for polyuria. Genitourinary: Negative for dysuria and pelvic pain. Musculoskeletal: Negative for back pain, neck pain and neck stiffness. Skin: Negative for pallor, rash and wound. Neurological: Positive for seizures, syncope, weakness, light-headedness, numbness and headaches. Psychiatric/Behavioral: Positive for confusion. The patient is nervous/anxious. PHYSICAL EXAM   (up to 7 for level 4, 8 or more for level 5)      INITIAL VITALS:   /88   Pulse 123   Temp 98 °F (36.7 °C) (Oral)   Resp 23   Ht 5' (1.524 m)   Wt 140 lb (63.5 kg)   SpO2 94%   BMI 27.34 kg/m²     Physical Exam  Vitals signs and nursing note reviewed. Constitutional:       General: She is in acute distress. HENT:      Head: Normocephalic and atraumatic. No raccoon eyes, Kirk's sign, abrasion, contusion, right periorbital erythema, left periorbital erythema or laceration. Right Ear: Tympanic membrane normal.      Left Ear: Tympanic membrane normal.      Nose: No congestion or rhinorrhea. Mouth/Throat:      Mouth: Mucous membranes are moist.      Pharynx: Oropharynx is clear. Eyes:      General:         Right eye: No discharge. Left eye: No discharge.    Neck:      Musculoskeletal: Neck supple. No neck rigidity or muscular tenderness. Cardiovascular:      Rate and Rhythm: Tachycardia present. Heart sounds: No murmur. No friction rub. No gallop. Pulmonary:      Breath sounds: No wheezing, rhonchi or rales. Comments: Tachypneic and mildly hypoxic  Chest:      Chest wall: No tenderness. Abdominal:      Palpations: There is no mass. Tenderness: There is no abdominal tenderness. There is no guarding. Hernia: No hernia is present. Musculoskeletal:         General: No swelling, tenderness, deformity or signs of injury. Right lower leg: No edema. Left lower leg: No edema. Skin:     Capillary Refill: Capillary refill takes less than 2 seconds. Findings: No erythema, lesion or rash. Neurological:      Mental Status: She is alert. GCS: GCS eye subscore is 4. GCS verbal subscore is 5. GCS motor subscore is 6. Cranial Nerves: No cranial nerve deficit, dysarthria or facial asymmetry. Sensory: Sensory deficit (Decreased sensation in right lower extremity from level of patella going distally) present. Motor: Weakness (Upper extremity and right lower extremity weakness. Muscle strength 4+ in right upper and right lower extremity. Muscle strength 5+ in all major muscle groups of left upper and left lower extremity) and seizure activity (Generalized, tonic-clonic seizure witnessed by nursing staff) present.          DIFFERENTIAL  DIAGNOSIS     PLAN (LABS / IMAGING / EKG):  Orders Placed This Encounter   Procedures    XR CHEST PORTABLE    CT Head WO Contrast    CTA HEAD NECK W CONTRAST    CT CERVICAL SPINE WO CONTRAST    STROKE PANEL    Hemoglobin and hematocrit, blood    SODIUM (POC)    POTASSIUM (POC)    CHLORIDE (POC)    CALCIUM, IONIC (POC)    Acetaminophen Level    Ethanol    Salicylate    TOXIC TRICYCLIC SC,B    Vitamin B12 & Folate    Oxcarbazepine Level    Vitamin D 25 Hydroxy    Comprehensive Metabolic Panel w/ Reflex to MG  CBC auto differential    Hepatic function panel    LACTIC ACID, WHOLE BLOOD    Vital signs per unit routine    Up as tolerated    Telemetry Monitoring    Inpatient consult to Stroke Team    Inpatient consult to Internal Medicine    Inpatient consult to Social Work    Initiate Oxygen Therapy Protocol    Venous Blood Gas, POC    Creatinine W/GFR Point of Care    Lactic Acid, POC    POCT Glucose    Anion Gap (Calc) POC    EKG 12 Lead    Insert peripheral IV    PATIENT STATUS (FROM ED OR OR/PROCEDURAL) Inpatient    Seizure precautions       MEDICATIONS ORDERED:  Orders Placed This Encounter   Medications    DISCONTD: LORazepam (ATIVAN) injection 2 mg    iohexol (OMNIPAQUE 350) solution 90 mL    OXcarbazepine (TRILEPTAL) tablet 150 mg    methylPREDNISolone sodium (SOLU-MEDROL) injection 125 mg    magnesium sulfate 1 g in dextrose 5% 100 mL IVPB    0.9 % sodium chloride bolus    sodium chloride 0.9 % 2,139 mL with folic acid 1 mg, adult multi-vitamin with vitamin k 10 mL, thiamine 100 mg    DISCONTD: acetaminophen (TYLENOL) tablet 650 mg    enoxaparin (LOVENOX) injection 40 mg    ondansetron (ZOFRAN) injection 4 mg    albuterol (PROVENTIL) nebulizer solution 2.5 mg    gabapentin (NEURONTIN) capsule 300 mg    pantoprazole (PROTONIX) tablet 40 mg    QUEtiapine (SEROQUEL) tablet 200 mg    OR Linked Order Group     acetaminophen (TYLENOL) tablet 650 mg     acetaminophen (TYLENOL) suppository 650 mg    polyethylene glycol (GLYCOLAX) packet 17 g    vitamin B-1 (THIAMINE) tablet 100 mg    nicotine (NICODERM CQ) 14 MG/24HR 1 patch    OR Linked Order Group     LORazepam (ATIVAN) tablet 1 mg     LORazepam (ATIVAN) injection 1 mg     LORazepam (ATIVAN) tablet 2 mg     LORazepam (ATIVAN) injection 2 mg     LORazepam (ATIVAN) tablet 3 mg     LORazepam (ATIVAN) injection 3 mg     LORazepam (ATIVAN) tablet 4 mg     LORazepam (ATIVAN) injection 4 mg    folic acid (FOLVITE) tablet 1 mg  therapeutic multivitamin-minerals 1 tablet       DDX: Ischemic stroke, hemorrhagic stroke, subdural hematoma, epidural hematoma, complex migraine, seizure disorder, post ictal state, hemiparesis    DIAGNOSTIC RESULTS / EMERGENCY DEPARTMENT COURSE / MDM   LAB RESULTS:  Results for orders placed or performed during the hospital encounter of 07/08/20   STROKE PANEL   Result Value Ref Range    Glucose 93 70 - 99 mg/dL    BUN 6 6 - 20 mg/dL    CREATININE 0.56 0.50 - 0.90 mg/dL    Bun/Cre Ratio NOT REPORTED 9 - 20    Calcium 8.2 (L) 8.6 - 10.4 mg/dL    Sodium 142 135 - 144 mmol/L    Potassium 4.3 3.7 - 5.3 mmol/L    Chloride 103 98 - 107 mmol/L    CO2 20 20 - 31 mmol/L    Anion Gap 19 (H) 9 - 17 mmol/L    GFR Non-African American >60 >60 mL/min    GFR African American >60 >60 mL/min    GFR Comment          GFR Staging NOT REPORTED     WBC 7.0 3.5 - 11.3 k/uL    RBC 4.88 3.95 - 5.11 m/uL    Hemoglobin 13.2 11.9 - 15.1 g/dL    Hematocrit 43.0 36.3 - 47.1 %    MCV 88.1 82.6 - 102.9 fL    MCH 27.0 25.2 - 33.5 pg    MCHC 30.7 28.4 - 34.8 g/dL    RDW 15.7 (H) 11.8 - 14.4 %    Platelets 887 936 - 253 k/uL    MPV 9.1 8.1 - 13.5 fL    NRBC Automated 0.0 0.0 per 100 WBC    Total  26 - 192 U/L    CK-MB 1.9 <5.4 ng/mL    % CKMB 1.8 0.0 - 3.0 %    CKMB Interpretation NORMAL ISOENZYME PATTERN     Differential Type NOT REPORTED     Seg Neutrophils 45 36 - 65 %    Lymphocytes 41 24 - 43 %    Monocytes 10 3 - 12 %    Eosinophils % 2 1 - 4 %    Basophils 1 0 - 2 %    Immature Granulocytes 1 (H) 0 %    Segs Absolute 3.17 1.50 - 8.10 k/uL    Absolute Lymph # 2.88 1.10 - 3.70 k/uL    Absolute Mono # 0.69 0.10 - 1.20 k/uL    Absolute Eos # 0.13 0.00 - 0.44 k/uL    Basophils Absolute 0.07 0.00 - 0.20 k/uL    Absolute Immature Granulocyte 0.04 0.00 - 0.30 k/uL    WBC Morphology NOT REPORTED     RBC Morphology ANISOCYTOSIS PRESENT     Platelet Estimate NOT REPORTED     Myoglobin 36 25 - 58 ng/mL    Protime 9.6 9.0 - 12.0 sec    INR 0.9 PTT 22.2 20.5 - 30.5 sec    Troponin, High Sensitivity <6 0 - 14 ng/L    Troponin T NOT REPORTED <0.03 ng/mL    Troponin Interp NOT REPORTED    Hemoglobin and hematocrit, blood   Result Value Ref Range    POC Hemoglobin 15.3 12.0 - 16.0 g/dL    POC Hematocrit 45 36 - 46 %   SODIUM (POC)   Result Value Ref Range    POC Sodium 144 138 - 146 mmol/L   POTASSIUM (POC)   Result Value Ref Range    POC Potassium 4.6 (H) 3.5 - 4.5 mmol/L   CHLORIDE (POC)   Result Value Ref Range    POC Chloride 112 (H) 98 - 107 mmol/L   CALCIUM, IONIC (POC)   Result Value Ref Range    POC Ionized Calcium 1.03 (L) 1.15 - 1.33 mmol/L   Acetaminophen Level   Result Value Ref Range    Acetaminophen Level <5 (L) 10 - 30 ug/mL   Ethanol   Result Value Ref Range    Ethanol 288 (H) <10 mg/dL    Ethanol percent 0.288 (H) <8.502 %   Salicylate   Result Value Ref Range    Salicylate Lvl <1 (L) 3 - 10 mg/dL   TOXIC TRICYCLIC SC,B   Result Value Ref Range    Toxic Tricyclic Sc,Blood NEGATIVE NEGATIVE   Vitamin D 25 Hydroxy   Result Value Ref Range    Vit D, 25-Hydroxy 36.5 30.0 - 100.0 ng/mL   Venous Blood Gas, POC   Result Value Ref Range    pH, Jeremi 7.376 7.320 - 7.430    pCO2, Jeremi 40.5 (L) 41.0 - 51.0 mm Hg    pO2, Jeremi 52.3 (H) 30.0 - 50.0 mm Hg    HCO3, Venous 23.8 22.0 - 29.0 mmol/L    Total CO2, Venous 25 23.0 - 30.0 mmol/L    Negative Base Excess, Jeremi 1 0.0 - 2.0    Positive Base Excess, Jeremi NOT REPORTED 0.0 - 3.0    O2 Sat, Jeremi 86 (H) 60.0 - 85.0 %    O2 Device/Flow/% NOT REPORTED     Gurmeet Test NOT REPORTED     Sample Site NOT REPORTED     Mode NOT REPORTED     FIO2 NOT REPORTED     Pt Temp NOT REPORTED     POC pH Temp NOT REPORTED     POC pCO2 Temp NOT REPORTED mm Hg    POC pO2 Temp NOT REPORTED mm Hg   Creatinine W/GFR Point of Care   Result Value Ref Range    POC Creatinine 0.59 0.51 - 1.19 mg/dL    GFR Comment >60 >60 mL/min    GFR Non-African American >60 >60 mL/min    GFR Comment         Lactic Acid, POC   Result Value Ref Range    POC Lactic Acid 2.48 (H) 0.56 - 1.39 mmol/L   POCT Glucose   Result Value Ref Range    POC Glucose 96 74 - 100 mg/dL   Anion Gap (Calc) POC   Result Value Ref Range    Anion Gap 8 7 - 16 mmol/L       IMPRESSION: 76-year-old female presenting with seizure activity and complaints of right arm and right leg numbness. Stroke alert paged out for right-sided deficits including right arm weakness and right lower extremity weakness. Patient also had decreased sensation in right lower extremity at the level of the right patella distally. Plan for CT head, CTA head and neck without contrast, labs. Plan for admission    RADIOLOGY:  Ct Head Wo Contrast    Result Date: 7/8/2020  EXAMINATION: CT OF THE HEAD WITHOUT CONTRAST  7/8/2020 1:10 pm TECHNIQUE: CT of the head was performed without the administration of intravenous contrast. Dose modulation, iterative reconstruction, and/or weight based adjustment of the mA/kV was utilized to reduce the radiation dose to as low as reasonably achievable. COMPARISON: 01/15/2020. HISTORY: ORDERING SYSTEM PROVIDED HISTORY: Stroke alert TECHNOLOGIST PROVIDED HISTORY: Stroke alert Is the patient pregnant?->No Reason for Exam: stroke alert. pt states she cant move FINDINGS: BRAIN/VENTRICLES: The cerebral and cerebellar parenchyma demonstrate volume loss. There are no areas of acute hemorrhage, mass, or midline shift. Gray-white differentiation is maintained without evidence of acute infarct. No abnormal extra-axial fluid collections. The ventricles are proportional to the cerebral sulci. ORBITS: The visualized portion of the orbits demonstrate no acute abnormality. SINUSES: There is a right maxillary mucous retention cyst.  Thickened secretions are noted in the nasal cavity. The rest of the visualized paranasal sinuses and mastoid air cells are clear. SOFT TISSUES/SKULL:  The calvarium is intact. No appreciable scalp soft tissue swelling.   Calcifications are noted in the palatine tonsils, compatible with sequela of remote infection. No acute intracranial abnormality. Ct Cervical Spine Wo Contrast    Result Date: 7/8/2020  EXAMINATION: CT OF THE CERVICAL SPINE WITHOUT CONTRAST 7/8/2020 1:09 pm TECHNIQUE: CT of the cervical spine was performed without the administration of intravenous contrast. Multiplanar reformatted images are provided for review. Dose modulation, iterative reconstruction, and/or weight based adjustment of the mA/kV was utilized to reduce the radiation dose to as low as reasonably achievable. COMPARISON: 09/28/2018. HISTORY: ORDERING SYSTEM PROVIDED HISTORY: fall, LOC, numbness TECHNOLOGIST PROVIDED HISTORY: fall, LOC, numbness Is the patient pregnant?->No Reason for Exam: pt states she cant move. stroke alert FINDINGS: BONES/ALIGNMENT: There is no acute fracture or traumatic malalignment. DEGENERATIVE CHANGES: Mild multilevel degenerative disc disease. The facet joints are unremarkable. SOFT TISSUES: There is no prevertebral soft tissue swelling. No acute abnormality of the cervical spine. Xr Chest Portable    Result Date: 7/8/2020  EXAMINATION: ONE XRAY VIEW OF THE CHEST 7/8/2020 2:27 pm COMPARISON: Acute abdominal series 11/27/2018 HISTORY: ORDERING SYSTEM PROVIDED HISTORY: Fall TECHNOLOGIST PROVIDED HISTORY: Fall Reason for Exam: Fall one week ago/ chest pain/ asthma/  AP erect/ port. Acuity: Unknown Type of Exam: Unknown FINDINGS: Clear lungs. No definite findings of pneumothorax or pleural effusion. Normal mediastinal, hilar, and cardiac contours. No obvious acute fracture. Unchanged remote fracture of the posterior left 7th rib. Joints maintain anatomic alignment.      No acute findings in the chest.     Cta Head Neck W Contrast    Result Date: 7/8/2020  EXAMINATION: CTA OF THE HEAD AND NECK WITH CONTRAST 7/8/2020 1:09 pm: TECHNIQUE: CTA of the head and neck was performed with the administration of intravenous contrast. Multiplanar reformatted images are provided for review. MIP images are provided for review. Stenosis of the internal carotid arteries measured using NASCET criteria. Dose modulation, iterative reconstruction, and/or weight based adjustment of the mA/kV was utilized to reduce the radiation dose to as low as reasonably achievable. COMPARISON: 12/17/2019. CT head 7/8/2020. HISTORY: ORDERING SYSTEM PROVIDED HISTORY: Stroke alert TECHNOLOGIST PROVIDED HISTORY: Stroke alert Cannot move. Acute. Initial exam FINDINGS: CTA NECK: AORTIC ARCH/ARCH VESSELS: There is a normal branch pattern of the aortic arch. The innominate and subclavian arteries are patent. CAROTID ARTERIES: The arteries in the neck are tortuous which may be related to underlying hypertension. The common carotid and internal carotid arteries are patent without evidence of a flow-limiting stenosis or dissection. VERTEBRAL ARTERIES: Fibrocalcific plaque is noted at the right vertebral artery origin with areas of mild narrowing. The left vertebral artery is patent. SOFT TISSUES: Gravity dependent atelectasis is noted in the lungs. Paraseptal emphysema is noted. No superior mediastinal adenopathy. The thyroid, submandibular, and parotid glands are unremarkable. The parapharyngeal fat spaces are preserved. Calcifications are noted in the palatine tonsils, likely related to sequela of remote infection. There is no laryngeal or pharyngeal mass. No cervical adenopathy or soft tissue swelling. BONES: There is a right maxillary mucous retention cyst.  The patient has several teeth missing. Degenerative changes are noted along the spine. No fracture. CTA HEAD: ANTERIOR CIRCULATION: Bilateral P comms are noted. The internal carotid, anterior cerebral, and middle cerebral arteries are unremarkable. POSTERIOR CIRCULATION: The posterior cerebral and basilar arteries are patent. OTHER: No gross dural venous sinus thrombosis. BRAIN: No areas of abnormal enhancement.      Mild narrowing at the right vertebral artery origin, unchanged. Otherwise unremarkable CTA of the head and neck. EKG  EKG Interpretation    Interpreted by me    Rhythm: Sinus tachycardia  Rate: Tachycardia  Axis: normal  Ectopy: none  Conduction: normal  ST Segments: no acute change  T Waves: no acute change  Q Waves: none    Clinical Impression: no acute changes and normal EKG    All EKG's are interpreted by the Emergency Department Physician who either signs or Co-signs this chart in the absence of a cardiologist.    EMERGENCY DEPARTMENT COURSE:  Patient evaluated at bedside. Patient was seen having generalized, tonic-clonic seizure witnessed by nursing staff. Seizure activity lasted approximately 60 seconds and resolved without any medical intervention. On my evaluation patient with complaints of right arm and right leg weakness and loss of sensation to the right leg. Stroke alert paged out    Neurology evaluated patient at bedside and patient was immediately transported to [place] [id number]. CT head remarkable. CTA head and neck with mild narrowing of the right vertebral artery. Patient found to be intoxicated with ethanol level of 288. ED tox screen negative for ASA, Tylenol, TCA. Symptoms possibly secondary to seizures versus prior history of Delroy's paresis. Additional labs ordered per neurology including oxcarbazepine level, urine drug screen. Lab results pending. Patient admitted to internal medicine service with neurology following as consult. PRN Ativan was ordered for seizures. Patient did not require any Ativan during emergency department encounter under my care. Signed out to Dr. Alok Law, awaiting bed placement    PROCEDURES:  None    CONSULTS:  IP CONSULT TO STROKE TEAM  IP CONSULT TO INTERNAL MEDICINE  IP CONSULT TO SOCIAL WORK  IP CONSULT TO SOCIAL WORK  IP CONSULT TO CASE MANAGEMENT    CRITICAL CARE:  Please see attending note    FINAL IMPRESSION      1. Seizure ([address]    2.  Acute alcoholic intoxication with complication [facility])          DISPOSITION / [address] Admitted 07/08/2020 06:57:22 PM      PATIENT REFERRED TO:  Rosalva Romberg, MD  [address] [place] [address] [address]  [phone]            DISCHARGE MEDICATIONS:  New Prescriptions    No medications on file       Sabrina West DO  Emergency Medicine Resident    (Please note that portions of thisnote were completed with a voice recognition program.  Efforts were made to edit the dictations but occasionally words are mis-transcribed.)        Sabrina West DO  Resident  07/08/20 1737

## 2020-07-08 NOTE — ED TRIAGE NOTES
Pt brought in by EMS c/o seizure like activity and alcohol intoxication. Upon arrival pt brought to [facility] AN [facility]FFILI[facility] OF [facility], demanding to go home. Writer entered [facility] to obtain vitals and found pt on the stretcher face down with tonic clonic movement lasting 60 seconds. Pt moved to room 29, Dr. Rochelle Rasmussen notified and at bedside. Pt stated she drank 1 pint of vodka today. Pt stated she may have had seizure activity last night and fell but is uncertain at this time. Pt denies taking blood thinners. Pt stated she is supposed to follow up with neurology but is not taking antiepileptic medications at this time. Pt c/o headache at this time, denies chest pain or SOB. Pt placed on monitor, seizure precautions initiated, call light in reach.

## 2020-07-08 NOTE — ED PROVIDER NOTES
[facility]     Emergency Department     Faculty Attestation    I performed a history and physical examination of the patient and discussed management with the resident. I reviewed the residents note and agree with the documented findings and plan of care. Any areas of disagreement are noted on the chart. I was personally present for the key portions of any procedures. I have documented in the chart those procedures where I was not present during the key portions. I have reviewed the emergency nurses triage note. I agree with the chief complaint, past medical history, past surgical history, allergies, medications, social and family history as documented unless otherwise noted below. For Physician Assistant/ Nurse Practitioner cases/documentation I have personally evaluated this patient and have completed at least one if not all key elements of the E/M (history, physical exam, and MDM). Additional findings are as noted. I have personally seen and evaluated the patient. I find the patient's history and physical exam are consistent with the NP/PA documentation. I agree with the care provided, treatment rendered, disposition and follow-up plan. Neurosurgery was consulted for possible strokelike symptoms however the patient does have a history of alcohol abuse and similar presentations in the past      Critical Care     Pedro Pablo Zelaya M.D.   Attending Emergency  Physician              Judy Prieto MD  07/08/20 [id number]

## 2020-07-08 NOTE — ED NOTES
Writer met with patient at bedside to complete the SBIRT assessment, the results can be located in the ED navigator under screening questions. Patient scored as following:  AUDIT (Acohol Screening Questionnaire): 32 or high risk  DAST (Drug Screening Questionnaire): 7 or high risk  PHQ-9 (Depresson Screening Questionnaire): 15 or moderately-severe. Writer and patient discussed interventions regarding alcohol use, drug use and depression. Patient reports that she has already been to several inpatient detox and substance use treatment facilities. She reports that she is not ready to stop drinking alcohol or using substances and feels that this is why she has not been successful with treatment. She reports that she will consider inpatient substance use treatment for her alcohol and drug use, as she is aware that her use is problematic. Patient admits to dependency on substances. Patient reports that she is current client of the [facility] and sees Dr. Kelsea Sheikh for treatment of Bipolar Disorder. Patient denies current thoughts of harm to self or other. She states that she has a history of suicidal ideations, \"but that was a while ago. \"  Patient states that she had an appointment today with Dr. Kelsea Sheikh that she missed on account of being in the ED. Writer assisted patient with contacting the [facility] regarding her missed appointment and concerns about medication refills, a message was left with the nursing staff.       Time of encounter: [address], GIOVANI  07/08/20 [id number] Choco Gonsalves, LISMIRIAM  07/27/20 [id number]

## 2020-07-08 NOTE — CONSULTS
Department of Endovascular Neurosurgery                                         Resident Consult Note  Stroke Alert paged @ 1:01PM  ER Room # 29  Arrival to patient bedside @ 1:10PM    Reason for Consult:  Right sided weakness, numbness  Requesting Physician:  Sudarshan Gilliam DO  Endovascular Neurosurgeon:   []Dr. Shelly Arguello  [x]Dr. Manuel Newman    History Obtained From:  patient, electronic medical record    CHIEF COMPLAINT:       Seizures, fall and right sided upper and lower extremity weakness and numbness, right face numbness, headache    HISTORY OF PRESENT ILLNESS:       The patient is a 53 y.o. female with PMH of seizure (no medication, list says Trileptal but she says is for there mood), substance abuse, MDD, Depression, Bipolar, asthma who presents with episodes of seizures, fall, headaches and right sided weakness and numbness. Patient refers that yesterday night she was in her house and suddenly had an episode of seizure but doesn't remember anythign from it. She refer she fell and had no LOC from it. After the event she started having right sided weakness and numbness but didn't do anything for it. This mornign she woke up and refer had another event and was brought by EMS. In ED jeannette had another seizure like event. As per notes patient drank 1 pint of vodka this morning. As per old notes: Patient had multiple admission (Oct 2019, Dec 2019) for symptoms resembling what she got today with seizures and right sided weakness, numbness. MRI's done in past and EEG has been unremarkable. Most of the time patient was found intoxicated with alcohol + cocaine. SmokinPPD  Alcohol: 3 beer toppers a day  Illicit Drugs: Cocaine and Heroin-last time about 2 weeks ago.     [address] [place] [address]: last night (doesn't remember)  NIHSS: 6  PAST MEDICAL HISTORY :       Past Medical History:        Diagnosis Date    Alcoholic hepatitis without ascites     Alcoholic ketoacidosis     Alcoholism ([facility]     3 pints daily    Anxiety     Asthma     Bipolar affective disorder ([facility] 6/12/2019    Depression     Drug overdose, accidental or unintentional, initial encounter 9/29/2018    Drug overdose, intentional self-harm, initial encounter ([facility] 5/8/2018    Drift coma scale total score 3-8 (HCC)     Hepatitis C antibody positive in blood 6/18/2019    History of gastric surgery (gastric sleeve 2012) 4/25/2017    Intentional drug overdose ([facility] 5/7/2018    MDD (major depressive disorder), recurrent episode ([facility] 5/8/2018    Pneumonia     Polysubstance abuse ([facility]     drug abuse includes, cocaine, IV heroin, cannabis, and ETOH    Post traumatic stress disorder     Seizure ([facility] 12/17/2019    Smoker unmotivated to quit 4/25/2017    Suicide attempt ([facility] 11/08/2017    S/A by overdosing on Trazodone and Seroquel    Suicide ideation     Toxic metabolic encephalopathy 2/87/7690       Past Surgical History:        Procedure Laterality Date    CARPAL TUNNEL RELEASE      CHOLECYSTECTOMY      HYSTERECTOMY      HYSTERECTOMY, TOTAL ABDOMINAL      STOMACH SURGERY      gastric sleeve    UPPER GASTROINTESTINAL ENDOSCOPY N/A 6/19/2019    EGD BIOPSY performed by Anand Uribe MD at [address] [place] [address] History:   Social History     Socioeconomic History    Marital status: Single     Spouse name: Not on file    Number of children: 0    Years of education: 15    Highest education level: Not on file   Occupational History    Not on file   Social Needs    Financial resource strain: Not on file    Food insecurity     Worry: Not on file     Inability: Not on file   [employer] needs     Medical: Not on file     Non-medical: Not on file   Tobacco Use    Smoking status: Current Every Day Smoker     Packs/day: 1.00     Years: 30.00     Pack years: 30.00     Types: Cigarettes    Smokeless tobacco: Never Used   Substance and Sexual Activity    Alcohol use: Not Currently     Alcohol/week: (FLONASE) 50 MCG/ACT nasal spray 1 spray by Each Nostril route daily 10/15/19   Dawna Aj,    hydrOXYzine (ATARAX) 50 MG tablet Take 1 tablet by mouth 2 times daily as needed for Anxiety 10/15/19   Dawna Aj DO   lithium (ESKALITH) 450 MG extended release tablet Take 1 tablet by mouth 2 times daily 10/15/19   Oliver Muñoz,    Multiple Vitamins-Minerals (MULTIVITAMIN ADULT) TABS Take 1 tablet by mouth daily 10/15/19   Dawna Aj,    omeprazole (PRILOSEC) 20 MG delayed release capsule Take 1 capsule by mouth 2 times daily 10/15/19   Oliver Muñoz,    ondansetron (ZOFRAN ODT) 4 MG disintegrating tablet Take 1 tablet by mouth every 8 hours as needed for Nausea 10/15/19   Oliver Muñoz,    OXcarbazepine (TRILEPTAL) 150 MG tablet Take 1 tablet by mouth 2 times daily 10/15/19   Oliver Muñoz,    QUEtiapine (SEROQUEL) 200 MG tablet Take 1 tablet by mouth nightly 10/15/19   Dawna Aj DO   sodium chloride (OCEAN) 0.65 % nasal spray 1 spray by Nasal route as needed for Congestion 10/15/19   Dawna Aj DO   venlafaxine (EFFEXOR XR) 75 MG extended release capsule Take 1 capsule by mouth daily (with breakfast) 10/15/19   Dawna Aj DO       REVIEW OF SYSTEMS:       CONSTITUTIONAL: negative for fatigue and malaise   EYES: negative for double vision and photophobia    HEENT: negative for tinnitus and sore throat   RESPIRATORY: negative for cough, shortness of breath   CARDIOVASCULAR: negative for chest pain, palpitations   GASTROINTESTINAL: negative for nausea, vomiting   GENITOURINARY: negative for incontinence   MUSCULOSKELETAL: negative for neck or back pain   NEUROLOGICAL: negative for aphasia  Positive for seizure, headache, weakness, numbness   PSYCHIATRIC: negative for fatigue     PHYSICAL EXAM:       /83   Pulse 84   Temp 98 °F (36.7 °C) (Oral)   Resp 22   Ht 5' (1.524 m)   Wt 140 lb (63.5 kg)   SpO2 99% BMI 27.34 kg/m²     CONSTITUTIONAL:  Well developed, well nourished, alert and oriented x 3, Mild distress due to symptoms. GCS 15, nontoxic. MIld dysarthria, no aphasia. HEAD:  normocephalic, atraumatic    EYES:  PERRLA, EOMI.   ENT:  moist mucous membranes   NECK:  supple, symmetric, no midline tenderness to palpation    BACK:  without midline tenderness, step-offs or deformities    LUNGS:  Equal air entry bilaterally   CARDIOVASCULAR:  normal s1 / s2   ABDOMEN:  Soft, no rigidity   NEUROLOGIC:  Mental Status:  A & O x3,awake             Cranial Nerves:    cranial nerves II-XII are grossly intact except decreased sensation on right side of face V1-V3    Motor Exam:    Drift:  present - RUE  Tone:  normal    RUE: 4/5  LUE: 5/5  RLE: 1/5  LLE: 5/5    Sensory:    Touch:    Right Upper Extremity:  abnormal - decreased sensation to light touch and pinprick  Left Upper Extremity:  normal  Right Lower Extremity:  abnormal - decreased sensation to light touch and pinprick  Left Lower Extremity:  normal    Deep Tendon Reflexes:    Right Bicep:  2+  Left Bicep:  2+  Right Knee:  2+  Left Knee:  2+    Plantar Response:  Right:  downgoing  Left:  downgoing    Clonus:  absent  Gonzáles's:  absent    Coordination/Dysmetria:  Heel to Shin:  Right:  abnormal - weakness  Left:  normal  Finger to Nose:   Right:  abnormal - wekaness  Left:  normal     Gait:  Not done due to weakness    INITIAL NIH STROKE SCALE:    Time Performed:  1:10 PM     1a. Level of consciousness:  0 - alert; keenly responsive  1b. Level of consciousness questions:  0 - answers both questions correctly  1c. Level of consciousness questions:  0 - performs both tasks correctly  2. Best Gaze:  0 - normal  3. Visual:  0 - no visual loss  4. Facial Palsy:  0 - normal symmetric movement  5a. Motor left arm:  0 - no drift, limb holds 90 (or 45) degrees for full 10 seconds  5b.   Motor right arm:  1 - drift, limb holds 90 (or 45) degrees but drifts down before full 10 seconds: does not hit bed  6a. Motor left le - no drift; leg holds 30 degree position for full 5 seconds  6b. Motor right leg:  3 - no effort against gravity; leg falls to bed immediately  7. Limb Ataxia:  0 - absent  8. Sensory:  1 - mild to moderate sensory loss; patient feels pinprick is less sharp or is dull on the affected side; there is a loss of superficial pain with pinprick but patient is aware of being touched   9. Best Language:  0 - no aphasia, normal  10. Dysarthria:  1 - mild to moderate, patient slurs at least some words and at worst, can be understood with some difficulty  11.   Extinction and Inattention:  0 - no abnormality    TOTAL:  6     SKIN:  no rash      Modified [place] Score Scale:     [x] Zero: No symptoms at all   [] 1: No significant disability despite symptoms; able to carry out all usual duties and activities   [] 2: Slight disability; unable to carry out all previous activities, but able to look after own affairs without assistance   [] 3:Moderate disability; requiring some help, but able to walk without assistance   [] 4: Moderately severe disability; unable to walk and attend to bodily needs without assistance   [] 5:Severe disability; bedridden, incontinent and requiring constant nursing care and attention    LABS AND IMAGING:     CBC with Differential:    Lab Results   Component Value Date    WBC 7.2 01/15/2020    RBC 5.04 01/15/2020    HGB 11.9 01/15/2020    HCT 39.7 01/15/2020     01/15/2020    MCV 78.8 01/15/2020    MCH 23.6 01/15/2020    MCHC 30.0 01/15/2020    RDW 18.7 01/15/2020    NRBC 0 2015    SEGSPCT 43.3 2015    LYMPHOPCT 49 01/15/2020    MONOPCT 10 01/15/2020    BASOPCT 0 01/15/2020    MONOSABS 0.72 01/15/2020    MONOSABS 0.6 2015    LYMPHSABS 3.52 01/15/2020    LYMPHSABS 3.3 2015    EOSABS 0.58 01/15/2020    EOSABS 0.2 2015    BASOSABS 0.00 01/15/2020    DIFFTYPE NOT REPORTED 01/15/2020     BMP:    Lab Results   Component Value Date     01/15/2020    K 3.9 01/15/2020     01/15/2020    CO2 18 01/15/2020    BUN 5 01/15/2020    LABALBU 4.1 01/15/2020    CREATININE 0.58 01/15/2020    CALCIUM 8.7 01/15/2020    GFRAA >60 01/15/2020    LABGLOM >60 01/15/2020    LABGLOM >90 11/22/2015    GLUCOSE 109 01/15/2020     Radiology Review:    1.) CT Head without contrast: (Reviewed at 1:10PM)  Impression   No acute intracranial abnormality.         2.) CTA Head/Neck: (Reviewed at 1:15PM)  Impression   1. Mild narrowing at the right vertebral artery origin, unchanged.  Otherwise   unremarkable CTA of the head and neck.         ASSESSMENT AND PLAN:       Assessment                 53 y.o. female who presents with seizure, headache, fall and right sided weakness and numbness. Patient refers having a seizure yesterday and falling and afterward having right sided weakness and numbness. Next morning patient had another seizure and brought here.    // Delroy's Paralysis vs ischemic stroke vs complex migraine //   Patient with other times coming to ED with same complaints and found with unremarkable MRI's and EEG's and symptoms resolving in matter of hours. As per notes and list of medication patient is suppose to be on Trileptal 150mg PO BID but not taking it as it should. Also patient refers her trileptal is ore for her mood than seizures. Could be a breakthrough seizure with Delroy's paralysis manifestation. Patient also refer having headache since yesterday and today and could have neurologic manifestation of right sided weakness and numbness. Will start some medications for headaches. Patient also with factors for ischemic stroke(smoking, alcohol, illicit drugs) but less likely due to presentation. // Intoxication //    Patient drank 1 pint of Vodka this morning. Possible alcohol intoxication. Will evaluate UDS and tox screen. 1. Last Known Well (date and time): Last night (unknown time)    2.  Candidate for IV tPA therapy     Yes []     No  [x] due to the following exclusion criteria: Out of window     Contraindications for IV tPA:   ? Symptom onset is unknown, > 4.5 hours, or if patient awoke with stroke   ? Acute or previous intracranial hemorrhage   ? Imaging showing extensive regions of irreversible injury (hypoattenuation)   ? Prior ischemic stroke, severe head trauma, or intracranial/intraspinal surgery within 3 months   ? Symptoms of subarachnoid hemorrhage   ? GI malignancy or GI bleed within 21 days   ? Coagulopathy: (Platelets < 175, 760/DN³, INR > 1.7, aPTT > 40 s, PT > 15 s)   ? Treatment dose of low molecular weight heparin within 24 hours (does not apply to prophylactic doses to prevent VTE)   ? Use of anticoagulant drugs (thrombin inhibitors and factor Xa inhibitors) unless labs are normal or when patient has not taken for >48 hours with normal renal function   ? Use of antiplatelet that inhibits glycoprotein IIb/IIIa receptors (except in clinical trials)   ? Infective endocarditis due to increased risk of intracranial hemorrhage   ? Aortic arch dissection   ? Intra-axial (inside the brain tissue) intracranial neoplasm   ? Persistent elevated blood pressure (systolic > 154 mm Hg or diastolic > 945 mm Hg)   ? Active internal bleeding      3.  Candidate for Thrombectomy    Yes []      No [x] due to the following exclusion criteria: No LVO    - Discussed with Dr. Miguel Wilson     Recommendations:    [] General Neurology Care Status - prefer 5th floor (5A/5C)   [x] Internal Medicine General Care Status   [] NICU Status - (5B)     [] MICU Status   [] Observation Status     - CTH WO - Done   - CTA H/N - Done   - Trileptal 150mg PO BID   - ASA 81mg PO D for her CTA H/N finding    - Thiamine 904UT PO D    - Folic acid 1mg BID    - Vitamin B12, folate, vitamin D levels    - Atorvastatin 40mg nightly     - Fasting Lipid panel    - TSH    - HgbA1c lab    - PT, OT, Speech eval     - Hydrate    - Telemetry     - Neuro checks per protocol   - We recommend SBP < 180   - Blood glucose goal less than 180   - Please avoid dextrose containing solutions    Additional recommendations may follow    Please contact [facility] with any changes in patients neurologic status. Thank you for your consult.        Nanda Squires MD  Adult General Neurology Resident PGY-4  7/8/2020 at 1:05 PM

## 2020-07-09 PROBLEM — D50.9 NORMOCYTIC HYPOCHROMIC ANEMIA: Status: RESOLVED | Noted: 2019-10-14 | Resolved: 2020-07-09

## 2020-07-09 LAB
-: NORMAL
ABSOLUTE EOS #: <0.03 K/UL (ref 0–0.44)
ABSOLUTE IMMATURE GRANULOCYTE: <0.03 K/UL (ref 0–0.3)
ABSOLUTE LYMPH #: 0.89 K/UL (ref 1.1–3.7)
ABSOLUTE MONO #: 0.28 K/UL (ref 0.1–1.2)
ACETAMINOPHEN LEVEL: <5 UG/ML (ref 10–30)
ALBUMIN SERPL-MCNC: 2.9 G/DL (ref 3.5–5.2)
ALBUMIN SERPL-MCNC: 3.9 G/DL (ref 3.5–5.2)
ALBUMIN/GLOBULIN RATIO: 1.2 (ref 1–2.5)
ALBUMIN/GLOBULIN RATIO: 1.2 (ref 1–2.5)
ALP BLD-CCNC: 64 U/L (ref 35–104)
ALP BLD-CCNC: 85 U/L (ref 35–104)
ALT SERPL-CCNC: 20 U/L (ref 5–33)
ALT SERPL-CCNC: 38 U/L (ref 5–33)
AMORPHOUS: NORMAL
AMPHETAMINE SCREEN URINE: NEGATIVE
ANION GAP SERPL CALCULATED.3IONS-SCNC: 11 MMOL/L (ref 9–17)
AST SERPL-CCNC: 23 U/L
AST SERPL-CCNC: 35 U/L
BACTERIA: NORMAL
BARBITURATE SCREEN URINE: NEGATIVE
BASOPHILS # BLD: 0 % (ref 0–2)
BASOPHILS ABSOLUTE: <0.03 K/UL (ref 0–0.2)
BENZODIAZEPINE SCREEN, URINE: NEGATIVE
BILIRUB SERPL-MCNC: 0.24 MG/DL (ref 0.3–1.2)
BILIRUB SERPL-MCNC: 0.27 MG/DL (ref 0.3–1.2)
BILIRUBIN DIRECT: 0.11 MG/DL
BILIRUBIN URINE: NEGATIVE
BILIRUBIN, INDIRECT: 0.16 MG/DL (ref 0–1)
BUN BLDV-MCNC: 7 MG/DL (ref 6–20)
BUN/CREAT BLD: ABNORMAL (ref 9–20)
BUPRENORPHINE URINE: NORMAL
CALCIUM SERPL-MCNC: 6.2 MG/DL (ref 8.6–10.4)
CANNABINOID SCREEN URINE: NEGATIVE
CASTS UA: NORMAL /LPF (ref 0–8)
CHLORIDE BLD-SCNC: 113 MMOL/L (ref 98–107)
CO2: 20 MMOL/L (ref 20–31)
COCAINE METABOLITE, URINE: NEGATIVE
COLOR: YELLOW
COMMENT UA: ABNORMAL
CREAT SERPL-MCNC: 0.3 MG/DL (ref 0.5–0.9)
CRYSTALS, UA: NORMAL /HPF
DIFFERENTIAL TYPE: ABNORMAL
EKG ATRIAL RATE: 92 BPM
EKG P AXIS: 59 DEGREES
EKG P-R INTERVAL: 146 MS
EKG Q-T INTERVAL: 368 MS
EKG QRS DURATION: 78 MS
EKG QTC CALCULATION (BAZETT): 455 MS
EKG R AXIS: 27 DEGREES
EKG T AXIS: 40 DEGREES
EKG VENTRICULAR RATE: 92 BPM
EOSINOPHILS RELATIVE PERCENT: 0 % (ref 1–4)
EPITHELIAL CELLS UA: NORMAL /HPF (ref 0–5)
ETHANOL PERCENT: <0.01 %
ETHANOL: <10 MG/DL
GFR AFRICAN AMERICAN: >60 ML/MIN
GFR NON-AFRICAN AMERICAN: >60 ML/MIN
GFR SERPL CREATININE-BSD FRML MDRD: ABNORMAL ML/MIN/{1.73_M2}
GFR SERPL CREATININE-BSD FRML MDRD: ABNORMAL ML/MIN/{1.73_M2}
GLOBULIN: ABNORMAL G/DL (ref 1.5–3.8)
GLUCOSE BLD-MCNC: 137 MG/DL (ref 70–99)
GLUCOSE URINE: ABNORMAL
HCT VFR BLD CALC: 34.6 % (ref 36.3–47.1)
HEMOGLOBIN: 10.5 G/DL (ref 11.9–15.1)
IMMATURE GRANULOCYTES: 0 %
KETONES, URINE: NEGATIVE
LACTIC ACID, WHOLE BLOOD: 1.3 MMOL/L (ref 0.7–2.1)
LACTIC ACID, WHOLE BLOOD: 2.3 MMOL/L (ref 0.7–2.1)
LACTIC ACID: ABNORMAL MMOL/L
LEUKOCYTE ESTERASE, URINE: NEGATIVE
LYMPHOCYTES # BLD: 20 % (ref 24–43)
MAGNESIUM: 1.8 MG/DL (ref 1.6–2.6)
MCH RBC QN AUTO: 26.3 PG (ref 25.2–33.5)
MCHC RBC AUTO-ENTMCNC: 30.3 G/DL (ref 28.4–34.8)
MCV RBC AUTO: 86.7 FL (ref 82.6–102.9)
MDMA URINE: NORMAL
METHADONE SCREEN, URINE: NEGATIVE
METHAMPHETAMINE, URINE: NORMAL
MONOCYTES # BLD: 6 % (ref 3–12)
MUCUS: NORMAL
NITRITE, URINE: NEGATIVE
NRBC AUTOMATED: 0 PER 100 WBC
OPIATES, URINE: NEGATIVE
OTHER OBSERVATIONS UA: NORMAL
OXYCODONE SCREEN URINE: NEGATIVE
PDW BLD-RTO: 15.4 % (ref 11.8–14.4)
PH UA: 6 (ref 5–8)
PHENCYCLIDINE, URINE: NEGATIVE
PLATELET # BLD: 262 K/UL (ref 138–453)
PLATELET ESTIMATE: ABNORMAL
PMV BLD AUTO: 9.2 FL (ref 8.1–13.5)
POTASSIUM SERPL-SCNC: 3.2 MMOL/L (ref 3.7–5.3)
PROPOXYPHENE, URINE: NORMAL
PROTEIN UA: NEGATIVE
RBC # BLD: 3.99 M/UL (ref 3.95–5.11)
RBC # BLD: ABNORMAL 10*6/UL
RBC UA: NORMAL /HPF (ref 0–4)
RENAL EPITHELIAL, UA: NORMAL /HPF
SALICYLATE LEVEL: <1 MG/DL (ref 3–10)
SEG NEUTROPHILS: 74 % (ref 36–65)
SEGMENTED NEUTROPHILS ABSOLUTE COUNT: 3.35 K/UL (ref 1.5–8.1)
SERUM OSMOLALITY: 368 MOSM/KG (ref 275–295)
SODIUM BLD-SCNC: 144 MMOL/L (ref 135–144)
SPECIFIC GRAVITY UA: 1.01 (ref 1–1.03)
TEST INFORMATION: NORMAL
TOTAL PROTEIN: 5.4 G/DL (ref 6.4–8.3)
TOTAL PROTEIN: 7.2 G/DL (ref 6.4–8.3)
TOXIC TRICYCLIC SC,BLOOD: NEGATIVE
TRICHOMONAS: NORMAL
TRICYCLIC ANTIDEPRESSANTS, UR: NORMAL
TURBIDITY: CLEAR
URINE HGB: NEGATIVE
UROBILINOGEN, URINE: NORMAL
WBC # BLD: 4.5 K/UL (ref 3.5–11.3)
WBC # BLD: ABNORMAL 10*3/UL
WBC UA: NORMAL /HPF (ref 0–5)
YEAST: NORMAL

## 2020-07-09 PROCEDURE — 36415 COLL VENOUS BLD VENIPUNCTURE: CPT

## 2020-07-09 PROCEDURE — 2580000003 HC RX 258: Performed by: STUDENT IN AN ORGANIZED HEALTH CARE EDUCATION/TRAINING PROGRAM

## 2020-07-09 PROCEDURE — 80307 DRUG TEST PRSMV CHEM ANLYZR: CPT

## 2020-07-09 PROCEDURE — G0396 ALCOHOL/SUBS INTERV 15-30MN: HCPCS | Performed by: SOCIAL WORKER

## 2020-07-09 PROCEDURE — 6360000002 HC RX W HCPCS: Performed by: STUDENT IN AN ORGANIZED HEALTH CARE EDUCATION/TRAINING PROGRAM

## 2020-07-09 PROCEDURE — G0480 DRUG TEST DEF 1-7 CLASSES: HCPCS

## 2020-07-09 PROCEDURE — 6370000000 HC RX 637 (ALT 250 FOR IP): Performed by: STUDENT IN AN ORGANIZED HEALTH CARE EDUCATION/TRAINING PROGRAM

## 2020-07-09 PROCEDURE — 2500000003 HC RX 250 WO HCPCS: Performed by: STUDENT IN AN ORGANIZED HEALTH CARE EDUCATION/TRAINING PROGRAM

## 2020-07-09 PROCEDURE — 94760 N-INVAS EAR/PLS OXIMETRY 1: CPT

## 2020-07-09 PROCEDURE — 99223 1ST HOSP IP/OBS HIGH 75: CPT | Performed by: PSYCHIATRY & NEUROLOGY

## 2020-07-09 PROCEDURE — 80053 COMPREHEN METABOLIC PANEL: CPT

## 2020-07-09 PROCEDURE — 99223 1ST HOSP IP/OBS HIGH 75: CPT | Performed by: INTERNAL MEDICINE

## 2020-07-09 PROCEDURE — 93010 ELECTROCARDIOGRAM REPORT: CPT | Performed by: INTERNAL MEDICINE

## 2020-07-09 PROCEDURE — 85025 COMPLETE CBC W/AUTO DIFF WBC: CPT

## 2020-07-09 PROCEDURE — 83605 ASSAY OF LACTIC ACID: CPT

## 2020-07-09 PROCEDURE — 1200000000 HC SEMI PRIVATE

## 2020-07-09 PROCEDURE — 81015 MICROSCOPIC EXAM OF URINE: CPT

## 2020-07-09 PROCEDURE — 81003 URINALYSIS AUTO W/O SCOPE: CPT

## 2020-07-09 PROCEDURE — 83735 ASSAY OF MAGNESIUM: CPT

## 2020-07-09 RX ORDER — SODIUM CHLORIDE 0.9 % (FLUSH) 0.9 %
10 SYRINGE (ML) INJECTION PRN
Status: DISCONTINUED | OUTPATIENT
Start: 2020-07-09 | End: 2020-07-11 | Stop reason: HOSPADM

## 2020-07-09 RX ORDER — CALCIUM GLUCONATE 20 MG/ML
2 INJECTION, SOLUTION INTRAVENOUS ONCE
Status: COMPLETED | OUTPATIENT
Start: 2020-07-09 | End: 2020-07-09

## 2020-07-09 RX ORDER — LORAZEPAM 2 MG/ML
2 INJECTION INTRAMUSCULAR ONCE
Status: DISCONTINUED | OUTPATIENT
Start: 2020-07-09 | End: 2020-07-11 | Stop reason: HOSPADM

## 2020-07-09 RX ORDER — POTASSIUM CHLORIDE 7.45 MG/ML
10 INJECTION INTRAVENOUS PRN
Status: DISCONTINUED | OUTPATIENT
Start: 2020-07-09 | End: 2020-07-11 | Stop reason: HOSPADM

## 2020-07-09 RX ORDER — CHLORDIAZEPOXIDE HYDROCHLORIDE 5 MG/1
15 CAPSULE, GELATIN COATED ORAL 3 TIMES DAILY
Status: DISCONTINUED | OUTPATIENT
Start: 2020-07-09 | End: 2020-07-10

## 2020-07-09 RX ORDER — SODIUM CHLORIDE 450 MG/100ML
INJECTION, SOLUTION INTRAVENOUS CONTINUOUS
Status: DISCONTINUED | OUTPATIENT
Start: 2020-07-09 | End: 2020-07-11 | Stop reason: HOSPADM

## 2020-07-09 RX ORDER — METOPROLOL TARTRATE 5 MG/5ML
5 INJECTION INTRAVENOUS EVERY 6 HOURS PRN
Status: DISCONTINUED | OUTPATIENT
Start: 2020-07-09 | End: 2020-07-11 | Stop reason: HOSPADM

## 2020-07-09 RX ORDER — SODIUM CHLORIDE 0.9 % (FLUSH) 0.9 %
10 SYRINGE (ML) INJECTION EVERY 12 HOURS SCHEDULED
Status: DISCONTINUED | OUTPATIENT
Start: 2020-07-09 | End: 2020-07-11 | Stop reason: HOSPADM

## 2020-07-09 RX ORDER — POTASSIUM CHLORIDE 20 MEQ/1
40 TABLET, EXTENDED RELEASE ORAL PRN
Status: DISCONTINUED | OUTPATIENT
Start: 2020-07-09 | End: 2020-07-11 | Stop reason: HOSPADM

## 2020-07-09 RX ADMIN — GABAPENTIN 300 MG: 300 CAPSULE ORAL at 20:22

## 2020-07-09 RX ADMIN — QUETIAPINE FUMARATE 200 MG: 200 TABLET ORAL at 20:22

## 2020-07-09 RX ADMIN — ENOXAPARIN SODIUM 40 MG: 40 INJECTION SUBCUTANEOUS at 10:43

## 2020-07-09 RX ADMIN — FOLIC ACID 1 MG: 1 TABLET ORAL at 07:23

## 2020-07-09 RX ADMIN — Medication 10 ML: at 10:44

## 2020-07-09 RX ADMIN — PANTOPRAZOLE SODIUM 40 MG: 40 TABLET, DELAYED RELEASE ORAL at 07:23

## 2020-07-09 RX ADMIN — GABAPENTIN 300 MG: 300 CAPSULE ORAL at 01:35

## 2020-07-09 RX ADMIN — POTASSIUM CHLORIDE 40 MEQ: 1500 TABLET, EXTENDED RELEASE ORAL at 12:19

## 2020-07-09 RX ADMIN — Medication 10 ML: at 20:23

## 2020-07-09 RX ADMIN — METHYLPREDNISOLONE SODIUM SUCCINATE 125 MG: 125 INJECTION, POWDER, FOR SOLUTION INTRAMUSCULAR; INTRAVENOUS at 06:03

## 2020-07-09 RX ADMIN — ONDANSETRON 4 MG: 2 INJECTION INTRAMUSCULAR; INTRAVENOUS at 21:12

## 2020-07-09 RX ADMIN — LORAZEPAM 2 MG: 2 INJECTION INTRAMUSCULAR; INTRAVENOUS at 07:23

## 2020-07-09 RX ADMIN — CALCIUM GLUCONATE 2 G: 20 INJECTION, SOLUTION INTRAVENOUS at 10:43

## 2020-07-09 RX ADMIN — CHLORDIAZEPOXIDE HYDROCHLORIDE 15 MG: 5 CAPSULE ORAL at 14:25

## 2020-07-09 RX ADMIN — LORAZEPAM 2 MG: 0.5 TABLET ORAL at 14:25

## 2020-07-09 RX ADMIN — MULTIPLE VITAMINS W/ MINERALS TAB 1 TABLET: TAB at 10:45

## 2020-07-09 RX ADMIN — Medication 100 MG: at 10:43

## 2020-07-09 RX ADMIN — CHLORDIAZEPOXIDE HYDROCHLORIDE 15 MG: 5 CAPSULE ORAL at 20:22

## 2020-07-09 RX ADMIN — QUETIAPINE FUMARATE 200 MG: 200 TABLET ORAL at 01:35

## 2020-07-09 RX ADMIN — METOPROLOL TARTRATE 5 MG: 5 INJECTION, SOLUTION INTRAVENOUS at 22:08

## 2020-07-09 RX ADMIN — OXCARBAZEPINE 150 MG: 150 TABLET, FILM COATED ORAL at 20:22

## 2020-07-09 RX ADMIN — LORAZEPAM 2 MG: 2 INJECTION INTRAMUSCULAR; INTRAVENOUS at 19:42

## 2020-07-09 RX ADMIN — GABAPENTIN 300 MG: 300 CAPSULE ORAL at 17:59

## 2020-07-09 RX ADMIN — GABAPENTIN 300 MG: 300 CAPSULE ORAL at 10:43

## 2020-07-09 RX ADMIN — LORAZEPAM 2 MG: 2 INJECTION INTRAMUSCULAR; INTRAVENOUS at 18:46

## 2020-07-09 RX ADMIN — OXCARBAZEPINE 150 MG: 150 TABLET, FILM COATED ORAL at 07:24

## 2020-07-09 RX ADMIN — LORAZEPAM 2 MG: 0.5 TABLET ORAL at 10:43

## 2020-07-09 RX ADMIN — SODIUM CHLORIDE: 4.5 INJECTION, SOLUTION INTRAVENOUS at 14:25

## 2020-07-09 NOTE — CARE COORDINATION
Case Management Initial Discharge Plan  [place]             Met with:patient to discuss discharge plans. Information verified: address, contacts, phone number, , insurance Yes    Emergency Contact/Next of Kin name & number: Patricia Daniels, sister [phone] or wife Truong Almanza    PCP: Jose A Castillo MD  Date of last visit:     Insurance Provider: [facility] Medicare and Medicaid    Discharge Planning    Living Arrangements:  Alone   Support Systems:  Spouse/Significant Other, Family Members    Home has 3rd floor apartment stories  0/elevator stairs to climb to get into front door, n/a stairs to climb to reach second floor  Location of bedroom/bathroom in home main    Patient able to perform ADL's:Independent    Current Services (outpatient & in home)   DME equipment: cane and nebulizer  DME provider: [facility]    Receiving oral anticoagulation therapy? No    If indicated:   Physician managing anticoagulation treatment: n/a Where does patient obtain lab work for ATC treatment? Potential Assistance Needed:  Home Care    Patient agreeable to home care: Yes  Freedom of choice provided:  yes    Prior SNF/Rehab Placement and Facility: none  Agreeable to SNF/Rehab: No  [place] of choice provided: n/a     Evaluation: n/a    Expected Discharge date:       Patient expects to be discharged to:  home  Follow Up Appointment: Best Day/ Time:      Transportation provider: gabe  Transportation arrangements needed for discharge: No    Readmission Risk              Risk of Unplanned Readmission:        32             Does patient have a readmission risk score greater than 14?: Yes  If yes, follow-up appointment must be made within 7 days of discharge. Goals of Care: no more seizures      Discharge Plan: home, has home care list. Will need cab.            Electronically signed by Bee Owen RN on 20 at 6:38 PM EDT

## 2020-07-09 NOTE — CARE COORDINATION
Consult received this date for consideration for rehab  Chart reviewed  Pt presents with seizure, headache, alcohol withdrawal  Met with pt this date was awake and answer all questions appropriately. Pt states she has not used heroin since Jan 2020. Last used fentanyl in Dec 2019. Denies any cocaine use. Pt admits to drinking 6 tall boys daily and has no desire to stop drinking. Recently completed rehab at [facility] in [facility] in June after  2 month stay. Pt states she relapsed due to her lifestyle. Declines rehab at this time. Pt states she is moving into her new apartment this week at [employer] Treatment resources given if decides to seek treatment after d/c. Insurance is [employer].

## 2020-07-09 NOTE — PROGRESS NOTES
Neurology Resident Progress Note      SUBJECTIVE:  This is a 53 y.o.  female admitted 7/8/2020 for Seizure [facility]) [R56.9]  This is a follow-up neurology progress note. The patient was seen and examined and the chart was reviewed. Pt has a PMH of seizures on trileptal \" non compliant\" substance abuse, MDD , depression, bipolar (take lithium). Asthma who presented with episodes of seizures, fall, headache and right sided weakness and numbness and alcohol intoxication,   First episode was the night before her admission, pt doesn't remember anything form it, she reported that she fell and had a LOC from it, in the morning she woke up and drank 1 pint of vodka, she had another seizure so she rushed to the hospital by EMS, in the ED patient had another seizure like event, stroke alert was called due to the subsequent right upper and lower Ext weakness and numbness,   NIHSS was : 6, CT head : unremarkable, CTA showed: mild right vertebral artery origin stenosis, alcohol level in the ED was 288   Multiple admissions in the past for similar events, (oct 2019, DEC 2019)   MRI done in the past and EEG has been unremarkable, most of the times pt found to be intoxicated with alcohol and cocaine. ROS  Constitutional: no fever, chills, fatigue  HENT: No change in vision or hearing   Respiratory: No cough, SOB, wheezing. Cardiovascular:  No chest pain, palpitations, leg swelling. Gastrointestinal: No nausea, vomiting, diarrhea. Genitourinary: No increased frequency, urgency. Musculoskeletal: No myalgia or arthralgia. Skin: No rashes or scarring or bruises. Neurological: No headache, paresthesia, or focal weakness. Endo/Heme/Allergies: Negative for itchy eyes or runny nose. Psychiatric/Behavioral: No anxiety or depressed mood.      HPI  See H&P     sodium chloride flush  10 mL Intravenous 2 times per day    chlordiazePOXIDE  15 mg Oral TID    OXcarbazepine  150 mg Oral BID    enoxaparin  40 mg Subcutaneous Daily    gabapentin  300 mg Oral TID    pantoprazole  40 mg Oral QAM AC    QUEtiapine  200 mg Oral Nightly    thiamine  100 mg Oral Daily    nicotine  1 patch Transdermal Daily    folic acid  1 mg Oral Daily    therapeutic multivitamin-minerals  1 tablet Oral Daily       Past Medical History:   Diagnosis Date    Alcoholic hepatitis without ascites 94/98/1391    Alcoholic ketoacidosis 29/59/5276    Alcoholism ([facility]     3 pints daily    Anxiety     Asthma     Bipolar affective disorder ([facility] 6/12/2019    Depression     Drug overdose, accidental or unintentional, initial encounter 9/29/2018    Drug overdose, intentional self-harm, initial encounter ([facility] 5/8/2018    [place] coma scale total score 3-8 ([facility]     Hepatitis C antibody positive in blood 6/18/2019    History of gastric surgery (gastric sleeve 2012) 4/25/2017    Intentional drug overdose ([facility] 5/7/2018    MDD (major depressive disorder), recurrent episode ([facility] 5/8/2018    Pneumonia     Polysubstance abuse ([facility]     drug abuse includes, cocaine, IV heroin, cannabis, and ETOH    Post traumatic stress disorder     Seizure ([address] 12/17/2019    Smoker unmotivated to quit 4/25/2017    Suicide attempt ([address] 11/08/2017    S/A by overdosing on Trazodone and Seroquel    Suicide ideation     Toxic metabolic encephalopathy 8/06/0809       Past Surgical History:   Procedure Laterality Date    CARPAL TUNNEL RELEASE      CHOLECYSTECTOMY      HYSTERECTOMY      HYSTERECTOMY, TOTAL ABDOMINAL      STOMACH SURGERY      gastric sleeve    UPPER GASTROINTESTINAL ENDOSCOPY N/A 6/19/2019    EGD BIOPSY performed by Kimmy Sandy MD at [facility]:      Blood pressure (!) 133/92, pulse 93, temperature 98.1 °F (36.7 °C), temperature source Oral, resp. rate 20, height 5' (1.524 m), weight 140 lb (63.5 kg), SpO2 98 %, not currently breastfeeding.    General Examination    General Resting comfortably in bed   Head Normocephalic, without Problem:    Seizure ([facility]  Active Problems:    Heroin dependence (HCC)    Major depression, recurrent ([facility]    Depressed bipolar I disorder ([facility]    Polysubstance abuse ([facility]    Alcoholic hepatitis without ascites    Alcohol withdrawal syndrome with complication ([facility]    [facility] intoxication without complication [facility]  Resolved Problems:    * No resolved hospital problems. *    Delroy's paralysis versus ischemic stroke versus provoked seizure due to substance abuse  -Patient has a history of multiple ED admission due to the same complaints and found with unremarkable MRIs and EEGs and symptoms resolved in a matter of hours,    -Patient has risk factors for ischemic stroke (smoking, alcohol, illicit drugs,):  Patient started on aspirin 81 mg p.o. daily  -We will hold on EEGs and MRIs, patient has had before multiple times  -Seizure precautions  -Atorvastatin 40 mg  -Trileptal home dosage: 150 mg    #Intoxication:  -Drank 1 pint of vodka yesterday, alcohol level of 288 yesterday  -On [facility] protocol    José Paez MD, 7/9/2020 1:40 PM       I reviewed and discussed with the resident of his/her note, the documented findings and plan of care. I have personally seen and evaluated the patient and test results. I agree with the documentation except following modifications/additions:    Please see neurology consult note for attending note.      Warnell Apley, MD, [address]  Neurology Attending  [facility], Neurology

## 2020-07-09 NOTE — PROGRESS NOTES
memory of the seizure, but states that her friends saw it; after gaining consciousness, she started having chills, right sided UL and LL weakness which then resolved on its own. Patient denies any SOB, chest pain, fever, nausea, vomiting. Patient said that she had been having some chest tightness and cough with phlegm production for a few weeks.     Neurology was consulted for stroke alert. Stroke team evaluated patient with CT head and neck which were unremarkable with only some mild right vertebral artery origin stenosis.  Patient in ED was found to have an alcohol level of 288.     In the ED patient was afebrile, tachycardic (), and hemodynamically stable with a systolic blood pressure of 133.  Saturating at 94% on room air.     PMH significant for seizure disorder, bipolar disorder, asthma, polysubstance abuse, alcoholism, suicide ideation and attempt, depression, alcoholic hepatitis without ascites, alcoholic ketosis, toxic metabolic encephalopathy.     CBC was unremarkable; BMP showed lactic acid of 2.48. Anion gap is 19; bicarb is 20. Serum osmolality ordered to calculate the osmolar gap.     CTa head and neck with contrast showed mild narrowing at the right vertebral artery origin, unchanged. Will be followed up by Neurology. CXR and CT head were unremarkable.       OBJECTIVE     Vital Signs:  BP (!) 133/92   Pulse 96   Temp 98.1 °F (36.7 °C) (Oral)   Resp 20   Ht 5' (1.524 m)   Wt 140 lb (63.5 kg)   SpO2 98%   BMI 27.34 kg/m²     Temp (24hrs), Av.1 °F (36.7 °C), Min:98 °F (36.7 °C), Max:98.1 °F (36.7 °C)    In: 1000   Out: -     Physical Exam:  Constitutional: This is a well developed, well nourished, 25-29.9 - Overweight 53y.o. year old female who is alert, oriented, cooperative and in no apparent distress. Head:normocephalic and atraumatic. EENT:  PERRLA. No conjunctival injections. Septum was midline, mucosa was without erythema, exudates or cobblestoning. No thrush was noted. Neck: Supple without thyromegaly. No elevated JVP. Trachea was midline. Respiratory: Chest was symmetrical without dullness to percussion. Breath sounds bilaterally were clear to auscultation. There were no wheezes, rhonchi or rales. There is no intercostal retraction or use of accessory muscles. No egophony noted. Cardiovascular: Regular without murmur, clicks, gallops or rubs. Abdomen: Slightly rounded and soft without organomegaly. No rebound, rigidity or guarding was appreciated. Lymphatic: No lymphadenopathy. Musculoskeletal: Normal curvature of the spine. No gross muscle weakness. Extremities:  No lower extremity edema, ulcerations, tenderness, varicosities or erythema. Muscle size, tone and strength are normal.  No involuntary movements are noted. Skin:  Warm and dry. Good color, turgor and pigmentation. No lesions or scars.   No cyanosis or clubbing  Neurological/Psychiatric: The patient's general behavior, level of consciousness, thought content and emotional status is normal.        Medications:  Scheduled Medications:    sodium chloride flush  10 mL Intravenous 2 times per day    calcium gluconate  2 g Intravenous Once    OXcarbazepine  150 mg Oral BID    enoxaparin  40 mg Subcutaneous Daily    gabapentin  300 mg Oral TID    pantoprazole  40 mg Oral QAM AC    QUEtiapine  200 mg Oral Nightly    thiamine  100 mg Oral Daily    nicotine  1 patch Transdermal Daily    folic acid  1 mg Oral Daily    therapeutic multivitamin-minerals  1 tablet Oral Daily     Continuous Infusions:   PRN Medicationssodium chloride flush, 10 mL, PRN  potassium chloride, 40 mEq, PRN    Or  potassium alternative oral replacement, 40 mEq, PRN    Or  potassium chloride, 10 mEq, PRN  ondansetron, 4 mg, Q8H PRN  albuterol, 2.5 mg, Q4H PRN  acetaminophen, 650 mg, Q6H PRN    Or  acetaminophen, 650 mg, Q6H PRN  polyethylene glycol, 17 g, Daily PRN  LORazepam, 1 mg, Q1H PRN    Or  LORazepam, 1 mg, Q1H PRN Or  LORazepam, 2 mg, Q1H PRN    Or  LORazepam, 2 mg, Q1H PRN    Or  LORazepam, 3 mg, Q1H PRN    Or  LORazepam, 3 mg, Q1H PRN    Or  LORazepam, 4 mg, Q1H PRN    Or  LORazepam, 4 mg, Q1H PRN        Diagnostic Labs:  CBC:   Recent Labs     07/08/20  1308 07/09/20  0708   WBC 7.0 4.5   RBC 4.88 3.99   HGB 13.2 10.5*   HCT 43.0 34.6*   MCV 88.1 86.7   RDW 15.7* 15.4*    262     BMP:   Recent Labs     07/08/20  1301 07/08/20  1308 07/09/20  0708   NA  --  142 144   K  --  4.3 3.2*   CL  --  103 113*   CO2  --  20 20   BUN  --  6 7   CREATININE 0.59 0.56 0.30*     BNP: No results for input(s): BNP in the last 72 hours. PT/INR:   Recent Labs     07/08/20  1308   PROTIME 9.6   INR 0.9     APTT:   Recent Labs     07/08/20  1308   APTT 22.2     CARDIAC ENZYMES:   Recent Labs     07/08/20  1308   CKMB 1.9     FASTING LIPID PANEL:  Lab Results   Component Value Date    CHOL 196 06/13/2019    HDL 56 06/13/2019    TRIG 99 06/13/2019     LIVER PROFILE:   Recent Labs     07/09/20  0141 07/09/20  0708   AST 35* 23   ALT 38* 20   BILIDIR 0.11  --    BILITOT 0.27* 0.24*   ALKPHOS 85 64      MICROBIOLOGY:   Lab Results   Component Value Date/Time    CULTURE NO SIGNIFICANT GROWTH 11/27/2018 10:18 PM       Imaging:    Ct Head Wo Contrast    Result Date: 7/8/2020  No acute intracranial abnormality. Ct Cervical Spine Wo Contrast    Result Date: 7/8/2020  No acute abnormality of the cervical spine. Xr Chest Portable    Result Date: 7/8/2020  No acute findings in the chest.     Cta Head Neck W Contrast    Result Date: 7/8/2020  Mild narrowing at the right vertebral artery origin, unchanged. Otherwise unremarkable CTA of the head and neck. ASSESSMENT & PLAN     ASSESSMENT / PLAN:       Seizure ([address]  Plan:   -seizure precautions initiated. - Neuro consulted: no EEGs or MRIs at this time.  Continue withTrileptal 150 mg BID  - Solumedrol 125 mg x 3 doses  -Lorazepam 1 mg IV q10 min if seizure lasts for more than 2 min.     Alcohol withdrawal  Plan:  - Alcohol withdrawal protocol initiated. Patient to receive Ativan as per CIWA score. ETOH . 288 at time of admission. Pt drinks 6-9 24 ounce beers daily plus 1 pint vodka. - Folate, B12 levels ordered. - Thiamine 839 mg po, Folic acid 1 mg po, multi vitamin 1 tab po daily.     Possible lactic acidosis:  Plan:  - serum osmolality tested for checking osmolar gap. Gap -5.1  - lactic acid retest; lactic acid 1.3>2.3   - follow CMP       Bipolar disorder  Plan:  - Quetiapine/ Seroquel estarted  - oxcarbazepine 150 mg po bid for bipolar disorder  - patient is medically non compliant.        DVT ppx: lovenox  GI ppx: protonix     PT/OT/SW: ordered  Discharge Planning:      Tanika Galindo MD  Internal Medicine Resident, PGY-1  [facility];  [place], [place]  7/9/2020, 10:46 AM

## 2020-07-09 NOTE — PROGRESS NOTES
Pt called out, said she \"needed help\". Writer was doing CIWA scale to administer Ativan for ETOH withdrawal. When writer got to room pt started seizing, 2 Mg of IV ativan administered. Pt stated \"she could feel her toes starting to curl and she couldn't stop them\" and that is why she called out. TeleSitter in room and on, awaiting bedfellows, suction set up at bedside. Will continue to monitor pt.

## 2020-07-09 NOTE — ED PROVIDER NOTES
Faculty Sign-Out Attestation  Handoff taken on the following patient from prior Attending Physician: Juanna Alpers    I was available and discussed any additional care issues that arose and coordinated the management plans with the resident(s) caring for the patient during my duty period. Any areas of disagreement with residents documentation of care or procedures are noted on the chart. I was personally present for the key portions of any/all procedures during my duty period. I have documented in the chart those procedures where I was not present during the vargas portions.         Humberto Goodpasture, MD  Attending Physician       Humberto Goodpasture, MD  07/09/20 [id number]

## 2020-07-09 NOTE — H&P
[address]     Department of Internal Medicine - Staff Internal Medicine Teaching Service          ADMISSION NOTE/HISTORY AND PHYSICAL EXAMINATION   Date: 7/8/2020  Patient Name: Roe Mathew  Date of admission: 7/8/2020 12:55 PM  YOB: 1975  PCP: Rosalva Romberg, MD  History Obtained From:  patient, electronic medical record    CHIEF COMPLAINT     Chief complaint: Seizure, headache, alcohol withdrawal    HISTORY OF PRESENTING ILLNESS     The patient is a pleasant 53 y.o. female presents with a chief complaint of having a seizure today, followed by headache and symptoms of alcohol withdrawal.    According to the patient, she was drinking alcohol with friends today, when she lost consciousness. Her friends called to her partner for help; 911 was called and the patient was brought to the ED. Patient has no memory of the seizure, but states that her friends saw it; after gaining consciousness, she started having chills, right sided UL and LL weakness which then resolved on its own. Patient denies any SOB, chest pain, fever, nausea, vomiting. Patient said that she had been having some chest tightness and cough with phlegm production for a few weeks. Neurology was consulted for stroke alert. Stroke team evaluated patient with CT head and neck which were unremarkable with only some mild right vertebral artery origin stenosis. Patient in ED was found to have an alcohol level of 288. In the ED patient was afebrile, tachycardic (), and hemodynamically stable with a systolic blood pressure of 133. Saturating at 94% on room air. PMH significant for seizure disorder, bipolar disorder, asthma, polysubstance abuse, alcoholism, suicide ideation and attempt, depression, alcoholic hepatitis without ascites, alcoholic ketosis, toxic metabolic encephalopathy. CBC was unremarkable; BMP showed lactic acid of 2.48. Anion gap is 19; bicarb is 20.  Serum osmolality ordered to calculate the osmolar gap. CTa head and neck with contrast showed mild narrowing at the right vertebral artery origin, unchanged. Will be followed up by Neurology. CXR and CT head were unremarkable. Review of Systems:  General ROS: Positive for chills. Negative for nausea, vomiting, diarrhea, fever. Respiratory ROS:  Positive for cough with sputum, wheezing. Cardiovascular ROS:  Negative for chest pain, leg swelling, palpitations. Gastrointestinal ROS: Negative for nausea, vomiting, abdominal pain  Genito-Urinary ROS:  Negative for hematuria, B/L lower back pain, urinary frequency, dysuria. Musculoskeletal ROS:  Negative for back pain  Neurological ROS:  Negative for dizziness, light headedness, headache, weakness, numbness, vision alteration. Skin & Dermatological ROS:  Negative for rash. Psychological ROS:  Negative for anxiety, depression, psychosis.     PAST MEDICAL HISTORY     Past Medical History:   Diagnosis Date    Alcoholic hepatitis without ascites 58/54/3755    Alcoholic ketoacidosis 51/77/1719    Alcoholism ([address]     3 pints daily    Anxiety     Asthma     Bipolar affective disorder ([address] 6/12/2019    Depression     Drug overdose, accidental or unintentional, initial encounter 9/29/2018    Drug overdose, intentional self-harm, initial encounter ([address] 5/8/2018    [place] coma scale total score 3-8 ([address]     Hepatitis C antibody positive in blood 6/18/2019    History of gastric surgery (gastric sleeve 2012) 4/25/2017    Intentional drug overdose ([address] 5/7/2018    MDD (major depressive disorder), recurrent episode ([address] 5/8/2018    Pneumonia     Polysubstance abuse ([address]     drug abuse includes, cocaine, IV heroin, cannabis, and ETOH    Post traumatic stress disorder     Seizure ([address] 12/17/2019    Smoker unmotivated to quit 4/25/2017    Suicide attempt ([address] 11/08/2017    S/A by overdosing on Trazodone and Seroquel    Suicide ideation     Toxic metabolic encephalopathy times daily 10/15/19   Omid Muñoz DO   QUEtiapine (SEROQUEL) 200 MG tablet Take 1 tablet by mouth nightly 10/15/19   Prince Herman DO   sodium chloride (OCEAN) 0.65 % nasal spray 1 spray by Nasal route as needed for Congestion 10/15/19   Prince Herman DO   venlafaxine (EFFEXOR XR) 75 MG extended release capsule Take 1 capsule by mouth daily (with breakfast) 10/15/19   Prince Herman DO       SOCIAL HISTORY     Tobacco: 30 pack years  Alcohol: 20 beers + 1 pint vodka/ day on avg  Illicits: polysubstance  Occupation:     FAMILY HISTORY     Family History   Problem Relation Age of Onset    Brain Cancer Mother     Cancer Mother         \"female cancer\"    Heart Disease Father        PHYSICAL EXAM     Vitals: /75   Pulse 104   Temp 98 °F (36.7 °C) (Oral)   Resp 17   Ht 5' (1.524 m)   Wt 140 lb (63.5 kg)   SpO2 93%   BMI 27.34 kg/m²   Tmax: Temp (24hrs), Av °F (36.7 °C), Min:98 °F (36.7 °C), Max:98 °F (36.7 °C)    Last Body weight:   Wt Readings from Last 3 Encounters:   20 140 lb (63.5 kg)   20 138 lb (62.6 kg)   19 125 lb (56.7 kg)     Body Mass Index : Body mass index is 27.34 kg/m². PHYSICAL EXAMINATION:  Constitutional: This is a well developed, well nourished, 25-29.9 - Overweight 53y.o. year old female who is alert, oriented, cooperative and in no apparent distress. Head:normocephalic and atraumatic. EENT:  PERRLA. No conjunctival injections. Septum was midline, mucosa was without erythema, exudates or cobblestoning. No thrush was noted. Neck: Supple without thyromegaly. No elevated JVP. Trachea was midline. Respiratory: Chest was symmetrical without dullness to percussion. Breath sounds bilaterally were clear to auscultation. There were no wheezes, rhonchi or rales. There is no intercostal retraction or use of accessory muscles. No egophony noted. Cardiovascular: Regular without murmur, clicks, gallops or rubs.    Abdomen: precautions initiated. Alcohol withdrawal  Plan:  - Alcohol withdrawal protocol initiated. Patient to receive Ativan as per CIWA score. - Folate, B12 levels ordered. - Thiamine 576 mg po, Folic acid 1 mg po, multi vitamin 1 tab po daily. Possible alcoholic ketoacidosis:  Plan:  - serum osmolality tested for checking osmolar gap. - lactic acid retest   - Hepatic functional panel  -CMP ordered for tomorrow    Bipolar disorder  Plan:  - Quetiapine/ Seroquel estarted  - oxcarbazepine 150 mg po bid for bipolar disorder      DVT ppx: lovenox  GI ppx: protonix    PT/OT/SW: ordered  Discharge Planning:    Tamara Layne MD  Internal Medicine Resident, PGY-1  [facility]; [place], [place]  7/8/2020, 10:12 PM   Attending Physician Statement  I have discussed the care of Roe Mathew, including pertinent history and exam findings,  with the resident. I have seen and examined the patient and the key elements of all parts of the encounter have been performed by me. I agree with the assessment, plan and orders as documented by the resident with additions . Treatment plan Discussed with nursing staff in detail , all questions answered . Electronically signed by Kelsea Ledesma MD on   7/9/20 at 4:46 PM EDT    Please note that this chart was generated using voice recognition Dragon dictation software. Although every effort was made to ensure the accuracy of this automated transcription, some errors in transcription may have occurred.

## 2020-07-09 NOTE — ED PROVIDER NOTES
[facility] ED  Emergency Department  Emergency Medicine Resident Sign-out     Care of Vidhya Frye was assumed from Dr. Kasia Currie and is being seen for Seizures and Alcohol Intoxication  . The patient's initial evaluation and plan have been discussed with the prior provider who initially evaluated the patient.      EMERGENCY DEPARTMENT COURSE / MEDICAL DECISION MAKING:       MEDICATIONS GIVEN:  Orders Placed This Encounter   Medications    LORazepam (ATIVAN) injection 2 mg    iohexol (OMNIPAQUE 350) solution 90 mL    OXcarbazepine (TRILEPTAL) tablet 150 mg    methylPREDNISolone sodium (SOLU-MEDROL) injection 125 mg    magnesium sulfate 1 g in dextrose 5% 100 mL IVPB    0.9 % sodium chloride bolus    sodium chloride 0.9 % 1,379 mL with folic acid 1 mg, adult multi-vitamin with vitamin k 10 mL, thiamine 100 mg    acetaminophen (TYLENOL) tablet 650 mg    enoxaparin (LOVENOX) injection 40 mg    ondansetron (ZOFRAN) injection 4 mg       LABS / RADIOLOGY:     Labs Reviewed   STROKE PANEL - Abnormal; Notable for the following components:       Result Value    Calcium 8.2 (*)     Anion Gap 19 (*)     RDW 15.7 (*)     Immature Granulocytes 1 (*)     All other components within normal limits   POTASSIUM (POC) - Abnormal; Notable for the following components:    POC Potassium 4.6 (*)     All other components within normal limits   CHLORIDE (POC) - Abnormal; Notable for the following components:    POC Chloride 112 (*)     All other components within normal limits   CALCIUM, IONIC (POC) - Abnormal; Notable for the following components:    POC Ionized Calcium 1.03 (*)     All other components within normal limits   ACETAMINOPHEN LEVEL - Abnormal; Notable for the following components:    Acetaminophen Level <5 (*)     All other components within normal limits   ETHANOL - Abnormal; Notable for the following components:    Ethanol 288 (*)     Ethanol percent 0.288 (*)     All other components within normal limits   SALICYLATE LEVEL - Abnormal; Notable for the following components:    Salicylate Lvl <1 (*)     All other components within normal limits   VENOUS BLOOD GAS, POINT OF CARE - Abnormal; Notable for the following components:    pCO2, Jeremi 40.5 (*)     pO2, Jeremi 52.3 (*)     O2 Sat, Jeremi 86 (*)     All other components within normal limits   LACTIC ACID,POINT OF CARE - Abnormal; Notable for the following components:    POC Lactic Acid 2.48 (*)     All other components within normal limits   HGB/HCT   SODIUM (POC)   TOXIC TRICYCLIC SC,B   VITAMIN D 25 HYDROXY   VITAMIN B12 & FOLATE   URINE DRUG SCREEN   URINALYSIS WITH MICROSCOPIC   OXCARBAZEPINE LEVEL   CREATININE W/GFR POINT OF CARE   POCT GLUCOSE   ANION GAP (CALC) POC       Ct Head Wo Contrast    Result Date: 7/8/2020  EXAMINATION: CT OF THE HEAD WITHOUT CONTRAST  7/8/2020 1:10 pm TECHNIQUE: CT of the head was performed without the administration of intravenous contrast. Dose modulation, iterative reconstruction, and/or weight based adjustment of the mA/kV was utilized to reduce the radiation dose to as low as reasonably achievable. COMPARISON: 01/15/2020. HISTORY: ORDERING SYSTEM PROVIDED HISTORY: Stroke alert TECHNOLOGIST PROVIDED HISTORY: Stroke alert Is the patient pregnant?->No Reason for Exam: stroke alert. pt states she cant move FINDINGS: BRAIN/VENTRICLES: The cerebral and cerebellar parenchyma demonstrate volume loss. There are no areas of acute hemorrhage, mass, or midline shift. Gray-white differentiation is maintained without evidence of acute infarct. No abnormal extra-axial fluid collections. The ventricles are proportional to the cerebral sulci. ORBITS: The visualized portion of the orbits demonstrate no acute abnormality. SINUSES: There is a right maxillary mucous retention cyst.  Thickened secretions are noted in the nasal cavity. The rest of the visualized paranasal sinuses and mastoid air cells are clear.  SOFT TISSUES/SKULL:  The calvarium is intact. No appreciable scalp soft tissue swelling. Calcifications are noted in the palatine tonsils, compatible with sequela of remote infection. No acute intracranial abnormality. Ct Cervical Spine Wo Contrast    Result Date: 7/8/2020  EXAMINATION: CT OF THE CERVICAL SPINE WITHOUT CONTRAST 7/8/2020 1:09 pm TECHNIQUE: CT of the cervical spine was performed without the administration of intravenous contrast. Multiplanar reformatted images are provided for review. Dose modulation, iterative reconstruction, and/or weight based adjustment of the mA/kV was utilized to reduce the radiation dose to as low as reasonably achievable. COMPARISON: 09/28/2018. HISTORY: ORDERING SYSTEM PROVIDED HISTORY: fall, LOC, numbness TECHNOLOGIST PROVIDED HISTORY: fall, LOC, numbness Is the patient pregnant?->No Reason for Exam: pt states she cant move. stroke alert FINDINGS: BONES/ALIGNMENT: There is no acute fracture or traumatic malalignment. DEGENERATIVE CHANGES: Mild multilevel degenerative disc disease. The facet joints are unremarkable. SOFT TISSUES: There is no prevertebral soft tissue swelling. No acute abnormality of the cervical spine. Xr Chest Portable    Result Date: 7/8/2020  EXAMINATION: ONE XRAY VIEW OF THE CHEST 7/8/2020 2:27 pm COMPARISON: Acute abdominal series 11/27/2018 HISTORY: ORDERING SYSTEM PROVIDED HISTORY: Fall TECHNOLOGIST PROVIDED HISTORY: Fall Reason for Exam: Fall one week ago/ chest pain/ asthma/  AP erect/ port. Acuity: Unknown Type of Exam: Unknown FINDINGS: Clear lungs. No definite findings of pneumothorax or pleural effusion. Normal mediastinal, hilar, and cardiac contours. No obvious acute fracture. Unchanged remote fracture of the posterior left 7th rib. Joints maintain anatomic alignment.      No acute findings in the chest.     Cta Head Neck W Contrast    Result Date: 7/8/2020  EXAMINATION: CTA OF THE HEAD AND NECK WITH CONTRAST 7/8/2020 1:09 pm: TECHNIQUE: CTA of the head and neck was performed with the administration of intravenous contrast. Multiplanar reformatted images are provided for review. MIP images are provided for review. Stenosis of the internal carotid arteries measured using NASCET criteria. Dose modulation, iterative reconstruction, and/or weight based adjustment of the mA/kV was utilized to reduce the radiation dose to as low as reasonably achievable. COMPARISON: 12/17/2019. CT head 7/8/2020. HISTORY: ORDERING SYSTEM PROVIDED HISTORY: Stroke alert TECHNOLOGIST PROVIDED HISTORY: Stroke alert Cannot move. Acute. Initial exam FINDINGS: CTA NECK: AORTIC ARCH/ARCH VESSELS: There is a normal branch pattern of the aortic arch. The innominate and subclavian arteries are patent. CAROTID ARTERIES: The arteries in the neck are tortuous which may be related to underlying hypertension. The common carotid and internal carotid arteries are patent without evidence of a flow-limiting stenosis or dissection. VERTEBRAL ARTERIES: Fibrocalcific plaque is noted at the right vertebral artery origin with areas of mild narrowing. The left vertebral artery is patent. SOFT TISSUES: Gravity dependent atelectasis is noted in the lungs. Paraseptal emphysema is noted. No superior mediastinal adenopathy. The thyroid, submandibular, and parotid glands are unremarkable. The parapharyngeal fat spaces are preserved. Calcifications are noted in the palatine tonsils, likely related to sequela of remote infection. There is no laryngeal or pharyngeal mass. No cervical adenopathy or soft tissue swelling. BONES: There is a right maxillary mucous retention cyst.  The patient has several teeth missing. Degenerative changes are noted along the spine. No fracture. CTA HEAD: ANTERIOR CIRCULATION: Bilateral P comms are noted. The internal carotid, anterior cerebral, and middle cerebral arteries are unremarkable. POSTERIOR CIRCULATION: The posterior cerebral and basilar arteries are patent.

## 2020-07-09 NOTE — ED NOTES
Report called to [facility] for bed 246. No questions or concerns noted.       Brenna Pat RN  07/09/20 2183

## 2020-07-10 ENCOUNTER — APPOINTMENT (OUTPATIENT)
Dept: GENERAL RADIOLOGY | Age: 45
DRG: 897 | End: 2020-07-10
Payer: MEDICARE

## 2020-07-10 VITALS
HEART RATE: 91 BPM | TEMPERATURE: 97.7 F | HEIGHT: 60 IN | BODY MASS INDEX: 28.78 KG/M2 | RESPIRATION RATE: 18 BRPM | SYSTOLIC BLOOD PRESSURE: 121 MMHG | WEIGHT: 146.61 LBS | OXYGEN SATURATION: 94 % | DIASTOLIC BLOOD PRESSURE: 84 MMHG

## 2020-07-10 LAB
ABSOLUTE EOS #: 0.07 K/UL (ref 0–0.44)
ABSOLUTE IMMATURE GRANULOCYTE: <0.03 K/UL (ref 0–0.3)
ABSOLUTE LYMPH #: 3.08 K/UL (ref 1.1–3.7)
ABSOLUTE MONO #: 0.68 K/UL (ref 0.1–1.2)
ALBUMIN SERPL-MCNC: 3.5 G/DL (ref 3.5–5.2)
ALBUMIN/GLOBULIN RATIO: 1.1 (ref 1–2.5)
ALP BLD-CCNC: 71 U/L (ref 35–104)
ALT SERPL-CCNC: 29 U/L (ref 5–33)
ANION GAP SERPL CALCULATED.3IONS-SCNC: 11 MMOL/L (ref 9–17)
AST SERPL-CCNC: 26 U/L
BASOPHILS # BLD: 0 % (ref 0–2)
BASOPHILS ABSOLUTE: 0.03 K/UL (ref 0–0.2)
BILIRUB SERPL-MCNC: 0.19 MG/DL (ref 0.3–1.2)
BUN BLDV-MCNC: 12 MG/DL (ref 6–20)
BUN/CREAT BLD: ABNORMAL (ref 9–20)
CALCIUM SERPL-MCNC: 8.3 MG/DL (ref 8.6–10.4)
CHLORIDE BLD-SCNC: 101 MMOL/L (ref 98–107)
CO2: 23 MMOL/L (ref 20–31)
CREAT SERPL-MCNC: 0.58 MG/DL (ref 0.5–0.9)
DIFFERENTIAL TYPE: ABNORMAL
EOSINOPHILS RELATIVE PERCENT: 1 % (ref 1–4)
GFR AFRICAN AMERICAN: >60 ML/MIN
GFR NON-AFRICAN AMERICAN: >60 ML/MIN
GFR SERPL CREATININE-BSD FRML MDRD: ABNORMAL ML/MIN/{1.73_M2}
GFR SERPL CREATININE-BSD FRML MDRD: ABNORMAL ML/MIN/{1.73_M2}
GLUCOSE BLD-MCNC: 91 MG/DL (ref 70–99)
HCT VFR BLD CALC: 39.3 % (ref 36.3–47.1)
HEMOGLOBIN: 11.7 G/DL (ref 11.9–15.1)
IMMATURE GRANULOCYTES: 0 %
LYMPHOCYTES # BLD: 37 % (ref 24–43)
MCH RBC QN AUTO: 26.7 PG (ref 25.2–33.5)
MCHC RBC AUTO-ENTMCNC: 29.8 G/DL (ref 28.4–34.8)
MCV RBC AUTO: 89.5 FL (ref 82.6–102.9)
MONOCYTES # BLD: 8 % (ref 3–12)
NRBC AUTOMATED: 0 PER 100 WBC
PDW BLD-RTO: 15.3 % (ref 11.8–14.4)
PLATELET # BLD: 276 K/UL (ref 138–453)
PLATELET ESTIMATE: ABNORMAL
PMV BLD AUTO: 9.2 FL (ref 8.1–13.5)
POTASSIUM SERPL-SCNC: 3.9 MMOL/L (ref 3.7–5.3)
RBC # BLD: 4.39 M/UL (ref 3.95–5.11)
RBC # BLD: ABNORMAL 10*6/UL
SEG NEUTROPHILS: 53 % (ref 36–65)
SEGMENTED NEUTROPHILS ABSOLUTE COUNT: 4.43 K/UL (ref 1.5–8.1)
SODIUM BLD-SCNC: 135 MMOL/L (ref 135–144)
TOTAL PROTEIN: 6.6 G/DL (ref 6.4–8.3)
VITAMIN D 25-HYDROXY: 35 NG/ML (ref 30–100)
WBC # BLD: 8.3 K/UL (ref 3.5–11.3)
WBC # BLD: ABNORMAL 10*3/UL

## 2020-07-10 PROCEDURE — 97166 OT EVAL MOD COMPLEX 45 MIN: CPT

## 2020-07-10 PROCEDURE — 80053 COMPREHEN METABOLIC PANEL: CPT

## 2020-07-10 PROCEDURE — 90792 PSYCH DIAG EVAL W/MED SRVCS: CPT | Performed by: NURSE PRACTITIONER

## 2020-07-10 PROCEDURE — 97162 PT EVAL MOD COMPLEX 30 MIN: CPT

## 2020-07-10 PROCEDURE — 97535 SELF CARE MNGMENT TRAINING: CPT

## 2020-07-10 PROCEDURE — 6370000000 HC RX 637 (ALT 250 FOR IP): Performed by: STUDENT IN AN ORGANIZED HEALTH CARE EDUCATION/TRAINING PROGRAM

## 2020-07-10 PROCEDURE — 6360000002 HC RX W HCPCS: Performed by: STUDENT IN AN ORGANIZED HEALTH CARE EDUCATION/TRAINING PROGRAM

## 2020-07-10 PROCEDURE — 73100 X-RAY EXAM OF WRIST: CPT

## 2020-07-10 PROCEDURE — 85025 COMPLETE CBC W/AUTO DIFF WBC: CPT

## 2020-07-10 PROCEDURE — 2580000003 HC RX 258: Performed by: STUDENT IN AN ORGANIZED HEALTH CARE EDUCATION/TRAINING PROGRAM

## 2020-07-10 PROCEDURE — 73110 X-RAY EXAM OF WRIST: CPT

## 2020-07-10 PROCEDURE — 99233 SBSQ HOSP IP/OBS HIGH 50: CPT | Performed by: PSYCHIATRY & NEUROLOGY

## 2020-07-10 PROCEDURE — 97530 THERAPEUTIC ACTIVITIES: CPT

## 2020-07-10 PROCEDURE — 36415 COLL VENOUS BLD VENIPUNCTURE: CPT

## 2020-07-10 PROCEDURE — 99238 HOSP IP/OBS DSCHRG MGMT 30/<: CPT | Performed by: INTERNAL MEDICINE

## 2020-07-10 PROCEDURE — 82306 VITAMIN D 25 HYDROXY: CPT

## 2020-07-10 RX ORDER — ONDANSETRON 4 MG/1
4 TABLET, ORALLY DISINTEGRATING ORAL EVERY 8 HOURS PRN
Qty: 30 TABLET | Refills: 0 | Status: ON HOLD | OUTPATIENT
Start: 2020-07-10 | End: 2022-01-03 | Stop reason: HOSPADM

## 2020-07-10 RX ORDER — QUETIAPINE FUMARATE 200 MG/1
200 TABLET, FILM COATED ORAL NIGHTLY
Qty: 30 TABLET | Refills: 0 | Status: ON HOLD | OUTPATIENT
Start: 2020-07-10 | End: 2020-11-02

## 2020-07-10 RX ORDER — ALBUTEROL SULFATE 90 UG/1
2 AEROSOL, METERED RESPIRATORY (INHALATION) EVERY 4 HOURS PRN
Qty: 1 INHALER | Refills: 0 | Status: SHIPPED | OUTPATIENT
Start: 2020-07-10

## 2020-07-10 RX ORDER — ATORVASTATIN CALCIUM 40 MG/1
40 TABLET, FILM COATED ORAL NIGHTLY
Qty: 30 TABLET | Refills: 3 | Status: ON HOLD | OUTPATIENT
Start: 2020-07-10 | End: 2022-01-03 | Stop reason: SDUPTHER

## 2020-07-10 RX ORDER — ATORVASTATIN CALCIUM 40 MG/1
40 TABLET, FILM COATED ORAL NIGHTLY
Qty: 30 TABLET | Refills: 3 | Status: SHIPPED | OUTPATIENT
Start: 2020-07-10 | End: 2020-07-10

## 2020-07-10 RX ORDER — ATORVASTATIN CALCIUM 40 MG/1
40 TABLET, FILM COATED ORAL NIGHTLY
Status: DISCONTINUED | OUTPATIENT
Start: 2020-07-10 | End: 2020-07-11 | Stop reason: HOSPADM

## 2020-07-10 RX ORDER — FOLIC ACID 1 MG/1
1 TABLET ORAL DAILY
Qty: 30 TABLET | Refills: 3 | Status: SHIPPED | OUTPATIENT
Start: 2020-07-11 | End: 2020-07-10

## 2020-07-10 RX ORDER — OMEPRAZOLE 20 MG/1
20 CAPSULE, DELAYED RELEASE ORAL 2 TIMES DAILY
Qty: 60 CAPSULE | Refills: 0 | Status: ON HOLD | OUTPATIENT
Start: 2020-07-10 | End: 2022-01-03 | Stop reason: SDUPTHER

## 2020-07-10 RX ORDER — DICYCLOMINE HYDROCHLORIDE 10 MG/1
10 CAPSULE ORAL EVERY 6 HOURS PRN
Qty: 40 CAPSULE | Refills: 0 | Status: ON HOLD | OUTPATIENT
Start: 2020-07-10 | End: 2022-01-03 | Stop reason: HOSPADM

## 2020-07-10 RX ORDER — HYDROXYZINE 50 MG/1
50 TABLET, FILM COATED ORAL 2 TIMES DAILY PRN
Qty: 60 TABLET | Refills: 0 | Status: ON HOLD | OUTPATIENT
Start: 2020-07-10 | End: 2022-01-03 | Stop reason: HOSPADM

## 2020-07-10 RX ORDER — CHLORDIAZEPOXIDE HYDROCHLORIDE 5 MG/1
10 CAPSULE, GELATIN COATED ORAL 3 TIMES DAILY
Status: DISCONTINUED | OUTPATIENT
Start: 2020-07-10 | End: 2020-07-10

## 2020-07-10 RX ORDER — OXCARBAZEPINE 150 MG/1
150 TABLET, FILM COATED ORAL 2 TIMES DAILY
Qty: 60 TABLET | Refills: 0 | Status: ON HOLD | OUTPATIENT
Start: 2020-07-10 | End: 2021-12-31

## 2020-07-10 RX ORDER — FLUTICASONE PROPIONATE 50 MCG
1 SPRAY, SUSPENSION (ML) NASAL DAILY
Qty: 1 BOTTLE | Refills: 0 | Status: ON HOLD | OUTPATIENT
Start: 2020-07-10 | End: 2022-01-03 | Stop reason: HOSPADM

## 2020-07-10 RX ORDER — FOLIC ACID 1 MG/1
1 TABLET ORAL DAILY
Qty: 30 TABLET | Refills: 3 | Status: ON HOLD | OUTPATIENT
Start: 2020-07-11 | End: 2022-01-03 | Stop reason: SDUPTHER

## 2020-07-10 RX ORDER — VENLAFAXINE HYDROCHLORIDE 75 MG/1
75 CAPSULE, EXTENDED RELEASE ORAL
Qty: 30 CAPSULE | Refills: 0 | Status: ON HOLD | OUTPATIENT
Start: 2020-07-10 | End: 2022-01-03 | Stop reason: HOSPADM

## 2020-07-10 RX ORDER — LANOLIN ALCOHOL/MO/W.PET/CERES
100 CREAM (GRAM) TOPICAL DAILY
Qty: 30 TABLET | Refills: 0 | Status: ON HOLD | OUTPATIENT
Start: 2020-07-10 | End: 2022-01-03 | Stop reason: SDUPTHER

## 2020-07-10 RX ORDER — LITHIUM CARBONATE 450 MG
450 TABLET, EXTENDED RELEASE ORAL 2 TIMES DAILY
Qty: 60 TABLET | Refills: 0 | Status: ON HOLD | OUTPATIENT
Start: 2020-07-10 | End: 2022-01-03 | Stop reason: HOSPADM

## 2020-07-10 RX ORDER — ECHINACEA PURPUREA EXTRACT 125 MG
1 TABLET ORAL PRN
Qty: 1 BOTTLE | Refills: 0 | Status: ON HOLD | OUTPATIENT
Start: 2020-07-10 | End: 2022-01-03 | Stop reason: HOSPADM

## 2020-07-10 RX ORDER — ELECTROLYTES/DEXTROSE
1 SOLUTION, ORAL ORAL DAILY
Qty: 30 TABLET | Refills: 0 | Status: ON HOLD | OUTPATIENT
Start: 2020-07-10 | End: 2022-01-03 | Stop reason: HOSPADM

## 2020-07-10 RX ORDER — GABAPENTIN 300 MG/1
300 CAPSULE ORAL 3 TIMES DAILY
Qty: 30 CAPSULE | Refills: 0 | Status: ON HOLD | OUTPATIENT
Start: 2020-07-10 | End: 2020-11-02

## 2020-07-10 RX ADMIN — CHLORDIAZEPOXIDE HYDROCHLORIDE 15 MG: 5 CAPSULE ORAL at 09:00

## 2020-07-10 RX ADMIN — LORAZEPAM 2 MG: 0.5 TABLET ORAL at 09:10

## 2020-07-10 RX ADMIN — PANTOPRAZOLE SODIUM 40 MG: 40 TABLET, DELAYED RELEASE ORAL at 08:57

## 2020-07-10 RX ADMIN — OXCARBAZEPINE 150 MG: 150 TABLET, FILM COATED ORAL at 08:57

## 2020-07-10 RX ADMIN — Medication 10 ML: at 09:03

## 2020-07-10 RX ADMIN — ONDANSETRON 4 MG: 2 INJECTION INTRAMUSCULAR; INTRAVENOUS at 12:23

## 2020-07-10 RX ADMIN — MULTIPLE VITAMINS W/ MINERALS TAB 1 TABLET: TAB at 08:57

## 2020-07-10 RX ADMIN — FOLIC ACID 1 MG: 1 TABLET ORAL at 08:57

## 2020-07-10 RX ADMIN — GABAPENTIN 300 MG: 300 CAPSULE ORAL at 14:42

## 2020-07-10 RX ADMIN — GABAPENTIN 300 MG: 300 CAPSULE ORAL at 08:57

## 2020-07-10 RX ADMIN — Medication 100 MG: at 08:56

## 2020-07-10 ASSESSMENT — PAIN DESCRIPTION - LOCATION
LOCATION: HEAD;HAND
LOCATION: GENERALIZED

## 2020-07-10 ASSESSMENT — PAIN SCALES - GENERAL
PAINLEVEL_OUTOF10: 6
PAINLEVEL_OUTOF10: 5

## 2020-07-10 ASSESSMENT — PAIN DESCRIPTION - PAIN TYPE: TYPE: ACUTE PAIN

## 2020-07-10 ASSESSMENT — PAIN DESCRIPTION - DESCRIPTORS: DESCRIPTORS: DISCOMFORT

## 2020-07-10 NOTE — PROGRESS NOTES
Physical Therapy    Facility/Department: [address] ORTHO/MED SURG  Initial Assessment    NAME: Alen Louis  : 1975  MRN: [id number]    Date of Service: 7/10/2020    Discharge Recommendations:    Further therapy recommended at discharge. PT Equipment Recommendations  Equipment Needed: Yes  Mobility Devices: [place] Wainwright: Rolling    Assessment   Body structures, Functions, Activity limitations: Decreased functional mobility ; Decreased balance;Decreased ROM; Decreased endurance;Decreased strength; Increased pain  Assessment: Pt ambulated 20ft w/ RW Selin to maintain balance and for RW progression and safety, demonstrating increased gait stability with RW. Pt demonstrates decreased safety awareness throughout session. Pt is expected to require skilled physical therapy services upon discharge to address high level balance and return pt to prior level of independence. Prognosis: Fair  Decision Making: Medium Complexity  PT Education: Goals;PT Role;Plan of Care;Gait Training  REQUIRES PT FOLLOW UP: Yes  Activity Tolerance  Activity Tolerance: Patient limited by endurance; Patient limited by fatigue       Patient Diagnosis(es): The primary encounter diagnosis was Seizure [facility]). Diagnoses of Acute alcoholic intoxication with complication ([address] and Depressed bipolar I disorder ([address] were also pertinent to this visit.      has a past medical history of Alcoholic hepatitis without ascites, Alcoholic ketoacidosis, Alcoholism ([address] Anxiety, Asthma, Bipolar affective disorder ([address] Depression, Drug overdose, accidental or unintentional, initial encounter, Drug overdose, intentional self-harm, initial encounter ([address] [place] coma scale total score 3-8 ([address] Hepatitis C antibody positive in blood, History of gastric surgery (gastric sleeve ), Intentional drug overdose ([address] MDD (major depressive disorder), recurrent episode ([address] Pneumonia, Polysubstance abuse ([address] Post traumatic stress disorder, Seizure ([address] Smoker unmotivated to quit, Suicide attempt [facility]), Suicide ideation, and Toxic metabolic encephalopathy. has a past surgical history that includes Hysterectomy; Carpal tunnel release; Hysterectomy, total abdominal; Cholecystectomy; Stomach surgery; and Upper gastrointestinal endoscopy (N/A, 6/19/2019). Restrictions  Restrictions/Precautions  Restrictions/Precautions: Up as Tolerated, Fall Risk, Seizure, General Precautions  Required Braces or Orthoses?: No  Position Activity Restriction  Other position/activity restrictions: Up with assistance  Vision/Hearing  Vision: Impaired  Vision Exceptions: Wears glasses for reading  Hearing: Within functional limits     Subjective  General  Patient assessed for rehabilitation services?: Yes  Response To Previous Treatment: Not applicable  Family / Caregiver Present: No  Follows Commands: Within Functional Limits  Subjective  Subjective: RN and pt in agreement for PT eval; pt supine in bed upon PT arrival, pt cooperative throughout session.    Pain Screening  Patient Currently in Pain: Yes  Pain Assessment  Pain Assessment: 0-10  Pain Level: 6  Pain Type: Acute pain  Pain Location: Generalized  Pain Descriptors: Discomfort  Non-Pharmaceutical Pain Intervention(s): Ambulation/Increased Activity;Repositioned  Response to Pain Intervention: Patient Satisfied  Multiple Pain Sites: No  Vital Signs  Patient Currently in Pain: Yes    Orientation  Orientation  Overall Orientation Status: Within Functional Limits  Social/Functional History  Social/Functional History  Lives With: Alone  Type of Home: Apartment  Home Layout: (3rd floor)  Home Access: Stairs to enter with rails  Entrance Stairs - Number of Steps: 4  Entrance Stairs - Rails: Both  Bathroom Shower/Tub: Tub/Shower unit  Bathroom Toilet: Standard  Bathroom Equipment: Grab bars in shower, Built-in shower seat  Bathroom Accessibility: Accessible  Receives Help From: Neighbor(after hand got hurt helped with dressing)  ADL Therapy Time   Individual Concurrent Group Co-treatment   Time In 1001         Time Out 1031         Minutes 30         Timed Code Treatment Minutes: 10 Minutes       Deb Moore, PT

## 2020-07-10 NOTE — CONSULTS
Vitamins-Minerals (MULTIVITAMIN ADULT) TABS Take 1 tablet by mouth daily 10/15/19   Dawna Aj,    omeprazole (PRILOSEC) 20 MG delayed release capsule Take 1 capsule by mouth 2 times daily 10/15/19   Dawna Aj DO   ondansetron (ZOFRAN ODT) 4 MG disintegrating tablet Take 1 tablet by mouth every 8 hours as needed for Nausea 10/15/19   Radha Muñoz, DO   OXcarbazepine (TRILEPTAL) 150 MG tablet Take 1 tablet by mouth 2 times daily 10/15/19   Dawna Aj, DO   QUEtiapine (SEROQUEL) 200 MG tablet Take 1 tablet by mouth nightly 10/15/19   Dawna Aj DO   sodium chloride (OCEAN) 0.65 % nasal spray 1 spray by Nasal route as needed for Congestion 10/15/19   Dawna Aj,    venlafaxine (EFFEXOR XR) 75 MG extended release capsule Take 1 capsule by mouth daily (with breakfast) 10/15/19   Dawna Aj DO       Allergies:    Morphine; Morphine and related; Azithromycin; Morphine; Nsaids; and Zithromax [azithromycin]    Social History:   Social History     Socioeconomic History    Marital status: Single     Spouse name: Not on file    Number of children: 0    Years of education: 15    Highest education level: Not on file   Occupational History    Not on file   Social Needs    Financial resource strain: Not on file    Food insecurity     Worry: Not on file     Inability: Not on file   [employer] needs     Medical: Not on file     Non-medical: Not on file   Tobacco Use    Smoking status: Current Every Day Smoker     Packs/day: 1.00     Years: 30.00     Pack years: 30.00     Types: Cigarettes    Smokeless tobacco: Never Used   Substance and Sexual Activity    Alcohol use: Not Currently     Alcohol/week: 42.0 standard drinks     Types: 42 Cans of beer per week     Comment: detoxing of alcohol x5 days    Drug use: Yes     Frequency: 7.0 times per week     Types: Marijuana, IV, Cocaine     Comment: drug abuse includes, cocaine, IV heroin, cannabis  Sexual activity: Not Currently   Lifestyle    Physical activity     Days per week: Not on file     Minutes per session: Not on file    Stress: Not on file   Relationships    Social connections     Talks on phone: Not on file     Gets together: Not on file     Attends Islam service: Not on file     Active member of club or organization: Not on file     Attends meetings of clubs or organizations: Not on file     Relationship status: Not on file    Intimate partner violence     Fear of current or ex partner: Not on file     Emotionally abused: Not on file     Physically abused: Not on file     Forced sexual activity: Not on file   Other Topics Concern    Not on file   Social History Narrative    ** Merged History Encounter **         ** Merged History Encounter **            Family History:  Family History   Problem Relation Age of Onset    Brain Cancer Mother     Cancer Mother         \"female cancer\"    Heart Disease Father        REVIEW OF SYSTEMS:   Constitutional: Negative for fever and chills. Cardiovascular: Negative for chest pain and palpitations. Musculoskeletal: Positive for myalgia left wrist  Skin: Negative for itching and rash. PHYSICAL EXAM:  Blood pressure 121/84, pulse 91, temperature 97.7 °F (36.5 °C), temperature source Oral, resp. rate 18, height 5' (1.524 m), weight 146 lb 9.7 oz (66.5 kg), SpO2 94 %, not currently breastfeeding. Gen: Alert and oriented, NAD, cooperative    Chest: Non labored breathing. b/l clavicles without TTP, crepitus, step off, or deformity    Cardiovascular: Regular rate, no dependent edema, distal pulses 2+    LUE: Tenderness to palpation about the dorsal aspect of the distal radius. Skin intact. No signs of open injury. Mild swelling noted.  strength 4/5. Full wrist ROM 80/90 flex/extension with some pain. Compartments soft. Ulnar/Median/AIN/PIN/Radial motor intact.   Sensation intact to light touch to axillary/musc/radial/ulnar/median nerve distributions. Radial pulse 2+ with BCR    LABS:  Recent Labs     07/08/20  1308  07/10/20  1035   WBC 7.0   < > 8.3   HGB 13.2   < > 11.7*   HCT 43.0   < > 39.3      < > 276   INR 0.9  --   --       < > 135   K 4.3   < > 3.9   BUN 6   < > 12   CREATININE 0.56   < > 0.58   GLUCOSE 93   < > 91    < > = values in this interval not displayed.         Radiology:    XR of Left wrist demonstrating a nondisplaced distal radius fracture that is extrarticular    Impression 53 y.o. female being seen after seizure for the following problems:  1) Left distal radius fracture     Plan  - Well padded reverse sugar tong splint applied to left upper extremity   - Weight bearing status: No pushing, pulling or lifting to the left upper extremity   - Pain control at primary discretion  - Maintain splint, do not remove or get wet  - Discussed with patient need for strict ice and elevation for pain/swelling  - DVT ppx: at primary discretion, no ortho recs  - PT/OT  - Dispo: OK to DC from ortho standpoint  - F/u Dr. Ashlie Saba 7-10d from injury  - Please page Ortho with any questions or concerns    Kyle Locke DO  Orthopedic Surgery Resident, PGY-2  [place] [id number], [facility]

## 2020-07-10 NOTE — PROGRESS NOTES
[facility]  Internal Medicine Teaching Residency Program  Inpatient Daily Progress Note  ______________________________________________________________________________    Patient: Juliocesar Saravia  YOB: 1975   Leana Mimes: [de-identified]     Room: [address] [place] [address]  Admit date: 7/8/2020  Today's date: 07/10/20  Number of days in the hospital: 2    SUBJECTIVE   Admitting Diagnosis: Seizure ([facility] [address]  CC: Seizure, headache, alcohol withdrawal     Pt examined at bedside. Chart & results reviewed.      Patient is awake, aware and oriented. She is afebrile and hemodynamically stable (SBP 94), SpO2 98% on room air. Patient appeared groggy likely due to effect of Ativan 2 mg PO + Ativan 2 mg IV given yesterday post seizure-like activity + three doses of Chlordiazepoxide (Librium) 15 mg started on 07/09. Librium Dose adjusted to 10 mg TID today.     Na 144, K 3.2, Cl 113, BUN 7, Glucose 137  Albumin 2.9, ALP 38, ALT 35, Bili 0.24,  INR 0.9. Lactic acid 2.3     PMH significant for seizure disorder, bipolar disorder, asthma, polysubstance abuse, alcoholism, suicide ideation and attempt, depression, alcoholic hepatitis without ascites, alcoholic ketosis, toxic metabolic encephalopathy.     Neurology was consulted yesterday; they do not recommend EEG or MRI since patient has h/o multiple admissions in the past with similar symptoms. Recommend starting aspirin 81 mg (allergy needs to be reconfirmed with patient prior to order), atorvastatin 40 mh and Psych evaluation. Regular neurochecks.     ROS:  Constitutional:  positve for fatigue, soreness, headache. negative for chills, fevers, sweats  Respiratory:  negative for cough, dyspnea on exertion, hemoptysis, shortness of breath, wheezing  Cardiovascular:  negative for chest pain, chest pressure/discomfort, lower extremity edema, palpitations  Gastrointestinal:  negative for abdominal pain, constipation, diarrhea, nausea, vomiting  Neurological:  negative for dizziness. Positive for headache  BRIEF HISTORY     The patient is a pleasant 53 y.o. female presents with a chief complaint of having a seizure today, followed by headache and symptoms of alcohol withdrawal.     According to the patient, she was drinking alcohol with friends today, when she lost consciousness. Her friends called to her partner for help; 911 was called and the patient was brought to the ED. Patient has no memory of the seizure, but states that her friends saw it; after gaining consciousness, she started having chills, right sided UL and LL weakness which then resolved on its own. Patient denies any SOB, chest pain, fever, nausea, vomiting. Patient said that she had been having some chest tightness and cough with phlegm production for a few weeks.     Neurology was consulted for stroke alert. Stroke team evaluated patient with CT head and neck which were unremarkable with only some mild right vertebral artery origin stenosis.  Patient in ED was found to have an alcohol level of 288.     In the ED patient was afebrile, tachycardic (), and hemodynamically stable with a systolic blood pressure of 133.  Saturating at 94% on room air.     PMH significant for seizure disorder, bipolar disorder, asthma, polysubstance abuse, alcoholism, suicide ideation and attempt, depression, alcoholic hepatitis without ascites, alcoholic ketosis, toxic metabolic encephalopathy.     CBC was unremarkable; BMP showed lactic acid of 2.48. Anion gap is 19; bicarb is 20. Serum osmolality ordered to calculate the osmolar gap.     CTa head and neck with contrast showed mild narrowing at the right vertebral artery origin, unchanged. Will be followed up by Neurology. CXR and CT head were unremarkable.     OBJECTIVE     Vital Signs:  BP 94/70   Pulse 78   Temp 97.7 °F (36.5 °C) (Oral)   Resp 18   Ht 5' (1.524 m)   Wt 146 lb 9.7 oz (66.5 kg)   SpO2 94%   BMI 28.63 kg/m²     Temp (24hrs), Av.9 °F (36.6 °C), Min:97.7 °F (36.5 °C), Max:98.1 °F (36.7 °C)    No intake/output data recorded. Physical Exam:  Constitutional: This is a well developed, well nourished, 25-29.9 - Overweight 53y.o. year old female who is alert, oriented, cooperative and in no apparent distress. Head:normocephalic and atraumatic. EENT:  PERRLA. No conjunctival injections. Septum was midline, mucosa was without erythema, exudates or cobblestoning. No thrush was noted. Neck: Supple without thyromegaly. No elevated JVP. Trachea was midline. Respiratory: Chest was symmetrical without dullness to percussion. Breath sounds bilaterally were clear to auscultation. There were no wheezes, rhonchi or rales. There is no intercostal retraction or use of accessory muscles. No egophony noted. Cardiovascular: Regular without murmur, clicks, gallops or rubs. Abdomen: Slightly rounded and soft without organomegaly. No rebound, rigidity or guarding was appreciated. Lymphatic: No lymphadenopathy. Musculoskeletal: Normal curvature of the spine. No gross muscle weakness. Extremities:  No lower extremity edema, ulcerations, tenderness, varicosities or erythema. Muscle size, tone and strength are normal.  No involuntary movements are noted. Skin:  Warm and dry. Good color, turgor and pigmentation. No lesions or scars.   No cyanosis or clubbing  Neurological/Psychiatric: The patient's general behavior, level of consciousness, thought content and emotional status is normal.        Medications:  Scheduled Medications:    chlordiazePOXIDE  10 mg Oral TID    sodium chloride flush  10 mL Intravenous 2 times per day    LORazepam  2 mg Intravenous Once    OXcarbazepine  150 mg Oral BID    enoxaparin  40 mg Subcutaneous Daily    gabapentin  300 mg Oral TID    pantoprazole  40 mg Oral QAM AC    QUEtiapine  200 mg Oral Nightly    thiamine  100 mg Oral Daily    nicotine  1 patch Transdermal Daily    folic acid 1 mg Oral Daily    therapeutic multivitamin-minerals  1 tablet Oral Daily     Continuous Infusions:    sodium chloride 50 mL/hr at 07/09/20 1425     PRN Medicationssodium chloride flush, 10 mL, PRN  potassium chloride, 40 mEq, PRN    Or  potassium alternative oral replacement, 40 mEq, PRN    Or  potassium chloride, 10 mEq, PRN  metoprolol, 5 mg, Q6H PRN  ondansetron, 4 mg, Q8H PRN  albuterol, 2.5 mg, Q4H PRN  acetaminophen, 650 mg, Q6H PRN    Or  acetaminophen, 650 mg, Q6H PRN  polyethylene glycol, 17 g, Daily PRN  LORazepam, 1 mg, Q1H PRN    Or  LORazepam, 1 mg, Q1H PRN    Or  LORazepam, 2 mg, Q1H PRN    Or  LORazepam, 2 mg, Q1H PRN        Diagnostic Labs:  CBC:   Recent Labs     07/08/20  1308 07/09/20  0708   WBC 7.0 4.5   RBC 4.88 3.99   HGB 13.2 10.5*   HCT 43.0 34.6*   MCV 88.1 86.7   RDW 15.7* 15.4*    262     BMP:   Recent Labs     07/08/20  1301 07/08/20  1308 07/09/20  0708   NA  --  142 144   K  --  4.3 3.2*   CL  --  103 113*   CO2  --  20 20   BUN  --  6 7   CREATININE 0.59 0.56 0.30*     BNP: No results for input(s): BNP in the last 72 hours. PT/INR:   Recent Labs     07/08/20  1308   PROTIME 9.6   INR 0.9     APTT:   Recent Labs     07/08/20  1308   APTT 22.2     CARDIAC ENZYMES:   Recent Labs     07/08/20  1308   CKMB 1.9     FASTING LIPID PANEL:  Lab Results   Component Value Date    CHOL 196 06/13/2019    HDL 56 06/13/2019    TRIG 99 06/13/2019     LIVER PROFILE:   Recent Labs     07/09/20  0141 07/09/20  0708   AST 35* 23   ALT 38* 20   BILIDIR 0.11  --    BILITOT 0.27* 0.24*   ALKPHOS 85 64      MICROBIOLOGY:   Lab Results   Component Value Date/Time    CULTURE NO SIGNIFICANT GROWTH 11/27/2018 10:18 PM       Imaging:    Ct Head Wo Contrast    Result Date: 7/8/2020  No acute intracranial abnormality. Ct Cervical Spine Wo Contrast    Result Date: 7/8/2020  No acute abnormality of the cervical spine.      Xr Chest Portable    Result Date: 7/8/2020  No acute findings in the chest. Cta Head Neck W Contrast    Result Date: 7/8/2020  Mild narrowing at the right vertebral artery origin, unchanged. Otherwise unremarkable CTA of the head and neck. ASSESSMENT & PLAN     ASSESSMENT / PLAN:     Seizure ([address]  Plan:   -seizure precautions initiated. -regular neurochecks  -solumedrol 125 mg IV 3 doses completed.     Alcohol withdrawal  Plan:  - Alcohol withdrawal protocol initiated. Patient to receive Ativan as per CIWA score. - Folate, B12 levels, Vitamin D ordered. - Thiamine 298 mg po, Folic acid 1 mg po, multi vitamin 1 tab po daily. - Chlordiazepoxide 15 mg changed to 10 mg TID.     Possible alcoholic ketoacidosis:  Plan:  - serum osmolality tested for checking osmolar gap. - lactic acid retest   - Hepatic functional panel  -CMP ordered for tomorrow     Bipolar disorder  Plan:  - Quetiapine/ Seroquel estarted  - oxcarbazepine 150 mg po bid for bipolar disorder   - Psychiatry consult ordered.     DVT ppx: lovenox  GI ppx: protonix     PT/OT/SW: ordered  Discharge Planning:      Nancy Ashley MD  Internal Medicine Resident, PGY-1  [facility]; [place], [place]  7/10/2020, 10:22 AM   Attending Physician Statement  I have discussed the care of Javier Sanchez, including pertinent history and exam findings,  with the resident. I have seen and examined the patient and the key elements of all parts of the encounter have been performed by me. I agree with the assessment, plan and orders as documented by the resident with additions . Treatment plan Discussed with nursing staff in detail , all questions answered . Electronically signed by Roz Gonzáles MD on   7/10/20 at 5:08 PM EDT    Please note that this chart was generated using voice recognition Dragon dictation software. Although every effort was made to ensure the accuracy of this automated transcription, some errors in transcription may have occurred.

## 2020-07-10 NOTE — PROGRESS NOTES
Occupational Therapy   Occupational Therapy Initial Assessment  Date: 7/10/2020   Patient Name: Shayy Butcher  MRN: [id number]     : 1975    Date of Service: 7/10/2020    Discharge Recommendations:  Patient would benefit from continued therapy after discharge       Assessment   Performance deficits / Impairments: Decreased functional mobility ; Decreased cognition;Decreased high-level IADLs;Decreased ADL status; Decreased endurance;Decreased safe awareness  Assessment: OT facilitated sitting EOB to engage in ADLs at [facility] for safety. Pt required use of the bathroom and transferred to/from bathroom at min A d/t unsteadiness on feet and required 2-3 verbal cues to keep both hands on RW and to keep RW in front of pt. Increased fatigue noted as session continued, as well as decreased safety awareness. Pt would benefit from skilled OT services to address decreased functional mobility, ADLs, and decreased safety awareness. Prognosis: Good  Decision Making: Medium Complexity  Patient Education: OT role, OT POC, safety awareness- good return noted. REQUIRES OT FOLLOW UP: Yes  Activity Tolerance  Activity Tolerance: Patient limited by fatigue;Treatment limited secondary to decreased cognition;Patient Tolerated treatment well  Safety Devices  Safety Devices in place: Yes  Type of devices: Bed alarm in place; Left in bed;Call light within reach;Nurse notified  Restraints  Initially in place: Yes  Restraints: Blue bumper pillows           Patient Diagnosis(es): The primary encounter diagnosis was Seizure ([facility] [address] A diagnosis of Acute alcoholic intoxication with complication [facility]) was also pertinent to this visit.      has a past medical history of Alcoholic hepatitis without ascites, Alcoholic ketoacidosis, Alcoholism ([address] Anxiety, Asthma, Bipolar affective disorder ([address] Depression, Drug overdose, accidental or unintentional, initial encounter, Drug overdose, intentional self-harm, initial encounter ([address] Lisa coma scale total score 3-8 (HCC), Hepatitis C antibody positive in blood, History of gastric surgery (gastric sleeve 2012), Intentional drug overdose ([facility] [address] MDD (major depressive disorder), recurrent episode ([facility] [address] Pneumonia, Polysubstance abuse ([facility] [address] Post traumatic stress disorder, Seizure ([facility] [address] Smoker unmotivated to quit, Suicide attempt [facility]), Suicide ideation, and Toxic metabolic encephalopathy. has a past surgical history that includes Hysterectomy; Carpal tunnel release; Hysterectomy, total abdominal; Cholecystectomy; Stomach surgery; and Upper gastrointestinal endoscopy (N/A, 6/19/2019). Restrictions  Restrictions/Precautions  Restrictions/Precautions: Up as Tolerated, Fall Risk, Seizure, General Precautions  Required Braces or Orthoses?: No  Position Activity Restriction  Other position/activity restrictions:  Up with assistance    Subjective   General  Patient assessed for rehabilitation services?: Yes  Family / Caregiver Present: No  Diagnosis: Seizure        Social/Functional History  Social/Functional History  Lives With: Alone  Type of Home: Apartment  Home Layout: (3rd floor)  Home Access: Stairs to enter with rails  Entrance Stairs - Number of Steps: 4  Entrance Stairs - Rails: Both  Bathroom Shower/Tub: Tub/Shower unit  Bathroom Toilet: Standard  Bathroom Equipment: Grab bars in shower, Built-in shower seat  Bathroom Accessibility: Accessible  Receives Help From: Neighbor(after hand got hurt helped with dressing)  ADL Assistance: [address]: Independent  Homemaking Responsibilities: Yes  Ambulation Assistance: Independent  Transfer Assistance: Independent  Active : No  Mode of Transportation: Walk, Bus  Occupation: On disability  Leisure & Hobbies: baseball and hockey games       Objective   Vision: Impaired  Vision Exceptions: Wears glasses for reading  Hearing: Exceptions to [facility]  Hearing Exceptions: No hearing aid          Balance  Sitting Balance: commands  Attention Span: Attends with cues to redirect; Difficulty dividing attention  Memory: Decreased recall of precautions;Decreased recall of recent events  Safety Judgement: Decreased awareness of need for assistance;Decreased awareness of need for safety  Problem Solving: Decreased awareness of errors  Insights: Decreased awareness of deficits  Initiation: Requires cues for some  Sequencing: Requires cues for some        Sensation  Overall Sensation Status: WFL        LUE AROM : WFL  Left Hand AROM: WFL  RUE AROM : WFL  Right Hand AROM: WFL  LUE Strength  Gross LUE Strength: Exceptions to [facility]  L Shoulder Flex: 4-/5  L Shoulder Ext: 4-/5  L Elbow Flex: 4-/5  L Elbow Ext: 4-/5  L Hand General: 3-/5  RUE Strength  Gross RUE Strength: Exceptions to [facility]  R Shoulder Flex: 4-/5  R Shoulder Ext: 4-/5  R Elbow Flex: 4-/5  R Elbow Ext: 4-/5  R Hand General: 3+/5       Plan   Plan  Times per week: 3-4x/wk  Current Treatment Recommendations: Strengthening, Safety Education & Training, Functional Mobility Training, Self-Care / ADL, Endurance Training      AM-PAC Score        AM-[facility] Inpatient Daily Activity Raw Score: 18 (07/10/20 1305)  AM-PAC Inpatient ADL T-Scale Score : 38.66 (07/10/20 1305)  ADL Inpatient CMS 0-100% Score: 46.65 (07/10/20 1305)  ADL Inpatient CMS G-Code Modifier : CK (07/10/20 1305)    Goals  Short term goals  Time Frame for Short term goals: Pt will, by discharge  Short term goal 1: demo LB ADLs at [facility] with AE PRN  Short term goal 2: demo functional mobility/transfers at [facility] with LRD  Short term goal 3: demo 15 mins of dynamic standing balance with LRD  Short term goal 4: demo 30+ minutes of activity tolerance for ADL tasks with LRD       Therapy Time   Individual Concurrent Group Co-treatment   Time In 1001         Time Out 1032         Minutes 31         Timed Code Treatment Minutes: [address], OT/S

## 2020-07-10 NOTE — PROGRESS NOTES
CLINICAL PHARMACY NOTE: MEDS TO [address] Select Patient?: No  Total # of Prescriptions Filled: 2   The following medications were delivered to the patient:  · LIPITOR   · FOLIC ACID   Total # of Interventions Completed: 0  Time Spent (min): 0    Additional Documentation:

## 2020-07-10 NOTE — DISCHARGE INSTR - COC
Continuity of Care Form    Patient Name: Juliocesar Saravia   :  1975  MRN:  [id number]    Admit date:  2020  Discharge date:  7/10/20    Code Status Order: Full Code   Advance Directives:   [facility] Documentation     Date/Time Healthcare Directive Type of Healthcare Directive Copy in [facility] [address] Agent's Name Healthcare Agent's Phone Number    20 1832  No, patient does not have an advance directive for healthcare treatment -- -- -- -- --          Admitting Physician:  Laure Zhong MD  PCP: Perez Ruelas MD    Discharging Nurse: [facility] Unit/Room#: 3344/5684-39  Discharging Unit Phone Number: [phone]    Emergency Contact:   Extended Emergency Contact Information  Primary Emergency Contact: Jennifer Hull   [address] Phone: [phone]  Relation: Brother/Sister  Hearing or visual needs: None  Other needs: None  Preferred language: English   needed?  No    Past Surgical History:  Past Surgical History:   Procedure Laterality Date    CARPAL TUNNEL RELEASE      CHOLECYSTECTOMY      HYSTERECTOMY      HYSTERECTOMY, TOTAL ABDOMINAL      STOMACH SURGERY      gastric sleeve    UPPER GASTROINTESTINAL ENDOSCOPY N/A 2019    EGD BIOPSY performed by Ruth Henderson MD at [address] [facility]       Immunization History:   Immunization History   Administered Date(s) Administered    Influenza Vaccine, unspecified formulation 2016    Influenza, Quadv, 6 mo and older, IM, PF (Flulaval, Fluarix) 2018    Influenza, Quadv, IM, PF (6 mo and older Fluzone, Flulaval, Fluarix, and 3 yrs and older Afluria) 2017    Tdap (Boostrix, Adacel) 10/30/2017       Active Problems:  Patient Active Problem List   Diagnosis Code    Alcoholism /alcohol abuse ([facility] [address] F10.20    Heroin dependence ([facility] [address] F11.20    Chronic post-traumatic stress disorder (PTSD) F43.12    Major depression, recurrent ([facility] [address] F33.9    Depressed bipolar I disorder ([address] F31.9    Polysubstance abuse ([facility] [address] F19.10    Bipolar I disorder, current or most recent episode depressed, with psychotic features with mood-congruent psychotic features ([address] P13.9    Alcoholic hepatitis without ascites K70.10    Alcohol withdrawal syndrome with complication (HCC) H07.424    Bipolar 1 disorder (HCC) F31.9    Mild intermittent asthma without complication G30.97    Alcohol related seizure ([address] R56.9    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    Seizure-like activity ([address] R56.9    Drug use F19.90    Cocaine abuse ([address] F14.10    Abnormal CT of the head L74.7    Alcoholic intoxication without complication ([address] F80.955    Seizure ([address] R56.9       Isolation/Infection:   Isolation          No Isolation        Patient Infection Status     None to display          Nurse Assessment:  Last Vital Signs: BP 94/70   Pulse 78   Temp 97.7 °F (36.5 °C) (Oral)   Resp 23   Ht 5' (1.524 m)   Wt 146 lb 9.7 oz (66.5 kg)   SpO2 94%   BMI 28.63 kg/m²     Last documented pain score (0-10 scale):    Last Weight:   Wt Readings from Last 1 Encounters:   07/10/20 146 lb 9.7 oz (66.5 kg)     Mental Status:  oriented    IV Access:  - None    Nursing Mobility/ADLs:  Walking   Independent  Transfer  Independent  Bathing  Independent  Dressing  Independent  Toileting  Independent  Feeding  Independent  Med Admin  Independent  Med Delivery   whole    Wound Care Documentation and Therapy:        Elimination:  Continence:   · Bowel: Yes  · Bladder: Yes  Urinary Catheter: None   Colostomy/Ileostomy/Ileal Conduit: No       Date of Last BM: ***  No intake or output data in the 24 hours ending 07/10/20 0934  No intake/output data recorded. Safety Concerns:      At Risk for Falls    Impairments/Disabilities:      None    Nutrition Therapy:  Current Nutrition Therapy:   - Oral Diet:  General    Routes of Feeding: Oral  Liquids: No Restrictions  Daily Fluid Restriction: no  Last Modified Barium Swallow with Video (Video Swallowing Test): not done    Treatments at the Time of Hospital Discharge:   Respiratory Treatments: ***  Oxygen Therapy:  {Therapy; copd oxygen:77055}  Ventilator:    {MH CC Vent SXKO:795108714}    Rehab Therapies: {THERAPEUTIC INTERVENTION:9419095478}  Weight Bearing Status/Restrictions: No weight bearing restirctions  Other Medical Equipment (for information only, NOT a DME order):  {EQUIPMENT:208052606}  Other Treatments: ***    Patient's personal belongings (please select all that are sent with patient):  None    RN SIGNATURE:  Electronically signed by Manjit Garcia RN on 7/10/20 at 12:41 PM EDT    CASE MANAGEMENT/SOCIAL WORK SECTION    Inpatient Status Date: ***    Readmission Risk Assessment Score:  Readmission Risk              Risk of Unplanned Readmission:        33           Discharging to Facility/ Agency   · Name:   · Address:  · Phone:  · Fax:    Dialysis Facility (if applicable)   · Name:  · Address:  · Dialysis Schedule:  · Phone:  · Fax:    / signature: {Esignature:[id number]}    PHYSICIAN SECTION    Prognosis: Fair    Condition at Discharge: Stable    Rehab Potential (if transferring to Rehab): Fair    Recommended Labs or Other Treatments After Discharge: Medication compliance. Physician Certification: I certify the above information and transfer of Shraddha Oh  is necessary for the continuing treatment of the diagnosis listed and that she requires Home Care for greater 30 days.      Update Admission H&P: No change in H&P    PHYSICIAN SIGNATURE:  Electronically signed by An Pradhan MD on 7/10/20 at 12:33 PM EDT

## 2020-07-10 NOTE — PROGRESS NOTES
NEUROLOGY INPATIENT PROGRESS NOTE    7/10/2020         Subjective: Diana Jiang is a  53 y.o. female admitted on7/8/2020 with Seizure ([facility] [address] [R56.9]      Briefly, this is a  53 y.o. female admitted on 7/8/2020 with past medical history of seizure disorder not on any AED, substance abuse(drinks 6-9 of 25oz of beer),MDD, depression, bipolar on lithium, asthma presented with episode of seizure, fall, headache and right-sided weakness and numbness in the setting of alcohol intoxication. CT head done in the ED showed no acute intracranial abnormality. In the ED her ethanol level was 288. Patient has been admitted prior to neurology service for alcohol withdrawal seizure in December 2019 and at that time MRI was negative for cocaine induced ischemia and EEG was unremarkable for epileptiform discharges. Prior to that patient was seen in October 2019 for brief loss of awareness with confusion and MRI brain with and without contrast showed no acute intracranial abnormalities as well as EEG done at that time was normal showed no focal or epileptiform abnormalities. Of note patient last drink was on 7/7 a.m., last use of cocaine 2 weeks ago, last heroin use last June. Patient is on Trileptal 150 mg for psychiatric history      This is a follow-up progress note patient seen and evaluated bedside chart reviewed: As per RN note patient at 6:30 PM, patient stated that she could feel her toes curling, became tremulous and thought she was going to have a seizure, she was given 2 mg of Ativan IM. On examination patient vitals are:    BP 93/63   Pulse 85   Temp 98.1 °F (36.7 °C) (Oral)   Resp 23   Ht 5' (1.524 m)   Wt 146 lb 9.7 oz (66.5 kg)   SpO2 97%   BMI 28.63 kg/m²     Blood pressure range: Systolic (00OUJ), WQT:355 , Min:93 , EYU:069   ; Diastolic (94MNQ), QPL:42, Min:63, Max:92      Patient this morning was seen eating breakfast, appeared alert oriented x3 normal mood judgment ,in no acute distress .     No current facility-administered medications on file prior to encounter. Current Outpatient Medications on File Prior to Encounter   Medication Sig Dispense Refill    gabapentin (NEURONTIN) 300 MG capsule Take 1 capsule by mouth 3 times daily for 10 days. 30 capsule 0    vitamin B-1 100 MG tablet Take 1 tablet by mouth daily 30 tablet 0    albuterol sulfate  (90 Base) MCG/ACT inhaler Inhale 2 puffs into the lungs every 4 hours as needed for Wheezing 1 Inhaler 0    dicyclomine (BENTYL) 10 MG capsule Take 1 capsule by mouth every 6 hours as needed (cramps) 40 capsule 0    fluticasone (FLONASE) 50 MCG/ACT nasal spray 1 spray by Each Nostril route daily 1 Bottle 0    hydrOXYzine (ATARAX) 50 MG tablet Take 1 tablet by mouth 2 times daily as needed for Anxiety 60 tablet 0    lithium (ESKALITH) 450 MG extended release tablet Take 1 tablet by mouth 2 times daily 60 tablet 0    Multiple Vitamins-Minerals (MULTIVITAMIN ADULT) TABS Take 1 tablet by mouth daily 30 tablet 0    omeprazole (PRILOSEC) 20 MG delayed release capsule Take 1 capsule by mouth 2 times daily 60 capsule 0    ondansetron (ZOFRAN ODT) 4 MG disintegrating tablet Take 1 tablet by mouth every 8 hours as needed for Nausea 30 tablet 0    OXcarbazepine (TRILEPTAL) 150 MG tablet Take 1 tablet by mouth 2 times daily 60 tablet 0    QUEtiapine (SEROQUEL) 200 MG tablet Take 1 tablet by mouth nightly 30 tablet 0    sodium chloride (OCEAN) 0.65 % nasal spray 1 spray by Nasal route as needed for Congestion 1 Bottle 0    venlafaxine (EFFEXOR XR) 75 MG extended release capsule Take 1 capsule by mouth daily (with breakfast) 30 capsule 0       Allergies: Florine Bence is allergic to morphine; morphine and related; azithromycin; morphine; nsaids; and zithromax [azithromycin].     Past Medical History:   Diagnosis Date    Alcoholic hepatitis without ascites 55/18/1686    Alcoholic ketoacidosis 67/91/0698    Alcoholism ([facility] [address]     3 pints daily    Anxiety     Asthma     Bipolar affective disorder ([facility] 6/12/2019    Depression     Drug overdose, accidental or unintentional, initial encounter 9/29/2018    Drug overdose, intentional self-harm, initial encounter ([facility] 5/8/2018    [place] coma scale total score 3-8 ([facility]     Hepatitis C antibody positive in blood 6/18/2019    History of gastric surgery (gastric sleeve 2012) 4/25/2017    Intentional drug overdose ([facility] 5/7/2018    MDD (major depressive disorder), recurrent episode ([facility] 5/8/2018    Pneumonia     Polysubstance abuse ([facility]     drug abuse includes, cocaine, IV heroin, cannabis, and ETOH    Post traumatic stress disorder     Seizure ([facility] 12/17/2019    Smoker unmotivated to quit 4/25/2017    Suicide attempt ([facility] 11/08/2017    S/A by overdosing on Trazodone and Seroquel    Suicide ideation     Toxic metabolic encephalopathy 7/28/2695       Past Surgical History:   Procedure Laterality Date    CARPAL TUNNEL RELEASE      CHOLECYSTECTOMY      HYSTERECTOMY      HYSTERECTOMY, TOTAL ABDOMINAL      STOMACH SURGERY      gastric sleeve    UPPER GASTROINTESTINAL ENDOSCOPY N/A 6/19/2019    EGD BIOPSY performed by Shane Quigley MD at [address] [place] [address] OR           Medications:     sodium chloride flush  10 mL Intravenous 2 times per day    chlordiazePOXIDE  15 mg Oral TID    LORazepam  2 mg Intravenous Once    OXcarbazepine  150 mg Oral BID    enoxaparin  40 mg Subcutaneous Daily    gabapentin  300 mg Oral TID    pantoprazole  40 mg Oral QAM AC    QUEtiapine  200 mg Oral Nightly    thiamine  100 mg Oral Daily    nicotine  1 patch Transdermal Daily    folic acid  1 mg Oral Daily    therapeutic multivitamin-minerals  1 tablet Oral Daily     PRN Meds include: sodium chloride flush, potassium chloride **OR** potassium alternative oral replacement **OR** potassium chloride, metoprolol, ondansetron, albuterol, acetaminophen **OR** acetaminophen, polyethylene glycol, LORazepam **OR** LORazepam Pulmonary effort is normal.   Abdominal:      General: Bowel sounds are normal.   Musculoskeletal: Normal range of motion. Skin:     General: Skin is warm. Capillary Refill: Capillary refill takes 2 to 3 seconds. Neurological:      Mental Status: She is alert. GCS: GCS eye subscore is 4. GCS verbal subscore is 5. GCS motor subscore is 6. Cranial Nerves: Cranial nerves are intact. Sensory: Sensation is intact. Motor: Motor function is intact. Coordination: Coordination is intact. Deep Tendon Reflexes:      Reflex Scores:       Tricep reflexes are 2+ on the right side and 2+ on the left side. Bicep reflexes are 2+ on the right side and 2+ on the left side. Brachioradialis reflexes are 2+ on the right side and 2+ on the left side. Patellar reflexes are 2+ on the right side and 2+ on the left side. Achilles reflexes are 2+ on the right side and 2+ on the left side.         Data:    Lab Results:   CBC:   Recent Labs     07/08/20  1308 07/09/20  0708   WBC 7.0 4.5   HGB 13.2 10.5*    262     BMP:    Recent Labs     07/08/20  1301 07/08/20  1308 07/09/20  0708   NA  --  142 144   K  --  4.3 3.2*   CL  --  103 113*   CO2  --  20 20   BUN  --  6 7   CREATININE 0.59 0.56 0.30*   GLUCOSE  --  93 137*         Lab Results   Component Value Date    CHOL 196 06/13/2019    LDLCHOLESTEROL 120 06/13/2019    HDL 56 06/13/2019    TRIG 99 06/13/2019    ALT 20 07/09/2020    AST 23 07/09/2020    TSH 2.53 12/18/2019    INR 0.9 07/08/2020    LABA1C 5.5 08/01/2018    [id number] 718 10/20/2018       No results found for: PHENYTOIN, PHENYTOIN, PHENOBARB, VALPROATE, CBMZ          ASSESSMENT RECOMMENDATIONS:  Provoked seizure in the setting of alcohol intoxication, patient ethanol level in ED was 288 as well as prior admission in  20 19 December and 2019October with similar settings where  patient had MRI done which was unremarkable for any acute intracranial pathology and EEG done which showed no epileptiform discharges-patient most likely undergoing DT on CIWA protocol and on Librium 15 mg 3 times daily, as well as thiamine 100 mg daily. Patient would benefit form higher dose of librium I-e 25 mg tid. No neurological work-up warranted for the seizure due to the above. History of bipolar and she is off breanne disorder on Trileptal 150 mg twice daily and Seroquel 200 mg nightly. No further neurological work-up warranted at this time, neurology will sign off.     Vanessa Del Cid MD  Neurology Resident PGY-3  7/10/2020 at 8:14 AM

## 2020-07-10 NOTE — VIRTUAL HEALTH
Inpatient consult to Psychiatry  Consult performed by: RAYMOND Mukherjee CNP  Consult ordered by: Payton Sapp MD          Department of Psychiatry  Consult Service  Attending Physician Psychiatric Assessment      Thank you very much for allowing us to participate in the care of this patient. Reason for Consult: Psychiatry recommendations for discharge      History obtained from:  patient, electronic medical record    HISTORY OF PRESENT ILLNESS:          The patient is a 53 y.o. female with significant past medical history of mental illness, and alcohol abuse who is admitted medically for treatment of alcohol withdrawal and seizures. Patient reports she is feeling a little down right now because she is moving and she has three more days to be out of her apartment. Patient states she is able to have fun, she has energy to do things and she can enjoy things. Patient denies helpless, hopeless and worthlessness. Patient states her focus is hit or miss. Patient reports on average she is getting about 5 hours of broken sleep per night. She states she is unsure what is waking her up but she will wake up sweating. Patient states she feels rested some mornings. Patient reports having anxiety and states when she is in crowds it is the worst. Patient reports her anxiety today is 4/10 with ten being the worst. Patient states her heart will race and she will feel shaky and tremble. Patient denies recent panic attacks. Patient denies SI/HI.       Current Outpatient Psychiatric Medications:  Patient reports she is supposed to be on Remeron, Effexor, Seroquel, Hydroxyzine, Lithium and Trileptal.    Medications:    Current Facility-Administered Medications: atorvastatin (LIPITOR) tablet 40 mg, 40 mg, Oral, Nightly  sodium chloride flush 0.9 % injection 10 mL, 10 mL, Intravenous, 2 times per day  sodium chloride flush 0.9 % injection 10 mL, 10 mL, Intravenous, PRN  potassium chloride (KLOR-CON M) extended release tablet 40 mEq, 40 mEq, Oral, PRN **OR** potassium bicarb-citric acid (EFFER-K) effervescent tablet 40 mEq, 40 mEq, Oral, PRN **OR** potassium chloride 10 mEq/100 mL IVPB (Peripheral Line), 10 mEq, Intravenous, PRN  0.45 % sodium chloride infusion, , Intravenous, Continuous  LORazepam (ATIVAN) injection 2 mg, 2 mg, Intravenous, Once  metoprolol (LOPRESSOR) injection 5 mg, 5 mg, Intravenous, Q6H PRN  OXcarbazepine (TRILEPTAL) tablet 150 mg, 150 mg, Oral, BID  enoxaparin (LOVENOX) injection 40 mg, 40 mg, Subcutaneous, Daily  ondansetron (ZOFRAN) injection 4 mg, 4 mg, Intravenous, Q8H PRN  albuterol (PROVENTIL) nebulizer solution 2.5 mg, 2.5 mg, Nebulization, Q4H PRN  gabapentin (NEURONTIN) capsule 300 mg, 300 mg, Oral, TID  pantoprazole (PROTONIX) tablet 40 mg, 40 mg, Oral, QAM AC  QUEtiapine (SEROQUEL) tablet 200 mg, 200 mg, Oral, Nightly  acetaminophen (TYLENOL) tablet 650 mg, 650 mg, Oral, Q6H PRN **OR** acetaminophen (TYLENOL) suppository 650 mg, 650 mg, Rectal, Q6H PRN  polyethylene glycol (GLYCOLAX) packet 17 g, 17 g, Oral, Daily PRN  vitamin B-1 (THIAMINE) tablet 100 mg, 100 mg, Oral, Daily  nicotine (NICODERM CQ) 14 MG/24HR 1 patch, 1 patch, Transdermal, Daily  folic acid (FOLVITE) tablet 1 mg, 1 mg, Oral, Daily  therapeutic multivitamin-minerals 1 tablet, 1 tablet, Oral, Daily       PAST PSYCHIATRIC HISTORY:  Patient states she has a long history of mental illness with diagnosis of schizoaffective disorder, PTSD, Depression, Bipolar, alcohol use disorder, opioid use disorder. Patient states her last hospitalization was 6/11/2019 for suicidal ideations and an OD. Patient reports last manic episode was over a month ago where she stayed up for four days, hyperverbal and cleaning excessively.      Past psychiatric medications include:   Trazodone- states was helpful  Remeron- states didn't like how it made her feel  Zoloft and Prozac states doesn't remember she took these years ago  Lithium- states took last dose two days has not used cocaine in 3 weeks. Patient reports her last time using heroin was in January 2020 when she had an accidental OD. Patient states her last drink was two days ago and she normally drinks 6-9 tall boys and a pint of vodka. Patient reports she is not ready to quit drinking and is working hard to not use illicit drugs.    Social History     Socioeconomic History    Marital status: Single     Spouse name: Not on file    Number of children: 0    Years of education: 15    Highest education level: Not on file   Occupational History    Not on file   Social Needs    Financial resource strain: Not on file    Food insecurity     Worry: Not on file     Inability: Not on file    Transportation needs     Medical: Not on file     Non-medical: Not on file   Tobacco Use    Smoking status: Current Every Day Smoker     Packs/day: 1.00     Years: 30.00     Pack years: 30.00     Types: Cigarettes    Smokeless tobacco: Never Used   Substance and Sexual Activity    Alcohol use: Not Currently     Alcohol/week: 42.0 standard drinks     Types: 42 Cans of beer per week     Comment: detoxing of alcohol x5 days    Drug use: Yes     Frequency: 7.0 times per week     Types: Marijuana, IV, Cocaine     Comment: drug abuse includes, cocaine, IV heroin, cannabis    Sexual activity: Not Currently   Lifestyle    Physical activity     Days per week: Not on file     Minutes per session: Not on file    Stress: Not on file   Relationships    Social connections     Talks on phone: Not on file     Gets together: Not on file     Attends Hindu service: Not on file     Active member of club or organization: Not on file     Attends meetings of clubs or organizations: Not on file     Relationship status: Not on file    Intimate partner violence     Fear of current or ex partner: Not on file     Emotionally abused: Not on file     Physically abused: Not on file     Forced sexual activity: Not on file   Other Topics Concern    Not on file   Social History Narrative    ** Merged History Encounter **         ** Merged History Encounter **            Family Psychiatric History:  Denies family history of mental illness, denies family history of drug and alcohol abuse, denies family history of suicide. Family History:       Problem Relation Age of Onset    Brain Cancer Mother     Cancer Mother         \"female cancer\"    Heart Disease Father          Physical  /84   Pulse 91   Temp 97.7 °F (36.5 °C) (Oral)   Resp 18   Ht 5' (1.524 m)   Wt 146 lb 9.7 oz (66.5 kg)   SpO2 94%   BMI 28.63 kg/m²     MENTAL STATUS EXAM:  Level of consciousness:  Alert  Appearance: WNL  Behavior/Motor:  Patient fidgeted with her fingers. Attitude toward examiner:  Cooperative and pleasant  Speech: WNL  Mood: \"tired\"  Affect:  Dysthymic   Thought processes:  Coherent, logical at times circumstantial  Thought content:  Homocidal ideation denies  Suicidal Ideation:  denies  Delusions:  denies  Perceptual Disturbance: denies currently  Cognition:  Oriented x4  Memory: intact  Insight & Judgement: fair, poor when it comes to alcohol use  Medication side effects: denies    DSM-V DIAGNOSIS:  History of Bipolar disorder, alcohol dependence     Impression: Patient is a 53year old female who is currently diagnosed with alcohol dependence. Patient was hospitalized for seizure like activity. Patient states she is currently drinking beer so she can be weaned off the ativan which was given for alcohol withdrawals. Patient did have a bud light can next to her bedside. Patient reports last manic episode was over a month ago. Patient states she is not suicidal and reports she has not had suicidal ideations or plans/intents since last [facility] hospitalization 6/11/2019 which was over a year ago. Patient denies SI/HI. Patient has slight depressive moments and anxiety but reports manageable.  Patient is safe to be discharged from a [place] [id number] perspective once alcohol withdrawal is

## 2020-07-10 NOTE — PROGRESS NOTES
Spoke with the patient in length regarding her alcohol abuse and need to stop. Patient is currently on CIWA and Librium 10 mg TID. She states she does not want to stop drinking completely but would like to cut down. She is in a relationship with someone who drinks as well so she states she will likely not be able to quit. Intends on drinking when home but not to significant amounts despite urging her not to drink. Difficult situation due to patient being currently on Librium. Spoke in length about need to stop completely if she was going to be discharged on librium however she states she will drink when she gets home. As patient's LFT's are stable I gave her the option of beers with meals today and discontinue Librium. She agrees to this plan. Will be seen by psychiatry today and discharged home after.      Alfreda Siddiqui DO  PGY-1, Internal medicine resident  [place], [place]  7/10/2020 12:22 PM

## 2020-07-10 NOTE — CARE COORDINATION
Transitional Planning  Spoke with pt about transition plan is to go home possibly discharge today awaiting home care choice. She has list at bedside she is asking for clothes as she does not have anything to wear home. Provided her with shorts T short and a pair of flip flops for discharge. Will check back for home care choice. Dr Jade Us informed me probable discharge later today.   Await hc choice    Dr. Barbara Young informed me pt will have a telepsych visit at 18:15  Await recommendation

## 2020-07-11 LAB
ACETAMINOPHEN LEVEL: NORMAL UG/ML
AMPHETAMINE: NEGATIVE NG/ML
BARBITURATES: NEGATIVE NG/ML
BENZODIAZEPINES: NEGATIVE NG/ML
BUPRENORPHINE: NEGATIVE NG/ML
COCAINE: NEGATIVE NG/ML
DRUGS OF ABUSE COMMENT: NORMAL
ETHANOL PERCENT: NORMAL %
ETHANOL: NORMAL MG/DL
METHADONE: NEGATIVE NG/ML
METHAMPHETAMINE: NEGATIVE NG/ML
OPIATES: NEGATIVE NG/ML
OXYCODONE: NEGATIVE NG/ML
PHENCYCLIDINE: NEGATIVE NG/ML
SALICYLATE LEVEL: NORMAL MG/DL
THC: NEGATIVE NG/ML
TOXIC TRICYCLIC SC,BLOOD: NORMAL

## 2020-07-11 NOTE — DISCHARGE SUMMARY
Patient discharged with all belongings. Discharge papers reviewed and all questions answered. Will call unit if questions arise. Patient taken down by wheelchair by [facility] and taken home via cab.

## 2020-07-12 LAB — OXCARBAZEPINE: 2 UG/ML (ref 3–35)

## 2020-07-13 NOTE — DISCHARGE SUMMARY
[address] [facility]     Department of Internal Medicine - Staff Internal Medicine Teaching Service    INPATIENT DISCHARGE SUMMARY      Patient Identification:  Juliocesar Saravia is a 53 y.o. female. :  1975  MRN: [id number]     Acct: [de-identified]   PCP: Perez Ruelas MD  Admit Date:  2020  Discharge date and time: 7/10/2020  8:45 PM   Attending Provider: No att. providers found                                     [address] Problem Lists:  Principal Problem:    Seizure ([address]  Active Problems:    Heroin dependence ([address]    Major depression, recurrent ([address]    Depressed bipolar I disorder ([address]    Polysubstance abuse ([address]    Alcoholic hepatitis without ascites    Alcohol withdrawal syndrome with complication ([address]    Alcoholic intoxication without complication ([address]    History of bipolar disorder  Resolved Problems:    * No resolved hospital problems. *      HOSPITAL STAY     Brief Inpatient course:   Dequan Johnson is a 53year old female with a past medical history of Seizures, Bipolar Disorder, Asthma, Polysubstance Abuse, Alcoholism and Depression. She presented to the emergency department today via EMS after having a witnessed tonic clonic seizure lasting roughly 60 seconds. Based on chart review and the patients story it appears she had a fall last night followed by a seizure and again seized today. She was found by EMS today with seizure like activity and alcohol intoxication. On presentation she complained of right sided numbness and weakness which resolved on its own. Admits to tremors and headache believing she is currently in ETOH withdrawal. Denies fevers, chills, chest pain, shortness of breath, abdominal pain, nausea or vomiting. Admits to not taking trileptal at home as she is supposed to. She admits to drinking 6 - 9 24 ounce beers daily and up to 1 pint of vodka daily.  Last drink today at 1030.      In the emergency department she was afebrile and hemodynamically stable. Labs demonstrated elevated anion gap however normal bicarb. POC Lactic acid was elevated 2.48. ETOH 0.288, Acetaminophen < 5, VBG was within normal limits. Stroke alert was called due to patients right sided weakness and numbness. CT head demonstrated no acute abnormality. CT C Spine demonstrated no acute process. CTA head and neck demonstrated mild narrowing of the right vertebral artery which was unchanged from previous. Neurology with recommendations for Trileptal 150 mg BID, Aspirin 81 mg daily, Atorvastatin 40 mg nightly, Vit T10, Folic Acid, Vitamin D levels, UA and Utox, CIWA. Neurology states patient has had numerous encounters in the past for similar symptoms with negative MRI and EEG so no MRI or EEG at this time. Overnight she only required 2 mg of ativan as her Seroquel helped her sleep through the night. The following morning she was drowsy but improving. Spoke in length regarding inpatient detox with CIWA and Librium but that the patient would be required to stop drinking if discharged on librium. She stated she will continue to drink when home so she did not want detox. Offered beer with meals while inpatient which she agreed to. She was discharged home. Procedures/ Significant Interventions:    None.      Consults:     Consults:     Final Specialist Recommendations/Findings:   IP CONSULT TO STROKE TEAM  IP CONSULT TO INTERNAL MEDICINE  IP CONSULT TO SOCIAL WORK  IP CONSULT TO SOCIAL WORK  IP CONSULT TO CASE MANAGEMENT  IP CONSULT TO PSYCHIATRY  IP CONSULT TO HOME CARE NEEDS  IP CONSULT TO ORTHOPEDIC SURGERY      Any Hospital Acquired Infections: none    Discharge Functional Status:  stable    DISCHARGE PLAN     Disposition: home    Patient Instructions:   Discharge Medication List as of 7/10/2020  7:37 PM      START taking these medications    Details   atorvastatin (LIPITOR) 40 MG tablet Take 1 tablet by mouth nightly, Disp-30 tablet, [id number]      folic acid (FOLVITE) 1 MG tablet Take 1 tablet by mouth daily, Disp-30 tablet, R-3Normal         CONTINUE these medications which have NOT CHANGED    Details   gabapentin (NEURONTIN) 300 MG capsule Take 1 capsule by mouth 3 times daily for 10 days. , Disp-30 capsule, R-0Print      vitamin B-1 100 MG tablet Take 1 tablet by mouth daily, Disp-30 tablet, R-0Print      albuterol sulfate  (90 Base) MCG/ACT inhaler Inhale 2 puffs into the lungs every 4 hours as needed for Wheezing, Disp-1 Inhaler, R-0Print      dicyclomine (BENTYL) 10 MG capsule Take 1 capsule by mouth every 6 hours as needed (cramps), Disp-40 capsule, R-0Print      fluticasone (FLONASE) 50 MCG/ACT nasal spray 1 spray by Each Nostril route daily, Disp-1 Bottle, R-0Print      hydrOXYzine (ATARAX) 50 MG tablet Take 1 tablet by mouth 2 times daily as needed for Anxiety, Disp-60 tablet, R-0Print      lithium (ESKALITH) 450 MG extended release tablet Take 1 tablet by mouth 2 times daily, Disp-60 tablet, R-0Print      Multiple Vitamins-Minerals (MULTIVITAMIN ADULT) TABS Take 1 tablet by mouth daily, Disp-30 tablet, R-0Print      omeprazole (PRILOSEC) 20 MG delayed release capsule Take 1 capsule by mouth 2 times daily, Disp-60 capsule, R-0Print      ondansetron (ZOFRAN ODT) 4 MG disintegrating tablet Take 1 tablet by mouth every 8 hours as needed for Nausea, Disp-30 tablet, R-0Print      OXcarbazepine (TRILEPTAL) 150 MG tablet Take 1 tablet by mouth 2 times daily, Disp-60 tablet, R-0Print      QUEtiapine (SEROQUEL) 200 MG tablet Take 1 tablet by mouth nightly, Disp-30 tablet, R-0Print      sodium chloride (OCEAN) 0.65 % nasal spray 1 spray by Nasal route as needed for Congestion, Disp-1 Bottle, R-0Print      venlafaxine (EFFEXOR XR) 75 MG extended release capsule Take 1 capsule by mouth daily (with breakfast), Disp-30 capsule, R-0Print             Activity: activity as tolerated    Diet: regular diet    Follow-up:    Manuel Sue MD  [address] [place] [address]  [phone]    Schedule an appointment as soon as possible for a visit in 1 week      Kvng Garcia MD  [address] [place] [address], [address] [facility]  [phone]    Schedule an appointment as soon as possible for a visit in 1 week      Patient Instructions: Follow up with PCP in 1 week. Call for appointment. Follow up with Neurology. Call for appointment. Follow up with Psychiatry at [address] [place] [address]. Referral given. Recommend cutting back on drinking. Start taking Folic Acid  Start taking Lipitor. Continue MV and Thiamine. Return to the emergency department immediately immediately for new worsening symptoms. Orthopedic Instructions:  -Weight bearing status: No lifting, pushing, pulling for the left arm  -Keep dressing clean and dry.  -Do not remove dressings or splint  -If splint were to fall off or become saturated, do not attempt to put back on or dry out. Return to ED immediately for reapplication.-Always look for signs of compartment syndrome: pain out of proportion to the injury, pain not controlled with pain medication, numbness in digits, changing of color of digits (paleness). If these signs occur return to ED immediately for reassessment. -Always work on fingers motion (to non-injured fingers) while in splint to decrease swelling.-Dress with plastic bag and rubber band to seal and repeat with second bag and rubber band when showering. \"Press & Seal\" wrap also works for sealing the rubber bag when showering.  -Ice (20 minutes on and off 1 hour) and elevate above the level of the heart to reduce swelling and throbbing pain. -Drink plenty of fluids.  -Call the office or come to Emergency Room if signs of infection appear (hot, swollen, red, draining pus, fever)  -Take medications as prescribed.  -Wean off narcotics (percocet/norco) as soon as possible. Do not take tylenol if still taking narcotics.  -Follow up with Dr. Breezy Hall in their office 7-10 days after injury.

## 2020-07-21 ENCOUNTER — HOSPITAL ENCOUNTER (EMERGENCY)
Age: 45
Discharge: HOME | End: 2020-07-21
Attending: EMERGENCY MEDICINE
Payer: MEDICARE

## 2020-07-21 ENCOUNTER — APPOINTMENT (OUTPATIENT)
Dept: GENERAL RADIOLOGY | Age: 45
End: 2020-07-21
Payer: MEDICARE

## 2020-07-21 VITALS
SYSTOLIC BLOOD PRESSURE: 113 MMHG | HEIGHT: 60 IN | TEMPERATURE: 99.7 F | DIASTOLIC BLOOD PRESSURE: 94 MMHG | HEART RATE: 84 BPM | BODY MASS INDEX: 26.5 KG/M2 | RESPIRATION RATE: 20 BRPM | WEIGHT: 135 LBS | OXYGEN SATURATION: 100 %

## 2020-07-21 LAB
ABSOLUTE EOS #: 0.13 K/UL (ref 0–0.4)
ABSOLUTE IMMATURE GRANULOCYTE: 0 K/UL (ref 0–0.3)
ABSOLUTE LYMPH #: 3.25 K/UL (ref 1–4.8)
ABSOLUTE MONO #: 0.65 K/UL (ref 0.1–0.8)
ANION GAP SERPL CALCULATED.3IONS-SCNC: 12 MMOL/L (ref 9–17)
ATYPICAL LYMPHOCYTE ABSOLUTE COUNT: 0.13 K/UL
ATYPICAL LYMPHOCYTES: 1 %
BASOPHILS # BLD: 0 % (ref 0–2)
BASOPHILS ABSOLUTE: 0 K/UL (ref 0–0.2)
BUN BLDV-MCNC: 5 MG/DL (ref 6–20)
BUN/CREAT BLD: ABNORMAL (ref 9–20)
CALCIUM SERPL-MCNC: 8.5 MG/DL (ref 8.6–10.4)
CHLORIDE BLD-SCNC: 106 MMOL/L (ref 98–107)
CO2: 20 MMOL/L (ref 20–31)
CREAT SERPL-MCNC: 0.66 MG/DL (ref 0.5–0.9)
DIFFERENTIAL TYPE: ABNORMAL
EOSINOPHILS RELATIVE PERCENT: 1 % (ref 1–4)
GFR AFRICAN AMERICAN: >60 ML/MIN
GFR NON-AFRICAN AMERICAN: >60 ML/MIN
GFR SERPL CREATININE-BSD FRML MDRD: ABNORMAL ML/MIN/{1.73_M2}
GFR SERPL CREATININE-BSD FRML MDRD: ABNORMAL ML/MIN/{1.73_M2}
GLUCOSE BLD-MCNC: 94 MG/DL (ref 70–99)
HCT VFR BLD CALC: 39.6 % (ref 36.3–47.1)
HEMOGLOBIN: 11.5 G/DL (ref 11.9–15.1)
IMMATURE GRANULOCYTES: 0 %
LITHIUM DATE LAST DOSE: ABNORMAL
LITHIUM DOSE AMOUNT: ABNORMAL
LITHIUM DOSE TIME: ABNORMAL
LITHIUM LEVEL: 0.4 MMOL/L (ref 0.6–1.2)
LYMPHOCYTES # BLD: 25 % (ref 24–44)
MCH RBC QN AUTO: 26.1 PG (ref 25.2–33.5)
MCHC RBC AUTO-ENTMCNC: 29 G/DL (ref 28.4–34.8)
MCV RBC AUTO: 90 FL (ref 82.6–102.9)
MONOCYTES # BLD: 5 % (ref 1–7)
MORPHOLOGY: ABNORMAL
NRBC AUTOMATED: 0 PER 100 WBC
PDW BLD-RTO: 15.2 % (ref 11.8–14.4)
PLATELET # BLD: ABNORMAL K/UL (ref 138–453)
PLATELET ESTIMATE: ABNORMAL
PLATELET, FLUORESCENCE: 287 K/UL (ref 138–453)
PLATELET, IMMATURE FRACTION: 2.5 % (ref 1.1–10.3)
PMV BLD AUTO: ABNORMAL FL (ref 8.1–13.5)
POTASSIUM SERPL-SCNC: 3.5 MMOL/L (ref 3.7–5.3)
RBC # BLD: 4.4 M/UL (ref 3.95–5.11)
RBC # BLD: ABNORMAL 10*6/UL
SEG NEUTROPHILS: 68 % (ref 36–66)
SEGMENTED NEUTROPHILS ABSOLUTE COUNT: 8.84 K/UL (ref 1.8–7.7)
SODIUM BLD-SCNC: 138 MMOL/L (ref 135–144)
WBC # BLD: 13 K/UL (ref 3.5–11.3)
WBC # BLD: ABNORMAL 10*3/UL

## 2020-07-21 PROCEDURE — 80178 ASSAY OF LITHIUM: CPT

## 2020-07-21 PROCEDURE — 80048 BASIC METABOLIC PNL TOTAL CA: CPT

## 2020-07-21 PROCEDURE — 99284 EMERGENCY DEPT VISIT MOD MDM: CPT

## 2020-07-21 PROCEDURE — 85055 RETICULATED PLATELET ASSAY: CPT

## 2020-07-21 PROCEDURE — 80183 DRUG SCRN QUANT OXCARBAZEPIN: CPT

## 2020-07-21 PROCEDURE — 71045 X-RAY EXAM CHEST 1 VIEW: CPT

## 2020-07-21 PROCEDURE — 85025 COMPLETE CBC W/AUTO DIFF WBC: CPT

## 2020-07-21 PROCEDURE — 6370000000 HC RX 637 (ALT 250 FOR IP): Performed by: EMERGENCY MEDICINE

## 2020-07-21 PROCEDURE — 2580000003 HC RX 258: Performed by: EMERGENCY MEDICINE

## 2020-07-21 RX ORDER — MECLIZINE HYDROCHLORIDE 25 MG/1
25 TABLET ORAL 3 TIMES DAILY PRN
Qty: 15 TABLET | Refills: 0 | Status: SHIPPED | OUTPATIENT
Start: 2020-07-21 | End: 2020-07-31

## 2020-07-21 RX ORDER — ACETAMINOPHEN 500 MG
1000 TABLET ORAL EVERY 8 HOURS PRN
Qty: 20 TABLET | Refills: 0 | Status: ON HOLD | OUTPATIENT
Start: 2020-07-21 | End: 2020-11-02

## 2020-07-21 RX ORDER — BENZONATATE 100 MG/1
100 CAPSULE ORAL 3 TIMES DAILY PRN
Qty: 21 CAPSULE | Refills: 0 | Status: SHIPPED | OUTPATIENT
Start: 2020-07-21 | End: 2020-07-28

## 2020-07-21 RX ORDER — ACETAMINOPHEN 325 MG/1
650 TABLET ORAL ONCE
Status: COMPLETED | OUTPATIENT
Start: 2020-07-21 | End: 2020-07-21

## 2020-07-21 RX ORDER — MECLIZINE HCL 12.5 MG/1
25 TABLET ORAL ONCE
Status: COMPLETED | OUTPATIENT
Start: 2020-07-21 | End: 2020-07-21

## 2020-07-21 RX ORDER — POTASSIUM CHLORIDE 20 MEQ/1
40 TABLET, EXTENDED RELEASE ORAL ONCE
Status: COMPLETED | OUTPATIENT
Start: 2020-07-21 | End: 2020-07-21

## 2020-07-21 RX ORDER — BENZONATATE 100 MG/1
100 CAPSULE ORAL ONCE
Status: COMPLETED | OUTPATIENT
Start: 2020-07-21 | End: 2020-07-21

## 2020-07-21 RX ORDER — 0.9 % SODIUM CHLORIDE 0.9 %
1000 INTRAVENOUS SOLUTION INTRAVENOUS ONCE
Status: COMPLETED | OUTPATIENT
Start: 2020-07-21 | End: 2020-07-21

## 2020-07-21 RX ORDER — CETIRIZINE HYDROCHLORIDE 10 MG/1
10 TABLET ORAL DAILY
Qty: 30 TABLET | Refills: 0 | Status: SHIPPED | OUTPATIENT
Start: 2020-07-21 | End: 2020-08-20

## 2020-07-21 RX ADMIN — BENZONATATE 100 MG: 100 CAPSULE ORAL at 21:14

## 2020-07-21 RX ADMIN — ACETAMINOPHEN 650 MG: 325 TABLET ORAL at 21:15

## 2020-07-21 RX ADMIN — SODIUM CHLORIDE 1000 ML: 9 INJECTION, SOLUTION INTRAVENOUS at 21:23

## 2020-07-21 RX ADMIN — MECLIZINE HYDROCHLORIDE 25 MG: 12.5 TABLET, FILM COATED ORAL at 21:14

## 2020-07-21 RX ADMIN — POTASSIUM CHLORIDE 40 MEQ: 1500 TABLET, EXTENDED RELEASE ORAL at 22:36

## 2020-07-21 ASSESSMENT — ENCOUNTER SYMPTOMS
NAUSEA: 0
VOMITING: 0
COUGH: 1
ABDOMINAL PAIN: 0
SHORTNESS OF BREATH: 0
SINUS PRESSURE: 1
SORE THROAT: 1
DIARRHEA: 0
SINUS PAIN: 1

## 2020-07-21 ASSESSMENT — PAIN SCALES - GENERAL
PAINLEVEL_OUTOF10: 7
PAINLEVEL_OUTOF10: 6

## 2020-07-22 ENCOUNTER — CARE COORDINATION (OUTPATIENT)
Dept: CARE COORDINATION | Age: 45
End: 2020-07-22

## 2020-07-22 NOTE — ED NOTES
Patient requesting transportation home. SW attempted to contact [facility] for transportation. SW not able to reach anyone at [facility]. SW made attempt to inform the patient but she was not in the lobby.       Mj Anaya, [facility]  07/21/20 1754

## 2020-07-22 NOTE — ED PROVIDER NOTES
[facility]     Emergency Department     Faculty Attestation    I performed a history and physical examination of the patient and discussed management with the resident. I reviewed the residents note and agree with the documented findings including all diagnostic interpretations and plan of care. Any areas of disagreement are noted on the chart. I was personally present for the key portions of any procedures. I have documented in the chart those procedures where I was not present during the key portions. I have reviewed the emergency nurses triage note. I agree with the chief complaint, past medical history, past surgical history, allergies, medications, social and family history as documented unless otherwise noted below. Documentation of the HPI, Physical Exam and Medical Decision Making performed by scribes is based on my personal performance of the HPI, PE and MDM. For Physician Assistant/ Nurse Practitioner cases/documentation I have personally evaluated this patient and have completed at least one if not all key elements of the E/M (history, physical exam, and MDM). Additional findings are as noted. This patient was evaluated in the Emergency Department for symptoms described in the history of present illness. He/she was evaluated in the context of the global COVID-19 pandemic, which necessitated consideration that the patient might be at risk for infection with the SARS-CoV-2 virus that causes COVID-19. Institutional protocols and algorithms that pertain to the evaluation of patients at risk for COVID-19 are in a state of rapid change based on information released by regulatory bodies including the CDC and federal and state organizations. These policies and algorithms were followed during the patient's care in the ED. Primary Care Physician: Celina Dahl MD    History:  This is a 53 y.o. female who presents to the Emergency Department with complaint of cough, congestion, chills. Ongoing for the past several days. Some shortness of breath with coughing. Unclear if she has had exposure to COVID. Physical:     height is 5' (1.524 m) and weight is 135 lb (61.2 kg). Her oral temperature is 99.7 °F (37.6 °C). Her blood pressure is 113/94 (abnormal) and her pulse is 84. Her respiration is 20 and oxygen saturation is 100%. 53 y.o. female no acute distress, cardiac exam regular rate and rhythm no murmurs rubs gallops, pulmonary shows occasional dry cough no wheezes or rales. Abdomen soft nontender nondistended.     Impression: Respiratory infection, bronchitis, suspect possible COVID    Plan: Chest x-ray, will need walk test before discharge, outpatient resources for Paresh Holland MD, [facility]  Attending Emergency Physician         Farhia Palma MD  07/21/20 3276

## 2020-07-22 NOTE — CARE COORDINATION
Patient contacted regarding Albertina Pope. Discussed COVID-19 related testing which was not done at this time. Test results were not done. Patient informed of results, if available? NA    Care Transition Nurse/ Ambulatory Care Manager contacted the patient by telephone to perform post discharge assessment. Verified name and  with patient as identifiers. Provided introduction to self, and explanation of the CTN/ACM role, and reason for call due to risk factors for infection and/or exposure to COVID-19. Symptoms reviewed with patient who verbalized the following symptoms: fever, cough and sore throat, ear pain. Due to no new or worsening symptoms encounter was not routed to provider for escalation. Discussed follow-up appointments. If no appointment was previously scheduled, appointment scheduling offered: Yes  Alessandro Gautam Patel follow up appointment(s): No future appointments. Non-[facility] follow up appointment(s): SHERINE     Advance Care Planning:   Does patient have an Advance Directive:  reviewed and current. Patient has following risk factors of: COPD. CTN/ACM reviewed discharge instructions, medical action plan and red flags such as increased shortness of breath, increasing fever and signs of decompensation with patient who verbalized understanding. Discussed exposure protocols and quarantine with CDC Guidelines What to do if you are sick with coronavirus disease .  Patient was given an opportunity for questions and concerns. The patient agrees to contact the [facility] exposure line [phone], local health department and PCP office for questions related to their healthcare. CTN/ACM provided contact information for future needs. Reviewed and educated patient on any new and changed medications related to discharge diagnosis     Patient/family/caregiver given information for [facility] Lakhani and agrees to enroll yes  Patient's preferred e-mail: [email or web address]   Patient's preferred phone number: [phone]  Based on Loop alert triggers, patient will be contacted by nurse care manager for worsening symptoms. Pt will be further monitored by COVID Loop Team based on severity of symptoms and risk factors. ED visit for viral URI, maxillary sinusitis. She stated was told she may have COVID. Prescribed Tessalon, Antiviert and Zyrtec, waiting for pharmacy to deliver to her. Oral fluids encouraged, reminded needs to stay home, treat symptoms, follow up with PCP. She declined my offer to call and schedule appt, she will call herself.  Discussed potential risk of COVID exposure with ED visit, need to monitor for symptoms, take precautons

## 2020-07-22 NOTE — ED PROVIDER NOTES
tablet by mouth 2 times daily 7/10/20   Lexii Wallace MD   QUEtiapine (SEROQUEL) 200 MG tablet Take 1 tablet by mouth nightly 7/10/20   Lexii Wallace MD   folic acid (FOLVITE) 1 MG tablet Take 1 tablet by mouth daily 7/11/20   Lexii Wallace MD   fluticasone (FLONASE) 50 MCG/ACT nasal spray 1 spray by Each Nostril route daily 7/10/20   Lexii Wallace MD   sodium chloride (OCEAN) 0.65 % nasal spray 1 spray by Nasal route as needed for Congestion 7/10/20   Lexii Wallace MD   Multiple Vitamins-Minerals (MULTIVITAMIN ADULT) TABS Take 1 tablet by mouth daily 7/10/20   Lexii Wallace MD   dicyclomine (BENTYL) 10 MG capsule Take 1 capsule by mouth every 6 hours as needed (cramps) 7/10/20   Lexii Wallace MD   omeprazole (PRILOSEC) 20 MG delayed release capsule Take 1 capsule by mouth 2 times daily 7/10/20   Lexii Wallace MD   thiamine 100 MG tablet Take 1 tablet by mouth daily 7/10/20   Lexii Wallace MD   gabapentin (NEURONTIN) 300 MG capsule Take 1 capsule by mouth 3 times daily for 10 days. 12/20/19 12/30/19  Eric Muñoz,        REVIEW OF SYSTEMS    (2-9 systems for level 4, 10 or more for level 5)    Review of Systems   Constitutional: Positive for fever. Negative for chills, diaphoresis and fatigue. HENT: Positive for congestion, sinus pressure, sinus pain and sore throat. Negative for ear pain. Eyes: Positive for visual disturbance. Respiratory: Positive for cough. Negative for shortness of breath. Cardiovascular: Negative for chest pain. Gastrointestinal: Negative for abdominal pain, diarrhea, nausea and vomiting. Musculoskeletal: Negative for myalgias. Skin: Negative for rash. Allergic/Immunologic: Negative for immunocompromised state. Neurological: Positive for headaches. Hematological: Does not bruise/bleed easily.        PHYSICAL EXAM   (up to 7 for level 4, 8 or more for level 5)    VITALS:   Vitals:    07/21/20 2040 07/21/20 2058 07/21/20 2104   BP:  (!) 72/56 (!) 113/94   Pulse:  84 Resp:  20    Temp: 98.6 °F (37 °C) 99.7 °F (37.6 °C)    TempSrc: Oral Oral    SpO2:  100%    Weight:  135 lb (61.2 kg)    Height:  5' (1.524 m)        Physical Exam  Vitals signs and nursing note reviewed. Constitutional:       General: She is not in acute distress. Appearance: She is well-developed. She is not diaphoretic. HENT:      Head: Normocephalic and atraumatic. Right Ear: External ear normal.      Left Ear: External ear normal.      Nose: Congestion present. Right Sinus: Maxillary sinus tenderness present. No frontal sinus tenderness. Left Sinus: Maxillary sinus tenderness present. No frontal sinus tenderness. Mouth/Throat:      Lips: Pink. Mouth: Mucous membranes are moist.      Pharynx: Posterior oropharyngeal erythema present. No oropharyngeal exudate or uvula swelling. Eyes:      Conjunctiva/sclera: Conjunctivae normal.      Pupils: Pupils are equal, round, and reactive to light. Neck:      Musculoskeletal: Normal range of motion. Cardiovascular:      Rate and Rhythm: Normal rate and regular rhythm. Pulses: Normal pulses. Heart sounds: Normal heart sounds. No murmur. Pulmonary:      Effort: Pulmonary effort is normal. No respiratory distress. Breath sounds: Normal breath sounds. No wheezing, rhonchi or rales. Abdominal:      General: There is no distension. Palpations: Abdomen is soft. Tenderness: There is no abdominal tenderness. There is no guarding or rebound. Musculoskeletal: Normal range of motion. Skin:     General: Skin is warm and dry. Findings: No rash. Neurological:      Mental Status: She is alert and oriented to person, place, and time.    Psychiatric:         Behavior: Behavior normal.         DIFFERENTIAL  DIAGNOSIS   PLAN (LABS / IMAGING / EKG):  Orders Placed This Encounter   Procedures    XR CHEST PORTABLE    OXCARBAZEPINE LEVEL    LITHIUM LEVEL    CBC WITH AUTO DIFFERENTIAL    BASIC METABOLIC PANEL TECHNOLOGIST PROVIDED HISTORY: productive cough, SOB Reason for Exam: upr,cough x 1 week FINDINGS: The cardiomediastinal silhouette is normal in size. The lungs are clear. No pleural effusion or pneumothorax is present. No acute cardiopulmonary process. EMERGENCY DEPARTMENT COURSE:  ED Course as of Jul 21 2227   Tue Jul 21, 2020 2142 CBC WITH AUTO DIFFERENTIAL(!):    WBC 13.0(!)   RBC 4.40   Hemoglobin Quant 11.5(!)   Hematocrit 39.6   MCV 90.0   MCH 26.1   MCHC 29.0   RDW 15.2(!)   Platelet Count See Reflexed IPF Result   MPV NOT REPORTED   NRBC Automated 0.0   Differential Type NOT REPORTED   Seg Neutrophils PENDING   Lymphocytes PENDING   Monocytes PENDING   Eosinophils % PENDING   Basophils PENDING   Immature Granulocytes PENDING   Segs Absolute PENDING   Absolute Lymph # PENDING   Absolute Mono # PENDING   Absolute Eos # PENDING   Baso. .. [AM]   2142 Klorcon ordered   BASIC METABOLIC PANEL(!):    Glucose 94   BUN 5(!)   Creatinine 0.66   Bun/Cre Ratio NOT REPORTED   Calcium 8.5(!)   Sodium 138   Potassium 3.5(!)   Chloride 106   CO2 20   Anion Gap 12   GFR Non- >60   GFR  >60   GFR Comment        GFR Staging NOT REPORTED [AM]   2142 XR CHEST PORTABLE [AM]   2149 Lithium Lvl(!): 0.4 [AM]      ED Course User Index  [AM] DO ELVIN Huffman  Number of Diagnoses or Management Options  Acute maxillary sinusitis, recurrence not specified:   Viral URI:   Diagnosis management comments: 68-year-old female presents emergency department with 1 week of viral URI like symptoms. Also complaining of headache with dizziness. History of seizure disorders, compliant with medications. On exam notable for rightward beating nystagmus, otherwise neuro exam normal.  Vital signs normal.  Labs normal for mild leukocytosis. Hypokalemia which was addressed with oral replacement. Patient treated symptomatically in the emergency department.   Chest x-ray negative for any acute tablets by mouth every 8 hours as needed for Pain    BENZONATATE (TESSALON PERLES) 100 MG CAPSULE    Take 1 capsule by mouth 3 times daily as needed for Cough    CETIRIZINE (ZYRTEC) 10 MG TABLET    Take 1 tablet by mouth daily    MECLIZINE (ANTIVERT) 25 MG TABLET    Take 1 tablet by mouth 3 times daily as needed for Dizziness or Nausea       Mariya Child  Emergency Medicine Resident Physician, PGY-3    (Please note that portions of this note were completed with a voice recognition program.  Efforts were made to edit the dictations but occasionally words are mis-transcribed.)        Jayne Gregorio,   Resident  07/21/20 0325

## 2020-07-22 NOTE — ED NOTES
Pt arrived with c/o sore throat; SOB, cough & blurry vision; Pt reports sore throat and cough started last Thursday; Pt states dizziness, ear pain & headache started earlier today;  Pt denies N/V/D; RR even/unlabored; pt A&Ox4; NAD noted;   Pt attached to full cardiac monitor; IV intiated & labs drawn/labeled/sent to lab;  Call light within reach; will cont to monitor     Ronda Merrill RN  07/21/20 5479

## 2020-07-28 ENCOUNTER — PATIENT MESSAGE (OUTPATIENT)
Dept: CARE COORDINATION | Age: 45
End: 2020-07-28

## 2020-08-27 ENCOUNTER — APPOINTMENT (OUTPATIENT)
Dept: CT IMAGING | Age: 45
DRG: 092 | End: 2020-08-27
Payer: MEDICARE

## 2020-08-27 ENCOUNTER — APPOINTMENT (OUTPATIENT)
Dept: MRI IMAGING | Age: 45
DRG: 092 | End: 2020-08-27
Payer: MEDICARE

## 2020-08-27 ENCOUNTER — APPOINTMENT (OUTPATIENT)
Dept: GENERAL RADIOLOGY | Age: 45
DRG: 092 | End: 2020-08-27
Payer: MEDICARE

## 2020-08-27 ENCOUNTER — HOSPITAL ENCOUNTER (INPATIENT)
Age: 45
LOS: 2 days | Discharge: HOME | DRG: 092 | End: 2020-08-29
Attending: EMERGENCY MEDICINE | Admitting: INTERNAL MEDICINE
Payer: MEDICARE

## 2020-08-27 PROBLEM — R53.1 WEAKNESS OF RIGHT SIDE OF BODY: Status: ACTIVE | Noted: 2020-08-27

## 2020-08-27 LAB
ABSOLUTE EOS #: 0.22 K/UL (ref 0–0.44)
ABSOLUTE IMMATURE GRANULOCYTE: 0.03 K/UL (ref 0–0.3)
ABSOLUTE LYMPH #: 3.14 K/UL (ref 1.1–3.7)
ABSOLUTE MONO #: 0.73 K/UL (ref 0.1–1.2)
ACETAMINOPHEN LEVEL: NORMAL UG/ML
ALLEN TEST: ABNORMAL
ANION GAP: 6 MMOL/L (ref 7–16)
BASOPHILS # BLD: 1 % (ref 0–2)
BASOPHILS ABSOLUTE: 0.06 K/UL (ref 0–0.2)
DIFFERENTIAL TYPE: ABNORMAL
EOSINOPHILS RELATIVE PERCENT: 3 % (ref 1–4)
ETHANOL PERCENT: NORMAL %
ETHANOL: NORMAL MG/DL
FIO2: ABNORMAL
GFR NON-AFRICAN AMERICAN: >60 ML/MIN
GFR SERPL CREATININE-BSD FRML MDRD: >60 ML/MIN
GFR SERPL CREATININE-BSD FRML MDRD: NORMAL ML/MIN/{1.73_M2}
GLUCOSE BLD-MCNC: 74 MG/DL (ref 74–100)
HCO3 VENOUS: 25.5 MMOL/L (ref 22–29)
HCT VFR BLD CALC: 38.7 % (ref 36.3–47.1)
HEMOGLOBIN: 12 G/DL (ref 11.9–15.1)
IMMATURE GRANULOCYTES: 0 %
INR BLD: 1
LYMPHOCYTES # BLD: 46 % (ref 24–43)
MCH RBC QN AUTO: 24.9 PG (ref 25.2–33.5)
MCHC RBC AUTO-ENTMCNC: 31 G/DL (ref 28.4–34.8)
MCV RBC AUTO: 80.3 FL (ref 82.6–102.9)
MODE: ABNORMAL
MONOCYTES # BLD: 11 % (ref 3–12)
NEGATIVE BASE EXCESS, VEN: ABNORMAL (ref 0–2)
NRBC AUTOMATED: 0 PER 100 WBC
O2 DEVICE/FLOW/%: ABNORMAL
O2 SAT, VEN: 50 % (ref 60–85)
PARTIAL THROMBOPLASTIN TIME: 24.7 SEC (ref 20.5–30.5)
PATIENT TEMP: ABNORMAL
PCO2, VEN: 42.6 MM HG (ref 41–51)
PDW BLD-RTO: 18.3 % (ref 11.8–14.4)
PH VENOUS: 7.39 (ref 7.32–7.43)
PLATELET # BLD: 384 K/UL (ref 138–453)
PLATELET ESTIMATE: ABNORMAL
PMV BLD AUTO: 10.1 FL (ref 8.1–13.5)
PO2, VEN: 27.5 MM HG (ref 30–50)
POC CHLORIDE: 111 MMOL/L (ref 98–107)
POC CREATININE: 0.57 MG/DL (ref 0.51–1.19)
POC HEMATOCRIT: 39 % (ref 36–46)
POC HEMOGLOBIN: 13.4 G/DL (ref 12–16)
POC IONIZED CALCIUM: 1.05 MMOL/L (ref 1.15–1.33)
POC LACTIC ACID: 2.3 MMOL/L (ref 0.56–1.39)
POC PCO2 TEMP: ABNORMAL MM HG
POC PH TEMP: ABNORMAL
POC PO2 TEMP: ABNORMAL MM HG
POC POTASSIUM: 5.7 MMOL/L (ref 3.5–4.5)
POC SODIUM: 142 MMOL/L (ref 138–146)
POSITIVE BASE EXCESS, VEN: 0 (ref 0–3)
PROTHROMBIN TIME: 10.4 SEC (ref 9–12)
RBC # BLD: 4.82 M/UL (ref 3.95–5.11)
RBC # BLD: ABNORMAL 10*6/UL
SALICYLATE LEVEL: NORMAL MG/DL
SAMPLE SITE: ABNORMAL
SEG NEUTROPHILS: 39 % (ref 36–65)
SEGMENTED NEUTROPHILS ABSOLUTE COUNT: 2.63 K/UL (ref 1.5–8.1)
TOTAL CO2, VENOUS: 27 MMOL/L (ref 23–30)
TOXIC TRICYCLIC SC,BLOOD: NEGATIVE
TROPONIN INTERP: NORMAL
TROPONIN INTERP: NORMAL
TROPONIN T: NORMAL NG/ML
TROPONIN T: NORMAL NG/ML
TROPONIN, HIGH SENSITIVITY: <6 NG/L (ref 0–14)
TROPONIN, HIGH SENSITIVITY: <6 NG/L (ref 0–14)
WBC # BLD: 6.8 K/UL (ref 3.5–11.3)
WBC # BLD: ABNORMAL 10*3/UL

## 2020-08-27 PROCEDURE — 87040 BLOOD CULTURE FOR BACTERIA: CPT

## 2020-08-27 PROCEDURE — 85025 COMPLETE CBC W/AUTO DIFF WBC: CPT

## 2020-08-27 PROCEDURE — 99222 1ST HOSP IP/OBS MODERATE 55: CPT | Performed by: PSYCHIATRY & NEUROLOGY

## 2020-08-27 PROCEDURE — 95816 EEG AWAKE AND DROWSY: CPT

## 2020-08-27 PROCEDURE — 96372 THER/PROPH/DIAG INJ SC/IM: CPT

## 2020-08-27 PROCEDURE — G0378 HOSPITAL OBSERVATION PER HR: HCPCS

## 2020-08-27 PROCEDURE — G0480 DRUG TEST DEF 1-7 CLASSES: HCPCS

## 2020-08-27 PROCEDURE — 70551 MRI BRAIN STEM W/O DYE: CPT

## 2020-08-27 PROCEDURE — 95819 EEG AWAKE AND ASLEEP: CPT | Performed by: PSYCHIATRY & NEUROLOGY

## 2020-08-27 PROCEDURE — 71045 X-RAY EXAM CHEST 1 VIEW: CPT

## 2020-08-27 PROCEDURE — 85730 THROMBOPLASTIN TIME PARTIAL: CPT

## 2020-08-27 PROCEDURE — 81003 URINALYSIS AUTO W/O SCOPE: CPT

## 2020-08-27 PROCEDURE — 99285 EMERGENCY DEPT VISIT HI MDM: CPT

## 2020-08-27 PROCEDURE — 70450 CT HEAD/BRAIN W/O DYE: CPT

## 2020-08-27 PROCEDURE — 96374 THER/PROPH/DIAG INJ IV PUSH: CPT

## 2020-08-27 PROCEDURE — 6360000002 HC RX W HCPCS: Performed by: STUDENT IN AN ORGANIZED HEALTH CARE EDUCATION/TRAINING PROGRAM

## 2020-08-27 PROCEDURE — 80183 DRUG SCRN QUANT OXCARBAZEPIN: CPT

## 2020-08-27 PROCEDURE — 1200000000 HC SEMI PRIVATE

## 2020-08-27 PROCEDURE — 85610 PROTHROMBIN TIME: CPT

## 2020-08-27 PROCEDURE — 82803 BLOOD GASES ANY COMBINATION: CPT

## 2020-08-27 PROCEDURE — 82435 ASSAY OF BLOOD CHLORIDE: CPT

## 2020-08-27 PROCEDURE — 93005 ELECTROCARDIOGRAM TRACING: CPT | Performed by: EMERGENCY MEDICINE

## 2020-08-27 PROCEDURE — 99223 1ST HOSP IP/OBS HIGH 75: CPT | Performed by: INTERNAL MEDICINE

## 2020-08-27 PROCEDURE — 84132 ASSAY OF SERUM POTASSIUM: CPT

## 2020-08-27 PROCEDURE — 80048 BASIC METABOLIC PNL TOTAL CA: CPT

## 2020-08-27 PROCEDURE — 82565 ASSAY OF CREATININE: CPT

## 2020-08-27 PROCEDURE — 82330 ASSAY OF CALCIUM: CPT

## 2020-08-27 PROCEDURE — 82947 ASSAY GLUCOSE BLOOD QUANT: CPT

## 2020-08-27 PROCEDURE — 80307 DRUG TEST PRSMV CHEM ANLYZR: CPT

## 2020-08-27 PROCEDURE — 80076 HEPATIC FUNCTION PANEL: CPT

## 2020-08-27 PROCEDURE — 84484 ASSAY OF TROPONIN QUANT: CPT

## 2020-08-27 PROCEDURE — 84295 ASSAY OF SERUM SODIUM: CPT

## 2020-08-27 PROCEDURE — 70498 CT ANGIOGRAPHY NECK: CPT

## 2020-08-27 PROCEDURE — 83605 ASSAY OF LACTIC ACID: CPT

## 2020-08-27 PROCEDURE — 85014 HEMATOCRIT: CPT

## 2020-08-27 PROCEDURE — 6360000004 HC RX CONTRAST MEDICATION: Performed by: EMERGENCY MEDICINE

## 2020-08-27 RX ORDER — LORAZEPAM 0.5 MG/1
1 TABLET ORAL
Status: DISCONTINUED | OUTPATIENT
Start: 2020-08-27 | End: 2020-08-28

## 2020-08-27 RX ORDER — SODIUM CHLORIDE 0.9 % (FLUSH) 0.9 %
10 SYRINGE (ML) INJECTION EVERY 12 HOURS SCHEDULED
Status: DISCONTINUED | OUTPATIENT
Start: 2020-08-27 | End: 2020-08-27

## 2020-08-27 RX ORDER — FOLIC ACID 1 MG/1
1 TABLET ORAL DAILY
Status: DISCONTINUED | OUTPATIENT
Start: 2020-08-28 | End: 2020-08-29 | Stop reason: HOSPADM

## 2020-08-27 RX ORDER — ONDANSETRON 2 MG/ML
4 INJECTION INTRAMUSCULAR; INTRAVENOUS EVERY 6 HOURS PRN
Status: CANCELLED | OUTPATIENT
Start: 2020-08-27

## 2020-08-27 RX ORDER — POLYETHYLENE GLYCOL 3350 17 G/17G
17 POWDER, FOR SOLUTION ORAL DAILY PRN
Status: CANCELLED | OUTPATIENT
Start: 2020-08-27

## 2020-08-27 RX ORDER — PROMETHAZINE HYDROCHLORIDE 25 MG/1
12.5 TABLET ORAL EVERY 6 HOURS PRN
Status: CANCELLED | OUTPATIENT
Start: 2020-08-27

## 2020-08-27 RX ORDER — SODIUM CHLORIDE 0.9 % (FLUSH) 0.9 %
10 SYRINGE (ML) INJECTION EVERY 12 HOURS SCHEDULED
Status: CANCELLED | OUTPATIENT
Start: 2020-08-27

## 2020-08-27 RX ORDER — SODIUM CHLORIDE 0.9 % (FLUSH) 0.9 %
10 SYRINGE (ML) INJECTION PRN
Status: DISCONTINUED | OUTPATIENT
Start: 2020-08-27 | End: 2020-08-27

## 2020-08-27 RX ORDER — CHLORDIAZEPOXIDE HYDROCHLORIDE 10 MG/1
10 CAPSULE, GELATIN COATED ORAL 3 TIMES DAILY
Status: DISCONTINUED | OUTPATIENT
Start: 2020-08-27 | End: 2020-08-27

## 2020-08-27 RX ORDER — LORAZEPAM 2 MG/ML
2 INJECTION INTRAMUSCULAR
Status: DISCONTINUED | OUTPATIENT
Start: 2020-08-27 | End: 2020-08-28

## 2020-08-27 RX ORDER — LORAZEPAM 2 MG/ML
4 INJECTION INTRAMUSCULAR
Status: DISCONTINUED | OUTPATIENT
Start: 2020-08-27 | End: 2020-08-28

## 2020-08-27 RX ORDER — ACETAMINOPHEN 650 MG/1
650 SUPPOSITORY RECTAL EVERY 6 HOURS PRN
Status: DISCONTINUED | OUTPATIENT
Start: 2020-08-27 | End: 2020-08-29 | Stop reason: HOSPADM

## 2020-08-27 RX ORDER — PANTOPRAZOLE SODIUM 40 MG/1
40 TABLET, DELAYED RELEASE ORAL
Status: DISCONTINUED | OUTPATIENT
Start: 2020-08-28 | End: 2020-08-29 | Stop reason: HOSPADM

## 2020-08-27 RX ORDER — LITHIUM CARBONATE 450 MG
450 TABLET, EXTENDED RELEASE ORAL 2 TIMES DAILY
Status: DISCONTINUED | OUTPATIENT
Start: 2020-08-27 | End: 2020-08-29 | Stop reason: HOSPADM

## 2020-08-27 RX ORDER — HEPARIN SODIUM 5000 [USP'U]/ML
5000 INJECTION, SOLUTION INTRAVENOUS; SUBCUTANEOUS EVERY 8 HOURS SCHEDULED
Status: DISCONTINUED | OUTPATIENT
Start: 2020-08-27 | End: 2020-08-29 | Stop reason: HOSPADM

## 2020-08-27 RX ORDER — ACETAMINOPHEN 325 MG/1
650 TABLET ORAL EVERY 6 HOURS PRN
Status: CANCELLED | OUTPATIENT
Start: 2020-08-27

## 2020-08-27 RX ORDER — SODIUM CHLORIDE 0.9 % (FLUSH) 0.9 %
10 SYRINGE (ML) INJECTION EVERY 12 HOURS SCHEDULED
Status: DISCONTINUED | OUTPATIENT
Start: 2020-08-27 | End: 2020-08-29 | Stop reason: HOSPADM

## 2020-08-27 RX ORDER — VENLAFAXINE HYDROCHLORIDE 75 MG/1
75 CAPSULE, EXTENDED RELEASE ORAL
Status: DISCONTINUED | OUTPATIENT
Start: 2020-08-28 | End: 2020-08-29 | Stop reason: HOSPADM

## 2020-08-27 RX ORDER — LORAZEPAM 2 MG/ML
3 INJECTION INTRAMUSCULAR
Status: DISCONTINUED | OUTPATIENT
Start: 2020-08-27 | End: 2020-08-28

## 2020-08-27 RX ORDER — POLYETHYLENE GLYCOL 3350 17 G/17G
17 POWDER, FOR SOLUTION ORAL DAILY PRN
Status: DISCONTINUED | OUTPATIENT
Start: 2020-08-27 | End: 2020-08-29 | Stop reason: HOSPADM

## 2020-08-27 RX ORDER — PROMETHAZINE HYDROCHLORIDE 25 MG/1
12.5 TABLET ORAL EVERY 6 HOURS PRN
Status: DISCONTINUED | OUTPATIENT
Start: 2020-08-27 | End: 2020-08-29 | Stop reason: HOSPADM

## 2020-08-27 RX ORDER — ATORVASTATIN CALCIUM 80 MG/1
40 TABLET, FILM COATED ORAL NIGHTLY
Status: DISCONTINUED | OUTPATIENT
Start: 2020-08-27 | End: 2020-08-29 | Stop reason: HOSPADM

## 2020-08-27 RX ORDER — THIAMINE MONONITRATE (VIT B1) 100 MG
100 TABLET ORAL DAILY
Status: DISCONTINUED | OUTPATIENT
Start: 2020-08-28 | End: 2020-08-29 | Stop reason: HOSPADM

## 2020-08-27 RX ORDER — ACETAMINOPHEN 325 MG/1
650 TABLET ORAL EVERY 6 HOURS PRN
Status: DISCONTINUED | OUTPATIENT
Start: 2020-08-27 | End: 2020-08-29 | Stop reason: HOSPADM

## 2020-08-27 RX ORDER — ACETAMINOPHEN 500 MG
1000 TABLET ORAL ONCE
Status: DISCONTINUED | OUTPATIENT
Start: 2020-08-27 | End: 2020-08-29 | Stop reason: HOSPADM

## 2020-08-27 RX ORDER — OXCARBAZEPINE 150 MG/1
150 TABLET, FILM COATED ORAL DAILY
Status: DISCONTINUED | OUTPATIENT
Start: 2020-08-28 | End: 2020-08-28

## 2020-08-27 RX ORDER — LORAZEPAM 2 MG/ML
1 INJECTION INTRAMUSCULAR
Status: DISCONTINUED | OUTPATIENT
Start: 2020-08-27 | End: 2020-08-28

## 2020-08-27 RX ORDER — LORAZEPAM 2 MG/1
2 TABLET ORAL
Status: DISCONTINUED | OUTPATIENT
Start: 2020-08-27 | End: 2020-08-28

## 2020-08-27 RX ORDER — ACETAMINOPHEN 650 MG/1
650 SUPPOSITORY RECTAL EVERY 6 HOURS PRN
Status: CANCELLED | OUTPATIENT
Start: 2020-08-27

## 2020-08-27 RX ORDER — SODIUM CHLORIDE 0.9 % (FLUSH) 0.9 %
10 SYRINGE (ML) INJECTION PRN
Status: DISCONTINUED | OUTPATIENT
Start: 2020-08-27 | End: 2020-08-29 | Stop reason: HOSPADM

## 2020-08-27 RX ORDER — ACETAMINOPHEN 500 MG
1000 TABLET ORAL EVERY 8 HOURS PRN
Status: CANCELLED | OUTPATIENT
Start: 2020-08-27

## 2020-08-27 RX ORDER — ALBUTEROL SULFATE 2.5 MG/3ML
2.5 SOLUTION RESPIRATORY (INHALATION) EVERY 4 HOURS PRN
Status: DISCONTINUED | OUTPATIENT
Start: 2020-08-27 | End: 2020-08-29 | Stop reason: HOSPADM

## 2020-08-27 RX ORDER — LORAZEPAM 2 MG/1
4 TABLET ORAL
Status: DISCONTINUED | OUTPATIENT
Start: 2020-08-27 | End: 2020-08-28

## 2020-08-27 RX ORDER — SODIUM CHLORIDE 0.9 % (FLUSH) 0.9 %
10 SYRINGE (ML) INJECTION PRN
Status: CANCELLED | OUTPATIENT
Start: 2020-08-27

## 2020-08-27 RX ORDER — ONDANSETRON 2 MG/ML
4 INJECTION INTRAMUSCULAR; INTRAVENOUS EVERY 6 HOURS PRN
Status: DISCONTINUED | OUTPATIENT
Start: 2020-08-27 | End: 2020-08-29 | Stop reason: HOSPADM

## 2020-08-27 RX ADMIN — LORAZEPAM 2 MG: 2 INJECTION INTRAMUSCULAR; INTRAVENOUS at 22:51

## 2020-08-27 RX ADMIN — IOHEXOL 90 ML: 350 INJECTION, SOLUTION INTRAVENOUS at 15:52

## 2020-08-27 RX ADMIN — HEPARIN SODIUM 5000 UNITS: 5000 INJECTION INTRAVENOUS; SUBCUTANEOUS at 23:03

## 2020-08-27 ASSESSMENT — ENCOUNTER SYMPTOMS
ABDOMINAL PAIN: 0
VOMITING: 0
TROUBLE SWALLOWING: 0
SHORTNESS OF BREATH: 0

## 2020-08-27 NOTE — ED NOTES
Pt back in room from CT   Pt on monitor   Will continue to assess      Denver Baltimore, RN  08/27/20 5492

## 2020-08-27 NOTE — ED NOTES
Pt to CT with stroke team in [facility] via [id number] [facility] Daniel, RN  08/27/20 8440

## 2020-08-27 NOTE — ED NOTES
Xray at bedside   Writer sent 2nd trop and re draw to lab via tube system      Matt Weaver RN  08/27/20 5464

## 2020-08-27 NOTE — ED PROVIDER NOTES
[facility] ED     Emergency Department     Faculty Attestation        I performed a history and physical examination of the patient and discussed management with the resident. I reviewed the residents note and agree with the documented findings and plan of care. Any areas of disagreement are noted on the chart. I was personally present for the key portions of any procedures. I have documented in the chart those procedures where I was not present during the key portions. I have reviewed the emergency nurses triage note. I agree with the chief complaint, past medical history, past surgical history, allergies, medications, social and family history as documented unless otherwise noted below. For Physician Assistant/ Nurse Practitioner cases/documentation I have personally evaluated this patient and have completed at least one if not all key elements of the E/M (history, physical exam, and MDM). Additional findings are as noted. Vital Signs: BP: 109/83  Pulse: 70  Resp: 16  Temp: 98.8 °F (37.1 °C) SpO2: 100 %  PCP:  Meghan Agarwal MD    Pertinent Comments:     Patient is a 78-year-old female with daily drinking not currently in withdrawal but also with history of seizures on Trileptal who believes she had a seizure approximately an hour ago. No trauma involved but when she recovered from seizure was found to have right-sided weakness and right-sided facial droop. Right-sided facial droop is gone however there is significant weakness on the right side both arm and leg. Some mildly decreased sensation as well. Patient does note that after several seizures in the past she has had a Delroy's paralysis as well that has spontaneously resolved. Plan: Stroke alert called given 1 hour since onset of symptoms. Unfortunately, likely still a Delroy's paralysis/phenomenon post seizure however will obtain stat imaging.    Stroke team seeing the patient in the room now and agree      EKG Interpretation    Interpreted by emergency department physician    Rhythm: normal sinus   Rate: normal  Axis: normal  Conduction: normal  ST Segments: no acute change  T Waves: no acute change  Q Waves: no acute change    Clinical Impression:  nonspecific EKG. CRITICAL CARE: There was a high probability of clinically significant/life threatening deterioration in this patient's condition which required my urgent intervention. Total critical care time was 30 minutes. This excludes any time for separately reportable procedures. This patient was evaluated in the Emergency Department for symptoms described in the history of present illness. He/she was evaluated in the context of the global COVID-19 pandemic, which necessitated consideration that the patient might be at risk for infection with the SARS-CoV-2 virus that causes COVID-19. Institutional protocols and algorithms that pertain to the evaluation of patients at risk for COVID-19 are in a state of rapid change based on information released by regulatory bodies including the CDC and federal and state organizations. These policies and algorithms were followed during the patient's care in the ED. (Please note that portions of this note were completed with a voice recognition program. Efforts were made to edit the dictations but occasionally words are mis-transcribed.  Whenever words are used in this note in any gender, they shall be construed as though they were used in the gender appropriate to the circumstances; and whenever words are used in this note in the singular or plural form, they shall be construed as though they were used in the form appropriate to the circumstances.)    MD Taylor Elmore  Attending Emergency Medicine Physician              Deepak Preston MD  08/27/20 [address], MD  08/27/20 [address], MD  08/27/20 8895

## 2020-08-27 NOTE — ED NOTES
Bed: 36  Expected date:   Expected time:   Means of arrival:   Comments:  [facility], RN  08/27/20 1994

## 2020-08-27 NOTE — ED NOTES
Pt back from EEG in [facility]   Will continue to assess      tSacie Godinez RN  08/27/20 1941

## 2020-08-27 NOTE — ED PROVIDER NOTES
[facility]  ED  Emergency Department Encounter  Emergency Medicine Resident     Pt Name: Jaciel Valente  [address] 1975  Date of evaluation: 8/27/20  PCP:  aFtuma David MD    CHIEF COMPLAINT       Chief Complaint   Patient presents with    Seizures     witnessed seizure by family lasted approx 4 minutes, reports compliance with her seizure meds, daily drinker      HISTORY OF PRESENT ILLNESS  (Location/Symptom, Timing/Onset, Context/Setting, Quality, Duration, ModifyingFactors, Severity.)      Jaciel Valente is a 50 y.o. female with PMH of seizure disorder, bipolar disorder, daily alcohol use s/p gastric sleeve presents for evaluation of issue like activity prior to arrival.  Episode witnessed by the patient's wife. Patient was told her eyes rolled to the back of her head, she slid to the ground, and began foaming at the mouth. She believes that she was shaking. No tongue biting, no incontinence. Patient's wife told her she had facial droop after. On EMS arrival patient was not postictal, no facial droop noted. Patient was sitting on a futon mattress when this happened which slowed down with her. Did not her head, no other injuries. Currently complaining of a left-sided frontal headache. Right-sided weakness found on exam.  Patient states last known normal was prior to this episode. Known seizure disorder. Currently on Trileptal, states she is compliant with medications. Patient reports daily alcohol use. States she drank 3 tall boy beers today. Denies other drug use or taking any medications other than those prescribed.     PAST MEDICAL / SURGICAL / SOCIAL /FAMILY HISTORY      has a past medical history of Alcoholic hepatitis without ascites, Alcoholic ketoacidosis, Alcoholism ([address] Anxiety, Asthma, Bipolar affective disorder ([address] Depression, Drug overdose, accidental or unintentional, initial encounter, Drug overdose, intentional self-harm, initial encounter [facility]), Lisa coma scale total score 3-8 ([facility] Hepatitis C antibody positive in blood, History of gastric surgery (gastric sleeve 2012), Intentional drug overdose ([facility] MDD (major depressive disorder), recurrent episode ([facility] Pneumonia, Polysubstance abuse ([facility] Post traumatic stress disorder, Seizure ([facility] Smoker unmotivated to quit, Suicide attempt [facility]), Suicide ideation, and Toxic metabolic encephalopathy. No other pertinent PMH on review with patient/guardian. has a past surgical history that includes Hysterectomy; Carpal tunnel release; Hysterectomy, total abdominal; Cholecystectomy; Stomach surgery; and Upper gastrointestinal endoscopy (N/A, 6/19/2019). No other pertinent PSH on review with patient/guardian. Social History     Socioeconomic History    Marital status: Single     Spouse name: Not on file    Number of children: 0    Years of education: 15    Highest education level: Not on file   Occupational History    Not on file   Social Needs    Financial resource strain: Not on file    Food insecurity     Worry: Not on file     Inability: Not on file   Flock needs     Medical: Not on file     Non-medical: Not on file   Tobacco Use    Smoking status: Current Every Day Smoker     Packs/day: 1.00     Years: 30.00     Pack years: 30.00     Types: Cigarettes    Smokeless tobacco: Never Used   Substance and Sexual Activity    Alcohol use:  Yes     Alcohol/week: 42.0 standard drinks     Types: 42 Cans of beer per week     Comment: detoxing of alcohol x5 days    Drug use: Yes     Frequency: 7.0 times per week     Types: Marijuana, IV, Cocaine     Comment: drug abuse includes, cocaine, IV heroin, cannabis    Sexual activity: Not Currently   Lifestyle    Physical activity     Days per week: Not on file     Minutes per session: Not on file    Stress: Not on file   Relationships    Social connections     Talks on phone: Not on file     Gets together: Not on file Attends Scientologist service: Not on file     Active member of club or organization: Not on file     Attends meetings of clubs or organizations: Not on file     Relationship status: Not on file    Intimate partner violence     Fear of current or ex partner: Not on file     Emotionally abused: Not on file     Physically abused: Not on file     Forced sexual activity: Not on file   Other Topics Concern    Not on file   Social History Narrative    ** Merged History Encounter **         ** Merged History Encounter **          Family History   Problem Relation Age of Onset    Brain Cancer Mother     Cancer Mother         \"female cancer\"    Heart Disease Father      No other pertinent FamHx on review with patient/guardian. Allergies:  Morphine; Morphine and related; Azithromycin; Morphine; Nsaids; and Zithromax [azithromycin]    Home Medications:  Prior to Admission medications    Medication Sig Start Date End Date Taking? Authorizing Provider   acetaminophen (TYLENOL) 500 MG tablet Take 2 tablets by mouth every 8 hours as needed for Pain 7/21/20   Mariya Ragsdale DO   hydrOXYzine (ATARAX) 50 MG tablet Take 1 tablet by mouth 2 times daily as needed for Anxiety 7/10/20   Génesis Polanco MD   albuterol sulfate  (90 Base) MCG/ACT inhaler Inhale 2 puffs into the lungs every 4 hours as needed for Wheezing 7/10/20   Génesis Polanco MD   OXcarbazepine (TRILEPTAL) 150 MG tablet Take 1 tablet by mouth 2 times daily 7/10/20   Génesis Polanco MD   gabapentin (NEURONTIN) 300 MG capsule Take 1 capsule by mouth 3 times daily for 10 days.  7/10/20 7/20/20  Génesis Polanco MD   venlafaxine (EFFEXOR XR) 75 MG extended release capsule Take 1 capsule by mouth daily (with breakfast) 7/10/20   Génesis Polanco MD   ondansetron (ZOFRAN ODT) 4 MG disintegrating tablet Take 1 tablet by mouth every 8 hours as needed for Nausea 7/10/20   Génesis Polanco MD   atorvastatin (LIPITOR) 40 MG tablet Take 1 tablet by mouth nightly 7/10/20   Paulette Gonzalez Pipe Gustafson MD   lithium (ESKALITH) 450 MG extended release tablet Take 1 tablet by mouth 2 times daily 7/10/20   Anay Burch MD   QUEtiapine (SEROQUEL) 200 MG tablet Take 1 tablet by mouth nightly 7/10/20   Anay Burch MD   folic acid (FOLVITE) 1 MG tablet Take 1 tablet by mouth daily 7/11/20   Anay Burch MD   fluticasone (FLONASE) 50 MCG/ACT nasal spray 1 spray by Each Nostril route daily 7/10/20   Anay Burch MD   sodium chloride (OCEAN) 0.65 % nasal spray 1 spray by Nasal route as needed for Congestion 7/10/20   Anay Burch MD   Multiple Vitamins-Minerals (MULTIVITAMIN ADULT) TABS Take 1 tablet by mouth daily 7/10/20   Krise MD Kiersten   dicyclomine (BENTYL) 10 MG capsule Take 1 capsule by mouth every 6 hours as needed (cramps) 7/10/20   Anay Burch MD   omeprazole (PRILOSEC) 20 MG delayed release capsule Take 1 capsule by mouth 2 times daily 7/10/20   Krise MD Kiersten   thiamine 100 MG tablet Take 1 tablet by mouth daily 7/10/20   Krise MD Kiersten   gabapentin (NEURONTIN) 300 MG capsule Take 1 capsule by mouth 3 times daily for 10 days. 12/20/19 12/30/19  Harshil Orem, DO     REVIEW OF SYSTEMS    (2-9 systems for level 4, 10 ormore for level 5)      Review of Systems   Constitutional: Negative for diaphoresis. HENT: Negative for drooling and trouble swallowing. Eyes: Negative for visual disturbance. Respiratory: Negative for shortness of breath. Cardiovascular: Negative for chest pain. Gastrointestinal: Negative for abdominal pain and vomiting. Musculoskeletal: Negative for neck pain. Skin: Negative for wound. Allergic/Immunologic: Negative for immunocompromised state. Neurological: Positive for seizures, weakness and headaches. Negative for dizziness. Hematological: Does not bruise/bleed easily.      PHYSICAL EXAM   (up to 7 for level 4, 8 or more for level 5)      INITIAL VITALS:   /83   Pulse 70   Temp 98.8 °F (37.1 °C)   Resp 16   SpO2 100%     Physical Exam  Constitutional:       General: She is not in acute distress. Appearance: Normal appearance. She is not toxic-appearing. HENT:      Head: Normocephalic and atraumatic. Right Ear: External ear normal.      Left Ear: External ear normal.   Eyes:      General:         Right eye: No discharge. Left eye: No discharge. Cardiovascular:      Rate and Rhythm: Normal rate and regular rhythm. Pulses: Normal pulses. Heart sounds: No murmur. Pulmonary:      Effort: Pulmonary effort is normal. No respiratory distress. Breath sounds: Normal breath sounds. No wheezing, rhonchi or rales. Abdominal:      Palpations: Abdomen is soft. Tenderness: There is no abdominal tenderness. Skin:     General: Skin is warm and dry. Capillary Refill: Capillary refill takes less than 2 seconds. Neurological:      Mental Status: She is alert and oriented to person, place, and time. Sensory: Sensory deficit (Left side of face) present. Motor: Weakness (Right upper/lower extremity) present. Coordination: Coordination normal.      Comments: Decreased sensation on left side of face, CN II through XII otherwise intact       DIFFERENTIAL  DIAGNOSIS     PLAN (LABS / IMAGING / EKG):  Orders Placed This Encounter   Procedures    CT HEAD WO CONTRAST    CTA HEAD NECK W CONTRAST    XR CHEST PORTABLE    MRI BRAIN WO CONTRAST    CBC Auto Differential    Basic Metabolic Panel    Protime-INR    APTT    Troponin    TOX SCR, BLD, ED    Urinalysis Reflex to Culture    Urine Drug Screen    OXCARBAZEPINE LEVEL    Inpatient consult to Stroke Team    POC Blood Gas and Chemistry    EKG 12 Lead    Insert peripheral IV       MEDICATIONS ORDERED:  No orders of the defined types were placed in this encounter. DIAGNOSTIC RESULTS / EMERGENCY DEPARTMENT COURSE / MDM     LABS:  No results found for this visit on 08/27/20.     IMPRESSION/MDM/ED COURSE:  50 y.o. female presented after

## 2020-08-27 NOTE — ED NOTES
Pt came into ER in [facility] by [facility], per ems pt had a witnessed seizure at home that lasted approx 4 minutes, pt states her last seizure was 2 weeks ago, pt has a tough time remembering seizure, pt family states she was helped down to the ground and has \"foam from mouth\", pt was sent to ER by family to be checked out, pt reports drinking today 3 tall boys which is her usual, pt is old heroin user and daily drinker, pt on monitor, pt alert and oriented x4, will continue to assess      Helen Perales RN  08/27/20 6611

## 2020-08-27 NOTE — PROCEDURES
drug abuse includes, cocaine, IV heroin, cannabis, and ETOH    Post traumatic stress disorder     Seizure ([facility] 12/17/2019    Smoker unmotivated to quit 4/25/2017    Suicide attempt ([facility] 11/08/2017    S/A by overdosing on Trazodone and Seroquel    Suicide ideation     Toxic metabolic encephalopathy 0/38/5506     Past Surgical History:   Procedure Laterality Date    CARPAL TUNNEL RELEASE      CHOLECYSTECTOMY      HYSTERECTOMY      HYSTERECTOMY, TOTAL ABDOMINAL      STOMACH SURGERY      gastric sleeve    UPPER GASTROINTESTINAL ENDOSCOPY N/A 6/19/2019    EGD BIOPSY performed by Sheeba Osuna MD at [address] [place] [address] History     Socioeconomic History    Marital status: Single     Spouse name: Not on file    Number of children: 0    Years of education: 15    Highest education level: Not on file   Occupational History    Not on file   Social Needs    Financial resource strain: Not on file    Food insecurity     Worry: Not on file     Inability: Not on file   [employer] needs     Medical: Not on file     Non-medical: Not on file   Tobacco Use    Smoking status: Current Every Day Smoker     Packs/day: 1.00     Years: 30.00     Pack years: 30.00     Types: Cigarettes    Smokeless tobacco: Never Used   Substance and Sexual Activity    Alcohol use:  Yes     Alcohol/week: 42.0 standard drinks     Types: 42 Cans of beer per week     Comment: detoxing of alcohol x5 days    Drug use: Yes     Frequency: 7.0 times per week     Types: Marijuana, IV, Cocaine     Comment: drug abuse includes, cocaine, IV heroin, cannabis    Sexual activity: Not Currently   Lifestyle    Physical activity     Days per week: Not on file     Minutes per session: Not on file    Stress: Not on file   Relationships    Social connections     Talks on phone: Not on file     Gets together: Not on file     Attends Buddhism service: Not on file     Active member of club or organization: Not on file     Attends meetings of clubs or organizations: Not on file     Relationship status: Not on file    Intimate partner violence     Fear of current or ex partner: Not on file     Emotionally abused: Not on file     Physically abused: Not on file     Forced sexual activity: Not on file   Other Topics Concern    Not on file   Social History Narrative    ** Merged History Encounter **         ** Merged History Encounter **          Family History   Problem Relation Age of Onset    Brain Cancer Mother     Cancer Mother         \"female cancer\"    Heart Disease Father       Allergies   Allergen Reactions    Morphine Anaphylaxis    Morphine And Related      Itching, nausea    Azithromycin     Morphine     Nsaids      contraindicated    Zithromax [Azithromycin] Swelling      /74   Pulse 66   Temp 98.8 °F (37.1 °C)   Resp 16   SpO2 100%      ROS:  Constitutional Negative for fever and chills   HEENT Negative for ear discharge, ear pain, nosebleed   Eyes Negative for photophobia, pain and discharge   Respiratory Negative for hemoptysis and sputum   Cardiovascular Negative for orthopnea, claudication and PND   Gastrointestinal Negative for abdominal pain, diarrhea, blood in stool   Musculoskeletal Negative for joint pain, negative for myalgia   Skin Negative for rash or itching   Endo/heme/allergies Negative for polydipsia, environmental allergy   Psychiatric/behavioral Negative for suicidal ideation.   Patient is not anxious   General examination:    Head: Normocephalic, atraumatic  Eyes: Extraocular movements intact  Lungs: Respirations unlabored, chest wall no deformity  ENT: Normal external ear canals, no sinus tenderness  Heart: Regular rate rhythm  Abdomen: No masses, tenderness  Extremities: No cyanosis or edema, 2+ pulses  Skin: Intact, normal skin color    Neurological examination:    Mental status   Alert and oriented; intact memory with no confusion, speech or language problems; no hallucinations or delusions     Cranial nerves   II - visual fields intact to confrontation                                                III, IV, VI - extra-ocular muscles full: no pupillary defect; no RALPH, no nystagmus, no ptosis                                                                      V - normal facial sensation                                                               VII - normal facial symmetry                                                             VIII - intact hearing                                                                             IX, X - symmetrical palate                                                                  XI - symmetrical shoulder shrug                                                       XII - midline tongue without atrophy or fasciculation     Motor function  Normal muscle bulk and tone; normal power 5/5, including fine motor movements     Sensory function Intact to touch, pin, vibration, proprioception     Cerebellar Intact fine motor movement. No involuntary movements or tremors     Reflex function Intact 2+ DTR and symmetric. Negative Babinski     Gait                  Not tested         Lab Results   Component Value Date    LDLCHOLESTEROL 120 06/13/2019     No components found for: CHLPL  Lab Results   Component Value Date    TRIG 99 06/13/2019    TRIG 168 (H) 08/01/2018    TRIG 274 (H) 06/05/2018     Lab Results   Component Value Date    HDL 56 06/13/2019    HDL 51 08/01/2018    HDL 45 06/05/2018     No results found for: LDLCALC  No results found for: LABVLDL  Lab Results   Component Value Date    LABA1C 5.5 08/01/2018     Lab Results   Component Value Date     08/01/2018     Lab Results   Component Value Date    JFYNZJQJ32 342 10/20/2018      Neurological work up:  CT head  CTA head and neck  MRI brain   2 D echo     Assessment and Recommendations:   Possible seizure with right-sided weakness.   Patient with a history of previous similar episodes  History of alcohol intoxication, history of polysubstance abuse    I will obtain repeat EEG brain. Patient's previous work-up including MRI scan of the brain in October 2019 and December 2019 has been normal.  MRI of the brain today is also normal.  Continue Trileptal at this time. Will follow. Thank you for the consult. Lobo Noel MD  Neurology    This note is created with the assistance of a speech-recognition program. While intending to generate a document that actually reflects the content of the visit, the document can still have some errors including those of syntax and sound a- like substitutions which may escape proofreading. In such instances, actual meaning can be extrapolated by contextual derivation.

## 2020-08-28 LAB
ABSOLUTE EOS #: 0.29 K/UL (ref 0–0.44)
ABSOLUTE IMMATURE GRANULOCYTE: <0.03 K/UL (ref 0–0.3)
ABSOLUTE LYMPH #: 2.27 K/UL (ref 1.1–3.7)
ABSOLUTE MONO #: 0.83 K/UL (ref 0.1–1.2)
ACETAMINOPHEN LEVEL: <5 UG/ML (ref 10–30)
ALBUMIN SERPL-MCNC: 3.9 G/DL (ref 3.5–5.2)
ALBUMIN/GLOBULIN RATIO: 1.1 (ref 1–2.5)
ALP BLD-CCNC: 77 U/L (ref 35–104)
ALT SERPL-CCNC: 62 U/L (ref 5–33)
AMPHETAMINE SCREEN URINE: NEGATIVE
ANION GAP SERPL CALCULATED.3IONS-SCNC: 13 MMOL/L (ref 9–17)
ANION GAP SERPL CALCULATED.3IONS-SCNC: 9 MMOL/L (ref 9–17)
AST SERPL-CCNC: 65 U/L
BARBITURATE SCREEN URINE: NEGATIVE
BASOPHILS # BLD: 1 % (ref 0–2)
BASOPHILS ABSOLUTE: 0.07 K/UL (ref 0–0.2)
BENZODIAZEPINE SCREEN, URINE: NEGATIVE
BILIRUB SERPL-MCNC: 0.37 MG/DL (ref 0.3–1.2)
BILIRUBIN DIRECT: 0.13 MG/DL
BILIRUBIN URINE: NEGATIVE
BILIRUBIN, INDIRECT: 0.24 MG/DL (ref 0–1)
BUN BLDV-MCNC: 7 MG/DL (ref 6–20)
BUN BLDV-MCNC: 7 MG/DL (ref 6–20)
BUN/CREAT BLD: ABNORMAL (ref 9–20)
BUN/CREAT BLD: NORMAL (ref 9–20)
BUPRENORPHINE URINE: NORMAL
CALCIUM SERPL-MCNC: 8.6 MG/DL (ref 8.6–10.4)
CALCIUM SERPL-MCNC: 8.7 MG/DL (ref 8.6–10.4)
CANNABINOID SCREEN URINE: NEGATIVE
CHLORIDE BLD-SCNC: 103 MMOL/L (ref 98–107)
CHLORIDE BLD-SCNC: 105 MMOL/L (ref 98–107)
CO2: 24 MMOL/L (ref 20–31)
CO2: 26 MMOL/L (ref 20–31)
COCAINE METABOLITE, URINE: NEGATIVE
COLOR: YELLOW
COMMENT UA: ABNORMAL
CREAT SERPL-MCNC: 0.54 MG/DL (ref 0.5–0.9)
CREAT SERPL-MCNC: 0.68 MG/DL (ref 0.5–0.9)
DIFFERENTIAL TYPE: ABNORMAL
EOSINOPHILS RELATIVE PERCENT: 4 % (ref 1–4)
ETHANOL PERCENT: <0.01 %
ETHANOL: <10 MG/DL
GFR AFRICAN AMERICAN: >60 ML/MIN
GFR AFRICAN AMERICAN: >60 ML/MIN
GFR NON-AFRICAN AMERICAN: >60 ML/MIN
GFR NON-AFRICAN AMERICAN: >60 ML/MIN
GFR SERPL CREATININE-BSD FRML MDRD: ABNORMAL ML/MIN/{1.73_M2}
GFR SERPL CREATININE-BSD FRML MDRD: ABNORMAL ML/MIN/{1.73_M2}
GFR SERPL CREATININE-BSD FRML MDRD: NORMAL ML/MIN/{1.73_M2}
GFR SERPL CREATININE-BSD FRML MDRD: NORMAL ML/MIN/{1.73_M2}
GLOBULIN: ABNORMAL G/DL (ref 1.5–3.8)
GLUCOSE BLD-MCNC: 107 MG/DL (ref 70–99)
GLUCOSE BLD-MCNC: 85 MG/DL (ref 70–99)
GLUCOSE URINE: NEGATIVE
HCT VFR BLD CALC: 39.1 % (ref 36.3–47.1)
HEMOGLOBIN: 11.7 G/DL (ref 11.9–15.1)
IMMATURE GRANULOCYTES: 0 %
KETONES, URINE: NEGATIVE
LEUKOCYTE ESTERASE, URINE: NEGATIVE
LYMPHOCYTES # BLD: 35 % (ref 24–43)
MCH RBC QN AUTO: 24.1 PG (ref 25.2–33.5)
MCHC RBC AUTO-ENTMCNC: 29.9 G/DL (ref 28.4–34.8)
MCV RBC AUTO: 80.5 FL (ref 82.6–102.9)
MDMA URINE: NORMAL
METHADONE SCREEN, URINE: NEGATIVE
METHAMPHETAMINE, URINE: NORMAL
MONOCYTES # BLD: 13 % (ref 3–12)
NITRITE, URINE: NEGATIVE
NRBC AUTOMATED: 0 PER 100 WBC
OPIATES, URINE: NEGATIVE
OXYCODONE SCREEN URINE: NEGATIVE
PDW BLD-RTO: 18.1 % (ref 11.8–14.4)
PH UA: 5 (ref 5–8)
PHENCYCLIDINE, URINE: NEGATIVE
PLATELET # BLD: 372 K/UL (ref 138–453)
PLATELET ESTIMATE: ABNORMAL
PMV BLD AUTO: 9.3 FL (ref 8.1–13.5)
POTASSIUM SERPL-SCNC: 3.9 MMOL/L (ref 3.7–5.3)
POTASSIUM SERPL-SCNC: 4.4 MMOL/L (ref 3.7–5.3)
PROPOXYPHENE, URINE: NORMAL
PROTEIN UA: NEGATIVE
RBC # BLD: 4.86 M/UL (ref 3.95–5.11)
RBC # BLD: ABNORMAL 10*6/UL
SALICYLATE LEVEL: <1 MG/DL (ref 3–10)
SEG NEUTROPHILS: 47 % (ref 36–65)
SEGMENTED NEUTROPHILS ABSOLUTE COUNT: 3.09 K/UL (ref 1.5–8.1)
SODIUM BLD-SCNC: 140 MMOL/L (ref 135–144)
SODIUM BLD-SCNC: 140 MMOL/L (ref 135–144)
SPECIFIC GRAVITY UA: 1.04 (ref 1–1.03)
TEST INFORMATION: NORMAL
TOTAL PROTEIN: 7.4 G/DL (ref 6.4–8.3)
TRICYCLIC ANTIDEPRESSANTS, UR: NORMAL
TURBIDITY: CLEAR
URINE HGB: NEGATIVE
UROBILINOGEN, URINE: NORMAL
WBC # BLD: 6.6 K/UL (ref 3.5–11.3)
WBC # BLD: ABNORMAL 10*3/UL

## 2020-08-28 PROCEDURE — 6370000000 HC RX 637 (ALT 250 FOR IP): Performed by: STUDENT IN AN ORGANIZED HEALTH CARE EDUCATION/TRAINING PROGRAM

## 2020-08-28 PROCEDURE — 85025 COMPLETE CBC W/AUTO DIFF WBC: CPT

## 2020-08-28 PROCEDURE — G0378 HOSPITAL OBSERVATION PER HR: HCPCS

## 2020-08-28 PROCEDURE — 2580000003 HC RX 258: Performed by: STUDENT IN AN ORGANIZED HEALTH CARE EDUCATION/TRAINING PROGRAM

## 2020-08-28 PROCEDURE — 80307 DRUG TEST PRSMV CHEM ANLYZR: CPT

## 2020-08-28 PROCEDURE — 99233 SBSQ HOSP IP/OBS HIGH 50: CPT | Performed by: INTERNAL MEDICINE

## 2020-08-28 PROCEDURE — 6370000000 HC RX 637 (ALT 250 FOR IP): Performed by: INTERNAL MEDICINE

## 2020-08-28 PROCEDURE — 99232 SBSQ HOSP IP/OBS MODERATE 35: CPT | Performed by: PSYCHIATRY & NEUROLOGY

## 2020-08-28 PROCEDURE — G0480 DRUG TEST DEF 1-7 CLASSES: HCPCS

## 2020-08-28 PROCEDURE — 96372 THER/PROPH/DIAG INJ SC/IM: CPT

## 2020-08-28 PROCEDURE — 80048 BASIC METABOLIC PNL TOTAL CA: CPT

## 2020-08-28 PROCEDURE — 96376 TX/PRO/DX INJ SAME DRUG ADON: CPT

## 2020-08-28 PROCEDURE — 36415 COLL VENOUS BLD VENIPUNCTURE: CPT

## 2020-08-28 PROCEDURE — 6360000002 HC RX W HCPCS: Performed by: STUDENT IN AN ORGANIZED HEALTH CARE EDUCATION/TRAINING PROGRAM

## 2020-08-28 PROCEDURE — 1200000000 HC SEMI PRIVATE

## 2020-08-28 RX ORDER — NICOTINE 21 MG/24HR
1 PATCH, TRANSDERMAL 24 HOURS TRANSDERMAL DAILY
Status: DISCONTINUED | OUTPATIENT
Start: 2020-08-28 | End: 2020-08-29 | Stop reason: HOSPADM

## 2020-08-28 RX ORDER — LORAZEPAM 2 MG/ML
1 INJECTION INTRAMUSCULAR EVERY 6 HOURS PRN
Status: DISCONTINUED | OUTPATIENT
Start: 2020-08-28 | End: 2020-08-29 | Stop reason: HOSPADM

## 2020-08-28 RX ORDER — CHLORDIAZEPOXIDE HYDROCHLORIDE 25 MG/1
25 CAPSULE, GELATIN COATED ORAL 3 TIMES DAILY
Status: DISCONTINUED | OUTPATIENT
Start: 2020-08-28 | End: 2020-08-29 | Stop reason: HOSPADM

## 2020-08-28 RX ORDER — OXCARBAZEPINE 150 MG/1
150 TABLET, FILM COATED ORAL 2 TIMES DAILY
Status: DISCONTINUED | OUTPATIENT
Start: 2020-08-28 | End: 2020-08-29 | Stop reason: HOSPADM

## 2020-08-28 RX ADMIN — PANTOPRAZOLE SODIUM 40 MG: 40 TABLET, DELAYED RELEASE ORAL at 06:17

## 2020-08-28 RX ADMIN — Medication 10 ML: at 01:35

## 2020-08-28 RX ADMIN — ATORVASTATIN CALCIUM 40 MG: 80 TABLET, FILM COATED ORAL at 21:39

## 2020-08-28 RX ADMIN — Medication 100 MG: at 09:22

## 2020-08-28 RX ADMIN — LITHIUM CARBONATE 450 MG: 450 TABLET, EXTENDED RELEASE ORAL at 21:39

## 2020-08-28 RX ADMIN — LORAZEPAM 1 MG: 2 INJECTION INTRAMUSCULAR; INTRAVENOUS at 09:22

## 2020-08-28 RX ADMIN — CHLORDIAZEPOXIDE HYDROCHLORIDE 25 MG: 25 CAPSULE ORAL at 14:12

## 2020-08-28 RX ADMIN — LITHIUM CARBONATE 450 MG: 450 TABLET, EXTENDED RELEASE ORAL at 11:37

## 2020-08-28 RX ADMIN — OXCARBAZEPINE 375 MG: 300 TABLET, FILM COATED ORAL at 01:34

## 2020-08-28 RX ADMIN — FOLIC ACID 1 MG: 1 TABLET ORAL at 09:22

## 2020-08-28 RX ADMIN — OXCARBAZEPINE 150 MG: 150 TABLET, FILM COATED ORAL at 09:22

## 2020-08-28 RX ADMIN — LITHIUM CARBONATE 450 MG: 450 TABLET, EXTENDED RELEASE ORAL at 01:34

## 2020-08-28 RX ADMIN — ATORVASTATIN CALCIUM 40 MG: 80 TABLET, FILM COATED ORAL at 01:39

## 2020-08-28 RX ADMIN — VENLAFAXINE HYDROCHLORIDE 75 MG: 75 CAPSULE, EXTENDED RELEASE ORAL at 14:12

## 2020-08-28 RX ADMIN — CHLORDIAZEPOXIDE HYDROCHLORIDE 25 MG: 25 CAPSULE ORAL at 09:22

## 2020-08-28 RX ADMIN — CHLORDIAZEPOXIDE HYDROCHLORIDE 25 MG: 25 CAPSULE ORAL at 21:39

## 2020-08-28 RX ADMIN — HEPARIN SODIUM 5000 UNITS: 5000 INJECTION INTRAVENOUS; SUBCUTANEOUS at 06:17

## 2020-08-28 RX ADMIN — LORAZEPAM 1 MG: 2 INJECTION INTRAMUSCULAR; INTRAVENOUS at 17:50

## 2020-08-28 RX ADMIN — Medication 10 ML: at 09:23

## 2020-08-28 RX ADMIN — OXCARBAZEPINE 150 MG: 150 TABLET, FILM COATED ORAL at 21:41

## 2020-08-28 ASSESSMENT — ENCOUNTER SYMPTOMS
ABDOMINAL PAIN: 0
DIARRHEA: 0
APNEA: 0
WHEEZING: 0
NAUSEA: 0
SORE THROAT: 0
CHEST TIGHTNESS: 0
VOMITING: 0
COUGH: 0
SHORTNESS OF BREATH: 0
SINUS PAIN: 0
CONSTIPATION: 0
PHOTOPHOBIA: 0
SINUS PRESSURE: 0
RECTAL PAIN: 0
RHINORRHEA: 0
TROUBLE SWALLOWING: 0

## 2020-08-28 ASSESSMENT — PAIN SCALES - GENERAL
PAINLEVEL_OUTOF10: 0
PAINLEVEL_OUTOF10: 0

## 2020-08-28 NOTE — FLOWSHEET NOTE
[facility]     Emergency/Trauma Note    PATIENT NAME: Iris Siddiqui    Shift date: 8.27.2020  Shift day: Thursday   Shift # 2    Room # 7066/6123-67   Name: Iris Siddiqui            Age: 50 y.o. Gender: female          Anglican: None   Place of Buddhist: unknown    Trauma/Incident type: Stroke Alert  Admit Date & Time: 8/27/2020  3:07 PM  TRAUMA NAME: None        PATIENT/EVENT DESCRIPTION:  Iris Siddiqui is a 50 y.o. female who arrived as a [address] with stroke-like symptoms. Pt to be admitted to [address] [place], [place] [id number]. SPIRITUAL ASSESSMENT/INTERVENTION:  Patient appears to be calm and coping. States that she is wondering if she will transferred to a room soon and what the next steps will be in her treatment and care. Says she has seizures but there seems to be no clarity as to why they are occurring. Has a girlfriend who will be coming tomorrow and her sister is in [place] right now. Patient seems to be somewhat sad that she does not have someone with her at this time while in the hospital.  Edy Cardoso spoke to nurse who provided an update regarding patient's move to [facility]. Patient was thankful for the  assistance. PATIENT BELONGINGS:  No belongings noted    ANY BELONGINGS OF SIGNIFICANT VALUE NOTED:  None    REGISTRATION STAFF NOTIFIED? Yes      WHAT IS YOUR SPIRITUAL CARE PLAN FOR THIS PATIENT?:  Chaplains will remain available to offer spiritual and emotional support as needed.       Electronically signed by Shobha Boss on 8/28/2020 at 12:44 AM.  Augustin Zelaya  [phone]       08/27/20 1525   Encounter Summary   Services provided to: Patient   Referral/Consult From: Multi-disciplinary team   Support System Significant other;Family members   Continue Visiting   (1.84.2015)   Complexity of Encounter High   Length of Encounter 30 minutes   Spiritual Assessment Completed Yes   Crisis   Type Stroke Alert   Assessment Calm; Approachable;Coping   Intervention Active listening;Explored feelings, thoughts, concerns;Explored coping resources; Discussed illness/injury and it's impact   Outcome Expressed gratitude     Electronically signed by Fatou Sandoval on 8/28/2020 at 12:44 AM

## 2020-08-28 NOTE — PROGRESS NOTES
[facility]  Internal Medicine Teaching Residency Program  Inpatient Daily Progress Note  ______________________________________________________________________________    Patient: Petra Lundy  YOB: 1975   BGZ:[id number]    Acct: [de-identified]     Room: [facility]  Admit date: 8/27/2020  Today's date: 08/28/20  Number of days in the hospital: 1    SUBJECTIVE   Admitting Diagnosis: Seizures  CC: Seizure Like Activity  Pt examined at bedside. Chart & results reviewed. No acute episodes overnight  Pt is heme stable and afebrile  No signs of alcohol withdrawal currently  No active suicidal ideations currently  Right sided weakness still present     ROS:  Constitutional:  negative for chills, fevers, sweats  Respiratory:  negative for cough, dyspnea on exertion, hemoptysis, shortness of breath, wheezing  Cardiovascular:  negative for chest pain, chest pressure/discomfort, lower extremity edema, palpitations  Gastrointestinal:  negative for abdominal pain, constipation, diarrhea, nausea, vomiting  Neurological:  negative for dizziness, headache  MSC: positive for right sided weakness    BRIEF HISTORY     The patient is a pleasant 50 y.o. female presents with a chief complaint of seizure like activity that occurred prior to her arrival to the emergency department. Such episode was witnessed by patients wife. She was told that here eyes rolled back into her head, that she slid to the ground, and that she started to foam from the mouth. She was also told that she was shaking but there was no tongue biting or incontinence associated. There was however some facial droop that was present after but when EMS arrived neither the facial droop nor the postictal state was present. Patient was sitting on a futon mattress when this whole ordeal happened; she did not hit her head or sustained any other injuries as a result.   She does however currently have a left sided frontal headache with right sided weakness being present on exam.  She has a seizure history and is currently on Trileptal and has been compliant with her medication. She denies any other fever, chills, weight loss, chest pain, shortness of breath, abdominal pain, and issues with bowel/bladder. OBJECTIVE     Vital Signs:  BP (!) 135/93   Pulse 90   Temp 98 °F (36.7 °C) (Oral)   Resp 14   Ht 5' (1.524 m)   Wt 130 lb (59 kg)   SpO2 98%   BMI 25.39 kg/m²     Temp (24hrs), Av °F (36.7 °C), Min:97.2 °F (36.2 °C), Max:98.8 °F (37.1 °C)    In: 10   Out: 450 [Urine:450]    Physical Exam:  Constitutional: This is a well developed, well nourished, 25-29.9 - Overweight 50y.o. year old female who is alert, oriented, cooperative and in no apparent distress.    Respiratory: Breath sounds bilaterally were clear to auscultation  Cardiovascular: Regular without murmur  Abdomen: Soft and Nontender  Extremities:  No lower extremity edema    Neurological/Psychiatric: The patient's general behavior, level of consciousness, thought content and emotional status is normal.        Medications:  Scheduled Medications:    nicotine  1 patch Transdermal Daily    OXcarbazepine  150 mg Oral BID    chlordiazePOXIDE  25 mg Oral TID    acetaminophen  1,000 mg Oral Once    atorvastatin  40 mg Oral Nightly    folic acid  1 mg Oral Daily    lithium  450 mg Oral BID    pantoprazole  40 mg Oral QAM AC    thiamine  100 mg Oral Daily    venlafaxine  75 mg Oral Daily with breakfast    heparin (porcine)  5,000 Units Subcutaneous 3 times per day    sodium chloride flush  10 mL Intravenous 2 times per day     Continuous Infusions:   PRN MedicationsLORazepam, 1 mg, Q6H PRN  albuterol, 2.5 mg, Q4H PRN  sodium chloride flush, 10 mL, PRN  acetaminophen, 650 mg, Q6H PRN    Or  acetaminophen, 650 mg, Q6H PRN  polyethylene glycol, 17 g, Daily PRN  promethazine, 12.5 mg, Q6H PRN    Or  ondansetron, 4 mg, Q6H PRN      Diagnostic Labs:  CBC:   Recent Labs     08/27/20  1549 08/28/20  0549   WBC 6.8 6.6   RBC 4.82 4.86   HGB 12.0 11.7*   HCT 38.7 39.1   MCV 80.3* 80.5*   RDW 18.3* 18.1*    372     BMP:   Recent Labs     08/27/20  1531 08/28/20  0108 08/28/20  0549   NA  --  140 140   K  --  3.9 4.4   CL  --  103 105   CO2  --  24 26   BUN  --  7 7   CREATININE 0.57 0.54 0.68     PT/INR:   Recent Labs     08/27/20  1549   PROTIME 10.4   INR 1.0     APTT:   Recent Labs     08/27/20  1549   APTT 24.7     LIVER PROFILE:   Recent Labs     08/28/20  0108   AST 65*   ALT 62*   BILIDIR 0.13   BILITOT 0.37   ALKPHOS 77      MICROBIOLOGY:   Lab Results   Component Value Date/Time    CULTURE NO GROWTH 10 HOURS 08/28/2020 01:08 AM       Imaging:    Ct Head Wo Contrast    Result Date: 8/27/2020  No acute intracranial abnormality. Xr Chest Portable    Result Date: 8/27/2020  No acute cardiopulmonary disease. Cta Head Neck W Contrast    Result Date: 8/27/2020  Mild atherosclerotic narrowing proximal right vertebral artery which is stable from prior study. Otherwise unremarkable CTA of the neck vessels and intracranial circulation. Mri Brain Wo Contrast    Result Date: 8/27/2020  Unremarkable MRI of the brain without contrast.       ASSESSMENT & PLAN     ASSESSMENT / PLAN:     This is a 50 y.o. female who presented with seizure signs and symptoms and admitted for observation and treatment.       Seizure with Delroy's Paralysis - Transient Right Sided Weakness post episode. Neurology consulted. Appreciate them seeing patient. CT of head negative. CTA negative for large vessel occlusion. MRI with seizure protocol negative. EEG today showed mildly abnormal routine awake and asleep EEG which showed very mild diffuse encephalopathy, no epileptiform discharges or EEG/clinical seizures seen. Trileptal Levels pending. Urine toxicology pending. Ethanol levels pending.  Continue Trileptal. Will continue to monitor.     Alcohol Withdrawal Seizures - Ethanol Levels pending. On Librium. Will continue to monitor. On Multivitamin, Thiamine, and Folic Acid.      Bipolar Disorder - Continue Eskalith 450 mg PO BiD     Major Depressive Disorder - Effexor 75 mg PO     DVT ppx : Heparin  GI ppx: Protonix    PT/OT/SW: On Board   Discharge Planning: Ongoing      Render MD Sara  Internal Medicine Resident, PGY-1  [facility]; [place], [place]  8/28/2020, 1:20 PM    Attestation and add on       I have discussed the care of J Carlos Gutiérrez , including pertinent history and exam findings,      8/28/20    with the resident. I have seen and examined the patient and the key elements of all parts of the encounter have been performed by me . I agree with the assessment, plan and orders as documented by the resident. Active Problems:    Seizure ([address]    Weakness of right side of body  Resolved Problems:    * No resolved hospital problems. *            -Neuro input noted  --Assessment and Recommendations:   Possible seizure with right-sided weakness. Patient with a history of previous similar episodes  History of alcohol intoxication, history of polysubstance abuse   - ;        Medications: Allergies:     Allergies   Allergen Reactions    Morphine Anaphylaxis    Morphine And Related      Itching, nausea    Azithromycin     Morphine     Nsaids      contraindicated    Zithromax [Azithromycin] Swelling       Current Meds:   Scheduled Meds:    nicotine  1 patch Transdermal Daily    OXcarbazepine  150 mg Oral BID    chlordiazePOXIDE  25 mg Oral TID    acetaminophen  1,000 mg Oral Once    atorvastatin  40 mg Oral Nightly    folic acid  1 mg Oral Daily    lithium  450 mg Oral BID    pantoprazole  40 mg Oral QAM AC    thiamine  100 mg Oral Daily    venlafaxine  75 mg Oral Daily with breakfast    heparin (porcine)  5,000 Units Subcutaneous 3 times per day    sodium chloride flush  10 mL Intravenous 2 times per day     Continuous Infusions: PRN Meds: LORazepam, albuterol, sodium chloride flush, acetaminophen **OR** acetaminophen, polyethylene glycol, promethazine **OR** ondansetron        [address], [address] [place] [address].    Phone [phone]   Fax: [phone]  Answering Service: [phone]

## 2020-08-28 NOTE — PLAN OF CARE
Problem: Falls - Risk of:  Goal: Will remain free from falls  8/28/2020 1846 by Jenelle Colindres RN  Outcome: Ongoing  8/28/2020 1529 by Hetty Boxer, RN  Outcome: Ongoing  Goal: Absence of physical injury  8/28/2020 1846 by Jenelle Colindres RN  Outcome: Ongoing  8/28/2020 1529 by Hetty Boxer, RN  Outcome: Ongoing

## 2020-08-28 NOTE — CARE COORDINATION
Case Management Initial Discharge Plan  Alen Louis,             Met with:patient to discuss discharge plans. Information verified: address, contacts, phone number, , insurance Yes    Emergency Contact/Next of Kin name & number: Javier Ponce,   [phone]    PCP: Yordan Camargo MD  Date of last visit: 2020    Insurance Provider: [facility] Medicare    Discharge Planning    Living Arrangements:  Alone   Support Systems:  Friends/Neighbors    Home has 1 story  10 stairs to climb to get into front door    Patient able to perform ADL's:Independent    Current Services (outpatient & in home) none  DME equipment: n/a  DME provider: n/a    Receiving oral anticoagulation therapy? No    If indicated:   Physician managing anticoagulation treatment: n/a  Where does patient obtain lab work for ATC treatment? n/a      Potential Assistance Needed:  N/A    Patient agreeable to home care: No  [place] of choice provided:  n/a    Prior SNF/Rehab Placement and Facility: none  Agreeable to SNF/Rehab: No  [place] of choice provided: n/a     Evaluation: yes    Expected Discharge date:  20    Patient expects to be discharged to:  home  Follow Up Appointment: Best Day/ Time: Wednesday AM    Transportation provider: gabe  Transportation arrangements needed for discharge: Yes    Readmission Risk              Risk of Unplanned Readmission:        28             Does patient have a readmission risk score greater than 14?: Yes  If yes, follow-up appointment must be made within 7 days of discharge.      Goals of Care: home without seizure activity       Discharge Plan: Home alone          Electronically signed by Ayana Lopez RN on 20 at 11:52 AM EDT

## 2020-08-28 NOTE — PROCEDURES
[address] [place] [address] 30      ELECTROENCEPHALOGRAM REPORT        REFERRING PHYSICIAN:  Stas Ambrose MD  PATIENT NAME:Isabela Renner  PATIENT MRN: [id number]  DATE OF EE2020    BRIEF HISTORY:  50year old female with history of alcoholism and seizure, presented with right sided weakness. CURRENT ANTI-EPILEPTIC MEDICATIONS: OXC 150mg BID; Ativan PRN    EEG DESCRIPTION:   During maximal wakefulness, the background was well organized and continuous consisting of an admixture of frequency of alpha, beta and theta ranging between 10-50uV. There was a posterior dominant rhythm at 7.5Hz, which was symmetric and reactive to eye opening/eye closure. Low amplitude 13-18 Hz beta rhythms were seen symmetrically over the frontal-central head regions. Spontaneous variability was present. No persistent focal asymmetries or abnormalities were seen. No epileptiform discharges were recorded. No clinical or electrographic seizures were recorded. Stage I sleep were recorded with alpha dropout, slow rolling eye movements, and high voltage centrally predominant vertex waves. Stage II sleep was recorded with centrally predominant, bilateral and symmetric K complexes and sleep spindles at 14-16Hz. Hyperventilation was not performed, photic stimulation activated photic driving but no abnormal activity. Heart rate was regular at 70s per minute on a single lead EKG. CLASSIFICATION:  Abnormal I (Awake, asleep)  1. Background slow    IMPRESSION:  This was a mildly abnormal routine awake and asleep EEG which showed very mild diffuse encephalopathy, no epileptiform discharges or EEG/clinical seizures seen.        Maria Teresa Keyes MD, [address] [place] [address], Neurology  Board Certified Epileptologist

## 2020-08-28 NOTE — CARE COORDINATION
Received social work consult for consideration of rehab. Pt admitted for seizures. Hx of alcohol hepatitis, alcoholism, and bipolar disorder. Met with pt to discuss rehab. Pt states she has been in rehab many times. Last rehab stay was approx. 3 months ago at [email or web address]. Pt states she is drinking approximately 4-5 24 oz. beers daily. Pt denies any other drug use. Pt has a history of heroin and crack use but has been clean for three months. Discussed rehab and pt states she in not interested in returning to rehab at this time but is agreeable to taking a resource list.  Pt states that she lives alone but her sister and partner are both support systems for her. Patients partner also drinks but her sister does not and has has been trying to convince her to go back into rehab. Pt is seen as a outpatient at [facility] for her Bipolar disorder. Pt states that her last appointment was 2 weeks ago and she claims that she is compliant with taking her medications and going to appointments. Pt has a hx of suicide attempts. She states her depression is currently under control and denies any current suicidal ideations. Pt plans on returning home upon discharge and may need a cab ride. Pt will take resource list and consider treatment at a later time.

## 2020-08-28 NOTE — PROGRESS NOTES
Physical Therapy  DATE: 2020    NAME: Pattie Cowden  MRN: [id number]   : 1975    Patient not seen this date for Physical Therapy due to:  [] Blood transfusion in progress  [] Hemodialysis  []  Patient Declined  [] Spine Precautions   [] Strict Bedrest  [] Surgery/ Procedure  [] Testing      [] Other        [x] PT being discontinued at this time. Patient independent. No further needs. [] PT being discontinued at this time as the patient has been transferred to palliative care. No further needs.     Lisa Polanco, PT

## 2020-08-28 NOTE — PROGRESS NOTES
Occupational Therapy Not Seen Note    DATE: 2020  Name: Diana Jiang  : 1975  MRN: [id number]    Patient not available for Occupational Therapy due to:    Per RN and pt, pt independent with functional mobility and functional tasks. Pt with no OT acute care needs at this time, will defer OT eval. Pt notified OT can be re-ordered if pt feels decline in indep or function.      Electronically signed by MALENA Lanza on 2020 at 10:32 AM

## 2020-08-28 NOTE — PROGRESS NOTES
Pts wife called unit upset that pt is on Ativan for seizure/withdraw given her recent Benzo addiction. Per Dr. Stacy Hayden who saw pt at bedside, pt will remain on Ativan Q6 for pt safety.

## 2020-08-28 NOTE — PROGRESS NOTES
Repeat EEG in December 2019 was normal as well. More recently the patient presented in July 2020 with seizure activity. CT head and CT gram head and neck were negative. Past Medical History:     Past Medical History:   Diagnosis Date    Alcoholic hepatitis without ascites 13/12/3694    Alcoholic ketoacidosis 96/35/1388    Alcoholism ([facility]     3 pints daily    Anxiety     Asthma     Bipolar affective disorder ([address] 6/12/2019    Depression     Drug overdose, accidental or unintentional, initial encounter 9/29/2018    Drug overdose, intentional self-harm, initial encounter ([address] 5/8/2018    Lisa coma scale total score 3-8 (HCC)     Hepatitis C antibody positive in blood 6/18/2019    History of gastric surgery (gastric sleeve 2012) 4/25/2017    Intentional drug overdose ([address] 5/7/2018    MDD (major depressive disorder), recurrent episode ([facility] 5/8/2018    Pneumonia     Polysubstance abuse ([facility] [address]     drug abuse includes, cocaine, IV heroin, cannabis, and ETOH    Post traumatic stress disorder     Seizure ([address] 12/17/2019    Smoker unmotivated to quit 4/25/2017    Suicide attempt ([address] 11/08/2017    S/A by overdosing on Trazodone and Seroquel    Suicide ideation     Toxic metabolic encephalopathy 4/55/5612        Past Surgical History:     Past Surgical History:   Procedure Laterality Date    CARPAL TUNNEL RELEASE      CHOLECYSTECTOMY      HYSTERECTOMY      HYSTERECTOMY, TOTAL ABDOMINAL      STOMACH SURGERY      gastric sleeve    UPPER GASTROINTESTINAL ENDOSCOPY N/A 6/19/2019    EGD BIOPSY performed by Silviano Valdez MD at [id number]        Medications Prior to Admission:     Prior to Admission medications    Medication Sig Start Date End Date Taking?  Authorizing Provider   acetaminophen (TYLENOL) 500 MG tablet Take 2 tablets by mouth every 8 hours as needed for Pain 7/21/20   Mariya Ragsdale DO   hydrOXYzine (ATARAX) 50 MG tablet Take 1 tablet by mouth 2 times daily as needed for Anxiety 7/10/20   Pedrito Bernard MD   albuterol sulfate  (90 Base) MCG/ACT inhaler Inhale 2 puffs into the lungs every 4 hours as needed for Wheezing 7/10/20   Pedrito Bernard MD   OXcarbazepine (TRILEPTAL) 150 MG tablet Take 1 tablet by mouth 2 times daily 7/10/20   Pedrito Bernard MD   gabapentin (NEURONTIN) 300 MG capsule Take 1 capsule by mouth 3 times daily for 10 days. 7/10/20 7/20/20  Pedrito Bernard MD   venlafaxine (EFFEXOR XR) 75 MG extended release capsule Take 1 capsule by mouth daily (with breakfast) 7/10/20   Pedrito Bernard MD   ondansetron (ZOFRAN ODT) 4 MG disintegrating tablet Take 1 tablet by mouth every 8 hours as needed for Nausea 7/10/20   Pedrito Bernard MD   atorvastatin (LIPITOR) 40 MG tablet Take 1 tablet by mouth nightly 7/10/20   Pedrito Bernard MD   lithium (ESKALITH) 450 MG extended release tablet Take 1 tablet by mouth 2 times daily 7/10/20   Pedrito Bernard MD   QUEtiapine (SEROQUEL) 200 MG tablet Take 1 tablet by mouth nightly 7/10/20   Pedrito Bernard MD   folic acid (FOLVITE) 1 MG tablet Take 1 tablet by mouth daily 7/11/20   Pedrito Bernard MD   fluticasone (FLONASE) 50 MCG/ACT nasal spray 1 spray by Each Nostril route daily 7/10/20   Pedrito Bernard MD   sodium chloride (OCEAN) 0.65 % nasal spray 1 spray by Nasal route as needed for Congestion 7/10/20   Pedrito Bernard MD   Multiple Vitamins-Minerals (MULTIVITAMIN ADULT) TABS Take 1 tablet by mouth daily 7/10/20   Pedrito Bernard MD   dicyclomine (BENTYL) 10 MG capsule Take 1 capsule by mouth every 6 hours as needed (cramps) 7/10/20   Pedrito Bernard MD   omeprazole (PRILOSEC) 20 MG delayed release capsule Take 1 capsule by mouth 2 times daily 7/10/20   Pedrito Bernard MD   thiamine 100 MG tablet Take 1 tablet by mouth daily 7/10/20   Pedrito Bernard MD   gabapentin (NEURONTIN) 300 MG capsule Take 1 capsule by mouth 3 times daily for 10 days.  12/20/19 12/30/19  Una Muñoz DO        Allergies:     Morphine; Morphine and related; Azithromycin; hallucination or delusion   CRANIAL NERVES: II     -      Visual fields intact to confrontation  III,IV,VI -  EOMs full, no afferent defect, no RALPH, no ptosis  V     -     Normal facial sensation  VII    -     Normal facial symmetry  VIII   -     Intact hearing  IX,X -     Symmetrical palate  XI    -     Symmetrical shoulder shrug  XII   -     Midline tongue, no atrophy    MOTOR FUNCTION:  significant for good strength of grade 5/5 in bilateral proximal and distal muscle groups of both upper and lower extremities with normal bulk, normal tone and no involuntary movements, no tremor   SENSORY FUNCTION:  Normal touch, normal pin, normal vibration, normal proprioception   CEREBELLAR FUNCTION:  Intact fine motor control over upper limbs   REFLEX FUNCTION:  Symmetric, no perverted reflex, no Babinski sign   STATION and GAIT  Not tested       Investigations:      Laboratory Testing:  Recent Results (from the past 24 hour(s))   EKG 12 Lead    Collection Time: 08/27/20  3:25 PM   Result Value Ref Range    Ventricular Rate 68 BPM    Atrial Rate 68 BPM    P-R Interval 160 ms    QRS Duration 80 ms    Q-T Interval 434 ms    QTc Calculation (Bazett) 461 ms    P Axis 47 degrees    R Axis 27 degrees    T Axis 30 degrees   Venous Blood Gas, POC    Collection Time: 08/27/20  3:31 PM   Result Value Ref Range    pH, Jeremi 7.386 7.320 - 7.430    pCO2, Jeremi 42.6 41.0 - 51.0 mm Hg    pO2, Jeremi 27.5 (L) 30.0 - 50.0 mm Hg    HCO3, Venous 25.5 22.0 - 29.0 mmol/L    Total CO2, Venous 27 23.0 - 30.0 mmol/L    Negative Base Excess, Jeremi NOT REPORTED 0.0 - 2.0    Positive Base Excess, Jeremi 0 0.0 - 3.0    O2 Sat, Jeremi 50 (L) 60.0 - 85.0 %    O2 Device/Flow/% NOT REPORTED     Gurmeet Test NOT REPORTED     Sample Site NOT REPORTED     Mode NOT REPORTED     FIO2 NOT REPORTED     Pt Temp NOT REPORTED     POC pH Temp NOT REPORTED     POC pCO2 Temp NOT REPORTED mm Hg    POC pO2 Temp NOT REPORTED mm Hg   Hemoglobin and hematocrit, blood    Collection Time: 08/27/20  3:31 PM   Result Value Ref Range    POC Hemoglobin 13.4 12.0 - 16.0 g/dL    POC Hematocrit 39 36 - 46 %   Creatinine W/GFR Point of Care    Collection Time: 08/27/20  3:31 PM   Result Value Ref Range    POC Creatinine 0.57 0.51 - 1.19 mg/dL    GFR Comment >60 >60 mL/min    GFR Non-African American >60 >60 mL/min    GFR Comment         SODIUM (POC)    Collection Time: 08/27/20  3:31 PM   Result Value Ref Range    POC Sodium 142 138 - 146 mmol/L   POTASSIUM (POC)    Collection Time: 08/27/20  3:31 PM   Result Value Ref Range    POC Potassium 5.7 (H) 3.5 - 4.5 mmol/L   CHLORIDE (POC)    Collection Time: 08/27/20  3:31 PM   Result Value Ref Range    POC Chloride 111 (H) 98 - 107 mmol/L   CALCIUM, IONIC (POC)    Collection Time: 08/27/20  3:31 PM   Result Value Ref Range    POC Ionized Calcium 1.05 (L) 1.15 - 1.33 mmol/L   Lactic Acid, POC    Collection Time: 08/27/20  3:31 PM   Result Value Ref Range    POC Lactic Acid 2.30 (H) 0.56 - 1.39 mmol/L   POCT Glucose    Collection Time: 08/27/20  3:31 PM   Result Value Ref Range    POC Glucose 74 74 - 100 mg/dL   Anion Gap (Calc) POC    Collection Time: 08/27/20  3:31 PM   Result Value Ref Range    Anion Gap 6 (L) 7 - 16 mmol/L   CBC Auto Differential    Collection Time: 08/27/20  3:49 PM   Result Value Ref Range    WBC 6.8 3.5 - 11.3 k/uL    RBC 4.82 3.95 - 5.11 m/uL    Hemoglobin 12.0 11.9 - 15.1 g/dL    Hematocrit 38.7 36.3 - 47.1 %    MCV 80.3 (L) 82.6 - 102.9 fL    MCH 24.9 (L) 25.2 - 33.5 pg    MCHC 31.0 28.4 - 34.8 g/dL    RDW 18.3 (H) 11.8 - 14.4 %    Platelets 246 949 - 519 k/uL    MPV 10.1 8.1 - 13.5 fL    NRBC Automated 0.0 0.0 per 100 WBC    Differential Type NOT REPORTED     Seg Neutrophils 39 36 - 65 %    Lymphocytes 46 (H) 24 - 43 %    Monocytes 11 3 - 12 %    Eosinophils % 3 1 - 4 %    Basophils 1 0 - 2 %    Immature Granulocytes 0 0 %    Segs Absolute 2.63 1.50 - 8.10 k/uL    Absolute Lymph # 3.14 1.10 - 3.70 k/uL    Absolute Mono # 0.73 0.10 - 1.20 k/uL    Absolute Eos # 0.22 0.00 - 0.44 k/uL    Basophils Absolute 0.06 0.00 - 0.20 k/uL    Absolute Immature Granulocyte 0.03 0.00 - 0.30 k/uL    WBC Morphology NOT REPORTED     RBC Morphology ANISOCYTOSIS PRESENT     Platelet Estimate NOT REPORTED    Protime-INR    Collection Time: 08/27/20  3:49 PM   Result Value Ref Range    Protime 10.4 9.0 - 12.0 sec    INR 1.0    APTT    Collection Time: 08/27/20  3:49 PM   Result Value Ref Range    PTT 24.7 20.5 - 30.5 sec   TOX SCR, BLD, ED    Collection Time: 08/27/20  3:49 PM   Result Value Ref Range    Acetaminophen Level  ug/mL     Unable to perform testing: Specimen hemolyzed. STEFANI COUGHLIN    Ethanol  mg/dL     Unable to perform testing: Specimen hemolyzed. STEFANI COUGHLIN    Ethanol percent  %     Unable to perform testing: Specimen hemolyzed. STEFANI COUGHLIN    Salicylate Lvl  mg/dL     Unable to perform testing: Specimen hemolyzed.  STEFANI COUGHLIN    Toxic Tricyclic Sc,Blood NEGATIVE NEGATIVE   Troponin    Collection Time: 08/27/20  3:49 PM   Result Value Ref Range    Troponin, High Sensitivity <6 0 - 14 ng/L    Troponin T NOT REPORTED <0.03 ng/mL    Troponin Interp NOT REPORTED    Troponin    Collection Time: 08/27/20  4:44 PM   Result Value Ref Range    Troponin, High Sensitivity <6 0 - 14 ng/L    Troponin T NOT REPORTED <0.03 ng/mL    Troponin Interp NOT REPORTED    Acetaminophen Level    Collection Time: 08/28/20  1:08 AM   Result Value Ref Range    Acetaminophen Level <5 (L) 10 - 30 ug/mL   Basic Metabolic Panel w/ Reflex to MG    Collection Time: 08/28/20  1:08 AM   Result Value Ref Range    Glucose 85 70 - 99 mg/dL    BUN 7 6 - 20 mg/dL    CREATININE 0.54 0.50 - 0.90 mg/dL    Bun/Cre Ratio NOT REPORTED 9 - 20    Calcium 8.6 8.6 - 10.4 mg/dL    Sodium 140 135 - 144 mmol/L    Potassium 3.9 3.7 - 5.3 mmol/L    Chloride 103 98 - 107 mmol/L    CO2 24 20 - 31 mmol/L    Anion Gap 13 9 - 17 mmol/L    GFR Non-African American >60 >60 mL/min    GFR African American >60 >60 mL/min    GFR Comment          GFR Staging NOT REPORTED    Culture, Blood 1    Collection Time: 08/28/20  1:08 AM    Specimen: Blood   Result Value Ref Range    Specimen Description . BLOOD     Special Requests 10ML LH     Culture NO GROWTH 12 HOURS    HEPATIC FUNCTION PANEL    Collection Time: 08/28/20  1:08 AM   Result Value Ref Range    Alb 3.9 3.5 - 5.2 g/dL    Alkaline Phosphatase 77 35 - 104 U/L    ALT 62 (H) 5 - 33 U/L    AST 65 (H) <32 U/L    Total Bilirubin 0.37 0.3 - 1.2 mg/dL    Bilirubin, Direct 0.13 <0.31 mg/dL    Bilirubin, Indirect 0.24 0.00 - 1.00 mg/dL    Total Protein 7.4 6.4 - 8.3 g/dL    Globulin NOT REPORTED 1.5 - 3.8 g/dL    Albumin/Globulin Ratio 1.1 1.0 - 2.5   Salicylate    Collection Time: 08/28/20  1:08 AM   Result Value Ref Range    Salicylate Lvl <1 (L) 3 - 10 mg/dL   Ethanol    Collection Time: 08/28/20  1:08 AM   Result Value Ref Range    Ethanol <10 <10 mg/dL    Ethanol percent <0.010 <0.010 %   Basic Metabolic Panel w/ Reflex to MG    Collection Time: 08/28/20  5:49 AM   Result Value Ref Range    Glucose 107 (H) 70 - 99 mg/dL    BUN 7 6 - 20 mg/dL    CREATININE 0.68 0.50 - 0.90 mg/dL    Bun/Cre Ratio NOT REPORTED 9 - 20    Calcium 8.7 8.6 - 10.4 mg/dL    Sodium 140 135 - 144 mmol/L    Potassium 4.4 3.7 - 5.3 mmol/L    Chloride 105 98 - 107 mmol/L    CO2 26 20 - 31 mmol/L    Anion Gap 9 9 - 17 mmol/L    GFR Non-African American >60 >60 mL/min    GFR African American >60 >60 mL/min    GFR Comment          GFR Staging NOT REPORTED    CBC auto differential    Collection Time: 08/28/20  5:49 AM   Result Value Ref Range    WBC 6.6 3.5 - 11.3 k/uL    RBC 4.86 3.95 - 5.11 m/uL    Hemoglobin 11.7 (L) 11.9 - 15.1 g/dL    Hematocrit 39.1 36.3 - 47.1 %    MCV 80.5 (L) 82.6 - 102.9 fL    MCH 24.1 (L) 25.2 - 33.5 pg    MCHC 29.9 28.4 - 34.8 g/dL    RDW 18.1 (H) 11.8 - 14.4 %    Platelets 400 855 - 754 k/uL    MPV 9.3 8.1 - 13.5 fL    NRBC Automated 0.0 0.0 per 100 WBC    Differential Type NOT REPORTED     Seg current medical therapies and most importantly because of direct risk to patient if care not provided in a hospital setting.     Otoniel Patel MD  8/28/2020  2:41 PM    Copy sent to Dr. Irvin Rocha MD

## 2020-08-28 NOTE — H&P
female reproductive organs / Father - heart disease    SH - 30 pack years, Substantial Alcohol Use, Cocaine and Marijuana use, Not currently sexually active    Review of Systems:  Review of Systems   Constitutional: Negative for chills, diaphoresis, fatigue and fever. HENT: Negative for congestion, drooling, rhinorrhea, sinus pressure, sinus pain, sneezing, sore throat, tinnitus and trouble swallowing. Eyes: Negative for photophobia and visual disturbance. Respiratory: Negative for apnea, cough, chest tightness, shortness of breath and wheezing. Cardiovascular: Negative for chest pain, palpitations and leg swelling. Gastrointestinal: Negative for abdominal pain, constipation, diarrhea, nausea, rectal pain and vomiting. Endocrine: Negative for polydipsia, polyphagia and polyuria. Genitourinary: Negative for dyspareunia, enuresis, frequency, hematuria and urgency. Musculoskeletal: Negative for arthralgias and myalgias. Skin: Negative for rash and wound. Allergic/Immunologic: Negative for immunocompromised state. Neurological: Positive for seizures, facial asymmetry and headaches. Negative for tremors and weakness. Hematological: Negative for adenopathy. Does not bruise/bleed easily. Psychiatric/Behavioral: Negative for agitation, behavioral problems, confusion, decreased concentration, self-injury and suicidal ideas. The patient is not hyperactive.         PAST MEDICAL HISTORY     Past Medical History:   Diagnosis Date    Alcoholic hepatitis without ascites 79/24/4283    Alcoholic ketoacidosis 23/93/8557    Alcoholism ([facility]     3 pints daily    Anxiety     Asthma     Bipolar affective disorder ([facility] 6/12/2019    Depression     Drug overdose, accidental or unintentional, initial encounter 9/29/2018    Drug overdose, intentional self-harm, initial encounter ([facility] 5/8/2018    Lisa coma scale total score 3-8 ([facility]     Hepatitis C antibody positive in blood 6/18/2019    History of mouth every 8 hours as needed for Nausea 7/10/20   Trinh Paulino MD   atorvastatin (LIPITOR) 40 MG tablet Take 1 tablet by mouth nightly 7/10/20   Trinh Paulino MD   lithium (ESKALITH) 450 MG extended release tablet Take 1 tablet by mouth 2 times daily 7/10/20   Trinh Paulino MD   QUEtiapine (SEROQUEL) 200 MG tablet Take 1 tablet by mouth nightly 7/10/20   Trinh Paulino MD   folic acid (FOLVITE) 1 MG tablet Take 1 tablet by mouth daily 20   Trinh Paulino MD   fluticasone (FLONASE) 50 MCG/ACT nasal spray 1 spray by Each Nostril route daily 7/10/20   Trinh Paulino MD   sodium chloride (OCEAN) 0.65 % nasal spray 1 spray by Nasal route as needed for Congestion 7/10/20   Trinh Paulino MD   Multiple Vitamins-Minerals (MULTIVITAMIN ADULT) TABS Take 1 tablet by mouth daily 7/10/20   Trinh Paulino MD   dicyclomine (BENTYL) 10 MG capsule Take 1 capsule by mouth every 6 hours as needed (cramps) 7/10/20   Trinh Paulino MD   omeprazole (PRILOSEC) 20 MG delayed release capsule Take 1 capsule by mouth 2 times daily 7/10/20   Trinh Paulino MD   thiamine 100 MG tablet Take 1 tablet by mouth daily 7/10/20   Trinh Paulino MD   gabapentin (NEURONTIN) 300 MG capsule Take 1 capsule by mouth 3 times daily for 10 days. 19  Amelia Sailors, DO       SOCIAL HISTORY     Tobacco: 30 pack years  Alcohol: Substantial Alcohol Use  Illicits: Marijuana and Cocaine Use  Occupation: Did Not Ask     FAMILY HISTORY     Family History   Problem Relation Age of Onset    Brain Cancer Mother     Cancer Mother         \"female cancer\"    Heart Disease Father        PHYSICAL EXAM     Vitals: /80   Pulse 74   Temp 97.2 °F (36.2 °C) (Oral)   Resp 14   SpO2 94%   Tmax: Temp (24hrs), Av °F (36.7 °C), Min:97.2 °F (36.2 °C), Max:98.8 °F (37.1 °C)    Last Body weight:   Wt Readings from Last 3 Encounters:   20 135 lb (61.2 kg)   07/10/20 146 lb 9.7 oz (66.5 kg)   20 138 lb (62.6 kg)     Body Mass Index :  There is no height or weight on file to calculate BMI. PHYSICAL EXAMINATION:  Constitutional: This is a well developed, well nourished, 25-29.9 - Overweight 50y.o. year old female who is alert, oriented, cooperative and in no apparent distress.    Respiratory: Breath sounds bilaterally were clear to auscultation  Cardiovascular: Regular without murmur   Abdomen: Soft and Nontender  Extremities:  No lower extremity edema    Neurological/Psychiatric: The patient's general behavior, level of consciousness, thought content and emotional status is normal.          INVESTIGATIONS     Laboratory Testing:     Recent Results (from the past 24 hour(s))   Venous Blood Gas, POC    Collection Time: 08/27/20  3:31 PM   Result Value Ref Range    pH, Jeremi 7.386 7.320 - 7.430    pCO2, Jeremi 42.6 41.0 - 51.0 mm Hg    pO2, Jeremi 27.5 (L) 30.0 - 50.0 mm Hg    HCO3, Venous 25.5 22.0 - 29.0 mmol/L    Total CO2, Venous 27 23.0 - 30.0 mmol/L    Negative Base Excess, Jeremi NOT REPORTED 0.0 - 2.0    Positive Base Excess, Jeremi 0 0.0 - 3.0    O2 Sat, Jeremi 50 (L) 60.0 - 85.0 %    O2 Device/Flow/% NOT REPORTED     Gurmeet Test NOT REPORTED     Sample Site NOT REPORTED     Mode NOT REPORTED     FIO2 NOT REPORTED     Pt Temp NOT REPORTED     POC pH Temp NOT REPORTED     POC pCO2 Temp NOT REPORTED mm Hg    POC pO2 Temp NOT REPORTED mm Hg   Hemoglobin and hematocrit, blood    Collection Time: 08/27/20  3:31 PM   Result Value Ref Range    POC Hemoglobin 13.4 12.0 - 16.0 g/dL    POC Hematocrit 39 36 - 46 %   Creatinine W/GFR Point of Care    Collection Time: 08/27/20  3:31 PM   Result Value Ref Range    POC Creatinine 0.57 0.51 - 1.19 mg/dL    GFR Comment >60 >60 mL/min    GFR Non-African American >60 >60 mL/min    GFR Comment         SODIUM (POC)    Collection Time: 08/27/20  3:31 PM   Result Value Ref Range    POC Sodium 142 138 - 146 mmol/L   POTASSIUM (POC)    Collection Time: 08/27/20  3:31 PM   Result Value Ref Range    POC Potassium 5.7 (H) 3.5 - 4.5 mmol/L CHLORIDE (POC)    Collection Time: 08/27/20  3:31 PM   Result Value Ref Range    POC Chloride 111 (H) 98 - 107 mmol/L   CALCIUM, IONIC (POC)    Collection Time: 08/27/20  3:31 PM   Result Value Ref Range    POC Ionized Calcium 1.05 (L) 1.15 - 1.33 mmol/L   Lactic Acid, POC    Collection Time: 08/27/20  3:31 PM   Result Value Ref Range    POC Lactic Acid 2.30 (H) 0.56 - 1.39 mmol/L   POCT Glucose    Collection Time: 08/27/20  3:31 PM   Result Value Ref Range    POC Glucose 74 74 - 100 mg/dL   Anion Gap (Calc) POC    Collection Time: 08/27/20  3:31 PM   Result Value Ref Range    Anion Gap 6 (L) 7 - 16 mmol/L   CBC Auto Differential    Collection Time: 08/27/20  3:49 PM   Result Value Ref Range    WBC 6.8 3.5 - 11.3 k/uL    RBC 4.82 3.95 - 5.11 m/uL    Hemoglobin 12.0 11.9 - 15.1 g/dL    Hematocrit 38.7 36.3 - 47.1 %    MCV 80.3 (L) 82.6 - 102.9 fL    MCH 24.9 (L) 25.2 - 33.5 pg    MCHC 31.0 28.4 - 34.8 g/dL    RDW 18.3 (H) 11.8 - 14.4 %    Platelets 170 548 - 601 k/uL    MPV 10.1 8.1 - 13.5 fL    NRBC Automated 0.0 0.0 per 100 WBC    Differential Type NOT REPORTED     Seg Neutrophils 39 36 - 65 %    Lymphocytes 46 (H) 24 - 43 %    Monocytes 11 3 - 12 %    Eosinophils % 3 1 - 4 %    Basophils 1 0 - 2 %    Immature Granulocytes 0 0 %    Segs Absolute 2.63 1.50 - 8.10 k/uL    Absolute Lymph # 3.14 1.10 - 3.70 k/uL    Absolute Mono # 0.73 0.10 - 1.20 k/uL    Absolute Eos # 0.22 0.00 - 0.44 k/uL    Basophils Absolute 0.06 0.00 - 0.20 k/uL    Absolute Immature Granulocyte 0.03 0.00 - 0.30 k/uL    WBC Morphology NOT REPORTED     RBC Morphology ANISOCYTOSIS PRESENT     Platelet Estimate NOT REPORTED    Protime-INR    Collection Time: 08/27/20  3:49 PM   Result Value Ref Range    Protime 10.4 9.0 - 12.0 sec    INR 1.0    APTT    Collection Time: 08/27/20  3:49 PM   Result Value Ref Range    PTT 24.7 20.5 - 30.5 sec   TOX SCR, BLD, ED    Collection Time: 08/27/20  3:49 PM   Result Value Ref Range    Acetaminophen Level  ug/mL Unable to perform testing: Specimen hemolyzed. STEFANI COUGHLIN    Ethanol  mg/dL     Unable to perform testing: Specimen hemolyzed. STEFANI COUGHLIN    Ethanol percent  %     Unable to perform testing: Specimen hemolyzed. STEFANI COUGHLIN    Salicylate Lvl  mg/dL     Unable to perform testing: Specimen hemolyzed.  STEFANI COUGHLIN    Toxic Tricyclic Sc,Blood NEGATIVE NEGATIVE   Troponin    Collection Time: 08/27/20  3:49 PM   Result Value Ref Range    Troponin, High Sensitivity <6 0 - 14 ng/L    Troponin T NOT REPORTED <0.03 ng/mL    Troponin Interp NOT REPORTED    Troponin    Collection Time: 08/27/20  4:44 PM   Result Value Ref Range    Troponin, High Sensitivity <6 0 - 14 ng/L    Troponin T NOT REPORTED <0.03 ng/mL    Troponin Interp NOT REPORTED    Acetaminophen Level    Collection Time: 08/28/20  1:08 AM   Result Value Ref Range    Acetaminophen Level <5 (L) 10 - 30 ug/mL   Basic Metabolic Panel w/ Reflex to MG    Collection Time: 08/28/20  1:08 AM   Result Value Ref Range    Glucose 85 70 - 99 mg/dL    BUN 7 6 - 20 mg/dL    CREATININE 0.54 0.50 - 0.90 mg/dL    Bun/Cre Ratio NOT REPORTED 9 - 20    Calcium 8.6 8.6 - 10.4 mg/dL    Sodium 140 135 - 144 mmol/L    Potassium 3.9 3.7 - 5.3 mmol/L    Chloride 103 98 - 107 mmol/L    CO2 24 20 - 31 mmol/L    Anion Gap 13 9 - 17 mmol/L    GFR Non-African American >60 >60 mL/min    GFR African American >60 >60 mL/min    GFR Comment          GFR Staging NOT REPORTED    HEPATIC FUNCTION PANEL    Collection Time: 08/28/20  1:08 AM   Result Value Ref Range    Alb 3.9 3.5 - 5.2 g/dL    Alkaline Phosphatase 77 35 - 104 U/L    ALT 62 (H) 5 - 33 U/L    AST 65 (H) <32 U/L    Total Bilirubin 0.37 0.3 - 1.2 mg/dL    Bilirubin, Direct 0.13 <0.31 mg/dL    Bilirubin, Indirect 0.24 0.00 - 1.00 mg/dL    Total Protein 7.4 6.4 - 8.3 g/dL    Globulin NOT REPORTED 1.5 - 3.8 g/dL    Albumin/Globulin Ratio 1.1 1.0 - 2.5   Salicylate    Collection Time: 08/28/20  1:08 AM   Result Value Ref Range    Salicylate Lvl <1 (L) 3 - 10 mg/dL   Ethanol    Collection Time: 08/28/20  1:08 AM   Result Value Ref Range    Ethanol <10 <10 mg/dL    Ethanol percent <0.010 <0.010 %       Imaging:   Ct Head Wo Contrast    Result Date: 8/27/2020  No acute intracranial abnormality. Xr Chest Portable    Result Date: 8/27/2020  No acute cardiopulmonary disease. Cta Head Neck W Contrast    Result Date: 8/27/2020  Mild atherosclerotic narrowing proximal right vertebral artery which is stable from prior study. Otherwise unremarkable CTA of the neck vessels and intracranial circulation. Mri Brain Wo Contrast    Result Date: 8/27/2020  Unremarkable MRI of the brain without contrast.       ASSESSMENT & PLAN     ASSESSMENT / PLAN:     IMPRESSION  This is a 50 y.o. female who presented with seizure signs and symptoms and admitted for observation and treatment. Seizure with Delroy's Paralysis - Transient Right Sided Weakness post episode. Neurology consulted. Appreciate them seeing patient. CT of head negative. CTA negative for large vessel occlusion. MRI with seizure protocol negative. Trileptal Levels pending. Urine toxicology pending. Ethanol levels pending. Continue Trileptal. Will continue to monitor. Alcohol Withdrawal Seizures - Ethanol Levels pending. On CIWA protocol. 2 mg of Ativan required so far. Will continue to monitor. On Multivitamin, Thiamine, and Folic Acid. Bipolar Disorder - Continue Eskalith 450 mg PO BiD    Major Depressive Disorder - Effexor 75 mg PO     DVT ppx: Heparin  GI ppx: Protonix     PT/OT/SW: On Board  Discharge Planning: Ongoing     Laura Byrd MD  Internal Medicine Resident, PGY-1  [address] and add on       I have discussed the care of Kaley Williamson , including pertinent history and exam findings,    @8/27/2020@   with the resident.   I have seen and examined the patient and the key elements of all parts of the encounter have been performed by me .   I agree with the assessment, plan and orders as documented by the resident. Active Problems:    Seizure ([address]    Weakness of right side of body  Resolved Problems:    * No resolved hospital problems. *           Medications: Allergies: Allergies   Allergen Reactions    Morphine Anaphylaxis    Morphine And Related      Itching, nausea    Azithromycin     Morphine     Nsaids      contraindicated    Zithromax [Azithromycin] Swelling       Current Meds:   Scheduled Meds:    nicotine  1 patch Transdermal Daily    OXcarbazepine  150 mg Oral BID    chlordiazePOXIDE  25 mg Oral TID    acetaminophen  1,000 mg Oral Once    atorvastatin  40 mg Oral Nightly    folic acid  1 mg Oral Daily    lithium  450 mg Oral BID    pantoprazole  40 mg Oral QAM AC    thiamine  100 mg Oral Daily    venlafaxine  75 mg Oral Daily with breakfast    heparin (porcine)  5,000 Units Subcutaneous 3 times per day    sodium chloride flush  10 mL Intravenous 2 times per day     Continuous Infusions:   PRN Meds: LORazepam, albuterol, sodium chloride flush, acetaminophen **OR** acetaminophen, polyethylene glycol, promethazine **OR** ondansetron      ---- ;     [facility]  [address], [address] [place] [address].    Phone [phone]   Fax: [phone]  Answering Service: [phone]

## 2020-08-28 NOTE — ED NOTES
Report called to [facility], talked to STEFANI Ramos   Answered all questions   Pt resting in bed in NAD   Will continue to assess      Shanda Gant RN  08/27/20 0143

## 2020-08-29 VITALS
WEIGHT: 130 LBS | TEMPERATURE: 97.9 F | HEIGHT: 60 IN | HEART RATE: 101 BPM | DIASTOLIC BLOOD PRESSURE: 83 MMHG | BODY MASS INDEX: 25.52 KG/M2 | OXYGEN SATURATION: 100 % | RESPIRATION RATE: 16 BRPM | SYSTOLIC BLOOD PRESSURE: 123 MMHG

## 2020-08-29 PROBLEM — F19.10 POLYSUBSTANCE ABUSE (HCC): Chronic | Status: ACTIVE | Noted: 2018-03-23

## 2020-08-29 PROBLEM — R53.1 WEAKNESS OF RIGHT SIDE OF BODY: Status: RESOLVED | Noted: 2020-08-27 | Resolved: 2020-08-29

## 2020-08-29 PROBLEM — F43.12 CHRONIC POST-TRAUMATIC STRESS DISORDER (PTSD): Chronic | Status: ACTIVE | Noted: 2017-11-28

## 2020-08-29 PROBLEM — F11.20 HEROIN DEPENDENCE (HCC): Chronic | Status: ACTIVE | Noted: 2017-11-28

## 2020-08-29 PROBLEM — R56.9 SEIZURE (HCC): Status: RESOLVED | Noted: 2020-07-08 | Resolved: 2020-08-29

## 2020-08-29 PROBLEM — F33.9 MAJOR DEPRESSION, RECURRENT (HCC): Chronic | Status: ACTIVE | Noted: 2018-03-22

## 2020-08-29 LAB
ABSOLUTE EOS #: 0.24 K/UL (ref 0–0.44)
ABSOLUTE IMMATURE GRANULOCYTE: <0.03 K/UL (ref 0–0.3)
ABSOLUTE LYMPH #: 2.26 K/UL (ref 1.1–3.7)
ABSOLUTE MONO #: 0.93 K/UL (ref 0.1–1.2)
ANION GAP SERPL CALCULATED.3IONS-SCNC: 10 MMOL/L (ref 9–17)
BASOPHILS # BLD: 1 % (ref 0–2)
BASOPHILS ABSOLUTE: 0.06 K/UL (ref 0–0.2)
BUN BLDV-MCNC: 8 MG/DL (ref 6–20)
BUN/CREAT BLD: NORMAL (ref 9–20)
CALCIUM SERPL-MCNC: 8.9 MG/DL (ref 8.6–10.4)
CHLORIDE BLD-SCNC: 105 MMOL/L (ref 98–107)
CO2: 26 MMOL/L (ref 20–31)
CREAT SERPL-MCNC: 0.58 MG/DL (ref 0.5–0.9)
DIFFERENTIAL TYPE: ABNORMAL
EKG ATRIAL RATE: 68 BPM
EKG P AXIS: 47 DEGREES
EKG P-R INTERVAL: 160 MS
EKG Q-T INTERVAL: 434 MS
EKG QRS DURATION: 80 MS
EKG QTC CALCULATION (BAZETT): 461 MS
EKG R AXIS: 27 DEGREES
EKG T AXIS: 30 DEGREES
EKG VENTRICULAR RATE: 68 BPM
EOSINOPHILS RELATIVE PERCENT: 4 % (ref 1–4)
GFR AFRICAN AMERICAN: >60 ML/MIN
GFR NON-AFRICAN AMERICAN: >60 ML/MIN
GFR SERPL CREATININE-BSD FRML MDRD: NORMAL ML/MIN/{1.73_M2}
GFR SERPL CREATININE-BSD FRML MDRD: NORMAL ML/MIN/{1.73_M2}
GLUCOSE BLD-MCNC: 95 MG/DL (ref 70–99)
HCT VFR BLD CALC: 39.2 % (ref 36.3–47.1)
HEMOGLOBIN: 11.7 G/DL (ref 11.9–15.1)
IMMATURE GRANULOCYTES: 0 %
LYMPHOCYTES # BLD: 33 % (ref 24–43)
MCH RBC QN AUTO: 24.5 PG (ref 25.2–33.5)
MCHC RBC AUTO-ENTMCNC: 29.8 G/DL (ref 28.4–34.8)
MCV RBC AUTO: 82 FL (ref 82.6–102.9)
MONOCYTES # BLD: 13 % (ref 3–12)
NRBC AUTOMATED: 0 PER 100 WBC
PDW BLD-RTO: 18.5 % (ref 11.8–14.4)
PLATELET # BLD: 389 K/UL (ref 138–453)
PLATELET ESTIMATE: ABNORMAL
PMV BLD AUTO: 10.2 FL (ref 8.1–13.5)
POTASSIUM SERPL-SCNC: 5 MMOL/L (ref 3.7–5.3)
RBC # BLD: 4.78 M/UL (ref 3.95–5.11)
RBC # BLD: ABNORMAL 10*6/UL
SEG NEUTROPHILS: 49 % (ref 36–65)
SEGMENTED NEUTROPHILS ABSOLUTE COUNT: 3.43 K/UL (ref 1.5–8.1)
SODIUM BLD-SCNC: 141 MMOL/L (ref 135–144)
WBC # BLD: 6.9 K/UL (ref 3.5–11.3)
WBC # BLD: ABNORMAL 10*3/UL

## 2020-08-29 PROCEDURE — 80048 BASIC METABOLIC PNL TOTAL CA: CPT

## 2020-08-29 PROCEDURE — 96372 THER/PROPH/DIAG INJ SC/IM: CPT

## 2020-08-29 PROCEDURE — 6370000000 HC RX 637 (ALT 250 FOR IP): Performed by: INTERNAL MEDICINE

## 2020-08-29 PROCEDURE — 96376 TX/PRO/DX INJ SAME DRUG ADON: CPT

## 2020-08-29 PROCEDURE — G0378 HOSPITAL OBSERVATION PER HR: HCPCS

## 2020-08-29 PROCEDURE — 99239 HOSP IP/OBS DSCHRG MGMT >30: CPT | Performed by: INTERNAL MEDICINE

## 2020-08-29 PROCEDURE — 6370000000 HC RX 637 (ALT 250 FOR IP): Performed by: STUDENT IN AN ORGANIZED HEALTH CARE EDUCATION/TRAINING PROGRAM

## 2020-08-29 PROCEDURE — 6360000002 HC RX W HCPCS: Performed by: STUDENT IN AN ORGANIZED HEALTH CARE EDUCATION/TRAINING PROGRAM

## 2020-08-29 PROCEDURE — 36415 COLL VENOUS BLD VENIPUNCTURE: CPT

## 2020-08-29 PROCEDURE — 85025 COMPLETE CBC W/AUTO DIFF WBC: CPT

## 2020-08-29 RX ORDER — QUETIAPINE FUMARATE 25 MG/1
50 TABLET, FILM COATED ORAL NIGHTLY
Status: DISCONTINUED | OUTPATIENT
Start: 2020-08-29 | End: 2020-08-29 | Stop reason: HOSPADM

## 2020-08-29 RX ORDER — DOCUSATE SODIUM 100 MG/1
100 CAPSULE, LIQUID FILLED ORAL DAILY
Status: DISCONTINUED | OUTPATIENT
Start: 2020-08-29 | End: 2020-08-29 | Stop reason: HOSPADM

## 2020-08-29 RX ADMIN — CHLORDIAZEPOXIDE HYDROCHLORIDE 25 MG: 25 CAPSULE ORAL at 14:54

## 2020-08-29 RX ADMIN — LITHIUM CARBONATE 450 MG: 450 TABLET, EXTENDED RELEASE ORAL at 08:47

## 2020-08-29 RX ADMIN — Medication 100 MG: at 08:48

## 2020-08-29 RX ADMIN — CHLORDIAZEPOXIDE HYDROCHLORIDE 25 MG: 25 CAPSULE ORAL at 08:48

## 2020-08-29 RX ADMIN — VENLAFAXINE HYDROCHLORIDE 75 MG: 75 CAPSULE, EXTENDED RELEASE ORAL at 08:47

## 2020-08-29 RX ADMIN — OXCARBAZEPINE 150 MG: 150 TABLET, FILM COATED ORAL at 08:47

## 2020-08-29 RX ADMIN — FOLIC ACID 1 MG: 1 TABLET ORAL at 08:47

## 2020-08-29 RX ADMIN — LORAZEPAM 1 MG: 2 INJECTION INTRAMUSCULAR; INTRAVENOUS at 00:16

## 2020-08-29 RX ADMIN — HEPARIN SODIUM 5000 UNITS: 5000 INJECTION INTRAVENOUS; SUBCUTANEOUS at 06:10

## 2020-08-29 RX ADMIN — PANTOPRAZOLE SODIUM 40 MG: 40 TABLET, DELAYED RELEASE ORAL at 06:10

## 2020-08-29 RX ADMIN — LORAZEPAM 1 MG: 2 INJECTION INTRAMUSCULAR; INTRAVENOUS at 10:52

## 2020-08-29 NOTE — PLAN OF CARE
Problem: Falls - Risk of:  Goal: Will remain free from falls  Description: Will remain free from falls   8/29/2020 0540 by Kendra Frederick RN  Outcome: Met This Shift  Goal: Absence of physical injury  Description: Absence of physical injury  8/29/2020 0540 by Kendra Frederick RN  Outcome: Met This Shift

## 2020-08-29 NOTE — DISCHARGE SUMMARY
[address]     Department of Internal Medicine - Staff Internal Medicine Teaching Service    INPATIENT DISCHARGE SUMMARY      Patient Identification:  Shayy Butcher is a 50 y.o. female. :  1975  MRN: [id number]     Acct: [de-identified]   PCP: Meghan Agarwal MD  Admit Date:  2020  Discharge date and time: No discharge date for patient encounter. Attending Provider: Coy Arce, [address] [employer] Problem Lists:  Active Problems:    Alcoholism /alcohol abuse ([address]    Heroin dependence ([address]    Chronic post-traumatic stress disorder (PTSD)    Major depression, recurrent ([address]    Polysubstance abuse ([address]    Delroy's paresis ([address]  Resolved Problems:    Seizure ([address]    Weakness of right side of body      HOSPITAL STAY     Brief Inpatient course:     Shayy Butcher is a 50 y.o. female who was admitted for the management of breakthrough seizures, presented to the emergency department after her significant other witness a seizure like episode where her eyes rolled back into her head, she started foaming from the mouth, started shaking, and had some slight facial droop; there was no witnessed tongue biting or associated incontinence. Patient has an extensive history related to alcohol abuse and withdrawal and has been admitted many times in the past for seizures. The etiology of her seizures is unsure as it may be secondary to alcohol or an epileptic episode. Neurology was consulted and subsequent EEG was negative for epileptic changes. Patients Trileptal dose was increased to 150 mg BID and she will follow up as an outpatient in 4 weeks.       Procedures/ Significant Interventions:    As Above    Consults:     Consults:     Final Specialist Recommendations/Findings:   IP CONSULT TO STROKE TEAM  IP CONSULT TO INTERNAL MEDICINE  IP CONSULT TO NEUROLOGY  IP CONSULT TO SOCIAL WORK  IP CONSULT TO CASE MANAGEMENT Any Hospital Acquired Infections: none    Discharge Functional Status:  stable    DISCHARGE PLAN     Disposition: home    Patient Instructions:   Current Discharge Medication List      CONTINUE these medications which have NOT CHANGED    Details   acetaminophen (TYLENOL) 500 MG tablet Take 2 tablets by mouth every 8 hours as needed for Pain  Qty: 20 tablet, Refills: 0      hydrOXYzine (ATARAX) 50 MG tablet Take 1 tablet by mouth 2 times daily as needed for Anxiety  Qty: 60 tablet, Refills: 0      albuterol sulfate  (90 Base) MCG/ACT inhaler Inhale 2 puffs into the lungs every 4 hours as needed for Wheezing  Qty: 1 Inhaler, Refills: 0      OXcarbazepine (TRILEPTAL) 150 MG tablet Take 1 tablet by mouth 2 times daily  Qty: 60 tablet, Refills: 0      gabapentin (NEURONTIN) 300 MG capsule Take 1 capsule by mouth 3 times daily for 10 days.   Qty: 30 capsule, Refills: 0      venlafaxine (EFFEXOR XR) 75 MG extended release capsule Take 1 capsule by mouth daily (with breakfast)  Qty: 30 capsule, Refills: 0      ondansetron (ZOFRAN ODT) 4 MG disintegrating tablet Take 1 tablet by mouth every 8 hours as needed for Nausea  Qty: 30 tablet, Refills: 0      atorvastatin (LIPITOR) 40 MG tablet Take 1 tablet by mouth nightly  Qty: 30 tablet, Refills: 3      lithium (ESKALITH) 450 MG extended release tablet Take 1 tablet by mouth 2 times daily  Qty: 60 tablet, Refills: 0      QUEtiapine (SEROQUEL) 200 MG tablet Take 1 tablet by mouth nightly  Qty: 30 tablet, Refills: 0      folic acid (FOLVITE) 1 MG tablet Take 1 tablet by mouth daily  Qty: 30 tablet, Refills: 3      fluticasone (FLONASE) 50 MCG/ACT nasal spray 1 spray by Each Nostril route daily  Qty: 1 Bottle, Refills: 0      sodium chloride (OCEAN) 0.65 % nasal spray 1 spray by Nasal route as needed for Congestion  Qty: 1 Bottle, Refills: 0      Multiple Vitamins-Minerals (MULTIVITAMIN ADULT) TABS Take 1 tablet by mouth daily  Qty: 30 tablet, Refills: 0      dicyclomine (BENTYL) 10 MG capsule Take 1 capsule by mouth every 6 hours as needed (cramps)  Qty: 40 capsule, Refills: 0      omeprazole (PRILOSEC) 20 MG delayed release capsule Take 1 capsule by mouth 2 times daily  Qty: 60 capsule, Refills: 0      thiamine 100 MG tablet Take 1 tablet by mouth daily  Qty: 30 tablet, Refills: 0      gabapentin (NEURONTIN) 300 MG capsule Take 1 capsule by mouth 3 times daily for 10 days. Qty: 30 capsule, Refills: 0             Activity: activity as tolerated    Diet: regular diet    Follow-up:    Sanjay Cota MD  [address] [place] [id number]  [phone]          Patient Instructions: Please take all medications as prescribed. Please consider abstinence from alcohol. Please continue to take an over the counter multivitamin. Please follow up with your primary care provider Dr. Ros Genao. Please call the Neurologist to schedule an appointment in 4 weeks. Note that over 30 minutes was spent in preparing discharge papers, discussing discharge with patient, medication review, etc.      Faiza Moser MD, MD  Internal Medicine Resident, PGY-1  [facility]; [place], [place]  8/29/2020, 2:48 PM      Attending Physician Statement  I have discussed the care of Diana Jiang with the resident team. I have examined the patient myself and taken ros and hpi , including pertinent history and exam findings,  with the resident. I have reviewed the key elements of all parts of the encounter with the resident. I agree with the assessment, plan and orders as documented by the resident.     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

## 2020-08-29 NOTE — DISCHARGE INSTR - COC
Continuity of Care Form    Patient Name: Nacho Beckwith   :  1975  MRN:  [id number]    Admit date:  2020  Discharge date:  ***    Code Status Order: Full Code   Advance Directives:   Advance Care Flowsheet Documentation     Date/Time Healthcare Directive Type of Healthcare Directive Copy in [facility] [address] Agent's Name Healthcare Agent's Phone Number    [phone]  No, patient does not have an advance directive for healthcare treatment -- -- -- -- --          Admitting Physician:  Celeste Vieyra MD  PCP: Jose Menard MD    Discharging Nurse: [facility] Unit/Room#: 9374/3027-78  Discharging Unit Phone Number: ***    Emergency Contact:   Extended Emergency Contact Information  Primary Emergency Contact: Cookie Damon [address] Phone: [phone]  Relation: Brother/Sister  Hearing or visual needs: None  Other needs: None  Preferred language: English   needed? No  Secondary Emergency Contact: [address] Phone: [phone]  Relation: Spouse   needed?  No    Past Surgical History:  Past Surgical History:   Procedure Laterality Date    CARPAL TUNNEL RELEASE      CHOLECYSTECTOMY      HYSTERECTOMY      HYSTERECTOMY, TOTAL ABDOMINAL      STOMACH SURGERY      gastric sleeve    UPPER GASTROINTESTINAL ENDOSCOPY N/A 2019    EGD BIOPSY performed by Conor Chan MD at [address] [place] [address]       Immunization History:   Immunization History   Administered Date(s) Administered    Influenza Vaccine, unspecified formulation 2016    Influenza, Quadv, 6 mo and older, IM, PF (Flulaval, Fluarix) 2018    Influenza, Quadv, IM, PF (6 mo and older Fluzone, Flulaval, Fluarix, and 3 yrs and older Afluria) 2017    Tdap (Boostrix, Adacel) 10/30/2017       Active Problems:  Patient Active Problem List   Diagnosis Code    Alcoholism /alcohol abuse ([facility] [address] F10.20    Heroin dependence [facility]) F11.20    Chronic post-traumatic stress disorder (PTSD) F43.12    Major depression, recurrent (HCC) F33.9    Depressed bipolar I disorder ([facility] [address] F31.9    Polysubstance abuse ([facility] F19.10    Bipolar I disorder, current or most recent episode depressed, with psychotic features with mood-congruent psychotic features ([facility] R07.6    Alcoholic hepatitis without ascites K70.10    Alcohol withdrawal syndrome with complication (HCC) G75.457    Bipolar 1 disorder (HCC) F31.9    Mild intermittent asthma without complication Q68.32    Alcohol related seizure ([facility] R56.9    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    Stroke-like symptoms R29.90    Drug use F19.90    Cocaine abuse ([facility]) F14.10    Abnormal CT of the head I80.3    Alcoholic intoxication without complication (HCC) E34.079    History of bipolar disorder Z86.59       Isolation/Infection:   Isolation          No Isolation        Patient Infection Status     None to display          Nurse Assessment:  Last Vital Signs: /83   Pulse 101   Temp 97.9 °F (36.6 °C) (Oral)   Resp 16   Ht 5' (1.524 m)   Wt 130 lb (59 kg)   SpO2 100%   BMI 25.39 kg/m²     Last documented pain score (0-10 scale): Pain Level: 0  Last Weight:   Wt Readings from Last 1 Encounters:   08/28/20 130 lb (59 kg)     Mental Status:  {IP PT MENTAL STATUS:20521}    IV Access:  { WES IV ACCESS:423098174}    Nursing Mobility/ADLs:  Walking   {P DME XZOI:759881198}  Transfer  {CHP DME LTIP:654693097}  Bathing  {CHP DME GZYJ:225813516}  Dressing  {CHP DME PFTO:288557732}  Toileting  {CHP DME DGRX:199317174}  Feeding  {CHP DME ETXX:782270820}  Med Admin  {P DME ECCW:435445383}  Med Delivery   { WES MED Delivery:740762652}    Wound Care Documentation and Therapy:        Elimination:  Continence:   · Bowel: {YES / CP:68773}  · Bladder: {YES / VU:96936}  Urinary Catheter: {Urinary Catheter:831561731}   Colostomy/Ileostomy/Ileal Conduit: {YES / PZ:98027}       Date of Last BM: ***  No intake or output data in the 24 hours ending 20 1622  No intake/output data recorded.     Safety Concerns:     [address] WES Safety Concerns:970533421}    Impairments/Disabilities:      [address] [facility] Impairments/Disabilities:173476056}    Nutrition Therapy:  Current Nutrition Therapy:   [address] [facility] Diet List:592683703}    Routes of Feeding: {CHP DME Other Feedings:566916531}  Liquids: {Slp liquid thickness:19509}  Daily Fluid Restriction: {CHP DME Yes amt example:875277410}  Last Modified Barium Swallow with Video (Video Swallowing Test): {Done Not Done NMKT:749781370}    Treatments at the Time of Hospital Discharge:   Respiratory Treatments: ***  Oxygen Therapy:  {Therapy; copd oxygen:71169}  Ventilator:    {[facility] Vent ZCYO:372473646}    Rehab Therapies: {THERAPEUTIC INTERVENTION:6539624355}  Weight Bearing Status/Restrictions: [facility] Weight Bearin}  Other Medical Equipment (for information only, NOT a DME order):  {EQUIPMENT:832269641}  Other Treatments: ***    Patient's personal belongings (please select all that are sent with patient):  {CHP DME Belongings:753083779}    RN SIGNATURE:  {Esignature:405470592}    CASE MANAGEMENT/SOCIAL WORK SECTION    Inpatient Status Date: ***    Readmission Risk Assessment Score:  Readmission Risk              Risk of Unplanned Readmission:        29           Discharging to Facility/ Agency   · Name:   · Address:  · Phone:  · Fax:    Dialysis Facility (if applicable)   · Name:  · Address:  · Dialysis Schedule:  · Phone:  · Fax:    / signature: {Esignature:014457302}    PHYSICIAN SECTION    Prognosis: {Prognosis:6364576518}    Condition at Discharge: [address] Patient Condition:924636089}    Rehab Potential (if transferring to Rehab): {Prognosis:4740120786}    Recommended Labs or Other Treatments After Discharge: ***    Physician Certification: I certify the above information and transfer of Nigel Gerarde  is necessary for the continuing treatment of the diagnosis listed and that she requires {Admit to Appropriate Level of Care:56013} for {GREATER/LESS:749731814} 30 days.      Update Admission H&P: {CHP DME Changes in LEYSM:258892122}    PHYSICIAN SIGNATURE:  Electronically signed by Kenna Sharif MD on 8/29/20 at 4:22 PM EDT

## 2020-08-29 NOTE — PROGRESS NOTES
Discharge order received. IV removed. Writer went over discharge instructions with pt including follow up appointments and medications, pt verbalized understanding. Pt ambulated off unit with all belongings.

## 2020-08-31 LAB — OXCARBAZEPINE: 3 UG/ML (ref 3–35)

## 2020-08-31 NOTE — ADT AUTH CERT
Utilization Reviews         Seizure - Care Day 2 (8/28/2020) by Julián Hylton RN         Review Status  Review Entered    Completed  8/31/2020 09:08        Criteria Review       Care Day: 2 Care Date: 8/28/2020 Level of Care:    Guideline Day 2    Level Of Care    (X) Neurologic unit or floor to discharge    8/31/2020 9:08 AM EDT by Philomena Galan      INTERMEDIATE FLOOR    Clinical Status    (X) * Hemodynamic stability    8/31/2020 9:08 AM EDT by Philomena Galan      08/28/20 08:26:15   98 (36.7)   14   90   135/93    98   None     08/28/20 1945   98.1 (36.7)   16   98   115/85   96   None    (X) * Mental status at baseline    (X) * No evidence of CNS bleeding or infection    (X) * No new neurologic deficits    (X) * Seizures absent or managed    (X) * No evidence of seizure etiology requiring inpatient care    ( ) * Discharge plans and education understood    Activity    (X) * Ambulatory or acceptable for next level of care    Routes    (X) * Oral hydration, medications, and diet    Interventions    (X) Neurologic checks and seizure monitoring    (X) Possible anticonvulsant medication levels    8/31/2020 9:08 AM EDT by Philomena Galan      SEE REVIEW    (X) Possible cardiac monitoring    Medications    ( ) * Antiseizure regimen suitable for next level of care in place, if indicated [B]    * Milestone    Additional Notes    8/28/2020       ===EEG RESULTS       CLASSIFICATION:    Abnormal I (Awake, asleep)    1. Background slow         IMPRESSION:    This was a mildly abnormal routine awake and asleep EEG which showed very mild diffuse encephalopathy, no epileptiform discharges or EEG/clinical seizures seen.        ===INTERNAL MED NOTE       CC: Seizure Like Activity    Pt examined at bedside.  Chart & results reviewed.          No acute episodes overnight    Pt is heme stable and afebrile    No signs of alcohol withdrawal currently    No active suicidal ideations currently    Right sided weakness still present  negative.  EEG today showed mildly abnormal routine awake and asleep EEG which showed very mild diffuse encephalopathy, no epileptiform discharges or EEG/clinical seizures seen. Trileptal Levels pending. Urine toxicology pending. Ethanol levels pending. Continue Trileptal. Will continue to monitor.         Alcohol Withdrawal Seizures - Ethanol Levels pending. On Librium. Will continue to monitor. On Multivitamin, Thiamine, and Folic Acid.         Bipolar Disorder - Continue Eskalith 450 mg PO BiD         Major Depressive Disorder - Effexor 75 mg PO          DVT ppx : Heparin    GI ppx: Protonix         PT/OT/SW: On Board     Discharge Planning: Ongoing         ===NERUO NOTE            Patient is a 22-year-old female with history of polysubstance abuse, heroin abuse, alcohol abuse.  Patient is under treatment for seizures with Trileptal 100 mg twice daily. Charlotte Croft if seizures are epileptic versus alcohol withdrawal seizures.  Denies noncompliance with the medication.         EEG: Unremarkable    MRI brain without contrast: Unremarkable    CT head and neck with contrast: Mild atherosclerotic narrowing proximal right vertebral artery, stable from prior study. CT head without contrast: Unremarkable         Plan:         Continue Trileptal 150 mg twice daily    Seizure precautions    Ativan as needed    Outpatient follow-up with neurologist in 4 weeks    PT/OT       Assessment recommendation:    Patient with history of seizure disorder.  At this time it is unclear if patient has provoked seizure from alcoholism/alcohol withdrawal or unprovoked seizures. Rosaura Cohn may have a combination of both         MRI scan of the brain is negative.  The EEG shows diffuse slowing with no abnormal epileptiform activity.         Her home medications include Trileptal 150 mg twice daily.  For now recommend to continue same.         Patient would benefit from outpatient alcohol rehab. Latisha Ta is agreeable for it.  Once she is back to her REPORTED    WBC: 6.6    RBC: 4.86    Hemoglobin Quant: 11.7 (L)    Hematocrit: 39.1    MCV: 80.5 (L)    MCH: 24.1 (L)    MCHC: 29.9    MPV: 9.3    RDW: 18.1 (H)    Platelet Count: 143    Platelet Estimate: NOT REPORTED    Absolute Mono #: 0.83    Eosinophils %: 4    Basophils Absolute: 0.07    Differential Type: NOT REPORTED    Seg Neutrophils: 47    Segs Absolute: 3.09    Lymphocytes: 35    Absolute Lymph #: 2.27    Monocytes: 13 (H)    Absolute Eos #: 0.29    Basophils: 1    Immature Granulocytes: 0    WBC Morphology: NOT REPORTED    RBC Morphology: ANISOCYTOSIS PRESENT    Absolute Immature Granulocyte: <0.03    NRBC Automated: 0.0                          PA LETTER OF RECOMMENDATION by Darya Cramer RN         Review Status  Review Entered    In Primary  2020 14:39        Criteria Review           We recommend that the following pt's current hospitalization under INPATIENT            status is APPROPRIATE .        If you agree, please place a new ADMIT order in [facility] as recommended.               Name: Rupali Olmos        : 1975        CSN: [id number]                Clinical summary Sz, hx alcohol abuse       Vitals stable       Labs and Imaging MRI brain neg, EEG mildly abnormal, CIWA 11       MCG criteria applies yes, P 390       Comments                        This chart was reviewed at 12:57 PM 2020               Rabia Paula DO       Physician [facility]

## 2020-09-01 ENCOUNTER — OFFICE VISIT (OUTPATIENT)
Dept: NEUROLOGY | Age: 45
End: 2020-09-01
Payer: MEDICARE

## 2020-09-01 VITALS
HEART RATE: 74 BPM | HEIGHT: 60 IN | BODY MASS INDEX: 25.52 KG/M2 | WEIGHT: 130 LBS | OXYGEN SATURATION: 96 % | TEMPERATURE: 95.3 F | SYSTOLIC BLOOD PRESSURE: 113 MMHG | DIASTOLIC BLOOD PRESSURE: 90 MMHG

## 2020-09-01 PROCEDURE — 1111F DSCHRG MED/CURRENT MED MERGE: CPT | Performed by: STUDENT IN AN ORGANIZED HEALTH CARE EDUCATION/TRAINING PROGRAM

## 2020-09-01 PROCEDURE — 4004F PT TOBACCO SCREEN RCVD TLK: CPT | Performed by: STUDENT IN AN ORGANIZED HEALTH CARE EDUCATION/TRAINING PROGRAM

## 2020-09-01 PROCEDURE — G8419 CALC BMI OUT NRM PARAM NOF/U: HCPCS | Performed by: STUDENT IN AN ORGANIZED HEALTH CARE EDUCATION/TRAINING PROGRAM

## 2020-09-01 PROCEDURE — G8427 DOCREV CUR MEDS BY ELIG CLIN: HCPCS | Performed by: STUDENT IN AN ORGANIZED HEALTH CARE EDUCATION/TRAINING PROGRAM

## 2020-09-01 PROCEDURE — 99215 OFFICE O/P EST HI 40 MIN: CPT | Performed by: STUDENT IN AN ORGANIZED HEALTH CARE EDUCATION/TRAINING PROGRAM

## 2020-09-01 RX ORDER — MECLIZINE HYDROCHLORIDE 25 MG/1
25 TABLET ORAL 3 TIMES DAILY PRN
Status: ON HOLD | COMMUNITY
End: 2020-11-02

## 2020-09-01 NOTE — PROGRESS NOTES
[address] [place] [address], [facility], [facility], [facility], [address] [place] [address]  P: [phone]  F: [phone]    NEUROLOGY CLINIC NOTE     PATIENT NAME: Denys Munguia  PATIENT MRN: [id number]  PRIMARY CARE PHYSICIAN: Vanessa Medina MD    HPI:      Denys Munguia is a 50 y.o. right handed  female with PMH significant for chronic alcohol abuse, polysubstance abuse, history of alcohol withdrawal symptoms with DTs, seizure disorder-mostly provoked secondary to alcohol intoxication or withdrawal, bipolar disorder, asthma was seen in the clinic for hospital follow-up for seizures. History obtained from Patient and medical chart review. Patient was recently admitted on 8/27/2020 for  breakthrough seizure, headaches, right-sided weakness and numbness and alcohol intoxication. Patient has a history of chronic alcohol abuse and polysubstance abuse. Drinks around 6 to 9 cans of 25 ounce beers daily She presented to the ER after a seizure and fall with left-sided weakness on 8/27/20. Her blood alcohol level was 288. Patient also had a witnessed seizure activity in the ER, no clear semiology available. CT head did not show any intracranial abnormality. Patient was evaluated by neurology. MRI of the brain was unremarkable. EEG showed diffuse swelling with no epileptiform discharges. Her home dose of Trileptal 150 mg twice a day was continued, patient was recommended to follow-up with outpatient alcohol rehab. Plan was to gradually wean off Trileptal as her seizures are most likely provoked secondary to alcohol abuse or alcohol withdrawal, she may not need Trileptal on a long-term basis. Combination of alcoholism and Trileptal also increases the risk of hyponatremia, this was discussed with the patient. Patient was then discharged once her medical condition was stabilized.     Since discharge patient denies any recurrence of seizures or seizure-like activity or episodes of confusion or staring spells. She is taking Trileptal 150 mg twice daily, denies any side effects of the medications. She will be following up with her psychiatrist sooner. She is also undergoing outpatient alcohol rehab. Did not drink alcohol since discharge from the hospital.    Patient had been admitted multiple times in the past for DTs due to withdrawal.  She was evaluated by neurology in October 2019 when she presented with a seizure-like activity, she had a EEG done at that time which was unremarkable. Repeat EEG was done in December 2019 which was normal.  She was then admitted in July 2020 with a breakthrough seizure due to alcohol intoxication at that time.     PATIENT HISTORY:     Past Medical History:   Diagnosis Date    Alcoholic hepatitis without ascites 18/65/9231    Alcoholic ketoacidosis 90/71/2924    Alcoholism ([address]     3 pints daily    Anxiety     Asthma     Bipolar affective disorder ([address] 6/12/2019    Depression     Drug overdose, accidental or unintentional, initial encounter 9/29/2018    Drug overdose, intentional self-harm, initial encounter ([address] 5/8/2018    Lisa coma scale total score 3-8 (HCC)     Hepatitis C antibody positive in blood 6/18/2019    History of gastric surgery (gastric sleeve 2012) 4/25/2017    Intentional drug overdose ([address] 5/7/2018    MDD (major depressive disorder), recurrent episode ([address] 5/8/2018    Pneumonia     Polysubstance abuse ([address]     drug abuse includes, cocaine, IV heroin, cannabis, and ETOH    Post traumatic stress disorder     Seizure ([address] 12/17/2019    Smoker unmotivated to quit 4/25/2017    Suicide attempt ([address] 11/08/2017    S/A by overdosing on Trazodone and Seroquel    Suicide ideation     Toxic metabolic encephalopathy 6/29/3950        Past Surgical History:   Procedure Laterality Date    CARPAL TUNNEL RELEASE      CHOLECYSTECTOMY      HYSTERECTOMY      HYSTERECTOMY, TOTAL ABDOMINAL      STOMACH SURGERY gastric sleeve    UPPER GASTROINTESTINAL ENDOSCOPY N/A 6/19/2019    EGD BIOPSY performed by Shane Quigley MD at [address] [place] [address] Marital status: Single     Spouse name: Not on file    Number of children: 0    Years of education: 15    Highest education level: Not on file   Occupational History    Not on file   Social Needs    Financial resource strain: Not on file    Food insecurity     Worry: Not on file     Inability: Not on file   [employer] needs     Medical: Not on file     Non-medical: Not on file   Tobacco Use    Smoking status: Current Every Day Smoker     Packs/day: 1.00     Years: 30.00     Pack years: 30.00     Types: Cigarettes    Smokeless tobacco: Never Used   Substance and Sexual Activity    Alcohol use:  Yes     Alcohol/week: 42.0 standard drinks     Types: 42 Cans of beer per week     Comment: detoxing of alcohol x5 days    Drug use: Yes     Frequency: 7.0 times per week     Types: Marijuana, IV, Cocaine     Comment: drug abuse includes, cocaine, IV heroin, cannabis    Sexual activity: Not Currently   Lifestyle    Physical activity     Days per week: Not on file     Minutes per session: Not on file    Stress: Not on file   Relationships    Social connections     Talks on phone: Not on file     Gets together: Not on file     Attends Rastafarian service: Not on file     Active member of club or organization: Not on file     Attends meetings of clubs or organizations: Not on file     Relationship status: Not on file    Intimate partner violence     Fear of current or ex partner: Not on file     Emotionally abused: Not on file     Physically abused: Not on file     Forced sexual activity: Not on file   Other Topics Concern    Not on file   Social History Narrative    ** Merged History Encounter **         ** Merged History Encounter **             Current Outpatient Medications   Medication Sig Dispense Refill    meclizine (ANTIVERT) 25 MG tablet Take 25 mg by mouth 3 times daily as needed      acetaminophen (TYLENOL) 500 MG tablet Take 2 tablets by mouth every 8 hours as needed for Pain 20 tablet 0    hydrOXYzine (ATARAX) 50 MG tablet Take 1 tablet by mouth 2 times daily as needed for Anxiety 60 tablet 0    albuterol sulfate  (90 Base) MCG/ACT inhaler Inhale 2 puffs into the lungs every 4 hours as needed for Wheezing 1 Inhaler 0    OXcarbazepine (TRILEPTAL) 150 MG tablet Take 1 tablet by mouth 2 times daily 60 tablet 0    venlafaxine (EFFEXOR XR) 75 MG extended release capsule Take 1 capsule by mouth daily (with breakfast) 30 capsule 0    atorvastatin (LIPITOR) 40 MG tablet Take 1 tablet by mouth nightly 30 tablet 3    lithium (ESKALITH) 450 MG extended release tablet Take 1 tablet by mouth 2 times daily 60 tablet 0    folic acid (FOLVITE) 1 MG tablet Take 1 tablet by mouth daily 30 tablet 3    fluticasone (FLONASE) 50 MCG/ACT nasal spray 1 spray by Each Nostril route daily 1 Bottle 0    Multiple Vitamins-Minerals (MULTIVITAMIN ADULT) TABS Take 1 tablet by mouth daily 30 tablet 0    dicyclomine (BENTYL) 10 MG capsule Take 1 capsule by mouth every 6 hours as needed (cramps) 40 capsule 0    omeprazole (PRILOSEC) 20 MG delayed release capsule Take 1 capsule by mouth 2 times daily 60 capsule 0    gabapentin (NEURONTIN) 300 MG capsule Take 1 capsule by mouth 3 times daily for 10 days.  30 capsule 0    ondansetron (ZOFRAN ODT) 4 MG disintegrating tablet Take 1 tablet by mouth every 8 hours as needed for Nausea (Patient not taking: Reported on 9/1/2020) 30 tablet 0    QUEtiapine (SEROQUEL) 200 MG tablet Take 1 tablet by mouth nightly (Patient not taking: Reported on 9/1/2020) 30 tablet 0    sodium chloride (OCEAN) 0.65 % nasal spray 1 spray by Nasal route as needed for Congestion (Patient not taking: Reported on 9/1/2020) 1 Bottle 0    thiamine 100 MG tablet Take 1 tablet by mouth daily (Patient not taking: Reported on 9/1/2020) 30 tablet 0    gabapentin (NEURONTIN) 300 MG capsule Take 1 capsule by mouth 3 times daily for 10 days. 30 capsule 0     No current facility-administered medications for this visit. Allergies  Allergies   Allergen Reactions    Morphine Anaphylaxis    Morphine And Related      Itching, nausea    Azithromycin     Morphine     Nsaids      contraindicated    Zithromax [Azithromycin] Swelling        REVIEW OF SYSTEMS:     Review of Systems   Constitutional: Positive for fatigue. Negative for chills, diaphoresis, fever and unexpected weight change. HENT: Negative for congestion, ear discharge, ear pain, hearing loss, sinus pain and sore throat. Eyes: Negative for photophobia, pain, discharge, redness and visual disturbance. Respiratory: Negative for cough and shortness of breath. Cardiovascular: Negative for chest pain, palpitations and leg swelling. Gastrointestinal: Negative for abdominal pain, constipation, diarrhea, nausea and vomiting. Endocrine: Negative for polydipsia and polyuria. Genitourinary: Negative for difficulty urinating and hematuria. Musculoskeletal: Positive for gait problem. Negative for neck pain. Skin: Negative for pallor and rash. Neurological: Positive for tremors, seizures and light-headedness. Negative for dizziness, syncope, facial asymmetry, speech difficulty, weakness, numbness and headaches. Hematological: Does not bruise/bleed easily. Psychiatric/Behavioral: Positive for behavioral problems, confusion, decreased concentration, dysphoric mood and sleep disturbance. Negative for agitation and hallucinations. VITALS  BP (!) 113/90 (Site: Right Upper Arm, Position: Sitting, Cuff Size: Medium Adult)   Pulse 74   Temp 95.3 °F (35.2 °C) (Temporal)   Ht 5' (1.524 m)   Wt 130 lb (59 kg)   SpO2 96%   BMI 25.39 kg/m²      PHYSICAL EXAMINATION:     Constitutional: Well developed, well nourished and in no acute distress.    Head:  normocephalic, atraumatic. Neck: supple, no carotid bruits, thyroid not palpable  Respiratory: Clear to auscultation bilaterally with no use of accessory muscles during respiration. Cardiovascular: normal rate, regular rhythm, no murmur, gallop, rub. Abdomen: Soft, nontender, nondistended, normal bowel sounds, no hepatomegaly or splenomegaly  Extremities:  peripheral pulses palpable, no pedal edema or calf pain with palpation  Psych: normal affect      NEUROLOGICAL EXAMINATION:     Mental status   Alert and oriented; impaired memory with episodes of confusion, dysarthric speech     Cranial nerves   II - visual fields intact to confrontation                                                III, IV, VI - extra-ocular muscles full: no pupillary defect; no RALPH, no nystagmus, no ptosis   V - normal facial sensation                                                               VII - normal facial symmetry                                                             VIII - intact hearing                                                                             IX, X - symmetrical palate                                                                  XI - symmetrical shoulder shrug                                                       XII - midline tongue without atrophy or fasciculation     Motor function  Normal muscle bulk and tone  Muscle strength: normal power 5/5     Sensory function Intact to light touch in bilateral upper and lower extremities. Cerebellar Postural tremors noted bilaterally     Reflex function 1+ bilaterally . Negative Babinski     Gait                   slow cautious gait, unsteady while walking           PRIOR TESTS AND IMAGING: Following images and Labs were reviewed by the examiner       MRI brain without contrast 8/27/2020  Impression    Unremarkable MRI of the brain without contrast.      CT head without contrast 8/27/2020     Impression    No acute intracranial abnormality.         CTA head and neck with contrast 8/27/2020  Impression    Mild atherosclerotic narrowing proximal right vertebral artery which is    stable from prior study.  Otherwise unremarkable CTA of the neck vessels and    intracranial circulation. MRI brain with and without contrast 12/19/2019  Impression    1. No acute intracranial abnormality.  No acute infarct. 2. Trace right mastoid effusion. MRI brain with and without contrast 10/14/2019  Impression    Limited by motion artifacts.         No acute intracranial abnormality.  No mass effect or abnormal intracranial    enhancement.         The bilateral hippocampal and parahippocampal structures are within normal    limits. Routine EEG 8/28/20  IMPRESSION:   This was a mildly abnormal routine awake and asleep EEG which   showed very mild diffuse encephalopathy, no epileptiform   discharges or EEG/clinical seizures seen. ASSESSMENT / PLAN:      Juliocesar Saravia is a 50 y.o. right handed  female  was seen in the clinic for hospital follow-up for seizures. 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 Unclear if patient's seizures are provoked secondary to alcoholism/alcohol withdrawal versus unprovoked seizures. Patient may have combination of both. History of seizure in association with alcohol intoxication or alcohol withdrawal  History of chronic alcohol abuse  History of polysubstance abuse- cocaine, heroin  History of alcohol withdrawal symptoms with DTs in the past  Bipolar disorder  Asthma      Plan:  -Patient is currently enrolled in outpatient alcohol rehab program.    -We will continue Trileptal 150 mg twice a day for now.    Instructed patient to continue with alcohol rehab, once she will stop drinking alcohol, she may not have any seizures as her seizures are most likely provoked secondary to alcohol intoxication or alcohol withdrawal.  And hence may not need Trileptal on a long-term basis.    -Also discussed with patient and her girlfriend risk of increased hyponatremia associated with alcoholism and Trileptal.  They voiced understanding.    -Follow-up with psychiatry for mood disorders.     - Seizure Precautions:   I counseled the patient with regard to seizure precautions for injury prevention and reinforced their rationale. No driving for at least 6 months, no activity at dangerous heights, no operation of dangerous machinery, no baths, no swimming. Caution (preferably accompanied) with cooking or near fires. The patient expressed understanding.     - Maintain seizure diary  a. seizures that occur, duration and description (or type)  b. any side effects from medications and medication dosage  c. mood or behavioral changes  d. Changes in other medications, or Significant life events      - Behavioral Health: I counseled the patient and discussed my impression that they would benefit from behavioral health care including counseling and/or a psychiatric consultation. She expressed understanding.       - Follow up in the clinic in 3 months  - Instructed patient to call the clinic if symptoms worsen or develop any new symptoms. I have spent 45 minutes face to face with the patient more than 50% of this time was spent counseling and coordinating care.       Electronically signed by Deb Kirk MD on 9/1/2020 at 3:26 PM

## 2020-09-03 LAB
CULTURE: NORMAL
Lab: NORMAL
SPECIMEN DESCRIPTION: NORMAL

## 2020-09-04 ASSESSMENT — ENCOUNTER SYMPTOMS
SORE THROAT: 0
VOMITING: 0
ABDOMINAL PAIN: 0
SHORTNESS OF BREATH: 0
EYE PAIN: 0
SINUS PAIN: 0
EYE DISCHARGE: 0
NAUSEA: 0
DIARRHEA: 0
CONSTIPATION: 0
PHOTOPHOBIA: 0
COUGH: 0
EYE REDNESS: 0

## 2020-11-02 ENCOUNTER — HOSPITAL ENCOUNTER (INPATIENT)
Age: 45
LOS: 1 days | Discharge: HOME | DRG: 101 | End: 2020-11-03
Attending: EMERGENCY MEDICINE | Admitting: PSYCHIATRY & NEUROLOGY
Payer: MEDICARE

## 2020-11-02 ENCOUNTER — APPOINTMENT (OUTPATIENT)
Dept: CT IMAGING | Age: 45
DRG: 101 | End: 2020-11-02
Payer: MEDICARE

## 2020-11-02 ENCOUNTER — APPOINTMENT (OUTPATIENT)
Dept: GENERAL RADIOLOGY | Age: 45
DRG: 101 | End: 2020-11-02
Payer: MEDICARE

## 2020-11-02 ENCOUNTER — APPOINTMENT (OUTPATIENT)
Dept: MRI IMAGING | Age: 45
DRG: 101 | End: 2020-11-02
Payer: MEDICARE

## 2020-11-02 PROBLEM — R56.9 SEIZURE (HCC): Status: ACTIVE | Noted: 2020-11-02

## 2020-11-02 LAB
% CKMB: 1.9 % (ref 0–3)
-: NORMAL
ABSOLUTE EOS #: 0.22 K/UL (ref 0–0.44)
ABSOLUTE IMMATURE GRANULOCYTE: <0.03 K/UL (ref 0–0.3)
ABSOLUTE LYMPH #: 3.15 K/UL (ref 1.1–3.7)
ABSOLUTE MONO #: 0.79 K/UL (ref 0.1–1.2)
ACETAMINOPHEN LEVEL: <5 UG/ML (ref 10–30)
ALLEN TEST: ABNORMAL
AMPHETAMINE SCREEN URINE: NEGATIVE
AMPHETAMINE: ABNORMAL
ANION GAP SERPL CALCULATED.3IONS-SCNC: 14 MMOL/L (ref 9–17)
ANION GAP: 11 MMOL/L (ref 7–16)
BARBITURATE SCREEN URINE: NEGATIVE
BARBITURATES: ABNORMAL
BASOPHILS # BLD: 1 % (ref 0–2)
BASOPHILS ABSOLUTE: 0.07 K/UL (ref 0–0.2)
BENZODIAZEPINE SCREEN, URINE: NEGATIVE
BENZODIAZEPINES: ABNORMAL
BILIRUBIN URINE: NEGATIVE
BUN BLDV-MCNC: 4 MG/DL (ref 6–20)
BUN/CREAT BLD: ABNORMAL (ref 9–20)
BUPRENORPHINE URINE: NORMAL
BUPRENORPHINE: ABNORMAL
CALCIUM SERPL-MCNC: 9 MG/DL (ref 8.6–10.4)
CANNABINOID SCREEN URINE: NEGATIVE
CHLORIDE BLD-SCNC: 100 MMOL/L (ref 98–107)
CK MB: 1.9 NG/ML
CKMB INTERPRETATION: ABNORMAL
CO2: 21 MMOL/L (ref 20–31)
COCAINE METABOLITE, URINE: NEGATIVE
COCAINE: ABNORMAL
COLOR: YELLOW
COMMENT UA: ABNORMAL
CREAT SERPL-MCNC: 0.62 MG/DL (ref 0.5–0.9)
DIFFERENTIAL TYPE: ABNORMAL
DRUGS OF ABUSE COMMENT: ABNORMAL
EOSINOPHILS RELATIVE PERCENT: 3 % (ref 1–4)
ETHANOL PERCENT: 0.13 %
ETHANOL: 126 MG/DL
FIO2: ABNORMAL
GFR AFRICAN AMERICAN: >60 ML/MIN
GFR NON-AFRICAN AMERICAN: >60 ML/MIN
GFR NON-AFRICAN AMERICAN: >60 ML/MIN
GFR SERPL CREATININE-BSD FRML MDRD: >60 ML/MIN
GFR SERPL CREATININE-BSD FRML MDRD: ABNORMAL ML/MIN/{1.73_M2}
GFR SERPL CREATININE-BSD FRML MDRD: ABNORMAL ML/MIN/{1.73_M2}
GFR SERPL CREATININE-BSD FRML MDRD: NORMAL ML/MIN/{1.73_M2}
GLUCOSE BLD-MCNC: 87 MG/DL (ref 70–99)
GLUCOSE BLD-MCNC: 93 MG/DL (ref 74–100)
GLUCOSE URINE: NEGATIVE
HCG(URINE) PREGNANCY TEST: NEGATIVE
HCO3 VENOUS: 24.6 MMOL/L (ref 22–29)
HCT VFR BLD CALC: 42 % (ref 36.3–47.1)
HEMOGLOBIN: 12.7 G/DL (ref 11.9–15.1)
IMMATURE GRANULOCYTES: 0 %
INR BLD: 0.9
KETONES, URINE: NEGATIVE
LEUKOCYTE ESTERASE, URINE: NEGATIVE
LYMPHOCYTES # BLD: 46 % (ref 24–43)
MCH RBC QN AUTO: 23.8 PG (ref 25.2–33.5)
MCHC RBC AUTO-ENTMCNC: 30.2 G/DL (ref 28.4–34.8)
MCV RBC AUTO: 78.8 FL (ref 82.6–102.9)
MDMA URINE: NORMAL
METHADONE SCREEN, URINE: NEGATIVE
METHADONE: ABNORMAL
METHAMPHETAMINE, URINE: NORMAL
METHAMPHETAMINE: ABNORMAL
MODE: ABNORMAL
MONOCYTES # BLD: 11 % (ref 3–12)
MYOGLOBIN: 26 NG/ML (ref 25–58)
NEGATIVE BASE EXCESS, VEN: 1 (ref 0–2)
NITRITE, URINE: NEGATIVE
NRBC AUTOMATED: 0 PER 100 WBC
O2 DEVICE/FLOW/%: ABNORMAL
O2 SAT, VEN: 26 % (ref 60–85)
OPIATES, URINE: NEGATIVE
OPIATES: ABNORMAL
OXYCODONE SCREEN URINE: NEGATIVE
OXYCODONE: ABNORMAL
PARTIAL THROMBOPLASTIN TIME: 23.2 SEC (ref 20.5–30.5)
PATIENT TEMP: ABNORMAL
PCO2, VEN: 43.1 MM HG (ref 41–51)
PDW BLD-RTO: 18.3 % (ref 11.8–14.4)
PH UA: 5 (ref 5–8)
PH VENOUS: 7.37 (ref 7.32–7.43)
PHENCYCLIDINE, URINE: NEGATIVE
PHENCYCLIDINE: ABNORMAL
PLATELET # BLD: 267 K/UL (ref 138–453)
PLATELET ESTIMATE: ABNORMAL
PMV BLD AUTO: 9.4 FL (ref 8.1–13.5)
PO2, VEN: 18.4 MM HG (ref 30–50)
POC CHLORIDE: 102 MMOL/L (ref 98–107)
POC CREATININE: 0.59 MG/DL (ref 0.51–1.19)
POC HEMATOCRIT: 44 % (ref 36–46)
POC HEMOGLOBIN: 15 G/DL (ref 12–16)
POC IONIZED CALCIUM: 1.07 MMOL/L (ref 1.15–1.33)
POC LACTIC ACID: 2.19 MMOL/L (ref 0.56–1.39)
POC PCO2 TEMP: ABNORMAL MM HG
POC PH TEMP: ABNORMAL
POC PO2 TEMP: ABNORMAL MM HG
POC POTASSIUM: 4.2 MMOL/L (ref 3.5–4.5)
POC SODIUM: 138 MMOL/L (ref 138–146)
POSITIVE BASE EXCESS, VEN: ABNORMAL (ref 0–3)
POTASSIUM SERPL-SCNC: 4.2 MMOL/L (ref 3.7–5.3)
PROPOXYPHENE, URINE: NORMAL
PROTEIN UA: NEGATIVE
PROTHROMBIN TIME: 9.9 SEC (ref 9–12)
RBC # BLD: 5.33 M/UL (ref 3.95–5.11)
RBC # BLD: ABNORMAL 10*6/UL
REASON FOR REJECTION: NORMAL
SALICYLATE LEVEL: <1 MG/DL (ref 3–10)
SAMPLE SITE: ABNORMAL
SEG NEUTROPHILS: 39 % (ref 36–65)
SEGMENTED NEUTROPHILS ABSOLUTE COUNT: 2.75 K/UL (ref 1.5–8.1)
SODIUM BLD-SCNC: 135 MMOL/L (ref 135–144)
SPECIFIC GRAVITY UA: 1.06 (ref 1–1.03)
TEST INFORMATION: NORMAL
THC: ABNORMAL
TOTAL CK: 98 U/L (ref 26–192)
TOTAL CO2, VENOUS: 26 MMOL/L (ref 23–30)
TOXIC TRICYCLIC SC,BLOOD: NEGATIVE
TRICYCLIC ANTIDEPRESSANTS, UR: NORMAL
TROPONIN INTERP: ABNORMAL
TROPONIN T: ABNORMAL NG/ML
TROPONIN, HIGH SENSITIVITY: <6 NG/L (ref 0–14)
TURBIDITY: CLEAR
URINE HGB: NEGATIVE
UROBILINOGEN, URINE: NORMAL
WBC # BLD: 7 K/UL (ref 3.5–11.3)
WBC # BLD: ABNORMAL 10*3/UL
ZZ NTE CLEAN UP: ORDERED TEST: NORMAL
ZZ NTE WITH NAME CLEAN UP: SPECIMEN SOURCE: NORMAL

## 2020-11-02 PROCEDURE — 70450 CT HEAD/BRAIN W/O DYE: CPT

## 2020-11-02 PROCEDURE — 85730 THROMBOPLASTIN TIME PARTIAL: CPT

## 2020-11-02 PROCEDURE — G0378 HOSPITAL OBSERVATION PER HR: HCPCS

## 2020-11-02 PROCEDURE — 82553 CREATINE MB FRACTION: CPT

## 2020-11-02 PROCEDURE — 70496 CT ANGIOGRAPHY HEAD: CPT

## 2020-11-02 PROCEDURE — 84132 ASSAY OF SERUM POTASSIUM: CPT

## 2020-11-02 PROCEDURE — 71045 X-RAY EXAM CHEST 1 VIEW: CPT

## 2020-11-02 PROCEDURE — 82565 ASSAY OF CREATININE: CPT

## 2020-11-02 PROCEDURE — 96375 TX/PRO/DX INJ NEW DRUG ADDON: CPT

## 2020-11-02 PROCEDURE — A9576 INJ PROHANCE MULTIPACK: HCPCS | Performed by: FAMILY MEDICINE

## 2020-11-02 PROCEDURE — 2060000000 HC ICU INTERMEDIATE R&B

## 2020-11-02 PROCEDURE — 82435 ASSAY OF BLOOD CHLORIDE: CPT

## 2020-11-02 PROCEDURE — 82330 ASSAY OF CALCIUM: CPT

## 2020-11-02 PROCEDURE — 6360000002 HC RX W HCPCS: Performed by: STUDENT IN AN ORGANIZED HEALTH CARE EDUCATION/TRAINING PROGRAM

## 2020-11-02 PROCEDURE — 2580000003 HC RX 258: Performed by: FAMILY MEDICINE

## 2020-11-02 PROCEDURE — 85025 COMPLETE CBC W/AUTO DIFF WBC: CPT

## 2020-11-02 PROCEDURE — 82803 BLOOD GASES ANY COMBINATION: CPT

## 2020-11-02 PROCEDURE — 99285 EMERGENCY DEPT VISIT HI MDM: CPT

## 2020-11-02 PROCEDURE — 84484 ASSAY OF TROPONIN QUANT: CPT

## 2020-11-02 PROCEDURE — 6360000004 HC RX CONTRAST MEDICATION: Performed by: FAMILY MEDICINE

## 2020-11-02 PROCEDURE — 81025 URINE PREGNANCY TEST: CPT

## 2020-11-02 PROCEDURE — 80307 DRUG TEST PRSMV CHEM ANLYZR: CPT

## 2020-11-02 PROCEDURE — 96374 THER/PROPH/DIAG INJ IV PUSH: CPT

## 2020-11-02 PROCEDURE — 72141 MRI NECK SPINE W/O DYE: CPT

## 2020-11-02 PROCEDURE — 6360000004 HC RX CONTRAST MEDICATION: Performed by: EMERGENCY MEDICINE

## 2020-11-02 PROCEDURE — G0480 DRUG TEST DEF 1-7 CLASSES: HCPCS

## 2020-11-02 PROCEDURE — 6370000000 HC RX 637 (ALT 250 FOR IP): Performed by: FAMILY MEDICINE

## 2020-11-02 PROCEDURE — 81003 URINALYSIS AUTO W/O SCOPE: CPT

## 2020-11-02 PROCEDURE — 80183 DRUG SCRN QUANT OXCARBAZEPIN: CPT

## 2020-11-02 PROCEDURE — 82947 ASSAY GLUCOSE BLOOD QUANT: CPT

## 2020-11-02 PROCEDURE — 85014 HEMATOCRIT: CPT

## 2020-11-02 PROCEDURE — 80048 BASIC METABOLIC PNL TOTAL CA: CPT

## 2020-11-02 PROCEDURE — 99222 1ST HOSP IP/OBS MODERATE 55: CPT | Performed by: PSYCHIATRY & NEUROLOGY

## 2020-11-02 PROCEDURE — 83605 ASSAY OF LACTIC ACID: CPT

## 2020-11-02 PROCEDURE — 82550 ASSAY OF CK (CPK): CPT

## 2020-11-02 PROCEDURE — 83874 ASSAY OF MYOGLOBIN: CPT

## 2020-11-02 PROCEDURE — 85610 PROTHROMBIN TIME: CPT

## 2020-11-02 PROCEDURE — 70553 MRI BRAIN STEM W/O & W/DYE: CPT

## 2020-11-02 PROCEDURE — 84295 ASSAY OF SERUM SODIUM: CPT

## 2020-11-02 RX ORDER — LORAZEPAM 2 MG/1
4 TABLET ORAL
Status: DISCONTINUED | OUTPATIENT
Start: 2020-11-02 | End: 2020-11-03 | Stop reason: HOSPADM

## 2020-11-02 RX ORDER — LORAZEPAM 2 MG/ML
3 INJECTION INTRAMUSCULAR
Status: DISCONTINUED | OUTPATIENT
Start: 2020-11-02 | End: 2020-11-03 | Stop reason: HOSPADM

## 2020-11-02 RX ORDER — FLUTICASONE PROPIONATE 50 MCG
1 SPRAY, SUSPENSION (ML) NASAL DAILY
Status: DISCONTINUED | OUTPATIENT
Start: 2020-11-02 | End: 2020-11-03 | Stop reason: HOSPADM

## 2020-11-02 RX ORDER — OXCARBAZEPINE 150 MG/1
150 TABLET, FILM COATED ORAL 2 TIMES DAILY
Status: DISCONTINUED | OUTPATIENT
Start: 2020-11-02 | End: 2020-11-02

## 2020-11-02 RX ORDER — HYDROXYZINE HYDROCHLORIDE 25 MG/1
50 TABLET, FILM COATED ORAL 2 TIMES DAILY PRN
Status: DISCONTINUED | OUTPATIENT
Start: 2020-11-02 | End: 2020-11-03 | Stop reason: HOSPADM

## 2020-11-02 RX ORDER — FOLIC ACID 1 MG/1
1 TABLET ORAL DAILY
Status: DISCONTINUED | OUTPATIENT
Start: 2020-11-02 | End: 2020-11-03 | Stop reason: HOSPADM

## 2020-11-02 RX ORDER — SODIUM CHLORIDE 0.9 % (FLUSH) 0.9 %
10 SYRINGE (ML) INJECTION EVERY 12 HOURS SCHEDULED
Status: DISCONTINUED | OUTPATIENT
Start: 2020-11-02 | End: 2020-11-03 | Stop reason: HOSPADM

## 2020-11-02 RX ORDER — SODIUM CHLORIDE 0.9 % (FLUSH) 0.9 %
10 SYRINGE (ML) INJECTION PRN
Status: DISCONTINUED | OUTPATIENT
Start: 2020-11-02 | End: 2020-11-03 | Stop reason: HOSPADM

## 2020-11-02 RX ORDER — LITHIUM CARBONATE 450 MG
450 TABLET, EXTENDED RELEASE ORAL 2 TIMES DAILY
Status: DISCONTINUED | OUTPATIENT
Start: 2020-11-02 | End: 2020-11-03 | Stop reason: HOSPADM

## 2020-11-02 RX ORDER — THIAMINE MONONITRATE (VIT B1) 100 MG
100 TABLET ORAL DAILY
Status: DISCONTINUED | OUTPATIENT
Start: 2020-11-02 | End: 2020-11-03 | Stop reason: HOSPADM

## 2020-11-02 RX ORDER — OXCARBAZEPINE 150 MG/1
150 TABLET, FILM COATED ORAL DAILY
Status: DISCONTINUED | OUTPATIENT
Start: 2020-11-03 | End: 2020-11-03 | Stop reason: HOSPADM

## 2020-11-02 RX ORDER — LORAZEPAM 2 MG/ML
2 INJECTION INTRAMUSCULAR
Status: DISCONTINUED | OUTPATIENT
Start: 2020-11-02 | End: 2020-11-03 | Stop reason: HOSPADM

## 2020-11-02 RX ORDER — M-VIT,TX,IRON,MINS/CALC/FOLIC 27MG-0.4MG
1 TABLET ORAL DAILY
Status: DISCONTINUED | OUTPATIENT
Start: 2020-11-02 | End: 2020-11-03 | Stop reason: HOSPADM

## 2020-11-02 RX ORDER — ECHINACEA PURPUREA EXTRACT 125 MG
1 TABLET ORAL PRN
Status: DISCONTINUED | OUTPATIENT
Start: 2020-11-02 | End: 2020-11-03 | Stop reason: HOSPADM

## 2020-11-02 RX ORDER — LORAZEPAM 2 MG/ML
4 INJECTION INTRAMUSCULAR
Status: DISCONTINUED | OUTPATIENT
Start: 2020-11-02 | End: 2020-11-03 | Stop reason: HOSPADM

## 2020-11-02 RX ORDER — VENLAFAXINE HYDROCHLORIDE 75 MG/1
75 CAPSULE, EXTENDED RELEASE ORAL
Status: DISCONTINUED | OUTPATIENT
Start: 2020-11-03 | End: 2020-11-03 | Stop reason: HOSPADM

## 2020-11-02 RX ORDER — ACETAMINOPHEN 650 MG/1
650 SUPPOSITORY RECTAL EVERY 6 HOURS PRN
Status: DISCONTINUED | OUTPATIENT
Start: 2020-11-02 | End: 2020-11-03 | Stop reason: HOSPADM

## 2020-11-02 RX ORDER — LORAZEPAM 2 MG/ML
1 INJECTION INTRAMUSCULAR
Status: DISCONTINUED | OUTPATIENT
Start: 2020-11-02 | End: 2020-11-03 | Stop reason: HOSPADM

## 2020-11-02 RX ORDER — ATORVASTATIN CALCIUM 80 MG/1
40 TABLET, FILM COATED ORAL NIGHTLY
Status: DISCONTINUED | OUTPATIENT
Start: 2020-11-02 | End: 2020-11-03 | Stop reason: HOSPADM

## 2020-11-02 RX ORDER — DICYCLOMINE HYDROCHLORIDE 10 MG/1
10 CAPSULE ORAL EVERY 6 HOURS PRN
Status: DISCONTINUED | OUTPATIENT
Start: 2020-11-02 | End: 2020-11-03 | Stop reason: HOSPADM

## 2020-11-02 RX ORDER — TRAZODONE HYDROCHLORIDE 50 MG/1
50 TABLET ORAL NIGHTLY
Status: ON HOLD | COMMUNITY
End: 2022-01-03 | Stop reason: HOSPADM

## 2020-11-02 RX ORDER — LORAZEPAM 0.5 MG/1
2 TABLET ORAL
Status: DISCONTINUED | OUTPATIENT
Start: 2020-11-02 | End: 2020-11-03 | Stop reason: HOSPADM

## 2020-11-02 RX ORDER — ALBUTEROL SULFATE 2.5 MG/3ML
2.5 SOLUTION RESPIRATORY (INHALATION) EVERY 4 HOURS PRN
Status: DISCONTINUED | OUTPATIENT
Start: 2020-11-02 | End: 2020-11-03 | Stop reason: HOSPADM

## 2020-11-02 RX ORDER — POLYETHYLENE GLYCOL 3350 17 G/17G
17 POWDER, FOR SOLUTION ORAL DAILY PRN
Status: DISCONTINUED | OUTPATIENT
Start: 2020-11-02 | End: 2020-11-03 | Stop reason: HOSPADM

## 2020-11-02 RX ORDER — QUETIAPINE FUMARATE 200 MG/1
200 TABLET, FILM COATED ORAL NIGHTLY
Status: DISCONTINUED | OUTPATIENT
Start: 2020-11-02 | End: 2020-11-02

## 2020-11-02 RX ORDER — PROMETHAZINE HYDROCHLORIDE 12.5 MG/1
12.5 TABLET ORAL EVERY 6 HOURS PRN
Status: DISCONTINUED | OUTPATIENT
Start: 2020-11-02 | End: 2020-11-03 | Stop reason: HOSPADM

## 2020-11-02 RX ORDER — ACETAMINOPHEN 325 MG/1
650 TABLET ORAL EVERY 6 HOURS PRN
Status: DISCONTINUED | OUTPATIENT
Start: 2020-11-02 | End: 2020-11-03 | Stop reason: HOSPADM

## 2020-11-02 RX ORDER — LORAZEPAM 2 MG/ML
1 INJECTION INTRAMUSCULAR ONCE
Status: COMPLETED | OUTPATIENT
Start: 2020-11-02 | End: 2020-11-02

## 2020-11-02 RX ORDER — LORAZEPAM 0.5 MG/1
1 TABLET ORAL
Status: DISCONTINUED | OUTPATIENT
Start: 2020-11-02 | End: 2020-11-03 | Stop reason: HOSPADM

## 2020-11-02 RX ORDER — ONDANSETRON 2 MG/ML
4 INJECTION INTRAMUSCULAR; INTRAVENOUS EVERY 6 HOURS PRN
Status: DISCONTINUED | OUTPATIENT
Start: 2020-11-02 | End: 2020-11-03 | Stop reason: HOSPADM

## 2020-11-02 RX ADMIN — IOPAMIDOL 90 ML: 755 INJECTION, SOLUTION INTRAVENOUS at 14:03

## 2020-11-02 RX ADMIN — LORAZEPAM 1 MG: 2 INJECTION INTRAMUSCULAR at 17:37

## 2020-11-02 RX ADMIN — SODIUM CHLORIDE, PRESERVATIVE FREE 10 ML: 5 INJECTION INTRAVENOUS at 21:00

## 2020-11-02 RX ADMIN — GADOTERIDOL 11 ML: 279.3 INJECTION, SOLUTION INTRAVENOUS at 18:10

## 2020-11-02 RX ADMIN — LORAZEPAM 1 MG: 0.5 TABLET ORAL at 22:34

## 2020-11-02 ASSESSMENT — ENCOUNTER SYMPTOMS
NAUSEA: 0
BACK PAIN: 0
VOMITING: 0
ABDOMINAL PAIN: 0
SHORTNESS OF BREATH: 0

## 2020-11-02 ASSESSMENT — PAIN DESCRIPTION - ORIENTATION: ORIENTATION: RIGHT

## 2020-11-02 ASSESSMENT — PAIN SCALES - GENERAL: PAINLEVEL_OUTOF10: 4

## 2020-11-02 ASSESSMENT — PAIN DESCRIPTION - LOCATION: LOCATION: HEAD

## 2020-11-02 NOTE — ED PROVIDER NOTES
[facility]  Emergency Department Encounter  Emergency Medicine Resident     Pt Name: Emiliano Harris  MRN: [id number]  [place] 1975  Date of evaluation: 11/2/20  PCP:  Alec Rodriguez MD    [address] [place] [address]       Chief Complaint   Patient presents with    Seizures     Possible seizure for about x3 min    Extremity Weakness     pt has Rt sided weakness. HISTORY OFPRESENT ILLNESS  (Location/Symptom, Timing/Onset, Context/Setting, Quality, Duration, Modifying Factors,Severity.)      Emiliano Harris is a 50year old female who presents with seizure-like activity. The patient reports that she had a witnessed seizure for approximately 3 minutes. The patient has a history of seizures and takes Trileptal.  She reports after the seizure-like activity she attempted to ambulate and noticed that her right lower extremity was dragging on the floor. 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 The patient reports compliance with her Trileptal but she is inconsistent with her history. She has a history of daily alcohol abuse. Her last drink was today and she reports drinking 2 beers. Does have a history of alcohol withdrawal seizures.     PAST MEDICAL / SURGICAL / SOCIAL / FAMILY HISTORY      has a past medical history of Alcoholic hepatitis without ascites, Alcoholic ketoacidosis, Alcoholism ([address] Anxiety, Asthma, Bipolar affective disorder ([address] Depression, Drug overdose, accidental or unintentional, initial encounter, Drug overdose, intentional self-harm, initial encounter ([address] Lisa coma scale total score 3-8 ([address] Hepatitis C antibody positive in blood, History of gastric surgery (gastric sleeve 2012), Intentional drug overdose ([address] MDD (major depressive disorder), recurrent episode ([address] Pneumonia, Polysubstance abuse ([address] Post traumatic stress disorder, Seizure ([address] Smoker unmotivated to quit, Suicide attempt [facility]), Suicide History Encounter **            Family History   Problem Relation Age of Onset    Brain Cancer Mother     Cancer Mother         \"female cancer\"    Heart Disease Father         Allergies:  Morphine; Morphine and related; Azithromycin; Morphine; Nsaids; and Zithromax [azithromycin]    Home Medications:  Prior to Admission medications    Medication Sig Start Date End Date Taking? Authorizing Provider   meclizine (ANTIVERT) 25 MG tablet Take 25 mg by mouth 3 times daily as needed    Historical Provider, MD   acetaminophen (TYLENOL) 500 MG tablet Take 2 tablets by mouth every 8 hours as needed for Pain 7/21/20   Mariya Ragsdale,    hydrOXYzine (ATARAX) 50 MG tablet Take 1 tablet by mouth 2 times daily as needed for Anxiety 7/10/20   Josselin Tejada MD   albuterol sulfate  (90 Base) MCG/ACT inhaler Inhale 2 puffs into the lungs every 4 hours as needed for Wheezing 7/10/20   Josselin Tejada MD   OXcarbazepine (TRILEPTAL) 150 MG tablet Take 1 tablet by mouth 2 times daily 7/10/20   Josselin Tejada MD   gabapentin (NEURONTIN) 300 MG capsule Take 1 capsule by mouth 3 times daily for 10 days.  7/10/20 7/20/20  Josselin Tejada MD   venlafaxine (EFFEXOR XR) 75 MG extended release capsule Take 1 capsule by mouth daily (with breakfast) 7/10/20   Josselin Tejada MD   ondansetron (ZOFRAN ODT) 4 MG disintegrating tablet Take 1 tablet by mouth every 8 hours as needed for Nausea  Patient not taking: Reported on 9/1/2020 7/10/20   Josselin Tejada MD   atorvastatin (LIPITOR) 40 MG tablet Take 1 tablet by mouth nightly 7/10/20   Josselin Tejada MD   lithium (ESKALITH) 450 MG extended release tablet Take 1 tablet by mouth 2 times daily 7/10/20   Josselin Tejada MD   QUEtiapine (SEROQUEL) 200 MG tablet Take 1 tablet by mouth nightly  Patient not taking: Reported on 9/1/2020 7/10/20   Josselin Tejada MD   folic acid (FOLVITE) 1 MG tablet Take 1 tablet by mouth daily 7/11/20   Josselin Tejada MD   fluticasone (FLONASE) 50 MCG/ACT nasal spray 1 spray by Each Nostril route daily 7/10/20   Augusto Owens MD   sodium chloride (OCEAN) 0.65 % nasal spray 1 spray by Nasal route as needed for Congestion  Patient not taking: Reported on 9/1/2020 7/10/20   Augusto Owens MD   Multiple Vitamins-Minerals (MULTIVITAMIN ADULT) TABS Take 1 tablet by mouth daily 7/10/20   Augusto Owens MD   dicyclomine (BENTYL) 10 MG capsule Take 1 capsule by mouth every 6 hours as needed (cramps) 7/10/20   Augusto Owens MD   omeprazole (PRILOSEC) 20 MG delayed release capsule Take 1 capsule by mouth 2 times daily 7/10/20   Augusto Owens MD   thiamine 100 MG tablet Take 1 tablet by mouth daily  Patient not taking: Reported on 9/1/2020 7/10/20   Augusto Owens MD   gabapentin (NEURONTIN) 300 MG capsule Take 1 capsule by mouth 3 times daily for 10 days. 12/20/19 12/30/19  Leslie Ashley, DO       REVIEW OFSYSTEMS    (2-9 systems for level 4, 10 or more for level 5)      Review of Systems   Constitutional: Negative for chills and fever. Eyes: Negative for visual disturbance. Respiratory: Negative for shortness of breath. Cardiovascular: Negative for chest pain. Gastrointestinal: Negative for abdominal pain, nausea and vomiting. Musculoskeletal: Negative for back pain and neck pain. Neurological: Positive for weakness. PHYSICAL EXAM   (up to 7 for level 4, 8 or more forlevel 5)      INITIAL VITALS:   ED Triage Vitals [11/02/20 1351]   BP Temp Temp Source Pulse Resp SpO2 Height Weight   (!) 125/92 97.8 °F (36.6 °C) Oral 73 18 98 % 5' (1.524 m) 130 lb (59 kg)       Physical Exam  Constitutional:       General: She is not in acute distress. Appearance: She is well-developed. She is not diaphoretic. HENT:      Head: Normocephalic and atraumatic. Eyes:      Conjunctiva/sclera: Conjunctivae normal.      Pupils: Pupils are equal, round, and reactive to light. Neck:      Musculoskeletal: Neck supple.    Cardiovascular:      Rate and Rhythm: Normal rate and regular rhythm. Heart sounds: No murmur. No friction rub. No gallop. Pulmonary:      Effort: Pulmonary effort is normal. No respiratory distress. Breath sounds: Normal breath sounds. No wheezing or rales. Abdominal:      General: There is no distension. Palpations: Abdomen is soft. Tenderness: There is no abdominal tenderness. There is no guarding. Musculoskeletal:      Comments: No lower extremity edema    Skin:     General: Skin is warm. Neurological:      Mental Status: She is alert and oriented to person, place, and time.       Comments: 3/5 strength right upper extremity, unable to lift right lower extremity against gravity, 5 out of 5 strength left upper and left lower extremity, cranial nerves III through VII intact, right-sided pronator drift         DIFFERENTIAL  DIAGNOSIS     PLAN (LABS / IMAGING / EKG):  Orders Placed This Encounter   Procedures    CT HEAD WO CONTRAST    CTA HEAD NECK W CONTRAST    MRI brain with and without contrast    XR CHEST PORTABLE    MRI CERVICAL SPINE WO CONTRAST    STROKE PANEL    Hemoglobin and hematocrit, blood    SODIUM (POC)    POTASSIUM (POC)    CHLORIDE (POC)    CALCIUM, IONIC (POC)    TOX SCR, COMPLETE BL    DRUG SCREEN MULTI URINE    Comprehensive Metabolic Panel w/ Reflex to MG    Basic Metabolic Panel w/ Reflex to MG    Oxcarbazepine level    CBC auto differential    Urinalysis    Pregnancy, urine    Telemetry Monitoring    Neuro checks    Inpatient consult to Stroke Team    Inpatient consult to Internal Medicine    Inpatient consult to Social Work    Venous Blood Gas, POC    Creatinine W/GFR Point of Care    Lactic Acid, POC    POCT Glucose    Anion Gap (Calc) POC    EKG 12 Lead    EKG 12 lead    PATIENT STATUS (FROM ED OR OR/PROCEDURAL) Inpatient    Alcohol and or drug assessment       MEDICATIONS ORDERED:  Orders Placed This Encounter   Medications    iopamidol (ISOVUE-370) 76 % injection 90 mL    albuterol (PROVENTIL) nebulizer solution 2.5 mg    atorvastatin (LIPITOR) tablet 40 mg    dicyclomine (BENTYL) capsule 10 mg    fluticasone (FLONASE) 50 MCG/ACT nasal spray 1 spray    folic acid (FOLVITE) tablet 1 mg    hydrOXYzine (ATARAX) tablet 50 mg    lithium (ESKALITH) extended release tablet 450 mg    therapeutic multivitamin-minerals 1 tablet    OXcarbazepine (TRILEPTAL) tablet 150 mg    QUEtiapine (SEROQUEL) tablet 200 mg    vitamin B-1 (THIAMINE) tablet 100 mg    venlafaxine (EFFEXOR XR) extended release capsule 75 mg    OR Linked Order Group     acetaminophen (TYLENOL) tablet 650 mg     acetaminophen (TYLENOL) suppository 650 mg    polyethylene glycol (GLYCOLAX) packet 17 g    OR Linked Order Group     promethazine (PHENERGAN) tablet 12.5 mg     ondansetron (ZOFRAN) injection 4 mg    enoxaparin (LOVENOX) injection 40 mg    OR Linked Order Group     LORazepam (ATIVAN) tablet 1 mg     LORazepam (ATIVAN) injection 1 mg     LORazepam (ATIVAN) tablet 2 mg     LORazepam (ATIVAN) injection 2 mg     LORazepam (ATIVAN) tablet 3 mg     LORazepam (ATIVAN) injection 3 mg     LORazepam (ATIVAN) tablet 4 mg     LORazepam (ATIVAN) injection 4 mg    LORazepam (ATIVAN) injection 1 mg           Initial MDM/Plan: 50 y.o. female who presents with right-sided weakness after seizure-like activity. The patient had stable vital signs on arrival.  Noted to have right upper and lower extremity weakness. Positive pronator drift on the right. Stroke alert was called. CT head and CTA ordered. Differential diagnoses include Delroy's paralysis, CVA, alcohol withdrawal seizure, medication noncompliance.     DIAGNOSTIC RESULTS / EMERGENCYDEPARTMENT COURSE / MDM     LABS:  Labs Reviewed   STROKE PANEL - Abnormal; Notable for the following components:       Result Value    BUN 4 (*)     RBC 5.33 (*)     MCV 78.8 (*)     MCH 23.8 (*)     RDW 18.3 (*)     Lymphocytes 46 (*)     All other components within normal limits CALCIUM, IONIC (POC) - Abnormal; Notable for the following components:    POC Ionized Calcium 1.07 (*)     All other components within normal limits   TOX SCR, COMPLETE BL - Abnormal; Notable for the following components:    Acetaminophen Level <5 (*)     Ethanol 126 (*)     Ethanol percent 6.464 (*)     Salicylate Lvl <1 (*)     All other components within normal limits   VENOUS BLOOD GAS, POINT OF CARE - Abnormal; Notable for the following components:    pO2, Jeremi 18.4 (*)     O2 Sat, Jeremi 26 (*)     All other components within normal limits   LACTIC ACID,POINT OF CARE - Abnormal; Notable for the following components:    POC Lactic Acid 2.19 (*)     All other components within normal limits   HGB/HCT   SODIUM (POC)   POTASSIUM (POC)   CHLORIDE (POC)   URINE DRUG SCREEN   OXCARBAZEPINE LEVEL   URINALYSIS   PREGNANCY, URINE   CREATININE W/GFR POINT OF CARE   POCT GLUCOSE   ANION GAP (CALC) POC         RADIOLOGY:  Ct Head Wo Contrast    Result Date: 11/2/2020  EXAMINATION: CT OF THE HEAD WITHOUT CONTRAST  11/2/2020 2:05 pm TECHNIQUE: CT of the head was performed without the administration of intravenous contrast. Dose modulation, iterative reconstruction, and/or weight based adjustment of the mA/kV was utilized to reduce the radiation dose to as low as reasonably achievable. COMPARISON: 08/27/2020 HISTORY: ORDERING SYSTEM PROVIDED HISTORY: seizure, R sided deficits TECHNOLOGIST PROVIDED HISTORY: seizure, R sided deficits Is the patient pregnant?->No Reason for Exam: seizure, R sided deficits FINDINGS: BRAIN/VENTRICLES: There is no acute intracranial hemorrhage, mass effect or midline shift. No abnormal extra-axial fluid collection. The gray-white differentiation is maintained without acute infarct. There is no hydrocephalus. ORBITS: The visualized portion of the orbits demonstrate no acute abnormality. SINUSES: The visualized paranasal sinuses and mastoid air cells demonstrate no acute abnormality.  SOFT TISSUES/SKULL:  No acute abnormality of the visualized skull or soft tissues. No acute intracranial abnormality. The findings were sent to the Radiology Results [address] at 2:12 pm on 11/2/2020to be communicated to stroke neurologist, Dr. Claribel Mcfarlane. Cta Head Neck W Contrast    Result Date: 11/2/2020  EXAMINATION: CTA OF THE HEAD AND NECK WITH CONTRAST 11/2/2020 1:59 pm: TECHNIQUE: CTA of the head and neck was performed with the administration of intravenous contrast. Multiplanar reformatted images are provided for review. MIP images are provided for review. Stenosis of the internal carotid arteries measured using NASCET criteria. Dose modulation, iterative reconstruction, and/or weight based adjustment of the mA/kV was utilized to reduce the radiation dose to as low as reasonably achievable. 3D surface reformatted and curved MIP images were submitted for review. COMPARISON: None. HISTORY: ORDERING SYSTEM PROVIDED HISTORY: seizure, right sided deficitis TECHNOLOGIST PROVIDED HISTORY: seizure, right sided deficitis Reason for Exam: right side defitcits, stroke alert FINDINGS: CTA NECK: AORTIC ARCH/ARCH VESSELS: No dissection or arterial injury. No significant stenosis of the brachiocephalic or subclavian arteries. CAROTID ARTERIES: No dissection, arterial injury, or hemodynamically significant stenosis by NASCET criteria. VERTEBRAL ARTERIES: No dissection, arterial injury, or significant stenosis. SOFT TISSUES: The lung apices are clear. No cervical or superior mediastinal lymphadenopathy. The larynx and pharynx are unremarkable. No acute abnormality of the salivary and thyroid glands. BONES: No acute osseous abnormality. CTA HEAD: ANTERIOR CIRCULATION: No significant stenosis of the intracranial internal carotid, anterior cerebral, or middle cerebral arteries. No aneurysm. Anterior communicating artery is present.  POSTERIOR CIRCULATION: No significant stenosis of the vertebral, basilar, or posterior cerebral arteries. No aneurysm. Left posterior communicating artery is present. OTHER: No dural venous sinus thrombosis on this non-dedicated study. Unremarkable CTA of the head and neck. EKG      All EKG's are interpreted by the Emergency Department Physicianwho either signs or Co-signs this chart in the absence of a cardiologist.    EMERGENCY DEPARTMENT COURSE:      CT imaging negative for any acute pathology. Neurology evaluated the patient recommending admission for MRI. Neurology to admit the patient. PROCEDURES:  None    CONSULTS:  IP CONSULT TO STROKE TEAM  IP CONSULT TO INTERNAL MEDICINE  IP CONSULT TO SOCIAL WORK    CRITICAL CARE:  Please see attending note    FINAL IMPRESSION      1. Seizure ([facility] [address]          DISPOSITION / PLAN     DISPOSITION Admitted 11/02/2020 03:52:24 PM      PATIENT REFERRED TO:  No follow-up provider specified.     DISCHARGE MEDICATIONS:  New Prescriptions    No medications on file       Pete Moore MD  Emergency Medicine Resident    (Please note that portions of this note were completed with a voice recognition program.Efforts were made to edit the dictations but occasionally words are mis-transcribed.)        Pete Moore MD  Resident  11/02/20 [id number]

## 2020-11-02 NOTE — ED PROVIDER NOTES
[facility] [facility]     Emergency Department     Faculty Attestation    I performed a history and physical examination of the patient and discussed management with the resident. I reviewed the residents note and agree with the documented findings and plan of care. Any areas of disagreement are noted on the chart. I was personally present for the key portions of any procedures. I have documented in the chart those procedures where I was not present during the key portions. I have reviewed the emergency nurses triage note. I agree with the chief complaint, past medical history, past surgical history, allergies, medications, social and family history as documented unless otherwise noted below. For Physician Assistant/ Nurse Practitioner cases/documentation I have personally evaluated this patient and have completed at least one if not all key elements of the E/M (history, physical exam, and MDM). Additional findings are as noted. I have personally seen and evaluated the patient. I find the patient's history and physical exam are consistent with the NP/PA documentation. I agree with the care provided, treatment rendered, disposition and follow-up plan. 64-year-old female with a history of seizures, alcohol abuse with withdrawal in the past presenting with seizure and right-sided weakness. Patient reports a history of stroke, with weakness on the right side. Weakness is not continuous, worsened after her last seizure as well. She states that after her seizure this morning, she went to walk to the bathroom and noted that her right leg was dragging behind. She denies any missed doses of her Trileptal this week, but states that it was decreased to once daily. She does drink daily, did drink beer this morning to get rid of morning shakes.     Exam:  General: Laying on the bed, awake, alert and in no acute distress  CV: normal rate and regular rhythm  Lungs: Breathing comfortably on room air with no tachypnea, hypoxia, or increased work of breathing  Neuro: Right-sided pronator drift, weakness in biceps and triceps compared to left side. Able to lift right leg off the bed, but falls back to bed prior to 5-second time. Left leg able to hold without difficulty. No facial droop. No speech deficit.     Plan:  Stroke alert called due to right-sided weakness, history of stroke  Suspect Delroy's paralysis given weakness immediately following seizure  CT/CTA  Admit to neurology for further management, MRI        Soy Alfred MD   Attending Emergency  Physician    (Please note that portions of this note were completed with a voice recognition program. Efforts were made to edit the dictations but occasionally words are mis-transcribed.)             Soy Alfred MD  11/02/20 2066

## 2020-11-02 NOTE — ED NOTES
Bed: 24  Expected date:   Expected time:   Means of arrival:   Comments:  LS1     Evelyn Jaramillo, STEFANI  11/02/20 [id number]

## 2020-11-02 NOTE — H&P
Department of Neurology                                         Resident H&P      History Obtained From:  patient, electronic medical record, staff    CHIEF COMPLAINT:       Right-sided weakness after a witnessed seizure    HISTORY OF PRESENT ILLNESS:       The patient is a 50 y.o. female who presents with right-sided weakness after a witnessed seizure. Last known well 1 PM.  Patient said today while she was on the phone her girlfriend witnessed her to have a seizure when she had her eyes rolling backwards, followed by generalized shaking. Afterwards patient noticed her right side to be weak. Patient said about 1 and half year ago she started to have seizures and has had about 5 to 6 years of her last seizure was 3 months ago prior to the one she had today. Patient usually gets aura when she feels lightheaded followed by eyes rolling, occasionally she has loss of bowel bladder control as well followed by generalized shaking and she is postictally confused as well. Patient said in the past she had right-sided weakness after the seizures x2. Patient is on Trileptal 150 mg bid for her mood disorder, Neurontin 300 mg 3 times daily for fibromyalgia. Patient says about a week ago she skipped a dose of Trileptal.  Patient has past medical history of alcoholism. Polysubstance abuse, bipolar disorder. Of note patient had multiple admissions in the past for DTs due to withdrawal.  She was evaluated by neurology in October 2019 when she presented with a seizure-like activity had EEG done at that time which was unremarkable. Repeat EEG in December 2019 was normal.    Patient was admitted in July 2020 with a breakthrough seizure due to alcohol intoxication at that time. Patient was admitted 8/27/2020 for breakthrough seizure, headache right-sided weakness numbness and alcohol intoxication. She says she drinks about 6 to 9 cans of 25 ounce beers daily.   Her blood Beth Diaz MD at [facility] History:   Social History     Socioeconomic History    Marital status: Single     Spouse name: Not on file    Number of children: 0    Years of education: 15    Highest education level: Not on file   Occupational History    Not on file   Social Needs    Financial resource strain: Not on file    Food insecurity     Worry: Not on file     Inability: Not on file   [employer] needs     Medical: Not on file     Non-medical: Not on file   Tobacco Use    Smoking status: Current Every Day Smoker     Packs/day: 1.00     Years: 30.00     Pack years: 30.00     Types: Cigarettes    Smokeless tobacco: Never Used   Substance and Sexual Activity    Alcohol use:  Yes     Alcohol/week: 42.0 standard drinks     Types: 42 Cans of beer per week     Comment: detoxing of alcohol x5 days    Drug use: Yes     Frequency: 7.0 times per week     Types: Marijuana, IV, Cocaine     Comment: drug abuse includes, cocaine, IV heroin, cannabis    Sexual activity: Not Currently   Lifestyle    Physical activity     Days per week: Not on file     Minutes per session: Not on file    Stress: Not on file   Relationships    Social connections     Talks on phone: Not on file     Gets together: Not on file     Attends [employer] service: Not on file     Active member of club or organization: Not on file     Attends meetings of clubs or organizations: Not on file     Relationship status: Not on file    Intimate partner violence     Fear of current or ex partner: Not on file     Emotionally abused: Not on file     Physically abused: Not on file     Forced sexual activity: Not on file   Other Topics Concern    Not on file   Social History Narrative    ** Merged History Encounter **         ** Merged History Encounter **            Family History:       Problem Relation Age of Onset    Brain Cancer Mother     Cancer Mother         \"female cancer\"    Heart Disease Father        Allergies: Morphine; Morphine and related; Azithromycin; Morphine; Nsaids; and Zithromax [azithromycin]    Home Medications:  Prior to Admission medications    Medication Sig Start Date End Date Taking? Authorizing Provider   meclizine (ANTIVERT) 25 MG tablet Take 25 mg by mouth 3 times daily as needed    Historical Provider, MD   acetaminophen (TYLENOL) 500 MG tablet Take 2 tablets by mouth every 8 hours as needed for Pain 7/21/20   Mariya Ragsdale DO   hydrOXYzine (ATARAX) 50 MG tablet Take 1 tablet by mouth 2 times daily as needed for Anxiety 7/10/20   Carolyn Pierson MD   albuterol sulfate  (90 Base) MCG/ACT inhaler Inhale 2 puffs into the lungs every 4 hours as needed for Wheezing 7/10/20   Carolyn Pierson MD   OXcarbazepine (TRILEPTAL) 150 MG tablet Take 1 tablet by mouth 2 times daily 7/10/20   Carolyn Pierson MD   gabapentin (NEURONTIN) 300 MG capsule Take 1 capsule by mouth 3 times daily for 10 days.  7/10/20 7/20/20  Carolyn Pierson MD   venlafaxine (EFFEXOR XR) 75 MG extended release capsule Take 1 capsule by mouth daily (with breakfast) 7/10/20   Carolyn Pierson MD   ondansetron (ZOFRAN ODT) 4 MG disintegrating tablet Take 1 tablet by mouth every 8 hours as needed for Nausea  Patient not taking: Reported on 9/1/2020 7/10/20   Carolyn Pierson MD   atorvastatin (LIPITOR) 40 MG tablet Take 1 tablet by mouth nightly 7/10/20   Carolyn Pierson MD   lithium (ESKALITH) 450 MG extended release tablet Take 1 tablet by mouth 2 times daily 7/10/20   Carolyn Pierson MD   QUEtiapine (SEROQUEL) 200 MG tablet Take 1 tablet by mouth nightly  Patient not taking: Reported on 9/1/2020 7/10/20   Carolyn Pierson MD   folic acid (FOLVITE) 1 MG tablet Take 1 tablet by mouth daily 7/11/20   Carolyn Pierson MD   fluticasone (FLONASE) 50 MCG/ACT nasal spray 1 spray by Each Nostril route daily 7/10/20   Carolyn Pierson MD   sodium chloride (OCEAN) 0.65 % nasal spray 1 spray by Nasal route as needed for Congestion  Patient not taking: Reported on 9/1/2020 7/10/20   Torin Dunn MD   Multiple Vitamins-Minerals (MULTIVITAMIN ADULT) TABS Take 1 tablet by mouth daily 7/10/20   Torin Dunn MD   dicyclomine (BENTYL) 10 MG capsule Take 1 capsule by mouth every 6 hours as needed (cramps) 7/10/20   Torin Dunn MD   omeprazole (PRILOSEC) 20 MG delayed release capsule Take 1 capsule by mouth 2 times daily 7/10/20   Torin Dunn MD   thiamine 100 MG tablet Take 1 tablet by mouth daily  Patient not taking: Reported on 9/1/2020 7/10/20   Torin Dunn MD   gabapentin (NEURONTIN) 300 MG capsule Take 1 capsule by mouth 3 times daily for 10 days. 12/20/19 12/30/19  [address] [place] [address]       Current Medications:   No current facility-administered medications for this encounter. REVIEW OF SYSTEMS:       CONSTITUTIONAL: negative for fatigue and malaise   EYES: negative for double vision and photophobia    HEENT: negative for tinnitus and sore throat   RESPIRATORY: negative for cough, shortness of breath   CARDIOVASCULAR: negative for chest pain, palpitations   GASTROINTESTINAL: negative for nausea, vomiting   GENITOURINARY: negative for incontinence   MUSCULOSKELETAL: negative for neck or back pain   NEUROLOGICAL: negative for seizures   PSYCHIATRIC: negative for fatigue     Review of systems otherwise negative. PHYSICAL EXAM:       BP 97/81   Pulse 70   Temp 97.8 °F (36.6 °C) (Oral)   Resp 18   Ht 5' (1.524 m)   Wt 130 lb (59 kg)   SpO2 99%   BMI 25.39 kg/m²     CONSTITUTIONAL:  Well developed, well nourished, alert and oriented x 3, in no acute distress. GCS 15, nontoxic. No dysarthria, no aphasia. EOMI.     HEAD:  normocephalic, atraumatic    EYES:  PERRLA, EOMI.   ENT:  moist mucous membranes   NECK:  supple, symmetric, no midline tenderness to palpation    BACK:  without midline tenderness, step-offs or deformities    LUNGS:  Equal air entry bilaterally   CARDIOVASCULAR:  normal s1 / s2   ABDOMEN:  Soft, no rigidity   NEUROLOGIC:    Mental status   Alert and oriented; intact memory with no confusion, speech or language problems; no hallucinations or delusions     Cranial nerves   II - visual fields intact to confrontation                                                III, IV, VI - extra-ocular muscles full: no pupillary defect; no RALPH, no nystagmus, no ptosis                                                                      V - normal facial sensation                                                               VII - normal facial symmetry                                                             VIII - intact hearing                                                                             IX, X - symmetrical palate                                                                  XI - symmetrical shoulder shrug                                                       XII - midline tongue without atrophy or fasciculation     Motor function  Normal muscle bulk and tone; normal power 5/5  RUE 4/5  RLE 4/5     Sensory function Intact to touch,  Proprioception  Decreased sensation RLE     Cerebellar No involuntary movements or tremors     Reflex function Intact 2+ DTR and symmetric with no pathologic reflex or Babinski sign     Gait                  Not tested             INITIAL NIH STROKE SCALE:    Time Performed:  2:00 PM     1a. Level of consciousness:  0 - alert; keenly responsive  1b. Level of consciousness questions:  0 - answers both questions correctly  1c. Level of consciousness questions:  0 - performs both tasks correctly  2. Best Gaze:  0 - normal  3. Visual:  0 - no visual loss  4. Facial Palsy:  0 - normal symmetric movement  5a. Motor left arm:  0 - no drift, limb holds 90 (or 45) degrees for full 10 seconds  5b. Motor right arm:  1 - drift, limb holds 90 (or 45) degrees but drifts down before full 10 seconds: does not hit bed  6a. Motor left le - no drift; leg holds 30 degree position for full 5 seconds  6b.   Motor right le - drift; leg falls by the end of the 5 second period but does not hit bed  7. Limb Ataxia:  0 - absent  8. Sensory: 1  9. Best Language:  0 - no aphasia, normal  10. Dysarthria:  0 - normal  11.   Extinction and Inattention:  0 - no abnormality    TOTAL:  3     SKIN:  no rash      LABS AND IMAGING:     CBC with Differential:    Lab Results   Component Value Date    WBC 7.0 11/02/2020    RBC 5.33 11/02/2020    HGB 12.7 11/02/2020    HCT 42.0 11/02/2020     11/02/2020    MCV 78.8 11/02/2020    MCH 23.8 11/02/2020    MCHC 30.2 11/02/2020    RDW 18.3 11/02/2020    NRBC 0 11/22/2015    SEGSPCT 43.3 11/22/2015    LYMPHOPCT 46 11/02/2020    MONOPCT 11 11/02/2020    BASOPCT 1 11/02/2020    MONOSABS 0.79 11/02/2020    MONOSABS 0.6 11/22/2015    LYMPHSABS 3.15 11/02/2020    LYMPHSABS 3.3 11/22/2015    EOSABS 0.22 11/02/2020    EOSABS 0.2 11/22/2015    BASOSABS 0.07 11/02/2020    DIFFTYPE NOT REPORTED 11/02/2020     BMP:    Lab Results   Component Value Date     11/02/2020    K 4.2 11/02/2020     11/02/2020    CO2 21 11/02/2020    BUN 4 11/02/2020    LABALBU 3.9 08/28/2020    CREATININE 0.59 11/02/2020    CREATININE 0.62 11/02/2020    CALCIUM 9.0 11/02/2020    GFRAA >60 11/02/2020    LABGLOM >60 11/02/2020    LABGLOM >90 11/22/2015    GLUCOSE 87 11/02/2020       Radiology Review:  As above      ASSESSMENT AND PLAN:       Patient Active Problem List   Diagnosis    Alcoholism /alcohol abuse ([address]    Heroin dependence ([address]    Chronic post-traumatic stress disorder (PTSD)    Major depression, recurrent ([address]    Depressed bipolar I disorder ([address]    Polysubstance abuse ([address]    Bipolar I disorder, current or most recent episode depressed, with psychotic features with mood-congruent psychotic features ([address]    Alcoholic hepatitis without ascites    Alcohol withdrawal syndrome with complication ([address]    Bipolar 1 disorder ([address]    Mild intermittent asthma without complication    Alcohol related

## 2020-11-02 NOTE — CONSULTS
Department of Endovascular Neurosurgery                                         Resident Consult Note    Reason for Consult: Right-sided weakness  Requesting Physician:  Dr. Phoenix Ching:   []Dr. Britni Alas  []Dr. Jillian Cavanaugh  []Dr. Lali Fitzgerald  []Dr. Renato Mitchell  [x]Dr. Claribel Mcfarlane    History Obtained From:  patient, electronic medical record, staff    CHIEF COMPLAINT:       Right-sided weakness after a witnessed seizure    HISTORY OF PRESENT ILLNESS:       The patient is a 50 y.o. female who presents with right-sided weakness after a witnessed seizure. Last known well 1 PM.  Patient said today while she was on the phone her girlfriend witnessed her to have a seizure when she had her eyes rolling backwards, followed by generalized shaking. Afterwards patient noticed her right side to be weak. Patient said about 1 and half year ago she started to have seizures and has had about 5 to 6 years of her last seizure was 3 months ago prior to the one she had today. Patient usually gets aura when she feels lightheaded followed by eyes rolling, occasionally she has loss of bowel bladder control as well followed by generalized shaking and she is postictally confused as well. Patient said in the past she had right-sided weakness after the seizures x2. Patient is on Trileptal 150 mg bid for her mood disorder, Neurontin 300 mg 3 times daily for fibromyalgia. Patient says about a week ago she skipped a dose of Trileptal.  Patient has past medical history of alcoholism. Polysubstance abuse, bipolar disorder. Of note patient had multiple admissions in the past for DTs due to withdrawal.  She was evaluated by neurology in October 2019 when she presented with a seizure-like activity had EEG done at that time which was unremarkable. Repeat EEG in December 2019 was normal.    Patient was admitted in July 2020 with a breakthrough seizure due to alcohol intoxication at that time.     Patient was admitted 8/27/2020 for breakthrough seizure, headache right-sided weakness numbness and alcohol intoxication. She says she drinks about 6 to 9 cans of 25 ounce beers daily. Her blood alcohol level at that time was 288. CT head was negative. MRI brain unremarkable. EEG consistent with diffuse slowing no epileptiform discharges. Her home dose of Trileptal 150 mg twice daily was continued and was recommended to follow-up with outpatient alcohol rehab. Plan was to gradually wean her off Trileptal as her seizures are most likely provoked secondary to alcohol abuse or alcohol withdrawal.    Patient was seen in the office 9/1/2020 and was recommended to continue Trileptal 150 mg twice daily. Continue alcohol rehab.  f/u psych for mood disorders.        PAST MEDICAL HISTORY :       Past Medical History:        Diagnosis Date    Alcoholic hepatitis without ascites 09/51/7589    Alcoholic ketoacidosis 81/87/2542    Alcoholism ([address]     3 pints daily    Anxiety     Asthma     Bipolar affective disorder ([address] 6/12/2019    Depression     Drug overdose, accidental or unintentional, initial encounter 9/29/2018    Drug overdose, intentional self-harm, initial encounter ([address] 5/8/2018    Boston coma scale total score 3-8 (HCC)     Hepatitis C antibody positive in blood 6/18/2019    History of gastric surgery (gastric sleeve 2012) 4/25/2017    Intentional drug overdose ([address] 5/7/2018    MDD (major depressive disorder), recurrent episode ([address] 5/8/2018    Pneumonia     Polysubstance abuse ([address]     drug abuse includes, cocaine, IV heroin, cannabis, and ETOH    Post traumatic stress disorder     Seizure ([address] 12/17/2019    Smoker unmotivated to quit 4/25/2017    Suicide attempt ([address] 11/08/2017    S/A by overdosing on Trazodone and Seroquel    Suicide ideation     Toxic metabolic encephalopathy 0/55/9406       Past Surgical History:        Procedure Laterality Date    CARPAL TUNNEL RELEASE      CHOLECYSTECTOMY      HYSTERECTOMY      HYSTERECTOMY, TOTAL ABDOMINAL      STOMACH SURGERY      gastric sleeve    UPPER GASTROINTESTINAL ENDOSCOPY N/A 6/19/2019    EGD BIOPSY performed by Petra Young MD at [place] [address] History:   Social History     Socioeconomic History    Marital status: Single     Spouse name: Not on file    Number of children: 0    Years of education: 15    Highest education level: Not on file   Occupational History    Not on file   Social Needs    Financial resource strain: Not on file    Food insecurity     Worry: Not on file     Inability: Not on file   [employer] needs     Medical: Not on file     Non-medical: Not on file   Tobacco Use    Smoking status: Current Every Day Smoker     Packs/day: 1.00     Years: 30.00     Pack years: 30.00     Types: Cigarettes    Smokeless tobacco: Never Used   Substance and Sexual Activity    Alcohol use:  Yes     Alcohol/week: 42.0 standard drinks     Types: 42 Cans of beer per week     Comment: detoxing of alcohol x5 days    Drug use: Yes     Frequency: 7.0 times per week     Types: Marijuana, IV, Cocaine     Comment: drug abuse includes, cocaine, IV heroin, cannabis    Sexual activity: Not Currently   Lifestyle    Physical activity     Days per week: Not on file     Minutes per session: Not on file    Stress: Not on file   Relationships    Social connections     Talks on phone: Not on file     Gets together: Not on file     Attends [employer] service: Not on file     Active member of club or organization: Not on file     Attends meetings of clubs or organizations: Not on file     Relationship status: Not on file    Intimate partner violence     Fear of current or ex partner: Not on file     Emotionally abused: Not on file     Physically abused: Not on file     Forced sexual activity: Not on file   Other Topics Concern    Not on file   Social History Narrative    ** Merged History Encounter **         ** Merged History Encounter **            Family History:       Problem Relation Age of Onset    Brain Cancer Mother     Cancer Mother         \"female cancer\"    Heart Disease Father        Allergies:  Morphine; Morphine and related; Azithromycin; Morphine; Nsaids; and Zithromax [azithromycin]    Home Medications:  Prior to Admission medications    Medication Sig Start Date End Date Taking? Authorizing Provider   meclizine (ANTIVERT) 25 MG tablet Take 25 mg by mouth 3 times daily as needed    Historical Provider, MD   acetaminophen (TYLENOL) 500 MG tablet Take 2 tablets by mouth every 8 hours as needed for Pain 7/21/20   Mariya Ragsdale DO   hydrOXYzine (ATARAX) 50 MG tablet Take 1 tablet by mouth 2 times daily as needed for Anxiety 7/10/20   Valentina Centeno MD   albuterol sulfate  (90 Base) MCG/ACT inhaler Inhale 2 puffs into the lungs every 4 hours as needed for Wheezing 7/10/20   Valentina Centeno MD   OXcarbazepine (TRILEPTAL) 150 MG tablet Take 1 tablet by mouth 2 times daily 7/10/20   Valentina Centeno MD   gabapentin (NEURONTIN) 300 MG capsule Take 1 capsule by mouth 3 times daily for 10 days.  7/10/20 7/20/20  Valentina Centeno MD   venlafaxine (EFFEXOR XR) 75 MG extended release capsule Take 1 capsule by mouth daily (with breakfast) 7/10/20   Valentina Centeno MD   ondansetron (ZOFRAN ODT) 4 MG disintegrating tablet Take 1 tablet by mouth every 8 hours as needed for Nausea  Patient not taking: Reported on 9/1/2020 7/10/20   Valentina Centeno MD   atorvastatin (LIPITOR) 40 MG tablet Take 1 tablet by mouth nightly 7/10/20   Valentina Centeno MD   lithium (ESKALITH) 450 MG extended release tablet Take 1 tablet by mouth 2 times daily 7/10/20   Valentina Centeno MD   QUEtiapine (SEROQUEL) 200 MG tablet Take 1 tablet by mouth nightly  Patient not taking: Reported on 9/1/2020 7/10/20   Valentina Centeno MD   folic acid (FOLVITE) 1 MG tablet Take 1 tablet by mouth daily 7/11/20   Valentina Centeno MD   fluticasone (FLONASE) 50 MCG/ACT nasal spray 1 spray by Each Nostril route daily 7/10/20   Adan Slater MD   sodium chloride (OCEAN) 0.65 % nasal spray 1 spray by Nasal route as needed for Congestion  Patient not taking: Reported on 9/1/2020 7/10/20   Adan Slater MD   Multiple Vitamins-Minerals (MULTIVITAMIN ADULT) TABS Take 1 tablet by mouth daily 7/10/20   Adan Slater MD   dicyclomine (BENTYL) 10 MG capsule Take 1 capsule by mouth every 6 hours as needed (cramps) 7/10/20   Adan Slater MD   omeprazole (PRILOSEC) 20 MG delayed release capsule Take 1 capsule by mouth 2 times daily 7/10/20   Adan Slater MD   thiamine 100 MG tablet Take 1 tablet by mouth daily  Patient not taking: Reported on 9/1/2020 7/10/20   Adan Slater MD   gabapentin (NEURONTIN) 300 MG capsule Take 1 capsule by mouth 3 times daily for 10 days. 12/20/19 12/30/19  [address] [place] [address],        Current Medications:   No current facility-administered medications for this encounter. REVIEW OF SYSTEMS:       CONSTITUTIONAL: negative for fatigue and malaise   EYES: negative for double vision and photophobia    HEENT: negative for tinnitus and sore throat   RESPIRATORY: negative for cough, shortness of breath   CARDIOVASCULAR: negative for chest pain, palpitations   GASTROINTESTINAL: negative for nausea, vomiting   GENITOURINARY: negative for incontinence   MUSCULOSKELETAL: negative for neck or back pain   NEUROLOGICAL: negative for seizures   PSYCHIATRIC: negative for fatigue     Review of systems otherwise negative. PHYSICAL EXAM:       BP 97/81   Pulse 70   Temp 97.8 °F (36.6 °C) (Oral)   Resp 18   Ht 5' (1.524 m)   Wt 130 lb (59 kg)   SpO2 99%   BMI 25.39 kg/m²     CONSTITUTIONAL:  Well developed, well nourished, alert and oriented x 3, in no acute distress. GCS 15, nontoxic. No dysarthria, no aphasia. EOMI.     HEAD:  normocephalic, atraumatic    EYES:  PERRLA, EOMI.   ENT:  moist mucous membranes   NECK:  supple, symmetric, no midline tenderness to palpation    BACK:  without midline tenderness, step-offs or deformities    LUNGS:  Equal air entry bilaterally   CARDIOVASCULAR:  normal s1 / s2   ABDOMEN:  Soft, no rigidity   NEUROLOGIC:    Mental status   Alert and oriented; intact memory with no confusion, speech or language problems; no hallucinations or delusions     Cranial nerves   II - visual fields intact to confrontation                                                III, IV, VI - extra-ocular muscles full: no pupillary defect; no RALPH, no nystagmus, no ptosis                                                                      V - normal facial sensation                                                               VII - normal facial symmetry                                                             VIII - intact hearing                                                                             IX, X - symmetrical palate                                                                  XI - symmetrical shoulder shrug                                                       XII - midline tongue without atrophy or fasciculation     Motor function  Normal muscle bulk and tone; normal power 5/5  RUE 4/5  RLE 4/5     Sensory function Intact to touch,  Proprioception  Decreased sensation RLE     Cerebellar No involuntary movements or tremors     Reflex function Intact 2+ DTR and symmetric with no pathologic reflex or Babinski sign     Gait                  Not tested             INITIAL NIH STROKE SCALE:    Time Performed:  2:00 PM     1a. Level of consciousness:  0 - alert; keenly responsive  1b. Level of consciousness questions:  0 - answers both questions correctly  1c. Level of consciousness questions:  0 - performs both tasks correctly  2. Best Gaze:  0 - normal  3. Visual:  0 - no visual loss  4. Facial Palsy:  0 - normal symmetric movement  5a. Motor left arm:  0 - no drift, limb holds 90 (or 45) degrees for full 10 seconds  5b.   Motor right arm:  1 - drift, limb holds 90 (or 45) degrees but drifts down before full 10 seconds: does not hit bed  6a. Motor left le - no drift; leg holds 30 degree position for full 5 seconds  6b. Motor right le - drift; leg falls by the end of the 5 second period but does not hit bed  7. Limb Ataxia:  0 - absent  8. Sensory: 1  9. Best Language:  0 - no aphasia, normal  10. Dysarthria:  0 - normal  11.   Extinction and Inattention:  0 - no abnormality    TOTAL:  3     SKIN:  no rash      LABS AND IMAGING:     CBC with Differential:    Lab Results   Component Value Date    WBC 7.0 2020    RBC 5.33 2020    HGB 12.7 2020    HCT 42.0 2020     2020    MCV 78.8 2020    MCH 23.8 2020    MCHC 30.2 2020    RDW 18.3 2020    NRBC 0 2015    SEGSPCT 43.3 2015    LYMPHOPCT 46 2020    MONOPCT 11 2020    BASOPCT 1 2020    MONOSABS 0.79 2020    MONOSABS 0.6 2015    LYMPHSABS 3.15 2020    LYMPHSABS 3.3 2015    EOSABS 0.22 2020    EOSABS 0.2 2015    BASOSABS 0.07 2020    DIFFTYPE NOT REPORTED 2020     BMP:    Lab Results   Component Value Date     2020    K 4.2 2020     2020    CO2 21 2020    BUN 4 2020    LABALBU 3.9 2020    CREATININE 0.59 2020    CREATININE 0.62 2020    CALCIUM 9.0 2020    GFRAA >60 2020    LABGLOM >60 2020    LABGLOM >90 2015    GLUCOSE 87 2020       Radiology Review:  As above      ASSESSMENT AND PLAN:       Patient Active Problem List   Diagnosis    [facility] abuse [facility]    Heroin dependence ([facility]    Chronic post-traumatic stress disorder (PTSD)    Major depression, recurrent ([facility]    Depressed bipolar I disorder ([facility]    [facility]    Bipolar I disorder, current or most recent episode depressed, with psychotic features with mood-congruent psychotic features ([facility]  Alcoholic hepatitis without ascites    Alcohol withdrawal syndrome with complication (HCC)    Bipolar 1 disorder (HCC)    Mild intermittent asthma without complication    Alcohol related seizure [facility]    Delroy's paresis (HCC)    Stroke-like symptoms    Drug use    Cocaine abuse (HCC)    Abnormal CT of the head    Alcoholic intoxication without complication ([facility]    History of bipolar disorder       50 y.o. female who presents with right-sided weakness after a witnessed seizure. No IV TPA due to witnessed seizure, low suspicion for stroke, likely Delroy's paralysis. History of alcoholism, polysubstance abuse, bipolar disorder. ABCD2 score 4     - Discussed with Dr. Kami Garnett  - Santiago Marrufo neg  - CTA H/N unremarkable   - MRI Brain W WO-p               -seizures precaution               -CIWA protocol               -f/u blood tox,UDS,UA,CXR              -ASA 81mg daily   - Fasting Lipid panel   - PT, OT, Speech eval    - Hydrate with  IVF NS @ 75cc/hr    - Telemetry    - Neuro checks per protocol  - We recommend SBP normotension  - Blood glucose goal less than 180  - Please avoid dextrose containing solutions    Additional recommendations may follow    Please contact [facility] with any changes in patients neurologic status. Thank you for your consult.        Rizwan Ashley MD   11/2/2020  3:47 PM

## 2020-11-02 NOTE — ED NOTES
Pt reports to the ED via EMS with c/o Seizures. Per ems pt had a 3 min seizure that was witnessed by her girlfriend, when she woke up she had Rt sided weakness, pt has a hx of 2 strokes this year and does not have any previous defects from them. Pt states she does not take anything for the seizures, pt did state she had a couple drinks this morning. Pt is A&Ox4, RR even and non labored.       Luigi Barron, RN  11/02/20 1580

## 2020-11-02 NOTE — ED NOTES
Pt provided warm blankets, lights off, updated on plan of care. Will continue to monitor.       [place] Roman RN  11/02/20 [id number]

## 2020-11-03 VITALS
HEART RATE: 64 BPM | OXYGEN SATURATION: 95 % | WEIGHT: 130 LBS | BODY MASS INDEX: 25.52 KG/M2 | TEMPERATURE: 97.9 F | SYSTOLIC BLOOD PRESSURE: 130 MMHG | RESPIRATION RATE: 16 BRPM | DIASTOLIC BLOOD PRESSURE: 117 MMHG | HEIGHT: 60 IN

## 2020-11-03 LAB
ABSOLUTE EOS #: 0.29 K/UL (ref 0–0.44)
ABSOLUTE IMMATURE GRANULOCYTE: 0.03 K/UL (ref 0–0.3)
ABSOLUTE LYMPH #: 2.88 K/UL (ref 1.1–3.7)
ABSOLUTE MONO #: 0.88 K/UL (ref 0.1–1.2)
ALBUMIN SERPL-MCNC: 3.9 G/DL (ref 3.5–5.2)
ALBUMIN/GLOBULIN RATIO: 1.1 (ref 1–2.5)
ALP BLD-CCNC: 82 U/L (ref 35–104)
ALT SERPL-CCNC: 44 U/L (ref 5–33)
ANION GAP SERPL CALCULATED.3IONS-SCNC: 9 MMOL/L (ref 9–17)
AST SERPL-CCNC: 44 U/L
BASOPHILS # BLD: 1 % (ref 0–2)
BASOPHILS ABSOLUTE: 0.06 K/UL (ref 0–0.2)
BILIRUB SERPL-MCNC: 0.38 MG/DL (ref 0.3–1.2)
BUN BLDV-MCNC: 9 MG/DL (ref 6–20)
BUN/CREAT BLD: ABNORMAL (ref 9–20)
CALCIUM SERPL-MCNC: 8.6 MG/DL (ref 8.6–10.4)
CHLORIDE BLD-SCNC: 104 MMOL/L (ref 98–107)
CO2: 24 MMOL/L (ref 20–31)
CREAT SERPL-MCNC: 0.6 MG/DL (ref 0.5–0.9)
DIFFERENTIAL TYPE: ABNORMAL
EOSINOPHILS RELATIVE PERCENT: 4 % (ref 1–4)
GFR AFRICAN AMERICAN: >60 ML/MIN
GFR NON-AFRICAN AMERICAN: >60 ML/MIN
GFR SERPL CREATININE-BSD FRML MDRD: ABNORMAL ML/MIN/{1.73_M2}
GFR SERPL CREATININE-BSD FRML MDRD: ABNORMAL ML/MIN/{1.73_M2}
GLUCOSE BLD-MCNC: 86 MG/DL (ref 70–99)
HCT VFR BLD CALC: 40 % (ref 36.3–47.1)
HEMOGLOBIN: 11.9 G/DL (ref 11.9–15.1)
IMMATURE GRANULOCYTES: 0 %
LYMPHOCYTES # BLD: 43 % (ref 24–43)
MCH RBC QN AUTO: 23.4 PG (ref 25.2–33.5)
MCHC RBC AUTO-ENTMCNC: 29.8 G/DL (ref 28.4–34.8)
MCV RBC AUTO: 78.7 FL (ref 82.6–102.9)
MONOCYTES # BLD: 13 % (ref 3–12)
NRBC AUTOMATED: 0 PER 100 WBC
PDW BLD-RTO: 18 % (ref 11.8–14.4)
PLATELET # BLD: 254 K/UL (ref 138–453)
PLATELET ESTIMATE: ABNORMAL
PMV BLD AUTO: 9.3 FL (ref 8.1–13.5)
POTASSIUM SERPL-SCNC: 4.1 MMOL/L (ref 3.7–5.3)
RBC # BLD: 5.08 M/UL (ref 3.95–5.11)
RBC # BLD: ABNORMAL 10*6/UL
SEG NEUTROPHILS: 39 % (ref 36–65)
SEGMENTED NEUTROPHILS ABSOLUTE COUNT: 2.68 K/UL (ref 1.5–8.1)
SODIUM BLD-SCNC: 137 MMOL/L (ref 135–144)
TOTAL PROTEIN: 7.4 G/DL (ref 6.4–8.3)
WBC # BLD: 6.8 K/UL (ref 3.5–11.3)
WBC # BLD: ABNORMAL 10*3/UL

## 2020-11-03 PROCEDURE — 96376 TX/PRO/DX INJ SAME DRUG ADON: CPT

## 2020-11-03 PROCEDURE — 95816 EEG AWAKE AND DROWSY: CPT

## 2020-11-03 PROCEDURE — G0378 HOSPITAL OBSERVATION PER HR: HCPCS

## 2020-11-03 PROCEDURE — 6370000000 HC RX 637 (ALT 250 FOR IP): Performed by: FAMILY MEDICINE

## 2020-11-03 PROCEDURE — 2580000003 HC RX 258: Performed by: FAMILY MEDICINE

## 2020-11-03 PROCEDURE — 85025 COMPLETE CBC W/AUTO DIFF WBC: CPT

## 2020-11-03 PROCEDURE — 6360000002 HC RX W HCPCS: Performed by: FAMILY MEDICINE

## 2020-11-03 PROCEDURE — 6360000002 HC RX W HCPCS: Performed by: STUDENT IN AN ORGANIZED HEALTH CARE EDUCATION/TRAINING PROGRAM

## 2020-11-03 PROCEDURE — 96365 THER/PROPH/DIAG IV INF INIT: CPT

## 2020-11-03 PROCEDURE — 96372 THER/PROPH/DIAG INJ SC/IM: CPT

## 2020-11-03 PROCEDURE — 95816 EEG AWAKE AND DROWSY: CPT | Performed by: PSYCHIATRY & NEUROLOGY

## 2020-11-03 PROCEDURE — 99238 HOSP IP/OBS DSCHRG MGMT 30/<: CPT | Performed by: PSYCHIATRY & NEUROLOGY

## 2020-11-03 PROCEDURE — 80053 COMPREHEN METABOLIC PANEL: CPT

## 2020-11-03 RX ORDER — MAGNESIUM SULFATE 1 G/100ML
1 INJECTION INTRAVENOUS ONCE
Status: COMPLETED | OUTPATIENT
Start: 2020-11-03 | End: 2020-11-03

## 2020-11-03 RX ADMIN — ENOXAPARIN SODIUM 40 MG: 40 INJECTION SUBCUTANEOUS at 08:48

## 2020-11-03 RX ADMIN — FOLIC ACID 1 MG: 1 TABLET ORAL at 08:48

## 2020-11-03 RX ADMIN — MAGNESIUM SULFATE HEPTAHYDRATE 1 G: 1 INJECTION, SOLUTION INTRAVENOUS at 12:30

## 2020-11-03 RX ADMIN — LITHIUM CARBONATE 450 MG: 450 TABLET, EXTENDED RELEASE ORAL at 08:48

## 2020-11-03 RX ADMIN — Medication 100 MG: at 08:49

## 2020-11-03 RX ADMIN — VENLAFAXINE HYDROCHLORIDE 75 MG: 75 CAPSULE, EXTENDED RELEASE ORAL at 10:31

## 2020-11-03 RX ADMIN — MULTIPLE VITAMINS W/ MINERALS TAB 1 TABLET: TAB at 08:49

## 2020-11-03 RX ADMIN — LORAZEPAM 3 MG: 2 INJECTION INTRAMUSCULAR; INTRAVENOUS at 13:37

## 2020-11-03 RX ADMIN — SODIUM CHLORIDE, PRESERVATIVE FREE 10 ML: 5 INJECTION INTRAVENOUS at 08:49

## 2020-11-03 RX ADMIN — OXCARBAZEPINE 150 MG: 150 TABLET, FILM COATED ORAL at 08:49

## 2020-11-03 NOTE — CARE COORDINATION
Consult received for consideration of rehab  Pt known to [address] [place] [address],[id number] from previous admisisons  Pt currently lives alone, gfriend stays with her occasionally  Pt does not work, has Cesar Mckee/Luis in place  Pt reports she is drinking alcohol daily, 4-6  24oz beers/day  Stated she has \"cut back tremendously\"  Last drink was yest, 1.5 tall boy  Pt stated her last use of marijuana was 1.5 weeks ago  She reports no crack, heroin/fentynal for ~ 4m  Pt has been thru multiple tx programs for her alcohol/drug use - [facility] x2, [facility] x2, [facility] x3, [address] [place] [address], and most recent tx program was at [facility] - was discharged home in June after 2m stay  She reports she stayed clean for one month  Pt has been to [place] [address] in the past, nothing currently  Pt stated she does not plan to quit drinking but rather \"cut back\"  Discussed tx options and pt stated she would be willing to consider an IOP at [facility] - provided pt with info for their walk-in assessments M-F 8a-1p  Pt stated [facility] is [facility] for her mental health tx (bipolar, PTSD, depression)  Pt stated she see's the psychiatrist monthly and next appt is next week  She also has a CM, Amberly Hernandez, and see's him every few weeks  Pt shared she is having trouble with meal prep at home - discussed home delivered meals and pt was interested - advised her will provide her with info on home delivered meals

## 2020-11-03 NOTE — ED NOTES
Report called to [facility] for report. No questions or concerns expressed.      Maryellen Lozano RN  11/02/20 [id number]

## 2020-11-03 NOTE — ED NOTES
Received report from [facility]. No questions or concerns noted. Patient resting comfortably on cot presenting in no obvious distress. Virtals noted as recorded. Will continue to monitor.       Ej Morales RN  11/02/20 [id number]

## 2020-11-03 NOTE — CARE COORDINATION
Discharge [address] [facility]  Clinical Case Management Department  Written by: Billie Kidd RN    Patient Name: Lars Been  Attending Provider: No att. providers found  Admit Date: 2020  1:42 PM  MRN: [id number]  Account: [de-identified]                     : 1975  Discharge Date: 11/3/2020      Disposition: Pt. Discharged per nurse. Left without giving home care preference to NCM for walker. Rx. was attained.      Billie Kidd RN

## 2020-11-03 NOTE — CARE COORDINATION
Case Management Initial Discharge Plan  Sage Cazares            Met with:patient to discuss discharge plans. Information verified: address, contacts, phone number, , insurance Yes    Emergency Contact/Next of Kin name & number: Cheryl Hassan, Sister, in [place] for season now, [phone]      PCP: Sandip Sargent MD  Date of last visit: 3 wks ago    Insurance Provider: Medicare    Discharge Planning    Living Arrangements:      Support Systems:       Home has 2 stories  4 stairs to climb to get into front door, 10stairs to climb to reach second floor  Location of bedroom/bathroom in home 2nd floor    Patient able to perform ADL's:Independent    Current Services (outpatient & in home) none  DME equipment: none, requesting walker with w/c  DME provider: unknown    Receiving oral anticoagulation therapy? No    If indicated:   Physician managing anticoagulation treatment: n/a  Where does patient obtain lab work for ATC treatment? n/a      Potential Assistance Needed:       Patient agreeable to home care: Yes  Freedom of choice provided:  yes    Prior SNF/Rehab Placement and Facility: no  Agreeable to SNF/Rehab: No  [place] of choice provided: no     Evaluation: yes    Expected Discharge date:       Patient expects to be discharged to: Follow Up Appointment: Best Day/ Time:      Transportation provider: [place]  Transportation arrangements needed for discharge: Yes    Readmission Risk              Risk of Unplanned Readmission:        31             Does patient have a readmission risk score greater than 14?: Yes  If yes, follow-up appointment must be made within 7 days of discharge. Goals of Care:       Discharge Plan: Potential home care vs SNF. Pending PT/OT notes.            Electronically signed by Ted Aguilar RN on 11/3/20 at 8:49 AM EST

## 2020-11-03 NOTE — PROGRESS NOTES
Neurology Resident Progress Note      SUBJECTIVE:  This is a 50 y.o.  female admitted 11/2/2020 for Seizure [facility]) [R56.9]  This is a follow-up neurology progress note. The patient was seen and examined and the chart was reviewed. There were no acute events overnight. ROS  Constitutional: no fever, chills, fatigue  HENT: No change in vision or hearing   Respiratory: No cough, SOB, wheezing. Cardiovascular:  No chest pain, palpitations, leg swelling. Gastrointestinal: No nausea, vomiting, diarrhea. Genitourinary: No increased frequency, urgency. Musculoskeletal: No myalgia or arthralgia. Skin: No rashes or scarring or bruises. Neurological: No headache, paresthesia, or focal weakness. Endo/Heme/Allergies: Negative for itchy eyes or runny nose. Psychiatric/Behavioral: No anxiety or depressed mood.      HPI  57-year-old female, presented with right-sided weakness after witnessed seizure, described as her eyes rolling backwards, followed by generalized shaking, postictal right-sided weakness,  Patient has a history of seizures for 1 year and a half, her last seizure was 3 months ago, Aura as lightheaded and eye rolling patient is on Trileptal 150 mg twice daily for her mood disorder, Neurontin 300 mg 3 times daily for fibromyalgia,  -Also history of alcoholism with multiple admissions for DTs, polysubstance abuse, bipolar disorder, EEG on October 2019: Unremarkable, EEG in December 2019: Normal,  -Another admission in 8/27/2020 for breakthrough seizure, headache, right-sided weakness numbness and alcohol intoxication,  -Patient drinks about 6 to 9 cans of 25 ounces beers daily, alcohol level: 288, CT head negative, MRI brain, negative, EEG in August: Diffuse slowing no epileptiform discharges,  Discharged and recommended to follow-up outpatient for alcohol rehab,  Last office visit was 9/1/2020:-Continue Trileptal 150 twice daily, continue alcohol rehab, follow-up with psychiatrist       atorvastatin  40 mg Oral Nightly    fluticasone  1 spray Each Nostril Daily    folic acid  1 mg Oral Daily    lithium  450 mg Oral BID    therapeutic multivitamin-minerals  1 tablet Oral Daily    thiamine  100 mg Oral Daily    venlafaxine  75 mg Oral Daily with breakfast    sodium chloride flush  10 mL Intravenous 2 times per day    enoxaparin  40 mg Subcutaneous Daily    sodium chloride flush  10 mL Intravenous 2 times per day    OXcarbazepine  150 mg Oral Daily       Past Medical History:   Diagnosis Date    Alcoholic hepatitis without ascites 70/36/3057    Alcoholic ketoacidosis 02/04/1238    Alcoholism ([facility]     3 pints daily    Anxiety     Asthma     Bipolar affective disorder ([address] 6/12/2019    Depression     Drug overdose, accidental or unintentional, initial encounter 9/29/2018    Drug overdose, intentional self-harm, initial encounter ([facility] 5/8/2018    Lisa coma scale total score 3-8 ([facility]     Hepatitis C antibody positive in blood 6/18/2019    History of gastric surgery (gastric sleeve 2012) 4/25/2017    Intentional drug overdose ([facility] [address] 5/7/2018    MDD (major depressive disorder), recurrent episode ([address] 5/8/2018    Pneumonia     Polysubstance abuse ([facility] [address]     drug abuse includes, cocaine, IV heroin, cannabis, and ETOH    Post traumatic stress disorder     Seizure ([address] 12/17/2019    Smoker unmotivated to quit 4/25/2017    Suicide attempt ([facility] [address] [facility] 11/08/2017    S/A by overdosing on Trazodone and Seroquel    Suicide ideation     Toxic metabolic encephalopathy 3/79/0394       Past Surgical History:   Procedure Laterality Date    CARPAL TUNNEL RELEASE      CHOLECYSTECTOMY      HYSTERECTOMY      HYSTERECTOMY, TOTAL ABDOMINAL      STOMACH SURGERY      gastric sleeve    UPPER GASTROINTESTINAL ENDOSCOPY N/A 6/19/2019    EGD BIOPSY performed by Katherin aPlumbo MD at [facility]:      Blood pressure 123/89, pulse 67, temperature 97.9 °F (36.6 °C), temperature NOT REPORTED     Pt Temp NOT REPORTED     POC pH Temp NOT REPORTED     POC pCO2 Temp NOT REPORTED mm Hg    POC pO2 Temp NOT REPORTED mm Hg   Hemoglobin and hematocrit, blood    Collection Time: 11/02/20  2:55 PM   Result Value Ref Range    POC Hemoglobin 15.0 12.0 - 16.0 g/dL    POC Hematocrit 44 36 - 46 %   Creatinine W/GFR Point of Care    Collection Time: 11/02/20  2:55 PM   Result Value Ref Range    POC Creatinine 0.59 0.51 - 1.19 mg/dL    GFR Comment >60 >60 mL/min    GFR Non-African American >60 >60 mL/min    GFR Comment         SODIUM (POC)    Collection Time: 11/02/20  2:55 PM   Result Value Ref Range    POC Sodium 138 138 - 146 mmol/L   POTASSIUM (POC)    Collection Time: 11/02/20  2:55 PM   Result Value Ref Range    POC Potassium 4.2 3.5 - 4.5 mmol/L   CHLORIDE (POC)    Collection Time: 11/02/20  2:55 PM   Result Value Ref Range    POC Chloride 102 98 - 107 mmol/L   CALCIUM, IONIC (POC)    Collection Time: 11/02/20  2:55 PM   Result Value Ref Range    POC Ionized Calcium 1.07 (L) 1.15 - 1.33 mmol/L   Lactic Acid, POC    Collection Time: 11/02/20  2:55 PM   Result Value Ref Range    POC Lactic Acid 2.19 (H) 0.56 - 1.39 mmol/L   POCT Glucose    Collection Time: 11/02/20  2:55 PM   Result Value Ref Range    POC Glucose 93 74 - 100 mg/dL   Anion Gap (Calc) POC    Collection Time: 11/02/20  2:55 PM   Result Value Ref Range    Anion Gap 11 7 - 16 mmol/L   TOX SCR, COMPLETE BL    Collection Time: 11/02/20  4:15 PM   Result Value Ref Range    Acetaminophen Level <5 (L) 10 - 30 ug/mL    THC       Unable to perform testing: Specimen quantity not sufficient. RN [address] NOTIFIED    Cocaine       Unable to perform testing: Specimen quantity not sufficient. RN [address] NOTIFIED    Opiates       Unable to perform testing: Specimen quantity not sufficient. RN [address] NOTIFIED    Phencyclidine       Unable to perform testing: Specimen quantity not sufficient.   RN [address] NOTIFIED    Amphetamine       Unable to perform testing: Specimen quantity not sufficient. RN [address] NOTIFIED    Barbiturates       Unable to perform testing: Specimen quantity not sufficient. RN [address] NOTIFIED    Benzodiazepines       Unable to perform testing: Specimen quantity not sufficient. RN [address] NOTIFIED    Methadone       Unable to perform testing: Specimen quantity not sufficient. RN [address] NOTIFIED    Oxycodone       Unable to perform testing: Specimen quantity not sufficient. RN [address] NOTIFIED    Methamphetamine       Unable to perform testing: Specimen quantity not sufficient. RN [address] NOTIFIED    Buprenorphine       Unable to perform testing: Specimen quantity not sufficient. RN [address] NOTIFIED    Drugs of Abuse Comment       Unable to perform testing: Specimen quantity not sufficient. RN [address] NOTIFIED    Ethanol 126 (H) <10 mg/dL    Ethanol percent 0.126 (H) <6.854 %    Salicylate Lvl <1 (L) 3 - 10 mg/dL    Toxic Tricyclic Sc,Blood NEGATIVE NEGATIVE   SPECIMEN REJECTION    Collection Time: 11/02/20  4:15 PM   Result Value Ref Range    Specimen Source . BLOOD     Ordered Test  [id number]     Reason for Rejection       Unable to perform testing: Specimen quantity not sufficient.    - NOT REPORTED    Urinalysis    Collection Time: 11/02/20  6:24 PM   Result Value Ref Range    Color, UA YELLOW YELLOW    Turbidity UA CLEAR CLEAR    Glucose, Ur NEGATIVE NEGATIVE    Bilirubin Urine NEGATIVE NEGATIVE    Ketones, Urine NEGATIVE NEGATIVE    Specific Gravity, UA 1.056 (H) 1.005 - 1.030    Urine Hgb NEGATIVE NEGATIVE    pH, UA 5.0 5.0 - 8.0    Protein, UA NEGATIVE NEGATIVE    Urobilinogen, Urine Normal Normal    Nitrite, Urine NEGATIVE NEGATIVE    Leukocyte Esterase, Urine NEGATIVE NEGATIVE    Urinalysis Comments       Microscopic exam not performed based on chemical results unless requested in original order.    Pregnancy, urine    Collection Time: 11/02/20  6:24 PM   Result Value Ref Range    HCG(Urine) Pregnancy Test NEGATIVE NEGATIVE DRUG SCREEN MULTI URINE    Collection Time: 11/02/20  6:30 PM   Result Value Ref Range    Amphetamine Screen, Ur NEGATIVE NEGATIVE    Barbiturate Screen, Ur NEGATIVE NEGATIVE    Benzodiazepine Screen, Urine NEGATIVE NEGATIVE    Cocaine Metabolite, Urine NEGATIVE NEGATIVE    Methadone Screen, Urine NEGATIVE NEGATIVE    Opiates, Urine NEGATIVE NEGATIVE    Phencyclidine, Urine NEGATIVE NEGATIVE    Propoxyphene, Urine NOT REPORTED NEGATIVE    Cannabinoid Scrn, Ur NEGATIVE NEGATIVE    Oxycodone Screen, Ur NEGATIVE NEGATIVE    Methamphetamine, Urine NOT REPORTED NEGATIVE    Tricyclic Antidepressants, Urine NOT REPORTED NEGATIVE    MDMA, Urine NOT REPORTED NEGATIVE    Buprenorphine Urine NOT REPORTED NEGATIVE    Test Information       Assay provides medical screening only. The absence of expected drug(s) and/or metabolite(s) may indicate diluted or adulterated urine, limitations of testing or timing of collection.    Comprehensive Metabolic Panel w/ Reflex to MG    Collection Time: 11/03/20  4:44 AM   Result Value Ref Range    Glucose 86 70 - 99 mg/dL    BUN 9 6 - 20 mg/dL    CREATININE 0.60 0.50 - 0.90 mg/dL    Bun/Cre Ratio NOT REPORTED 9 - 20    Calcium 8.6 8.6 - 10.4 mg/dL    Sodium 137 135 - 144 mmol/L    Potassium 4.1 3.7 - 5.3 mmol/L    Chloride 104 98 - 107 mmol/L    CO2 24 20 - 31 mmol/L    Anion Gap 9 9 - 17 mmol/L    Alkaline Phosphatase 82 35 - 104 U/L    ALT 44 (H) 5 - 33 U/L    AST 44 (H) <32 U/L    Total Bilirubin 0.38 0.3 - 1.2 mg/dL    Total Protein 7.4 6.4 - 8.3 g/dL    Alb 3.9 3.5 - 5.2 g/dL    Albumin/Globulin Ratio 1.1 1.0 - 2.5    GFR Non-African American >60 >60 mL/min    GFR African American >60 >60 mL/min    GFR Comment          GFR Staging NOT REPORTED    CBC auto differential    Collection Time: 11/03/20  4:44 AM   Result Value Ref Range    WBC 6.8 3.5 - 11.3 k/uL    RBC 5.08 3.95 - 5.11 m/uL    Hemoglobin 11.9 11.9 - 15.1 g/dL    Hematocrit 40.0 36.3 - 47.1 %    MCV 78.7 (L) 82.6 - 102.9 fL MCH 23.4 (L) 25.2 - 33.5 pg    MCHC 29.8 28.4 - 34.8 g/dL    RDW 18.0 (H) 11.8 - 14.4 %    Platelets 823 478 - 502 k/uL    MPV 9.3 8.1 - 13.5 fL    NRBC Automated 0.0 0.0 per 100 WBC    Differential Type NOT REPORTED     Seg Neutrophils 39 36 - 65 %    Lymphocytes 43 24 - 43 %    Monocytes 13 (H) 3 - 12 %    Eosinophils % 4 1 - 4 %    Basophils 1 0 - 2 %    Immature Granulocytes 0 0 %    Segs Absolute 2.68 1.50 - 8.10 k/uL    Absolute Lymph # 2.88 1.10 - 3.70 k/uL    Absolute Mono # 0.88 0.10 - 1.20 k/uL    Absolute Eos # 0.29 0.00 - 0.44 k/uL    Basophils Absolute 0.06 0.00 - 0.20 k/uL    Absolute Immature Granulocyte 0.03 0.00 - 0.30 k/uL    WBC Morphology NOT REPORTED     RBC Morphology ANISOCYTOSIS PRESENT     Platelet Estimate NOT REPORTED        Imaging/Diagnostics:  Ct Head Wo Contrast    Result Date: 11/2/2020  EXAMINATION: CT OF THE HEAD WITHOUT CONTRAST  11/2/2020 2:05 pm TECHNIQUE: CT of the head was performed without the administration of intravenous contrast. Dose modulation, iterative reconstruction, and/or weight based adjustment of the mA/kV was utilized to reduce the radiation dose to as low as reasonably achievable. COMPARISON: 08/27/2020 HISTORY: ORDERING SYSTEM PROVIDED HISTORY: seizure, R sided deficits TECHNOLOGIST PROVIDED HISTORY: seizure, R sided deficits Is the patient pregnant?->No Reason for Exam: seizure, R sided deficits FINDINGS: BRAIN/VENTRICLES: There is no acute intracranial hemorrhage, mass effect or midline shift. No abnormal extra-axial fluid collection. The gray-white differentiation is maintained without acute infarct. There is no hydrocephalus. ORBITS: The visualized portion of the orbits demonstrate no acute abnormality. SINUSES: The visualized paranasal sinuses and mastoid air cells demonstrate no acute abnormality. SOFT TISSUES/SKULL:  No acute abnormality of the visualized skull or soft tissues. No acute intracranial abnormality.  The findings were sent to the Radiology Results [address] at 2:12 pm on 11/2/2020to be communicated to stroke neurologist, Dr. Lila Reid. Mri Cervical Spine Wo Contrast    Result Date: 11/2/2020  EXAMINATION: MRI OF THE CERVICAL SPINE WITHOUT CONTRAST 11/2/2020 5:06 pm TECHNIQUE: Multiplanar multisequence MRI of the cervical spine was performed without the administration of intravenous contrast. COMPARISON: CT July 8, 2020 HISTORY: ORDERING SYSTEM PROVIDED HISTORY: unstaedy gait TECHNOLOGIST PROVIDED HISTORY: unstaedy gait Is the patient pregnant?->No Reason for Exam: unsteady gait FINDINGS: Evaluation is limited by motion. BONES/ALIGNMENT: No acute fracture. No suspicious bone marrow replacing lesion. No subluxation. SPINAL CORD: No abnormal signal within the cervical spinal cord. SOFT TISSUES: No paraspinal abnormality. C2-C3: No central canal or foraminal stenosis. C3-C4: Broad-based disc bulge causes minimal impingement upon the ventral aspect of the thecal sac. No right foraminal stenosis. Disc bulge extends into the left foramina causing moderate left foraminal stenosis. C4-C5: Mild broad-based disc bulge causes minimal impingement upon the ventral aspect of the thecal sac. No significant central canal stenosis. No significant left foraminal stenosis. Disc bulge causes moderate right foraminal stenosis. C5-C6: Broad-based disc bulge causes minimal impingement upon the ventral aspect of the thecal sac. Disc bulge causes mild bilateral foraminal stenosis. C6-C7: No central canal stenosis. No foraminal stenosis. C7-T1: No central canal or foraminal stenosis. Motion limited study. Overall mild degenerative changes. No abnormal signal within the cervical spinal cord.      Xr Chest Portable    Result Date: 11/2/2020  EXAMINATION: ONE XRAY VIEW OF THE CHEST 11/2/2020 4:53 pm COMPARISON: Chest radiograph 08/27/2020 HISTORY: ORDERING SYSTEM PROVIDED HISTORY: stroke/seizure TECHNOLOGIST PROVIDED HISTORY: stroke/seizure Reason for Exam: stroke/seizure FINDINGS: Overlying heart monitor leads and buttons. Clear lungs. No definite findings of pneumothorax or pleural effusion. Normal mediastinal, hilar, and cardiac contours. No acute fracture. No acute cardiopulmonary process. Cta Head Neck W Contrast    Result Date: 11/2/2020  EXAMINATION: CTA OF THE HEAD AND NECK WITH CONTRAST 11/2/2020 1:59 pm: TECHNIQUE: CTA of the head and neck was performed with the administration of intravenous contrast. Multiplanar reformatted images are provided for review. MIP images are provided for review. Stenosis of the internal carotid arteries measured using NASCET criteria. Dose modulation, iterative reconstruction, and/or weight based adjustment of the mA/kV was utilized to reduce the radiation dose to as low as reasonably achievable. 3D surface reformatted and curved MIP images were submitted for review. COMPARISON: None. HISTORY: ORDERING SYSTEM PROVIDED HISTORY: seizure, right sided deficitis TECHNOLOGIST PROVIDED HISTORY: seizure, right sided deficitis Reason for Exam: right side defitcits, stroke alert FINDINGS: CTA NECK: AORTIC ARCH/ARCH VESSELS: No dissection or arterial injury. No significant stenosis of the brachiocephalic or subclavian arteries. CAROTID ARTERIES: No dissection, arterial injury, or hemodynamically significant stenosis by NASCET criteria. VERTEBRAL ARTERIES: No dissection, arterial injury, or significant stenosis. SOFT TISSUES: The lung apices are clear. No cervical or superior mediastinal lymphadenopathy. The larynx and pharynx are unremarkable. No acute abnormality of the salivary and thyroid glands. BONES: No acute osseous abnormality. CTA HEAD: ANTERIOR CIRCULATION: No significant stenosis of the intracranial internal carotid, anterior cerebral, or middle cerebral arteries. No aneurysm. Anterior communicating artery is present.  POSTERIOR CIRCULATION: No significant stenosis of the vertebral, basilar, or posterior Trileptal 150 twice daily outpatient for seizure and mood disorder, gabapentin 300 mg 3 times daily,    Plan:     -Continue on Trileptal 150 mg twice daily  -MRI cervical spine W0 for cervical myelopathy: Mild degenerative changes, no abnormal signal within the cervical spine  -MRI brain: Unremarkable  -CTh: Negative, CTA head and neck: Negative  -[facility] protocol for alcohol withdrawal  -Vitamin B12: ordered  -EEG: completed  -PT/OT  -Anticipated discharge today      David Goldstein MD, 11/3/2020 7:27 AM

## 2020-11-03 NOTE — FLOWSHEET NOTE
[facility]     Emergency/Trauma Note    PATIENT NAME: Gwen Hughes    Shift date: 11.2.2020  Shift day: Monday   Shift # 2    Room # 0788/6172-84   Name: Gwen Hughes            Age: 50 y.o. Gender: female          Gnosticism: None   Place of Confucianist: unknown    Trauma/Incident type: Stroke Alert  Admit Date & Time: 11/2/2020  1:42 PM  TRAUMA NAME: None        PATIENT/EVENT DESCRIPTION:  Gwen Hughes is a 50 y.o. female who arrived as a STROKE ALERT due to stroke-like symptoms. Pt to be admitted to 0535/0535-01. SPIRITUAL ASSESSMENT/INTERVENTION:  Patient states that she is possibly having seizures and believes that is why she is being brought in. Patient states that the last time she was in several months back that the seizures were caused by alcohol withdraw. Patient states that her significant other knows that she is at the hospital and does not seem to need any family contacted at this time. While talking, patient appeared to fall asleep. Chaplains will remain available as needed. PATIENT BELONGINGS:  No belongings noted    ANY BELONGINGS OF SIGNIFICANT VALUE NOTED:  None    REGISTRATION STAFF NOTIFIED? Yes      WHAT IS YOUR SPIRITUAL CARE PLAN FOR THIS PATIENT?:  Chaplains will remain available to offer spiritual and emotional support as needed.       Electronically signed by Cheryl Perez on 11/3/2020 at [address] [place] [address]  [phone]       11/02/20 1930   Encounter Summary   Services provided to: Patient   Referral/Consult From: Multi-disciplinary team   Support System Significant other   Continue Visiting   (11.2.2020)   Complexity of Encounter Moderate   Length of Encounter 15 minutes   Spiritual Assessment Completed Yes   Crisis   Type Stroke Alert   Assessment Approachable;Coping   Intervention Active listening;Explored feelings, thoughts, concerns;Explored coping resources; Discussed illness/injury and it's impact   Outcome Expressed feelings/needs/concerns;Engaged in conversation     Electronically signed by Roxana Ornelas on 11/3/2020 at 1:57 AM

## 2020-11-03 NOTE — PROGRESS NOTES
Priyanka Sneed was evaluated today and a DME order was entered for a standard walker because she requires this to successfully complete daily living tasks of ambulating. A standard walker is necessary due to the patient's impaired ambulation and mobility restrictions and she can ambulate only by using a walker instead of a lesser assistive device, such as a cane or crutch. The need for this equipment was discussed with the patient and she understands and is in agreement. Jerman Larios MD

## 2020-11-03 NOTE — DISCHARGE SUMMARY
Neurology Discharge Summary     Patient ID: Priyanka Sneed  :  1975   MRN: [id number]     ACCOUNT:  [de-identified]   Patient's PCP: Virginia Pearce MD  Admit Date: 2020   Discharge Date: 11/3/2020   Length of Stay: 1  Code Status:  Full Code  Admitting Physician: Edgard Rucker MD  Discharge Physician: Enmanuel Quarles MD     Active Discharge Diagnoses:       Primary Problem  <principal problem not specified>      [place] Problems    Diagnosis Date Noted    Seizure [facility]) [R56.9] 2020       Condition on the discharge: good     Hospital Stay:       Hospital Course: The patient is a 50 y.o. female who presents with right-sided weakness after a witnessed seizure. Last known well 1 PM.  Patient said today while she was on the phone her girlfriend witnessed her to have a seizure when she had her eyes rolling backwards, followed by generalized shaking. Afterwards patient noticed her right side to be weak. Patient said about 1 and half year ago she started to have seizures and has had about 5 to 6 years of her last seizure was 3 months ago prior to the one she had today. Patient usually gets aura when she feels lightheaded followed by eyes rolling, occasionally she has loss of bowel bladder control as well followed by generalized shaking and she is postictally confused as well. Patient said in the past she had right-sided weakness after the seizures x2. Patient is on Trileptal 150 mg bid for her mood disorder, Neurontin 300 mg 3 times daily for fibromyalgia. Patient says about a week ago she skipped a dose of Trileptal.  Patient has past medical history of alcoholism. Polysubstance abuse, bipolar disorder.        Of note patient had multiple admissions in the past for DTs due to withdrawal.  She was evaluated by neurology in 2019 when she presented with a seizure-like activity had EEG done at that time which was unremarkable.     Repeat EEG in 2019 was dissection, arterial injury, or hemodynamically significant stenosis by NASCET criteria. VERTEBRAL ARTERIES: No dissection, arterial injury, or significant stenosis. SOFT TISSUES: The lung apices are clear. No cervical or superior mediastinal lymphadenopathy. The larynx and pharynx are unremarkable. No acute abnormality of the salivary and thyroid glands. BONES: No acute osseous abnormality. CTA HEAD: ANTERIOR CIRCULATION: No significant stenosis of the intracranial internal carotid, anterior cerebral, or middle cerebral arteries. No aneurysm. Anterior communicating artery is present. POSTERIOR CIRCULATION: No significant stenosis of the vertebral, basilar, or posterior cerebral arteries. No aneurysm. Left posterior communicating artery is present. OTHER: No dural venous sinus thrombosis on this non-dedicated study. Unremarkable CTA of the head and neck. Mri Brain With And Without Contrast    Result Date: 11/2/2020  EXAMINATION: MRI OF THE BRAIN WITHOUT AND WITH CONTRAST  11/2/2020 5:06 pm TECHNIQUE: Multiplanar multisequence MRI of the head/brain was performed without and with the administration of intravenous contrast. COMPARISON: 08/27/2020 HISTORY: ORDERING SYSTEM PROVIDED HISTORY: stroke/seizure TECHNOLOGIST PROVIDED HISTORY: stroke/seizure Is the patient pregnant?->No Reason for Exam: stroke/seizure FINDINGS: INTRACRANIAL STRUCTURES/VENTRICLES:  Mild motion artifact. No acute infarct or mass. No focal parenchymal signal abnormality. Normal hippocampi. No mass effect or midline shift. No evidence of an acute intracranial hemorrhage. The ventricles and sulci are normal in size and configuration. The sellar/suprasellar regions appear unremarkable. The normal signal voids within the major intracranial vessels appear maintained. No abnormal focus of enhancement is seen within the brain. ORBITS: The visualized portion of the orbits demonstrate no acute abnormality.  SINUSES: The visualized paranasal sinuses and mastoid air cells are well aerated. BONES/SOFT TISSUES: The bone marrow signal intensity appears normal. The soft tissues demonstrate no acute abnormality. Unremarkable MRI of the brain with and without contrast.         Consultations:    Consults:     Final Specialist Recommendations/Findings:   IP CONSULT TO STROKE TEAM  IP CONSULT TO INTERNAL MEDICINE  IP CONSULT TO SOCIAL WORK      The patient was seen and examined on day of discharge and this discharge summary is in conjunction with any daily progress note from day of discharge. Discharge plan:       Disposition: Home    Physician Follow Up:     Ita Yu MD  [address] [place] [address]  [phone]    In 4 weeks         Requiring Further Evaluation/Follow Up POST HOSPITALIZATION/Incidental Findings: N/A    Diet: regular diet    Activity: As tolerated    Instructions to Patient: - Take all medications as prescribed  - Follow up with PCP   - In case of any worsening condition please visit Emergency Room.       Discharge Medications:      Medication List      CHANGE how you take these medications    OXcarbazepine 150 MG tablet  Commonly known as:  TRILEPTAL  Take 1 tablet by mouth 2 times daily  What changed:  when to take this        CONTINUE taking these medications    albuterol sulfate  (90 Base) MCG/ACT inhaler  Inhale 2 puffs into the lungs every 4 hours as needed for Wheezing     atorvastatin 40 MG tablet  Commonly known as:  LIPITOR  Take 1 tablet by mouth nightly     dicyclomine 10 MG capsule  Commonly known as:  Bentyl  Take 1 capsule by mouth every 6 hours as needed (cramps)     fluticasone 50 MCG/ACT nasal spray  Commonly known as:  FLONASE  1 spray by Each Nostril route daily     folic acid 1 MG tablet  Commonly known as:  FOLVITE  Take 1 tablet by mouth daily     hydrOXYzine 50 MG tablet  Commonly known as:  ATARAX  Take 1 tablet by mouth 2 times daily as needed for Anxiety     lithium 450 MG extended release tablet  Commonly known as:  ESKALITH  Take 1 tablet by mouth 2 times daily     Multivitamin Adult Tabs  Take 1 tablet by mouth daily     omeprazole 20 MG delayed release capsule  Commonly known as:  PRILOSEC  Take 1 capsule by mouth 2 times daily     ondansetron 4 MG disintegrating tablet  Commonly known as:  Zofran ODT  Take 1 tablet by mouth every 8 hours as needed for Nausea     sodium chloride 0.65 % nasal spray  Commonly known as:  OCEAN  1 spray by Nasal route as needed for Congestion     thiamine 100 MG tablet  Take 1 tablet by mouth daily     traZODone 50 MG tablet  Commonly known as:  DESYREL     venlafaxine 75 MG extended release capsule  Commonly known as:  EFFEXOR XR  Take 1 capsule by mouth daily (with breakfast)        STOP taking these medications    gabapentin 300 MG capsule  Commonly known as:  NEURONTIN            Time Spent on discharge is  31 mins in patient examination, evaluation, counseling as well as medication reconciliation, prescriptions for required medications, discharge plan and follow up. Electronically signed by   Jerman Mejía MD  11/3/2020  4:01 PM      Thank you Dr. Audrey Lee MD for the opportunity to be involved in this patient's care.

## 2020-11-04 LAB
AMPHETAMINE: NEGATIVE NG/ML
BARBITURATES: NEGATIVE NG/ML
BENZODIAZEPINES: NEGATIVE NG/ML
BUPRENORPHINE: NEGATIVE NG/ML
COCAINE: NEGATIVE NG/ML
DRUGS OF ABUSE COMMENT: NORMAL
METHADONE: NEGATIVE NG/ML
METHAMPHETAMINE: NEGATIVE NG/ML
OPIATES: NEGATIVE NG/ML
OXCARBAZEPINE: 3 UG/ML (ref 3–35)
OXYCODONE: NEGATIVE NG/ML
PHENCYCLIDINE: NEGATIVE NG/ML
THC: NEGATIVE NG/ML

## 2021-01-03 ENCOUNTER — APPOINTMENT (OUTPATIENT)
Dept: CT IMAGING | Age: 46
End: 2021-01-03
Payer: MEDICARE

## 2021-01-03 ENCOUNTER — HOSPITAL ENCOUNTER (EMERGENCY)
Age: 46
Discharge: HOME | End: 2021-01-03
Attending: EMERGENCY MEDICINE
Payer: MEDICARE

## 2021-01-03 VITALS
DIASTOLIC BLOOD PRESSURE: 104 MMHG | TEMPERATURE: 98 F | OXYGEN SATURATION: 98 % | RESPIRATION RATE: 14 BRPM | BODY MASS INDEX: 25.39 KG/M2 | SYSTOLIC BLOOD PRESSURE: 123 MMHG | HEART RATE: 62 BPM | WEIGHT: 130 LBS

## 2021-01-03 DIAGNOSIS — F19.10 POLYSUBSTANCE ABUSE (HCC): Primary | ICD-10-CM

## 2021-01-03 LAB
-: NORMAL
ABSOLUTE EOS #: 0.25 K/UL (ref 0–0.44)
ABSOLUTE IMMATURE GRANULOCYTE: <0.03 K/UL (ref 0–0.3)
ABSOLUTE LYMPH #: 3.22 K/UL (ref 1.1–3.7)
ABSOLUTE MONO #: 0.6 K/UL (ref 0.1–1.2)
ACETAMINOPHEN LEVEL: <5 UG/ML (ref 10–30)
ANION GAP SERPL CALCULATED.3IONS-SCNC: 17 MMOL/L (ref 9–17)
BASOPHILS # BLD: 1 % (ref 0–2)
BASOPHILS ABSOLUTE: 0.05 K/UL (ref 0–0.2)
BUN BLDV-MCNC: 3 MG/DL (ref 6–20)
BUN/CREAT BLD: ABNORMAL (ref 9–20)
CALCIUM SERPL-MCNC: 8.8 MG/DL (ref 8.6–10.4)
CHLORIDE BLD-SCNC: 106 MMOL/L (ref 98–107)
CO2: 19 MMOL/L (ref 20–31)
CREAT SERPL-MCNC: 0.52 MG/DL (ref 0.5–0.9)
DIFFERENTIAL TYPE: ABNORMAL
EOSINOPHILS RELATIVE PERCENT: 4 % (ref 1–4)
ETHANOL PERCENT: 0.18 %
ETHANOL: 183 MG/DL
GFR AFRICAN AMERICAN: >60 ML/MIN
GFR NON-AFRICAN AMERICAN: >60 ML/MIN
GFR SERPL CREATININE-BSD FRML MDRD: ABNORMAL ML/MIN/{1.73_M2}
GFR SERPL CREATININE-BSD FRML MDRD: ABNORMAL ML/MIN/{1.73_M2}
GLUCOSE BLD-MCNC: 106 MG/DL (ref 65–105)
GLUCOSE BLD-MCNC: 126 MG/DL (ref 65–105)
GLUCOSE BLD-MCNC: 71 MG/DL (ref 65–105)
GLUCOSE BLD-MCNC: 72 MG/DL (ref 65–105)
GLUCOSE BLD-MCNC: 78 MG/DL (ref 65–105)
GLUCOSE BLD-MCNC: 78 MG/DL (ref 65–105)
GLUCOSE BLD-MCNC: 92 MG/DL (ref 70–99)
HCT VFR BLD CALC: 30.3 % (ref 36.3–47.1)
HEMOGLOBIN: 9 G/DL (ref 11.9–15.1)
IMMATURE GRANULOCYTES: 0 %
LITHIUM DATE LAST DOSE: ABNORMAL
LITHIUM DOSE AMOUNT: ABNORMAL
LITHIUM DOSE TIME: ABNORMAL
LITHIUM LEVEL: 0.5 MMOL/L (ref 0.6–1.2)
LYMPHOCYTES # BLD: 47 % (ref 24–43)
MCH RBC QN AUTO: 22.9 PG (ref 25.2–33.5)
MCHC RBC AUTO-ENTMCNC: 29.7 G/DL (ref 28.4–34.8)
MCV RBC AUTO: 77.1 FL (ref 82.6–102.9)
MONOCYTES # BLD: 9 % (ref 3–12)
MYOGLOBIN: 25 NG/ML (ref 25–58)
NRBC AUTOMATED: 0 PER 100 WBC
PDW BLD-RTO: 19.5 % (ref 11.8–14.4)
PLATELET # BLD: 292 K/UL (ref 138–453)
PLATELET ESTIMATE: ABNORMAL
PMV BLD AUTO: 9.4 FL (ref 8.1–13.5)
POTASSIUM SERPL-SCNC: 4.8 MMOL/L (ref 3.7–5.3)
RBC # BLD: 3.93 M/UL (ref 3.95–5.11)
RBC # BLD: ABNORMAL 10*6/UL
REASON FOR REJECTION: NORMAL
SALICYLATE LEVEL: <1 MG/DL (ref 3–10)
SEG NEUTROPHILS: 39 % (ref 36–65)
SEGMENTED NEUTROPHILS ABSOLUTE COUNT: 2.62 K/UL (ref 1.5–8.1)
SODIUM BLD-SCNC: 142 MMOL/L (ref 135–144)
TOTAL CK: 115 U/L (ref 26–192)
TOXIC TRICYCLIC SC,BLOOD: NEGATIVE
WBC # BLD: 6.8 K/UL (ref 3.5–11.3)
WBC # BLD: ABNORMAL 10*3/UL
ZZ NTE CLEAN UP: ORDERED TEST: NORMAL
ZZ NTE WITH NAME CLEAN UP: SPECIMEN SOURCE: NORMAL

## 2021-01-03 PROCEDURE — 82550 ASSAY OF CK (CPK): CPT

## 2021-01-03 PROCEDURE — 70450 CT HEAD/BRAIN W/O DYE: CPT

## 2021-01-03 PROCEDURE — 2580000003 HC RX 258: Performed by: STUDENT IN AN ORGANIZED HEALTH CARE EDUCATION/TRAINING PROGRAM

## 2021-01-03 PROCEDURE — 96374 THER/PROPH/DIAG INJ IV PUSH: CPT

## 2021-01-03 PROCEDURE — 80179 DRUG ASSAY SALICYLATE: CPT

## 2021-01-03 PROCEDURE — 6360000002 HC RX W HCPCS: Performed by: STUDENT IN AN ORGANIZED HEALTH CARE EDUCATION/TRAINING PROGRAM

## 2021-01-03 PROCEDURE — G0480 DRUG TEST DEF 1-7 CLASSES: HCPCS

## 2021-01-03 PROCEDURE — 80178 ASSAY OF LITHIUM: CPT

## 2021-01-03 PROCEDURE — 72125 CT NECK SPINE W/O DYE: CPT

## 2021-01-03 PROCEDURE — 96375 TX/PRO/DX INJ NEW DRUG ADDON: CPT

## 2021-01-03 PROCEDURE — 80183 DRUG SCRN QUANT OXCARBAZEPIN: CPT

## 2021-01-03 PROCEDURE — 80048 BASIC METABOLIC PNL TOTAL CA: CPT

## 2021-01-03 PROCEDURE — 99283 EMERGENCY DEPT VISIT LOW MDM: CPT

## 2021-01-03 PROCEDURE — 83874 ASSAY OF MYOGLOBIN: CPT

## 2021-01-03 PROCEDURE — 80307 DRUG TEST PRSMV CHEM ANLYZR: CPT

## 2021-01-03 PROCEDURE — 80143 DRUG ASSAY ACETAMINOPHEN: CPT

## 2021-01-03 PROCEDURE — 82947 ASSAY GLUCOSE BLOOD QUANT: CPT

## 2021-01-03 PROCEDURE — 6360000002 HC RX W HCPCS

## 2021-01-03 PROCEDURE — 85025 COMPLETE CBC W/AUTO DIFF WBC: CPT

## 2021-01-03 RX ORDER — 0.9 % SODIUM CHLORIDE 0.9 %
1000 INTRAVENOUS SOLUTION INTRAVENOUS ONCE
Status: COMPLETED | OUTPATIENT
Start: 2021-01-03 | End: 2021-01-03

## 2021-01-03 RX ORDER — ONDANSETRON 2 MG/ML
INJECTION INTRAMUSCULAR; INTRAVENOUS
Status: COMPLETED
Start: 2021-01-03 | End: 2021-01-03

## 2021-01-03 RX ORDER — NALOXONE HYDROCHLORIDE 1 MG/ML
2 INJECTION INTRAMUSCULAR; INTRAVENOUS; SUBCUTANEOUS ONCE
Status: COMPLETED | OUTPATIENT
Start: 2021-01-03 | End: 2021-01-03

## 2021-01-03 RX ORDER — NALOXONE HYDROCHLORIDE 0.4 MG/ML
0.4 INJECTION, SOLUTION INTRAMUSCULAR; INTRAVENOUS; SUBCUTANEOUS ONCE
Status: COMPLETED | OUTPATIENT
Start: 2021-01-03 | End: 2021-01-03

## 2021-01-03 RX ORDER — ONDANSETRON 2 MG/ML
4 INJECTION INTRAMUSCULAR; INTRAVENOUS ONCE
Status: COMPLETED | OUTPATIENT
Start: 2021-01-03 | End: 2021-01-03

## 2021-01-03 RX ORDER — DEXTROSE MONOHYDRATE 25 G/50ML
25 INJECTION, SOLUTION INTRAVENOUS ONCE
Status: COMPLETED | OUTPATIENT
Start: 2021-01-03 | End: 2021-01-03

## 2021-01-03 RX ADMIN — NALOXONE HYDROCHLORIDE 0.4 MG: 0.4 INJECTION, SOLUTION INTRAMUSCULAR; INTRAVENOUS; SUBCUTANEOUS at 13:08

## 2021-01-03 RX ADMIN — DEXTROSE MONOHYDRATE 25 G: 25 INJECTION, SOLUTION INTRAVENOUS at 13:32

## 2021-01-03 RX ADMIN — ONDANSETRON 4 MG: 2 INJECTION INTRAMUSCULAR; INTRAVENOUS at 13:10

## 2021-01-03 RX ADMIN — NALOXONE HYDROCHLORIDE 2 MG: 1 INJECTION PARENTERAL at 13:31

## 2021-01-03 RX ADMIN — SODIUM CHLORIDE 1000 ML: 9 INJECTION, SOLUTION INTRAVENOUS at 13:09

## 2021-01-03 NOTE — ED PROVIDER NOTES
[facility]     Emergency Department     Faculty Attestation    I performed a history and physical examination of the patient and discussed management with the resident. I reviewed the resident´s note and agree with the documented findings and plan of care. Any areas of disagreement are noted on the chart. I was personally present for the key portions of any procedures. I have documented in the chart those procedures where I was not present during the key portions. I have reviewed the emergency nurses triage note. I agree with the chief complaint, past medical history, past surgical history, allergies, medications, social and family history as documented unless otherwise noted below. For Physician Assistant/ Nurse Practitioner cases/documentation I have personally evaluated this patient and have completed at least one if not all key elements of the E/M (history, physical exam, and MDM). Additional findings are as noted. History of seizures, history of drug abuse, patient has opening her eyes to sternal rub and loud name-calling, pupils are 1 mm and reactive, no meningeal signs, chest clear, heart exam normal.  Abrasion left frontal temporal area.      Zuleima Tang MD  01/03/21 [id number]

## 2021-01-03 NOTE — ED PROVIDER NOTES
FACULTY SIGN-OUT  ADDENDUM     Care of this patient was assumed from previous attending physician. The patient's initial evaluation and plan have been discussed with the prior provider who initially evaluated the patient. Attestation  I was available and discussed any additional care issues that arose and coordinated the management plans with the resident(s) caring for the patient during my duty period. Any areas of disagreement with resident's documentation of care or procedures are noted on the chart. I was personally present for the key portions of any/all procedures, during my duty period. I have documented in the chart those procedures where I was not present during the key portions. ED COURSE      The patient was given the following medications:  Orders Placed This Encounter   Medications    naloxone (NARCAN) injection 0.4 mg    0.9 % sodium chloride bolus    ondansetron (ZOFRAN) injection 4 mg    ondansetron (ZOFRAN) 4 MG/2ML injection     Lexy Roberts: cabinet override    naloxone (NARCAN) injection 2 mg    dextrose 50 % IV solution       RECENT VITALS:   Temp: 98 °F (36.7 °C), Pulse: 78, Resp: 16, BP: 112/76    MEDICAL DECISION MAKING        Khurram Turk is a 50 y.o. female who presents to the Emergency Department with complaints of altered mental status confused. There is a differential was seizure versus drug overdose as the patient does have a history of seizure order. However the patient did respond to Narcan and eventually admitted to use of heroin. CT scan and lab work are pending at this time. Will reassess for improving mental status. Fco Guan MD, F.A.C.E.P.   Attending Emergency Physician    (Please note that portions of this note were completed with a voice recognition program.  Efforts were made to edit the dictations but occasionally words are mis-transcribed.)         Fco Guan MD  01/03/21 [id number]

## 2021-01-03 NOTE — ED NOTES
Pt to ED cc lethargic x4 hours after drinking and smoking crack last night  Pt was given 1.5 mg Narcan, 1/2 amp dextrose for BS if 64 per EMS   Pt currently only responsive to [painful stimuli  Pt denies any drug use at this time, not able to answer questions   Pt placed on continuous cardiac monitoring, BP, Pulse ox. EKG obtained. IV established and labs drawn. Pt alert and oriented x4, talking in complete sentences, respirations even and unlabored. Pt acting age appropriate.  White board updated,      Samson Salas RN  01/03/21 [id number]

## 2021-01-03 NOTE — ED PROVIDER NOTES
There is no acute fracture or traumatic malalignment. DEGENERATIVE CHANGES: Mild degenerative and degenerative disc changes are present throughout the cervical spine, slightly more pronounced at C3-C4. C3-C4 neural foraminal narrowing is present. The remainder of the neural foramina are patent. No gross bony canal stenosis is evident. SOFT TISSUES: There is no prevertebral soft tissue swelling. Included lung apices are unremarkable. No acute abnormality of the cervical spine. Degenerative changes as above. RECENT VITALS:     Temp: 98 °F (36.7 °C),  Pulse: 62, Resp: 14, BP: (!) 123/104, SpO2: 98 %    ED Course as of Jan 03 2140   Sun Jan 03, 2021   1551 Ethanol percent(!): 0.183 [MS]   1622 BG 64 for EMS got 1 amp D50    [MS]   5478 Blood glucose check at 1600 showing blood sugar of 72. Patient reassessed. Continuing to remain somnolent however does awaken and is alert to place but not time. Complaining of generalized muscle aches. Will get CK myoglobin to rule out cocaine induced rhabdo. Recheck blood glucose approximately an hour hour and half. If continuing to drop admission. [MS]   1708 Patient reassessed. Much more alert awake. Blood sugar remained stable at 71. Will discharge. [MS]   1725 Attempted to ambulate at bedside. Patient denies any opiate or heroin use but does endorse multiple doses of Ativan. Will continue to observe this patient is much more alert however still very unsteady on her feet. [MS]      ED Course User Index  [MS] Ezequiel Hernandez DO       This patient is a 50 y.o. Female with concern for opiate overdose. Has heroin use history. Patient does admit to heroin use today. Patient has received a total of approximately 6 mg of Narcan between EMS and hospital.  Will continue to observe until mental status improves. Also intoxicated with ethanol of 183, admitted to cocaine use as well. Head CT and C-spine CT due to external signs of trauma. Negative. Awaiting lithium and Trileptal level. Lithium normal.  Patient observed for approximately 6 hours total.  Discussion had with patient when she was more alert and she denies any opiate use but said she did take multiple doses of 2 mg Ativan tablets. Ambulated at bedside. No nausea no vomiting. Alert and oriented. Will discharge. OUTSTANDING TASKS / RECOMMENDATIONS:    1. Discharge with mental status improvement     FINAL IMPRESSION:     1.  Polysubstance abuse ([facility] [address]        DISPOSITION:         DISPOSITION:  [x]  Discharge   []  Transfer -    []  Admission -     []  Against Medical Advice   []  Eloped   FOLLOW-UP: Estuardo Reddy MD  [address] [place], [place] [address] [phone]    Schedule an appointment as soon as possible for a visit        DISCHARGE MEDICATIONS: Discharge Medication List as of 1/3/2021  6:54 PM             Arturo Bonner DO  Emergency Medicine Resident  [facility] [place]  Resident  01/03/21 3051

## 2021-01-03 NOTE — ED NOTES
Bed: 15  Expected date:   Expected time:   Means of arrival:   Comments:  BE[id number] ELMO Felix RN  01/03/21 [id number]

## 2021-01-03 NOTE — ED PROVIDER NOTES
[facility]  ED  Emergency Department Encounter  Emergency Medicine Resident     Pt Name: Tu Jiménez  MRN: [id number]  [place] 1975  Date of evaluation: 1/3/21  PCP:  Amanda Sapp MD    [address] [place] [address]       Chief Complaint   Patient presents with    Altered Mental Status       HISTORY OFPRESENT ILLNESS  (Location/Symptom, Timing/Onset, Context/Setting, Quality, Duration, Modifying Factors,Severity.)      Tu Jiménez is a 51 yo female who presents with altered mental status. EMS was called by patient's friend who states that she was altered. EMS did give 1.5 mg of Narcan without any change in mental status. Patient is lethargic at this time and unable to provide any history however vital signs are normal except for SPO2 of 92%. Patient does have a history of seizures in the past.    PAST MEDICAL / SURGICAL / SOCIAL / FAMILY HISTORY      has a past medical history of Alcoholic hepatitis without ascites, Alcoholic ketoacidosis, Alcoholism ([address] Anxiety, Asthma, Bipolar affective disorder ([address] Depression, Drug overdose, accidental or unintentional, initial encounter, Drug overdose, intentional self-harm, initial encounter ([address] Lisa coma scale total score 3-8 ([address] Hepatitis C antibody positive in blood, History of gastric surgery (gastric sleeve 2012), Intentional drug overdose ([address] MDD (major depressive disorder), recurrent episode ([address] Pneumonia, Polysubstance abuse ([address] Post traumatic stress disorder, Seizure ([address] Smoker unmotivated to quit, Suicide attempt ([address] Suicide ideation, and Toxic metabolic encephalopathy. has a past surgical history that includes Hysterectomy; Carpal tunnel release; Hysterectomy, total abdominal; Cholecystectomy; Stomach surgery; and Upper gastrointestinal endoscopy (N/A, 6/19/2019).      Social History     Socioeconomic History    Marital status: Single     Spouse name: Not on file    Number of children: 0    Years of education: 15    Highest education level: Not on file   Occupational History    Not on file   Social Needs    Financial resource strain: Not on file    Food insecurity     Worry: Not on file     Inability: Not on file    Transportation needs     Medical: Not on file     Non-medical: Not on file   Tobacco Use    Smoking status: Current Every Day Smoker     Packs/day: 1.00     Years: 30.00     Pack years: 30.00     Types: Cigarettes    Smokeless tobacco: Never Used   Substance and Sexual Activity    Alcohol use: Yes     Alcohol/week: 42.0 standard drinks     Types: 42 Cans of beer per week     Comment: detoxing of alcohol x5 days    Drug use: Yes     Frequency: 7.0 times per week     Types: Marijuana, IV, Cocaine     Comment: drug abuse includes, cocaine, IV heroin, cannabis    Sexual activity: Not Currently   Lifestyle    Physical activity     Days per week: Not on file     Minutes per session: Not on file    Stress: Not on file   Relationships    Social connections     Talks on phone: Not on file     Gets together: Not on file     Attends Taoism service: Not on file     Active member of club or organization: Not on file     Attends meetings of clubs or organizations: Not on file     Relationship status: Not on file    Intimate partner violence     Fear of current or ex partner: Not on file     Emotionally abused: Not on file     Physically abused: Not on file     Forced sexual activity: Not on file   Other Topics Concern    Not on file   Social History Narrative    ** Merged History Encounter **         ** Merged History Encounter **            Family History   Problem Relation Age of Onset    Brain Cancer Mother     Cancer Mother         \"female cancer\"    Heart Disease Father         Allergies:  Morphine, Morphine and related, Azithromycin, Morphine, Nsaids, and Zithromax [azithromycin]    Home Medications:  Prior to Admission medications    Medication Sig Start Date End Date Taking? ROS: Mental status change       PHYSICAL EXAM   (up to 7 for level 4, 8 or more forlevel 5)      INITIAL VITALS:   ED Triage Vitals   BP Temp Temp Source Pulse Resp SpO2 Height Weight   01/03/21 1258 01/03/21 1300 01/03/21 1300 01/03/21 1258 01/03/21 1258 01/03/21 1258 -- 01/03/21 1258   113/75 98 °F (36.7 °C) Axillary 70 18 92 %  130 lb (59 kg)       Physical Exam  Vitals signs and nursing note reviewed. Constitutional:       Comments: Patient initially somnolent and only mumbling with sternal rub however she did answer yes/no questions appropriately after Narcan administration. HENT:      Head: Normocephalic. Comments: Small abrasion to the left forehead. Eyes:      Extraocular Movements: Extraocular movements intact. Comments: Pupils are small and symmetric and responsive however no longer pinpoint after Narcan administration. Neck:      Musculoskeletal: Normal range of motion and neck supple. Cardiovascular:      Rate and Rhythm: Normal rate and regular rhythm. Heart sounds: No murmur. No gallop. Pulmonary:      Effort: Pulmonary effort is normal. No respiratory distress. Breath sounds: Wheezing present. No rales. Abdominal:      General: There is no distension. Palpations: Abdomen is soft. Tenderness: There is no abdominal tenderness. Skin:     General: Skin is warm and dry. Neurological:      Mental Status: She is lethargic. GCS: GCS eye subscore is 2. GCS verbal subscore is 4. GCS motor subscore is 5.          DIFFERENTIAL  DIAGNOSIS     PLAN (LABS / IMAGING / EKG):  Orders Placed This Encounter   Procedures    CT HEAD WO CONTRAST    CT CERVICAL SPINE WO CONTRAST    BASIC METABOLIC PANEL    TOX SCR, BLD, ED    PREVIOUS SPECIMEN    CBC Auto Differential    SPECIMEN REJECTION    Oxcarbazepine Level    Lithium Level    CK    Myoglobin, Serum    POC Glucose Fingerstick    POC Glucose Fingerstick    POC Glucose Fingerstick    POC Glucose Fingerstick    POCT glucose    POC Glucose Fingerstick    POC Glucose Fingerstick       MEDICATIONS ORDERED:  Orders Placed This Encounter   Medications    naloxone (NARCAN) injection 0.4 mg    0.9 % sodium chloride bolus    ondansetron (ZOFRAN) injection 4 mg    ondansetron (ZOFRAN) 4 MG/2ML injection     Delfina Mendez: cabinet override    naloxone (NARCAN) injection 2 mg    dextrose 50 % IV solution       DDX: Drug overdose, postictal, trauma    Initial MDM/Plan/ED course: 50 y.o. female who presents with heroin overdose. On exam initially patient is somnolent with GCS of 11 and intermittent desaturations on SPO2. Patient treated with 2 mg of Narcan with improvement, however patient still not to baseline but she is maintaining her airway. Other vitals are normal and she is in no acute distress. Physical exam reveals an abrasion on the left forehead but no other signs of trauma and no basilar skull fracture signs, no tenderness to the cervical spine, heart lung sounds normal, abdomen soft and nontender. Basic labs, Trileptal level, lithium level, CT scans of the head and neck ordered. Work-up thus far unremarkable however troponin levels lithium level are still pending. I did speak to the patient's wife on the phone who states that she has been using heroin, alcohol, crack cocaine. She denies any witnessed seizures today. Plan to await clinical sobriety before discharge. Drug levels still pending at this time. Signed out to Dr. Surya Schuster discussed history, physical, work up and plan with him. Please see his note for further information.        DIAGNOSTIC RESULTS / EMERGENCY DEPARTMENT COURSE / MDM     LABS:  Labs Reviewed   BASIC METABOLIC PANEL - Abnormal; Notable for the following components:       Result Value    BUN 3 (*)     CO2 19 (*)     All other components within normal limits   TOX SCR, BLD, ED - Abnormal; Notable for the following components:    Acetaminophen Level <5 (*)     Ethanol 183 (*)     Ethanol percent 6.439 (*)     Salicylate Lvl <1 (*)     All other components within normal limits   CBC WITH AUTO DIFFERENTIAL - Abnormal; Notable for the following components:    RBC 3.93 (*)     Hemoglobin 9.0 (*)     Hematocrit 30.3 (*)     MCV 77.1 (*)     MCH 22.9 (*)     RDW 19.5 (*)     Lymphocytes 47 (*)     All other components within normal limits   LITHIUM LEVEL - Abnormal; Notable for the following components:    Lithium Lvl 0.5 (*)     All other components within normal limits   POC GLUCOSE FINGERSTICK - Abnormal; Notable for the following components:    POC Glucose 106 (*)     All other components within normal limits   POC GLUCOSE FINGERSTICK - Abnormal; Notable for the following components:    POC Glucose 126 (*)     All other components within normal limits   SPECIMEN REJECTION   CK   MYOGLOBIN, SERUM   PREVIOUS SPECIMEN   OXCARBAZEPINE LEVEL   POC GLUCOSE FINGERSTICK   POC GLUCOSE FINGERSTICK   POC GLUCOSE FINGERSTICK   POC GLUCOSE FINGERSTICK   POCT GLUCOSE         RADIOLOGY:  Ct Head Wo Contrast    Result Date: 1/3/2021  EXAMINATION: CT OF THE HEAD WITHOUT CONTRAST  1/3/2021 11:40 am TECHNIQUE: CT of the head was performed without the administration of intravenous contrast. Dose modulation, iterative reconstruction, and/or weight based adjustment of the mA/kV was utilized to reduce the radiation dose to as low as reasonably achievable. COMPARISON: 11/2/2020 HISTORY: ORDERING SYSTEM PROVIDED HISTORY: poss head trauma TECHNOLOGIST PROVIDED HISTORY: poss head trauma Is the patient pregnant?->No Reason for Exam: poss head trauma FINDINGS: BRAIN/VENTRICLES: There is no acute intracranial hemorrhage, mass effect or midline shift. No abnormal extra-axial fluid collection. The gray-white differentiation is maintained without evidence of an acute infarct. There is no evidence of hydrocephalus. ORBITS: The visualized portion of the orbits demonstrate no acute abnormality.  SINUSES: Cyst or polyp in the lower right maxillary sinus. The remaining visualized paranasal sinuses and mastoid air cells demonstrate no acute abnormality. SOFT TISSUES/SKULL:  No acute abnormality of the visualized skull or soft tissues. No acute intracranial abnormality. Ct Cervical Spine Wo Contrast    Result Date: 1/3/2021  EXAMINATION: CT OF THE CERVICAL SPINE WITHOUT CONTRAST 1/3/2021 2:40 pm TECHNIQUE: CT of the cervical spine was performed without the administration of intravenous contrast. Multiplanar reformatted images are provided for review. Dose modulation, iterative reconstruction, and/or weight based adjustment of the mA/kV was utilized to reduce the radiation dose to as low as reasonably achievable. COMPARISON: 8 July 2020 HISTORY: ORDERING SYSTEM PROVIDED HISTORY: poss trauma TECHNOLOGIST PROVIDED HISTORY: poss trauma Is the patient pregnant?->No Reason for Exam: poss head trauma FINDINGS: BONES/ALIGNMENT: There is no acute fracture or traumatic malalignment. DEGENERATIVE CHANGES: Mild degenerative and degenerative disc changes are present throughout the cervical spine, slightly more pronounced at C3-C4. C3-C4 neural foraminal narrowing is present. The remainder of the neural foramina are patent. No gross bony canal stenosis is evident. SOFT TISSUES: There is no prevertebral soft tissue swelling. Included lung apices are unremarkable. No acute abnormality of the cervical spine. Degenerative changes as above. EKG      All EKG's are interpreted by the [facility] Physician who either signs or Co-signs this chart in the absence of a cardiologist.      PROCEDURES:  None    CONSULTS:  None    CRITICAL CARE:  Please see attending note    FINAL IMPRESSION      1.  Polysubstance abuse [facility])          DISPOSITION / PLAN     DISPOSITION Decision To Discharge 01/03/2021 06:53:30 PM      PATIENT REFERRED TO:  Ramon Grajeda MD  [address] [place] [address]  [phone]    Schedule an appointment as soon as possible for a visit         DISCHARGE MEDICATIONS:  Discharge Medication List as of 1/3/2021  6:54 PM          Aditya Feldman DO  Emergency Medicine Resident    (Please note that portions of this note were completed with a voice recognition program.Efforts were made to edit the dictations but occasionally words are mis-transcribed.)       Ofe Couch DO  Resident  01/03/21 0176

## 2021-01-04 NOTE — ED NOTES
Pt ambulatory with steady gait, alert and oriented x4 and answering all questions appropriately      Mohsen Merchant RN  01/03/21 3134

## 2021-01-06 LAB — OXCARBAZEPINE: 2 UG/ML (ref 3–35)

## 2021-05-07 ENCOUNTER — HOSPITAL ENCOUNTER (EMERGENCY)
Age: 46
Discharge: HOME | End: 2021-05-07
Attending: EMERGENCY MEDICINE
Payer: MEDICARE

## 2021-05-07 VITALS
RESPIRATION RATE: 10 BRPM | TEMPERATURE: 97.5 F | DIASTOLIC BLOOD PRESSURE: 79 MMHG | SYSTOLIC BLOOD PRESSURE: 112 MMHG | OXYGEN SATURATION: 96 % | HEART RATE: 71 BPM

## 2021-05-07 DIAGNOSIS — F10.920 ACUTE ALCOHOLIC INTOXICATION WITHOUT COMPLICATION (HCC): ICD-10-CM

## 2021-05-07 DIAGNOSIS — R10.12 LEFT UPPER QUADRANT ABDOMINAL PAIN: Primary | ICD-10-CM

## 2021-05-07 LAB
ABSOLUTE EOS #: 0.41 K/UL (ref 0–0.4)
ABSOLUTE IMMATURE GRANULOCYTE: 0 K/UL (ref 0–0.3)
ABSOLUTE LYMPH #: 3.53 K/UL (ref 1–4.8)
ABSOLUTE MONO #: 0.82 K/UL (ref 0.1–0.8)
ALBUMIN SERPL-MCNC: 3.9 G/DL (ref 3.5–5.2)
ALBUMIN/GLOBULIN RATIO: 1 (ref 1–2.5)
ALP BLD-CCNC: 71 U/L (ref 35–104)
ALT SERPL-CCNC: 77 U/L (ref 5–33)
ANION GAP SERPL CALCULATED.3IONS-SCNC: 10 MMOL/L (ref 9–17)
AST SERPL-CCNC: 114 U/L
BASOPHILS # BLD: 0 % (ref 0–2)
BASOPHILS ABSOLUTE: 0 K/UL (ref 0–0.2)
BILIRUB SERPL-MCNC: 0.22 MG/DL (ref 0.3–1.2)
BUN BLDV-MCNC: 6 MG/DL (ref 6–20)
BUN/CREAT BLD: ABNORMAL (ref 9–20)
CALCIUM SERPL-MCNC: 8.6 MG/DL (ref 8.6–10.4)
CHLORIDE BLD-SCNC: 107 MMOL/L (ref 98–107)
CO2: 23 MMOL/L (ref 20–31)
CREAT SERPL-MCNC: 0.61 MG/DL (ref 0.5–0.9)
DIFFERENTIAL TYPE: ABNORMAL
EOSINOPHILS RELATIVE PERCENT: 6 % (ref 1–4)
ETHANOL PERCENT: 0.24 %
ETHANOL: 238 MG/DL
GFR AFRICAN AMERICAN: >60 ML/MIN
GFR NON-AFRICAN AMERICAN: >60 ML/MIN
GFR SERPL CREATININE-BSD FRML MDRD: ABNORMAL ML/MIN/{1.73_M2}
GFR SERPL CREATININE-BSD FRML MDRD: ABNORMAL ML/MIN/{1.73_M2}
GLUCOSE BLD-MCNC: 120 MG/DL (ref 70–99)
HCT VFR BLD CALC: 39.8 % (ref 36.3–47.1)
HEMOGLOBIN: 11.2 G/DL (ref 11.9–15.1)
IMMATURE GRANULOCYTES: 0 %
LIPASE: 87 U/L (ref 13–60)
LYMPHOCYTES # BLD: 52 % (ref 24–44)
MAGNESIUM: 2.3 MG/DL (ref 1.6–2.6)
MCH RBC QN AUTO: 21.9 PG (ref 25.2–33.5)
MCHC RBC AUTO-ENTMCNC: 28.1 G/DL (ref 28.4–34.8)
MCV RBC AUTO: 77.7 FL (ref 82.6–102.9)
MONOCYTES # BLD: 12 % (ref 1–7)
MORPHOLOGY: ABNORMAL
NRBC AUTOMATED: 0 PER 100 WBC
PDW BLD-RTO: 19.5 % (ref 11.8–14.4)
PLATELET # BLD: 274 K/UL (ref 138–453)
PLATELET ESTIMATE: ABNORMAL
PMV BLD AUTO: 9.4 FL (ref 8.1–13.5)
POTASSIUM SERPL-SCNC: 3.1 MMOL/L (ref 3.7–5.3)
RBC # BLD: 5.12 M/UL (ref 3.95–5.11)
RBC # BLD: ABNORMAL 10*6/UL
SEG NEUTROPHILS: 30 % (ref 36–66)
SEGMENTED NEUTROPHILS ABSOLUTE COUNT: 2.04 K/UL (ref 1.8–7.7)
SODIUM BLD-SCNC: 140 MMOL/L (ref 135–144)
TOTAL PROTEIN: 7.7 G/DL (ref 6.4–8.3)
WBC # BLD: 6.8 K/UL (ref 3.5–11.3)
WBC # BLD: ABNORMAL 10*3/UL

## 2021-05-07 PROCEDURE — 99285 EMERGENCY DEPT VISIT HI MDM: CPT

## 2021-05-07 PROCEDURE — 85025 COMPLETE CBC W/AUTO DIFF WBC: CPT

## 2021-05-07 PROCEDURE — 83735 ASSAY OF MAGNESIUM: CPT

## 2021-05-07 PROCEDURE — G0480 DRUG TEST DEF 1-7 CLASSES: HCPCS

## 2021-05-07 PROCEDURE — 83690 ASSAY OF LIPASE: CPT

## 2021-05-07 PROCEDURE — 6370000000 HC RX 637 (ALT 250 FOR IP): Performed by: STUDENT IN AN ORGANIZED HEALTH CARE EDUCATION/TRAINING PROGRAM

## 2021-05-07 PROCEDURE — 80053 COMPREHEN METABOLIC PANEL: CPT

## 2021-05-07 RX ORDER — POTASSIUM CHLORIDE 20 MEQ/1
40 TABLET, EXTENDED RELEASE ORAL ONCE
Status: COMPLETED | OUTPATIENT
Start: 2021-05-07 | End: 2021-05-07

## 2021-05-07 RX ADMIN — POTASSIUM CHLORIDE 40 MEQ: 1500 TABLET, EXTENDED RELEASE ORAL at 08:28

## 2021-05-07 ASSESSMENT — ENCOUNTER SYMPTOMS
DIARRHEA: 0
COUGH: 0
SHORTNESS OF BREATH: 0
VOMITING: 1
NAUSEA: 1
BACK PAIN: 0
ABDOMINAL PAIN: 1
RHINORRHEA: 0

## 2021-05-07 ASSESSMENT — PAIN SCALES - GENERAL: PAINLEVEL_OUTOF10: 8

## 2021-05-07 NOTE — ED PROVIDER NOTES
[facility] ED  Emergency Department Encounter  Emergency Medicine Resident     Pt Name: Aileen Gutierrez  MRN: [id number]  [place] 1975  Date of evaluation: 5/7/21  PCP:  Shakeel Patel MD    [address] [place] [address]       Chief Complaint   Patient presents with    Abdominal Pain    Alcohol Intoxication    Emesis       HISTORY OFPRESENT ILLNESS  (Location/Symptom, Timing/Onset, Context/Setting, Quality, Duration, Modifying Factors,Severity.)      Aileen Gutierrez is a 50 y.o. female who presents via EMS with left upper quadrant pain and alcohol intoxication. Patient states approximately 8 hours ago, she developed left upper quadrant pain that has been persistent. No history of similar pain. Patient reports very distant history of pancreatitis, but 20 years ago. She uses alcohol daily, stating she usually drinks 1 gallon of vodka and 3 large beers, and this is consistent with what she is doing today. She has been nauseous and has vomited 3 times today. No blood in the vomit. No diarrhea or constipation. No other symptoms at this time. Patient states she is suicidal and has a plan to overdose on her pills. She has attempted suicide in the past, approximately 1 month ago with this plan. She is also homicidal by report, does not feel safe at home living with her girlfriend.      PAST MEDICAL / SURGICAL / SOCIAL / FAMILY HISTORY      has a past medical history of Alcoholic hepatitis without ascites, Alcoholic ketoacidosis, Alcoholism ([address] Anxiety, Asthma, Bipolar affective disorder ([address] Depression, Drug overdose, accidental or unintentional, initial encounter, Drug overdose, intentional self-harm, initial encounter ([address] Lisa coma scale total score 3-8 ([address] Hepatitis C antibody positive in blood, History of gastric surgery (gastric sleeve 2012), Intentional drug overdose ([address] MDD (major depressive disorder), recurrent episode ([address] Pneumonia, Polysubstance abuse ([address] Post traumatic stress disorder, Seizure ([facility] Smoker unmotivated to quit, Suicide attempt [facility]), Suicide ideation, and Toxic metabolic encephalopathy. has a past surgical history that includes Hysterectomy; Carpal tunnel release; Hysterectomy, total abdominal; Cholecystectomy; Stomach surgery; and Upper gastrointestinal endoscopy (N/A, 6/19/2019). Social History     Socioeconomic History    Marital status: Single     Spouse name: Not on file    Number of children: 0    Years of education: 15    Highest education level: Not on file   Occupational History    Not on file   Social Needs    Financial resource strain: Not on file    Food insecurity     Worry: Not on file     Inability: Not on file   [employer] needs     Medical: Not on file     Non-medical: Not on file   Tobacco Use    Smoking status: Current Every Day Smoker     Packs/day: 1.00     Years: 30.00     Pack years: 30.00     Types: Cigarettes    Smokeless tobacco: Never Used   Substance and Sexual Activity    Alcohol use:  Yes     Alcohol/week: 42.0 standard drinks     Types: 42 Cans of beer per week     Comment: detoxing of alcohol x5 days    Drug use: Yes     Frequency: 7.0 times per week     Types: Marijuana, IV, Cocaine     Comment: drug abuse includes, cocaine, IV heroin, cannabis    Sexual activity: Not Currently   Lifestyle    Physical activity     Days per week: Not on file     Minutes per session: Not on file    Stress: Not on file   Relationships    Social connections     Talks on phone: Not on file     Gets together: Not on file     Attends Mormonism service: Not on file     Active member of club or organization: Not on file     Attends meetings of clubs or organizations: Not on file     Relationship status: Not on file    Intimate partner violence     Fear of current or ex partner: Not on file     Emotionally abused: Not on file     Physically abused: Not on file     Forced sexual activity: Not on file   Other Topics Concern    Not on file   Social History Narrative    ** Merged History Encounter **         ** Merged History Encounter **            Family History   Problem Relation Age of Onset    Brain Cancer Mother     Cancer Mother         \"female cancer\"    Heart Disease Father         Allergies:  Morphine, Morphine and related, Azithromycin, Morphine, Nsaids, and Zithromax [azithromycin]    Home Medications:  Prior to Admission medications    Medication Sig Start Date End Date Taking?  Authorizing Provider   traZODone (DESYREL) 50 MG tablet Take 50 mg by mouth nightly    Historical Provider, MD   hydrOXYzine (ATARAX) 50 MG tablet Take 1 tablet by mouth 2 times daily as needed for Anxiety 7/10/20   Judge Mikhail MD   albuterol sulfate  (90 Base) MCG/ACT inhaler Inhale 2 puffs into the lungs every 4 hours as needed for Wheezing 7/10/20   Judge Mikhail MD   OXcarbazepine (TRILEPTAL) 150 MG tablet Take 1 tablet by mouth 2 times daily  Patient taking differently: Take 150 mg by mouth every morning  7/10/20   Judge Mikhail MD   venlafaxine (EFFEXOR XR) 75 MG extended release capsule Take 1 capsule by mouth daily (with breakfast) 7/10/20   Judge Mikhail MD   ondansetron (ZOFRAN ODT) 4 MG disintegrating tablet Take 1 tablet by mouth every 8 hours as needed for Nausea  Patient not taking: Reported on 9/1/2020 7/10/20   Judge Mikhail MD   atorvastatin (LIPITOR) 40 MG tablet Take 1 tablet by mouth nightly 7/10/20   Judge Mikhail MD   lithium (ESKALITH) 450 MG extended release tablet Take 1 tablet by mouth 2 times daily 7/10/20   Judge Mikhail MD   folic acid (FOLVITE) 1 MG tablet Take 1 tablet by mouth daily 7/11/20   Judge Mikhail MD   fluticasone (FLONASE) 50 MCG/ACT nasal spray 1 spray by Each Nostril route daily 7/10/20   Judge Mikhail MD   sodium chloride (OCEAN) 0.65 % nasal spray 1 spray by Nasal route as needed for Congestion 7/10/20   Judge Mikhail MD   Multiple Vitamins-Minerals (MULTIVITAMIN ADULT) TABS Take 1 tablet by mouth daily 7/10/20   Kailash Kim MD   dicyclomine (BENTYL) 10 MG capsule Take 1 capsule by mouth every 6 hours as needed (cramps) 7/10/20   Kailash Kim MD   omeprazole (PRILOSEC) 20 MG delayed release capsule Take 1 capsule by mouth 2 times daily 7/10/20   Kailash Kim MD   thiamine 100 MG tablet Take 1 tablet by mouth daily 7/10/20   Kailash Kim MD       REVIEW OFSYSTEMS    (2-9 systems for level 4, 10 or more for level 5)      Review of Systems   Constitutional: Negative for fever. HENT: Negative for congestion and rhinorrhea. Eyes: Negative for visual disturbance. Respiratory: Negative for cough and shortness of breath. Cardiovascular: Negative for chest pain. Gastrointestinal: Positive for abdominal pain, nausea and vomiting. Negative for diarrhea. Musculoskeletal: Negative for back pain and neck pain. Skin: Negative for rash. Neurological: Negative for weakness, numbness and headaches. PHYSICAL EXAM   (up to 7 for level 4, 8 or more forlevel 5)      INITIAL VITALS:   ED Triage Vitals [05/07/21 0245]   BP Temp Temp Source Pulse Resp SpO2 Height Weight   112/79 97.5 °F (36.4 °C) Oral 71 10 96 % -- --       Physical Exam  Constitutional:       General: She is not in acute distress. Appearance: Normal appearance. She is normal weight. She is not ill-appearing, toxic-appearing or diaphoretic. HENT:      Head: Normocephalic and atraumatic. Nose: Nose normal.      Mouth/Throat:      Mouth: Mucous membranes are moist.      Pharynx: Oropharynx is clear. No oropharyngeal exudate or posterior oropharyngeal erythema. Eyes:      Extraocular Movements: Extraocular movements intact. Pupils: Pupils are equal, round, and reactive to light. Neck:      Musculoskeletal: Normal range of motion and neck supple. Cardiovascular:      Rate and Rhythm: Normal rate and regular rhythm. Heart sounds: Normal heart sounds. No murmur.    Pulmonary:      Effort: Pulmonary effort is normal. No respiratory distress. Breath sounds: Normal breath sounds. No wheezing, rhonchi or rales. Abdominal:      General: There is no distension. Palpations: Abdomen is soft. Tenderness: There is no abdominal tenderness. There is no guarding or rebound. Musculoskeletal: Normal range of motion. General: No tenderness. Right lower leg: No edema. Left lower leg: No edema. Skin:     General: Skin is warm and dry. Neurological:      General: No focal deficit present. Mental Status: She is alert and oriented to person, place, and time. Motor: No weakness. Psychiatric:         Mood and Affect: Mood normal.         DIFFERENTIAL  DIAGNOSIS     PLAN (LABS / IMAGING / EKG):  Orders Placed This Encounter   Procedures    CBC Auto Differential    Comprehensive Metabolic Panel w/ Reflex to MG    Lipase    Magnesium    Ethanol    Diet NPO Effective Now    Suicide precautions       MEDICATIONS ORDERED:  Orders Placed This Encounter   Medications    potassium chloride (KLOR-CON M) extended release tablet 40 mEq     Initial MDM/Plan/ED COURSE:    50 y.o. female who presents with alcohol intoxication and left upper quadrant abdominal pain. On exam, patient is very intoxicated and falling asleep during almost every question I asked her. Her vitals are all within normal limits otherwise. Heart is regular rate and rhythm, lungs clear to auscultation bilaterally. Patient does not appear to have any neurologic deficits, but is intoxicated and exam is difficult to obtain. She has tenderness palpation in the epigastric and left upper quadrant regions. No rebound or guarding. No overlying skin changes. No other focal findings on exam.  Of note, patient also reports being suicidal with a plan to overdose on her medications. She also states she wants to hurt other people and does not feel safe at home at times.     We will begin work-up with CBC, CMP, lipase, and ethanol level. Sitter assigned to the room for suicidal ideation. We will continue to monitor, and and we will also reassess her suicidal ideas when patient is sober based on ethanol level. Was not updated by the nurse that patient was desaturating due to sleep apnea and snoring. Patient was placed on nonrebreather, but still desatted at times. C-collar was applied to maintain airway alignment and patient maintained saturations. She also continues to sleep. Patient was reevaluated around 7 AM and 8 AM when she was at sober level. At this time, she denies any suicidal ideation, plan to hurt her self, or any homicidal ideations. We discussed discharge home and resources she has available for psychiatric assessment. Patient [facility] and I highly encouraged her to call today or follow-up as soon as possible. Patient agreeable to this plan.   Potassium to be replaced here, and she is to be discharged.        :     DIAGNOSTIC RESULTS / EMERGENCYDEPARTMENT COURSE / MDM     LABS:  Labs Reviewed   CBC WITH AUTO DIFFERENTIAL - Abnormal; Notable for the following components:       Result Value    RBC 5.12 (*)     Hemoglobin 11.2 (*)     MCV 77.7 (*)     MCH 21.9 (*)     MCHC 28.1 (*)     RDW 19.5 (*)     Seg Neutrophils 30 (*)     Lymphocytes 52 (*)     Monocytes 12 (*)     Eosinophils % 6 (*)     Absolute Mono # 0.82 (*)     Absolute Eos # 0.41 (*)     All other components within normal limits   COMPREHENSIVE METABOLIC PANEL W/ REFLEX TO MG FOR LOW K - Abnormal; Notable for the following components:    Glucose 120 (*)     Potassium 3.1 (*)     ALT 77 (*)      (*)     Total Bilirubin 0.22 (*)     All other components within normal limits   LIPASE - Abnormal; Notable for the following components:    Lipase 87 (*)     All other components within normal limits   ETHANOL - Abnormal; Notable for the following components:    Ethanol 238 (*)     Ethanol percent 0.238 (*)     All other components within normal limits   MAGNESIUM           No results found. EKG  Not indicated at this time. All EKG's are interpreted by the Emergency Department Physicianwho either signs or Co-signs this chart in the absence of a cardiologist.      PROCEDURES:  None    CONSULTS:  None    CRITICAL CARE:  Please see attending note    FINAL IMPRESSION      1. Left upper quadrant abdominal pain    2. Acute alcoholic intoxication without complication [facility])          DISPOSITION / PLAN     DISPOSITION Decision To Discharge 05/07/2021 08:07:51 AM      PATIENT REFERRED TO:  Barb Santiago MD  [facility] [place] [address]  [phone]    Call today      [facility] ED  [facility]  [phone]    If symptoms worsen    [place] [address] [address] [place] [address]  [phone]    Call today      [facility]  [phone]  Call today  As needed      DISCHARGE MEDICATIONS:  New Prescriptions    No medications on file       Lem Hashimoto, DO  Emergency Medicine Resident    (Please note that portions of this note were completed with a voice recognition program.Efforts were made to edit the dictations but occasionally words are mis-transcribed.)       Lem Hashimoto, DO  Resident  05/07/21 9731

## 2021-05-07 NOTE — ED NOTES
[] Cesar    [] [facility]    [x]  [facility] ASSESSMENT      Y  N     [x] [] In the past two weeks have you had thoughts of hurting yourself in any way? [x] [] In the past two weeks have you had thoughts that you would be better off dead? [x] [] Have you made a suicide attempt in the past two months? [x] [] Do you have a plan for hurting yourself or suicide? [] [x] Presence of hallucinations/voices related to hurting himself or herself or someone else. SUICIDE/SECURITY WATCH PRECAUTION CHECKLIST     Orders    [x]  Suicide/Security Watch Precautions initiated as checked below:   5/7/21 4:21 AM EDT 02/02    [x] Notified physician:  Dorothea Rodriguez MD  5/7/21 4:21 AM EDT    [x] Orders obtained as appropriate:     [x] 1:1 Observer     [] Psych Consult     [] Psych Consult    Name:  Date:  Time:    [x] 1:1 Observer, Notified by:  Diana Monday Nurse Supervisor    [x] Remove all personal clothes from room and place in snap/paper gown/pants. Slipper only    [x] Remove all personal belongings from room and secured away from patient. Documentation    [x] Initiate Suicide/Security Watch Precaution Flow Sheet    [x] Initiate individualized Care Plan/Problem    [x] Document why precautions initiated on flow sheet (Initiate Nursing Care Plan/Problem)    [x] 1:1 Observer in place; instructions provided. Suicide precautions require observer be within arms length. [x] Nurse-Observer Communication Hand-off initiated by RN, reviewed with Observer. Subsequently used as Hand Off between Observers. [x] Initiate every 15 minute observations per observer as delegated by the RN.     [x] Initiate RN assessment and documentation    Environmental Scan  Search Criteria and Process: OPTIONAL, see Search Policy    [] Reason for search:    [] Nursing in presence of second person to search patient    [] Patient notified of reason for body assessment and belongings search:     Persons present during search:   Results of search and disposition:       Searchers Name: [employer]     These items or items similar should be removed from the room:   [x] Chairs   [x] Telephone   [x] Trash cans and liners   [x] Plastic utensils (order Patient Safety tray)   [x] Empty or remove Sharps containers   [x] All personal clothing/belongings removed   [x] All unnecessary lead wires, electrical cords, draw cords, etc.   [x] Flowers and plants   [x] Double check for lighters, matches, razors, any glass items etc that can be used as weapons. Person completing Checklist: Shaina Hammer       GENERAL INFORMATION     Y  N     [x] [] Has the patient been informed that they are on a watch and what that means? [] [x] Can the patient get out of Bed without nursing assistance? [] [x] Can the patient use the restroom without nursing assistance? [] [x] Can the patient walk the halls to [place] their legs? \"   [] [x] Does the patient have metal utensils? [x] [] Have the patient's belongings been placed out of control of the patient? [x] [] Have the patient and his/her belongings been checked for contraband? [x] [] Is the patient under any visitor restrictions? If Yes, explain: SI   [] [x] Is the patient under an alias? [facility] Name:   Authorized visitors (no more than two are to be on the list)   Name/Relationship:   Name/Relationship:    Name of Staff member that you  Received this information from?: [address] Galicia Description:    Debbie Muñoz [birth year] female 50 y.o. Admission weight:      Race: []  [] Black  []   []   [] [place] [] Other  Facial Hair:  [] Yes  [] No  If yes, please describe: Identifying Marks (i.e. Visible tattoos, scars, etc... ):     NURSING CARE PLAN    Nursing Diagnosis: Risk of Self Directed Harm  [] Actual  [x] Potential  Date Started: 5/7/21      Etiological Factors: (related to)  [x] Expressed or implied suicidal ideation/behavior  [] Depression  [] Suicide attempt      [] Low self-esteem  [] Hallucinations      [] Feeling of Hopelessness  [x] Substance abuse or withdrawal    [] Dysfunctional family  [] Major traumatic event, eg., divorce, etc   [] Excessive stress/anxiety    5/7/21    Expected Outcomes    Patient will:   [x] Patient will remain safe for the duration of their stay   [x] Patient's environment will be safe, eg. Free of potential suicide weapons   [] Verbalize Recovery from suicidal episode and improvement in self-worth   [x] Discuss feeling that precipitated suicide attempt/thoughts/behavior   [] Will describe available resources for crisis prevention and management   [] Will verbalize positive coping skills     Nursing Intervention   [x] Assessment and Observations hourly   [x] Suicide Precautions implemented with patient, should be 1:1 observation   [x] Document observation [id number] and RN assessment hourly   [] Consult physician for:    [] Psychiatric consult    [] Pharmacological therapy    [] Other:    [x] Patient search completed by security   [x] Initiated appropriate safety protocols by removing from the patient's environment anything that could be used to inflict self injury, eg. Order safe tray, snap gown, etc   [x] Maintain open, warm, caring, non-judgmental attitude/manner towards patient   [] Discuss advantages and disadvantages of existing coping methods/skills   [x] Assist and educate patient with identifying present strengths and coping skills   [x] Keep patient informed regarding plan of care and provide clear concise explanations. Provide the patient/family education information as well as telephone numbers and other information about crisis centers, hot lines, and counselors.     Discharge Planning:   [] Referral  [] Groups [] Health agencies  [] Other:          Amelia Wilkinson RN  05/07/21 0577

## 2021-05-07 NOTE — ED PROVIDER NOTES
[address] ED  Emergency Department  Faculty Attestation       I performed a history and physical examination of the patient and discussed management with the resident. I reviewed the residents note and agree with the documented findings including all diagnostic interpretations and plan of care. Any areas of disagreement are noted on the chart. I was personally present for the key portions of any procedures. I have documented in the chart those procedures where I was not present during the key portions. I have reviewed the emergency nurses triage note. I agree with the chief complaint, past medical history, past surgical history, allergies, medications, social and family history as documented unless otherwise noted below. Documentation of the HPI, Physical Exam and Medical Decision Making performed by scribes is based on my personal performance of the HPI, PE and MDM. For Physician Assistant/ Nurse Practitioner cases/documentation I have personally evaluated this patient and have completed at least one if not all key elements of the E/M (history, physical exam, and MDM). Additional findings are as noted. Pertinent Comments     Primary Care Physician: Shakeel Patel MD    ED Triage Vitals [05/07/21 0245]   BP Temp Temp Source Pulse Resp SpO2 Height Weight   112/79 97.5 °F (36.4 °C) Oral 71 10 96 % -- --        History/Physical: This is a 50 y.o. female who presents to the Emergency Department with complaint of abdominal pain and suicidal/homicidal ideations. Patient is a chronic alcoholic and is intoxicated. She states that she wanted to overdose on her pills, but did not take anything. She is complaining of abdominal pain. On exam she is tired and intoxicated, but does awaken to voice. She normocephalic/atraumatic. Pupils equal and reactive. Heart rate regular lungs clear auscultation.   Abdomen is soft with tenderness in the epigastric and left upper quadrant, but no rebound or guarding. Patient does have slurred speech, however does answer questions appropriately when she is awoken. No obvious extremity deformities. MDM/Plan:   Patient is intoxicated with a history of chronic alcohol use will check alcohol level  Also complaining of abdominal pain, possible pancreatitis. Will check basic abdominal labs, but she is a nonperitoneal abdomen, low suspicion of any surgical pathology, therefore no imaging of the abdomen indicated unless gross abnormalities on the labs. Is stating that she is suicidal and homicidal, however she states clearly she is not taking anything to harm herself tonight. Patient is protecting her airway when she awakened talking, but does have some sleep apnea. We will keep in her room on a monitor until she is less intoxicated, will reevaluate once patient is sober to determine if she is still suicidal or homicidal.  If patient is no longer endorsing suicidal homicidal ideations, will plan for discharge home. Otherwise we will then proceed with psychiatric admission.       CRITICAL CARE: None     Soledad Chacon MD  Attending Emergency Physician        Soledad Chacon MD  05/07/21 0158

## 2021-05-07 NOTE — ED PROVIDER NOTES
mis-transcribed. )    Hale MD, F.A.C.E.P.   Attending Emergency Physician       Radha Beauchamp MD  05/07/21 [id number]

## 2021-07-17 ENCOUNTER — HOSPITAL ENCOUNTER (INPATIENT)
Age: 46
LOS: 2 days | Discharge: LEFT BEFORE DISCHARGE | DRG: 641 | End: 2021-07-19
Attending: EMERGENCY MEDICINE | Admitting: INTERNAL MEDICINE
Payer: MEDICARE

## 2021-07-17 ENCOUNTER — APPOINTMENT (OUTPATIENT)
Dept: GENERAL RADIOLOGY | Age: 46
DRG: 641 | End: 2021-07-17
Payer: MEDICARE

## 2021-07-17 DIAGNOSIS — J02.9 ACUTE PHARYNGITIS, UNSPECIFIED ETIOLOGY: ICD-10-CM

## 2021-07-17 DIAGNOSIS — H92.03 OTALGIA OF BOTH EARS: Primary | ICD-10-CM

## 2021-07-17 DIAGNOSIS — R74.01 TRANSAMINITIS: ICD-10-CM

## 2021-07-17 DIAGNOSIS — R79.89 ELEVATED LACTIC ACID LEVEL: ICD-10-CM

## 2021-07-17 DIAGNOSIS — R05.9 COUGH: ICD-10-CM

## 2021-07-17 DIAGNOSIS — F10.930 ALCOHOL WITHDRAWAL SYNDROME WITHOUT COMPLICATION (HCC): ICD-10-CM

## 2021-07-17 DIAGNOSIS — E83.42 HYPOMAGNESEMIA: ICD-10-CM

## 2021-07-17 DIAGNOSIS — R11.0 NAUSEA: ICD-10-CM

## 2021-07-17 PROBLEM — B18.2 CHRONIC HEPATITIS C VIRUS INFECTION (HCC): Status: ACTIVE | Noted: 2021-07-17

## 2021-07-17 PROBLEM — F10.10 ALCOHOL ABUSE: Status: ACTIVE | Noted: 2017-04-25

## 2021-07-17 PROBLEM — N17.9 AKI (ACUTE KIDNEY INJURY) (HCC): Status: ACTIVE | Noted: 2021-07-17

## 2021-07-17 PROBLEM — H66.91 RIGHT OTITIS MEDIA: Status: ACTIVE | Noted: 2021-07-17

## 2021-07-17 LAB
ABSOLUTE EOS #: 0 K/UL (ref 0–0.44)
ABSOLUTE IMMATURE GRANULOCYTE: 0.1 K/UL (ref 0–0.3)
ABSOLUTE LYMPH #: 1.2 K/UL (ref 1.1–3.7)
ABSOLUTE MONO #: 0.7 K/UL (ref 0.1–1.2)
ALBUMIN SERPL-MCNC: 4 G/DL (ref 3.5–5.2)
ALBUMIN/GLOBULIN RATIO: 0.8 (ref 1–2.5)
ALP BLD-CCNC: 135 U/L (ref 35–104)
ALT SERPL-CCNC: 62 U/L (ref 5–33)
AMPHETAMINE SCREEN URINE: NEGATIVE
ANION GAP SERPL CALCULATED.3IONS-SCNC: 24 MMOL/L (ref 9–17)
AST SERPL-CCNC: 110 U/L
BARBITURATE SCREEN URINE: NEGATIVE
BASOPHILS # BLD: 0 % (ref 0–2)
BASOPHILS ABSOLUTE: 0 K/UL (ref 0–0.2)
BENZODIAZEPINE SCREEN, URINE: NEGATIVE
BILIRUB SERPL-MCNC: 0.68 MG/DL (ref 0.3–1.2)
BILIRUBIN DIRECT: 0.28 MG/DL
BILIRUBIN URINE: NEGATIVE
BUN BLDV-MCNC: 10 MG/DL (ref 6–20)
BUN/CREAT BLD: ABNORMAL (ref 9–20)
BUPRENORPHINE URINE: NORMAL
CALCIUM SERPL-MCNC: 9 MG/DL (ref 8.6–10.4)
CANNABINOID SCREEN URINE: NEGATIVE
CHLORIDE BLD-SCNC: 96 MMOL/L (ref 98–107)
CO2: 21 MMOL/L (ref 20–31)
COCAINE METABOLITE, URINE: NEGATIVE
COLOR: YELLOW
COMMENT UA: ABNORMAL
CREAT SERPL-MCNC: 1.05 MG/DL (ref 0.5–0.9)
DIFFERENTIAL TYPE: ABNORMAL
DIRECT EXAM: NORMAL
EOSINOPHILS RELATIVE PERCENT: 0 % (ref 1–4)
ETHANOL PERCENT: 0.06 %
ETHANOL: 60 MG/DL
GFR AFRICAN AMERICAN: >60 ML/MIN
GFR NON-AFRICAN AMERICAN: 56 ML/MIN
GFR SERPL CREATININE-BSD FRML MDRD: ABNORMAL ML/MIN/{1.73_M2}
GFR SERPL CREATININE-BSD FRML MDRD: ABNORMAL ML/MIN/{1.73_M2}
GLUCOSE BLD-MCNC: 176 MG/DL (ref 70–99)
GLUCOSE URINE: NEGATIVE
HCG QUALITATIVE: NEGATIVE
HCT VFR BLD CALC: 47.6 % (ref 36.3–47.1)
HEMOGLOBIN: 14 G/DL (ref 11.9–15.1)
IMMATURE GRANULOCYTES: 1 %
INR BLD: 1.1
KETONES, URINE: ABNORMAL
LACTIC ACID, WHOLE BLOOD: 9.9 MMOL/L (ref 0.7–2.1)
LACTIC ACID: ABNORMAL MMOL/L
LEUKOCYTE ESTERASE, URINE: NEGATIVE
LIPASE: 174 U/L (ref 13–60)
LYMPHOCYTES # BLD: 12 % (ref 24–43)
Lab: NORMAL
MAGNESIUM: 1.4 MG/DL (ref 1.6–2.6)
MCH RBC QN AUTO: 22.8 PG (ref 25.2–33.5)
MCHC RBC AUTO-ENTMCNC: 29.4 G/DL (ref 28.4–34.8)
MCV RBC AUTO: 77.5 FL (ref 82.6–102.9)
MDMA URINE: NORMAL
METHADONE SCREEN, URINE: NEGATIVE
METHAMPHETAMINE, URINE: NORMAL
MONOCYTES # BLD: 7 % (ref 3–12)
MORPHOLOGY: ABNORMAL
MORPHOLOGY: ABNORMAL
MYOGLOBIN: 198 NG/ML (ref 25–58)
NITRITE, URINE: NEGATIVE
NRBC AUTOMATED: 0 PER 100 WBC
OPIATES, URINE: NEGATIVE
OXYCODONE SCREEN URINE: NEGATIVE
PARTIAL THROMBOPLASTIN TIME: 24.4 SEC (ref 20.5–30.5)
PDW BLD-RTO: 22 % (ref 11.8–14.4)
PH UA: 7.5 (ref 5–8)
PHENCYCLIDINE, URINE: NEGATIVE
PLATELET # BLD: 416 K/UL (ref 138–453)
PLATELET ESTIMATE: ABNORMAL
PMV BLD AUTO: 8.9 FL (ref 8.1–13.5)
POTASSIUM SERPL-SCNC: 3.8 MMOL/L (ref 3.7–5.3)
PROPOXYPHENE, URINE: NORMAL
PROTEIN UA: NEGATIVE
PROTHROMBIN TIME: 11.7 SEC (ref 9.1–12.3)
RBC # BLD: 6.14 M/UL (ref 3.95–5.11)
RBC # BLD: ABNORMAL 10*6/UL
SARS-COV-2, RAPID: NOT DETECTED
SEG NEUTROPHILS: 80 % (ref 36–65)
SEGMENTED NEUTROPHILS ABSOLUTE COUNT: 8 K/UL (ref 1.5–8.1)
SODIUM BLD-SCNC: 141 MMOL/L (ref 135–144)
SPECIFIC GRAVITY UA: 1.01 (ref 1–1.03)
SPECIMEN DESCRIPTION: NORMAL
SPECIMEN DESCRIPTION: NORMAL
TEST INFORMATION: NORMAL
TOTAL CK: 203 U/L (ref 26–192)
TOTAL PROTEIN: 9.1 G/DL (ref 6.4–8.3)
TRICYCLIC ANTIDEPRESSANTS, UR: NORMAL
TURBIDITY: CLEAR
URINE HGB: NEGATIVE
UROBILINOGEN, URINE: NORMAL
WBC # BLD: 10 K/UL (ref 3.5–11.3)
WBC # BLD: ABNORMAL 10*3/UL

## 2021-07-17 PROCEDURE — 6370000000 HC RX 637 (ALT 250 FOR IP): Performed by: STUDENT IN AN ORGANIZED HEALTH CARE EDUCATION/TRAINING PROGRAM

## 2021-07-17 PROCEDURE — 81003 URINALYSIS AUTO W/O SCOPE: CPT

## 2021-07-17 PROCEDURE — 6360000002 HC RX W HCPCS

## 2021-07-17 PROCEDURE — 83735 ASSAY OF MAGNESIUM: CPT

## 2021-07-17 PROCEDURE — 84703 CHORIONIC GONADOTROPIN ASSAY: CPT

## 2021-07-17 PROCEDURE — 96361 HYDRATE IV INFUSION ADD-ON: CPT

## 2021-07-17 PROCEDURE — 6370000000 HC RX 637 (ALT 250 FOR IP): Performed by: EMERGENCY MEDICINE

## 2021-07-17 PROCEDURE — 80307 DRUG TEST PRSMV CHEM ANLYZR: CPT

## 2021-07-17 PROCEDURE — 85610 PROTHROMBIN TIME: CPT

## 2021-07-17 PROCEDURE — 1200000000 HC SEMI PRIVATE

## 2021-07-17 PROCEDURE — 6370000000 HC RX 637 (ALT 250 FOR IP)

## 2021-07-17 PROCEDURE — 2580000003 HC RX 258: Performed by: STUDENT IN AN ORGANIZED HEALTH CARE EDUCATION/TRAINING PROGRAM

## 2021-07-17 PROCEDURE — 36415 COLL VENOUS BLD VENIPUNCTURE: CPT

## 2021-07-17 PROCEDURE — 82550 ASSAY OF CK (CPK): CPT

## 2021-07-17 PROCEDURE — 85730 THROMBOPLASTIN TIME PARTIAL: CPT

## 2021-07-17 PROCEDURE — 80053 COMPREHEN METABOLIC PANEL: CPT

## 2021-07-17 PROCEDURE — 87880 STREP A ASSAY W/OPTIC: CPT

## 2021-07-17 PROCEDURE — 87635 SARS-COV-2 COVID-19 AMP PRB: CPT

## 2021-07-17 PROCEDURE — 96374 THER/PROPH/DIAG INJ IV PUSH: CPT

## 2021-07-17 PROCEDURE — 87040 BLOOD CULTURE FOR BACTERIA: CPT

## 2021-07-17 PROCEDURE — 99284 EMERGENCY DEPT VISIT MOD MDM: CPT

## 2021-07-17 PROCEDURE — 83605 ASSAY OF LACTIC ACID: CPT

## 2021-07-17 PROCEDURE — 85025 COMPLETE CBC W/AUTO DIFF WBC: CPT

## 2021-07-17 PROCEDURE — 2580000003 HC RX 258

## 2021-07-17 PROCEDURE — 6360000002 HC RX W HCPCS: Performed by: STUDENT IN AN ORGANIZED HEALTH CARE EDUCATION/TRAINING PROGRAM

## 2021-07-17 PROCEDURE — 71046 X-RAY EXAM CHEST 2 VIEWS: CPT

## 2021-07-17 PROCEDURE — 83690 ASSAY OF LIPASE: CPT

## 2021-07-17 PROCEDURE — G0480 DRUG TEST DEF 1-7 CLASSES: HCPCS

## 2021-07-17 PROCEDURE — 82248 BILIRUBIN DIRECT: CPT

## 2021-07-17 PROCEDURE — 83874 ASSAY OF MYOGLOBIN: CPT

## 2021-07-17 RX ORDER — SODIUM CHLORIDE 9 MG/ML
25 INJECTION, SOLUTION INTRAVENOUS PRN
Status: DISCONTINUED | OUTPATIENT
Start: 2021-07-17 | End: 2021-07-19 | Stop reason: HOSPADM

## 2021-07-17 RX ORDER — LORAZEPAM 0.5 MG/1
1 TABLET ORAL
Status: DISCONTINUED | OUTPATIENT
Start: 2021-07-17 | End: 2021-07-19 | Stop reason: HOSPADM

## 2021-07-17 RX ORDER — ONDANSETRON 2 MG/ML
4 INJECTION INTRAMUSCULAR; INTRAVENOUS ONCE
Status: COMPLETED | OUTPATIENT
Start: 2021-07-17 | End: 2021-07-17

## 2021-07-17 RX ORDER — ATORVASTATIN CALCIUM 80 MG/1
40 TABLET, FILM COATED ORAL NIGHTLY
Status: DISCONTINUED | OUTPATIENT
Start: 2021-07-17 | End: 2021-07-19 | Stop reason: HOSPADM

## 2021-07-17 RX ORDER — GAUZE BANDAGE 2" X 2"
100 BANDAGE TOPICAL DAILY
Status: DISCONTINUED | OUTPATIENT
Start: 2021-07-17 | End: 2021-07-19 | Stop reason: HOSPADM

## 2021-07-17 RX ORDER — AMOXICILLIN 250 MG/1
500 CAPSULE ORAL ONCE
Status: COMPLETED | OUTPATIENT
Start: 2021-07-17 | End: 2021-07-17

## 2021-07-17 RX ORDER — SODIUM CHLORIDE 0.9 % (FLUSH) 0.9 %
5-40 SYRINGE (ML) INJECTION EVERY 12 HOURS SCHEDULED
Status: DISCONTINUED | OUTPATIENT
Start: 2021-07-17 | End: 2021-07-19 | Stop reason: HOSPADM

## 2021-07-17 RX ORDER — ONDANSETRON 2 MG/ML
4 INJECTION INTRAMUSCULAR; INTRAVENOUS EVERY 6 HOURS PRN
Status: DISCONTINUED | OUTPATIENT
Start: 2021-07-17 | End: 2021-07-19 | Stop reason: HOSPADM

## 2021-07-17 RX ORDER — OXCARBAZEPINE 150 MG/1
150 TABLET, FILM COATED ORAL EVERY MORNING
Status: DISCONTINUED | OUTPATIENT
Start: 2021-07-18 | End: 2021-07-19 | Stop reason: HOSPADM

## 2021-07-17 RX ORDER — NICOTINE 21 MG/24HR
1 PATCH, TRANSDERMAL 24 HOURS TRANSDERMAL DAILY
Status: DISCONTINUED | OUTPATIENT
Start: 2021-07-17 | End: 2021-07-19 | Stop reason: HOSPADM

## 2021-07-17 RX ORDER — 0.9 % SODIUM CHLORIDE 0.9 %
1000 INTRAVENOUS SOLUTION INTRAVENOUS ONCE
Status: COMPLETED | OUTPATIENT
Start: 2021-07-17 | End: 2021-07-17

## 2021-07-17 RX ORDER — MULTIVITAMIN WITH IRON
1 TABLET ORAL DAILY
Status: DISCONTINUED | OUTPATIENT
Start: 2021-07-17 | End: 2021-07-19 | Stop reason: HOSPADM

## 2021-07-17 RX ORDER — PANTOPRAZOLE SODIUM 40 MG/1
40 TABLET, DELAYED RELEASE ORAL
Status: DISCONTINUED | OUTPATIENT
Start: 2021-07-18 | End: 2021-07-19 | Stop reason: HOSPADM

## 2021-07-17 RX ORDER — LORAZEPAM 0.5 MG/1
2 TABLET ORAL
Status: DISCONTINUED | OUTPATIENT
Start: 2021-07-17 | End: 2021-07-19 | Stop reason: HOSPADM

## 2021-07-17 RX ORDER — FOLIC ACID 1 MG/1
1 TABLET ORAL DAILY
Status: DISCONTINUED | OUTPATIENT
Start: 2021-07-17 | End: 2021-07-19 | Stop reason: HOSPADM

## 2021-07-17 RX ORDER — VENLAFAXINE HYDROCHLORIDE 75 MG/1
75 CAPSULE, EXTENDED RELEASE ORAL
Status: DISCONTINUED | OUTPATIENT
Start: 2021-07-18 | End: 2021-07-19 | Stop reason: HOSPADM

## 2021-07-17 RX ORDER — SODIUM CHLORIDE 9 MG/ML
INJECTION, SOLUTION INTRAVENOUS CONTINUOUS
Status: DISCONTINUED | OUTPATIENT
Start: 2021-07-17 | End: 2021-07-19 | Stop reason: HOSPADM

## 2021-07-17 RX ORDER — TRAZODONE HYDROCHLORIDE 50 MG/1
50 TABLET ORAL NIGHTLY
Status: DISCONTINUED | OUTPATIENT
Start: 2021-07-17 | End: 2021-07-19 | Stop reason: HOSPADM

## 2021-07-17 RX ORDER — LORAZEPAM 2 MG/ML
4 INJECTION INTRAMUSCULAR
Status: DISCONTINUED | OUTPATIENT
Start: 2021-07-17 | End: 2021-07-19 | Stop reason: HOSPADM

## 2021-07-17 RX ORDER — SODIUM CHLORIDE 9 MG/ML
25 INJECTION, SOLUTION INTRAVENOUS PRN
Status: DISCONTINUED | OUTPATIENT
Start: 2021-07-17 | End: 2021-07-17

## 2021-07-17 RX ORDER — LITHIUM CARBONATE 450 MG
450 TABLET, EXTENDED RELEASE ORAL 2 TIMES DAILY
Status: DISCONTINUED | OUTPATIENT
Start: 2021-07-17 | End: 2021-07-19 | Stop reason: HOSPADM

## 2021-07-17 RX ORDER — LORAZEPAM 2 MG/ML
1 INJECTION INTRAMUSCULAR
Status: DISCONTINUED | OUTPATIENT
Start: 2021-07-17 | End: 2021-07-19 | Stop reason: HOSPADM

## 2021-07-17 RX ORDER — FAMOTIDINE 20 MG/1
20 TABLET, FILM COATED ORAL ONCE
Status: COMPLETED | OUTPATIENT
Start: 2021-07-17 | End: 2021-07-17

## 2021-07-17 RX ORDER — LORAZEPAM 2 MG/ML
2 INJECTION INTRAMUSCULAR
Status: DISCONTINUED | OUTPATIENT
Start: 2021-07-17 | End: 2021-07-19 | Stop reason: HOSPADM

## 2021-07-17 RX ORDER — FLUTICASONE PROPIONATE 50 MCG
1 SPRAY, SUSPENSION (ML) NASAL DAILY
Status: DISCONTINUED | OUTPATIENT
Start: 2021-07-17 | End: 2021-07-19 | Stop reason: HOSPADM

## 2021-07-17 RX ORDER — SODIUM CHLORIDE 0.9 % (FLUSH) 0.9 %
5-40 SYRINGE (ML) INJECTION PRN
Status: DISCONTINUED | OUTPATIENT
Start: 2021-07-17 | End: 2021-07-17

## 2021-07-17 RX ORDER — SODIUM CHLORIDE 0.9 % (FLUSH) 0.9 %
5-40 SYRINGE (ML) INJECTION EVERY 12 HOURS SCHEDULED
Status: DISCONTINUED | OUTPATIENT
Start: 2021-07-17 | End: 2021-07-17

## 2021-07-17 RX ORDER — SODIUM CHLORIDE 0.9 % (FLUSH) 0.9 %
5-40 SYRINGE (ML) INJECTION PRN
Status: DISCONTINUED | OUTPATIENT
Start: 2021-07-17 | End: 2021-07-19 | Stop reason: HOSPADM

## 2021-07-17 RX ORDER — AMOXICILLIN AND CLAVULANATE POTASSIUM 875; 125 MG/1; MG/1
1 TABLET, FILM COATED ORAL EVERY 12 HOURS SCHEDULED
Status: DISCONTINUED | OUTPATIENT
Start: 2021-07-17 | End: 2021-07-19 | Stop reason: HOSPADM

## 2021-07-17 RX ORDER — MAGNESIUM SULFATE IN WATER 40 MG/ML
2000 INJECTION, SOLUTION INTRAVENOUS ONCE
Status: DISCONTINUED | OUTPATIENT
Start: 2021-07-17 | End: 2021-07-17

## 2021-07-17 RX ORDER — HYDROXYZINE HYDROCHLORIDE 25 MG/1
50 TABLET, FILM COATED ORAL 2 TIMES DAILY PRN
Status: DISCONTINUED | OUTPATIENT
Start: 2021-07-17 | End: 2021-07-19 | Stop reason: HOSPADM

## 2021-07-17 RX ORDER — POLYETHYLENE GLYCOL 3350 17 G/17G
17 POWDER, FOR SOLUTION ORAL DAILY PRN
Status: DISCONTINUED | OUTPATIENT
Start: 2021-07-17 | End: 2021-07-19 | Stop reason: HOSPADM

## 2021-07-17 RX ORDER — POTASSIUM CHLORIDE 20 MEQ/1
40 TABLET, EXTENDED RELEASE ORAL PRN
Status: DISCONTINUED | OUTPATIENT
Start: 2021-07-17 | End: 2021-07-19 | Stop reason: HOSPADM

## 2021-07-17 RX ORDER — ALBUTEROL SULFATE 90 UG/1
2 AEROSOL, METERED RESPIRATORY (INHALATION) EVERY 4 HOURS PRN
Status: DISCONTINUED | OUTPATIENT
Start: 2021-07-17 | End: 2021-07-19 | Stop reason: HOSPADM

## 2021-07-17 RX ORDER — LORAZEPAM 2 MG/1
4 TABLET ORAL
Status: DISCONTINUED | OUTPATIENT
Start: 2021-07-17 | End: 2021-07-19 | Stop reason: HOSPADM

## 2021-07-17 RX ORDER — ACETAMINOPHEN 650 MG/1
650 SUPPOSITORY RECTAL EVERY 6 HOURS PRN
Status: DISCONTINUED | OUTPATIENT
Start: 2021-07-17 | End: 2021-07-19 | Stop reason: HOSPADM

## 2021-07-17 RX ORDER — ONDANSETRON 4 MG/1
4 TABLET, ORALLY DISINTEGRATING ORAL EVERY 8 HOURS PRN
Status: DISCONTINUED | OUTPATIENT
Start: 2021-07-17 | End: 2021-07-19 | Stop reason: HOSPADM

## 2021-07-17 RX ORDER — ACETAMINOPHEN 325 MG/1
650 TABLET ORAL EVERY 6 HOURS PRN
Status: DISCONTINUED | OUTPATIENT
Start: 2021-07-17 | End: 2021-07-19 | Stop reason: HOSPADM

## 2021-07-17 RX ORDER — LORAZEPAM 2 MG/ML
3 INJECTION INTRAMUSCULAR
Status: DISCONTINUED | OUTPATIENT
Start: 2021-07-17 | End: 2021-07-19 | Stop reason: HOSPADM

## 2021-07-17 RX ORDER — CETIRIZINE HYDROCHLORIDE 10 MG/1
10 TABLET ORAL DAILY
Status: DISCONTINUED | OUTPATIENT
Start: 2021-07-17 | End: 2021-07-17

## 2021-07-17 RX ORDER — POTASSIUM CHLORIDE 7.45 MG/ML
10 INJECTION INTRAVENOUS PRN
Status: DISCONTINUED | OUTPATIENT
Start: 2021-07-17 | End: 2021-07-19 | Stop reason: HOSPADM

## 2021-07-17 RX ADMIN — ONDANSETRON 4 MG: 2 INJECTION, SOLUTION INTRAMUSCULAR; INTRAVENOUS at 15:57

## 2021-07-17 RX ADMIN — AMOXICILLIN 500 MG: 250 CAPSULE ORAL at 16:59

## 2021-07-17 RX ADMIN — ATORVASTATIN CALCIUM 40 MG: 80 TABLET, FILM COATED ORAL at 22:06

## 2021-07-17 RX ADMIN — CETIRIZINE HYDROCHLORIDE 10 MG: 10 TABLET ORAL at 19:15

## 2021-07-17 RX ADMIN — LORAZEPAM 1 MG: 2 INJECTION INTRAMUSCULAR; INTRAVENOUS at 21:18

## 2021-07-17 RX ADMIN — Medication 1 TABLET: at 22:06

## 2021-07-17 RX ADMIN — THIAMINE HYDROCHLORIDE 500 MG: 100 INJECTION, SOLUTION INTRAMUSCULAR; INTRAVENOUS at 18:09

## 2021-07-17 RX ADMIN — SODIUM CHLORIDE: 9 INJECTION, SOLUTION INTRAVENOUS at 21:09

## 2021-07-17 RX ADMIN — LITHIUM CARBONATE 450 MG: 450 TABLET, EXTENDED RELEASE ORAL at 22:06

## 2021-07-17 RX ADMIN — SODIUM CHLORIDE, PRESERVATIVE FREE 10 ML: 5 INJECTION INTRAVENOUS at 21:09

## 2021-07-17 RX ADMIN — TRAZODONE HYDROCHLORIDE 50 MG: 50 TABLET ORAL at 22:06

## 2021-07-17 RX ADMIN — HYDROXYZINE HYDROCHLORIDE 50 MG: 25 TABLET ORAL at 20:03

## 2021-07-17 RX ADMIN — AMOXICILLIN AND CLAVULANATE POTASSIUM 1 TABLET: 875; 125 TABLET, FILM COATED ORAL at 22:06

## 2021-07-17 RX ADMIN — SODIUM CHLORIDE 1000 ML: 9 INJECTION, SOLUTION INTRAVENOUS at 15:58

## 2021-07-17 RX ADMIN — FAMOTIDINE 20 MG: 20 TABLET, FILM COATED ORAL at 19:21

## 2021-07-17 RX ADMIN — MAGNESIUM GLUCONATE 500 MG ORAL TABLET 800 MG: 500 TABLET ORAL at 18:09

## 2021-07-17 ASSESSMENT — PAIN DESCRIPTION - LOCATION: LOCATION: EAR;THROAT

## 2021-07-17 ASSESSMENT — ENCOUNTER SYMPTOMS
CONSTIPATION: 0
COUGH: 1
BLOOD IN STOOL: 0
CHEST TIGHTNESS: 0
DIARRHEA: 0
BACK PAIN: 0
CHOKING: 0
COUGH: 0
SHORTNESS OF BREATH: 0
ABDOMINAL PAIN: 0
SORE THROAT: 1
PHOTOPHOBIA: 0
NAUSEA: 1
SINUS PRESSURE: 0
EYE REDNESS: 0
SINUS PAIN: 0
EYE PAIN: 0
TROUBLE SWALLOWING: 0
VOMITING: 1

## 2021-07-17 ASSESSMENT — PAIN DESCRIPTION - DESCRIPTORS: DESCRIPTORS: ACHING;CONSTANT

## 2021-07-17 ASSESSMENT — PAIN SCALES - GENERAL
PAINLEVEL_OUTOF10: 7
PAINLEVEL_OUTOF10: 4

## 2021-07-17 ASSESSMENT — PAIN DESCRIPTION - ORIENTATION: ORIENTATION: LEFT;RIGHT

## 2021-07-17 NOTE — FLOWSHEET NOTE
[facility] [address]  PROGRESS NOTE    Room # 07/07   Name: Sage Cazares            Age: 50 y.o. Gender: female            Admit Date & Time: 7/17/2021  3:34 PM    PATIENT/EVENT DESCRIPTION:  Sage Cazares is a 50 y.o. female        SPIRITUAL ASSESSMENT/INTERVENTION:  The patient was calm and approachable. The  was able to activity listen and explore their thoughts and feelings. They engaged in the conversation and appeared to be coping. SPIRITUAL CARE FOLLOW-UP PLAN:  [facility]ains will remain available to offer spiritual and emotional support as needed.     Chaplains can be contacted 24/7 by [address] [place] [address]    Electronically signed by Kandace Hatfield Resident, on 7/17/2021 at [address] [place] [address]  [phone]             07/17/21 1912   Encounter Summary   Services provided to: Patient   Referral/Consult From: [address] [facility] Family members   Continue Visiting   (07/17/21)   Complexity of Encounter Moderate   Length of Encounter 15 minutes   Spiritual Assessment Completed Yes   Routine   Type Initial   Assessment Approachable;Calm   Intervention Active listening   Outcome Engaged in conversation

## 2021-07-17 NOTE — ED NOTES
Pt updated on plan of care, pt to be admitted, waiting for bed placement     [address] [place] [address], RN  07/17/21 3592

## 2021-07-17 NOTE — ED PROVIDER NOTES
[facility] ED  Emergency Department  Emergency Medicine Resident Sign-out     Care of Mekhi Paredes was assumed from Dr. Laly Canales and is being seen for Otalgia (right ear pain for 1.5 weeks on doxycycline), Pharyngitis (sore throat to right side of throat), Nausea (nause aand some emesis, not eating not able to drink much ), and Emesis  . The patient's initial evaluation and plan have been discussed with the prior provider who initially evaluated the patient.      EMERGENCY DEPARTMENT COURSE / MEDICAL DECISION MAKING:       MEDICATIONS GIVEN:  Orders Placed This Encounter   Medications    ondansetron (ZOFRAN) injection 4 mg    0.9 % sodium chloride bolus    DISCONTD: magnesium sulfate 2000 mg in 50 mL IVPB premix    magnesium oxide (MAG-OX) tablet 800 mg    amoxicillin (AMOXIL) capsule 500 mg    thiamine (B-1) 500 mg in sodium chloride 0.9 % 100 mL IVPB    sodium chloride flush 0.9 % injection 5-40 mL    sodium chloride flush 0.9 % injection 5-40 mL    0.9 % sodium chloride infusion    cetirizine (ZYRTEC) tablet 10 mg       LABS / RADIOLOGY:     Labs Reviewed   CBC WITH AUTO DIFFERENTIAL - Abnormal; Notable for the following components:       Result Value    RBC 6.14 (*)     Hematocrit 47.6 (*)     MCV 77.5 (*)     MCH 22.8 (*)     RDW 22.0 (*)     Immature Granulocytes 1 (*)     Seg Neutrophils 80 (*)     Lymphocytes 12 (*)     Eosinophils % 0 (*)     All other components within normal limits   COMPREHENSIVE METABOLIC PANEL - Abnormal; Notable for the following components:    Glucose 176 (*)     CREATININE 1.05 (*)     Chloride 96 (*)     Anion Gap 24 (*)     Alkaline Phosphatase 135 (*)     ALT 62 (*)      (*)     Total Protein 9.1 (*)     Albumin/Globulin Ratio 0.8 (*)     GFR Non- 56 (*)     All other components within normal limits   MAGNESIUM - Abnormal; Notable for the following components:    Magnesium 1.4 (*)     All other components within normal limits   LACTIC ACID, PLASMA - Abnormal; Notable for the following components:    Lactic Acid, Whole Blood 9.9 (*)     All other components within normal limits   ETHANOL - Abnormal; Notable for the following components:    Ethanol 60 (*)     Ethanol percent 0.060 (*)     All other components within normal limits   LIPASE - Abnormal; Notable for the following components:    Lipase 174 (*)     All other components within normal limits   COVID-19, RAPID   STREP SCREEN GROUP A THROAT   BILIRUBIN, DIRECT   PROTIME-INR   APTT   CK   MYOGLOBIN, SERUM       XR CHEST (2 VW)    Result Date: 7/17/2021  EXAMINATION: TWO XRAY VIEWS OF THE CHEST 7/17/2021 5:03 pm COMPARISON: 11/02/2020 HISTORY: ORDERING SYSTEM PROVIDED HISTORY: eval pna TECHNOLOGIST PROVIDED HISTORY: eval pna FINDINGS: Heart size and configuration are normal.  Hilar and mediastinal structures are unremarkable. The lungs are clear. No pneumothorax or pleural fluid. There is a subacute or chronic fracture of the posterior left 7th rib. No acute bone finding. No acute cardiopulmonary disease. RECENT VITALS:     Temp: 98.3 °F (36.8 °C),  Pulse: 107, Resp: 16, BP: 97/68, SpO2: 97 %    This patient is a 50 y.o. Female with bilateral otalgia for 1 week started as right ear pain. Started on doxycycline. Subsequent pharyngitis and left ear pain. Nausea/vomiting,. Has MORTEZA, elevated lactate 2/2 alcohol abuse. No ascites, but known liver disease. Started on amoxicillin for AOM R ear. Admitted to IM for MORTEZA. OUTSTANDING TASKS / RECOMMENDATIONS:    1. Awaiting bed     FINAL IMPRESSION:     1. Otalgia of both ears    2. Acute pharyngitis, unspecified etiology    3. Nausea    4. Cough    5. Hypomagnesemia    6. Elevated lactic acid level    7. Alcohol withdrawal syndrome without complication ([facility] [address]    8.  Transaminitis        DISPOSITION:         DISPOSITION:  []  Discharge   []  Transfer to [facility]   []  Transfer -      []  Admission -      [x] Admission to Internal Medicine   []  Admission to Intermed   []  Admission to Obs   []  Against Medical Advice   []  Eloped   FOLLOW-UP: No follow-up provider specified.    DISCHARGE MEDICATIONS: New Prescriptions    No medications on file           Michaela Zamorano MD  Emergency Medicine Resident  [facility]        Michaela Zamorano MD  Resident  07/17/21 6702

## 2021-07-17 NOTE — ED NOTES
Pt to ED with c/o bilateral ear pain, worse on the right, sore throat, cough and nausea/vomiting for the past 1.5 weeks gradually getting worse     Ann Marie Salinas RN  07/17/21 9793

## 2021-07-17 NOTE — ED PROVIDER NOTES
[address] ED  Emergency Department Encounter  EmergencyMedicine Resident     Pt Lina Cardona  MRN: [id number]  [place] 1975  Date of evaluation: 7/17/21  PCP:  Virginia Pearce MD    This patient was evaluated in the Emergency Department for symptoms described in the history of present illness. The patient was evaluated in the context of the global COVID-19 pandemic, which necessitated consideration that the patient might be at risk for infection with the SARS-CoV-2 virus that causes COVID-19. Institutional protocols and algorithms that pertain to the evaluation of patients at risk for COVID-19 are in a state of rapid change based on information released by regulatory bodies including the CDC and federal and state organizations. These policies and algorithms were followed during the patient's care in the ED. CHIEF COMPLAINT       Chief Complaint   Patient presents with    Otalgia     right ear pain for 1.5 weeks on doxycycline    Pharyngitis     sore throat to right side of throat    Nausea     nause aand some emesis, not eating not able to drink much     Emesis       HISTORY OF PRESENT ILLNESS  (Location/Symptom, Timing/Onset, Context/Setting, Quality, Duration, Modifying Factors, Severity.)      Priyanka Sneed is a 50 y.o. female who presents with 1/2-week history of right-sided otalgia diagnosed by her PCP with media and started on doxycycline is unclear why this medication was chosen patient also has began to have persistent pharyngitis she has began to have pain on her left ear as well she has had some nausea and vomiting and decreased p.o. intake. Denies any abdominal pain.     PAST MEDICAL / SURGICAL / SOCIAL / FAMILY HISTORY      has a past medical history of Alcoholic hepatitis without ascites, Alcoholic ketoacidosis, Alcoholism ([address] Anxiety, Asthma, Bipolar affective disorder ([address] Depression, Drug overdose, accidental or unintentional, initial encounter, Drug overdose, intentional self-harm, initial encounter ([facility] Lisa coma scale total score 3-8 ([facility] Hepatitis C antibody positive in blood, History of gastric surgery (gastric sleeve 2012), Intentional drug overdose ([facility] MDD (major depressive disorder), recurrent episode ([facility] Pneumonia, Polysubstance abuse ([facility] Post traumatic stress disorder, Seizure ([facility] Smoker unmotivated to quit, Suicide attempt [facility]), Suicide ideation, and Toxic metabolic encephalopathy. has a past surgical history that includes Hysterectomy; Carpal tunnel release; Hysterectomy, total abdominal; Cholecystectomy; Stomach surgery; and Upper gastrointestinal endoscopy (N/A, 6/19/2019). Social History     Socioeconomic History    Marital status: Single     Spouse name: Not on file    Number of children: 0    Years of education: 15    Highest education level: Not on file   Occupational History    Not on file   Tobacco Use    Smoking status: Current Every Day Smoker     Packs/day: 1.00     Years: 30.00     Pack years: 30.00     Types: Cigarettes    Smokeless tobacco: Never Used   Vaping Use    Vaping Use: Never used   Substance and Sexual Activity    Alcohol use: Yes     Alcohol/week: 3.0 standard drinks     Types: 3 Shots of liquor per week     Comment: drinking vodka and Beer daily    Drug use: Yes     Frequency: 7.0 times per week     Types: Marijuana, IV, Cocaine     Comment: drug abuse includes, cocaine, IV heroin, cannabis    Sexual activity: Not Currently   Other Topics Concern    Not on file   Social History Narrative    ** Merged History Encounter **         ** Merged History Encounter **          Social Determinants of Health     Financial Resource Strain:     Difficulty of Paying Living Expenses:    Food Insecurity:     Worried About Running Out of Food in the Last Year:     Ran Out of Food in the Last Year:    Transportation Needs:     Lack of Transportation (Medical):      Lack of Transportation (Non-Medical):    Physical Activity:     Days of Exercise per Week:     Minutes of Exercise per Session:    Stress:     Feeling of Stress :    Social Connections:     Frequency of Communication with Friends and Family:     Frequency of Social Gatherings with Friends and Family:     Attends Oriental orthodox Services:     Active Member of Clubs or Organizations:     Attends Club or Organization Meetings:     Marital Status:    Intimate Partner Violence:     Fear of Current or Ex-Partner:     Emotionally Abused:     Physically Abused:     Sexually Abused:        Family History   Problem Relation Age of Onset    Brain Cancer Mother    Thirza Stage Mother         \"female cancer\"    Heart Disease Father        Allergies:  Morphine, Morphine and related, Azithromycin, Morphine, Nsaids, and Zithromax [azithromycin]    Home Medications:  Prior to Admission medications    Medication Sig Start Date End Date Taking?  Authorizing Provider   traZODone (DESYREL) 50 MG tablet Take 50 mg by mouth nightly    Historical Provider, MD   hydrOXYzine (ATARAX) 50 MG tablet Take 1 tablet by mouth 2 times daily as needed for Anxiety 7/10/20   Haritha Garza MD   albuterol sulfate  (90 Base) MCG/ACT inhaler Inhale 2 puffs into the lungs every 4 hours as needed for Wheezing 7/10/20   Haritha Garza MD   OXcarbazepine (TRILEPTAL) 150 MG tablet Take 1 tablet by mouth 2 times daily  Patient taking differently: Take 150 mg by mouth every morning  7/10/20   Haritha Garza MD   venlafaxine (EFFEXOR XR) 75 MG extended release capsule Take 1 capsule by mouth daily (with breakfast) 7/10/20   Haritha Garza MD   ondansetron (ZOFRAN ODT) 4 MG disintegrating tablet Take 1 tablet by mouth every 8 hours as needed for Nausea  Patient not taking: Reported on 9/1/2020 7/10/20   Haritha Garza MD   atorvastatin (LIPITOR) 40 MG tablet Take 1 tablet by mouth nightly 7/10/20   Haritha Garza MD   lithium (ESKALITH) 450 MG extended release tablet Take 1 tablet by mouth 2 times daily 7/10/20   Tamia Cary MD   folic acid (FOLVITE) 1 MG tablet Take 1 tablet by mouth daily 7/11/20   Tamia Cary MD   fluticasone (FLONASE) 50 MCG/ACT nasal spray 1 spray by Each Nostril route daily 7/10/20   Tamia Cary MD   sodium chloride (OCEAN) 0.65 % nasal spray 1 spray by Nasal route as needed for Congestion 7/10/20   Tamia Cary MD   Multiple Vitamins-Minerals (MULTIVITAMIN ADULT) TABS Take 1 tablet by mouth daily 7/10/20   Tamia Cary MD   dicyclomine (BENTYL) 10 MG capsule Take 1 capsule by mouth every 6 hours as needed (cramps) 7/10/20   Tamia Cary MD   omeprazole (PRILOSEC) 20 MG delayed release capsule Take 1 capsule by mouth 2 times daily 7/10/20   Tamia Cary MD   thiamine 100 MG tablet Take 1 tablet by mouth daily 7/10/20   Tamia Cary MD       REVIEW OF SYSTEMS    (2-9 systems for level 4, 10 or more for level 5)      Review of Systems   Constitutional: Positive for appetite change. Negative for fever. HENT: Positive for ear pain and sore throat. Negative for congestion, dental problem, sinus pressure, sinus pain and trouble swallowing. Eyes: Negative for visual disturbance. Respiratory: Positive for cough. Negative for shortness of breath. Cardiovascular: Negative for chest pain. Gastrointestinal: Positive for nausea and vomiting. Negative for abdominal pain. Genitourinary: Negative for dysuria. Musculoskeletal: Negative for back pain, gait problem and neck pain. Skin: Negative for pallor and wound. Allergic/Immunologic: Negative for food allergies. Neurological: Negative for weakness and headaches. Psychiatric/Behavioral: Negative for agitation and confusion. PHYSICAL EXAM   (up to 7 for level 4, 8 or more for level 5)      INITIAL VITALS:   BP 97/68   Pulse 107   Temp 98.3 °F (36.8 °C) (Oral)   Resp 16   Ht 5' (1.524 m)   Wt 114 lb (51.7 kg)   SpO2 97%   BMI 22.26 kg/m²     Physical Exam  Vitals and nursing note reviewed. Constitutional:       General: She is not in acute distress. Appearance: She is not toxic-appearing. HENT:      Head: Normocephalic. Right Ear: External ear normal.      Left Ear: Tympanic membrane, ear canal and external ear normal.      Ears:      Comments: Mild erythema of tm and canal      Nose: Nose normal.      Comments: No sinus pressure or ttp     Mouth/Throat:      Pharynx: Oropharynx is clear. Eyes:      Conjunctiva/sclera: Conjunctivae normal.      Pupils: Pupils are equal, round, and reactive to light. Cardiovascular:      Rate and Rhythm: Regular rhythm. Tachycardia present. Pulses: Normal pulses. Pulmonary:      Effort: Pulmonary effort is normal. No respiratory distress. Chest:      Chest wall: No tenderness. Abdominal:      General: Abdomen is flat. Musculoskeletal:         General: Normal range of motion. Cervical back: Normal range of motion. No rigidity or tenderness. Right lower leg: No edema. Left lower leg: No edema. Skin:     General: Skin is warm. Neurological:      Mental Status: She is alert and oriented to person, place, and time.    Psychiatric:         Mood and Affect: Mood normal.         DIFFERENTIAL  DIAGNOSIS     PLAN (LABS / IMAGING / EKG):  Orders Placed This Encounter   Procedures    COVID-19, Rapid    Strep Screen Group A Throat    XR CHEST (2 VW)    CBC WITH AUTO DIFFERENTIAL    Comprehensive Metabolic Panel    MAGNESIUM    Lactic Acid, Plasma    Ethanol    Bilirubin, Direct    LIPASE    Notify physician    Inpatient consult to Social Work    Inpatient consult to Internal Medicine    Initiate Oxygen Therapy Protocol    PATIENT STATUS (FROM ED OR OR/PROCEDURAL) Inpatient    Alcohol and or drug assessment    Seizure precautions    Fall precautions       MEDICATIONS ORDERED:  Orders Placed This Encounter   Medications    ondansetron (ZOFRAN) injection 4 mg    0.9 % sodium chloride bolus    DISCONTD: magnesium sulfate 2000 mg in 50 mL IVPB premix    magnesium oxide (MAG-OX) tablet 800 mg    amoxicillin (AMOXIL) capsule 500 mg    thiamine (B-1) 500 mg in sodium chloride 0.9 % 100 mL IVPB    sodium chloride flush 0.9 % injection 5-40 mL    sodium chloride flush 0.9 % injection 5-40 mL    0.9 % sodium chloride infusion    cetirizine (ZYRTEC) tablet 10 mg       DDX: pneumonia, ottitis media, alcohol withdrawal    DIAGNOSTIC RESULTS / EMERGENCY DEPARTMENT COURSE / MDM   LAB RESULTS:  Results for orders placed or performed during the hospital encounter of 07/17/21   Strep Screen Group A Throat    Specimen: Throat   Result Value Ref Range    Specimen Description . THROAT     Special Requests NOT REPORTED     Direct Exam       Rapid Strep A negative. A negative Rapid Group A Strep Screen result does not rule out the possibility of Group A Streptococci in the specimen.  A Group A Strep DNA test is available upon request.   CBC WITH AUTO DIFFERENTIAL   Result Value Ref Range    WBC 10.0 3.5 - 11.3 k/uL    RBC 6.14 (H) 3.95 - 5.11 m/uL    Hemoglobin 14.0 11.9 - 15.1 g/dL    Hematocrit 47.6 (H) 36.3 - 47.1 %    MCV 77.5 (L) 82.6 - 102.9 fL    MCH 22.8 (L) 25.2 - 33.5 pg    MCHC 29.4 28.4 - 34.8 g/dL    RDW 22.0 (H) 11.8 - 14.4 %    Platelets 235 101 - 533 k/uL    MPV 8.9 8.1 - 13.5 fL    NRBC Automated 0.0 0.0 per 100 WBC    Differential Type NOT REPORTED     WBC Morphology NOT REPORTED     RBC Morphology NOT REPORTED     Platelet Estimate NOT REPORTED     Immature Granulocytes 1 (H) 0 %    Seg Neutrophils 80 (H) 36 - 65 %    Lymphocytes 12 (L) 24 - 43 %    Monocytes 7 3 - 12 %    Eosinophils % 0 (L) 1 - 4 %    Basophils 0 0 - 2 %    Absolute Immature Granulocyte 0.10 0.00 - 0.30 k/uL    Segs Absolute 8.00 1.50 - 8.10 k/uL    Absolute Lymph # 1.20 1.10 - 3.70 k/uL    Absolute Mono # 0.70 0.10 - 1.20 k/uL    Absolute Eos # 0.00 0.00 - 0.44 k/uL    Basophils Absolute 0.00 0.00 - 0.20 k/uL    Morphology ANISOCYTOSIS PRESENT Morphology MICROCYTOSIS PRESENT    Comprehensive Metabolic Panel   Result Value Ref Range    Glucose 176 (H) 70 - 99 mg/dL    BUN 10 6 - 20 mg/dL    CREATININE 1.05 (H) 0.50 - 0.90 mg/dL    Bun/Cre Ratio NOT REPORTED 9 - 20    Calcium 9.0 8.6 - 10.4 mg/dL    Sodium 141 135 - 144 mmol/L    Potassium 3.8 3.7 - 5.3 mmol/L    Chloride 96 (L) 98 - 107 mmol/L    CO2 21 20 - 31 mmol/L    Anion Gap 24 (H) 9 - 17 mmol/L    Alkaline Phosphatase 135 (H) 35 - 104 U/L    ALT 62 (H) 5 - 33 U/L     (H) <32 U/L    Total Bilirubin 0.68 0.3 - 1.2 mg/dL    Total Protein 9.1 (H) 6.4 - 8.3 g/dL    Albumin 4.0 3.5 - 5.2 g/dL    Albumin/Globulin Ratio 0.8 (L) 1.0 - 2.5    GFR Non- 56 (L) >60 mL/min    GFR African American >60 >60 mL/min    GFR Comment          GFR Staging NOT REPORTED    MAGNESIUM   Result Value Ref Range    Magnesium 1.4 (L) 1.6 - 2.6 mg/dL       IMPRESSION: Oriented mildly anxious 24-year-old female mildly tachycardic on exam with pulse of 107 afebrile complaining of bilateral otalgia concern for pneumonia recently treated with doxycycline. Screening pharyngitis plan will be Covid, strep swabs, basic labs CBC CMP will include mag provide IV fluids as patient does appear clinically dehydrated has had poor p.o. intake with nausea vomiting will provide antiemetics as needed reevaluate frequently. RADIOLOGY:  XR CHEST (2 VW)    Result Date: 7/17/2021  EXAMINATION: TWO XRAY VIEWS OF THE CHEST 7/17/2021 5:03 pm COMPARISON: 11/02/2020 HISTORY: ORDERING SYSTEM PROVIDED HISTORY: eval pna TECHNOLOGIST PROVIDED HISTORY: eval pna FINDINGS: Heart size and configuration are normal.  Hilar and mediastinal structures are unremarkable. The lungs are clear. No pneumothorax or pleural fluid. There is a subacute or chronic fracture of the posterior left 7th rib. No acute bone finding. No acute cardiopulmonary disease.        EKG  none    All EKG's are interpreted by the Emergency Department Physician who either signs or Co-signs this chart in the absence of a cardiologist.    EMERGENCY DEPARTMENT COURSE:  ED Course as of Jul 17 1805   Sat Jul 17, 2021   1635 Strep neg, evaded anion gap hypomagnesemia down to 1.4 MORTEZA creatinine increased 1.6-1.5 transaminitis with a 2 1 ratio of AST to ALT concerning for alcoholic transaminitis. [BG]   [phone] Is a daily drinker woke up this morning and having tremors had several shots earlier today has had episodes of withdrawal as well as seizures patient updated on results of work-up thus far all questions addressed resting comfortably    [BG]   1719 Will have social work see patient for provision of resources    [BG]   1729 Xr unremarkable    [BG]   [phone] Will admit for morteza     [BG]      ED Course User Index  [BG] Jefe Ramires DO         PROCEDURES:  none    CONSULTS:  IP CONSULT TO SOCIAL WORK  IP CONSULT TO INTERNAL MEDICINE    CRITICAL CARE:  none    FINAL IMPRESSION      1. Otalgia of both ears    2. Acute pharyngitis, unspecified etiology    3. Nausea    4. Cough    5. Hypomagnesemia    6. Elevated lactic acid level    7. Alcohol withdrawal syndrome without complication ([facility] [address]    8. Transaminitis          DISPOSITION / PLAN     DISPOSITION  admitted      PATIENT REFERRED TO:  No follow-up provider specified.     DISCHARGE MEDICATIONS:  New Prescriptions    No medications on file       Yo Madison DO  Emergency Medicine Resident    (Please note that portions of thisnote were completed with a voice recognition program.  Efforts were made to edit the dictations but occasionally words are mis-transcribed.)        Yo Madison DO  Resident  07/17/21 6611

## 2021-07-17 NOTE — Clinical Note
Patient Class: Inpatient [101]   REQUIRED: Diagnosis: MORTEZA (acute kidney injury) ([facility] [[id number]]   Estimated Length of Stay: Estimated stay of more than 2 midnights

## 2021-07-17 NOTE — ED NOTES
Consulted for outpatient alcohol treatment resources. The patient is a very pleasant 50year old  female who appeared alert, orientedx4, great eye contact. She appears in the pre-contemplation stage of change, admitting to alcohol consumption, but denies that she has a substantial problem with the substance. Her last drink was three days ago, consuming about 4 shots of liquor. Drink of choice: vodka. She has polysubstance history with cocaine, heroin, but has not used in about a year. She admits the cause of hindering the polysubstance abuse concluded to an ICU hospitalization due to a \"bad batch of heroin\" and overdose. Patient follows up with [facility] for bipolar and schizoaffective disorder. She asserts to have her basic needs including housing at [employer], utilities, and food. Kalia Boothemarie is her payee. We discussed patient's report of transportation issues. She plans to call [place] [phone] for transportation assistance. At the conclusion of the assessment, patient denied need for alcohol treatment, contending she will seek help at [facility] should the issue arise. Social work to remain available to assist as needed.     [facility] LISW-S ACSW      [facility], LISW  07/17/21 [id number]

## 2021-07-17 NOTE — ED NOTES
Writer assumed car eof pt at this time. Pt experiencing tremors r/t withdraws at this time. Writer will follow up for further orders.  Pt in 1218 MIRIAM Ta RN  07/17/21 [id number]

## 2021-07-17 NOTE — H&P
[address]     Department of Internal Medicine - Staff Internal Medicine Teaching Service          ADMISSION NOTE/HISTORY AND PHYSICAL EXAMINATION   Date: 7/17/2021  Patient Name: Cuong Zamorano  Date of admission: 7/17/2021  3:34 PM  YOB: 1975  PCP: Sheliah Cranker, MD  History Obtained From:  patient    CHIEF COMPLAINT     Chief complaint: Right ear pain, nausea, vomiting    HISTORY OF PRESENTING ILLNESS     The patient is a pleasant 50 y.o. female presents with a chief complaint of right ear pain, soar throat to right side, nausea and vomiting. The patient has PMH of GERD, hep C, bipolar disorder, schizoaffective depression, asthma, alcohol abuse, pancreatitis (6 years ago), Lap gastric Sleeve surgery in 2012 (s/p 100 lbs weight loss). The patient reported to have right sided ear pain that started about 2 weeks ago. She was diagnosed otitis media by her PCP and started on doxycycline about a week ago. The patient reported to have persistent symptoms of right ear pain that has progressed to right throat pain. She reports the pain 7 out of 10. She denies any relief from pain after starting antibiotics. The patient also reported to have nausea, vomiting that started about 2 weeks ago and has gotten worse in the last 2 to 3 days. She has had 2-3 episodes of clear fluid vomiting, she reported one episode of green and yellow vomitus yesterday. Patient reported low oral intake in the last 2 weeks. She was also has been having subjective low-grade fevers at home, and treated with Tylenol. She reports chills and diaphoresis and night. She denies diarrhea or abdominal pain. 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 In the ED the patient was afebrile, RR 16, , BP 97/68.   Significant labs include Lipase 174, Lactic acid 9.9, creatinine 1.05 (baseline 0.61), - , ALT 62, . Patient received 1L NS IVF in the ED. She denies current nausea, vomiting, abdominal pain, dysuria. Review of Systems   Constitutional: Positive for appetite change, chills, diaphoresis and fever (low-grade subjective fever at home). HENT: Positive for ear pain, hearing loss and sore throat. Negative for ear discharge and trouble swallowing. Eyes: Negative for photophobia, pain and redness. Respiratory: Negative for cough, choking, chest tightness and shortness of breath. Cardiovascular: Negative for chest pain, palpitations and leg swelling. Gastrointestinal: Positive for nausea and vomiting. Negative for abdominal pain, blood in stool, constipation and diarrhea. Endocrine: Negative for polydipsia, polyphagia and polyuria. Genitourinary: Negative for dysuria, flank pain, frequency and hematuria. Musculoskeletal: Negative for arthralgias, back pain and myalgias. Neurological: Negative for dizziness, seizures, light-headedness and headaches. Psychiatric/Behavioral: Negative for agitation, behavioral problems and confusion.          PAST MEDICAL HISTORY     Past Medical History:   Diagnosis Date    MORTEZA (acute kidney injury) ([facility] [address] 7/90/3966    Alcoholic hepatitis without ascites 71/26/6018    Alcoholic ketoacidosis 61/53/7986    Alcoholism ([address]     3 pints daily    Anxiety     Asthma     Bipolar affective disorder ([address] 6/12/2019    Depression     Drug overdose, accidental or unintentional, initial encounter 9/29/2018    Drug overdose, intentional self-harm, initial encounter ([address] 5/8/2018    Lisa coma scale total score 3-8 ([address]     Hepatitis C antibody positive in blood 6/18/2019    History of gastric surgery (gastric sleeve 2012) 4/25/2017    Intentional drug overdose ([address] 5/7/2018    MDD (major depressive disorder), recurrent episode ([address] 5/8/2018    Pneumonia     Polysubstance abuse ([address]     drug abuse includes, cocaine, IV heroin, cannabis, and ETOH    Post traumatic stress disorder     Seizure ([facility] [address] 12/17/2019    Smoker unmotivated to quit 4/25/2017    Suicide attempt ([facility] [address] 11/08/2017    S/A by overdosing on Trazodone and Seroquel    Suicide ideation     Toxic metabolic encephalopathy 1/21/0674       PAST SURGICAL HISTORY     Past Surgical History:   Procedure Laterality Date    CARPAL TUNNEL RELEASE      CHOLECYSTECTOMY      HYSTERECTOMY      HYSTERECTOMY, TOTAL ABDOMINAL      STOMACH SURGERY      gastric sleeve    UPPER GASTROINTESTINAL ENDOSCOPY N/A 6/19/2019    EGD BIOPSY performed by Jewell Rubio MD at [address]     Morphine, Morphine and related, Azithromycin, Morphine, Nsaids, and Zithromax [azithromycin]    MEDICATIONS PRIOR TO ADMISSION     Prior to Admission medications    Medication Sig Start Date End Date Taking?  Authorizing Provider   traZODone (DESYREL) 50 MG tablet Take 50 mg by mouth nightly    Historical Provider, MD   hydrOXYzine (ATARAX) 50 MG tablet Take 1 tablet by mouth 2 times daily as needed for Anxiety 7/10/20   Sheeba Bowels, MD   albuterol sulfate  (90 Base) MCG/ACT inhaler Inhale 2 puffs into the lungs every 4 hours as needed for Wheezing 7/10/20   Sheeba Bowels, MD   OXcarbazepine (TRILEPTAL) 150 MG tablet Take 1 tablet by mouth 2 times daily  Patient taking differently: Take 150 mg by mouth every morning  7/10/20   Sheeba Mcarthur MD   venlafaxine (EFFEXOR XR) 75 MG extended release capsule Take 1 capsule by mouth daily (with breakfast) 7/10/20   Sheeba BowelMD hernan   ondansetron (ZOFRAN ODT) 4 MG disintegrating tablet Take 1 tablet by mouth every 8 hours as needed for Nausea  Patient not taking: Reported on 9/1/2020 7/10/20   Sheeba Mcarthur MD   atorvastatin (LIPITOR) 40 MG tablet Take 1 tablet by mouth nightly 7/10/20   Sheeba BowelMD hernan   lithium (ESKALITH) 450 MG extended release tablet Take 1 tablet by mouth 2 times daily 7/10/20   Sheeba BowelMD hernan   folic acid (Joceline Dine) 1 MG tablet Take 1 tablet by mouth daily 20   Mandi Rizvi MD   fluticasone (FLONASE) 50 MCG/ACT nasal spray 1 spray by Each Nostril route daily 7/10/20   Mandi Rizvi MD   sodium chloride (OCEAN) 0.65 % nasal spray 1 spray by Nasal route as needed for Congestion 7/10/20   Mandi Rizvi MD   Multiple Vitamins-Minerals (MULTIVITAMIN ADULT) TABS Take 1 tablet by mouth daily 7/10/20   Mandi Rizvi MD   dicyclomine (BENTYL) 10 MG capsule Take 1 capsule by mouth every 6 hours as needed (cramps) 7/10/20   Mandi Rizvi MD   omeprazole (PRILOSEC) 20 MG delayed release capsule Take 1 capsule by mouth 2 times daily 7/10/20   Mandi Rizvi MD   thiamine 100 MG tablet Take 1 tablet by mouth daily 7/10/20   Mandi Rizvi MD       SOCIAL HISTORY     Tobacco: Current smoker, 1 PPD; for last 30 years; Alcohol: daily drinker- 1 gallon of vodka or 2-3 tall glasses of beer daily  Illicits: history of heroin and crack use, stopped IVDU 1 year ago  Occupation: N/A    FAMILY HISTORY     Family History   Problem Relation Age of Onset    Brain Cancer Mother     Cancer Mother         \"female cancer\"    Heart Disease Father        PHYSICAL EXAM     Vitals: /83   Pulse 90   Temp 99 °F (37.2 °C) (Oral)   Resp 18   Ht 5' (1.524 m)   Wt 114 lb (51.7 kg)   SpO2 99%   BMI 22.26 kg/m²   Tmax: Temp (24hrs), Av.7 °F (37.1 °C), Min:98.3 °F (36.8 °C), Max:99 °F (37.2 °C)    Last Body weight:   Wt Readings from Last 3 Encounters:   21 114 lb (51.7 kg)   21 130 lb (59 kg)   20 130 lb (59 kg)     Body Mass Index : Body mass index is 22.26 kg/m². PHYSICAL EXAMINATION:  Constitutional: This is a well developed, well nourished, 18.5-24.9 - Normal 50y.o. year old female who is alert, oriented, cooperative and in no apparent distress. Head:normocephalic and atraumatic. EENT:  PERRLA. No conjunctival injections. Septum was midline, mucosa was without erythema, exudates or cobblestoning.   No thrush was noted. Right ear erythema   Neck: Supple without thyromegaly. No elevated JVP. Trachea was midline. Respiratory: Chest was symmetrical without dullness to percussion. Breath sounds bilaterally were clear to auscultation. There were no wheezes, rhonchi or rales. There is no intercostal retraction or use of accessory muscles. No egophony noted. Cardiovascular: Regular without murmur, clicks, gallops or rubs. Abdomen: Slightly rounded and soft without organomegaly. No rebound, rigidity or guarding was appreciated. Lymphatic: No lymphadenopathy. Musculoskeletal: Normal curvature of the spine. No gross muscle weakness. Extremities:  No lower extremity edema, ulcerations, tenderness, varicosities or erythema. Muscle size, tone and strength are normal.  No involuntary movements are noted. Skin:  Warm and dry. Good color, turgor and pigmentation. No lesions or scars. No cyanosis or clubbing  Neurological/Psychiatric: The patient's general behavior, level of consciousness, thought content and emotional status is normal.          INVESTIGATIONS     Laboratory Testing:     Recent Results (from the past 24 hour(s))   Strep Screen Group A Throat    Collection Time: 07/17/21  3:45 PM    Specimen: Throat   Result Value Ref Range    Specimen Description . THROAT     Special Requests NOT REPORTED     Direct Exam       Rapid Strep A negative. A negative Rapid Group A Strep Screen result does not rule out the possibility of Group A Streptococci in the specimen.  A Group A Strep DNA test is available upon request.   CBC WITH AUTO DIFFERENTIAL    Collection Time: 07/17/21  4:00 PM   Result Value Ref Range    WBC 10.0 3.5 - 11.3 k/uL    RBC 6.14 (H) 3.95 - 5.11 m/uL    Hemoglobin 14.0 11.9 - 15.1 g/dL    Hematocrit 47.6 (H) 36.3 - 47.1 %    MCV 77.5 (L) 82.6 - 102.9 fL    MCH 22.8 (L) 25.2 - 33.5 pg    MCHC 29.4 28.4 - 34.8 g/dL    RDW 22.0 (H) 11.8 - 14.4 %    Platelets 229 813 - 206 k/uL    MPV 8.9 8.1 - 13.5 fL NRBC Automated 0.0 0.0 per 100 WBC    Differential Type NOT REPORTED     WBC Morphology NOT REPORTED     RBC Morphology NOT REPORTED     Platelet Estimate NOT REPORTED     Immature Granulocytes 1 (H) 0 %    Seg Neutrophils 80 (H) 36 - 65 %    Lymphocytes 12 (L) 24 - 43 %    Monocytes 7 3 - 12 %    Eosinophils % 0 (L) 1 - 4 %    Basophils 0 0 - 2 %    Absolute Immature Granulocyte 0.10 0.00 - 0.30 k/uL    Segs Absolute 8.00 1.50 - 8.10 k/uL    Absolute Lymph # 1.20 1.10 - 3.70 k/uL    Absolute Mono # 0.70 0.10 - 1.20 k/uL    Absolute Eos # 0.00 0.00 - 0.44 k/uL    Basophils Absolute 0.00 0.00 - 0.20 k/uL    Morphology ANISOCYTOSIS PRESENT     Morphology MICROCYTOSIS PRESENT    Comprehensive Metabolic Panel    Collection Time: 07/17/21  4:00 PM   Result Value Ref Range    Glucose 176 (H) 70 - 99 mg/dL    BUN 10 6 - 20 mg/dL    CREATININE 1.05 (H) 0.50 - 0.90 mg/dL    Bun/Cre Ratio NOT REPORTED 9 - 20    Calcium 9.0 8.6 - 10.4 mg/dL    Sodium 141 135 - 144 mmol/L    Potassium 3.8 3.7 - 5.3 mmol/L    Chloride 96 (L) 98 - 107 mmol/L    CO2 21 20 - 31 mmol/L    Anion Gap 24 (H) 9 - 17 mmol/L    Alkaline Phosphatase 135 (H) 35 - 104 U/L    ALT 62 (H) 5 - 33 U/L     (H) <32 U/L    Total Bilirubin 0.68 0.3 - 1.2 mg/dL    Total Protein 9.1 (H) 6.4 - 8.3 g/dL    Albumin 4.0 3.5 - 5.2 g/dL    Albumin/Globulin Ratio 0.8 (L) 1.0 - 2.5    GFR Non- 56 (L) >60 mL/min    GFR African American >60 >60 mL/min    GFR Comment          GFR Staging NOT REPORTED    MAGNESIUM    Collection Time: 07/17/21  4:00 PM   Result Value Ref Range    Magnesium 1.4 (L) 1.6 - 2.6 mg/dL   Ethanol    Collection Time: 07/17/21  4:00 PM   Result Value Ref Range    Ethanol 60 (H) <10 mg/dL    Ethanol percent 0.060 (H) <0.010 %   Bilirubin, Direct    Collection Time: 07/17/21  4:00 PM   Result Value Ref Range    Bilirubin, Direct 0.28 <0.31 mg/dL   Lipase    Collection Time: 07/17/21  4:00 PM   Result Value Ref Range    Lipase 174 (H) 13 - 60 U/L   CK    Collection Time: 07/17/21  4:00 PM   Result Value Ref Range    Total  (H) 26 - 192 U/L   Myoglobin, Serum    Collection Time: 07/17/21  4:00 PM   Result Value Ref Range    Myoglobin 198 (H) 25 - 58 ng/mL   HCG Qualitative, Serum    Collection Time: 07/17/21  4:00 PM   Result Value Ref Range    hCG Qual NEGATIVE NEGATIVE   COVID-19, Rapid    Collection Time: 07/17/21  4:11 PM    Specimen: Nasopharyngeal Swab   Result Value Ref Range    Specimen Description . NASOPHARYNGEAL SWAB     SARS-CoV-2, Rapid Not Detected Not Detected   Lactic Acid, Plasma    Collection Time: 07/17/21  4:50 PM   Result Value Ref Range    Lactic Acid NOT REPORTED mmol/L    Lactic Acid, Whole Blood 9.9 (H) 0.7 - 2.1 mmol/L   Protime-INR    Collection Time: 07/17/21  7:08 PM   Result Value Ref Range    Protime 11.7 9.1 - 12.3 sec    INR 1.1    APTT    Collection Time: 07/17/21  7:08 PM   Result Value Ref Range    PTT 24.4 20.5 - 30.5 sec   DRUG SCREEN MULTI URINE    Collection Time: 07/17/21  8:42 PM   Result Value Ref Range    Amphetamine Screen, Ur NEGATIVE NEGATIVE    Barbiturate Screen, Ur NEGATIVE NEGATIVE    Benzodiazepine Screen, Urine NEGATIVE NEGATIVE    Cocaine Metabolite, Urine NEGATIVE NEGATIVE    Methadone Screen, Urine NEGATIVE NEGATIVE    Opiates, Urine NEGATIVE NEGATIVE    Phencyclidine, Urine NEGATIVE NEGATIVE    Propoxyphene, Urine NOT REPORTED NEGATIVE    Cannabinoid Scrn, Ur NEGATIVE NEGATIVE    Oxycodone Screen, Ur NEGATIVE NEGATIVE    Methamphetamine, Urine NOT REPORTED NEGATIVE    Tricyclic Antidepressants, Urine NOT REPORTED NEGATIVE    MDMA, Urine NOT REPORTED NEGATIVE    Buprenorphine Urine NOT REPORTED NEGATIVE    Test Information       Assay provides medical screening only. The absence of expected drug(s) and/or metabolite(s) may indicate diluted or adulterated urine, limitations of testing or timing of collection.    Urinalysis Reflex to Culture    Collection Time: 07/17/21  8:42 PM    Specimen: Urine voided   Result Value Ref Range    Color, UA YELLOW YELLOW    Turbidity UA CLEAR CLEAR    Glucose, Ur NEGATIVE NEGATIVE    Bilirubin Urine NEGATIVE NEGATIVE    Ketones, Urine TRACE (A) NEGATIVE    Specific Gravity, UA 1.006 1.005 - 1.030    Urine Hgb NEGATIVE NEGATIVE    pH, UA 7.5 5.0 - 8.0    Protein, UA NEGATIVE NEGATIVE    Urobilinogen, Urine Normal Normal    Nitrite, Urine NEGATIVE NEGATIVE    Leukocyte Esterase, Urine NEGATIVE NEGATIVE    Urinalysis Comments       Microscopic exam not performed based on chemical results unless requested in original order. Imaging:   XR CHEST (2 VW)    Result Date: 7/17/2021  No acute cardiopulmonary disease. ASSESSMENT & PLAN     ASSESSMENT / PLAN:     IMPRESSION  This is a 50 y.o. female who presented with right ear pain, nausea and vomiting and found to have MORTEZA     Principal Problem:    MORTEZA (acute kidney injury) ([facility] [address]  Active Problems:    Alcohol abuse    Depressed bipolar I disorder ([facility] [address]    Polysubstance abuse ([facility] [address]    Alcoholic hepatitis without ascites    Mild intermittent asthma without complication    Chronic hepatitis C virus infection ([facility] [address]    Right otitis media  Resolved Problems:    * No resolved hospital problems. *      Acute Kidney Injury  - BUN/Cr - 10/1.05 (baseline 6/0.61)  - secondary to poor oral intake and vomiting in last 2 weeks  - In the ED, patient received 1L NS  - Currently on  mL/hr NS   - will continue to monitor BMP labs      Vomiting/Nausea  - Ddx: secondary to Doxycycline Vs. Pancreatitis  - Lipase 174, will follow up repeat Lipase tomorrow am  - Patient denies abdominal pain  - Has a history of severe GERD s/p gastric sleeve in 2013  - US RUQ ordered- will follow up;  Follow up blood and urine culture    Otitis Media- Right  - Patient has had right ear pain and progressive right sore throat   - Patient was given 1 dose Amoxicillin in the ED  - Started on Augmentin 1000 mg BID daily for 7 days    Alcohol abuse  - daily drinker- 1 gallon of vodka or 2-3 tall glasses of beer daily  - Lactic acid 9.9 likely secondary to alcohol abuse  - Currently on CIWA protocol, last drink was this morning  - Patient has a history of seizure in past due to alcohol withdrawal  - Monitor for alcohol withdrawal symptoms  - Started on thiamine, multivitamins, and folic acid   - EtOH level 60, , ALT 62,  - will monitor LFTs    Polysubstance abuse  - History of crack and cocaine use  - Denies current use  - Reported stop using about 1 year ago  - Utox negative      Tobacco abuse  - Current smoker, 1 PPD; for last 30 years;   - Nicotine patch ordered      Mild Moderate Asthma  - Continue home meds- Albuterol PRN and Flonase nightly       Bipolar I disorder  - Started on home meds- Lithium, Trazodone and Venlafaxine      Chronic Hep C  - Patient was started diagnosed 5-6 years ago  - Unable to get treatment started without being 6 months alcohol free      DVT ppx: Lovenox  GI ppx: Protonix    PT/OT/SW: consulted  Discharge Planning:  consulted, will follow up    Karen Chiu MD  Internal Medicine Resident, PGY-1  [address], [place]  7/17/2021, 10:07 PM    Attestation and add on       I have discussed the care of Dakota Kern , including pertinent history and exam findings,      7/18/21    with the resident. I have seen and examined the patient and the key elements of all parts of the encounter have been performed by me . I agree with the assessment, plan and orders as documented by the resident. --acute rhinosinusitis  -acute pharyngitis and otitis media- ;  Get ct paranasal sinuses     MD AMADOR Antonio [address]    Phone [phone]   Fax: [phone]  Answering Service: [phone]

## 2021-07-18 ENCOUNTER — APPOINTMENT (OUTPATIENT)
Dept: CT IMAGING | Age: 46
DRG: 641 | End: 2021-07-18
Payer: MEDICARE

## 2021-07-18 ENCOUNTER — APPOINTMENT (OUTPATIENT)
Dept: ULTRASOUND IMAGING | Age: 46
DRG: 641 | End: 2021-07-18
Payer: MEDICARE

## 2021-07-18 VITALS
HEART RATE: 66 BPM | RESPIRATION RATE: 14 BRPM | OXYGEN SATURATION: 96 % | BODY MASS INDEX: 22.38 KG/M2 | SYSTOLIC BLOOD PRESSURE: 129 MMHG | DIASTOLIC BLOOD PRESSURE: 78 MMHG | WEIGHT: 114 LBS | HEIGHT: 60 IN | TEMPERATURE: 98.3 F

## 2021-07-18 PROBLEM — E86.0 ACUTE DEHYDRATION: Status: ACTIVE | Noted: 2021-07-18

## 2021-07-18 LAB
-: NORMAL
ABSOLUTE EOS #: 0.19 K/UL (ref 0–0.44)
ABSOLUTE IMMATURE GRANULOCYTE: 0 K/UL (ref 0–0.3)
ABSOLUTE LYMPH #: 2.4 K/UL (ref 1.1–3.7)
ABSOLUTE MONO #: 0.61 K/UL (ref 0.1–1.2)
ALBUMIN SERPL-MCNC: 3 G/DL (ref 3.5–5.2)
ALBUMIN/GLOBULIN RATIO: 0.9 (ref 1–2.5)
ALP BLD-CCNC: 85 U/L (ref 35–104)
ALT SERPL-CCNC: 36 U/L (ref 5–33)
ANION GAP SERPL CALCULATED.3IONS-SCNC: 8 MMOL/L (ref 9–17)
AST SERPL-CCNC: 63 U/L
BASOPHILS # BLD: 0 % (ref 0–2)
BASOPHILS ABSOLUTE: 0 K/UL (ref 0–0.2)
BILIRUB SERPL-MCNC: 0.42 MG/DL (ref 0.3–1.2)
BUN BLDV-MCNC: 6 MG/DL (ref 6–20)
BUN/CREAT BLD: ABNORMAL (ref 9–20)
CALCIUM SERPL-MCNC: 7.8 MG/DL (ref 8.6–10.4)
CHLORIDE BLD-SCNC: 108 MMOL/L (ref 98–107)
CO2: 27 MMOL/L (ref 20–31)
CREAT SERPL-MCNC: 0.45 MG/DL (ref 0.5–0.9)
DIFFERENTIAL TYPE: ABNORMAL
EOSINOPHILS RELATIVE PERCENT: 4 % (ref 1–4)
GFR AFRICAN AMERICAN: >60 ML/MIN
GFR NON-AFRICAN AMERICAN: >60 ML/MIN
GFR SERPL CREATININE-BSD FRML MDRD: ABNORMAL ML/MIN/{1.73_M2}
GFR SERPL CREATININE-BSD FRML MDRD: ABNORMAL ML/MIN/{1.73_M2}
GLUCOSE BLD-MCNC: 132 MG/DL (ref 70–99)
HCT VFR BLD CALC: 37.2 % (ref 36.3–47.1)
HEMOGLOBIN: 10.7 G/DL (ref 11.9–15.1)
IMMATURE GRANULOCYTES: 0 %
LACTIC ACID, WHOLE BLOOD: 1 MMOL/L (ref 0.7–2.1)
LIPASE: 181 U/L (ref 13–60)
LYMPHOCYTES # BLD: 51 % (ref 24–43)
MAGNESIUM: 1.7 MG/DL (ref 1.6–2.6)
MCH RBC QN AUTO: 22.8 PG (ref 25.2–33.5)
MCHC RBC AUTO-ENTMCNC: 28.8 G/DL (ref 28.4–34.8)
MCV RBC AUTO: 79.3 FL (ref 82.6–102.9)
MONOCYTES # BLD: 13 % (ref 3–12)
MORPHOLOGY: ABNORMAL
MORPHOLOGY: ABNORMAL
NRBC AUTOMATED: 0 PER 100 WBC
PDW BLD-RTO: 21.3 % (ref 11.8–14.4)
PLATELET # BLD: 269 K/UL (ref 138–453)
PLATELET ESTIMATE: ABNORMAL
PMV BLD AUTO: 9.2 FL (ref 8.1–13.5)
POTASSIUM SERPL-SCNC: 3.1 MMOL/L (ref 3.7–5.3)
RBC # BLD: 4.69 M/UL (ref 3.95–5.11)
RBC # BLD: ABNORMAL 10*6/UL
REASON FOR REJECTION: NORMAL
SEG NEUTROPHILS: 32 % (ref 36–65)
SEGMENTED NEUTROPHILS ABSOLUTE COUNT: 1.5 K/UL (ref 1.5–8.1)
SODIUM BLD-SCNC: 143 MMOL/L (ref 135–144)
TOTAL PROTEIN: 6.2 G/DL (ref 6.4–8.3)
WBC # BLD: 4.7 K/UL (ref 3.5–11.3)
WBC # BLD: ABNORMAL 10*3/UL
ZZ NTE CLEAN UP: ORDERED TEST: NORMAL
ZZ NTE WITH NAME CLEAN UP: SPECIMEN SOURCE: NORMAL

## 2021-07-18 PROCEDURE — 36415 COLL VENOUS BLD VENIPUNCTURE: CPT

## 2021-07-18 PROCEDURE — 80053 COMPREHEN METABOLIC PANEL: CPT

## 2021-07-18 PROCEDURE — 83605 ASSAY OF LACTIC ACID: CPT

## 2021-07-18 PROCEDURE — 70486 CT MAXILLOFACIAL W/O DYE: CPT

## 2021-07-18 PROCEDURE — 76705 ECHO EXAM OF ABDOMEN: CPT

## 2021-07-18 PROCEDURE — 83735 ASSAY OF MAGNESIUM: CPT

## 2021-07-18 PROCEDURE — 2580000003 HC RX 258

## 2021-07-18 PROCEDURE — 6360000002 HC RX W HCPCS

## 2021-07-18 PROCEDURE — 6370000000 HC RX 637 (ALT 250 FOR IP)

## 2021-07-18 PROCEDURE — 6370000000 HC RX 637 (ALT 250 FOR IP): Performed by: STUDENT IN AN ORGANIZED HEALTH CARE EDUCATION/TRAINING PROGRAM

## 2021-07-18 PROCEDURE — 1200000000 HC SEMI PRIVATE

## 2021-07-18 PROCEDURE — 85025 COMPLETE CBC W/AUTO DIFF WBC: CPT

## 2021-07-18 PROCEDURE — 83690 ASSAY OF LIPASE: CPT

## 2021-07-18 PROCEDURE — 99223 1ST HOSP IP/OBS HIGH 75: CPT | Performed by: INTERNAL MEDICINE

## 2021-07-18 RX ORDER — PREDNISONE 20 MG/1
20 TABLET ORAL DAILY
Status: DISCONTINUED | OUTPATIENT
Start: 2021-07-20 | End: 2021-07-19 | Stop reason: HOSPADM

## 2021-07-18 RX ORDER — PREDNISONE 20 MG/1
40 TABLET ORAL DAILY
Status: DISCONTINUED | OUTPATIENT
Start: 2021-07-18 | End: 2021-07-19 | Stop reason: HOSPADM

## 2021-07-18 RX ADMIN — FOLIC ACID 1 MG: 1 TABLET ORAL at 09:29

## 2021-07-18 RX ADMIN — LORAZEPAM 1 MG: 2 INJECTION INTRAMUSCULAR; INTRAVENOUS at 06:34

## 2021-07-18 RX ADMIN — Medication 1 TABLET: at 09:29

## 2021-07-18 RX ADMIN — PANTOPRAZOLE SODIUM 40 MG: 40 TABLET, DELAYED RELEASE ORAL at 16:30

## 2021-07-18 RX ADMIN — ATORVASTATIN CALCIUM 40 MG: 80 TABLET, FILM COATED ORAL at 20:12

## 2021-07-18 RX ADMIN — PREDNISONE 40 MG: 20 TABLET ORAL at 14:19

## 2021-07-18 RX ADMIN — AMOXICILLIN AND CLAVULANATE POTASSIUM 1 TABLET: 875; 125 TABLET, FILM COATED ORAL at 09:28

## 2021-07-18 RX ADMIN — LORAZEPAM 2 MG: 2 INJECTION INTRAMUSCULAR; INTRAVENOUS at 23:29

## 2021-07-18 RX ADMIN — POTASSIUM CHLORIDE 40 MEQ: 1500 TABLET, EXTENDED RELEASE ORAL at 10:22

## 2021-07-18 RX ADMIN — TRAZODONE HYDROCHLORIDE 50 MG: 50 TABLET ORAL at 20:11

## 2021-07-18 RX ADMIN — Medication 100 MG: at 09:29

## 2021-07-18 RX ADMIN — OXCARBAZEPINE 150 MG: 150 TABLET, FILM COATED ORAL at 09:29

## 2021-07-18 RX ADMIN — ONDANSETRON 4 MG: 2 INJECTION INTRAMUSCULAR; INTRAVENOUS at 09:23

## 2021-07-18 RX ADMIN — SODIUM CHLORIDE: 9 INJECTION, SOLUTION INTRAVENOUS at 23:28

## 2021-07-18 RX ADMIN — SODIUM CHLORIDE: 9 INJECTION, SOLUTION INTRAVENOUS at 03:31

## 2021-07-18 RX ADMIN — LITHIUM CARBONATE 450 MG: 450 TABLET, EXTENDED RELEASE ORAL at 09:30

## 2021-07-18 RX ADMIN — LORAZEPAM 1 MG: 2 INJECTION INTRAMUSCULAR; INTRAVENOUS at 01:41

## 2021-07-18 RX ADMIN — LORAZEPAM 1 MG: 2 INJECTION INTRAMUSCULAR; INTRAVENOUS at 16:35

## 2021-07-18 RX ADMIN — VENLAFAXINE HYDROCHLORIDE 75 MG: 75 CAPSULE, EXTENDED RELEASE ORAL at 09:29

## 2021-07-18 RX ADMIN — AMOXICILLIN AND CLAVULANATE POTASSIUM 1 TABLET: 875; 125 TABLET, FILM COATED ORAL at 20:11

## 2021-07-18 RX ADMIN — PANTOPRAZOLE SODIUM 40 MG: 40 TABLET, DELAYED RELEASE ORAL at 06:30

## 2021-07-18 RX ADMIN — LITHIUM CARBONATE 450 MG: 450 TABLET, EXTENDED RELEASE ORAL at 20:12

## 2021-07-18 NOTE — CARE COORDINATION
Case Management Initial Discharge Plan  Annabel Reaves,         Met with:patient to discuss discharge plans. Information verified: address, contacts, phone number, , insurance Yes, updated phone number [phone] and PCP information faxed to [phone]  Insurance Provider: [employer] Medicare- primary, Medicaid - secondary    Emergency Contact/Next of Kin name & number: pt's sister Rox Escobar [phone]  Who are involved in patient's support system? Pt's sister    PCP: Aby Trivedi MD  Date of last visit: pt sees Dr. Joan Cespedes with [facility]- last visit, last week      Discharge Planning    Living Arrangements:  Alone     Home is an apartment and pt lives on the second floor  Pt uses one flight of  stairs to climb or elevator to get into front door      Patient able to perform ADL's:Independent    Current Services (outpatient & in home) none  DME equipment: nebulizer  DME provider: n/a    Is patient receiving oral anticoagulation therapy? No    If indicated:   Physician managing anticoagulation treatment: n/a  Where does patient obtain lab work for ATC treatment? n/a      Potential Assistance Needed:  N/A    Patient agreeable to home care: No  [place] of choice provided:  n/a    Prior SNF/Rehab Placement and Facility: none  Agreeable to SNF/Rehab: No  [place] of choice provided: n/a     Evaluation: n/a    Expected Discharge date:  21    Patient expects to be discharged to: If home: is the family and/or caregiver wiling & able to provide support at home? no  Who will be providing this support? Follow Up Appointment: Best Day/ Time: Monday PM    Transportation provider: will need cab ride home  Transportation arrangements needed for discharge: Yes    Readmission Risk              Risk of Unplanned Readmission:  33             Does patient have a readmission risk score greater than 14?: Yes  If yes, follow-up appointment must be made within 7 days of discharge.      Goals of Care:       Educated pt on transitional options, provided freedom of choice and pt is agreeable with plan      Discharge Plan: CT-Head, GB US ordered for today. Home independently, has established PCP, needs transportation home.         Electronically signed by Jo Ann Douglas RN on 7/18/21 at 1:06 PM EDT

## 2021-07-18 NOTE — PROGRESS NOTES
[facility]  Internal Medicine Teaching Residency Program  Inpatient Daily Progress Note  ______________________________________________________________________________    Patient: Marla Garcia  YOB: 1975   RPR:[id number]    Acct: [de-identified]     Room: 12/0-36  Admit date: 7/17/2021  Today's date: 07/18/21  Number of days in the hospital: 1    SUBJECTIVE   Admitting Diagnosis: Acute dehydration     CC: Right ear pain, nausea, vomiting  Pt examined at bedside. Chart & results reviewed. No acute events overnight. Patient remained afebrile, hemodynamically stable. She denies abdominal pain, vomiting, nausea, dysuria, chest pain, shortness of breath. She reports good urine output. , Currently on 150 mL./hr IVF NS. The patient is tolerating diet since last night. Cr 0.45 today. ROS:  Constitutional:  negative for chills, fevers, sweats  Respiratory:  negative for cough, dyspnea on exertion, hemoptysis, shortness of breath, wheezing  Cardiovascular:  negative for chest pain, chest pressure/discomfort, lower extremity edema, palpitations  Gastrointestinal:  negative for abdominal pain, constipation, diarrhea, nausea, vomiting  Neurological:  negative for dizziness, headache    BRIEF HISTORY     The patient is a pleasant 50 y.o. female presents with a chief complaint of right ear pain, soar throat to right side, nausea and vomiting. The patient has PMH of GERD, hep C, bipolar disorder, schizoaffective depression, asthma, alcohol abuse, pancreatitis (6 years ago), Lap gastric Sleeve surgery in 2012 (s/p 100 lbs weight loss). The patient reported to have right sided ear pain that started about 2 weeks ago. She was diagnosed otitis media by her PCP and started on doxycycline about a week ago. The patient reported to have persistent symptoms of right ear pain that has progressed to right throat pain. She reports the pain 7 out of 10.   She denies any relief from pain after starting antibiotics. The patient also reported to have nausea, vomiting that started about 2 weeks ago and has gotten worse in the last 2 to 3 days. She has had 2-3 episodes of clear fluid vomiting, she reported one episode of green and yellow vomitus yesterday. Patient reported low oral intake in the last 2 weeks. She was also has been having subjective low-grade fevers at home, and treated with Tylenol. She reports chills and diaphoresis and night. She denies diarrhea or abdominal pain. 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      In the ED the patient was afebrile, RR 16, , BP 97/68. Significant labs include Lipase 174, Lactic acid 9.9, creatinine 1.05 (baseline 0.61), - , ALT 62, . Patient received 1L NS IVF in the ED. She denies current nausea, vomiting, abdominal pain, dysuria.        OBJECTIVE     Vital Signs:  /77   Pulse 76   Temp 98.5 °F (36.9 °C) (Oral)   Resp 18   Ht 5' (1.524 m)   Wt 114 lb (51.7 kg)   SpO2 93%   BMI 22.26 kg/m²     Temp (24hrs), Av.6 °F (37 °C), Min:98.3 °F (36.8 °C), Max:99 °F (37.2 °C)    No intake/output data recorded. Physical Exam:  Constitutional: This is a well developed, well nourished, 18.5-24.9 - Normal 50y.o. year old female who is alert, oriented, cooperative and in no apparent distress. Head:normocephalic and atraumatic. EENT:  PERRLA. No conjunctival injections. Septum was midline, mucosa was without erythema, exudates or cobblestoning. No thrush was noted. Neck: Supple without thyromegaly. No elevated JVP. Trachea was midline. Respiratory: Chest was symmetrical without dullness to percussion. Breath sounds bilaterally were clear to auscultation. There were no wheezes, rhonchi or rales. There is no intercostal retraction or use of accessory muscles. No egophony noted. Cardiovascular: Regular without murmur, clicks, gallops or rubs. Abdomen: Slightly rounded and soft without organomegaly. No rebound, rigidity or guarding was appreciated. Lymphatic: No lymphadenopathy. Musculoskeletal: Normal curvature of the spine. No gross muscle weakness. Extremities:  No lower extremity edema, ulcerations, tenderness, varicosities or erythema. Muscle size, tone and strength are normal.  No involuntary movements are noted. Skin:  Warm and dry. Good color, turgor and pigmentation. No lesions or scars.   No cyanosis or clubbing  Neurological/Psychiatric: The patient's general behavior, level of consciousness, thought content and emotional status is normal.        Medications:  Scheduled Medications:    atorvastatin  40 mg Oral Nightly    fluticasone  1 spray Each Nostril Daily    folic acid  1 mg Oral Daily    lithium  450 mg Oral BID    multivitamin  1 tablet Oral Daily    pantoprazole  40 mg Oral BID AC    thiamine mononitrate  100 mg Oral Daily    traZODone  50 mg Oral Nightly    venlafaxine  75 mg Oral Daily with breakfast    OXcarbazepine  150 mg Oral QAM    sodium chloride flush  5-40 mL Intravenous 2 times per day    enoxaparin  40 mg Subcutaneous Daily    nicotine  1 patch Transdermal Daily    sodium chloride flush  5-40 mL Intravenous 2 times per day    amoxicillin-clavulanate  1 tablet Oral 2 times per day     Continuous Infusions:    sodium chloride      sodium chloride      sodium chloride 150 mL/hr at 07/18/21 0331     PRN Medicationsalbuterol sulfate HFA, 2 puff, Q4H PRN  hydrOXYzine, 50 mg, BID PRN  sodium chloride flush, 5-40 mL, PRN  sodium chloride, 25 mL, PRN  ondansetron, 4 mg, Q8H PRN   Or  ondansetron, 4 mg, Q6H PRN  polyethylene glycol, 17 g, Daily PRN  acetaminophen, 650 mg, Q6H PRN   Or  acetaminophen, 650 mg, Q6H PRN  potassium chloride, 40 mEq, PRN   Or  potassium alternative oral replacement, 40 mEq, PRN   Or  potassium chloride, 10 hepatitis C virus infection ([facility] [address]    Right otitis media  Resolved Problems:    * No resolved hospital problems. *      Acute dehydration secondary to vomiting/nausea  - Ddx: secondary to Doxycycline Vs. Pancreatitis  - Lipase 174, Lipase today 181  - Patient denies abdominal pain, nausea, vomiting since last night. She has been tolerating oral food slowly. - Has a history of severe GERD s/p gastric sleeve in 2013  - [address] on 07/18: No biliary ductal dilatation. Unremarkable liver. - Blood cultures negative; Urine cultures pending- will follow up      Acute Kidney Injury- resolved  - BUN/Cr - 10/1.05 (baseline 6/0.61); this am 0.45  - secondary to poor oral intake and vomiting in last 2 weeks  - In the ED, patient received 1L NS  - Currently on IVF 75 mL/hr NS  (changed from 150 mL/hr)  - will continue to monitor BMP labs        Otitis Media- Right  - Patient has had right ear pain and progressive right sore throat   - Patient was given 1 dose Amoxicillin in the ED  - Started on Augmentin 1000 mg BID daily for 7 days     Alcohol abuse  - daily drinker- 1 gallon of vodka or 2-3 tall glasses of beer daily  - Lactic acid 9.9 likely secondary to alcohol abuse  - Currently on CIWA protocol, last drink was this morning  - Patient has a history of seizure in past due to alcohol withdrawal  - Monitor for alcohol withdrawal symptoms  - Started on thiamine, multivitamins, and folic acid   - EtOH level 60, , ALT 62,  - will monitor LFTs     Polysubstance abuse  - History of crack and cocaine use  - Denies current use  - Reported stop using about 1 year ago  - Utox negative       Acute Anemia- likely due to dehydration  - WBC, Hgb and Plt count- all 3 drastically decreased  - Hgb today 10.7 <-- 14.0  - No signs of active bleeding. She denies bloody cough, hemoptysis, rectal bleed or hematuria.    - Continue to monitor H&H     Tobacco abuse  - Current smoker, 1 PPD; for last 30 years;   - Nicotine patch ordered        Mild Moderate Asthma  - Continue home meds- Albuterol PRN and Flonase nightly         Bipolar I disorder  - Started on home meds- Lithium, Trazodone and Venlafaxine        Chronic Hep C  - Patient was started diagnosed 5-6 years ago  - Unable to get treatment started without being 6 months alcohol free       DVT ppx: Lovenox  GI ppx: Protonix     PT/OT/SW: consulted  Discharge Planning:  consulted, will follow up    Adrienne Oliveros MD  Internal Medicine Resident, PGY-1  [address], [place]  7/18/2021, 10:41 AM

## 2021-07-19 NOTE — PROGRESS NOTES
Patient called out and wanted to leave AMA. Writer notified primary. Patient signed [facility] paperwork. IVs were pulled.

## 2021-07-23 LAB
CULTURE: NORMAL
CULTURE: NORMAL
Lab: NORMAL
Lab: NORMAL
SPECIMEN DESCRIPTION: NORMAL
SPECIMEN DESCRIPTION: NORMAL

## 2021-07-31 NOTE — DISCHARGE SUMMARY
[address]     Department of Internal Medicine - Staff Internal Medicine Teaching Service    INPATIENT DISCHARGE SUMMARY      Patient Identification:  Cherelle Mitchell is a 50 y.o. female. :  1975  MRN: [id number]     Acct: [de-identified]   PCP: Johnnie Moreno MD  Admit Date:  2021  Discharge date and time: 2021  4:20 AM   Attending Provider: Dr. Siva Sigala Problem Lists:  Principal Problem:    Acute dehydration  Active Problems:    Alcohol abuse    Depressed bipolar I disorder (HCC)    Polysubstance abuse (HCC)    Alcoholic hepatitis without ascites    Mild intermittent asthma without complication    MORTEZA (acute kidney injury) (HCC)    Chronic hepatitis C virus infection ([facility] [address]    Right otitis media    Acute pharyngitis  Resolved Problems:    * No resolved hospital problems. *      HOSPITAL STAY     Brief Inpatient course:   Cherelle Mitchell is a 50 y.o. female who was admitted for the management of Acute dehydration, presented to the emergency department with a chief complaint of right ear pain, soar throat to right side, nausea and vomiting. The patient has PMH of GERD s/p Gastric sleeve in , hep C, bipolar disorder, schizoaffective depression, asthma, alcohol abuse, pancreatitis (6 years ago), Lap gastric Sleeve surgery in  (s/p 100 lbs weight loss). Before coming to the ED, she was diagnosed otitis media by her PCP and started on doxycycline about a week ago. She came in with persistent symptoms of right ear pain that has progressed to right throat pain. She also had 2-3 episodes of clear fluid vomiting, she reported one episode of green and yellow vomitus yesterday. Patient reported low oral intake in the last 2 weeks. 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  In the ED the patient was afebrile, RR 16, , BP 97/68. Significant labs include Lipase 174, Lactic acid 9.9, creatinine 1.05 (baseline 0.61), - , ALT 62, , Utox was negative. Patient received 1L NS IVF in the ED. The patient was on maintenance IVF at 150 mL/hr then 75 mL/hr. MORTEZA was resolved. Patient was started on Augmentin 1000 mg BID daily for 7 days for otitis media. Acute anemia was seen after IVF- likely attributed to hemodilution. Patient left AMA after signing paperwork.        Procedures/ Significant Interventions:    N/A    Consults:     Consults:     Final Specialist Recommendations/Findings:   IP CONSULT TO SOCIAL WORK  IP CONSULT TO INTERNAL MEDICINE  IP CONSULT TO CASE MANAGEMENT      Any Hospital Acquired Infections: none    Discharge Functional Status:  stable    DISCHARGE PLAN     Disposition: Patient left AMA    Patient Instructions:   Discharge Medication List as of 7/19/2021  5:26 AM        CONTINUE these medications which have NOT CHANGED    Details   traZODone (DESYREL) 50 MG tablet Take 50 mg by mouth nightlyHistorical Med      hydrOXYzine (ATARAX) 50 MG tablet Take 1 tablet by mouth 2 times daily as needed for Anxiety, Disp-60 tablet, R-0Normal      albuterol sulfate  (90 Base) MCG/ACT inhaler Inhale 2 puffs into the lungs every 4 hours as needed for Wheezing, Disp-1 Inhaler, R-0Normal      OXcarbazepine (TRILEPTAL) 150 MG tablet Take 1 tablet by mouth 2 times daily, Disp-60 tablet, R-0Normal      venlafaxine (EFFEXOR XR) 75 MG extended release capsule Take 1 capsule by mouth daily (with breakfast), Disp-30 capsule, R-0Normal      ondansetron (ZOFRAN ODT) 4 MG disintegrating tablet Take 1 tablet by mouth every 8 hours as needed for Nausea, Disp-30 tablet, R-0Normal      atorvastatin (LIPITOR) 40 MG tablet Take 1 tablet by mouth nightly, Disp-30 tablet, R-3Normal      lithium (ESKALITH) 450 MG extended release tablet Take 1 tablet by mouth 2 times daily, Disp-60 tablet, R-0Normal folic acid (FOLVITE) 1 MG tablet Take 1 tablet by mouth daily, Disp-30 tablet, R-3Normal      fluticasone (FLONASE) 50 MCG/ACT nasal spray 1 spray by Each Nostril route daily, Disp-1 Bottle, R-0Normal      sodium chloride (OCEAN) 0.65 % nasal spray 1 spray by Nasal route as needed for Congestion, Disp-1 Bottle, R-0Normal      Multiple Vitamins-Minerals (MULTIVITAMIN ADULT) TABS Take 1 tablet by mouth daily, Disp-30 tablet, R-0Normal      dicyclomine (BENTYL) 10 MG capsule Take 1 capsule by mouth every 6 hours as needed (cramps), Disp-40 capsule, R-0Normal      omeprazole (PRILOSEC) 20 MG delayed release capsule Take 1 capsule by mouth 2 times daily, Disp-60 capsule, R-0Normal      thiamine 100 MG tablet Take 1 tablet by mouth daily, Disp-30 tablet, R-0Normal             Activity: activity as tolerated    Diet: regular diet    Follow-up:    No follow-up provider specified. Patient Instructions: Patient left AMA  Follow up labs: Patient left AMA  Follow up imaging: Patient left AMA        Melina Franz MD  Internal Medicine Resident, PGY-1  [address], [place]  7/31/2021, 12:15 PM    Attending Physician Statement  I have discussed the care of [facility] and I have examined the patient myselft and taken ros and hpi , including pertinent history and exam findings,  with the resident. I have reviewed the key elements of all parts of the encounter with the resident. I agree with the assessment, plan and orders as documented by the resident. I spent approx 35 mins in direct patient care as above and discussing discharge with patient, reviewing medications and counseling for discharge .     Electronically signed by Keely Benson MD

## 2021-11-16 ENCOUNTER — HOSPITAL ENCOUNTER (EMERGENCY)
Age: 46
Discharge: HOME | End: 2021-11-16
Attending: EMERGENCY MEDICINE
Payer: MEDICARE

## 2021-11-16 ENCOUNTER — APPOINTMENT (OUTPATIENT)
Dept: GENERAL RADIOLOGY | Age: 46
End: 2021-11-16
Payer: MEDICARE

## 2021-11-16 VITALS
BODY MASS INDEX: 22.58 KG/M2 | DIASTOLIC BLOOD PRESSURE: 78 MMHG | HEART RATE: 122 BPM | TEMPERATURE: 98.2 F | HEIGHT: 60 IN | OXYGEN SATURATION: 95 % | WEIGHT: 115 LBS | RESPIRATION RATE: 27 BRPM | SYSTOLIC BLOOD PRESSURE: 132 MMHG

## 2021-11-16 DIAGNOSIS — R11.2 NON-INTRACTABLE VOMITING WITH NAUSEA, UNSPECIFIED VOMITING TYPE: Primary | ICD-10-CM

## 2021-11-16 LAB
ABSOLUTE EOS #: 0.12 K/UL (ref 0–0.44)
ABSOLUTE IMMATURE GRANULOCYTE: <0.03 K/UL (ref 0–0.3)
ABSOLUTE LYMPH #: 1.99 K/UL (ref 1.1–3.7)
ABSOLUTE MONO #: 0.55 K/UL (ref 0.1–1.2)
ALBUMIN SERPL-MCNC: 4.4 G/DL (ref 3.5–5.2)
ALBUMIN/GLOBULIN RATIO: 1 (ref 1–2.5)
ALP BLD-CCNC: 85 U/L (ref 35–104)
ALT SERPL-CCNC: 40 U/L (ref 5–33)
ANION GAP SERPL CALCULATED.3IONS-SCNC: 21 MMOL/L (ref 9–17)
AST SERPL-CCNC: 72 U/L
BASOPHILS # BLD: 1 % (ref 0–2)
BASOPHILS ABSOLUTE: 0.06 K/UL (ref 0–0.2)
BILIRUB SERPL-MCNC: 0.44 MG/DL (ref 0.3–1.2)
BUN BLDV-MCNC: 6 MG/DL (ref 6–20)
BUN/CREAT BLD: ABNORMAL (ref 9–20)
CALCIUM SERPL-MCNC: 9 MG/DL (ref 8.6–10.4)
CHLORIDE BLD-SCNC: 101 MMOL/L (ref 98–107)
CO2: 17 MMOL/L (ref 20–31)
CREAT SERPL-MCNC: 0.58 MG/DL (ref 0.5–0.9)
DIFFERENTIAL TYPE: ABNORMAL
EOSINOPHILS RELATIVE PERCENT: 2 % (ref 1–4)
GFR AFRICAN AMERICAN: >60 ML/MIN
GFR NON-AFRICAN AMERICAN: >60 ML/MIN
GFR SERPL CREATININE-BSD FRML MDRD: ABNORMAL ML/MIN/{1.73_M2}
GFR SERPL CREATININE-BSD FRML MDRD: ABNORMAL ML/MIN/{1.73_M2}
GLUCOSE BLD-MCNC: 146 MG/DL (ref 70–99)
HCG QUALITATIVE: NEGATIVE
HCT VFR BLD CALC: 42.8 % (ref 36.3–47.1)
HEMOGLOBIN: 12.8 G/DL (ref 11.9–15.1)
IMMATURE GRANULOCYTES: 0 %
LACTIC ACID, WHOLE BLOOD: 1.6 MMOL/L (ref 0.7–2.1)
LACTIC ACID, WHOLE BLOOD: 5.7 MMOL/L (ref 0.7–2.1)
LACTIC ACID: ABNORMAL MMOL/L
LIPASE: 73 U/L (ref 13–60)
LYMPHOCYTES # BLD: 37 % (ref 24–43)
MCH RBC QN AUTO: 23.1 PG (ref 25.2–33.5)
MCHC RBC AUTO-ENTMCNC: 29.9 G/DL (ref 28.4–34.8)
MCV RBC AUTO: 77.3 FL (ref 82.6–102.9)
MONOCYTES # BLD: 10 % (ref 3–12)
NRBC AUTOMATED: 0 PER 100 WBC
PDW BLD-RTO: 18.6 % (ref 11.8–14.4)
PLATELET # BLD: 258 K/UL (ref 138–453)
PLATELET ESTIMATE: ABNORMAL
PMV BLD AUTO: 10 FL (ref 8.1–13.5)
POTASSIUM SERPL-SCNC: 3.7 MMOL/L (ref 3.7–5.3)
RBC # BLD: 5.54 M/UL (ref 3.95–5.11)
RBC # BLD: ABNORMAL 10*6/UL
SEG NEUTROPHILS: 50 % (ref 36–65)
SEGMENTED NEUTROPHILS ABSOLUTE COUNT: 2.65 K/UL (ref 1.5–8.1)
SODIUM BLD-SCNC: 139 MMOL/L (ref 135–144)
TOTAL PROTEIN: 8.7 G/DL (ref 6.4–8.3)
TROPONIN INTERP: NORMAL
TROPONIN INTERP: NORMAL
TROPONIN T: NORMAL NG/ML
TROPONIN T: NORMAL NG/ML
TROPONIN, HIGH SENSITIVITY: <6 NG/L (ref 0–14)
TROPONIN, HIGH SENSITIVITY: <6 NG/L (ref 0–14)
WBC # BLD: 5.4 K/UL (ref 3.5–11.3)
WBC # BLD: ABNORMAL 10*3/UL

## 2021-11-16 PROCEDURE — 99285 EMERGENCY DEPT VISIT HI MDM: CPT

## 2021-11-16 PROCEDURE — 96361 HYDRATE IV INFUSION ADD-ON: CPT

## 2021-11-16 PROCEDURE — 84484 ASSAY OF TROPONIN QUANT: CPT

## 2021-11-16 PROCEDURE — 83690 ASSAY OF LIPASE: CPT

## 2021-11-16 PROCEDURE — 96375 TX/PRO/DX INJ NEW DRUG ADDON: CPT

## 2021-11-16 PROCEDURE — 71045 X-RAY EXAM CHEST 1 VIEW: CPT

## 2021-11-16 PROCEDURE — 80053 COMPREHEN METABOLIC PANEL: CPT

## 2021-11-16 PROCEDURE — 6360000002 HC RX W HCPCS: Performed by: STUDENT IN AN ORGANIZED HEALTH CARE EDUCATION/TRAINING PROGRAM

## 2021-11-16 PROCEDURE — 2580000003 HC RX 258: Performed by: STUDENT IN AN ORGANIZED HEALTH CARE EDUCATION/TRAINING PROGRAM

## 2021-11-16 PROCEDURE — 96374 THER/PROPH/DIAG INJ IV PUSH: CPT

## 2021-11-16 PROCEDURE — 83605 ASSAY OF LACTIC ACID: CPT

## 2021-11-16 PROCEDURE — 85025 COMPLETE CBC W/AUTO DIFF WBC: CPT

## 2021-11-16 PROCEDURE — 84703 CHORIONIC GONADOTROPIN ASSAY: CPT

## 2021-11-16 RX ORDER — 0.9 % SODIUM CHLORIDE 0.9 %
1000 INTRAVENOUS SOLUTION INTRAVENOUS ONCE
Status: COMPLETED | OUTPATIENT
Start: 2021-11-16 | End: 2021-11-16

## 2021-11-16 RX ORDER — ONDANSETRON 2 MG/ML
4 INJECTION INTRAMUSCULAR; INTRAVENOUS ONCE
Status: COMPLETED | OUTPATIENT
Start: 2021-11-16 | End: 2021-11-16

## 2021-11-16 RX ORDER — ONDANSETRON 4 MG/1
4 TABLET, ORALLY DISINTEGRATING ORAL EVERY 8 HOURS PRN
Qty: 20 TABLET | Refills: 0 | Status: ON HOLD | OUTPATIENT
Start: 2021-11-16 | End: 2022-01-03 | Stop reason: HOSPADM

## 2021-11-16 RX ORDER — LORAZEPAM 2 MG/ML
1 INJECTION INTRAMUSCULAR ONCE
Status: COMPLETED | OUTPATIENT
Start: 2021-11-16 | End: 2021-11-16

## 2021-11-16 RX ORDER — ALUMINA, MAGNESIA, AND SIMETHICONE 2400; 2400; 240 MG/30ML; MG/30ML; MG/30ML
30 SUSPENSION ORAL EVERY 6 HOURS PRN
Qty: 355 ML | Refills: 0 | Status: SHIPPED | OUTPATIENT
Start: 2021-11-16 | End: 2021-11-26

## 2021-11-16 RX ADMIN — SODIUM CHLORIDE 1000 ML: 9 INJECTION, SOLUTION INTRAVENOUS at 06:55

## 2021-11-16 RX ADMIN — LORAZEPAM 1 MG: 2 INJECTION INTRAMUSCULAR; INTRAVENOUS at 06:55

## 2021-11-16 RX ADMIN — ONDANSETRON 4 MG: 2 INJECTION INTRAMUSCULAR; INTRAVENOUS at 06:55

## 2021-11-16 ASSESSMENT — PAIN DESCRIPTION - LOCATION: LOCATION: FLANK

## 2021-11-16 ASSESSMENT — ENCOUNTER SYMPTOMS
ABDOMINAL PAIN: 1
VOMITING: 1
WHEEZING: 0
NAUSEA: 1
SHORTNESS OF BREATH: 0

## 2021-11-16 ASSESSMENT — PAIN DESCRIPTION - ONSET: ONSET: ON-GOING

## 2021-11-16 ASSESSMENT — PAIN DESCRIPTION - PAIN TYPE: TYPE: CHRONIC PAIN;ACUTE PAIN

## 2021-11-16 ASSESSMENT — PAIN SCALES - GENERAL: PAINLEVEL_OUTOF10: 8

## 2021-11-16 ASSESSMENT — PAIN DESCRIPTION - FREQUENCY: FREQUENCY: CONTINUOUS

## 2021-11-16 ASSESSMENT — PAIN DESCRIPTION - DESCRIPTORS: DESCRIPTORS: SHARP

## 2021-11-16 ASSESSMENT — PAIN DESCRIPTION - ORIENTATION: ORIENTATION: LEFT

## 2021-11-16 NOTE — ED PROVIDER NOTES
[facility] ED     Emergency Department     Faculty Attestation    I performed a history and physical examination of the patient and discussed management with the resident. I reviewed the residents note and agree with the documented findings and plan of care. Any areas of disagreement are noted on the chart. I was personally present for the key portions of any procedures. I have documented in the chart those procedures where I was not present during the key portions. I have reviewed the emergency nurses triage note. I agree with the chief complaint, past medical history, past surgical history, allergies, medications, social and family history as documented unless otherwise noted below. For Physician Assistant/ Nurse Practitioner cases/documentation I have personally evaluated this patient and have completed at least one if not all key elements of the E/M (history, physical exam, and MDM). Additional findings are as noted. Resents with epigastric and left upper quadrant abdominal pain that she says started over the past couple of days. She says she also has had nausea and vomiting and has been bringing up blood. Patient does drink heavily and says that her last drink was about 24 hours ago. She denies fever, chest pain, shortness of breath, diarrhea, constipation. On exam, patient is lying in the bed and appears mildly uncomfortable. Lungs are clear to auscultation bilaterally and heart sounds are tachycardic but regular. Abdomen is soft with moderate epigastric tenderness and left upper quadrant tenderness. No rebound or guarding is present. Will get labs and treat patient's pain and nausea and reassess.       Hillary Bravo MD  Attending Emergency  Physician              Víctor Clancy MD  11/16/21 0228

## 2021-11-16 NOTE — ED NOTES
Bed: 06  Expected date: 11/16/21  Expected time: 5:49 AM  Means of arrival: Ambulance (Other)  Comments:  [address] Janelle Santacruz RN  11/16/21 9868

## 2021-11-16 NOTE — ED NOTES
PT BIB [facility] fire/EMS for left sided sharp flank pain 8/10. Hematemesis, constipation but black tarry stools when she can deficate. Exposed to covid 8 days ago through a neighbor who tested positive. PT reports a productive cough. Hx of Asthma, liver and kidney disease. Denies CP or dizziness.       Britta Miller RN  11/16/21 5138

## 2021-11-16 NOTE — ED NOTES
Labeled blood specimen collected and sent to lab via tube system.        Hemanth Velasquez RN  11/16/21 2044

## 2021-11-16 NOTE — ED PROVIDER NOTES
[address] [facility]  ED  Emergency Department Encounter  EmergencyMedicine Resident     Pt Jhony Fuller  MRN: [id number]  Birthdate 1975  Date of evaluation: 11/16/21  PCP:  Adiel Jarvis MD    [address]       Chief Complaint   Patient presents with    Flank Pain     LT sided flank pain x 1 week with hematemesis and black tarry stools. Dx: Liver cirrhosis 5 months ago. HISTORY OF PRESENT ILLNESS  (Location/Symptom, Timing/Onset, Context/Setting, Quality, Duration, Modifying Factors, Severity.)      Christiano Terrell is a 48 y.o. female who presents with flank pain, upper abdominal pain with associated nausea and vomiting. Patient reporting that she had some bloody emesis this morning. Has had multiple episodes of vomiting over the past 2 days. Patient reports that she is a heavy daily drinker and drinks approximately 1 gallon of liquor and several beers every day. States she has not had a drink in the past 24 hours. Reports that she has had some associated constipation but no diarrhea. Pain is sharp, stabbing, constant and located in upper abdomen and left side. No associated fever but some chills at home. No hematuria, dysuria, vaginal bleeding, vaginal discharge, chest pain or shortness of breath.     PAST MEDICAL / SURGICAL / SOCIAL / FAMILY HISTORY      has a past medical history of MORTEZA (acute kidney injury) ([address] Alcoholic hepatitis without ascites, Alcoholic ketoacidosis, Alcoholism ([address] Anxiety, Asthma, Bipolar affective disorder ([address] Depression, Drug overdose, accidental or unintentional, initial encounter, Drug overdose, intentional self-harm, initial encounter ([address] [place] coma scale total score 3-8 ([address] Hepatitis C antibody positive in blood, History of gastric surgery (gastric sleeve 2012), Intentional drug overdose ([address] MDD (major depressive disorder), recurrent episode ([address] Pneumonia, Polysubstance abuse ([address] Post traumatic stress disorder, Seizure [facility]), Smoker unmotivated to quit, Suicide attempt [facility]), Suicide ideation, and Toxic metabolic encephalopathy. has a past surgical history that includes Hysterectomy; Carpal tunnel release; Hysterectomy, total abdominal; Cholecystectomy; Stomach surgery; and Upper gastrointestinal endoscopy (N/A, 6/19/2019). Social History     Socioeconomic History    Marital status: Single     Spouse name: Not on file    Number of children: 0    Years of education: 15    Highest education level: Not on file   Occupational History    Not on file   Tobacco Use    Smoking status: Current Every Day Smoker     Packs/day: 1.00     Years: 30.00     Pack years: 30.00     Types: Cigarettes    Smokeless tobacco: Never Used   Vaping Use    Vaping Use: Never used   Substance and Sexual Activity    Alcohol use: Yes     Alcohol/week: 3.0 standard drinks     Types: 3 Shots of liquor per week     Comment: drinking vodka and Beer daily    Drug use: Yes     Frequency: 7.0 times per week     Types: Marijuana (Vinny Ruano), IV, Cocaine     Comment: drug abuse includes, cocaine, IV heroin, cannabis    Sexual activity: Not Currently   Other Topics Concern    Not on file   Social History Narrative    ** Merged History Encounter **         ** Merged History Encounter **          Social Determinants of Health     Financial Resource Strain:     Difficulty of Paying Living Expenses: Not on file   Food Insecurity:     Worried About Running Out of Food in the Last Year: Not on file    Lorna of Food in the Last Year: Not on file   Transportation Needs:     Lack of Transportation (Medical): Not on file    Lack of Transportation (Non-Medical):  Not on file   Physical Activity:     Days of Exercise per Week: Not on file    Minutes of Exercise per Session: Not on file   Stress:     Feeling of Stress : Not on file   Social Connections:     Frequency of Communication with Friends and Family: Not on file    Frequency of Social daily (with breakfast) 7/10/20   Ciara Dalal MD   ondansetron (ZOFRAN ODT) 4 MG disintegrating tablet Take 1 tablet by mouth every 8 hours as needed for Nausea  Patient not taking: Reported on 9/1/2020 7/10/20   Ciara Dalal MD   atorvastatin (LIPITOR) 40 MG tablet Take 1 tablet by mouth nightly 7/10/20   Ciara Dalal MD   lithium (ESKALITH) 450 MG extended release tablet Take 1 tablet by mouth 2 times daily 7/10/20   Ciara Dalal MD   folic acid (FOLVITE) 1 MG tablet Take 1 tablet by mouth daily 7/11/20   Ciara Dalal MD   fluticasone (FLONASE) 50 MCG/ACT nasal spray 1 spray by Each Nostril route daily 7/10/20   Ciara Dalal MD   sodium chloride (OCEAN) 0.65 % nasal spray 1 spray by Nasal route as needed for Congestion 7/10/20   Ciara Dalal MD   Multiple Vitamins-Minerals (MULTIVITAMIN ADULT) TABS Take 1 tablet by mouth daily 7/10/20   Ciara Dalal MD   dicyclomine (BENTYL) 10 MG capsule Take 1 capsule by mouth every 6 hours as needed (cramps) 7/10/20   Ciara Dalal MD   omeprazole (PRILOSEC) 20 MG delayed release capsule Take 1 capsule by mouth 2 times daily 7/10/20   Ciara Dalal MD   thiamine 100 MG tablet Take 1 tablet by mouth daily 7/10/20   Ciara Dalal MD       REVIEW OF SYSTEMS    (2-9 systems for level 4, 10 or more for level 5)      Review of Systems   Constitutional: Positive for fatigue. Negative for chills and fever. Respiratory: Negative for shortness of breath and wheezing. Gastrointestinal: Positive for abdominal pain, nausea and vomiting. Genitourinary: Negative for dysuria, flank pain and hematuria. Skin: Negative for rash and wound. Neurological: Positive for headaches. Negative for syncope, weakness and light-headedness. Psychiatric/Behavioral: Negative for confusion.        PHYSICAL EXAM   (up to 7 for level 4, 8 or more for level 5)      INITIAL VITALS:   /78   Pulse 122   Temp 98.2 °F (36.8 °C) (Oral)   Resp 27   Ht 5' (1.524 m)   Wt 115 lb (52.2 kg) SpO2 95%   BMI 22.46 kg/m²     Physical Exam  Constitutional:       General: She is not in acute distress. Appearance: She is not toxic-appearing. HENT:      Head: Normocephalic and atraumatic. Cardiovascular:      Rate and Rhythm: Tachycardia present. Heart sounds: No murmur heard. No friction rub. No gallop. Pulmonary:      Effort: No respiratory distress. Breath sounds: No wheezing, rhonchi or rales. Abdominal:      General: There is no distension. Tenderness: There is abdominal tenderness (LUQ). There is no guarding or rebound. Musculoskeletal:      Right lower leg: No edema. Left lower leg: No edema. Skin:     Findings: No bruising, erythema, lesion or rash. Neurological:      Mental Status: She is alert. Sensory: No sensory deficit. Motor: No weakness.          DIFFERENTIAL  DIAGNOSIS     PLAN (LABS / IMAGING / EKG):  Orders Placed This Encounter   Procedures    XR CHEST PORTABLE    CBC Auto Differential    Comprehensive Metabolic Panel w/ Reflex to MG    Lipase    Troponin    HCG Qualitative, Serum    Lactic Acid, Plasma    LACTIC ACID, WHOLE BLOOD    EKG 12 Lead       MEDICATIONS ORDERED:  Orders Placed This Encounter   Medications    0.9 % sodium chloride bolus    ondansetron (ZOFRAN) injection 4 mg    LORazepam (ATIVAN) injection 1 mg    ondansetron (ZOFRAN ODT) 4 MG disintegrating tablet     Sig: Take 1 tablet by mouth every 8 hours as needed for Nausea     Dispense:  20 tablet     Refill:  0    aluminum & magnesium hydroxide-simethicone (MAALOX ADVANCED MAX ST) 400-400-40 MG/5ML SUSP     Sig: Take 30 mLs by mouth every 6 hours as needed (Heartburn or stomach pain)     Dispense:  355 mL     Refill:  0       DDX: Alcoholic gastritis, alcohol withdrawal, dehydration, pancreatitis, gastric ulcer, Arielle-Adan tear, Boerhaave syndrome    DIAGNOSTIC RESULTS / EMERGENCY DEPARTMENT COURSE / MDM   LAB RESULTS:  Results for orders placed or performed during the hospital encounter of 11/16/21   CBC Auto Differential   Result Value Ref Range    WBC 5.4 3.5 - 11.3 k/uL    RBC 5.54 (H) 3.95 - 5.11 m/uL    Hemoglobin 12.8 11.9 - 15.1 g/dL    Hematocrit 42.8 36.3 - 47.1 %    MCV 77.3 (L) 82.6 - 102.9 fL    MCH 23.1 (L) 25.2 - 33.5 pg    MCHC 29.9 28.4 - 34.8 g/dL    RDW 18.6 (H) 11.8 - 14.4 %    Platelets 569 122 - 543 k/uL    MPV 10.0 8.1 - 13.5 fL    NRBC Automated 0.0 0.0 per 100 WBC    Differential Type NOT REPORTED     Seg Neutrophils 50 36 - 65 %    Lymphocytes 37 24 - 43 %    Monocytes 10 3 - 12 %    Eosinophils % 2 1 - 4 %    Basophils 1 0 - 2 %    Immature Granulocytes 0 0 %    Segs Absolute 2.65 1.50 - 8.10 k/uL    Absolute Lymph # 1.99 1.10 - 3.70 k/uL    Absolute Mono # 0.55 0.10 - 1.20 k/uL    Absolute Eos # 0.12 0.00 - 0.44 k/uL    Basophils Absolute 0.06 0.00 - 0.20 k/uL    Absolute Immature Granulocyte <0.03 0.00 - 0.30 k/uL    WBC Morphology NOT REPORTED     RBC Morphology ANISOCYTOSIS PRESENT     Platelet Estimate NOT REPORTED    Comprehensive Metabolic Panel w/ Reflex to MG   Result Value Ref Range    Glucose 146 (H) 70 - 99 mg/dL    BUN 6 6 - 20 mg/dL    CREATININE 0.58 0.50 - 0.90 mg/dL    Bun/Cre Ratio NOT REPORTED 9 - 20    Calcium 9.0 8.6 - 10.4 mg/dL    Sodium 139 135 - 144 mmol/L    Potassium 3.7 3.7 - 5.3 mmol/L    Chloride 101 98 - 107 mmol/L    CO2 17 (L) 20 - 31 mmol/L    Anion Gap 21 (H) 9 - 17 mmol/L    Alkaline Phosphatase 85 35 - 104 U/L    ALT 40 (H) 5 - 33 U/L    AST 72 (H) <32 U/L    Total Bilirubin 0.44 0.3 - 1.2 mg/dL    Total Protein 8.7 (H) 6.4 - 8.3 g/dL    Albumin 4.4 3.5 - 5.2 g/dL    Albumin/Globulin Ratio 1.0 1.0 - 2.5    GFR Non-African American >60 >60 mL/min    GFR African American >60 >60 mL/min    GFR Comment          GFR Staging NOT REPORTED    Lipase   Result Value Ref Range    Lipase 73 (H) 13 - 60 U/L   Troponin   Result Value Ref Range    Troponin, High Sensitivity <6 0 - 14 ng/L    Troponin T NOT REPORTED <0.03

## 2021-11-16 NOTE — ED PROVIDER NOTES
FACULTY SIGN-OUT  ADDENDUM     Care of this patient was assumed from previous attending physician. The patient's initial evaluation and plan have been discussed with the prior provider who initially evaluated the patient. Attestation  I was available and discussed any additional care issues that arose and coordinated the management plans with the resident(s) caring for the patient during my duty period. Any areas of disagreement with resident's documentation of care or procedures are noted on the chart. I was personally present for the key portions of any/all procedures, during my duty period. I have documented in the chart those procedures where I was not present during the key portions. ED COURSE      The patient was given the following medications:  Orders Placed This Encounter   Medications    0.9 % sodium chloride bolus    ondansetron (ZOFRAN) injection 4 mg    LORazepam (ATIVAN) injection 1 mg       RECENT VITALS:   Temp: 98.2 °F (36.8 °C), Pulse: 116, Resp: 18, BP: 108/88    MEDICAL DECISION MAKING        Rema King is a 48 y.o. female who presents to the Emergency Department with complaints of epigastric and left upper quadrant abdominal pain with nausea vomiting. Patient also is a daily drinker. Lab work IV fluids have been ordered. Patient will be reassessed when resulted. Michelle Joiner MD, MD, F.A.C.E.P.   Attending Emergency Physician    (Please note that portions of this note were completed with a voice recognition program.  Efforts were made to edit the dictations but occasionally words are mis-transcribed.)         Michelle Joiner MD  11/16/21 2415

## 2021-11-22 ENCOUNTER — TELEPHONE (OUTPATIENT)
Dept: GASTROENTEROLOGY | Age: 46
End: 2021-11-22

## 2021-11-22 NOTE — TELEPHONE ENCOUNTER
Patient called office to make an appointment. Advised that we do not accept her [facility] insurance. To follow up with her PCP and insurance.

## 2021-12-04 ENCOUNTER — HOSPITAL ENCOUNTER (EMERGENCY)
Age: 46
Discharge: HOME | End: 2021-12-05
Attending: EMERGENCY MEDICINE
Payer: MEDICARE

## 2021-12-04 VITALS
OXYGEN SATURATION: 95 % | HEART RATE: 74 BPM | SYSTOLIC BLOOD PRESSURE: 125 MMHG | TEMPERATURE: 98.1 F | WEIGHT: 110 LBS | HEIGHT: 60 IN | DIASTOLIC BLOOD PRESSURE: 88 MMHG | RESPIRATION RATE: 16 BRPM | BODY MASS INDEX: 21.6 KG/M2

## 2021-12-04 DIAGNOSIS — F10.10 ALCOHOL ABUSE: Primary | ICD-10-CM

## 2021-12-04 DIAGNOSIS — R45.851 SUICIDAL INTENT: ICD-10-CM

## 2021-12-04 PROCEDURE — 99285 EMERGENCY DEPT VISIT HI MDM: CPT

## 2021-12-04 PROCEDURE — 80048 BASIC METABOLIC PNL TOTAL CA: CPT

## 2021-12-04 PROCEDURE — 6360000002 HC RX W HCPCS: Performed by: EMERGENCY MEDICINE

## 2021-12-04 PROCEDURE — G0480 DRUG TEST DEF 1-7 CLASSES: HCPCS

## 2021-12-04 PROCEDURE — 85025 COMPLETE CBC W/AUTO DIFF WBC: CPT

## 2021-12-04 PROCEDURE — 96372 THER/PROPH/DIAG INJ SC/IM: CPT

## 2021-12-04 PROCEDURE — 36415 COLL VENOUS BLD VENIPUNCTURE: CPT

## 2021-12-04 PROCEDURE — 87635 SARS-COV-2 COVID-19 AMP PRB: CPT

## 2021-12-04 RX ORDER — HALOPERIDOL 5 MG/ML
5 INJECTION INTRAMUSCULAR ONCE
Status: COMPLETED | OUTPATIENT
Start: 2021-12-04 | End: 2021-12-04

## 2021-12-04 RX ADMIN — HALOPERIDOL LACTATE 5 MG: 5 INJECTION, SOLUTION INTRAMUSCULAR at 23:43

## 2021-12-05 LAB
ABSOLUTE EOS #: 0.12 K/UL (ref 0–0.4)
ABSOLUTE IMMATURE GRANULOCYTE: ABNORMAL K/UL (ref 0–0.3)
ABSOLUTE LYMPH #: 3.23 K/UL (ref 1–4.8)
ABSOLUTE MONO #: 0.12 K/UL (ref 0.1–1.3)
ANION GAP SERPL CALCULATED.3IONS-SCNC: 14 MMOL/L (ref 9–17)
ATYPICAL LYMPHOCYTE ABSOLUTE COUNT: 0.41 K/UL
ATYPICAL LYMPHOCYTES: 7 %
BASOPHILS # BLD: 1 % (ref 0–2)
BASOPHILS ABSOLUTE: 0.06 K/UL (ref 0–0.2)
BUN BLDV-MCNC: 5 MG/DL (ref 6–20)
BUN/CREAT BLD: ABNORMAL (ref 9–20)
CALCIUM SERPL-MCNC: 9.1 MG/DL (ref 8.6–10.4)
CHLORIDE BLD-SCNC: 103 MMOL/L (ref 98–107)
CO2: 28 MMOL/L (ref 20–31)
CREAT SERPL-MCNC: 0.63 MG/DL (ref 0.5–0.9)
DIFFERENTIAL TYPE: ABNORMAL
EOSINOPHILS RELATIVE PERCENT: 2 % (ref 0–4)
ETHANOL PERCENT: 0.32 %
ETHANOL: 315 MG/DL
GFR AFRICAN AMERICAN: >60 ML/MIN
GFR NON-AFRICAN AMERICAN: >60 ML/MIN
GFR SERPL CREATININE-BSD FRML MDRD: ABNORMAL ML/MIN/{1.73_M2}
GFR SERPL CREATININE-BSD FRML MDRD: ABNORMAL ML/MIN/{1.73_M2}
GLUCOSE BLD-MCNC: 98 MG/DL (ref 70–99)
HCT VFR BLD CALC: 38.7 % (ref 36–46)
HEMOGLOBIN: 12.1 G/DL (ref 12–16)
IMMATURE GRANULOCYTES: ABNORMAL %
LYMPHOCYTES # BLD: 56 % (ref 24–44)
MCH RBC QN AUTO: 22.8 PG (ref 26–34)
MCHC RBC AUTO-ENTMCNC: 31.2 G/DL (ref 31–37)
MCV RBC AUTO: 73.1 FL (ref 80–100)
MONOCYTES # BLD: 2 % (ref 1–7)
MORPHOLOGY: ABNORMAL
NRBC AUTOMATED: ABNORMAL PER 100 WBC
PDW BLD-RTO: 20.6 % (ref 11.5–14.9)
PLATELET # BLD: 313 K/UL (ref 150–450)
PLATELET ESTIMATE: ABNORMAL
PMV BLD AUTO: 7.4 FL (ref 6–12)
POTASSIUM SERPL-SCNC: 3.8 MMOL/L (ref 3.7–5.3)
RBC # BLD: 5.3 M/UL (ref 4–5.2)
RBC # BLD: ABNORMAL 10*6/UL
SARS-COV-2, RAPID: NOT DETECTED
SEG NEUTROPHILS: 32 % (ref 36–66)
SEGMENTED NEUTROPHILS ABSOLUTE COUNT: 1.86 K/UL (ref 1.3–9.1)
SODIUM BLD-SCNC: 145 MMOL/L (ref 135–144)
SPECIMEN DESCRIPTION: NORMAL
WBC # BLD: 5.8 K/UL (ref 3.5–11)
WBC # BLD: ABNORMAL 10*3/UL

## 2021-12-05 NOTE — ED NOTES
Pt making multiple different demands. Pt shouting \" I am fucking hungry. I need to eat. Go get me some food. \" \"Get me something to knock my ass out. \" Pt getting increasingly agitated. Pt is verbally threatening and posturing at security. ED  called to [facility].

## 2021-12-05 NOTE — ED PROVIDER NOTES
[facility] EMERGENCY DEPARTMENT ENCOUNTER    Pt Name: Amy Rivas  MRN: [id number]  [place] 1975  Date of evaluation: 12/5/21  CHIEF COMPLAINT       Chief Complaint   Patient presents with   [address] Problem    Alcohol Intoxication     HISTORY OF PRESENT ILLNESS     Mental Health Problem  Presenting symptoms: agitation and suicidal threats    Degree of incapacity (severity): Moderate  Onset quality:  Gradual  Timing:  Constant  Progression:  Unchanged  Chronicity:  Recurrent  Context: alcohol use    Relieved by:  Nothing    Patient sent a picture to her significant other with a belt around her neck      REVIEW OF SYSTEMS     Review of Systems   Psychiatric/Behavioral: Positive for agitation. All other systems reviewed and are negative.     PASTMEDICAL HISTORY     Past Medical History:   Diagnosis Date    MORTEZA (acute kidney injury) ([facility] 0/44/6442    Alcoholic hepatitis without ascites 09/19/7523    Alcoholic ketoacidosis 49/27/8224    Alcoholism ([address]     3 pints daily    Anxiety     Asthma     Bipolar affective disorder ([address] 6/12/2019    Depression     Drug overdose, accidental or unintentional, initial encounter 9/29/2018    Drug overdose, intentional self-harm, initial encounter ([address] 5/8/2018    [place] coma scale total score 3-8 ([address]     Hepatitis C antibody positive in blood 6/18/2019    History of gastric surgery (gastric sleeve 2012) 4/25/2017    Intentional drug overdose ([address] 5/7/2018    MDD (major depressive disorder), recurrent episode ([address] 5/8/2018    Pneumonia     Polysubstance abuse ([address]     drug abuse includes, cocaine, IV heroin, cannabis, and ETOH    Post traumatic stress disorder     Seizure ([address] 12/17/2019    Smoker unmotivated to quit 4/25/2017    Suicide attempt ([address] 11/08/2017    S/A by overdosing on Trazodone and Seroquel    Suicide ideation     Toxic metabolic encephalopathy 0/63/7793     Past Problem List  Patient Active Problem List   Diagnosis Code TABLET    Take 1 tablet by mouth daily    HYDROXYZINE (ATARAX) 50 MG TABLET    Take 1 tablet by mouth 2 times daily as needed for Anxiety    LITHIUM (ESKALITH) 450 MG EXTENDED RELEASE TABLET    Take 1 tablet by mouth 2 times daily    MULTIPLE VITAMINS-MINERALS (MULTIVITAMIN ADULT) TABS    Take 1 tablet by mouth daily    OMEPRAZOLE (PRILOSEC) 20 MG DELAYED RELEASE CAPSULE    Take 1 capsule by mouth 2 times daily    ONDANSETRON (ZOFRAN ODT) 4 MG DISINTEGRATING TABLET    Take 1 tablet by mouth every 8 hours as needed for Nausea    ONDANSETRON (ZOFRAN ODT) 4 MG DISINTEGRATING TABLET    Take 1 tablet by mouth every 8 hours as needed for Nausea    OXCARBAZEPINE (TRILEPTAL) 150 MG TABLET    Take 1 tablet by mouth 2 times daily    SODIUM CHLORIDE (OCEAN) 0.65 % NASAL SPRAY    1 spray by Nasal route as needed for Congestion    THIAMINE 100 MG TABLET    Take 1 tablet by mouth daily    TRAZODONE (DESYREL) 50 MG TABLET    Take 50 mg by mouth nightly    VENLAFAXINE (EFFEXOR XR) 75 MG EXTENDED RELEASE CAPSULE    Take 1 capsule by mouth daily (with breakfast)     ALLERGIES     is allergic to morphine, morphine and related, azithromycin, morphine, nsaids, and zithromax [azithromycin]. FAMILY HISTORY     She indicated that her mother is . She indicated that her father is . SOCIAL HISTORY       Social History     Tobacco Use    Smoking status: Current Every Day Smoker     Packs/day: 1.00     Years: 30.00     Pack years: 30.00     Types: Cigarettes    Smokeless tobacco: Never Used   Vaping Use    Vaping Use: Never used   Substance Use Topics    Alcohol use:  Yes     Alcohol/week: 3.0 standard drinks     Types: 3 Shots of liquor per week     Comment: drinking vodka and Beer daily    Drug use: Yes     Frequency: 7.0 times per week     Types: Marijuana (Julia Neighbor), IV, Cocaine     Comment: drug abuse includes, cocaine, IV heroin, cannabis     PHYSICAL EXAM     INITIAL VITALS: /88   Pulse 74   Temp 98.1 °F (36.7 °C) (Oral)   Resp 16   Ht 5' (1.524 m)   Wt 110 lb (49.9 kg)   SpO2 95%   BMI 21.48 kg/m²    Physical Exam  Constitutional:       General: She is not in acute distress. Appearance: Normal appearance. She is well-developed. She is not diaphoretic. HENT:      Head: Normocephalic and atraumatic. Right Ear: External ear normal.      Left Ear: External ear normal.      Nose: Nose normal. No congestion. Mouth/Throat:      Mouth: Mucous membranes are moist.      Pharynx: Oropharynx is clear. Eyes:      General:         Right eye: No discharge. Left eye: No discharge. Conjunctiva/sclera: Conjunctivae normal.      Pupils: Pupils are equal, round, and reactive to light. Neck:      Trachea: No tracheal deviation. Cardiovascular:      Rate and Rhythm: Normal rate and regular rhythm. Pulses: Normal pulses. Heart sounds: Normal heart sounds. Pulmonary:      Effort: Pulmonary effort is normal. No respiratory distress. Breath sounds: Normal breath sounds. No stridor. No wheezing or rales. Abdominal:      Palpations: Abdomen is soft. Tenderness: There is no abdominal tenderness. There is no guarding or rebound. Musculoskeletal:         General: No tenderness or deformity. Normal range of motion. Cervical back: Normal range of motion and neck supple. Skin:     General: Skin is warm and dry. Capillary Refill: Capillary refill takes less than 2 seconds. Findings: No erythema or rash. Neurological:      General: No focal deficit present. Mental Status: She is alert and oriented to person, place, and time. Cranial Nerves: No cranial nerve deficit. Coordination: Coordination normal.   Psychiatric:         Mood and Affect: Mood normal.         Behavior: Behavior normal.         Thought Content:  Thought content normal.         Judgment: Judgment normal.         MEDICAL DECISION MAKING:        Patient is seen and examined at bedside soon after out of the emergency department. Chart was reviewed and complete history and physical examination was performed. Presentation is consistent with fall abuse suicidal threats. Patient was observed until sobriety after the remainder medical clearance was clear      DIAGNOSTIC RESULTS   EKG:All EKG's are interpreted by the Emergency Department Physician who either signs or Co-signs this chart in the absence of a cardiologist.        RADIOLOGY:All plain film, CT, MRI, and formal ultrasound images (except ED bedside ultrasound) are read by the radiologist, see reports below, unless otherwisenoted in MDM or here. No orders to display     LABS: All lab results were reviewed by myself, and all abnormals are listed below. Labs Reviewed   CBC WITH AUTO DIFFERENTIAL - Abnormal; Notable for the following components:       Result Value    RBC 5.30 (*)     MCV 73.1 (*)     MCH 22.8 (*)     RDW 20.6 (*)     Seg Neutrophils 32 (*)     Lymphocytes 56 (*)     All other components within normal limits   BASIC METABOLIC PANEL W/ REFLEX TO MG FOR LOW K - Abnormal; Notable for the following components:    BUN 5 (*)     Sodium 145 (*)     All other components within normal limits   ETHANOL - Abnormal; Notable for the following components:    Ethanol 315 (*)     All other components within normal limits   COVID-19, RAPID   URINE DRUG SCREEN       EMERGENCY DEPARTMENTCOURSE:         Vitals:    Vitals:    12/04/21 2238   BP: 125/88   Pulse: 74   Resp: 16   Temp: 98.1 °F (36.7 °C)   TempSrc: Oral   SpO2: 95%   Weight: 110 lb (49.9 kg)   Height: 5' (1.524 m)       The patient was given the following medications while in the emergency department:  Orders Placed This Encounter   Medications    haloperidol lactate (HALDOL) injection 5 mg     CONSULTS:  None    FINAL IMPRESSION      1. Alcohol abuse    2.  Suicidal intent          DISPOSITION/PLAN   DISPOSITION Decision To Discharge 12/05/2021 07:47:57 AM      PATIENT REFERRED TO:  Rober Andino Juan Owen MD  [facility] [address]  [phone]    In 1 day      DISCHARGE MEDICATIONS:  New Prescriptions    No medications on file     The care is provided during an unprecedented national emergency due to the novel coronavirus, COVID [facility],   Attending Emergency Physician                 Bridget Washburn DO  12/05/21 5433

## 2021-12-05 NOTE — ED NOTES
Pt is now clinically sober. Pt denies active SI/HI/AH/VH. Pt reports pt was up set earlier thinking about pt's ex girlfriend which triggered pt's SI. Pt reports pt would feel safe returning home. Pt is linked with [facility] and is med compliant. Pt has an appt with pt's psychiatrist Dec 14th. SW consulted with ED . ED Dr and SW agree pt is safe to be discharged home. Pt is not a risk to self or others at this time. Pt denies SI/HI. Pt agreeable to be discharged home and follow up outpatient services.

## 2021-12-05 NOTE — ED NOTES
Pt presents to the ED via [facility]. Pt wrapped a belt around pt's neck then sent a picture to pt's ex girlfriend stating \"I will die for you. \" Ex-girlfriend then contacted 911. Upon arrival to pt's home pt admitted to police pt did put a belt around pt's neck and that pt wanted to die, per [employer]. [employer] transported pt to the ED and completed an application for an emergency admission for pt. Pt is intoxicated upon arrival to [facility]. Pt has slurred speech, glossy eyes and unsteady gait. Pt's mood is very labile. Pt is uncooperative at times and is making multiple demands. Pt reports suicidal ideations to this writer. Pt stating pt wishes the police would have never came. Pt reports hearing voices that are \"botheirng\" pt. Pt drinks alcohol on a daily basis and drank a half gallon of vodka earlier today. Pt to be reevaluated once pt is legally sober.

## 2021-12-05 NOTE — ED TRIAGE NOTES
Mode of arrival (squad #, walk in, police, etc) : [employer]      Chief complaint(s):pink slip      Arrival Note (brief scenario, treatment PTA, etc). : states \"been drinking vodka 3 gallons   last 3 days\"  wants too kill self by hanging self pink slip  Per police       C= \"Have you ever felt that you should Cut down on your drinking? \"  Yes  A= \"Have people Annoyed you by criticizing your drinking? \"  Refused  G= \"Have you ever felt bad or Guilty about your drinking? \"  Yes  E= \"Have you ever had a drink as an Eye-opener first thing in the morning to steady your nerves or to help a hangover? \"  Yes      Deferred []      Reason for deferring: N/A    *If yes to two or more: probable alcohol abuse. *

## 2021-12-30 ENCOUNTER — APPOINTMENT (OUTPATIENT)
Dept: CT IMAGING | Age: 46
End: 2021-12-30
Payer: MEDICARE

## 2021-12-30 ENCOUNTER — HOSPITAL ENCOUNTER (EMERGENCY)
Age: 46
Discharge: NURSING, REHAB OR LONG-TERM CARE | End: 2021-12-31
Attending: EMERGENCY MEDICINE
Payer: MEDICARE

## 2021-12-30 ENCOUNTER — APPOINTMENT (OUTPATIENT)
Dept: GENERAL RADIOLOGY | Age: 46
End: 2021-12-30
Payer: MEDICARE

## 2021-12-30 DIAGNOSIS — B18.2 CHRONIC HEPATITIS C WITHOUT HEPATIC COMA (HCC): Primary | ICD-10-CM

## 2021-12-30 DIAGNOSIS — T42.6X2A GABAPENTIN OVERDOSE, INTENTIONAL SELF-HARM, INITIAL ENCOUNTER (HCC): ICD-10-CM

## 2021-12-30 DIAGNOSIS — F10.10 ALCOHOL ABUSE: ICD-10-CM

## 2021-12-30 DIAGNOSIS — R07.81 RIB PAIN ON LEFT SIDE: ICD-10-CM

## 2021-12-30 DIAGNOSIS — T14.91XA SUICIDAL BEHAVIOR WITH ATTEMPTED SELF-INJURY (HCC): ICD-10-CM

## 2021-12-30 LAB
ABSOLUTE EOS #: 0.24 K/UL (ref 0–0.4)
ABSOLUTE IMMATURE GRANULOCYTE: 0 K/UL (ref 0–0.3)
ABSOLUTE LYMPH #: 2.83 K/UL (ref 1–4.8)
ABSOLUTE MONO #: 0.47 K/UL (ref 0.1–0.8)
ACETAMINOPHEN LEVEL: <5 UG/ML (ref 10–30)
ALBUMIN SERPL-MCNC: 3.9 G/DL (ref 3.5–5.2)
ALBUMIN/GLOBULIN RATIO: 1.1 (ref 1–2.5)
ALP BLD-CCNC: 90 U/L (ref 35–104)
ALT SERPL-CCNC: 27 U/L (ref 5–33)
ANION GAP SERPL CALCULATED.3IONS-SCNC: 14 MMOL/L (ref 9–17)
AST SERPL-CCNC: 46 U/L
BASOPHILS # BLD: 1 % (ref 0–2)
BASOPHILS ABSOLUTE: 0.06 K/UL (ref 0–0.2)
BILIRUB SERPL-MCNC: <0.1 MG/DL (ref 0.3–1.2)
BUN BLDV-MCNC: 10 MG/DL (ref 6–20)
BUN/CREAT BLD: ABNORMAL (ref 9–20)
CALCIUM SERPL-MCNC: 8.6 MG/DL (ref 8.6–10.4)
CHLORIDE BLD-SCNC: 107 MMOL/L (ref 98–107)
CO2: 22 MMOL/L (ref 20–31)
CREAT SERPL-MCNC: 0.63 MG/DL (ref 0.5–0.9)
DIFFERENTIAL TYPE: ABNORMAL
EOSINOPHILS RELATIVE PERCENT: 4 % (ref 1–4)
ETHANOL PERCENT: 0.33 %
ETHANOL: 327 MG/DL
GFR AFRICAN AMERICAN: >60 ML/MIN
GFR NON-AFRICAN AMERICAN: >60 ML/MIN
GFR SERPL CREATININE-BSD FRML MDRD: ABNORMAL ML/MIN/{1.73_M2}
GFR SERPL CREATININE-BSD FRML MDRD: ABNORMAL ML/MIN/{1.73_M2}
GLUCOSE BLD-MCNC: 112 MG/DL (ref 70–99)
HCG QUALITATIVE: NEGATIVE
HCT VFR BLD CALC: 38.4 % (ref 36.3–47.1)
HEMOGLOBIN: 11.4 G/DL (ref 11.9–15.1)
IMMATURE GRANULOCYTES: 0 %
LYMPHOCYTES # BLD: 48 % (ref 24–44)
MAGNESIUM: 2 MG/DL (ref 1.6–2.6)
MCH RBC QN AUTO: 22.5 PG (ref 25.2–33.5)
MCHC RBC AUTO-ENTMCNC: 29.7 G/DL (ref 28.4–34.8)
MCV RBC AUTO: 75.9 FL (ref 82.6–102.9)
MONOCYTES # BLD: 8 % (ref 1–7)
MORPHOLOGY: ABNORMAL
NRBC AUTOMATED: 0 PER 100 WBC
PDW BLD-RTO: 21 % (ref 11.8–14.4)
PLATELET # BLD: 474 K/UL (ref 138–453)
PLATELET ESTIMATE: ABNORMAL
PMV BLD AUTO: 9.1 FL (ref 8.1–13.5)
POTASSIUM SERPL-SCNC: 3.7 MMOL/L (ref 3.7–5.3)
RBC # BLD: 5.06 M/UL (ref 3.95–5.11)
RBC # BLD: ABNORMAL 10*6/UL
SALICYLATE LEVEL: <1 MG/DL (ref 3–10)
SARS-COV-2, RAPID: NOT DETECTED
SEG NEUTROPHILS: 39 % (ref 36–66)
SEGMENTED NEUTROPHILS ABSOLUTE COUNT: 2.3 K/UL (ref 1.8–7.7)
SODIUM BLD-SCNC: 143 MMOL/L (ref 135–144)
SPECIMEN DESCRIPTION: NORMAL
TOTAL PROTEIN: 7.6 G/DL (ref 6.4–8.3)
TOXIC TRICYCLIC SC,BLOOD: NEGATIVE
WBC # BLD: 5.9 K/UL (ref 3.5–11.3)
WBC # BLD: ABNORMAL 10*3/UL

## 2021-12-30 PROCEDURE — 80178 ASSAY OF LITHIUM: CPT

## 2021-12-30 PROCEDURE — 99285 EMERGENCY DEPT VISIT HI MDM: CPT

## 2021-12-30 PROCEDURE — 2580000003 HC RX 258: Performed by: STUDENT IN AN ORGANIZED HEALTH CARE EDUCATION/TRAINING PROGRAM

## 2021-12-30 PROCEDURE — 85025 COMPLETE CBC W/AUTO DIFF WBC: CPT

## 2021-12-30 PROCEDURE — 71045 X-RAY EXAM CHEST 1 VIEW: CPT

## 2021-12-30 PROCEDURE — 80179 DRUG ASSAY SALICYLATE: CPT

## 2021-12-30 PROCEDURE — 87635 SARS-COV-2 COVID-19 AMP PRB: CPT

## 2021-12-30 PROCEDURE — 72125 CT NECK SPINE W/O DYE: CPT

## 2021-12-30 PROCEDURE — 96374 THER/PROPH/DIAG INJ IV PUSH: CPT

## 2021-12-30 PROCEDURE — 84703 CHORIONIC GONADOTROPIN ASSAY: CPT

## 2021-12-30 PROCEDURE — 83735 ASSAY OF MAGNESIUM: CPT

## 2021-12-30 PROCEDURE — G0480 DRUG TEST DEF 1-7 CLASSES: HCPCS

## 2021-12-30 PROCEDURE — 80143 DRUG ASSAY ACETAMINOPHEN: CPT

## 2021-12-30 PROCEDURE — 6360000002 HC RX W HCPCS: Performed by: STUDENT IN AN ORGANIZED HEALTH CARE EDUCATION/TRAINING PROGRAM

## 2021-12-30 PROCEDURE — 80307 DRUG TEST PRSMV CHEM ANLYZR: CPT

## 2021-12-30 PROCEDURE — 80053 COMPREHEN METABOLIC PANEL: CPT

## 2021-12-30 PROCEDURE — 70450 CT HEAD/BRAIN W/O DYE: CPT

## 2021-12-30 PROCEDURE — 93005 ELECTROCARDIOGRAM TRACING: CPT | Performed by: STUDENT IN AN ORGANIZED HEALTH CARE EDUCATION/TRAINING PROGRAM

## 2021-12-30 PROCEDURE — 6370000000 HC RX 637 (ALT 250 FOR IP): Performed by: STUDENT IN AN ORGANIZED HEALTH CARE EDUCATION/TRAINING PROGRAM

## 2021-12-30 RX ORDER — SODIUM CHLORIDE 0.9 % (FLUSH) 0.9 %
5-40 SYRINGE (ML) INJECTION EVERY 12 HOURS SCHEDULED
Status: DISCONTINUED | OUTPATIENT
Start: 2021-12-30 | End: 2021-12-31 | Stop reason: HOSPADM

## 2021-12-30 RX ORDER — LORAZEPAM 2 MG/1
4 TABLET ORAL
Status: DISCONTINUED | OUTPATIENT
Start: 2021-12-30 | End: 2021-12-31 | Stop reason: HOSPADM

## 2021-12-30 RX ORDER — SODIUM CHLORIDE 0.9 % (FLUSH) 0.9 %
5-40 SYRINGE (ML) INJECTION PRN
Status: DISCONTINUED | OUTPATIENT
Start: 2021-12-30 | End: 2021-12-31 | Stop reason: HOSPADM

## 2021-12-30 RX ORDER — LORAZEPAM 2 MG/ML
1 INJECTION INTRAMUSCULAR
Status: DISCONTINUED | OUTPATIENT
Start: 2021-12-30 | End: 2021-12-31 | Stop reason: HOSPADM

## 2021-12-30 RX ORDER — LORAZEPAM 2 MG/ML
2 INJECTION INTRAMUSCULAR
Status: DISCONTINUED | OUTPATIENT
Start: 2021-12-30 | End: 2021-12-31 | Stop reason: HOSPADM

## 2021-12-30 RX ORDER — SODIUM CHLORIDE 9 MG/ML
25 INJECTION, SOLUTION INTRAVENOUS PRN
Status: DISCONTINUED | OUTPATIENT
Start: 2021-12-30 | End: 2021-12-31 | Stop reason: HOSPADM

## 2021-12-30 RX ORDER — LORAZEPAM 0.5 MG/1
1 TABLET ORAL
Status: DISCONTINUED | OUTPATIENT
Start: 2021-12-30 | End: 2021-12-31 | Stop reason: HOSPADM

## 2021-12-30 RX ORDER — LORAZEPAM 2 MG/ML
4 INJECTION INTRAMUSCULAR
Status: DISCONTINUED | OUTPATIENT
Start: 2021-12-30 | End: 2021-12-31 | Stop reason: HOSPADM

## 2021-12-30 RX ORDER — LIDOCAINE 4 G/G
1 PATCH TOPICAL DAILY
Status: DISCONTINUED | OUTPATIENT
Start: 2021-12-30 | End: 2021-12-31 | Stop reason: HOSPADM

## 2021-12-30 RX ORDER — LANOLIN ALCOHOL/MO/W.PET/CERES
100 CREAM (GRAM) TOPICAL DAILY
Status: DISCONTINUED | OUTPATIENT
Start: 2021-12-30 | End: 2021-12-30

## 2021-12-30 RX ORDER — LORAZEPAM 2 MG/ML
3 INJECTION INTRAMUSCULAR
Status: DISCONTINUED | OUTPATIENT
Start: 2021-12-30 | End: 2021-12-31 | Stop reason: HOSPADM

## 2021-12-30 RX ORDER — LORAZEPAM 2 MG/ML
1 INJECTION INTRAMUSCULAR ONCE
Status: COMPLETED | OUTPATIENT
Start: 2021-12-30 | End: 2021-12-30

## 2021-12-30 RX ORDER — LANOLIN ALCOHOL/MO/W.PET/CERES
100 CREAM (GRAM) TOPICAL ONCE
Status: COMPLETED | OUTPATIENT
Start: 2021-12-30 | End: 2021-12-30

## 2021-12-30 RX ORDER — LORAZEPAM 0.5 MG/1
2 TABLET ORAL
Status: DISCONTINUED | OUTPATIENT
Start: 2021-12-30 | End: 2021-12-31 | Stop reason: HOSPADM

## 2021-12-30 RX ORDER — MULTIVITAMIN WITH IRON
1 TABLET ORAL DAILY
Status: DISCONTINUED | OUTPATIENT
Start: 2021-12-30 | End: 2021-12-31 | Stop reason: HOSPADM

## 2021-12-30 RX ADMIN — LORAZEPAM 1 MG: 2 INJECTION INTRAMUSCULAR at 16:16

## 2021-12-30 RX ADMIN — Medication 1 TABLET: at 23:53

## 2021-12-30 RX ADMIN — SODIUM CHLORIDE, PRESERVATIVE FREE 10 ML: 5 INJECTION INTRAVENOUS at 23:55

## 2021-12-30 RX ADMIN — Medication 100 MG: at 23:54

## 2021-12-30 ASSESSMENT — PAIN DESCRIPTION - LOCATION: LOCATION: RIB CAGE

## 2021-12-30 ASSESSMENT — ENCOUNTER SYMPTOMS
COUGH: 0
DIARRHEA: 0
VOMITING: 0
ABDOMINAL PAIN: 0
CONSTIPATION: 0
SORE THROAT: 0
PHOTOPHOBIA: 0
BLOOD IN STOOL: 0
SHORTNESS OF BREATH: 0
NAUSEA: 0

## 2021-12-30 ASSESSMENT — PAIN DESCRIPTION - PAIN TYPE: TYPE: ACUTE PAIN

## 2021-12-30 ASSESSMENT — PAIN DESCRIPTION - ORIENTATION: ORIENTATION: LEFT

## 2021-12-30 ASSESSMENT — PAIN SCALES - GENERAL: PAINLEVEL_OUTOF10: 10
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
## 2021-12-30 NOTE — ED NOTES
Pt arrived to ED alert and oriented x4 via LS 11.  Pt to ED d/t ETOH intoxication with suicidal and homicidal ideations. Pt reports that she drank 2 gallons of Vodka today. EMS reported that pt was at [facility] and told staff that she took 30 Gabapentin PTA in attempt to hurt herself. Pt reports suicidal ideations with attempt to take the Gabapentin, states that she \"snorted some and broke some up and shot them\". Pt reports \"I just want to go to sleep\". Pt reports homicidal ideations towards her ex-girlfriend Taylor, denies plan to harm her. Pt reports that she is hearing and seeing \"Lilly\" and that \"Lilly\" is telling her to \"kill myself and that she wants to see me die\". Pt c/o left side rib pain, reports fall 3 weeks ago. Pt denies having been around anyone suspected to have COVID-19 or anyone that has been sick, denies recent travel outside the [facility] or [address] [facility] [address]. RR even and unlabored. NAD noted. [employer] at bedside. Will continue with plan of care.        Mickey Garcia RN  12/30/21 1748

## 2021-12-30 NOTE — ED NOTES
Labeled blood specimens sent to lab via tube system.     [x] Lavender   [] on ice   [x] Blue   [x] Green/yellow  [] Green/black [] on ice  [] Pink  [] Red  [x] Yellow  [] Blood Cultures      Oliver Rhodes RN  12/30/21 0776
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
## 2021-12-30 NOTE — ED NOTES
Labeled COVID swab sent to lab via tube system.     [x] COVID-19 swab      [x] Rapid   [] Non- Rapid/PCR  [] Respiratory Panel with [address] [place] [address]  RN  12/30/21 1472

## 2021-12-30 NOTE — ED NOTES
[] Cesar    [] [address]    [x]  [facility] ASSESSMENT      Y  N     [x] [] In the past two weeks have you had thoughts of hurting yourself in any way? [x] [] In the past two weeks have you had thoughts that you would be better off dead? [] [x] Have you made a suicide attempt in the past two months? [x] [] Do you have a plan for hurting yourself or suicide? [x] [] Presence of hallucinations/voices related to hurting himself or herself or someone else. SUICIDE/SECURITY WATCH PRECAUTION CHECKLIST     Orders    [x]  Suicide/Security Watch Precautions initiated as checked below:   12/30/21 3:28 PM EST BH31/BH31B    [x] Notified physician:  Darvin Miranda DO  12/30/21 3:28 PM EST    [x] Orders obtained as appropriate:     [] 1:1 Observer     [] Psych Consult     [] Psych Consult    Name:  Date:  Time:    [x] 1:1 Observer, Notified by:  Janet Yuen RN    Contact Nurse Supervisor    [] Remove all personal clothes from room and place in snap/paper gown/pants. Slipper only    [] Remove all personal belongings from room and secured away from patient. Documentation    [x] Initiate Suicide/Security Watch Precaution Flow Sheet    [x] Initiate individualized Care Plan/Problem    [x] Document why precautions initiated on flow sheet (Initiate Nursing Care Plan/Problem)    [x] 1:1 Observer in place; instructions provided. Suicide precautions require observer be within arms length. [x] Nurse-Observer Communication Hand-off initiated by RN, reviewed with Observer. Subsequently used as Hand Off between Observers. [x] Initiate every 15 minute observations per observer as delegated by the RN.     [x] Initiate RN assessment and documentation    Environmental Scan  Search Criteria and Process: OPTIONAL, see Search Policy    [x] Reason for search: Suicidal and homicidal    [x] Nursing in presence of second person to search patient    [x] Patient notified of reason for body assessment and belongings search:     Persons present during search:   Results of search and disposition:       Searchers Name: [facility]     These items or items similar should be removed from the room:   [] Chairs   [] Telephone   [] Trash cans and liners   [x] Plastic utensils (order Patient Safety tray)   [] Empty or remove Sharps containers   [x] All personal clothing/belongings removed   [x] All unnecessary lead wires, electrical cords, draw cords, etc.   [] Flowers and plants   [x] Double check for lighters, matches, razors, any glass items etc that can be used as weapons. Person completing Checklist: Sabino Moore RN       GENERAL INFORMATION     Y  N     [x] [] Has the patient been informed that they are on a watch and what that means? [x] [] Can the patient get out of Bed without nursing assistance? [x] [] Can the patient use the restroom without nursing assistance? [x] [] Can the patient walk the halls to [place] their legs? \"   [] [x] Does the patient have metal utensils? [x] [] Have the patient's belongings been placed out of control of the patient? [x] [] Have the patient and his/her belongings been checked for contraband? [x] [] Is the patient under any visitor restrictions? If Yes, explain: Suicidal and homicidal   [] [] Is the patient under an alias? Alias Name:   Authorized visitors (no more than two are to be on the list)   Name/Relationship:   Name/Relationship:    Name of Staff member that you  Received this information from?:    General Description:    Payton Shane BH30/BH26B female 48 y.o. Admission weight: 108 lb (49 kg) Height: 5' (152.4 cm)  Race: [x]  [] Black  []   []   [] [place] [] Other  Facial Hair:  [] Yes  [x] No  If yes, please describe: Identifying Marks (i.e. Visible tattoos, scars, etc... ):     NURSING CARE PLAN    Nursing Diagnosis: Risk of Self Directed Harm  [x] Actual  [] Potential  Date Started: 12/30/2021   Etiological Factors: (related to)  [x] Expressed or implied suicidal ideation/behavior  [x] Depression  [x] Suicide attempt      [] Low self-esteem  [x] Hallucinations      [] Feeling of Hopelessness  [] Substance abuse or withdrawal    [] Dysfunctional family  [] Major traumatic event, eg., divorce, etc   [x] Excessive stress/anxiety    12/30/21    Expected Outcomes    Patient will:   [x] Patient will remain safe for the duration of their stay   [x] Patient's environment will be safe, eg. Free of potential suicide weapons   [x] Verbalize Recovery from suicidal episode and improvement in self-worth   [x] Discuss feeling that precipitated suicide attempt/thoughts/behavior   [x] Will describe available resources for crisis prevention and management   [x] Will verbalize positive coping skills     Nursing Intervention   [x] Assessment and Observations hourly   [x] Suicide Precautions implemented with patient, should be 1:1 observation   [x] Document observation [id number] and RN assessment hourly   [] Consult physician for:    [] Psychiatric consult    [] Pharmacological therapy    [] Other:    [x] Patient search completed by security   [x] Initiated appropriate safety protocols by removing from the patient's environment anything that could be used to inflict self injury, eg. Order safe tray, snap gown, etc   [x] Maintain open, warm, caring, non-judgmental attitude/manner towards patient   [] Discuss advantages and disadvantages of existing coping methods/skills   [x] Assist and educate patient with identifying present strengths and coping skills   [x] Keep patient informed regarding plan of care and provide clear concise explanations. Provide the patient/family education information as well as telephone numbers and other information about crisis centers, hot lines, and counselors.     Discharge Planning:   [] Referral  [] Groups [] Health agencies  [] Other:            Murray Pelaez RN  12/30/21 7619

## 2021-12-31 ENCOUNTER — HOSPITAL ENCOUNTER (INPATIENT)
Age: 46
LOS: 3 days | Discharge: HOME | DRG: 885 | End: 2022-01-03
Attending: PSYCHIATRY & NEUROLOGY | Admitting: PSYCHIATRY & NEUROLOGY
Payer: MEDICARE

## 2021-12-31 VITALS
WEIGHT: 108 LBS | DIASTOLIC BLOOD PRESSURE: 69 MMHG | HEIGHT: 60 IN | SYSTOLIC BLOOD PRESSURE: 107 MMHG | OXYGEN SATURATION: 93 % | RESPIRATION RATE: 16 BRPM | HEART RATE: 107 BPM | TEMPERATURE: 99.1 F | BODY MASS INDEX: 21.2 KG/M2

## 2021-12-31 PROBLEM — F33.3 SEVERE RECURRENT MAJOR DEPRESSION WITH PSYCHOTIC FEATURES (HCC): Status: ACTIVE | Noted: 2021-12-31

## 2021-12-31 PROBLEM — F32.A DEPRESSION WITH SUICIDAL IDEATION: Status: ACTIVE | Noted: 2021-12-31

## 2021-12-31 PROBLEM — R45.851 DEPRESSION WITH SUICIDAL IDEATION: Status: ACTIVE | Noted: 2021-12-31

## 2021-12-31 LAB
AMPHETAMINE SCREEN URINE: NEGATIVE
BARBITURATE SCREEN URINE: NEGATIVE
BENZODIAZEPINE SCREEN, URINE: NEGATIVE
BUPRENORPHINE URINE: NORMAL
CANNABINOID SCREEN URINE: NEGATIVE
COCAINE METABOLITE, URINE: NEGATIVE
EKG ATRIAL RATE: 357 BPM
EKG Q-T INTERVAL: 358 MS
EKG QRS DURATION: 60 MS
EKG QTC CALCULATION (BAZETT): 442 MS
EKG R AXIS: 25 DEGREES
EKG T AXIS: 12 DEGREES
EKG VENTRICULAR RATE: 92 BPM
LITHIUM DATE LAST DOSE: ABNORMAL
LITHIUM DOSE AMOUNT: ABNORMAL
LITHIUM DOSE TIME: ABNORMAL
LITHIUM LEVEL: <0.1 MMOL/L (ref 0.6–1.2)
MDMA URINE: NORMAL
METHADONE SCREEN, URINE: NEGATIVE
METHAMPHETAMINE, URINE: NORMAL
OPIATES, URINE: NEGATIVE
OXYCODONE SCREEN URINE: NEGATIVE
PHENCYCLIDINE, URINE: NEGATIVE
PROPOXYPHENE, URINE: NORMAL
TEST INFORMATION: NORMAL
TRICYCLIC ANTIDEPRESSANTS, UR: NORMAL

## 2021-12-31 PROCEDURE — APPSS60 APP SPLIT SHARED TIME 46-60 MINUTES: Performed by: PSYCHIATRY & NEUROLOGY

## 2021-12-31 PROCEDURE — 99223 1ST HOSP IP/OBS HIGH 75: CPT | Performed by: PSYCHIATRY & NEUROLOGY

## 2021-12-31 PROCEDURE — 1240000000 HC EMOTIONAL WELLNESS R&B

## 2021-12-31 PROCEDURE — 6370000000 HC RX 637 (ALT 250 FOR IP): Performed by: PSYCHIATRY & NEUROLOGY

## 2021-12-31 PROCEDURE — 6360000002 HC RX W HCPCS: Performed by: PSYCHIATRY & NEUROLOGY

## 2021-12-31 PROCEDURE — 93010 ELECTROCARDIOGRAM REPORT: CPT | Performed by: INTERNAL MEDICINE

## 2021-12-31 RX ORDER — NICOTINE 21 MG/24HR
1 PATCH, TRANSDERMAL 24 HOURS TRANSDERMAL DAILY
Status: DISCONTINUED | OUTPATIENT
Start: 2021-12-31 | End: 2022-01-03 | Stop reason: HOSPADM

## 2021-12-31 RX ORDER — GAUZE BANDAGE 2" X 2"
100 BANDAGE TOPICAL DAILY
Status: DISCONTINUED | OUTPATIENT
Start: 2021-12-31 | End: 2022-01-03 | Stop reason: HOSPADM

## 2021-12-31 RX ORDER — LORAZEPAM 2 MG/ML
2 INJECTION INTRAMUSCULAR
Status: DISCONTINUED | OUTPATIENT
Start: 2021-12-31 | End: 2022-01-03

## 2021-12-31 RX ORDER — LORAZEPAM 1 MG/1
4 TABLET ORAL
Status: DISCONTINUED | OUTPATIENT
Start: 2021-12-31 | End: 2022-01-03

## 2021-12-31 RX ORDER — ACETAMINOPHEN 325 MG/1
650 TABLET ORAL EVERY 4 HOURS PRN
Status: DISCONTINUED | OUTPATIENT
Start: 2021-12-31 | End: 2022-01-03 | Stop reason: HOSPADM

## 2021-12-31 RX ORDER — M-VIT,TX,IRON,MINS/CALC/FOLIC 27MG-0.4MG
1 TABLET ORAL DAILY
Status: DISCONTINUED | OUTPATIENT
Start: 2021-12-31 | End: 2022-01-03 | Stop reason: HOSPADM

## 2021-12-31 RX ORDER — BUSPIRONE HYDROCHLORIDE 7.5 MG/1
7.5 TABLET ORAL 2 TIMES DAILY PRN
Status: DISCONTINUED | OUTPATIENT
Start: 2021-12-31 | End: 2022-01-03 | Stop reason: HOSPADM

## 2021-12-31 RX ORDER — HYDROXYZINE 50 MG/1
50 TABLET, FILM COATED ORAL 3 TIMES DAILY PRN
Status: DISCONTINUED | OUTPATIENT
Start: 2021-12-31 | End: 2022-01-03 | Stop reason: HOSPADM

## 2021-12-31 RX ORDER — LORAZEPAM 1 MG/1
2 TABLET ORAL
Status: DISCONTINUED | OUTPATIENT
Start: 2021-12-31 | End: 2022-01-03

## 2021-12-31 RX ORDER — LORAZEPAM 2 MG/ML
1 INJECTION INTRAMUSCULAR
Status: DISCONTINUED | OUTPATIENT
Start: 2021-12-31 | End: 2022-01-03

## 2021-12-31 RX ORDER — LORAZEPAM 1 MG/1
1 TABLET ORAL
Status: DISCONTINUED | OUTPATIENT
Start: 2021-12-31 | End: 2022-01-03

## 2021-12-31 RX ORDER — POLYETHYLENE GLYCOL 3350 17 G/17G
17 POWDER, FOR SOLUTION ORAL DAILY PRN
Status: DISCONTINUED | OUTPATIENT
Start: 2021-12-31 | End: 2022-01-03 | Stop reason: HOSPADM

## 2021-12-31 RX ORDER — MAGNESIUM HYDROXIDE/ALUMINUM HYDROXICE/SIMETHICONE 120; 1200; 1200 MG/30ML; MG/30ML; MG/30ML
30 SUSPENSION ORAL EVERY 6 HOURS PRN
Status: DISCONTINUED | OUTPATIENT
Start: 2021-12-31 | End: 2022-01-03 | Stop reason: HOSPADM

## 2021-12-31 RX ORDER — FOLIC ACID 1 MG/1
1 TABLET ORAL DAILY
Status: DISCONTINUED | OUTPATIENT
Start: 2021-12-31 | End: 2022-01-03 | Stop reason: HOSPADM

## 2021-12-31 RX ORDER — LORAZEPAM 1 MG/1
3 TABLET ORAL
Status: DISCONTINUED | OUTPATIENT
Start: 2021-12-31 | End: 2022-01-03

## 2021-12-31 RX ORDER — OMEPRAZOLE 20 MG/1
20 CAPSULE, DELAYED RELEASE ORAL
Status: DISCONTINUED | OUTPATIENT
Start: 2021-12-31 | End: 2022-01-03 | Stop reason: HOSPADM

## 2021-12-31 RX ORDER — LORAZEPAM 2 MG/ML
3 INJECTION INTRAMUSCULAR
Status: DISCONTINUED | OUTPATIENT
Start: 2021-12-31 | End: 2022-01-03

## 2021-12-31 RX ORDER — ATORVASTATIN CALCIUM 20 MG/1
40 TABLET, FILM COATED ORAL NIGHTLY
Status: DISCONTINUED | OUTPATIENT
Start: 2021-12-31 | End: 2022-01-03 | Stop reason: HOSPADM

## 2021-12-31 RX ORDER — LORAZEPAM 2 MG/ML
4 INJECTION INTRAMUSCULAR
Status: DISCONTINUED | OUTPATIENT
Start: 2021-12-31 | End: 2022-01-03

## 2021-12-31 RX ORDER — VENLAFAXINE HYDROCHLORIDE 75 MG/1
75 CAPSULE, EXTENDED RELEASE ORAL
Status: DISCONTINUED | OUTPATIENT
Start: 2022-01-01 | End: 2022-01-03 | Stop reason: HOSPADM

## 2021-12-31 RX ORDER — TRAZODONE HYDROCHLORIDE 50 MG/1
50 TABLET ORAL NIGHTLY PRN
Status: DISCONTINUED | OUTPATIENT
Start: 2021-12-31 | End: 2022-01-01

## 2021-12-31 RX ORDER — CHLORDIAZEPOXIDE HYDROCHLORIDE 25 MG/1
25 CAPSULE, GELATIN COATED ORAL EVERY 6 HOURS PRN
Status: DISCONTINUED | OUTPATIENT
Start: 2021-12-31 | End: 2022-01-03

## 2021-12-31 RX ADMIN — LORAZEPAM 3 MG: 2 INJECTION INTRAMUSCULAR; INTRAVENOUS at 02:40

## 2021-12-31 RX ADMIN — TRAZODONE HYDROCHLORIDE 50 MG: 50 TABLET ORAL at 21:21

## 2021-12-31 RX ADMIN — LORAZEPAM 4 MG: 1 TABLET ORAL at 17:32

## 2021-12-31 RX ADMIN — ACETAMINOPHEN 650 MG: 325 TABLET, FILM COATED ORAL at 11:22

## 2021-12-31 RX ADMIN — HYDROXYZINE HYDROCHLORIDE 50 MG: 50 TABLET, FILM COATED ORAL at 21:21

## 2021-12-31 RX ADMIN — FOLIC ACID 1 MG: 1 TABLET ORAL at 08:55

## 2021-12-31 RX ADMIN — THIAMINE HCL TAB 100 MG 100 MG: 100 TAB at 08:55

## 2021-12-31 RX ADMIN — ATORVASTATIN CALCIUM 40 MG: 20 TABLET, FILM COATED ORAL at 21:21

## 2021-12-31 RX ADMIN — ALUMINUM HYDROXIDE, MAGNESIUM HYDROXIDE, AND SIMETHICONE 30 ML: 200; 200; 20 SUSPENSION ORAL at 11:22

## 2021-12-31 RX ADMIN — OMEPRAZOLE 20 MG: 20 CAPSULE, DELAYED RELEASE ORAL at 18:17

## 2021-12-31 RX ADMIN — CHLORDIAZEPOXIDE HYDROCHLORIDE 25 MG: 25 CAPSULE ORAL at 02:40

## 2021-12-31 RX ADMIN — CHLORDIAZEPOXIDE HYDROCHLORIDE 25 MG: 25 CAPSULE ORAL at 18:47

## 2021-12-31 RX ADMIN — MULTIPLE VITAMINS W/ MINERALS TAB 1 TABLET: TAB at 08:55

## 2021-12-31 ASSESSMENT — SLEEP AND FATIGUE QUESTIONNAIRES
AVERAGE NUMBER OF SLEEP HOURS: 6
DIFFICULTY ARISING: NO
DIFFICULTY FALLING ASLEEP: NO
DIFFICULTY STAYING ASLEEP: YES
SLEEP PATTERN: DIFFICULTY FALLING ASLEEP;RESTLESSNESS
DO YOU HAVE DIFFICULTY SLEEPING: YES
DO YOU USE A SLEEP AID: YES
RESTFUL SLEEP: NO

## 2021-12-31 ASSESSMENT — PAIN SCALES - GENERAL
PAINLEVEL_OUTOF10: 7
PAINLEVEL_OUTOF10: 3
PAINLEVEL_OUTOF10: 4
PAINLEVEL_OUTOF10: 0

## 2021-12-31 ASSESSMENT — PATIENT HEALTH QUESTIONNAIRE - PHQ9: SUM OF ALL RESPONSES TO PHQ QUESTIONS 1-9: 18

## 2021-12-31 ASSESSMENT — PAIN DESCRIPTION - DESCRIPTORS: DESCRIPTORS: ACHING;THROBBING;POUNDING

## 2021-12-31 ASSESSMENT — LIFESTYLE VARIABLES: HISTORY_ALCOHOL_USE: YES

## 2021-12-31 ASSESSMENT — PAIN DESCRIPTION - LOCATION
LOCATION: GENERALIZED
LOCATION: CHEST

## 2021-12-31 ASSESSMENT — PAIN DESCRIPTION - PAIN TYPE: TYPE: ACUTE PAIN

## 2021-12-31 ASSESSMENT — PAIN DESCRIPTION - FREQUENCY: FREQUENCY: CONTINUOUS

## 2021-12-31 NOTE — ED NOTES
The patient is a 48year old female that has a history of Bipolar 1 Disorder. The patient came to the Ed today due to a suicide attempt and thoughts of wanting to harm her ex girlfriend. The patient reports that she took 30 Gabapentin prior to coming to to the ED. The patient reports that she she took overdosed on the medications because her girlfriend broke up with her today. Patient states that she feels very depressed, lonely and hopeless. Patient also stated that she wants to hurt her ex girlfriend for making her feel this way. Patient denied any homicidal thoughts towards her ex girlfriend or plans to harm her. The patient does have a history of multiple attempts at suicide by over dosing. Patient states that she is not able to contract for safety and desires inpatient help. Patient also has a history of Alcohol abuse. Patient reports that she is a daily drinker and drinks about 2 gallons of vodka every 3 days. The patient states that she gets the sweats, tremors and seizures when she is not drinking alcohol. Patient was placed in a CIWA protocol in the ED. ED medical team consulted with Poison Control and recommendation of 8 hour observation and treat symptoms as needed.
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
## 2021-12-31 NOTE — BH NOTE
[facility] staff attempted to complete a 12 Lead EKG per order. EKG was unable to read a rhythm due to intensity of patients tremors. Will retry once patient's tremors subside.

## 2021-12-31 NOTE — BH NOTE
Patient given tobacco quitline number 33597288163 at this time, refusing to call at this time, states \" I just dont want to quit now\"- patient given information as to the dangers of long term tobacco use. Continue to reinforce the importance of tobacco cessation. Patient unwilling to stop smoking at this current time.
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
## 2021-12-31 NOTE — ED PROVIDER NOTES
Care of Archana Bernabe was assumed from previous attending and is being seen for Suicidal (Suicidal with attempt to take 30 Gabapentin PTA, states \"I snorted some and broke some up and shot them\".), Homicidal (Towards her ex-girlfriend Taylor, denies plan), Alcohol Intoxication (Drank \"2 gallons\" of Vodka PTA), and Rib Pain (Left side, states that she fell 3 weeks ago)  . The patient's initial evaluation and plan have been discussed with the prior provider who initially evaluated the patient. Handoff taken on the following patient from prior Attending Physician:    Tere Wolff    I was available and discussed any additional care issues that arose and coordinated the management plans with the resident(s) caring for the patient during my duty period. Any areas of disagreement with residents documentation of care or procedures are noted on the chart. I was personally present for the key portions of any/all procedures during my duty period. I have documented in the chart those procedures where I was not present during the key portions.       EMERGENCY DEPARTMENT COURSE / MEDICAL DECISION MAKING:       MEDICATIONS GIVEN:  Orders Placed This Encounter   Medications    LORazepam (ATIVAN) injection 1 mg    lidocaine 4 % external patch 1 patch    sodium chloride flush 0.9 % injection 5-40 mL    sodium chloride flush 0.9 % injection 5-40 mL    0.9 % sodium chloride infusion    thiamine tablet 100 mg    multivitamin 1 tablet       LABS / RADIOLOGY:     Labs Reviewed   CBC WITH AUTO DIFFERENTIAL - Abnormal; Notable for the following components:       Result Value    Hemoglobin 11.4 (*)     MCV 75.9 (*)     MCH 22.5 (*)     RDW 21.0 (*)     Platelets 901 (*)     Lymphocytes 48 (*)     Monocytes 8 (*)     All other components within normal limits   COMPREHENSIVE METABOLIC PANEL - Abnormal; Notable for the following components:    Glucose 112 (*)     AST 46 (*)     Total Bilirubin <0.10 (*)     All other components within normal limits   TOX SCR, BLD, ED - Abnormal; Notable for the following components:    Acetaminophen Level <5 (*)     Ethanol 327 (*)     Ethanol percent 1.435 (*)     Salicylate Lvl <1 (*)     All other components within normal limits   COVID-19, RAPID   MAGNESIUM   HCG, SERUM, QUALITATIVE   URINE DRUG SCREEN       CT HEAD WO CONTRAST    Result Date: 12/30/2021  EXAMINATION: CT OF THE HEAD WITHOUT CONTRAST; CT OF THE CERVICAL SPINE WITHOUT CONTRAST 12/30/2021 5:19 pm TECHNIQUE: CT of the head was performed without the administration of intravenous contrast. Dose modulation, iterative reconstruction, and/or weight based adjustment of the mA/kV was utilized to reduce the radiation dose to as low as reasonably achievable.; CT of the cervical spine was performed without the administration of intravenous contrast. Multiplanar reformatted images are provided for review. Dose modulation, iterative reconstruction, and/or weight based adjustment of the mA/kV was utilized to reduce the radiation dose to as low as reasonably achievable. COMPARISON: CT head 3 January 2021; CT sinuses 18 July 2020. HISTORY: ORDERING SYSTEM PROVIDED HISTORY: fall, hx of EtOH abuse TECHNOLOGIST PROVIDED HISTORY: fall, hx of EtOH abuse Decision Support Exception - unselect if not a suspected or confirmed emergency medical condition->Emergency Medical Condition (MA) Is the patient pregnant?->No FINDINGS: CT head: BRAIN/VENTRICLES: There is no acute intracranial hemorrhage, mass effect or midline shift. No abnormal extra-axial fluid collection. The gray-white differentiation is maintained without evidence of an acute infarct. There is no evidence of hydrocephalus. Global mild to moderate cortical atrophy is noted, advanced for the patient's chronological age. ORBITS: The visualized portion of the orbits demonstrate no acute abnormality. SINUSES: A polyp or retention cyst is present in the right maxillary sinus, stable.   Other paranasal sinuses are well aerated. Mastoid air cells are stable. Sclerosis both mastoid tips are noted, chronic. SOFT TISSUES/SKULL:  No acute abnormality of the visualized skull or soft tissues. CT C-spine: BONES/ALIGNMENT: No acute fracture or traumatic malalignment is noted. DEGENERATIVE CHANGES: Diffuse degenerative and degenerative disc changes are present, mild to moderate. Moderate bilateral neural foraminal narrowing is present at C3-C4. Other neural foramina are fairly patent. SOFT TISSUES: No paraspinal soft tissue swelling. Included lung apices are unremarkable. CT head: No acute intracranial abnormality. Cortical atrophy, pronounced for chronological age. CT C-spine: Multilevel degenerative changes and degenerative disc disease. No acute fracture or traumatic malalignment. CT CERVICAL SPINE WO CONTRAST    Result Date: 12/30/2021  EXAMINATION: CT OF THE HEAD WITHOUT CONTRAST; CT OF THE CERVICAL SPINE WITHOUT CONTRAST 12/30/2021 5:19 pm TECHNIQUE: CT of the head was performed without the administration of intravenous contrast. Dose modulation, iterative reconstruction, and/or weight based adjustment of the mA/kV was utilized to reduce the radiation dose to as low as reasonably achievable.; CT of the cervical spine was performed without the administration of intravenous contrast. Multiplanar reformatted images are provided for review. Dose modulation, iterative reconstruction, and/or weight based adjustment of the mA/kV was utilized to reduce the radiation dose to as low as reasonably achievable. COMPARISON: CT head 3 January 2021; CT sinuses 18 July 2020.  HISTORY: ORDERING SYSTEM PROVIDED HISTORY: fall, hx of EtOH abuse TECHNOLOGIST PROVIDED HISTORY: fall, hx of EtOH abuse Decision Support Exception - unselect if not a suspected or confirmed emergency medical condition->Emergency Medical Condition (MA) Is the patient pregnant?->No FINDINGS: CT head: BRAIN/VENTRICLES: There is no acute intracranial hemorrhage, mass effect or midline shift. No abnormal extra-axial fluid collection. The gray-white differentiation is maintained without evidence of an acute infarct. There is no evidence of hydrocephalus. Global mild to moderate cortical atrophy is noted, advanced for the patient's chronological age. ORBITS: The visualized portion of the orbits demonstrate no acute abnormality. SINUSES: A polyp or retention cyst is present in the right maxillary sinus, stable. Other paranasal sinuses are well aerated. Mastoid air cells are stable. Sclerosis both mastoid tips are noted, chronic. SOFT TISSUES/SKULL:  No acute abnormality of the visualized skull or soft tissues. CT C-spine: BONES/ALIGNMENT: No acute fracture or traumatic malalignment is noted. DEGENERATIVE CHANGES: Diffuse degenerative and degenerative disc changes are present, mild to moderate. Moderate bilateral neural foraminal narrowing is present at C3-C4. Other neural foramina are fairly patent. SOFT TISSUES: No paraspinal soft tissue swelling. Included lung apices are unremarkable. CT head: No acute intracranial abnormality. Cortical atrophy, pronounced for chronological age. CT C-spine: Multilevel degenerative changes and degenerative disc disease. No acute fracture or traumatic malalignment. XR CHEST PORTABLE    Result Date: 12/30/2021  EXAMINATION: ONE XRAY VIEW OF THE CHEST 12/30/2021 11:32 am COMPARISON: November 16, 2021 HISTORY: ORDERING SYSTEM PROVIDED HISTORY: fall, with left sided rib pains TECHNOLOGIST PROVIDED HISTORY: fall, with left sided rib pains Reason for Exam: port upright FINDINGS: Marginal inspiration is present. No focal area of consolidation, pleural effusion, or pneumothorax is present. Heart size and mediastinal contours appear normal.  No evidence of a displaced left rib fracture is present. Old fracture deformity is present involving the lateral right 6th and left posterior 7th rib.      No evidence of acute cardiopulmonary disease       RECENT VITALS:     Temp: 97 °F (36.1 °C),  Pulse: 98, Resp: 16, BP: (!) 143/84, SpO2: 98 %    This patient is a 48 y.o. Female with suicidal, ETOh level >300s, tremoring, chronic drinker. OUTSTANDING TASKS / RECOMMENDATIONS:    1. Await sobriety, reassess SI,   2.  Monitor for alcohol withdrawl          Primus DO Hood DO  Attending Emergency Physician  [facility]        [place], [place]  12/30/21 2044

## 2021-12-31 NOTE — PLAN OF CARE
Problem: Altered Mood, Depressive Behavior:  Goal: Able to verbalize acceptance of life and situations over which he or she has no control  Description: Able to verbalize acceptance of life and situations over which he or she has no control  12/31/2021 1023 by Marysol Andrade RN  Outcome: Ongoing  Patient has been cooperative upon approach, has allowed all assessments; Patient has expressed reality-based thinking with peers and staff; Patient reports a decrease in frequency and intensity of hallucinations. Patient has been able to verbalize acceptance of life and situations over which she has no control when speaking with staff. Problem: Altered Mood, Depressive Behavior:  Goal: Able to verbalize and/or display a decrease in depressive symptoms  Description: Able to verbalize and/or display a decrease in depressive symptoms  12/31/2021 1023 by Marysol Andrade RN  Outcome: Ongoing  Patient has been cooperative upon approach, has allowed all assessments; Patient has expressed reality-based thinking with peers and staff; Patient reports a decrease in depressive symptoms  Problem: Altered Mood, Depressive Behavior:  Goal: Able to verbalize support systems  Description: Able to verbalize support systems  12/31/2021 1023 by Marysol Andrade RN  Outcome: Ongoing  Patient has been cooperative upon approach, has allowed all assessments; Patient has expressed reality-based thinking with peers and staff; Patient has been able to verbalize support systems when talking to peers and staff  Problem: Substance Abuse:  Goal: Absence of drug withdrawal signs and symptoms  Description: Absence of drug withdrawal signs and symptoms  12/31/2021 1023 by Marysol Andrade RN  Outcome: Ongoing  Patient has been cooperative upon approach, has allowed all assessments;  Patient has expressed reality-based thinking with peers and staff; Patient has been experiencing mild withdrawal signs and symptoms, staff will continue to monitor  Problem: Pain:  Goal: Pain level will decrease  Description: Pain level will decrease  12/31/2021 1023 by Tay Goncalves RN  Outcome: Ongoing   Patient denies pain this shift, has not asked for PRN pain reduction medication. Patient encouraged to use non-pharmalogical pain reduction measures including distraction, yoga, finger tapping, music, tv, groups, dark room, quiet time, 1:1 talk time with staff, walking, counting, deep breathing, bubbles, alphabet games, reading, audio books, drawing, crafts and models, stress ball, puzzles, massage, guided imagery. Problem: Pain:  Goal: Control of acute pain  Description: Control of acute pain  12/31/2021 1023 by Tay Goncalves RN  Outcome: Ongoing   Patient denies acute pain this shift, has not asked for PRN pain reduction medication. Patient encouraged to use non-pharmalogical pain reduction measures including distraction, yoga, finger tapping, music, tv, groups, dark room, quiet time, 1:1 talk time with staff, walking, counting, deep breathing, bubbles, alphabet games, reading, audio books, drawing, crafts and models, stress ball, puzzles, massage, guided imagery. Problem: Pain:  Goal: Control of chronic pain  Description: Control of chronic pain  12/31/2021 1023 by Tay Goncalves RN  Outcome: Ongoing   Patient denies chronic pain this shift, has not asked for PRN pain reduction medication. Patient encouraged to use non-pharmalogical pain reduction measures including distraction, yoga, finger tapping, music, tv, groups, dark room, quiet time, 1:1 talk time with staff, walking, counting, deep breathing, bubbles, alphabet games, reading, audio books, drawing, crafts and models, stress ball, puzzles, massage, guided imagery.   Problem: Suicide risk  Goal: Provide patient with safe environment  Description: Provide patient with safe environment  12/31/2021 1023 by Tay Goncalves RN  Outcome: Ongoing   Patient is provided with a safe environment: locked unit, observed q 15 minutes, weapons/sharps are confiscated+collected  Problem: Tobacco Use:  Goal: Inpatient tobacco use cessation counseling participation  Description: Inpatient tobacco use cessation counseling participation  12/31/2021 1023 by Maura Carrel, RN  Outcome: Ongoing  Patient is enrolled in inpatient tobacco use cessation counseling but refuses participation at this time.

## 2021-12-31 NOTE — GROUP NOTE
Group Therapy Note    Date: 12/31/2021    Group Start Time: 1350  Group End Time: 1430  Group Topic: Psychoeducation    [facility]    Patient refused to attend positive thoughts and affirmations group at 1350 after encouragement from staff. 1:1 talk time offered by staff as alternative to group session.

## 2021-12-31 NOTE — BH NOTE
[facility]  Admission Note     Admission Type:   Admission Type: Voluntary    Reason for admission:  Reason for Admission: overdose on 30 neurontin    PATIENT STRENGTHS:  Strengths: Medication Compliance,Positive Support    Patient Strengths and Limitations:  Limitations: Tendency to isolate self,Multiple barriers to leisure interests    Addictive Behavior:   Addictive Behavior  In the past 3 months, have you felt or has someone told you that you have a problem with:  : None  Do you have a history of Chemical Use?: No  Do you have a history of Alcohol Use?: Yes  Do you have a history of Street Drug Abuse?: Yes  Histroy of Prescripton Drug Abuse?: No    Medical Problems:   Past Medical History:   Diagnosis Date    MORTEZA (acute kidney injury) ([facility] [address] 4/91/6045    Alcoholic hepatitis without ascites 18/09/7629    Alcoholic ketoacidosis 77/09/9664    Alcoholism ([address]     3 pints daily    Anxiety     Asthma     Bipolar affective disorder ([address] 6/12/2019    Depression     Drug overdose, accidental or unintentional, initial encounter 9/29/2018    Drug overdose, intentional self-harm, initial encounter ([address] 5/8/2018    [place] coma scale total score 3-8 ([address]     Hepatitis C antibody positive in blood 6/18/2019    History of gastric surgery (gastric sleeve 2012) 4/25/2017    Intentional drug overdose ([address] 5/7/2018    MDD (major depressive disorder), recurrent episode ([address] 5/8/2018    Pneumonia     Polysubstance abuse ([address]     drug abuse includes, cocaine, IV heroin, cannabis, and ETOH    Post traumatic stress disorder     Seizure ([address] 12/17/2019    Smoker unmotivated to quit 4/25/2017    Suicide attempt ([address] 11/08/2017    S/A by overdosing on Trazodone and Seroquel    Suicide ideation     Toxic metabolic encephalopathy 6/47/0225       Status EXAM:  Status and Exam  Normal: No  Facial Expression: Flat,Sad  Affect: Appropriate  Level of Consciousness: Alert  Mood:Normal: No  Mood: Depressed,Anxious,Irritable  Motor Activity:Normal: Yes  Interview Behavior: Irritable,Evasive  Preception: [place] to Person,[place] to Time,[place] to Place,[place] to Situation  Attention:Normal: No  Attention: Distractible  Thought Processes: Blocking  Thought Content:Normal: Yes  Hallucinations: None  Delusions: No  Memory:Normal: No  Memory: Poor Recent,Poor Remote  Insight and Judgment: No  Insight and Judgment: Poor Judgment,Poor Insight  Present Suicidal Ideation: No  Present Homicidal Ideation: No    Tobacco Screening:  Practical Counseling, on admission, ainsley X, if applicable and completed (first 3 are required if patient doesn't refuse):            ( )  Recognizing danger situations (included triggers and roadblocks)                    ( )  Coping skills (new ways to manage stress, exercise, relaxation techniques, changing routine, distraction)                                                           ( )  Basic information about quitting (benefits of quitting, techniques in how to quit, available resources  ( ) Referral for counseling faxed to [facility]                                           (X ) Patient refused counseling  ( ) Patient has not smoked in the last 30 days    Metabolic Screening:    Lab Results   Component Value Date    LABA1C 5.5 08/01/2018       Lab Results   Component Value Date    CHOL 196 06/13/2019    CHOL 212 (H) 08/01/2018    CHOL 204 (H) 06/05/2018     Lab Results   Component Value Date    TRIG 99 06/13/2019    TRIG 168 (H) 08/01/2018    TRIG 274 (H) 06/05/2018    TRIG 158 (H) 04/27/2017     Lab Results   Component Value Date    HDL 56 06/13/2019    HDL 51 08/01/2018    HDL 45 06/05/2018    HDL 26 (L) 10/23/2017     No components found for: LDLCAL  No results found for: LABVLDL      Body mass index is 21.09 kg/m².     BP Readings from Last 2 Encounters:   12/31/21 (!) 142/92   12/31/21 107/69           Pt admitted with followings belongings:  Dental Appliances: None  Vision - Corrective Lenses: None  Hearing Aid: None  Jewelry: None  Body Piercings Removed: N/A  Clothing: Footwear,Pants,Shirt,Sweater,Socks,Undergarments (Comment)  Were All Patient Medications Collected?: Not Applicable  Other Valuables: [phone] [facility] phone      Patient oriented to surroundings and program expectations and copy of patient rights given. Received admission packet:  Yes. Consents reviewed, signed NO. Refused Yes. Patient verbalize understanding:  Yes. Patient education on precautions: Yes    Patient was a voluntary admission from [facility] [place] ED. Patient over dosed on home medications and ingested excessive amount of alcohol. Patient was oriented to the unit. Patient was offered food and water. Patient was wanded for safety. Patient denies any additional needs at this time.  15 minute safety checks were initiated                   Dalia Andrea RN

## 2021-12-31 NOTE — PROGRESS NOTES
Behavioral Services  Medicare Certification Upon Admission    I certify that this patient's inpatient psychiatric hospital admission is medically necessary for:    [x] (1) Treatment which could reasonably be expected to improve this patient's condition,       [x] (2) Or for diagnostic study;     AND     [x](2) The inpatient psychiatric services are provided while the individual is under the care of a physician and are included in the individualized plan of care.     Estimated length of stay/service -3-9 days    Plan for post-hospital care -outpatient care    Electronically signed by Elvira Reyes MD on 12/31/2021 at 3:19 PM
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
## 2021-12-31 NOTE — PLAN OF CARE
[facility]  Initial Interdisciplinary Treatment Plan NO      Original treatment plan Date & Time: 12/31/2021 0822    Admission Type:  Admission Type: Voluntary    Reason for admission:   Reason for Admission: overdose on 30 neurontin    Estimated Length of Stay:  5-7days  Estimated Discharge Date: to be determined by physician    PATIENT STRENGTHS:  Patient Strengths:Strengths: Medication Compliance,Positive Support  Patient Strengths and Limitations:Limitations: Tendency to isolate self,Multiple barriers to leisure interests  Addictive Behavior: Addictive Behavior  In the past 3 months, have you felt or has someone told you that you have a problem with:  : None  Do you have a history of Chemical Use?: No  Do you have a history of Alcohol Use?: Yes  Do you have a history of Street Drug Abuse?: Yes  Histroy of Prescripton Drug Abuse?: No  Medical Problems:  Past Medical History:   Diagnosis Date    MORTEZA (acute kidney injury) ([facility] 8/71/0192    Alcoholic hepatitis without ascites 91/84/3291    Alcoholic ketoacidosis 74/71/7369    Alcoholism ([address]     3 pints daily    Anxiety     Asthma     Bipolar affective disorder ([address] 6/12/2019    Depression     Drug overdose, accidental or unintentional, initial encounter 9/29/2018    Drug overdose, intentional self-harm, initial encounter ([facility] 5/8/2018    Lisa coma scale total score 3-8 ([address]     Hepatitis C antibody positive in blood 6/18/2019    History of gastric surgery (gastric sleeve 2012) 4/25/2017    Intentional drug overdose ([address] 5/7/2018    MDD (major depressive disorder), recurrent episode ([address] 5/8/2018    Pneumonia     Polysubstance abuse ([address]     drug abuse includes, cocaine, IV heroin, cannabis, and ETOH    Post traumatic stress disorder     Seizure ([address] 12/17/2019    Smoker unmotivated to quit 4/25/2017    Suicide attempt ([address] 11/08/2017    S/A by overdosing on Trazodone and Seroquel    Suicide ideation     Toxic metabolic encephalopathy 9/28/2018     Status EXAM:Status and Exam  Normal: No  Facial Expression: Flat,Sad  Affect: Appropriate  Level of Consciousness: Alert  Mood:Normal: No  Mood: Depressed,Anxious,Irritable  Motor Activity:Normal: Yes  Interview Behavior: Irritable,Evasive  Preception: [place] to Person,[place] to Time,[place] to Place,[place] to Situation  Attention:Normal: No  Attention: Distractible  Thought Processes: Blocking  Thought Content:Normal: Yes  Hallucinations: None  Delusions: No  Memory:Normal: No  Memory: Poor Recent,Poor Remote  Insight and Judgment: No  Insight and Judgment: Poor Judgment,Poor Insight  Present Suicidal Ideation: No  Present Homicidal Ideation: No    EDUCATION:   Learner Progress Toward Treatment Goals: reviewed group plans and strategies for care    Method:group therapy, medication compliance, individualized assessments and care planning    Outcome: needs reinforcement    PATIENT GOALS: to be discussed with patient within 72 hours    PLAN/TREATMENT RECOMMENDATIONS:     continue group therapy , medications compliance, goal setting, individualized assessments and care, continue to monitor pt on unit      SHORT-TERM GOALS:   Time frame for Short-Term Goals: 5-7 days    LONG-TERM GOALS:  Time frame for Long-Term Goals: 6 months  Members Present in Team Meeting: See Signature Sheet    Ty Mayank

## 2021-12-31 NOTE — GROUP NOTE
Group Therapy Note    Date: 12/31/2021    Group Start Time: 1030  Group End Time: 1115  Group Topic: Psychoeducation    FREDERICK Castro        Group Therapy Note    Attendees: 4/19         Patient's Goal:  Developing self assessment     Notes:      Status After Intervention:  Improved    Participation Level:  Active Listener and Interactive    Participation Quality: Appropriate and Attentive      Speech:  normal      Thought Process/Content: Logical      Affective Functioning: Flat      Mood: depressed      Level of consciousness:  Drowsy      Response to Learning: Progressing to goal      Endings: None Reported    Modes of Intervention: Education and Support      Discipline Responsible: /Counselor      Signature:  FREDERICK Saavedra

## 2021-12-31 NOTE — BH NOTE
Admission paperwork unable to be signed by patient due to intensity of tremors and withdrawal symptoms upon arrival to unit. Pt was able to sign voluntary form, verbalized she will re-attempt to sign paperwork tomorrow.

## 2022-01-01 PROCEDURE — 2500000003 HC RX 250 WO HCPCS: Performed by: PSYCHIATRY & NEUROLOGY

## 2022-01-01 PROCEDURE — 99231 SBSQ HOSP IP/OBS SF/LOW 25: CPT | Performed by: PSYCHIATRY & NEUROLOGY

## 2022-01-01 PROCEDURE — 6370000000 HC RX 637 (ALT 250 FOR IP): Performed by: PSYCHIATRY & NEUROLOGY

## 2022-01-01 PROCEDURE — 1240000000 HC EMOTIONAL WELLNESS R&B

## 2022-01-01 RX ORDER — TRAZODONE HYDROCHLORIDE 150 MG/1
150 TABLET ORAL NIGHTLY
Status: DISCONTINUED | OUTPATIENT
Start: 2022-01-01 | End: 2022-01-03 | Stop reason: HOSPADM

## 2022-01-01 RX ADMIN — OMEPRAZOLE 20 MG: 20 CAPSULE, DELAYED RELEASE ORAL at 16:54

## 2022-01-01 RX ADMIN — HYDROXYZINE HYDROCHLORIDE 50 MG: 50 TABLET, FILM COATED ORAL at 23:00

## 2022-01-01 RX ADMIN — GUAIFENESIN 200 MG: 200 SOLUTION ORAL at 16:54

## 2022-01-01 RX ADMIN — THIAMINE HCL TAB 100 MG 100 MG: 100 TAB at 08:17

## 2022-01-01 RX ADMIN — VENLAFAXINE HYDROCHLORIDE 75 MG: 75 CAPSULE, EXTENDED RELEASE ORAL at 08:17

## 2022-01-01 RX ADMIN — MULTIPLE VITAMINS W/ MINERALS TAB 1 TABLET: TAB at 08:17

## 2022-01-01 RX ADMIN — ATORVASTATIN CALCIUM 40 MG: 20 TABLET, FILM COATED ORAL at 23:00

## 2022-01-01 RX ADMIN — LORAZEPAM 2 MG: 1 TABLET ORAL at 16:54

## 2022-01-01 RX ADMIN — FOLIC ACID 1 MG: 1 TABLET ORAL at 08:17

## 2022-01-01 RX ADMIN — ACETAMINOPHEN 650 MG: 325 TABLET, FILM COATED ORAL at 09:59

## 2022-01-01 RX ADMIN — TRAZODONE HYDROCHLORIDE 150 MG: 150 TABLET ORAL at 23:00

## 2022-01-01 RX ADMIN — OMEPRAZOLE 20 MG: 20 CAPSULE, DELAYED RELEASE ORAL at 09:32

## 2022-01-01 RX ADMIN — ACETAMINOPHEN 650 MG: 325 TABLET, FILM COATED ORAL at 16:54

## 2022-01-01 ASSESSMENT — PAIN SCALES - GENERAL
PAINLEVEL_OUTOF10: 1
PAINLEVEL_OUTOF10: 2
PAINLEVEL_OUTOF10: 7
PAINLEVEL_OUTOF10: 8

## 2022-01-01 NOTE — PLAN OF CARE
Problem: Altered Mood, Depressive Behavior:  Goal: Able to verbalize acceptance of life and situations over which he or she has no control  Description: Able to verbalize acceptance of life and situations over which he or she has no control  1/1/2022 1702 by Flavio Argueta RN  Outcome: Ongoing     Problem: Altered Mood, Depressive Behavior:  Goal: Able to verbalize and/or display a decrease in depressive symptoms  Description: Able to verbalize and/or display a decrease in depressive symptoms  1/1/2022 1702 by Flavio Argueta RN  Outcome: Ongoing     Problem: Altered Mood, Depressive Behavior:  Goal: Able to verbalize support systems  Description: Able to verbalize support systems  1/1/2022 1702 by Flavio Argueta RN  Outcome: Ongoing     Problem: Substance Abuse:  Goal: Absence of drug withdrawal signs and symptoms  Description: Absence of drug withdrawal signs and symptoms  1/1/2022 1702 by Flavio Argueta RN  Outcome: Ongoing     Problem: Suicide risk  Goal: Provide patient with safe environment  Description: Provide patient with safe environment  1/1/2022 1702 by Flavio Argueta RN  Outcome: Ongoing   Patient denies having thoughts of self harm during this shift. Patient is cooperative with shift assessment and complaint with scheduled medications during this shift. Patient remains isolative to room during this shift and is only out for needs. Patient reports having withdrawal symptoms in the evening requiring PRN medications. Q15 minute and random safety checks maintained.

## 2022-01-01 NOTE — GROUP NOTE
Group Therapy Note    Date: 1/1/2022    Group Start Time: 1031  Group End Time: 7844  Group Topic: Psychoeducation    [facility]    PORFIRIO Headley LSW        Group Therapy Note    patient refused to attend psychoetherapy group at 10:31am after encouragement from staff.       Signature:  PORFIRIO Headley LSW

## 2022-01-01 NOTE — PROGRESS NOTES
Daily Progress Note  1/1/2022    Patient Name: Ashley Forrest    CHIEF COMPLAINT: Suicide attempt by overdose on gabapentin and homicidal ideation towards ex girlfriend         SUBJECTIVE:    Nursing staff report the patient has maintained medication adherence and has not experienced acute behavioral changes since admission yesterday. Jasper Adam has utilized as needed medications including acetaminophen, Maalox, Librium and hydroxyzine to help manage symptoms of withdrawal, her anxiety and has not generalized pain. She reports her mood as \"tired \"and we discussed the importance of rest as her body detoxes from alcohol. She continues to endorse suicidal ideation as it relates to her break-up of 4 years and states that she feels lost and empty especially in that it is [place]. She has difficulty considering the future and reports poor motivation. She confirms she was able to tolerate a small amount of breakfast but has not explored the milieu or group programming. Jasper Adam contracts for safety on this unit however believes that if she had remained in the community she would have ended her life. She is encouraged to reach out to the support team as needed when she wants additional one-to-one time. Currently she has a reference folder at bedside and is encouraged to consider selecting a alcohol and drug treatment program.  Writer encouraged patient to attend groups on the unit. At this time, the patient is not appropriate for a lower level of care. There is risk of decompensation and patient warrants further hospitalization for safety and stabilization. Appetite:  [] Normal/Adequate/Unchanged  [] Increased  [x] Decreased      Sleep:       [] Normal/Adequate/Unchanged  [x] Fair  [] Poor      Group Attendance on Unit:   [] Yes  [] Selectively    [x] No    Medication Side Effects:  Patient denies any medication side effects at the time of assessment.          Mental Status Exam  Level of consciousness: Alert and awake. Appearance: Appropriate attire for setting, resting in bed, with fair  grooming and hygiene. Behavior/Motor: Approachable, no psychomotor abnormalities. Attitude toward examiner: Cooperative, attentive, good eye contact. Speech: Normal rate, normal volume, normal tone. Mood:  Patient reports \" tired\". Patient presents depressed  Affect: Blunted  Thought processes: Linear and coherent. Thought content: Reports improvement in homicidal ideation. Suicidal Ideation: Active suicidal ideations, without current plan or intent, contracts for safety on the unit. Delusions: No evidence of delusions. Denies paranoia. Perceptual Disturbance: Patient does not appear to be responding to internal stimuli. Endorses auditory hallucinations. Endorses visual hallucinations. Cognition: Oriented to self, location, time, and situation. Memory: Intact. Insight & Judgement: Poor. Data   height is 5' (1.524 m) and weight is 108 lb (49 kg). Her temporal temperature is 97.7 °F (36.5 °C). Her blood pressure is 108/82 and her pulse is 90. Her respiration is 15.    Labs:   Admission on 12/30/2021, Discharged on 12/31/2021   Component Date Value Ref Range Status    Ventricular Rate 12/30/2021 92  BPM Final    Atrial Rate 12/30/2021 357  BPM Final    QRS Duration 12/30/2021 60  ms Final    Q-T Interval 12/30/2021 358  ms Final    QTc Calculation (Bazett) 12/30/2021 442  ms Final    R Axis 12/30/2021 25  degrees Final    T Axis 12/30/2021 12  degrees Final    WBC 12/30/2021 5.9  3.5 - 11.3 k/uL Final    RBC 12/30/2021 5.06  3.95 - 5.11 m/uL Final    Hemoglobin 12/30/2021 11.4* 11.9 - 15.1 g/dL Final    Hematocrit 12/30/2021 38.4  36.3 - 47.1 % Final    MCV 12/30/2021 75.9* 82.6 - 102.9 fL Final    MCH 12/30/2021 22.5* 25.2 - 33.5 pg Final    MCHC 12/30/2021 29.7  28.4 - 34.8 g/dL Final    RDW 12/30/2021 21.0* 11.8 - 14.4 % Final    Platelets 73/36/7895 474* 138 - 453 k/uL Final    MPV 12/30/2021 9.1 8.1 - 13.5 fL Final    NRBC Automated 12/30/2021 0.0  0.0 per 100 WBC Final    Differential Type 12/30/2021 NOT REPORTED   Final    WBC Morphology 12/30/2021 NOT REPORTED   Final    RBC Morphology 12/30/2021 NOT REPORTED   Final    Platelet Estimate 74/32/1765 NOT REPORTED   Final    Immature Granulocytes 12/30/2021 0  0 % Final    Seg Neutrophils 12/30/2021 39  36 - 66 % Final    Lymphocytes 12/30/2021 48* 24 - 44 % Final    Monocytes 12/30/2021 8* 1 - 7 % Final    Eosinophils % 12/30/2021 4  1 - 4 % Final    Basophils 12/30/2021 1  0 - 2 % Final    Absolute Immature Granulocyte 12/30/2021 0.00  0.00 - 0.30 k/uL Final    Segs Absolute 12/30/2021 2.30  1.8 - 7.7 k/uL Final    Absolute Lymph # 12/30/2021 2.83  1.0 - 4.8 k/uL Final    Absolute Mono # 12/30/2021 0.47  0.1 - 0.8 k/uL Final    Absolute Eos # 12/30/2021 0.24  0.0 - 0.4 k/uL Final    Basophils Absolute 12/30/2021 0.06  0.0 - 0.2 k/uL Final    Morphology 12/30/2021 ANISOCYTOSIS PRESENT   Final    Morphology 12/30/2021 MICROCYTOSIS PRESENT   Final    Morphology 12/30/2021 HYPOCHROMIA PRESENT   Final    Glucose 12/30/2021 112* 70 - 99 mg/dL Final    BUN 12/30/2021 10  6 - 20 mg/dL Final    CREATININE 12/30/2021 0.63  0.50 - 0.90 mg/dL Final    Bun/Cre Ratio 12/30/2021 NOT REPORTED  9 - 20 Final    Calcium 12/30/2021 8.6  8.6 - 10.4 mg/dL Final    Sodium 12/30/2021 143  135 - 144 mmol/L Final    Potassium 12/30/2021 3.7  3.7 - 5.3 mmol/L Final    Chloride 12/30/2021 107  98 - 107 mmol/L Final    CO2 12/30/2021 22  20 - 31 mmol/L Final    Anion Gap 12/30/2021 14  9 - 17 mmol/L Final    Alkaline Phosphatase 12/30/2021 90  35 - 104 U/L Final    ALT 12/30/2021 27  5 - 33 U/L Final    AST 12/30/2021 46* <32 U/L Final    Total Bilirubin 12/30/2021 <0.10* 0.3 - 1.2 mg/dL Final    Total Protein 12/30/2021 7.6  6.4 - 8.3 g/dL Final    Albumin 12/30/2021 3.9  3.5 - 5.2 g/dL Final    Albumin/Globulin Ratio 12/30/2021 1.1  1.0 - 2.5 Final    GFR Non- 12/30/2021 >60  >60 mL/min Final    GFR  12/30/2021 >60  >60 mL/min Final    GFR Comment 12/30/2021        Final    Comment: Average GFR for 38-51 years old:   80 mL/min/1.73sq m  Chronic Kidney Disease:   <60 mL/min/1.73sq m  Kidney failure:   <15 mL/min/1.73sq m              eGFR calculated using average adult body mass.  Additional eGFR calculator available at:        Snaapi[employer]            GFR Staging 12/30/2021 NOT REPORTED   Final    Magnesium 12/30/2021 2.0  1.6 - 2.6 mg/dL Final    Amphetamine Screen, Ur 12/30/2021 NEGATIVE  NEGATIVE Final    Comment:       (Positive cutoff 1000 ng/mL)                  Barbiturate Screen, Ur 12/30/2021 NEGATIVE  NEGATIVE Final    Comment:       (Positive cutoff 200 ng/mL)                  Benzodiazepine Screen, Urine 12/30/2021 NEGATIVE  NEGATIVE Final    Comment:       (Positive cutoff 200 ng/mL)                  Cocaine Metabolite, Urine 12/30/2021 NEGATIVE  NEGATIVE Final    Comment:       (Positive cutoff 300 ng/mL)                  Methadone Screen, Urine 12/30/2021 NEGATIVE  NEGATIVE Final    Comment:       (Positive cutoff 300 ng/mL)                  Opiates, Urine 12/30/2021 NEGATIVE  NEGATIVE Final    Comment:       (Positive cutoff 300 ng/mL)                  Phencyclidine, Urine 12/30/2021 NEGATIVE  NEGATIVE Final    Comment:       (Positive cutoff 25 ng/mL)                  Propoxyphene, Urine 12/30/2021 NOT REPORTED  NEGATIVE Final    Cannabinoid Scrn, Ur 12/30/2021 NEGATIVE  NEGATIVE Final    Comment:       (Positive cutoff 50 ng/mL)                  Oxycodone Screen, Ur 12/30/2021 NEGATIVE  NEGATIVE Final    Comment:       (Positive cutoff 100 ng/mL)                  Methamphetamine, Urine 12/30/2021 NOT REPORTED  NEGATIVE Final    Tricyclic Antidepressants, Urine 12/30/2021 NOT REPORTED  NEGATIVE Final    MDMA, Urine 12/30/2021 NOT REPORTED  NEGATIVE Final  Buprenorphine Urine 12/30/2021 NOT REPORTED  NEGATIVE Final    Test Information 12/30/2021 Assay provides medical screening only. The absence of expected drug(s) and/or metabolite(s) may indicate diluted or adulterated urine, limitations of testing or timing of collection. Final    Comment: Testing for legal purposes should be confirmed by another method. To request confirmation   of test result, please call the lab within 7 days of sample submission.  hCG Qual 12/30/2021 NEGATIVE  NEGATIVE Final    Comment: Specimens with hCG levels near the threshold of the test (25 mIU/mL) may give a negative or   indeterminate result. In such cases, another test should be performed with a new specimen   in 48-72 hours. If early pregnancy is suspected clinically in this setting, correlation   with quantitative serum b-hCG level is suggested. [facility] has confirmed the use of plasma for this test. This has not been cleared   or approved by the U.S. Food and Drug Administration. The FDA has determined that such   clearance is not necessary.  Acetaminophen Level 12/30/2021 <5* 10 - 30 ug/mL Final    Ethanol 12/30/2021 327* <10 mg/dL Final    Ethanol percent 12/30/2021 0.327* <6.749 % Final    Salicylate Lvl 74/37/2106 <1* 3 - 10 mg/dL Final    Toxic Tricyclic Sc,Blood 98/79/9570 NEGATIVE  NEGATIVE Final    Specimen Description 12/30/2021 . NASOPHARYNGEAL SWAB   Final    SARS-CoV-2, Rapid 12/30/2021 Not Detected  Not Detected Final    Comment:       Rapid NAAT:  The specimen is NEGATIVE for SARS-CoV-2, the novel coronavirus associated with   COVID-19. The ID NOW COVID-19 assay is designed to detect the virus that causes COVID-19 in patients   with signs and symptoms of infection who are suspected of COVID-19. An individual without symptoms of COVID-19 and who is not shedding SARS-CoV-2 virus would   expect to have a negative (not detected) result in this assay.   Negative results should be treated as presumptive and, if inconsistent with clinical signs   and symptoms or necessary for patient management,  should be tested with an alternative molecular assay. Negative results do not preclude   SARS-CoV-2 infection and   should not be used as the sole basis for patient management decisions. Fact sheet for Healthcare Providers: [place]  Fact sheet for Patients: [place]tasha          Methodology: Isothermal Nucleic Acid Amplification      Lithium Lvl 12/30/2021 <0.1* 0.6 - 1.2 mmol/L Final    Lithium Dose Amount 12/30/2021 NOT REPORTED   Final    Lithium Date Last Dose 12/30/2021 NOT REPORTED   Final    Lithium Dose Time 12/30/2021 NOT REPORTED   Final         Reviewed patient's current plan of care and vital signs with nursing staff.     Labs reviewed: [x] Yes  Last EKG in EMR reviewed: [x] Yes  QTc: 442    Medications  Current Facility-Administered Medications: traZODone (DESYREL) tablet 150 mg, 150 mg, Oral, Nightly  acetaminophen (TYLENOL) tablet 650 mg, 650 mg, Oral, Q4H PRN  aluminum & magnesium hydroxide-simethicone (MAALOX) 200-200-20 MG/5ML suspension 30 mL, 30 mL, Oral, Q6H PRN  hydrOXYzine (ATARAX) tablet 50 mg, 50 mg, Oral, TID PRN  nicotine (NICODERM CQ) 14 MG/24HR 1 patch, 1 patch, TransDERmal, Daily  polyethylene glycol (GLYCOLAX) packet 17 g, 17 g, Oral, Daily PRN  folic acid (FOLVITE) tablet 1 mg, 1 mg, Oral, Daily  chlordiazePOXIDE (LIBRIUM) capsule 25 mg, 25 mg, Oral, Q6H PRN  therapeutic multivitamin-minerals 1 tablet, 1 tablet, Oral, Daily  LORazepam (ATIVAN) tablet 3 mg, 3 mg, Oral, Q1H PRN **OR** LORazepam (ATIVAN) injection 3 mg, 3 mg, IntraMUSCular, Q1H PRN  LORazepam (ATIVAN) tablet 2 mg, 2 mg, Oral, Q1H PRN **OR** LORazepam (ATIVAN) injection 2 mg, 2 mg, IntraMUSCular, Q1H PRN  LORazepam (ATIVAN) tablet 1 mg, 1 mg, Oral, Q1H PRN **OR** LORazepam (ATIVAN) injection 1 mg, 1 mg, IntraMUSCular, Q1H PRN  LORazepam (ATIVAN) tablet 4 mg, 4 mg, Oral, Q1H PRN **OR** LORazepam (ATIVAN) injection 4 mg, 4 mg, IntraMUSCular, Q1H PRN  thiamine mononitrate tablet 100 mg, 100 mg, Oral, Daily  busPIRone (BUSPAR) tablet 7.5 mg, 7.5 mg, Oral, BID PRN  venlafaxine (EFFEXOR XR) extended release capsule 75 mg, 75 mg, Oral, Daily with breakfast  atorvastatin (LIPITOR) tablet 40 mg, 40 mg, Oral, Nightly  omeprazole (PRILOSEC) delayed release capsule 20 mg, 20 mg, Oral, BID AC  guaiFENesin (ROBITUSSIN) 100 MG/5ML liquid 200 mg, 200 mg, Oral, Q4H PRN    ASSESSMENT  Severe recurrent major depression with psychotic features ([facility] [address]         PLAN  Patient symptoms are: Remains Unstable. Continue current medication regimen. Monitor need and frequency of PRN medications. Encourage participation in groups and milieu. Attempt to develop insight. Psycho-education conducted. Supportive Therapy conducted. Probable discharge per attending physician and currently undetermined. Follow-up daily while inpatient. Patient continues to be monitored in the inpatient psychiatric facility at [facility] for safety and stabilization. Patient continues to need, on a daily basis, active treatment furnished directly by or requiring the supervision of inpatient psychiatric personnel. Electronically signed by RAYMOND Basilio Mai, CNP on 1/1/2022 at 2:39 PM    **This report has been created using voice recognition software. It may contain minor errors which are inherent in voice recognition technology. **

## 2022-01-01 NOTE — PLAN OF CARE
[facility]  Day 3 Interdisciplinary Treatment Plan NOTE    Review Date & Time: 1/1/2022 1311    Admission Type:   Admission Type: Voluntary    Reason for admission:  Reason for Admission: overdose on 30 neurontin  Estimated Length of Stay:  5-7 days  Estimated Discharge Date Update:  to be determined by physician    PATIENT STRENGTHS:  Patient Strengths:Strengths: Medication Compliance,Positive Support  Patient Strengths and Limitations:Limitations: Inappropriate/potentially harmful leisure interests,Multiple barriers to leisure interests,Difficult relationships / poor social skills  Addictive Behavior:Addictive Behavior  In the past 3 months, have you felt or has someone told you that you have a problem with:  : None  Do you have a history of Chemical Use?: No  Do you have a history of Alcohol Use?: Yes  Do you have a history of Street Drug Abuse?: Yes  Histroy of Prescripton Drug Abuse?: No  Medical Problems:  Past Medical History:   Diagnosis Date    MORTEZA (acute kidney injury) ([facility] [address] 3/60/0564    Alcoholic hepatitis without ascites 85/92/7110    Alcoholic ketoacidosis 35/04/4162    Alcoholism ([facility] [address]     3 pints daily    Anxiety     Asthma     Bipolar affective disorder ([address] 6/12/2019    Depression     Drug overdose, accidental or unintentional, initial encounter 9/29/2018    Drug overdose, intentional self-harm, initial encounter ([facility] [address] 5/8/2018    [place]is coma scale total score 3-8 ([address]     Hepatitis C antibody positive in blood 6/18/2019    History of gastric surgery (gastric sleeve 2012) 4/25/2017    Intentional drug overdose ([address] 5/7/2018    MDD (major depressive disorder), recurrent episode ([address] 5/8/2018    Pneumonia     Polysubstance abuse ([address]     drug abuse includes, cocaine, IV heroin, cannabis, and ETOH    Post traumatic stress disorder     Seizure ([address] 12/17/2019    Smoker unmotivated to quit 4/25/2017    Suicide attempt ([address] 11/08/2017    S/A by overdosing on Trazodone and Seroquel Suicide ideation     Toxic metabolic encephalopathy 1/30/2887       Risk:  Fall RiskTotal: 75  Tee Scale Tee Scale Score: 22  BVC    Change in scores:  No Changes to plan of Care:  No    Status EXAM:   Status and Exam  Normal: No  Facial Expression: Flat,Sad  Affect: Appropriate  Level of Consciousness: Alert  Mood:Normal: No  Mood: Depressed,Anxious  Motor Activity:Normal: No  Motor Activity: Decreased  Interview Behavior: Cooperative  Preception: Perry to Person,Perry to Time,Perry to Place,Perry to Situation  Attention:Normal: No  Attention: Distractible  Thought Processes: Blocking  Thought Content:Normal: No  Thought Content: Preoccupations  Hallucinations: None  Delusions: No  Memory:Normal: No  Memory: Poor Recent,Poor Remote  Insight and Judgment: No  Insight and Judgment: Poor Judgment,Poor Insight,Unmotivated  Present Suicidal Ideation: No  Present Homicidal Ideation: No    Daily Assessment Last Entry:   Daily Sleep (WDL): Within Defined Limits         Patient Currently in Pain: Denies  Daily Nutrition (WDL): Within Defined Limits    Patient Monitoring:  Frequency of Checks: 4 times per hour, close    Psychiatric Symptoms:   Depression Symptoms  Depression Symptoms: Feelings of helplessness,Feelings of hopelessess,Impaired concentration,Isolative  Anxiety Symptoms  Anxiety Symptoms: Generalized  Ingrid Symptoms  Ingrid Symptoms: No problems reported or observed. Psychosis Symptoms  Delusion Type: No problems reported or observed. Suicide Risk CSSR-S:  1) Within the past month, have you wished you were dead or wished you could go to sleep and not wake up? : Yes  2) Have you actually had any thoughts of killing yourself? : Yes  3) Have you been thinking about how you might kill yourself? : Yes  5) Have you started to work out or worked out the details of how to kill yourself?  Do you intend to carry out this plan? : Yes  6) Have you ever done anything, started to do anything, or prepared to do anything to end your life?: Yes  Change in Result:  Change in Plan of care:       EDUCATION:   Learner Progress Toward Treatment Goals:  Reviewed results and recommendations of this team, Reviewed group plan and strategies, Reviewed signs, symptoms and risk of self harm and violent behavior, Reviewed goals and plan of care    Method:  small group, individual verbal education    Outcome:  Verbalized by patient but needs reinforcement to obtain goals    PATIENT GOALS:  Short term:  Patient unable to participate  Long term:  Patient unable to participate    PLAN/TREATMENT RECOMMENDATIONS UPDATE:  continue with group therapies, increased socialization, continue planning for after discharge goals, continue with medication compliance    SHORT-TERM GOALS UPDATE:   Time frame for Short-Term Goals:  5-7 days    LONG-TERM GOALS UPDATE:   Time frame for Long-Term Goals:  6 months    Members Present in Team Meeting:   See signature sheet  KRYSTIAN Crump

## 2022-01-01 NOTE — GROUP NOTE
Group Therapy Note    Date: 1/1/2022    Group Start Time: 1400  Group End Time: 1440  Group Topic: Psychoeducation    GORGE Hernadez, CTRS    Patient refused to attend goal setting group at 1400 after encouragement from staff.   1:1 talk time provided as alternative to group session

## 2022-01-02 PROCEDURE — 99232 SBSQ HOSP IP/OBS MODERATE 35: CPT

## 2022-01-02 PROCEDURE — 6370000000 HC RX 637 (ALT 250 FOR IP)

## 2022-01-02 PROCEDURE — 1240000000 HC EMOTIONAL WELLNESS R&B

## 2022-01-02 PROCEDURE — 6370000000 HC RX 637 (ALT 250 FOR IP): Performed by: PSYCHIATRY & NEUROLOGY

## 2022-01-02 RX ORDER — PALIPERIDONE 3 MG/1
3 TABLET, EXTENDED RELEASE ORAL DAILY
Status: DISCONTINUED | OUTPATIENT
Start: 2022-01-02 | End: 2022-01-03 | Stop reason: HOSPADM

## 2022-01-02 RX ADMIN — OMEPRAZOLE 20 MG: 20 CAPSULE, DELAYED RELEASE ORAL at 08:45

## 2022-01-02 RX ADMIN — TRAZODONE HYDROCHLORIDE 150 MG: 150 TABLET ORAL at 21:09

## 2022-01-02 RX ADMIN — MULTIPLE VITAMINS W/ MINERALS TAB 1 TABLET: TAB at 09:44

## 2022-01-02 RX ADMIN — ATORVASTATIN CALCIUM 40 MG: 20 TABLET, FILM COATED ORAL at 21:09

## 2022-01-02 RX ADMIN — OMEPRAZOLE 20 MG: 20 CAPSULE, DELAYED RELEASE ORAL at 16:27

## 2022-01-02 RX ADMIN — ACETAMINOPHEN 650 MG: 325 TABLET, FILM COATED ORAL at 16:27

## 2022-01-02 RX ADMIN — FOLIC ACID 1 MG: 1 TABLET ORAL at 09:44

## 2022-01-02 RX ADMIN — LORAZEPAM 1 MG: 1 TABLET ORAL at 17:53

## 2022-01-02 RX ADMIN — PALIPERIDONE 3 MG: 3 TABLET, EXTENDED RELEASE ORAL at 16:27

## 2022-01-02 RX ADMIN — THIAMINE HCL TAB 100 MG 100 MG: 100 TAB at 09:44

## 2022-01-02 RX ADMIN — ALUMINUM HYDROXIDE, MAGNESIUM HYDROXIDE, AND SIMETHICONE 30 ML: 200; 200; 20 SUSPENSION ORAL at 11:41

## 2022-01-02 RX ADMIN — HYDROXYZINE HYDROCHLORIDE 50 MG: 50 TABLET, FILM COATED ORAL at 21:09

## 2022-01-02 RX ADMIN — VENLAFAXINE HYDROCHLORIDE 75 MG: 75 CAPSULE, EXTENDED RELEASE ORAL at 09:44

## 2022-01-02 ASSESSMENT — PAIN SCALES - GENERAL
PAINLEVEL_OUTOF10: 0
PAINLEVEL_OUTOF10: 4
PAINLEVEL_OUTOF10: 4
PAINLEVEL_OUTOF10: 5

## 2022-01-02 NOTE — GROUP NOTE
Group Therapy Note    Date: 1/2/2022    Group Start Time: 1400  Group End Time: 3129  Group Topic: Cognitive Skills    GORGE Herrera, CTRS    Pt did not attend 1400 cognitive skills group d/t resting in room despite staff invitation to attend. 1:1 talk time offered as alternative to group session, pt declined.               Signature:  Giovanny Mcclure

## 2022-01-02 NOTE — GROUP NOTE
Group Therapy Note    Date: 1/2/2022    Group Start Time: 9052  Group End Time: 5075  Group Topic: Psychoeducation    GORGE WOODS    PORFIRIO Mejia, FREDERICK        Group Therapy Note    Attendees: 5/20         Patient's Goal:  Increase interpersonal relationship skills while discussing random topics using best self topic cards. Status After Intervention:  Improved    Participation Level:  Active Listener and Interactive    Participation Quality: Appropriate, Attentive and Sharing      Speech:  normal      Thought Process/Content: Logical      Affective Functioning: Congruent      Mood: euthymic      Level of consciousness:  Alert and Attentive      Response to Learning: Able to verbalize current knowledge/experience and Able to verbalize/acknowledge new learning      Endings: None Reported    Modes of Intervention: Support and Socialization      Discipline Responsible: /Counselor      Signature:  PORFIRIO Mejia, FREDERICK

## 2022-01-02 NOTE — GROUP NOTE
Group Therapy Note    Date: 1/2/2022    Group Start Time: 0900  Group End Time: 4181  Group Topic: Healthy Living/Wellness    [facility]    Anjali Max RN        Group Therapy Note    Attendees: 5         Patient's Goal:  To feel better. Notes:  Goal setting group    Status After Intervention:  Unchanged    Participation Level:  Active Listener and Interactive    Participation Quality: Appropriate, Attentive and Sharing      Speech:  normal      Thought Process/Content: Logical      Affective Functioning: Congruent      Mood: anxious and depressed      Level of consciousness:  Alert, Oriented x4 and Attentive      Response to Learning: Able to verbalize current knowledge/experience and Able to verbalize/acknowledge new learning      Endings: None Reported    Modes of Intervention: Support      Discipline Responsible: Registered Nurse      Signature:  Sapna Yoder RN

## 2022-01-02 NOTE — PROGRESS NOTES
Daily Progress Note  1/2/2022    Patient Name: Francis Uribe    CHIEF COMPLAINT: Suicide attempt by overdose on gabapentin and homicidal ideation towards ex-girlfriend         SUBJECTIVE:      Patient is seen today for a follow up assessment. Patient is compliant with scheduled medications. Patient has not needed emergency medications in the past 24 hours. Patient is agreeable to interview in her room today. She reports that she has not been experiencing withdrawal symptoms today and has not had to take any Ativan for withdrawal today. Patient continues to endorse anxiety and depression today. She reports that her appetite continues to be decreased. She states that she slept well last night however still feels tired today. She reports that she had not been sleeping well at home and she is just trying to catch up on her sleep. Patient endorses fleeting suicidal ideation without current plan or intent. She reports improvement in homicidal ideation. She is able to contract for safety on this unit with this writer. Patient reports that she often sees a woman \"with no face. \"  She reports that this woman's name is Jacy Jamil and that Jacy Jamil has black hair and wears a \"white dress almost like a wedding dress. \"  She reports that Jacy Jamil comes to her at night and that she did see her last night. She reports that Adithya Gilliam me to hurt people. \"  She does endorse feeling somewhat paranoid due to this. Patient denies medication side effects at this time. Patient was receiving lithium for mood stabilization however due to her alcohol use the attending physician decided not to restart lithium. We did discuss adding an antipsychotic for mood stabilizer and to help with the visual and auditory hallucinations. After discussing risks benefits and alternative patient is agreeable to this. Patient is complaining of rib pain and states \"I think I may have broken my ribs when I fell. \"  This writer reviewed x-ray from 12/30 and there was no evidence of broken ribs. This writer encouraged patient to utilize her as needed Tylenol for pain. Writer encouraged patient to attend groups on the unit. At this time, the patient is not appropriate for a lower level of care. There is risk of decompensation and patient warrants further hospitalization for safety and stabilization. Appetite:  [] Normal/Adequate/Unchanged  [] Increased  [x] Decreased      Sleep:       [x] Normal/Adequate/Unchanged  [] Fair  [] Poor      Group Attendance on Unit:   [] Yes  [x] Selectively    [] No    Medication Side Effects:  Patient denies any medication side effects at the time of assessment. Mental Status Exam  Level of consciousness: Alert and awake. Appearance: Appropriate attire for setting, resting in bed, with fair  grooming and hygiene. Behavior/Motor: Approachable, no psychomotor abnormalities. Attitude toward examiner: Cooperative, attentive, fair eye contact. Speech: Normal rate, normal volume, normal tone. Mood:  Patient reports \"good\". Affect: Depressed  Thought processes: Linear and coherent. Thought content: Reports improvement in homicidal ideation. Suicidal Ideation: Fleeting suicidal ideations, without current plan or intent, contracts for safety on the unit. Delusions: No evidence of delusions. Endorses paranoia. Perceptual Disturbance: Patient does not appear to be responding to internal stimuli. Endorses auditory hallucinations. Endorses visual hallucinations. Cognition: Oriented to self, location, time, and situation. Memory: Intact. Insight & Judgement: Poor. Data   height is 5' (1.524 m) and weight is 108 lb (49 kg). Her temporal temperature is 97.7 °F (36.5 °C). Her blood pressure is 131/88 and her pulse is 88. Her respiration is 14 and oxygen saturation is 100%. Labs:   No visits with results within 2 Day(s) from this visit.    Latest known visit with results is:   Admission on 12/30/2021, Discharged on 12/31/2021   Component Date Value Ref Range Status    Ventricular Rate 12/30/2021 92  BPM Final    Atrial Rate 12/30/2021 357  BPM Final    QRS Duration 12/30/2021 60  ms Final    Q-T Interval 12/30/2021 358  ms Final    QTc Calculation (Bazett) 12/30/2021 442  ms Final    R Axis 12/30/2021 25  degrees Final    T Axis 12/30/2021 12  degrees Final    WBC 12/30/2021 5.9  3.5 - 11.3 k/uL Final    RBC 12/30/2021 5.06  3.95 - 5.11 m/uL Final    Hemoglobin 12/30/2021 11.4* 11.9 - 15.1 g/dL Final    Hematocrit 12/30/2021 38.4  36.3 - 47.1 % Final    MCV 12/30/2021 75.9* 82.6 - 102.9 fL Final    MCH 12/30/2021 22.5* 25.2 - 33.5 pg Final    MCHC 12/30/2021 29.7  28.4 - 34.8 g/dL Final    RDW 12/30/2021 21.0* 11.8 - 14.4 % Final    Platelets 45/99/0335 474* 138 - 453 k/uL Final    MPV 12/30/2021 9.1  8.1 - 13.5 fL Final    NRBC Automated 12/30/2021 0.0  0.0 per 100 WBC Final    Differential Type 12/30/2021 NOT REPORTED   Final    WBC Morphology 12/30/2021 NOT REPORTED   Final    RBC Morphology 12/30/2021 NOT REPORTED   Final    Platelet Estimate 75/30/7598 NOT REPORTED   Final    Immature Granulocytes 12/30/2021 0  0 % Final    Seg Neutrophils 12/30/2021 39  36 - 66 % Final    Lymphocytes 12/30/2021 48* 24 - 44 % Final    Monocytes 12/30/2021 8* 1 - 7 % Final    Eosinophils % 12/30/2021 4  1 - 4 % Final    Basophils 12/30/2021 1  0 - 2 % Final    Absolute Immature Granulocyte 12/30/2021 0.00  0.00 - 0.30 k/uL Final    Segs Absolute 12/30/2021 2.30  1.8 - 7.7 k/uL Final    Absolute Lymph # 12/30/2021 2.83  1.0 - 4.8 k/uL Final    Absolute Mono # 12/30/2021 0.47  0.1 - 0.8 k/uL Final    Absolute Eos # 12/30/2021 0.24  0.0 - 0.4 k/uL Final    Basophils Absolute 12/30/2021 0.06  0.0 - 0.2 k/uL Final    Morphology 12/30/2021 ANISOCYTOSIS PRESENT   Final    Morphology 12/30/2021 MICROCYTOSIS PRESENT   Final    Morphology 12/30/2021 HYPOCHROMIA PRESENT   Final    Glucose 12/30/2021 112* 70 - 99 mg/dL Final    BUN 12/30/2021 10  6 - 20 mg/dL Final    CREATININE 12/30/2021 0.63  0.50 - 0.90 mg/dL Final    Bun/Cre Ratio 12/30/2021 NOT REPORTED  9 - 20 Final    Calcium 12/30/2021 8.6  8.6 - 10.4 mg/dL Final    Sodium 12/30/2021 143  135 - 144 mmol/L Final    Potassium 12/30/2021 3.7  3.7 - 5.3 mmol/L Final    Chloride 12/30/2021 107  98 - 107 mmol/L Final    CO2 12/30/2021 22  20 - 31 mmol/L Final    Anion Gap 12/30/2021 14  9 - 17 mmol/L Final    Alkaline Phosphatase 12/30/2021 90  35 - 104 U/L Final    ALT 12/30/2021 27  5 - 33 U/L Final    AST 12/30/2021 46* <32 U/L Final    Total Bilirubin 12/30/2021 <0.10* 0.3 - 1.2 mg/dL Final    Total Protein 12/30/2021 7.6  6.4 - 8.3 g/dL Final    Albumin 12/30/2021 3.9  3.5 - 5.2 g/dL Final    Albumin/Globulin Ratio 12/30/2021 1.1  1.0 - 2.5 Final    GFR Non- 12/30/2021 >60  >60 mL/min Final    GFR  12/30/2021 >60  >60 mL/min Final    GFR Comment 12/30/2021        Final    Comment: Average GFR for 38-51 years old:   80 mL/min/1.73sq m  Chronic Kidney Disease:   <60 mL/min/1.73sq m  Kidney failure:   <15 mL/min/1.73sq m              eGFR calculated using average adult body mass.  Additional eGFR calculator available at:        [employer]            GFR Staging 12/30/2021 NOT REPORTED   Final    Magnesium 12/30/2021 2.0  1.6 - 2.6 mg/dL Final    Amphetamine Screen, Ur 12/30/2021 NEGATIVE  NEGATIVE Final    Comment:       (Positive cutoff 1000 ng/mL)                  Barbiturate Screen, Ur 12/30/2021 NEGATIVE  NEGATIVE Final    Comment:       (Positive cutoff 200 ng/mL)                  Benzodiazepine Screen, Urine 12/30/2021 NEGATIVE  NEGATIVE Final    Comment:       (Positive cutoff 200 ng/mL)                  Cocaine Metabolite, Urine 12/30/2021 NEGATIVE  NEGATIVE Final    Comment:       (Positive cutoff 300 ng/mL)                  Methadone Screen, Urine 12/30/2021 NEGATIVE  NEGATIVE Final    Comment:       (Positive cutoff 300 ng/mL)                  Opiates, Urine 12/30/2021 NEGATIVE  NEGATIVE Final    Comment:       (Positive cutoff 300 ng/mL)                  Phencyclidine, Urine 12/30/2021 NEGATIVE  NEGATIVE Final    Comment:       (Positive cutoff 25 ng/mL)                  Propoxyphene, Urine 12/30/2021 NOT REPORTED  NEGATIVE Final    Cannabinoid Scrn, Ur 12/30/2021 NEGATIVE  NEGATIVE Final    Comment:       (Positive cutoff 50 ng/mL)                  Oxycodone Screen, Ur 12/30/2021 NEGATIVE  NEGATIVE Final    Comment:       (Positive cutoff 100 ng/mL)                  Methamphetamine, Urine 12/30/2021 NOT REPORTED  NEGATIVE Final    Tricyclic Antidepressants, Urine 12/30/2021 NOT REPORTED  NEGATIVE Final    MDMA, Urine 12/30/2021 NOT REPORTED  NEGATIVE Final    Buprenorphine Urine 12/30/2021 NOT REPORTED  NEGATIVE Final    Test Information 12/30/2021 Assay provides medical screening only. The absence of expected drug(s) and/or metabolite(s) may indicate diluted or adulterated urine, limitations of testing or timing of collection. Final    Comment: Testing for legal purposes should be confirmed by another method. To request confirmation   of test result, please call the lab within 7 days of sample submission.  hCG Qual 12/30/2021 NEGATIVE  NEGATIVE Final    Comment: Specimens with hCG levels near the threshold of the test (25 mIU/mL) may give a negative or   indeterminate result. In such cases, another test should be performed with a new specimen   in 48-72 hours. If early pregnancy is suspected clinically in this setting, correlation   with quantitative serum b-hCG level is suggested. [employer] has confirmed the use of plasma for this test. This has not been cleared   or approved by the U.S. Food and Drug Administration. The FDA has determined that such   clearance is not necessary.       Acetaminophen Level 12/30/2021 <5* 10 - 30 ug/mL Final    Ethanol 12/30/2021 327* <10 mg/dL Final    Ethanol percent 12/30/2021 0.327* <0.677 % Final    Salicylate Lvl 44/64/2012 <1* 3 - 10 mg/dL Final    Toxic Tricyclic Sc,Blood 94/21/2972 NEGATIVE  NEGATIVE Final    Specimen Description 12/30/2021 . NASOPHARYNGEAL SWAB   Final    SARS-CoV-2, Rapid 12/30/2021 Not Detected  Not Detected Final    Comment:       Rapid NAAT:  The specimen is NEGATIVE for SARS-CoV-2, the novel coronavirus associated with   COVID-19. The ID NOW COVID-19 assay is designed to detect the virus that causes COVID-19 in patients   with signs and symptoms of infection who are suspected of COVID-19. An individual without symptoms of COVID-19 and who is not shedding SARS-CoV-2 virus would   expect to have a negative (not detected) result in this assay. Negative results should be treated as presumptive and, if inconsistent with clinical signs   and symptoms or necessary for patient management,  should be tested with an alternative molecular assay. Negative results do not preclude   SARS-CoV-2 infection and   should not be used as the sole basis for patient management decisions. Fact sheet for Healthcare Providers: [employer]  Fact sheet for Patients: [employer]Her.es          Methodology: Isothermal Nucleic Acid Amplification      Lithium Lvl 12/30/2021 <0.1* 0.6 - 1.2 mmol/L Final    Lithium Dose Amount 12/30/2021 NOT REPORTED   Final    Lithium Date Last Dose 12/30/2021 NOT REPORTED   Final    Lithium Dose Time 12/30/2021 NOT REPORTED   Final         Reviewed patient's current plan of care and vital signs with nursing staff.     Labs reviewed: [x] Yes  Last EKG in EMR reviewed: [x] Yes  QTc: 442    Medications  Current Facility-Administered Medications: traZODone (DESYREL) tablet 150 mg, 150 mg, Oral, Nightly  acetaminophen (TYLENOL) tablet 650 mg, 650 mg, Oral, Q4H PRN  aluminum & magnesium hydroxide-simethicone (MAALOX) 935-975-61 MG/5ML suspension 30 mL, 30 mL, Oral, Q6H PRN  hydrOXYzine (ATARAX) tablet 50 mg, 50 mg, Oral, TID PRN  nicotine (NICODERM CQ) 14 MG/24HR 1 patch, 1 patch, TransDERmal, Daily  polyethylene glycol (GLYCOLAX) packet 17 g, 17 g, Oral, Daily PRN  folic acid (FOLVITE) tablet 1 mg, 1 mg, Oral, Daily  chlordiazePOXIDE (LIBRIUM) capsule 25 mg, 25 mg, Oral, Q6H PRN  therapeutic multivitamin-minerals 1 tablet, 1 tablet, Oral, Daily  LORazepam (ATIVAN) tablet 3 mg, 3 mg, Oral, Q1H PRN **OR** LORazepam (ATIVAN) injection 3 mg, 3 mg, IntraMUSCular, Q1H PRN  LORazepam (ATIVAN) tablet 2 mg, 2 mg, Oral, Q1H PRN **OR** LORazepam (ATIVAN) injection 2 mg, 2 mg, IntraMUSCular, Q1H PRN  LORazepam (ATIVAN) tablet 1 mg, 1 mg, Oral, Q1H PRN **OR** LORazepam (ATIVAN) injection 1 mg, 1 mg, IntraMUSCular, Q1H PRN  LORazepam (ATIVAN) tablet 4 mg, 4 mg, Oral, Q1H PRN **OR** LORazepam (ATIVAN) injection 4 mg, 4 mg, IntraMUSCular, Q1H PRN  thiamine mononitrate tablet 100 mg, 100 mg, Oral, Daily  busPIRone (BUSPAR) tablet 7.5 mg, 7.5 mg, Oral, BID PRN  venlafaxine (EFFEXOR XR) extended release capsule 75 mg, 75 mg, Oral, Daily with breakfast  atorvastatin (LIPITOR) tablet 40 mg, 40 mg, Oral, Nightly  omeprazole (PRILOSEC) delayed release capsule 20 mg, 20 mg, Oral, BID AC  guaiFENesin (ROBITUSSIN) 100 MG/5ML liquid 200 mg, 200 mg, Oral, Q4H PRN    ASSESSMENT  Severe recurrent major depression with psychotic features ([facility] [address]         PLAN  Patient symptoms are: Remains Unstable. Continue current medication regimen. Consider addition of antipsychotic for mood stablilization  Monitor need and frequency of PRN medications. Encourage participation in groups and milieu. Attempt to develop insight. Psycho-education conducted. Supportive Therapy conducted. Probable discharge is to be determined by MD.   Follow-up daily while inpatient.      Patient continues to be monitored in the inpatient psychiatric facility at [facility] for safety and stabilization. Patient continues to need, on a daily basis, active treatment furnished directly by or requiring the supervision of inpatient psychiatric personnel. Electronically signed by RAYMOND Hart CNP on 1/2/2022 at 2:20 PM    **This report has been created using voice recognition software. It may contain minor errors which are inherent in voice recognition technology. **

## 2022-01-02 NOTE — PLAN OF CARE
Problem: Altered Mood, Depressive Behavior:  Goal: Able to verbalize acceptance of life and situations over which he or she has no control  Description: Able to verbalize acceptance of life and situations over which he or she has no control  1/2/2022 1059 by Anjali Roman RN  Outcome: Ongoing  Pt states that she recently went through a break up from her girlfriend of four years. She stated that she plans on moving to a new location, possible closer to family members. She stated that she has attended rehab programs in the past and she just needs to practice what she learns. Problem: Altered Mood, Depressive Behavior:  Goal: Able to verbalize and/or display a decrease in depressive symptoms  Description: Able to verbalize and/or display a decrease in depressive symptoms  Outcome: Ongoing  Pt stated that she continues to feel depressed but that she is feeling better since she is in the hospital and out of the situation. Problem: Altered Mood, Depressive Behavior:  Goal: Able to verbalize support systems  Description: Able to verbalize support systems  Outcome: Ongoing  Pt states that she has family members that live out of town that can be supportive. Problem: Pain:  Goal: Pain level will decrease  Description: Pain level will decrease  Outcome: Ongoing  Pt states that she continues to have pain. Problem: Pain:  Goal: Control of acute pain  Description: Control of acute pain  1/2/2022 1059 by Anjali Walsh RN  Outcome: Ongoing  Pt states that she continues to have pain. Problem: Pain:  Goal: Control of chronic pain  Description: Control of chronic pain  Outcome: Ongoing  Pt states that she continues to have pain.      Problem: Substance Abuse:  Goal: Absence of drug withdrawal signs and symptoms  Description: Absence of drug withdrawal signs and symptoms  1/2/2022 1059 by Anjali Walsh RN  Outcome: Ongoing  Pt states that she is feeling better today and does not appear to be having withdrawals signs and symptoms at this time. Problem: Suicide risk  Goal: Provide patient with safe environment  Description: Provide patient with safe environment  1/2/2022 1059 by Anjali Cedeño RN  Outcome: Ongoing  Pt provided a safe environment. Problem: Tobacco Use:  Goal: Inpatient tobacco use cessation counseling participation  Description: Inpatient tobacco use cessation counseling participation  1/2/2022 1059 by Anjali Cedeño RN  Outcome: Ongoing  Pt does not want to discuss tobacco cessation at this time.

## 2022-01-02 NOTE — PLAN OF CARE
Problem: Altered Mood, Depressive Behavior:  Goal: Able to verbalize acceptance of life and situations over which he or she has no control  Description: Able to verbalize acceptance of life and situations over which he or she has no control  1/1/2022 2132 by Cornelius Wheeler RN  Outcome: Ongoing   States she has no control over her drinking. Problem: Pain:  Goal: Control of acute pain  Description: Control of acute pain  1/1/2022 2132 by Cornelius Wheeler RN  Outcome: Ongoing   Denies current pain. Problem: Substance Abuse:  Goal: Absence of drug withdrawal signs and symptoms  Description: Absence of drug withdrawal signs and symptoms  1/1/2022 2132 by Cornelius Wheeler RN  Outcome: Ongoing   Prn med's were effective earlier. No current signs of withdrawal.  Problem: Suicide risk  Goal: Provide patient with safe environment  Description: Provide patient with safe environment  1/1/2022 2132 by Cornelius Wheeler RN  Outcome: Ongoing   Safe environment provided. Problem: Tobacco Use:  Goal: Inpatient tobacco use cessation counseling participation  Description: Inpatient tobacco use cessation counseling participation  1/1/2022 2132 by Cornelius Wheeler RN  Outcome: Ongoing   Declined.

## 2022-01-03 VITALS
BODY MASS INDEX: 21.2 KG/M2 | RESPIRATION RATE: 12 BRPM | HEART RATE: 84 BPM | HEIGHT: 60 IN | DIASTOLIC BLOOD PRESSURE: 85 MMHG | WEIGHT: 108 LBS | TEMPERATURE: 98.4 F | OXYGEN SATURATION: 100 % | SYSTOLIC BLOOD PRESSURE: 113 MMHG

## 2022-01-03 PROCEDURE — 6370000000 HC RX 637 (ALT 250 FOR IP): Performed by: PSYCHIATRY & NEUROLOGY

## 2022-01-03 PROCEDURE — 6370000000 HC RX 637 (ALT 250 FOR IP)

## 2022-01-03 PROCEDURE — 99239 HOSP IP/OBS DSCHRG MGMT >30: CPT | Performed by: PSYCHIATRY & NEUROLOGY

## 2022-01-03 RX ORDER — OMEPRAZOLE 20 MG/1
20 CAPSULE, DELAYED RELEASE ORAL 2 TIMES DAILY
Qty: 60 CAPSULE | Refills: 0 | Status: SHIPPED | OUTPATIENT
Start: 2022-01-03

## 2022-01-03 RX ORDER — BUSPIRONE HYDROCHLORIDE 7.5 MG/1
7.5 TABLET ORAL 2 TIMES DAILY PRN
Qty: 60 TABLET | Refills: 0 | Status: ON HOLD | OUTPATIENT
Start: 2022-01-03 | End: 2022-03-07 | Stop reason: SDUPTHER

## 2022-01-03 RX ORDER — LANOLIN ALCOHOL/MO/W.PET/CERES
100 CREAM (GRAM) TOPICAL DAILY
Qty: 30 TABLET | Refills: 0 | Status: ON HOLD | OUTPATIENT
Start: 2022-01-03 | End: 2022-03-07 | Stop reason: HOSPADM

## 2022-01-03 RX ORDER — PALIPERIDONE 3 MG/1
3 TABLET, EXTENDED RELEASE ORAL DAILY
Qty: 30 TABLET | Refills: 0 | Status: ON HOLD | OUTPATIENT
Start: 2022-01-04 | End: 2022-03-07 | Stop reason: HOSPADM

## 2022-01-03 RX ORDER — TRAZODONE HYDROCHLORIDE 150 MG/1
150 TABLET ORAL NIGHTLY
Qty: 30 TABLET | Refills: 0 | Status: SHIPPED | OUTPATIENT
Start: 2022-01-03

## 2022-01-03 RX ORDER — FOLIC ACID 1 MG/1
1 TABLET ORAL DAILY
Qty: 30 TABLET | Refills: 0 | Status: ON HOLD | OUTPATIENT
Start: 2022-01-03 | End: 2022-03-07 | Stop reason: SDUPTHER

## 2022-01-03 RX ORDER — VENLAFAXINE HYDROCHLORIDE 75 MG/1
75 CAPSULE, EXTENDED RELEASE ORAL
Qty: 30 CAPSULE | Refills: 0 | Status: ON HOLD | OUTPATIENT
Start: 2022-01-04 | End: 2022-03-07 | Stop reason: HOSPADM

## 2022-01-03 RX ORDER — HYDROXYZINE 50 MG/1
50 TABLET, FILM COATED ORAL 3 TIMES DAILY PRN
Qty: 30 TABLET | Refills: 0 | Status: SHIPPED | OUTPATIENT
Start: 2022-01-03 | End: 2022-01-13

## 2022-01-03 RX ORDER — M-VIT,TX,IRON,MINS/CALC/FOLIC 27MG-0.4MG
1 TABLET ORAL DAILY
Qty: 30 TABLET | Refills: 0 | Status: ON HOLD | OUTPATIENT
Start: 2022-01-04 | End: 2022-03-07 | Stop reason: SDUPTHER

## 2022-01-03 RX ORDER — ATORVASTATIN CALCIUM 40 MG/1
40 TABLET, FILM COATED ORAL NIGHTLY
Qty: 30 TABLET | Refills: 0 | Status: SHIPPED | OUTPATIENT
Start: 2022-01-03

## 2022-01-03 RX ORDER — NICOTINE 21 MG/24HR
1 PATCH, TRANSDERMAL 24 HOURS TRANSDERMAL DAILY
Qty: 30 PATCH | Refills: 3 | Status: ON HOLD | OUTPATIENT
Start: 2022-01-04 | End: 2022-03-07 | Stop reason: HOSPADM

## 2022-01-03 RX ADMIN — MULTIPLE VITAMINS W/ MINERALS TAB 1 TABLET: TAB at 08:26

## 2022-01-03 RX ADMIN — VENLAFAXINE HYDROCHLORIDE 75 MG: 75 CAPSULE, EXTENDED RELEASE ORAL at 08:26

## 2022-01-03 RX ADMIN — PALIPERIDONE 3 MG: 3 TABLET, EXTENDED RELEASE ORAL at 08:26

## 2022-01-03 RX ADMIN — FOLIC ACID 1 MG: 1 TABLET ORAL at 08:26

## 2022-01-03 RX ADMIN — THIAMINE HCL TAB 100 MG 100 MG: 100 TAB at 08:26

## 2022-01-03 RX ADMIN — OMEPRAZOLE 20 MG: 20 CAPSULE, DELAYED RELEASE ORAL at 06:26

## 2022-01-03 NOTE — CARE COORDINATION
Name: Yonis Ferrer    : 1975    Discharge Date:1/3/22    Primary Auth/Cert #: [id number]    Destination: home     Discharge Medications:      Medication List      START taking these medications    busPIRone 7.5 MG tablet  Commonly known as: BUSPAR  Take 1 tablet by mouth 2 times daily as needed (Anxiety)  Notes to patient: To help stabilize mood     nicotine 14 MG/24HR  Commonly known as: NICODERM CQ  Place 1 patch onto the skin daily  Start taking on: 2022  Notes to patient: Smoking cessation      paliperidone 3 MG extended release tablet  Commonly known as: INVEGA  Take 1 tablet by mouth daily  Start taking on: 2022  Notes to patient: To help stabilize mood     therapeutic multivitamin-minerals tablet  Take 1 tablet by mouth daily  Start taking on: 2022  Replaces: Multivitamin Adult Tabs  Notes to patient: Supplement        CHANGE how you take these medications    hydrOXYzine 50 MG tablet  Commonly known as: ATARAX  Take 1 tablet by mouth 3 times daily as needed for Anxiety  What changed: when to take this  Notes to patient: To help with anxiety     traZODone 150 MG tablet  Commonly known as: DESYREL  Take 1 tablet by mouth nightly  What changed:   · medication strength  · how much to take  Notes to patient:  To help with sleep        CONTINUE taking these medications    albuterol sulfate  (90 Base) MCG/ACT inhaler  Inhale 2 puffs into the lungs every 4 hours as needed for Wheezing     atorvastatin 40 MG tablet  Commonly known as: LIPITOR  Take 1 tablet by mouth nightly     folic acid 1 MG tablet  Commonly known as: FOLVITE  Take 1 tablet by mouth daily     omeprazole 20 MG delayed release capsule  Commonly known as: PRILOSEC  Take 1 capsule by mouth 2 times daily     thiamine 100 MG tablet  Take 1 tablet by mouth daily     venlafaxine 75 MG extended release capsule  Commonly known as: EFFEXOR XR  Take 1 capsule by mouth daily (with breakfast)  Start taking on: January 4, 2022        STOP taking these medications    dicyclomine 10 MG capsule  Commonly known as: Bentyl     fluticasone 50 MCG/ACT nasal spray  Commonly known as: FLONASE     lithium 450 MG extended release tablet  Commonly known as: ESKALITH     Multivitamin Adult Tabs  Replaced by: therapeutic multivitamin-minerals tablet     ondansetron 4 MG disintegrating tablet  Commonly known as: Zofran ODT     OXcarbazepine 150 MG tablet  Commonly known as: TRILEPTAL     sodium chloride 0.65 % nasal spray  Commonly known as: OCEAN     SYMBICORT IN           Where to Get Your Medications      These medications were sent to [place] [address]    Phone: [phone]   · atorvastatin 40 MG tablet  · busPIRone 7.5 MG tablet  · folic acid 1 MG tablet  · hydrOXYzine 50 MG tablet  · nicotine 14 MG/24HR  · omeprazole 20 MG delayed release capsule  · paliperidone 3 MG extended release tablet  · therapeutic multivitamin-minerals tablet  · thiamine 100 MG tablet  · traZODone 150 MG tablet  · venlafaxine 75 MG extended release capsule         Follow Up Appointment: [place] [address] [address]  [phone]    On 1/4/2022  @ 9:15am for medication management

## 2022-01-03 NOTE — GROUP NOTE
Group Therapy Note    Date: 1/3/2022    Group Start Time: 0372  Group End Time: 8696  Group Topic: Psychoeducation    GORGE BHKIRSTEN Butts, NIDAS    Group Therapy Note    Attendees: 9    Patient's Goal:  Patient will demonstrate improved socialization skills    Notes:  Patient attended group and participated. Status After Intervention:  Improved    Participation Level:  Active Listener and Interactive    Participation Quality: Appropriate, Attentive, Sharing and Supportive      Speech:  normal      Thought Process/Content: Logical  Linear      Affective Functioning: Congruent      Mood: euthymic      Level of consciousness:  Alert, Oriented x4 and Attentive      Response to Learning: Able to verbalize current knowledge/experience, Able to verbalize/acknowledge new learning, Able to retain information, Capable of insight, Able to change behavior and Progressing to goal      Endings: None Reported    Modes of Intervention: Education, Support, Socialization and Exploration      Discipline Responsible: Psychoeducational Specialist      Signature:  Ac Gomez, [address]

## 2022-01-03 NOTE — GROUP NOTE
Group Therapy Note    Date: 1/3/2022    Group Start Time: 1115  Group End Time: 2886  Group Topic: Psychoeducation    GORGE Torres, CTRS    Patient refused to attend music for coping group at 1115 after encouragement from staff. 1:1 talk time offered by staff as alternative to group session.

## 2022-01-03 NOTE — PLAN OF CARE
Problem: Altered Mood, Depressive Behavior:  Goal: Able to verbalize acceptance of life and situations over which he or she has no control  Description: Able to verbalize acceptance of life and situations over which he or she has no control  1/3/2022 1051 by Jhoan Burns RN  Outcome: Ongoing  Patient has been cooperative upon approach, has allowed all assessments; Patient has expressed reality-based thinking with peers and staff; Patient reports a decrease in frequency and intensity of hallucinations. Patient has been able to verbalize acceptance of life and situations over which she has no control when speaking with staff. Problem: Altered Mood, Depressive Behavior:  Goal: Able to verbalize and/or display a decrease in depressive symptoms  Description: Able to verbalize and/or display a decrease in depressive symptoms  1/3/2022 1051 by Jhoan Burns RN  Outcome: Ongoing  Patient has been cooperative upon approach, has allowed all assessments; Patient has expressed reality-based thinking with peers and staff; Patient reports a decrease in depressive symptoms  Problem: Altered Mood, Depressive Behavior:  Goal: Able to verbalize support systems  Description: Able to verbalize support systems  1/3/2022 1051 by Jhoan Burns RN  Outcome: Ongoing   Patient is able to verbalize support systems  Problem: Substance Abuse:  Goal: Absence of drug withdrawal signs and symptoms  Description: Absence of drug withdrawal signs and symptoms  1/3/2022 1051 by Jhoan Burns RN  Outcome: Ongoing   Patient remains absent of withdrawal signs and symptoms  Problem: Pain:  Goal: Pain level will decrease  Description: Pain level will decrease  1/3/2022 1051 by Jhoan Burns RN  Outcome: Ongoing   Patient denies pain this shift, has not asked for PRN pain reduction medication.  Patient encouraged to use non-pharmalogical pain reduction measures including distraction, yoga, finger tapping, music, tv, groups, dark room, quiet time, 1:1 talk time with staff, walking, counting, deep breathing, bubbles, alphabet games, reading, audio books, drawing, crafts and models, stress ball, puzzles, massage, guided imagery  Problem: Pain:  Goal: Control of acute pain  Description: Control of acute pain  Outcome: Ongoing   Patient denies acute pain this shift, has not asked for PRN pain reduction medication. Patient encouraged to use non-pharmalogical pain reduction measures including distraction, yoga, finger tapping, music, tv, groups, dark room, quiet time, 1:1 talk time with staff, walking, counting, deep breathing, bubbles, alphabet games, reading, audio books, drawing, crafts and models, stress ball, puzzles, massage, guided imagery  Problem: Pain:  Goal: Control of chronic pain  Description: Control of chronic pain  Outcome: Ongoing   Patient denies chronic pain this shift, has not asked for PRN pain reduction medication. Patient encouraged to use non-pharmalogical pain reduction measures including distraction, yoga, finger tapping, music, tv, groups, dark room, quiet time, 1:1 talk time with staff, walking, counting, deep breathing, bubbles, alphabet games, reading, audio books, drawing, crafts and models, stress ball, puzzles, massage, guided imagery  Problem: Suicide risk  Goal: Provide patient with safe environment  Description: Provide patient with safe environment  Outcome: Ongoing   Patient is provided a safe environment: locked unit, observed q 15 minutes, all weapons/sharps are confiscated  Problem: Tobacco Use:  Goal: Inpatient tobacco use cessation counseling participation  Description: Inpatient tobacco use cessation counseling participation  Outcome: Ongoing   Patient is enrolled in inpatient tobacco use cessation counseling but refuses participation at this time.
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
## 2022-01-03 NOTE — DISCHARGE INSTR - DIET
Good nutrition is important when healing from an illness, injury, or surgery. Follow any nutrition recommendations given to you during your hospital stay. If you were given an oral nutrition supplement while in the hospital, continue to take this supplement at home. You can take it with meals, in-between meals, and/or before bedtime. These supplements can be purchased at most local grocery stores, pharmacies, and chain [employer]-stores. If you have any questions about your diet or nutrition, call the hospital and ask for the dietitian.       General diet

## 2022-01-03 NOTE — GROUP NOTE
Group Therapy Note    Date: 1/3/2022    Group Start Time: 1000  Group End Time: 9921  Group Topic: Psychotherapy    [facility]    FREDERICK Dial        Group Therapy Note    Attendees: 7/21         Patient's Goal:  Expression of feeling     Notes:      Status After Intervention:  Improved    Participation Level:  Active Listener and Interactive    Participation Quality: Appropriate and Attentive      Speech:  normal      Thought Process/Content: Logical      Affective Functioning: Congruent      Mood: euthymic      Level of consciousness:  Alert and Oriented x4      Response to Learning: Able to verbalize current knowledge/experience and Able to verbalize/acknowledge new learning      Endings: None Reported    Modes of Intervention: Education and Support      Discipline Responsible: /Counselor      Signature:  FREDERICK Dial

## 2022-01-03 NOTE — BH NOTE
[facility]  Discharge Note    Patient discharged home to private residence via [employer]. Pt discharged with followings belongings:   Dental Appliances: None  Vision - Corrective Lenses: None  Hearing Aid: None  Jewelry: None  Body Piercings Removed: N/A  Clothing: Footwear,Pants,Shirt,Sweater,Socks,Undergarments (Comment)  Were All Patient Medications Collected?: Not Applicable  Other Valuables: [id number] [facility] phone   Valuables sent home with patient ([id number]). Patient education on aftercare instructions: Yes, and verbalized understanding. Information faxed to [facility] by nurse at 3:08 PM .Patient verbalize understanding of AVS:  Yes, via read back method.     Status EXAM upon discharge:  Status and Exam  Normal: No  Facial Expression: Brightened  Affect: Appropriate  Level of Consciousness: Alert  Mood:Normal: No  Mood: Depressed  Motor Activity:Normal: Yes  Motor Activity: Decreased  Interview Behavior: Cooperative  Preception: [place] to Person,[place] to Time,[place] to Place,[place] to Situation  Attention:Normal: No  Attention: Distractible  Thought Processes: Circumstantial  Thought Content:Normal: Yes  Thought Content: Preoccupations  Hallucinations: None  Delusions: No  Memory:Normal: No  Memory: Confabulation  Insight and Judgment: No  Insight and Judgment: Poor Judgment,Poor Insight  Present Suicidal Ideation: No  Present Homicidal Ideation: No      Metabolic Screening:    Lab Results   Component Value Date    LABA1C 5.5 08/01/2018       Lab Results   Component Value Date    CHOL 196 06/13/2019    CHOL 212 (H) 08/01/2018    CHOL 204 (H) 06/05/2018     Lab Results   Component Value Date    TRIG 99 06/13/2019    TRIG 168 (H) 08/01/2018    TRIG 274 (H) 06/05/2018    TRIG 158 (H) 04/27/2017     Lab Results   Component Value Date    HDL 56 06/13/2019    HDL 51 08/01/2018    HDL 45 06/05/2018    HDL 26 (L) 10/23/2017     No components found for: LDLCAL  No results found for: Wanda Del Rosario LPN

## 2022-01-03 NOTE — PLAN OF CARE
Problem: Altered Mood, Depressive Behavior:  Goal: Able to verbalize acceptance of life and situations over which he or she has no control  Description: Able to verbalize acceptance of life and situations over which he or she has no control  1/2/2022 2156 by Ebony Hsu  Outcome: Ongoing  Pt remains guarded on approach, non-relating & isolative to room, refused group attendance       Problem: Altered Mood, Depressive Behavior:  Goal: Able to verbalize and/or display a decrease in depressive symptoms  Description: Able to verbalize and/or display a decrease in depressive symptoms  1/2/2022 2156 by Ebony Hsu  Outcome: Ongoing   Pt admits to an increase in her depression, refused group attendance. Remains isolative to room. Pt was compliant with medicine & has a good appetite. Problem: Substance Abuse:  Goal: Absence of drug withdrawal signs and symptoms  Description: Absence of drug withdrawal signs and symptoms  1/2/2022 2156 by Ebony Hsu  Outcome: Ongoing  Pt relating she is not feeling well, states : \"I feel like I got the flu, a sore throat, aches in joints, I'm tired\"  pt vitals were normal.    Problem: Pain:  Goal: Pain level will decrease  Description: Pain level will decrease  1/2/2022 2156 by Ebony Hsu  Outcome: Ongoing  Pt continues to admit to pain  is more isolative to room & blames not feeling well.

## 2022-01-03 NOTE — PROGRESS NOTES
CLINICAL PHARMACY NOTE: MEDS TO BEDS    Total # of Prescriptions Filled: 5   The following medications were delivered to the patient:  · Trazodone HCL 150mg  · Atorvastatin 40mg  · Venlafaxine HCL ER 75mg  · Buspirone HCL 7.5mg  · Paliperidone ER 3mg    Additional Documentation:    Delivered medications to nurses station

## 2022-01-03 NOTE — BH NOTE
Patient given tobacco quitline number [id number] at this time, refusing to call at this time, states \" I just dont want to quit now\"- patient given information as to the dangers of long term tobacco use.

## 2022-01-05 ENCOUNTER — HOSPITAL ENCOUNTER (EMERGENCY)
Age: 47
Discharge: HOME | End: 2022-01-06
Attending: EMERGENCY MEDICINE
Payer: MEDICARE

## 2022-01-05 DIAGNOSIS — R45.1 AGITATION: ICD-10-CM

## 2022-01-05 DIAGNOSIS — Z74.9: ICD-10-CM

## 2022-01-05 DIAGNOSIS — F10.929 ACUTE ALCOHOLIC INTOXICATION WITH COMPLICATION (HCC): Primary | ICD-10-CM

## 2022-01-05 PROCEDURE — 6360000002 HC RX W HCPCS

## 2022-01-05 PROCEDURE — 96372 THER/PROPH/DIAG INJ SC/IM: CPT

## 2022-01-05 PROCEDURE — 99284 EMERGENCY DEPT VISIT MOD MDM: CPT

## 2022-01-05 PROCEDURE — 87635 SARS-COV-2 COVID-19 AMP PRB: CPT

## 2022-01-05 RX ORDER — HALOPERIDOL 5 MG/ML
INJECTION INTRAMUSCULAR
Status: COMPLETED
Start: 2022-01-05 | End: 2022-01-05

## 2022-01-05 RX ORDER — DIPHENHYDRAMINE HYDROCHLORIDE 50 MG/ML
INJECTION INTRAMUSCULAR; INTRAVENOUS
Status: COMPLETED
Start: 2022-01-05 | End: 2022-01-05

## 2022-01-05 RX ORDER — LORAZEPAM 2 MG/ML
INJECTION INTRAMUSCULAR
Status: COMPLETED
Start: 2022-01-05 | End: 2022-01-05

## 2022-01-05 RX ADMIN — LORAZEPAM: 2 INJECTION INTRAMUSCULAR; INTRAVENOUS at 23:29

## 2022-01-05 RX ADMIN — HALOPERIDOL: 5 INJECTION INTRAMUSCULAR at 23:28

## 2022-01-05 RX ADMIN — DIPHENHYDRAMINE HYDROCHLORIDE: 50 INJECTION, SOLUTION INTRAMUSCULAR; INTRAVENOUS at 23:30

## 2022-01-05 ASSESSMENT — ENCOUNTER SYMPTOMS
EYE PAIN: 0
SHORTNESS OF BREATH: 0
COLOR CHANGE: 0
ABDOMINAL PAIN: 0
BACK PAIN: 0

## 2022-01-05 ASSESSMENT — PAIN SCALES - GENERAL: PAINLEVEL_OUTOF10: 8

## 2022-01-05 ASSESSMENT — PAIN DESCRIPTION - LOCATION: LOCATION: RIB CAGE

## 2022-01-05 ASSESSMENT — PAIN DESCRIPTION - PAIN TYPE: TYPE: CHRONIC PAIN

## 2022-01-05 ASSESSMENT — PAIN DESCRIPTION - ORIENTATION: ORIENTATION: LEFT

## 2022-01-06 VITALS
HEART RATE: 93 BPM | SYSTOLIC BLOOD PRESSURE: 128 MMHG | OXYGEN SATURATION: 93 % | TEMPERATURE: 97.7 F | RESPIRATION RATE: 22 BRPM | HEIGHT: 60 IN | BODY MASS INDEX: 21.79 KG/M2 | DIASTOLIC BLOOD PRESSURE: 85 MMHG | WEIGHT: 111 LBS

## 2022-01-06 LAB
ABSOLUTE EOS #: 0.25 K/UL (ref 0–0.4)
ABSOLUTE IMMATURE GRANULOCYTE: ABNORMAL K/UL (ref 0–0.3)
ABSOLUTE LYMPH #: 3.53 K/UL (ref 1–4.8)
ABSOLUTE MONO #: 0.5 K/UL (ref 0.1–1.3)
ACETAMINOPHEN LEVEL: <5 UG/ML (ref 10–30)
ALBUMIN SERPL-MCNC: 4.3 G/DL (ref 3.5–5.2)
ALBUMIN/GLOBULIN RATIO: ABNORMAL (ref 1–2.5)
ALP BLD-CCNC: 80 U/L (ref 35–104)
ALT SERPL-CCNC: 27 U/L (ref 5–33)
ANION GAP SERPL CALCULATED.3IONS-SCNC: 18 MMOL/L (ref 9–17)
AST SERPL-CCNC: 54 U/L
BASOPHILS # BLD: 0 % (ref 0–2)
BASOPHILS ABSOLUTE: 0 K/UL (ref 0–0.2)
BILIRUB SERPL-MCNC: <0.15 MG/DL (ref 0.3–1.2)
BUN BLDV-MCNC: 11 MG/DL (ref 6–20)
BUN/CREAT BLD: ABNORMAL (ref 9–20)
CALCIUM SERPL-MCNC: 8.9 MG/DL (ref 8.6–10.4)
CHLORIDE BLD-SCNC: 103 MMOL/L (ref 98–107)
CO2: 22 MMOL/L (ref 20–31)
CREAT SERPL-MCNC: 0.69 MG/DL (ref 0.5–0.9)
DIFFERENTIAL TYPE: ABNORMAL
EOSINOPHILS RELATIVE PERCENT: 4 % (ref 0–4)
ETHANOL PERCENT: 0.28 %
ETHANOL: 284 MG/DL
GFR AFRICAN AMERICAN: >60 ML/MIN
GFR NON-AFRICAN AMERICAN: >60 ML/MIN
GFR SERPL CREATININE-BSD FRML MDRD: ABNORMAL ML/MIN/{1.73_M2}
GFR SERPL CREATININE-BSD FRML MDRD: ABNORMAL ML/MIN/{1.73_M2}
GLUCOSE BLD-MCNC: 51 MG/DL (ref 65–105)
GLUCOSE BLD-MCNC: 53 MG/DL (ref 65–105)
GLUCOSE BLD-MCNC: 54 MG/DL (ref 70–99)
GLUCOSE BLD-MCNC: 60 MG/DL (ref 65–105)
GLUCOSE BLD-MCNC: 74 MG/DL (ref 65–105)
GLUCOSE BLD-MCNC: 86 MG/DL (ref 65–105)
HCT VFR BLD CALC: 38.4 % (ref 36–46)
HEMOGLOBIN: 12 G/DL (ref 12–16)
IMMATURE GRANULOCYTES: ABNORMAL %
LYMPHOCYTES # BLD: 56 % (ref 24–44)
MCH RBC QN AUTO: 22.9 PG (ref 26–34)
MCHC RBC AUTO-ENTMCNC: 31.4 G/DL (ref 31–37)
MCV RBC AUTO: 72.9 FL (ref 80–100)
MONOCYTES # BLD: 8 % (ref 1–7)
MORPHOLOGY: ABNORMAL
NRBC AUTOMATED: ABNORMAL PER 100 WBC
PDW BLD-RTO: 22.5 % (ref 11.5–14.9)
PLATELET # BLD: 223 K/UL (ref 150–450)
PLATELET ESTIMATE: ABNORMAL
PMV BLD AUTO: 7 FL (ref 6–12)
POTASSIUM SERPL-SCNC: 3.7 MMOL/L (ref 3.7–5.3)
RBC # BLD: 5.27 M/UL (ref 4–5.2)
RBC # BLD: ABNORMAL 10*6/UL
SALICYLATE LEVEL: <1 MG/DL (ref 3–10)
SARS-COV-2, RAPID: NOT DETECTED
SEG NEUTROPHILS: 32 % (ref 36–66)
SEGMENTED NEUTROPHILS ABSOLUTE COUNT: 2.02 K/UL (ref 1.3–9.1)
SODIUM BLD-SCNC: 143 MMOL/L (ref 135–144)
SPECIMEN DESCRIPTION: NORMAL
TOTAL PROTEIN: 8.2 G/DL (ref 6.4–8.3)
WBC # BLD: 6.3 K/UL (ref 3.5–11)
WBC # BLD: ABNORMAL 10*3/UL

## 2022-01-06 PROCEDURE — 82947 ASSAY GLUCOSE BLOOD QUANT: CPT

## 2022-01-06 PROCEDURE — 85025 COMPLETE CBC W/AUTO DIFF WBC: CPT

## 2022-01-06 PROCEDURE — G0480 DRUG TEST DEF 1-7 CLASSES: HCPCS

## 2022-01-06 PROCEDURE — 80143 DRUG ASSAY ACETAMINOPHEN: CPT

## 2022-01-06 PROCEDURE — 36415 COLL VENOUS BLD VENIPUNCTURE: CPT

## 2022-01-06 PROCEDURE — 2580000003 HC RX 258: Performed by: EMERGENCY MEDICINE

## 2022-01-06 PROCEDURE — 80053 COMPREHEN METABOLIC PANEL: CPT

## 2022-01-06 PROCEDURE — 2500000003 HC RX 250 WO HCPCS: Performed by: EMERGENCY MEDICINE

## 2022-01-06 PROCEDURE — 80179 DRUG ASSAY SALICYLATE: CPT

## 2022-01-06 RX ORDER — DEXTROSE MONOHYDRATE 25 G/50ML
25 INJECTION, SOLUTION INTRAVENOUS ONCE
Status: COMPLETED | OUTPATIENT
Start: 2022-01-06 | End: 2022-01-06

## 2022-01-06 RX ORDER — 0.9 % SODIUM CHLORIDE 0.9 %
1000 INTRAVENOUS SOLUTION INTRAVENOUS ONCE
Status: COMPLETED | OUTPATIENT
Start: 2022-01-06 | End: 2022-01-06

## 2022-01-06 RX ADMIN — SODIUM CHLORIDE 1000 ML: 9 INJECTION, SOLUTION INTRAVENOUS at 01:17

## 2022-01-06 RX ADMIN — DEXTROSE MONOHYDRATE 25 G: 500 INJECTION PARENTERAL at 07:18

## 2022-01-06 RX ADMIN — DEXTROSE MONOHYDRATE 25 G: 25 INJECTION, SOLUTION INTRAVENOUS at 01:15

## 2022-01-06 NOTE — ED PROVIDER NOTES
Drug overdose, accidental or unintentional, initial encounter 9/29/2018    Drug overdose, intentional self-harm, initial encounter ([address] 5/8/2018    [place] coma scale total score 3-8 (HCC)     Hepatitis C antibody positive in blood 6/18/2019    History of gastric surgery (gastric sleeve 2012) 4/25/2017    Intentional drug overdose ([address] 5/7/2018    MDD (major depressive disorder), recurrent episode ([address] 5/8/2018    Pneumonia     Polysubstance abuse ([address]     drug abuse includes, cocaine, IV heroin, cannabis, and ETOH    Post traumatic stress disorder     Seizure ([address] 12/17/2019    Smoker unmotivated to quit 4/25/2017    Suicide attempt ([address] 11/08/2017    S/A by overdosing on Trazodone and Seroquel    Suicide ideation     Toxic metabolic encephalopathy 8/78/2543     Past Problem List  Patient Active Problem List   Diagnosis Code    Alcohol abuse F10.10    Heroin dependence ([address] F11.20    Chronic post-traumatic stress disorder (PTSD) F43.12    Major depression, recurrent ([address] F33.9    Depressed bipolar I disorder ([address] F31.9    Polysubstance abuse ([address] F19.10    Bipolar I disorder, current or most recent episode depressed, with psychotic features with mood-congruent psychotic features ([address] Z36.1    Alcoholic hepatitis without ascites K70.10    Alcohol withdrawal syndrome with complication ([address] O49.433    Bipolar 1 disorder ([address] F31.9    Mild intermittent asthma without complication G44.01    Alcohol related seizure ([address] R56.9    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 A, R45.851    Severe recurrent major depression with psychotic features [facility] F33.3     SURGICAL HISTORY       Past Surgical History:   Procedure Laterality Date    CARPAL TUNNEL RELEASE      CHOLECYSTECTOMY      HYSTERECTOMY      HYSTERECTOMY, TOTAL ABDOMINAL      STOMACH SURGERY      gastric sleeve    UPPER GASTROINTESTINAL ENDOSCOPY N/A 2019    EGD BIOPSY performed by Scherrie Nyhan, MD at [facility]       Previous Medications    ALBUTEROL SULFATE  (90 BASE) MCG/ACT INHALER    Inhale 2 puffs into the lungs every 4 hours as needed for Wheezing    ATORVASTATIN (LIPITOR) 40 MG TABLET    Take 1 tablet by mouth nightly    BUSPIRONE (BUSPAR) 7.5 MG TABLET    Take 1 tablet by mouth 2 times daily as needed (Anxiety)    FOLIC ACID (FOLVITE) 1 MG TABLET    Take 1 tablet by mouth daily    HYDROXYZINE (ATARAX) 50 MG TABLET    Take 1 tablet by mouth 3 times daily as needed for Anxiety    MULTIPLE VITAMINS-MINERALS (THERAPEUTIC MULTIVITAMIN-MINERALS) TABLET    Take 1 tablet by mouth daily    NICOTINE (NICODERM CQ) 14 MG/24HR    Place 1 patch onto the skin daily    OMEPRAZOLE (PRILOSEC) 20 MG DELAYED RELEASE CAPSULE    Take 1 capsule by mouth 2 times daily    PALIPERIDONE (INVEGA) 3 MG EXTENDED RELEASE TABLET    Take 1 tablet by mouth daily    THIAMINE 100 MG TABLET    Take 1 tablet by mouth daily    TRAZODONE (DESYREL) 150 MG TABLET    Take 1 tablet by mouth nightly    VENLAFAXINE (EFFEXOR XR) 75 MG EXTENDED RELEASE CAPSULE    Take 1 capsule by mouth daily (with breakfast)     ALLERGIES     is allergic to morphine, morphine and related, azithromycin, morphine, nsaids, and zithromax [azithromycin]. FAMILY HISTORY     She indicated that her mother is . She indicated that her father is .      SOCIAL HISTORY       Social History     Tobacco Use    Smoking status: Current Every Day Smoker     Packs/day: 2.00     Years: 30.00     Pack years: 60.00     Types: Cigarettes    Smokeless tobacco: Never Used   Vaping Use    Vaping Use: Never used   Substance Use Topics    Alcohol use: Yes     Alcohol/week: 3.0 standard drinks     Types: 3 Shots of liquor per week     Comment: reports 1/2 gallon/day     Drug use: Yes     Frequency: 7.0 times per week     Types: Marijuana (Weed), IV, Cocaine     Comment: drug abuse includes, cocaine, IV heroin, cannabis     PHYSICAL EXAM     INITIAL VITALS: /81   Pulse 67   Temp 97.7 °F (36.5 °C) (Oral)   Resp 16   Ht 5' (1.524 m)   Wt 111 lb (50.3 kg)   SpO2 91%   BMI 21.68 kg/m²    Physical Exam  Vitals and nursing note reviewed. Constitutional:       General: She is not in acute distress. Appearance: Normal appearance. She is not toxic-appearing. HENT:      Head: Normocephalic and atraumatic. Nose: Nose normal.      Mouth/Throat:      Mouth: Mucous membranes are moist.      Pharynx: Oropharynx is clear. Eyes:      Extraocular Movements: Extraocular movements intact. Conjunctiva/sclera: Conjunctivae normal.   Cardiovascular:      Rate and Rhythm: Normal rate and regular rhythm. Pulses: Normal pulses. Heart sounds: Normal heart sounds. Pulmonary:      Effort: Pulmonary effort is normal.      Breath sounds: Normal breath sounds. Abdominal:      General: Bowel sounds are normal. There is no distension. Palpations: Abdomen is soft. Tenderness: There is no abdominal tenderness. Musculoskeletal:         General: Normal range of motion. Cervical back: Normal range of motion. Skin:     General: Skin is warm and dry. Capillary Refill: Capillary refill takes less than 2 seconds. Neurological:      General: No focal deficit present. Mental Status: She is alert and oriented to person, place, and time. Cranial Nerves: Cranial nerves are intact. Sensory: Sensation is intact. Motor: Motor function is intact.    Psychiatric: Attention and Perception: She perceives auditory hallucinations. Mood and Affect: Mood normal.         Thought Content: Thought content includes suicidal ideation. Thought content includes suicidal plan. MEDICAL DECISION MAKIN-year-old female presents for mental health evaluation. On initial exam patient in no acute distress vitals are stable, patient with active suicidal thoughts and plan reportedly with auditory hallucinations that are telling to kill herself as well. Patient does appear intoxicated, will check labs    Patient began to become aggressive and violent towards staff attempts at verbal de-escalation were not successful in, chemical restraint was used    Reviewed patient was found to have a glucose of 54, will give D50, alcohol level of 284    Patient had recheck of her glucose was still little bit low was given repeat D50, patient signed out to oncoming physician pending reevaluation when sober and awake           CRITICAL CARE:       PROCEDURES:    Procedures    DIAGNOSTIC RESULTS   EKG:All EKG's are interpreted by the Emergency Department Physician who either signs or Co-signs this chart in the absence of a cardiologist.        RADIOLOGY:All plain film, CT, MRI, and formal ultrasound images (except ED bedside ultrasound) are read by the radiologist, see reports below, unless otherwisenoted in MDM or here. No orders to display     LABS: All lab results were reviewed by myself, and all abnormals are listed below.   Labs Reviewed   CBC WITH AUTO DIFFERENTIAL - Abnormal; Notable for the following components:       Result Value    RBC 5.27 (*)     MCV 72.9 (*)     MCH 22.9 (*)     RDW 22.5 (*)     Seg Neutrophils 32 (*)     Lymphocytes 56 (*)     Monocytes 8 (*)     All other components within normal limits   COMPREHENSIVE METABOLIC PANEL W/ REFLEX TO MG FOR LOW K - Abnormal; Notable for the following components:    Glucose 54 (*)     Anion Gap 18 (*)     AST 54 (*)     Total Bilirubin <0.15 (*)     All other components within normal limits   ETHANOL - Abnormal; Notable for the following components:    Ethanol 284 (*)     All other components within normal limits   SALICYLATE LEVEL - Abnormal; Notable for the following components:    Salicylate Lvl <1 (*)     All other components within normal limits   ACETAMINOPHEN LEVEL - Abnormal; Notable for the following components:    Acetaminophen Level <5 (*)     All other components within normal limits   POC GLUCOSE FINGERSTICK - Abnormal; Notable for the following components:    POC Glucose 53 (*)     All other components within normal limits   POC GLUCOSE FINGERSTICK - Abnormal; Notable for the following components:    POC Glucose 60 (*)     All other components within normal limits   POC GLUCOSE FINGERSTICK - Abnormal; Notable for the following components:    POC Glucose 51 (*)     All other components within normal limits   COVID-19, RAPID   URINE DRUG SCREEN   POC GLUCOSE FINGERSTICK   POCT GLUCOSE   POCT GLUCOSE   POCT GLUCOSE   POCT GLUCOSE   POCT GLUCOSE   POCT GLUCOSE       EMERGENCY DEPARTMENTCOURSE:         Vitals:    Vitals:    01/06/22 0510 01/06/22 0525 01/06/22 0540 01/06/22 0555   BP: 112/75 121/86 119/72 117/81   Pulse:       Resp:       Temp:       TempSrc:       SpO2: 91%      Weight:       Height:           The patient was given the following medications while in the emergency department:  Orders Placed This Encounter   Medications    haloperidol lactate (HALDOL) 5 MG/ML injection     Mik Gregory: cabinet override    diphenhydrAMINE (BENADRYL) 50 MG/ML injection     Mik Gregory: cabinet override    LORazepam (ATIVAN) 2 MG/ML injection     Mik Gregory: cabinet override    dextrose 50 % IV solution    0.9 % sodium chloride bolus    dextrose 50 % IV solution     CONSULTS:  None    FINAL IMPRESSION      1.  Acute alcoholic intoxication with complication (HCC)          DISPOSITION/PLAN   DISPOSITION        PATIENT REFERRED TO:  No follow-up provider specified. DISCHARGE MEDICATIONS:  New Prescriptions    No medications on file     The care is provided during an unprecedented national emergency due to the novel coronavirus, COVID 19.   Faye Forman DO  Attending Emergency Physician                  Faye Forman DO  01/06/22 9335

## 2022-01-06 NOTE — ED NOTES
Counselor unable to complete assessment at this time patient still sleeping and not able to wake due to PRN given earlier.

## 2022-01-06 NOTE — ED NOTES
Mode of arrival (squad #, walk in, police, etc) : EMS         Chief complaint(s): alcohol intoxication         Arrival Note (brief scenario, treatment PTA, etc). : Pt states her wife called EMS because she was having \"the worst muscle spasm of her life\" in her hand. Pt reports she was recently admitted to the [facility] for a suicide attempt. Pt was discharged 2 days ago. Pt states she was taken off of her lithium and switched to invega due to her alcoholism. Pt follows with [facility] but did not go to her follow up appointment due to transportation issues. Pt admits to daily ETOH use of 1/2 gallon per day. Pt denies suicidal or homicidal ideation. Pt states she just want sto go home and go to sleep. Pt states she does not have a sober ride home. Writer explained that pt would need to be sober to leave is she did not have  Sober ride. Pt states \"just admit me to the psych unit then\". Writer explained that we would have the doctor evaluate her and then make a plan of care from there. C= \"Have you ever felt that you should Cut down on your drinking? \"  Yes  A= \"Have people Annoyed you by criticizing your drinking? \"  Yes  G= \"Have you ever felt bad or Guilty about your drinking? \"  Yes  E= \"Have you ever had a drink as an Eye-opener first thing in the morning to steady your nerves or to help a hangover? \"  Yes      Deferred []      Reason for deferring: N/A    *If yes to two or more: probable alcohol abuse. Shahbaz Black RN  01/06/22 0989

## 2022-01-06 NOTE — ED PROVIDER NOTES
ADDENDUM:        Care of this patient was assumed from Dr. Randal Shelley   at  3312    . The patient was seen for Alcohol Intoxication and Spasms  . The patient's initial evaluation and plan have been discussed with the prior provider who initially evaluated the patient. Nursing Notes, Past Medical Hx, Past Surgical Hx, Social Hx, Allergies, and Family Hx were all reviewed. I performed a repeat evaluation of the patient and reviewed tests completed so far. 9:33 AM EST  GCS 15  Clinically sober  Awake  Ate breakfast  BS stable  She denies SI or HI or hallucinations  Admits to alcohol last night, she doesn't recall much from last night  Calm and cooperative  Discussed with patient anticipatory guidance, discharge instructions, follow up PCP and zeph 24 hours  Police are notified that she is being discharged        ED Course        The patient was given the following medications:  Orders Placed This Encounter   Medications    haloperidol lactate (HALDOL) 5 MG/ML injection     Bri Mik: cabinet override    diphenhydrAMINE (BENADRYL) 50 MG/ML injection     Bri Mik: cabinet override    LORazepam (ATIVAN) 2 MG/ML injection     Bri Mik: cabinet override    dextrose 50 % IV solution    0.9 % sodium chloride bolus    dextrose 50 % IV solution       RECENT VITALS:  BP: 117/84, Temp: 97.7 °F (36.5 °C), Pulse: 95, Resp: 20     RADIOLOGY:All plain film, CT, MRI, and formal ultrasound images (except ED bedside ultrasound) are read by the radiologist and the images and interpretations are directly viewed by the emergency physician. No orders to display         LABS: All lab results were reviewed by myself, and all abnormals are listed below.   Labs Reviewed   CBC WITH AUTO DIFFERENTIAL - Abnormal; Notable for the following components:       Result Value    RBC 5.27 (*)     MCV 72.9 (*)     MCH 22.9 (*)     RDW 22.5 (*)     Seg Neutrophils 32 (*)     Lymphocytes 56 (*)     Monocytes 8 (*)     All other components within normal limits   COMPREHENSIVE METABOLIC PANEL W/ REFLEX TO MG FOR LOW K - Abnormal; Notable for the following components:    Glucose 54 (*)     Anion Gap 18 (*)     AST 54 (*)     Total Bilirubin <0.15 (*)     All other components within normal limits   ETHANOL - Abnormal; Notable for the following components:    Ethanol 284 (*)     All other components within normal limits   SALICYLATE LEVEL - Abnormal; Notable for the following components:    Salicylate Lvl <1 (*)     All other components within normal limits   ACETAMINOPHEN LEVEL - Abnormal; Notable for the following components:    Acetaminophen Level <5 (*)     All other components within normal limits   POC GLUCOSE FINGERSTICK - Abnormal; Notable for the following components:    POC Glucose 53 (*)     All other components within normal limits   POC GLUCOSE FINGERSTICK - Abnormal; Notable for the following components:    POC Glucose 60 (*)     All other components within normal limits   POC GLUCOSE FINGERSTICK - Abnormal; Notable for the following components:    POC Glucose 51 (*)     All other components within normal limits   COVID-19, RAPID   URINE DRUG SCREEN   POC GLUCOSE FINGERSTICK   POC GLUCOSE FINGERSTICK   POCT GLUCOSE   POCT GLUCOSE   POCT GLUCOSE   POCT GLUCOSE   POCT GLUCOSE   POCT GLUCOSE           Disposition   DISPOSITION:    DISPOSITION Decision To Discharge 01/06/2022 09:31:49 AM      CLINICAL IMPRESSION:  1. Acute alcoholic intoxication with complication ([address]    2. Agitation    3. Conflict between patient and staff        PATIENT REFERRED TO:  [place] [address]  [phone]    Schedule an appointment as soon as possible for a visit in 1 day      Barb Santiago MD  [address] [place] [address]  [phone]    Schedule an appointment as soon as possible for a visit in 1 day        DISCHARGE MEDICATIONS:  New Prescriptions    No medications on file     The care is provided during an unprecedented national emergency due to the novel coronavirus, COVID 19.   Carine Villavicencio MD  Attending Emergency Physician              Carine Villavicencio MD  01/06/22 7820

## 2022-01-06 NOTE — ED NOTES
Writer informed by Dr. Julio Lugo that pt reports SI during his assessment. Pt then called out for a phone. Security was called for a change out. Pt was informed that there were ome things to get done and then she would be provided with a phone. Pt is then heard screaming from the nurses station demanding a phone. When writer approached the room, pt states \"get me a fucking phone right now, bitch\". Pt was informed that due to her suicidal ideation, it was important for her safety that a change out to be completed prior to anything else at this point. Pt continued to yell and demand her phone. When writer explained again that due to her suicidal ideation she expressed to the doctor, she would need to have her belongings checked and clothing changed, pt removed her BP cuff stating \"i'm fucking leaving\". Pt was reminded that she can not leave at this time. Pt demands that she is going to leave and gets out of the stretcher attempting to leave. When writer did not allow pt to walk out of the door. Pt then brought her hands up and wrapped both of her hands around writers neck. Writer was able to remove hands from neck and yell for help. Pt was restrained to the ER stretcher by writer while additional staff arrived to assist. Pt did leave marks on writer neck.       Paul Rodriguez RN  01/06/22 6354

## 2022-01-06 NOTE — ED NOTES
Patient changed into hospital attire with RN. Belongings secured with security. Raymond provided.      Juan Miguel Flannery RN  01/05/22 8932

## 2022-01-06 NOTE — ED NOTES
Pt is screaming \" I [employer] SO DONT YOU TELL ME IM STUPID, I WANT TO USE A FUCKING PHONE RIGHT NOW\". This writer asked pt to please lower her voice, pt responded \" I DONT GIVE A FUCK\". Pt is unable to redirect at this time.      William Buenrostro  01/05/22 3525

## 2022-01-06 NOTE — ED NOTES
Bed: 11  Expected date: 1/5/22  Expected time: 10:47 PM  Means of arrival:   Comments:  Medic 3  49 yo female   Generalized cramps.  Out of psyc medications  ETOH   120/91 Pulse 82 SPO2  98      [facility]  01/05/22 3187 No

## 2022-01-06 NOTE — ED NOTES
Security at bedside. Patient has notify on release. Patient discharged into police custody.       Pinky Dodge, RN  01/06/22 4707

## 2022-03-03 ENCOUNTER — HOSPITAL ENCOUNTER (INPATIENT)
Age: 47
LOS: 4 days | Discharge: HOME | DRG: 885 | End: 2022-03-07
Attending: PSYCHIATRY & NEUROLOGY | Admitting: PSYCHIATRY & NEUROLOGY
Payer: MEDICARE

## 2022-03-03 ENCOUNTER — HOSPITAL ENCOUNTER (EMERGENCY)
Age: 47
Discharge: NURSING, REHAB OR LONG-TERM CARE | End: 2022-03-03
Attending: EMERGENCY MEDICINE
Payer: MEDICARE

## 2022-03-03 VITALS
WEIGHT: 111 LBS | OXYGEN SATURATION: 96 % | HEART RATE: 112 BPM | SYSTOLIC BLOOD PRESSURE: 127 MMHG | TEMPERATURE: 98.1 F | RESPIRATION RATE: 14 BRPM | DIASTOLIC BLOOD PRESSURE: 83 MMHG | BODY MASS INDEX: 21.79 KG/M2 | HEIGHT: 60 IN

## 2022-03-03 DIAGNOSIS — F10.920 ACUTE ALCOHOLIC INTOXICATION WITHOUT COMPLICATION (HCC): Primary | ICD-10-CM

## 2022-03-03 DIAGNOSIS — T50.902A SUICIDE ATTEMPT BY DRUG INGESTION, INITIAL ENCOUNTER (HCC): ICD-10-CM

## 2022-03-03 LAB
ABSOLUTE EOS #: 0.15 K/UL (ref 0–0.44)
ABSOLUTE IMMATURE GRANULOCYTE: 0 K/UL (ref 0–0.3)
ABSOLUTE LYMPH #: 2.4 K/UL (ref 1.1–3.7)
ABSOLUTE MONO #: 0.55 K/UL (ref 0.1–1.2)
ACETAMINOPHEN LEVEL: <5 UG/ML (ref 10–30)
ALBUMIN SERPL-MCNC: 4.1 G/DL (ref 3.5–5.2)
ALBUMIN/GLOBULIN RATIO: 1.1 (ref 1–2.5)
ALP BLD-CCNC: 89 U/L (ref 35–104)
ALT SERPL-CCNC: 217 U/L (ref 5–33)
AMPHETAMINE SCREEN URINE: NEGATIVE
ANION GAP SERPL CALCULATED.3IONS-SCNC: 14 MMOL/L (ref 9–17)
AST SERPL-CCNC: 229 U/L
BARBITURATE SCREEN URINE: NEGATIVE
BASOPHILS # BLD: 1 % (ref 0–2)
BASOPHILS ABSOLUTE: 0.05 K/UL (ref 0–0.2)
BENZODIAZEPINE SCREEN, URINE: NEGATIVE
BILIRUB SERPL-MCNC: 0.43 MG/DL (ref 0.3–1.2)
BUN BLDV-MCNC: 6 MG/DL (ref 6–20)
CALCIUM SERPL-MCNC: 8.3 MG/DL (ref 8.6–10.4)
CANNABINOID SCREEN URINE: NEGATIVE
CHLORIDE BLD-SCNC: 100 MMOL/L (ref 98–107)
CO2: 27 MMOL/L (ref 20–31)
COCAINE METABOLITE, URINE: NEGATIVE
CREAT SERPL-MCNC: 0.53 MG/DL (ref 0.5–0.9)
EOSINOPHILS RELATIVE PERCENT: 3 % (ref 1–4)
ETHANOL PERCENT: 0.4 %
ETHANOL: 398 MG/DL
GFR AFRICAN AMERICAN: >60 ML/MIN
GFR NON-AFRICAN AMERICAN: >60 ML/MIN
GFR SERPL CREATININE-BSD FRML MDRD: ABNORMAL ML/MIN/{1.73_M2}
GLUCOSE BLD-MCNC: 108 MG/DL (ref 70–99)
HCG QUALITATIVE: NEGATIVE
HCT VFR BLD CALC: 40 % (ref 36.3–47.1)
HEMOGLOBIN: 12.3 G/DL (ref 11.9–15.1)
IMMATURE GRANULOCYTES: 0 %
LYMPHOCYTES # BLD: 48 % (ref 24–43)
MCH RBC QN AUTO: 23.7 PG (ref 25.2–33.5)
MCHC RBC AUTO-ENTMCNC: 30.8 G/DL (ref 28.4–34.8)
MCV RBC AUTO: 76.9 FL (ref 82.6–102.9)
METHADONE SCREEN, URINE: NEGATIVE
MONOCYTES # BLD: 11 % (ref 3–12)
MORPHOLOGY: ABNORMAL
MORPHOLOGY: ABNORMAL
NRBC AUTOMATED: 0 PER 100 WBC
OPIATES, URINE: NEGATIVE
OXYCODONE SCREEN URINE: NEGATIVE
PDW BLD-RTO: 22.5 % (ref 11.8–14.4)
PHENCYCLIDINE, URINE: NEGATIVE
PLATELET # BLD: 264 K/UL (ref 138–453)
PMV BLD AUTO: 9.3 FL (ref 8.1–13.5)
POTASSIUM SERPL-SCNC: 3.8 MMOL/L (ref 3.7–5.3)
RBC # BLD: 5.2 M/UL (ref 3.95–5.11)
SALICYLATE LEVEL: <1 MG/DL (ref 3–10)
SARS-COV-2, RAPID: NOT DETECTED
SEG NEUTROPHILS: 37 % (ref 36–65)
SEGMENTED NEUTROPHILS ABSOLUTE COUNT: 1.85 K/UL (ref 1.5–8.1)
SODIUM BLD-SCNC: 141 MMOL/L (ref 135–144)
SPECIMEN DESCRIPTION: NORMAL
TEST INFORMATION: NORMAL
TOTAL PROTEIN: 7.7 G/DL (ref 6.4–8.3)
TOXIC TRICYCLIC SC,BLOOD: NEGATIVE
WBC # BLD: 5 K/UL (ref 3.5–11.3)

## 2022-03-03 PROCEDURE — 1240000000 HC EMOTIONAL WELLNESS R&B

## 2022-03-03 PROCEDURE — 80143 DRUG ASSAY ACETAMINOPHEN: CPT

## 2022-03-03 PROCEDURE — 80179 DRUG ASSAY SALICYLATE: CPT

## 2022-03-03 PROCEDURE — 99285 EMERGENCY DEPT VISIT HI MDM: CPT

## 2022-03-03 PROCEDURE — 6360000002 HC RX W HCPCS: Performed by: HEALTH CARE PROVIDER

## 2022-03-03 PROCEDURE — 93005 ELECTROCARDIOGRAM TRACING: CPT | Performed by: HEALTH CARE PROVIDER

## 2022-03-03 PROCEDURE — 6370000000 HC RX 637 (ALT 250 FOR IP): Performed by: PSYCHIATRY & NEUROLOGY

## 2022-03-03 PROCEDURE — G0396 ALCOHOL/SUBS INTERV 15-30MN: HCPCS | Performed by: SOCIAL WORKER

## 2022-03-03 PROCEDURE — 84703 CHORIONIC GONADOTROPIN ASSAY: CPT

## 2022-03-03 PROCEDURE — 85025 COMPLETE CBC W/AUTO DIFF WBC: CPT

## 2022-03-03 PROCEDURE — 80307 DRUG TEST PRSMV CHEM ANLYZR: CPT

## 2022-03-03 PROCEDURE — 6370000000 HC RX 637 (ALT 250 FOR IP): Performed by: HEALTH CARE PROVIDER

## 2022-03-03 PROCEDURE — 80053 COMPREHEN METABOLIC PANEL: CPT

## 2022-03-03 PROCEDURE — 6370000000 HC RX 637 (ALT 250 FOR IP): Performed by: STUDENT IN AN ORGANIZED HEALTH CARE EDUCATION/TRAINING PROGRAM

## 2022-03-03 PROCEDURE — 87635 SARS-COV-2 COVID-19 AMP PRB: CPT

## 2022-03-03 PROCEDURE — 96372 THER/PROPH/DIAG INJ SC/IM: CPT

## 2022-03-03 PROCEDURE — G0480 DRUG TEST DEF 1-7 CLASSES: HCPCS

## 2022-03-03 RX ORDER — TRAZODONE HYDROCHLORIDE 50 MG/1
50 TABLET ORAL NIGHTLY PRN
Status: DISCONTINUED | OUTPATIENT
Start: 2022-03-04 | End: 2022-03-07 | Stop reason: HOSPADM

## 2022-03-03 RX ORDER — LORAZEPAM 2 MG/ML
1 INJECTION INTRAMUSCULAR
Status: DISCONTINUED | OUTPATIENT
Start: 2022-03-03 | End: 2022-03-07 | Stop reason: HOSPADM

## 2022-03-03 RX ORDER — LORAZEPAM 2 MG/ML
2 INJECTION INTRAMUSCULAR EVERY 4 HOURS PRN
Status: DISCONTINUED | OUTPATIENT
Start: 2022-03-03 | End: 2022-03-07 | Stop reason: HOSPADM

## 2022-03-03 RX ORDER — POLYETHYLENE GLYCOL 3350 17 G/17G
17 POWDER, FOR SOLUTION ORAL DAILY PRN
Status: DISCONTINUED | OUTPATIENT
Start: 2022-03-03 | End: 2022-03-07 | Stop reason: HOSPADM

## 2022-03-03 RX ORDER — ACETAMINOPHEN 325 MG/1
650 TABLET ORAL EVERY 4 HOURS PRN
Status: DISCONTINUED | OUTPATIENT
Start: 2022-03-03 | End: 2022-03-07 | Stop reason: HOSPADM

## 2022-03-03 RX ORDER — LORAZEPAM 2 MG/ML
2 INJECTION INTRAMUSCULAR ONCE
Status: DISCONTINUED | OUTPATIENT
Start: 2022-03-03 | End: 2022-03-03

## 2022-03-03 RX ORDER — HALOPERIDOL 5 MG/ML
5 INJECTION INTRAMUSCULAR EVERY 4 HOURS PRN
Status: DISCONTINUED | OUTPATIENT
Start: 2022-03-03 | End: 2022-03-07 | Stop reason: HOSPADM

## 2022-03-03 RX ORDER — LORAZEPAM 1 MG/1
2 TABLET ORAL EVERY 4 HOURS PRN
Status: DISCONTINUED | OUTPATIENT
Start: 2022-03-03 | End: 2022-03-07 | Stop reason: HOSPADM

## 2022-03-03 RX ORDER — HYDROXYZINE 50 MG/1
50 TABLET, FILM COATED ORAL 3 TIMES DAILY PRN
Status: DISCONTINUED | OUTPATIENT
Start: 2022-03-03 | End: 2022-03-07 | Stop reason: HOSPADM

## 2022-03-03 RX ORDER — LORAZEPAM 1 MG/1
4 TABLET ORAL
Status: DISCONTINUED | OUTPATIENT
Start: 2022-03-03 | End: 2022-03-07 | Stop reason: HOSPADM

## 2022-03-03 RX ORDER — GAUZE BANDAGE 2" X 2"
100 BANDAGE TOPICAL DAILY
Status: DISCONTINUED | OUTPATIENT
Start: 2022-03-04 | End: 2022-03-07 | Stop reason: HOSPADM

## 2022-03-03 RX ORDER — LORAZEPAM 1 MG/1
2 TABLET ORAL
Status: DISCONTINUED | OUTPATIENT
Start: 2022-03-03 | End: 2022-03-07 | Stop reason: HOSPADM

## 2022-03-03 RX ORDER — SODIUM CHLORIDE 9 MG/ML
25 INJECTION, SOLUTION INTRAVENOUS PRN
Status: DISCONTINUED | OUTPATIENT
Start: 2022-03-03 | End: 2022-03-03 | Stop reason: HOSPADM

## 2022-03-03 RX ORDER — MAGNESIUM HYDROXIDE/ALUMINUM HYDROXICE/SIMETHICONE 120; 1200; 1200 MG/30ML; MG/30ML; MG/30ML
30 SUSPENSION ORAL EVERY 6 HOURS PRN
Status: DISCONTINUED | OUTPATIENT
Start: 2022-03-03 | End: 2022-03-07 | Stop reason: HOSPADM

## 2022-03-03 RX ORDER — LORAZEPAM 2 MG/ML
1 INJECTION INTRAMUSCULAR ONCE
Status: COMPLETED | OUTPATIENT
Start: 2022-03-03 | End: 2022-03-03

## 2022-03-03 RX ORDER — LORAZEPAM 1 MG/1
1 TABLET ORAL
Status: DISCONTINUED | OUTPATIENT
Start: 2022-03-03 | End: 2022-03-07 | Stop reason: HOSPADM

## 2022-03-03 RX ORDER — LORAZEPAM 2 MG/ML
4 INJECTION INTRAMUSCULAR
Status: DISCONTINUED | OUTPATIENT
Start: 2022-03-03 | End: 2022-03-07 | Stop reason: HOSPADM

## 2022-03-03 RX ORDER — LORAZEPAM 2 MG/ML
2 INJECTION INTRAMUSCULAR
Status: DISCONTINUED | OUTPATIENT
Start: 2022-03-03 | End: 2022-03-07 | Stop reason: HOSPADM

## 2022-03-03 RX ORDER — LORAZEPAM 2 MG/ML
3 INJECTION INTRAMUSCULAR
Status: DISCONTINUED | OUTPATIENT
Start: 2022-03-03 | End: 2022-03-07 | Stop reason: HOSPADM

## 2022-03-03 RX ORDER — NICOTINE 21 MG/24HR
1 PATCH, TRANSDERMAL 24 HOURS TRANSDERMAL DAILY
Status: DISCONTINUED | OUTPATIENT
Start: 2022-03-03 | End: 2022-03-03 | Stop reason: HOSPADM

## 2022-03-03 RX ORDER — M-VIT,TX,IRON,MINS/CALC/FOLIC 27MG-0.4MG
1 TABLET ORAL DAILY
Status: DISCONTINUED | OUTPATIENT
Start: 2022-03-04 | End: 2022-03-07 | Stop reason: HOSPADM

## 2022-03-03 RX ORDER — LORAZEPAM 2 MG/ML
1 INJECTION INTRAMUSCULAR ONCE
Status: DISCONTINUED | OUTPATIENT
Start: 2022-03-03 | End: 2022-03-03

## 2022-03-03 RX ORDER — LORAZEPAM 1 MG/1
3 TABLET ORAL
Status: DISCONTINUED | OUTPATIENT
Start: 2022-03-03 | End: 2022-03-07 | Stop reason: HOSPADM

## 2022-03-03 RX ORDER — LORAZEPAM 0.5 MG/1
1 TABLET ORAL ONCE
Status: COMPLETED | OUTPATIENT
Start: 2022-03-03 | End: 2022-03-03

## 2022-03-03 RX ORDER — LORAZEPAM 2 MG/ML
2 INJECTION INTRAMUSCULAR ONCE
Status: COMPLETED | OUTPATIENT
Start: 2022-03-03 | End: 2022-03-03

## 2022-03-03 RX ORDER — DIPHENHYDRAMINE HYDROCHLORIDE 50 MG/ML
50 INJECTION INTRAMUSCULAR; INTRAVENOUS EVERY 4 HOURS PRN
Status: DISCONTINUED | OUTPATIENT
Start: 2022-03-03 | End: 2022-03-07 | Stop reason: HOSPADM

## 2022-03-03 RX ORDER — SODIUM CHLORIDE 0.9 % (FLUSH) 0.9 %
5-40 SYRINGE (ML) INJECTION EVERY 12 HOURS SCHEDULED
Status: DISCONTINUED | OUTPATIENT
Start: 2022-03-03 | End: 2022-03-03 | Stop reason: HOSPADM

## 2022-03-03 RX ORDER — SODIUM CHLORIDE 0.9 % (FLUSH) 0.9 %
5-40 SYRINGE (ML) INJECTION PRN
Status: DISCONTINUED | OUTPATIENT
Start: 2022-03-03 | End: 2022-03-03 | Stop reason: HOSPADM

## 2022-03-03 RX ORDER — HALOPERIDOL 5 MG
5 TABLET ORAL EVERY 4 HOURS PRN
Status: DISCONTINUED | OUTPATIENT
Start: 2022-03-03 | End: 2022-03-07 | Stop reason: HOSPADM

## 2022-03-03 RX ADMIN — LORAZEPAM 2 MG: 1 TABLET ORAL at 23:08

## 2022-03-03 RX ADMIN — HYDROXYZINE HYDROCHLORIDE 50 MG: 50 TABLET, FILM COATED ORAL at 23:08

## 2022-03-03 RX ADMIN — ACETAMINOPHEN 650 MG: 325 TABLET, FILM COATED ORAL at 23:08

## 2022-03-03 RX ADMIN — LORAZEPAM 1 MG: 2 INJECTION INTRAMUSCULAR at 15:43

## 2022-03-03 RX ADMIN — LORAZEPAM 2 MG: 2 INJECTION INTRAMUSCULAR at 10:10

## 2022-03-03 RX ADMIN — LORAZEPAM 1 MG: 0.5 TABLET ORAL at 18:25

## 2022-03-03 ASSESSMENT — ENCOUNTER SYMPTOMS
VOMITING: 0
ABDOMINAL PAIN: 0
NAUSEA: 0
EYE REDNESS: 0
SHORTNESS OF BREATH: 0
RHINORRHEA: 0
SORE THROAT: 0
COUGH: 0

## 2022-03-03 ASSESSMENT — PATIENT HEALTH QUESTIONNAIRE - PHQ9
SUM OF ALL RESPONSES TO PHQ QUESTIONS 1-9: 21
SUM OF ALL RESPONSES TO PHQ QUESTIONS 1-9: 15

## 2022-03-03 ASSESSMENT — SLEEP AND FATIGUE QUESTIONNAIRES
DIFFICULTY ARISING: YES
DO YOU HAVE DIFFICULTY SLEEPING: YES
DIFFICULTY FALLING ASLEEP: YES
DO YOU USE A SLEEP AID: YES
RESTFUL SLEEP: NO
SLEEP PATTERN: DIFFICULTY FALLING ASLEEP;DIFFICULTY ARISING;RESTLESSNESS
DIFFICULTY STAYING ASLEEP: YES
AVERAGE NUMBER OF SLEEP HOURS: 3

## 2022-03-03 ASSESSMENT — PAIN DESCRIPTION - LOCATION: LOCATION: HIP

## 2022-03-03 ASSESSMENT — PAIN DESCRIPTION - ORIENTATION: ORIENTATION: LEFT

## 2022-03-03 ASSESSMENT — PAIN DESCRIPTION - PAIN TYPE: TYPE: ACUTE PAIN

## 2022-03-03 ASSESSMENT — PAIN SCALES - GENERAL
PAINLEVEL_OUTOF10: 10
PAINLEVEL_OUTOF10: 3

## 2022-03-03 ASSESSMENT — LIFESTYLE VARIABLES: HISTORY_ALCOHOL_USE: YES

## 2022-03-03 NOTE — ED NOTES
Pt becoming agitated and removed IV per self. Writer and Jaci Rodriguez RN at bedside. Dressing applied and pt changed into new paper scrubs.      Sandra Harrison RN  03/03/22 2365

## 2022-03-03 NOTE — ED PROVIDER NOTES
[facility] ED  Emergency Department  Faculty Sign-Out Addendum     Care of Helder Calixto was assumed from previous attending and is being seen for Alcohol Intoxication (Gallon of Vodka) and Suicide Attempt (Took unknown amount of Trazadone)  . The patient's initial evaluation and plan have been discussed with the prior provider who initially evaluated the patient. Handoff taken on the following patient from prior Attending Physician:    Darlene Gutiérrez    I was available and discussed any additional care issues that arose and coordinated the management plans with the resident(s) caring for the patient during my duty period. Any areas of disagreement with residents documentation of care or procedures are noted on the chart. I was personally present for the key portions of any/all procedures during my duty period. I have documented in the chart those procedures where I was not present during the key portions.       EMERGENCY DEPARTMENT COURSE / MEDICAL DECISION MAKING:       MEDICATIONS GIVEN:  Orders Placed This Encounter   Medications    DISCONTD: LORazepam (ATIVAN) injection 2 mg    sodium chloride flush 0.9 % injection 5-40 mL    sodium chloride flush 0.9 % injection 5-40 mL    0.9 % sodium chloride infusion    sodium chloride 0.9 % 8,977 mL with folic acid 1 mg, adult multi-vitamin with vitamin k 10 mL, thiamine 100 mg    LORazepam (ATIVAN) injection 2 mg    nicotine (NICODERM CQ) 21 MG/24HR 1 patch    LORazepam (ATIVAN) injection 1 mg       LABS / RADIOLOGY:     Labs Reviewed   COMPREHENSIVE METABOLIC PANEL W/ REFLEX TO MG FOR LOW K - Abnormal; Notable for the following components:       Result Value    Glucose 108 (*)     Calcium 8.3 (*)      (*)      (*)     All other components within normal limits   CBC WITH AUTO DIFFERENTIAL - Abnormal; Notable for the following components:    RBC 5.20 (*)     MCV 76.9 (*)     MCH 23.7 (*)     RDW 22.5 (*)     Lymphocytes 48 (*)     All other components within normal limits   TOX SCR, BLD, ED - Abnormal; Notable for the following components:    Acetaminophen Level <5 (*)     Ethanol 398 (*)     Ethanol percent 8.339 (*)     Salicylate Lvl <1 (*)     All other components within normal limits   COVID-19, RAPID   URINE DRUG SCREEN   HCG, SERUM, QUALITATIVE       No results found. RECENT VITALS:     Temp: 97.3 °F (36.3 °C),  Pulse: 112, Resp: 18, BP: 113/72, SpO2: 95 %    This patient is a 48 y.o. Female with alcohol intoxication, suicidal ideation. Requiring some sedation for behavioral control. No evidence of trauma.   Remainder of labs unremarkable except for significantly elevated alcohol level reassess for sobriety and social work consultation    [facility] [address] / RECOMMENDATIONS:    1. Reassess for sobriety      Brad Greer MD, Heber Silveira  Attending Emergency Physician  [facility]        Fritz Bedolla MD  03/03/22 4712

## 2022-03-03 NOTE — ED NOTES
Pt ambulatory to restroom with steady gait with tech, provided with labeled urine cup for specimen collection     Danyelle Chau RN  03/03/22 7758

## 2022-03-03 NOTE — ED PROVIDER NOTES
[address] [facility]  Emergency Department  Emergency Medicine Resident Sign-out     Care of Jennifer Escalera was assumed from Dr. Sheryle Dust and is being seen for Alcohol Intoxication (Gallon of Vodka) and Suicide Attempt (Took unknown amount of Trazadone)  . The patient's initial evaluation and plan have been discussed with the prior provider who initially evaluated the patient. EMERGENCY DEPARTMENT COURSE / MEDICAL DECISION MAKING:       MEDICATIONS GIVEN:  Orders Placed This Encounter   Medications    DISCONTD: LORazepam (ATIVAN) injection 2 mg    sodium chloride flush 0.9 % injection 5-40 mL    sodium chloride flush 0.9 % injection 5-40 mL    0.9 % sodium chloride infusion    sodium chloride 0.9 % 4,557 mL with folic acid 1 mg, adult multi-vitamin with vitamin k 10 mL, thiamine 100 mg    LORazepam (ATIVAN) injection 2 mg    nicotine (NICODERM CQ) 21 MG/24HR 1 patch    LORazepam (ATIVAN) injection 1 mg       LABS / RADIOLOGY:     Labs Reviewed   COMPREHENSIVE METABOLIC PANEL W/ REFLEX TO MG FOR LOW K - Abnormal; Notable for the following components:       Result Value    Glucose 108 (*)     Calcium 8.3 (*)      (*)      (*)     All other components within normal limits   CBC WITH AUTO DIFFERENTIAL - Abnormal; Notable for the following components:    RBC 5.20 (*)     MCV 76.9 (*)     MCH 23.7 (*)     RDW 22.5 (*)     Lymphocytes 48 (*)     All other components within normal limits   TOX SCR, BLD, ED - Abnormal; Notable for the following components:    Acetaminophen Level <5 (*)     Ethanol 398 (*)     Ethanol percent 5.633 (*)     Salicylate Lvl <1 (*)     All other components within normal limits   COVID-19, RAPID   URINE DRUG SCREEN   HCG, SERUM, QUALITATIVE       No results found. RECENT VITALS:     Temp: 97.3 °F (36.3 °C),  Pulse: 112, Resp: 18, BP: 113/72, SpO2: 95 %    This patient is a 48 y.o. Female with alcohol intoxication and suicide attempt.   Drinks a gallon of vodka a day. Sober time is 1 but patient also took a \"handful \"of trazodone, unknown amount in a suicide attempt. Patient medically cleared we will need to reevaluate upon sober time for 6 suicidality. Patient re-evaluated she states she is not suicidal.  Had a lengthy discussion about her drinking habits. Stated that she is at high risk for permanent liver damage, liver cirrhosis and death. Urged her to go to a rehab facility and to not try to detox on her own. Patient has no intention of currently detoxing. She does not wish to go to rehab. Would like to be discharged. She is alert and oriented, does not appear intoxicated, she has no medical concerns at the moment, speaking clearly. We will have  reevaluate her 1 more time and if she still is declining inpatient detox will consider pink slipped.  has concerns for suicide attempt, discussed with myself and attending. Patient is pink slipped due to suicide attempt. OUTSTANDING TASKS / RECOMMENDATIONS:    1. Re-eval when sober  2. dispo     FINAL IMPRESSION:     1. Acute alcoholic intoxication without complication ([address]    2. Suicide attempt by drug ingestion, initial encounter [facility])        DISPOSITION:         DISPOSITION:  []  Discharge   [x]  Transfer -    []  Admission -     []  Against Medical Advice   []  Eloped   FOLLOW-UP: No follow-up provider specified.    DISCHARGE MEDICATIONS: New Prescriptions    No medications on file           Kendell Bach DO  Emergency Medicine Resident  [facility]        Kendell Bach [place]  Resident  03/03/22 10 Telly Gonsalves DO  Resident  03/03/22 [id number]

## 2022-03-03 NOTE — ED PROVIDER NOTES
PM&R Clinic Note     Patient Name: Otoniel Gomez : 1968 Medical Record: [id number]     Requesting Physician/clinician: No att. providers found           History of Present Illness:     Otoniel Gomez is a 52 year old male with past medical history of malignant neoplasm of brain (cerebellar medulloblastoma) s/p craniotomy and tumor resection, chemotherapy in , asthma, hyperlipidemia who presented on  with seizures and severe hyponatremia requiring intubation for airway protection -, with hospital course complicated by R temporal lobe infarcts on imaging, JACK, significant encephalopathy and agitation, possible aspiration pneumonia, GERD, and dysphagia.  He was admitted to ARU on 21 for rehabilitation and ongoing medical management.      He remained medically stable throughout his rehab stay, with ongoing monitoring of his sodium, kidney function, respiratory and hydration status remaining stable.  No recurrent seizure activity observed.  He made notable gains with therapy and remains most limited by ongoing cognitive deficits, though no further agitation and thus able to be weaned off Depakote.  He also has some ongoing impairments in balance though safely ambulating without assistive device. Family training was completed by therapists, and patient is now ready to be discharged home with recommendation of 24/7 supervision from family initially and ongoing outpatient PT, OT, and SLP.  Sodium and kidney function are stable at discharge, patient and family educated on ongoing fluid restriction and will plan to follow up with PCP for ongoing monitoring and recommendations. He discharged home on 21.      Current:  Overall doing well.  Has completed home therapy and starting to wrap up outpatient therapy.  No falls.  Not using assistive device.  No issues with bowel or bladder.  Following outpatient for electrolytes, Sodium has been stable.  No bouts of agitation.  Patient and  [facility] ED  Emergency Department Encounter  Emergency Medicine Resident     Pt Name: Veronica Gonzales  MRN: [id number]  [place] 1975  Date of evaluation: 3/3/22  PCP:  Jerica Dawn MD    CHIEF COMPLAINT       Chief Complaint   Patient presents with    Alcohol Intoxication     Gallon of Vodka    Suicide Attempt     Took unknown amount of Trazadone       HISTORY OFPRESENT ILLNESS  (Location/Symptom, Timing/Onset, Context/Setting, Quality, Duration, Modifying Oneyda Lanius.)      Veronica Gonzales is a 48 y.o. female history of alcohol abuse and bipolar mood disorder brought in by EMS following suicide attempt with alcohol and trazodone. Please presented at home early this morning for a well check by patient's ex-girlfriend. Patient was found laying on her couch, drinking vodka and told police she had taken a handful of trazodone in an attempt to commit suicide. On arrival, patient is clearly intoxicated, but cooperative and answering questions. She does endorse suicidal ideation and states that she was attempting to end her own life via intentional overdose this morning. She took an unknown number of tablets of trazodone at approximately 6 AM.  Also states that she has had approximately 1 gallon of vodka in the last 24 hours.     PAST MEDICAL / SURGICAL / SOCIAL / FAMILY HISTORY      has a past medical history of MORTEZA (acute kidney injury) ([address] Alcoholic hepatitis without ascites, Alcoholic ketoacidosis, Alcoholism ([address] Anxiety, Asthma, Bipolar affective disorder ([address] Depression, Drug overdose, accidental or unintentional, initial encounter, Drug overdose, intentional self-harm, initial encounter ([address] Lisa coma scale total score 3-8 ([address] Hepatitis C antibody positive in blood, History of gastric surgery (gastric sleeve 2012), Intentional drug overdose ([address] MDD (major depressive disorder), recurrent episode ([address] Pneumonia, Polysubstance abuse ([address] Post traumatic family happy with progress.  Neck pain has gone away once at home and completed therapy.              Past Medical and Surgical History:     Past Medical History:   Diagnosis Date     Cancer (H)     brain tumor      Low back pain      Seizures (H)      Past Surgical History:   Procedure Laterality Date     CRANIOTOMY              Social History:     Social History     Tobacco Use     Smoking status: Never Smoker     Smokeless tobacco: Never Used   Substance Use Topics     Alcohol use: Yes     Comment: Alcoholic Drinks/day: occasional      Marital Status:   Living situation: lives with spouse in house with 5 stairs to enter, 12 stairs inside  Family support: supportive  Vocational History: , 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    ADLs: I  Assistive devices: none   iADLs (medication management and finances): I, some help with wife  Hand dominance: R             Family History:     Family History   Problem Relation Age of Onset     No Known Problems Mother      No Known Problems Father      No Known Problems Sister      No Known Problems Brother      No Known Problems Maternal Aunt      No Known Problems Maternal Uncle      No Known Problems Paternal Aunt      No Known Problems Paternal Uncle      Cerebrovascular Disease Maternal Grandmother      Coronary Artery Disease Maternal Grandfather      No Known Problems Paternal Grandmother      No Known Problems Paternal Grandfather             Medications:     Current Outpatient Medications   Medication Sig Dispense Refill     acetaminophen (TYLENOL) 325 MG tablet Take 1-2 tablets (325-650 mg) by mouth every 4 hours as needed for mild pain or fever       aspirin (ASA) 81 MG chewable tablet Take 1 tablet (81 mg) by mouth daily 30 tablet 0     melatonin 5 MG tablet Take 1 tablet (5  stress disorder, Seizure ([facility] [address] Smoker unmotivated to quit, Suicide attempt [facility]), Suicide ideation, and Toxic metabolic encephalopathy. has a past surgical history that includes Hysterectomy; Carpal tunnel release; Hysterectomy, total abdominal; Cholecystectomy; Stomach surgery; and Upper gastrointestinal endoscopy (N/A, 6/19/2019). Social History     Socioeconomic History    Marital status: Single     Spouse name: Not on file    Number of children: 0    Years of education: 15    Highest education level: Not on file   Occupational History    Not on file   Tobacco Use    Smoking status: Current Every Day Smoker     Packs/day: 2.00     Years: 30.00     Pack years: 60.00     Types: Cigarettes    Smokeless tobacco: Never Used   Vaping Use    Vaping Use: Never used   Substance and Sexual Activity    Alcohol use: Yes     Alcohol/week: 3.0 standard drinks     Types: 3 Shots of liquor per week     Comment: reports 1/2 gallon/day     Drug use: Yes     Frequency: 7.0 times per week     Types: Marijuana (Suzie Rist), IV, Cocaine     Comment: drug abuse includes, cocaine, IV heroin, cannabis    Sexual activity: Not Currently   Other Topics Concern    Not on file   Social History Narrative    ** Merged History Encounter **         ** Merged History Encounter **          Social Determinants of Health     Financial Resource Strain:     Difficulty of Paying Living Expenses: Not on file   Food Insecurity:     Worried About Running Out of Food in the Last Year: Not on file    Lorna of Food in the Last Year: Not on file   Transportation Needs:     Lack of Transportation (Medical): Not on file    Lack of Transportation (Non-Medical):  Not on file   Physical Activity:     Days of Exercise per Week: Not on file    Minutes of Exercise per Session: Not on file   Stress:     Feeling of Stress : Not on file   Social Connections:     Frequency of Communication with Friends and Family: Not on file    Frequency of "mg) by mouth At Bedtime 30 tablet 0     simvastatin (ZOCOR) 20 MG tablet Take 1 tablet (20 mg) by mouth At Bedtime 30 tablet 0     famotidine (PEPCID) 10 MG tablet Take 1 tablet (10 mg) by mouth 2 times daily (Patient not taking: Reported on 8/27/2021) 60 tablet 0     Lidocaine (LIDOCARE) 4 % Patch Place 1 patch onto the skin every 24 hours To prevent lidocaine toxicity, patient should be patch free for 12 hrs daily. (Patient not taking: Reported on 8/27/2021) 30 patch 0     senna-docusate (SENOKOT-S/PERICOLACE) 8.6-50 MG tablet Take 1 tablet by mouth 2 times daily as needed for constipation (Patient not taking: Reported on 8/27/2021) 10 tablet 0            Allergies:     Allergies   Allergen Reactions     Fentanyl Other (See Comments)     Amoxicillin GI Disturbance     Gramineae Pollens      Omeprazole Other (See Comments)     Recommended by nephrologist during hospital stay May 2021, due to severe hyponatremia     Other Environmental Allergy      Sertraline Other (See Comments)     Severe hyponatremia     Thiazide-Type Diuretics Other (See Comments)     Severe hyponatremia              ROS:        ROS: 10 point ROS neg other than the symptoms noted above in the HPI.             Physical Examiniation:     VITAL SIGNS: /79   Pulse 59   Ht 1.676 m (5' 6\")   Wt 68 kg (150 lb)   SpO2 100%   BMI 24.21 kg/m    BMI: Estimated body mass index is 24.21 kg/m  as calculated from the following:    Height as of this encounter: 1.676 m (5' 6\").    Weight as of this encounter: 68 kg (150 lb).    Gen: NAD, pleasant and cooperative   HEENT: NCAT, EOMI, no nystagmus, SUNDAY, there is no reproducible headache and eye strain with VOMS. No taut or tender cervical paraspinal muscles, no facial asymmetry   Cardio: 2+ radial pulse, well perfused  Pulm: non-labored breathing in room air, symmetrical chest rise  Abd: benign  Ext: WWP, no edema in BLE, no tenderness in calves  Neuro/MSK: 5/5 in c5-t1 and l2-s1 myotomes, SILT, CN " Social Gatherings with Friends and Family: Not on file    Attends Sabianism Services: Not on file    Active Member of Clubs or Organizations: Not on file    Attends Club or Organization Meetings: Not on file    Marital Status: Not on file   Intimate Partner Violence:     Fear of Current or Ex-Partner: Not on file    Emotionally Abused: Not on file    Physically Abused: Not on file    Sexually Abused: Not on file   Housing Stability:     Unable to Pay for Housing in the Last Year: Not on file    Number of [place] in the Last Year: Not on file    Unstable Housing in the Last Year: Not on file       Family History   Problem Relation Age of Onset    Brain Cancer Mother     Cancer Mother         \"female cancer\"    Heart Disease Father         Allergies:  Morphine, Morphine and related, Azithromycin, Morphine, Nsaids, and Zithromax [azithromycin]    Home Medications:  Prior to Admission medications    Medication Sig Start Date End Date Taking?  Authorizing Provider   busPIRone (BUSPAR) 7.5 MG tablet Take 1 tablet by mouth 2 times daily as needed (Anxiety) 1/3/22   Juana Santana MD   atorvastatin (LIPITOR) 40 MG tablet Take 1 tablet by mouth nightly 1/3/22   Juana Santana MD   folic acid (FOLVITE) 1 MG tablet Take 1 tablet by mouth daily 1/3/22   Juana Santana MD   omeprazole (PRILOSEC) 20 MG delayed release capsule Take 1 capsule by mouth 2 times daily 1/3/22   Juana Santana MD   nicotine (NICODERM CQ) 14 MG/24HR Place 1 patch onto the skin daily 1/4/22   Juana Santana MD   paliperidone (INVEGA) 3 MG extended release tablet Take 1 tablet by mouth daily 1/4/22   Juana Santana MD   Multiple Vitamins-Minerals (THERAPEUTIC MULTIVITAMIN-MINERALS) tablet Take 1 tablet by mouth daily 1/4/22   Juana Santana MD   thiamine 100 MG tablet Take 1 tablet by mouth daily 1/3/22   Juana Santana MD   traZODone (DESYREL) 150 MG tablet Take 1 tablet by mouth nightly 1/3/22   Juana Santana MD 2-12 intact, AAOx3.  GAIT: WNFL               Laboratory/Imaging:     Last Comprehensive Metabolic Panel:  Sodium   Date Value Ref Range Status   06/17/2021 136 133 - 144 mmol/L Final     Potassium   Date Value Ref Range Status   06/17/2021 4.4 3.4 - 5.3 mmol/L Final     Chloride   Date Value Ref Range Status   06/17/2021 103 94 - 109 mmol/L Final     Carbon Dioxide   Date Value Ref Range Status   06/17/2021 29 20 - 32 mmol/L Final     Anion Gap   Date Value Ref Range Status   06/17/2021 4 3 - 14 mmol/L Final     Glucose   Date Value Ref Range Status   06/17/2021 88 70 - 99 mg/dL Final     Urea Nitrogen   Date Value Ref Range Status   06/17/2021 27 7 - 30 mg/dL Final     Creatinine   Date Value Ref Range Status   06/17/2021 1.26 (H) 0.66 - 1.25 mg/dL Final     GFR Estimate   Date Value Ref Range Status   06/17/2021 64 >60 mL/min/[1.73_m2] Final     Comment:     Non  GFR Calc  Starting 12/18/2018, serum creatinine based estimated GFR (eGFR) will be   calculated using the Chronic Kidney Disease Epidemiology Collaboration   (CKD-EPI) equation.       Calcium   Date Value Ref Range Status   06/17/2021 8.7 8.5 - 10.1 mg/dL Final                Assessment/Plan:     Otoniel was seen today for hospital f/u.    Diagnoses and all orders for this visit:    Encephalopathy    History of brain tumor    History of chemotherapy    History of radiation to head and neck region            1. Patient education: In depth discussion and education was provided about the assessment and implications of each of the below recommendations for management. Patient indicated readiness to learn, all questions were answered and understanding of material presented was confirmed.    2. Work-up: none     3. Therapy/equipment/braces: complete therapy program    4. Medications: no additions     5. Interventions: discussed exercise and brain health     6. Referral / follow up with other providers: PCP, Neuro, follow up on TSH    7. Follow up:  venlafaxine (EFFEXOR XR) 75 MG extended release capsule Take 1 capsule by mouth daily (with breakfast) 1/4/22   Flavio Wilson MD   albuterol sulfate  (90 Base) MCG/ACT inhaler Inhale 2 puffs into the lungs every 4 hours as needed for Wheezing 7/10/20   Vee Morales MD       REVIEW OFSYSTEMS    (2-9 systems for level 4, 10 or more for level 5)      Review of Systems   Constitutional: Negative for chills, fatigue and fever. HENT: Negative for congestion, rhinorrhea and sore throat. Eyes: Negative for redness. Respiratory: Negative for cough and shortness of breath. Cardiovascular: Negative for chest pain. Gastrointestinal: Negative for abdominal pain, nausea and vomiting. Genitourinary: Negative for dysuria. Musculoskeletal: Negative for myalgias, neck pain and neck stiffness. Allergic/Immunologic: Negative for immunocompromised state. Neurological: Negative for dizziness, light-headedness and headaches. Hematological: Negative for adenopathy. Psychiatric/Behavioral: Positive for agitation, dysphoric mood and suicidal ideas. PHYSICAL EXAM   (up to 7 for level 4, 8 or more forlevel 5)      INITIAL VITALS:   ED Triage Vitals [03/03/22 0946]   BP Temp Pulse Resp SpO2 Height Weight   (!) 166/101  36.8 °C 89 15 (!) 7 % 5' (1.524 m) 111 lb (50.3 kg)       Physical Exam  Vitals reviewed. Constitutional:       General: She is not in acute distress. Appearance: Normal appearance. She is not ill-appearing. HENT:      Head: Normocephalic and atraumatic. Right Ear: Tympanic membrane, ear canal and external ear normal.      Left Ear: Tympanic membrane, ear canal and external ear normal.      Nose: Nose normal. No congestion. Mouth/Throat:      Mouth: Mucous membranes are moist.      Pharynx: Oropharynx is clear. Eyes:      Conjunctiva/sclera: Conjunctivae normal.      Pupils: Pupils are equal, round, and reactive to light.    Cardiovascular:      Rate and Rhythm: Normal as needed     David Granados, DO  Physical Medicine & Rehabilitation    I spent a total of 45 minutes face-to-face with Otoniel Gomez during today's office visit. Over 50% of this time was spent counseling the patient and/or coordinating care. See note for details.     45 minutes spent on the date of the encounter doing chart review, history and exam, documentation and further activities as noted above               rate and regular rhythm. Pulses: Normal pulses. Heart sounds: Normal heart sounds. Pulmonary:      Effort: Pulmonary effort is normal.      Breath sounds: Normal breath sounds. Abdominal:      General: There is no distension. Palpations: Abdomen is soft. Tenderness: There is no abdominal tenderness. Musculoskeletal:      Cervical back: Normal range of motion. No rigidity. Right lower leg: No edema. Left lower leg: No edema. Lymphadenopathy:      Cervical: No cervical adenopathy. Skin:     General: Skin is warm and dry. Capillary Refill: Capillary refill takes less than 2 seconds. Neurological:      General: No focal deficit present. Mental Status: She is alert.       Comments: Clearly intoxicated, slurring speech   Psychiatric:      Comments: Endorses suicidal ideation and depression         DIFFERENTIAL  DIAGNOSIS     PLAN (Doyle Lama / IMAGING / EKG):  Orders Placed This Encounter   Procedures    COVID-19, Rapid    Comprehensive Metabolic Panel w/ Reflex to MG    CBC with Auto Differential    TOX SCR, BLD, ED    Urine Drug Screen    HCG Qualitative, Serum    Notify physician    Inpatient consult to Social Work    EKG 12 Lead       MEDICATIONS ORDERED:  Orders Placed This Encounter   Medications    DISCONTD: LORazepam (ATIVAN) injection 2 mg    DISCONTD: sodium chloride flush 0.9 % injection 5-40 mL    DISCONTD: sodium chloride flush 0.9 % injection 5-40 mL    DISCONTD: 0.9 % sodium chloride infusion    DISCONTD: sodium chloride 0.9 % 5,921 mL with folic acid 1 mg, adult multi-vitamin with vitamin k 10 mL, thiamine 100 mg    LORazepam (ATIVAN) injection 2 mg    DISCONTD: nicotine (NICODERM CQ) 21 MG/24HR 1 patch    LORazepam (ATIVAN) injection 1 mg    DISCONTD: LORazepam (ATIVAN) injection 1 mg    LORazepam (ATIVAN) tablet 1 mg       DDX: Suicide attempt, alcohol intoxication, trazodone intoxication    Initial MDM/Plan: 48 y.o. female who presents with a slight attempt with intentional ingestion of trazodone and heavy alcohol abuse. Will obtain I labs and calculate sober time. Once patient is clinically sober, will reassess suicidal ideations. Possible telepsych. DIAGNOSTIC RESULTS / EMERGENCYDEPARTMENT COURSE / MDM     LABS:  Labs Reviewed   COMPREHENSIVE METABOLIC PANEL W/ REFLEX TO MG FOR LOW K - Abnormal; Notable for the following components:       Result Value    Glucose 108 (*)     Calcium 8.3 (*)      (*)      (*)     All other components within normal limits   CBC WITH AUTO DIFFERENTIAL - Abnormal; Notable for the following components:    RBC 5.20 (*)     MCV 76.9 (*)     MCH 23.7 (*)     RDW 22.5 (*)     Lymphocytes 48 (*)     All other components within normal limits   TOX SCR, BLD, ED - Abnormal; Notable for the following components:    Acetaminophen Level <5 (*)     Ethanol 398 (*)     Ethanol percent 3.892 (*)     Salicylate Lvl <1 (*)     All other components within normal limits   COVID-19, RAPID   URINE DRUG SCREEN   HCG, SERUM, QUALITATIVE         RADIOLOGY:  No results found. EMERGENCY DEPARTMENT COURSE:  ED Course as of 03/04/22 1448   Thu Mar 03, 2022   1043 Patient complaining of restlessness and severe anxiety. Ripped out her IV. Asking for something to help relax her. Will give 2 of Ativan IM. Will monitor closely. [GG]   1016 Reassessed patient. She is much calmer now, but complaining of some shakiness and is concerned for alcohol withdrawal.  Patient is still legally intoxicated with sober time of 2030 tonight, but given patient's daily drinking there is a concern for withdrawal.  We will continue CIWA protocol and as needed Ativan. Patient does still endorse suicidal ideation, but per [facility] protocols must be legally sober before transfer process can begin.  [GG]      ED Course User Index  [GG] Kushal Yin MD          PROCEDURES:  None    CONSULTS:  IP CONSULT TO SOCIAL WORK    CRITICAL CARE:  Please see attending note    FINAL IMPRESSION      1. Acute alcoholic intoxication without complication ([facility] [address] [address]    2. Suicide attempt by drug ingestion, initial encounter ([facility] [address]          DISPOSITION / PLAN     DISPOSITION      Discharged    PATIENT REFERRED TO:  No follow-up provider specified.     DISCHARGE MEDICATIONS:  Discharge Medication List as of 3/3/2022 10:22 PM          Corie Zambrano MD  Emergency Medicine Resident    (Please note that portions of this note were completed with a voice recognition program.Efforts were made to edit the dictations but occasionally words are mis-transcribed.)        Corie Zambrano MD  Resident  03/04/22 5322

## 2022-03-03 NOTE — ED NOTES
Pt given 1mg of ativan for tremors. Pt vomiting. Will continue to monitor.       Sakina Sampson RN  03/03/22 6160

## 2022-03-03 NOTE — ED NOTES
Pt transferred to [facility] with chaka and RN. Will continue to monitor. Pt denies any needs/wants at this time.      Lucie Chairez RN  03/03/22 8721

## 2022-03-03 NOTE — ED NOTES
Labeled blood specimens sent to lab via tube system.     [x] Lavender   [] on ice   [x] Blue   [x] Green/yellow  [x] Green/black [] on ice  [] Pink  [] Red  [x] Yellow  [] Blood Cultures      Janet Yuen RN  03/03/22 1518

## 2022-03-03 NOTE — ED NOTES
Pt resting on stretcher. Pt denies any needs/wans at this time. Will continue to monitor.      Jesús Christianson RN  03/03/22 7529

## 2022-03-03 NOTE — ED NOTES
Pt arrived to ED alert and oriented x4 via EMS. Pt to ED s/p suicide attempt and ETOH intoxication. Pt reports drinking 1 gallon of Vodka and taking an unknown amount of Trazodone in attempt to harm herself. Pt reports that she has been fighting her ex and they \"broke up\" and she has been wanting to harm herself. Pt denies hallucinations, denies homicidal ideations. Pt c/o left hip pain, has full ROM of LLE noted. Pt denies having been around anyone suspected to have COVID-19 or anyone that has been sick, denies recent travel outside the [facility] or [address]. Pt placed on cardiac monitor, continuous pulse ox, and BP cuff. RR even and unlabored. NAD noted. Whiteboard updated. Will continue with plan of care.      Ken Park RN  03/03/22 1829

## 2022-03-03 NOTE — ED NOTES
While pt was ambulating to restroom, pt stopped and fell to ground. Pt did not hit head or LOC. Pt was assisted per writer and two techs to stand up. Pt began to walk and then fell to the floor again while in writer and techs arms. Pt assisted onto wheelchair and taken to restroom with two techs.      Danyelle Chau RN  03/03/22 2052

## 2022-03-03 NOTE — ED NOTES
Labeled COVID swab sent to lab via tube system.     [x] COVID-19 swab      [x] Rapid   [] Non- Rapid/PCR  [] Respiratory Panel with Johnnie Amaya RN  03/03/22 9020

## 2022-03-03 NOTE — ED NOTES
Labeled urine specimen sent to lab via tube system.     [x] Urine Sample   [x]  Clean catch   [] Straight cath   [] Urine voided   []  Indwelling catheter   []  Suprapubic catheter     Compa Alvarado RN  03/03/22 6035

## 2022-03-03 NOTE — ED NOTES
Writer met with patient at bedside per her request as she has not been assessed at this time. Writer explained to patient that she will not be legally sober until around 2000. Patient is visibly trembling and when asked she reports that she is experiencing withdraw symptoms. Patient states that she is a daily alcohol user in the amount of a gallon of vodka a day. She reports that she was last sober for approximately 8 months four years ago. Additionally, patient states that she overdosed today on Trazodone stating, \"I just couldn't take it anymore. \"  Patient states that she does drink first thing in the morning on account with withdraw symptoms. She is ambivalent regarding being admitted to detox and request time to think about how she wants to proceed. Patient is currently linked with the [facility] and is treated by Dr. Andie Perry for Bipolar, Anxiety and PTSD. Patient states she is adherent with treatment at this time. SBIRT assessment complete. The results can be located in the ED navigator under screening questions. Patient scored as following:    AUDIT (Alcohol Screening Questionnaire): 40  DAST (Drug Screening Questionnaire): Patient was screened using the initial screening questions and her score was 0, which did not indicate a full assessment be complete. PHQ-9 (Depression Screening Questionnaire): 21      The patient was provided with the following resources/interventions: Patient is ambivalent regarding inpatient detox at this time. She additionally intentionally overdosed today and will need to be evaluated for inpatient psychiatric placement when patient is legally sober.      Start Time 1820  End Time 1835  Diagnosis Z71.41; Z13.30     GIOVANI Haney  03/03/22 1834

## 2022-03-03 NOTE — ED NOTES
belongings search:     Persons present during search:   Results of search and disposition:       Searchers Name: Writer and [facility]     These items or items similar should be removed from the room:   [] Chairs   [] Telephone   [] Trash cans and liners   [x] Plastic utensils (order Patient Safety tray)   [] Empty or remove Sharps containers   [x] All personal clothing/belongings removed   [x] All unnecessary lead wires, electrical cords, draw cords, etc.   [] Flowers and plants   [x] Double check for lighters, matches, razors, any glass items etc that can be used as weapons. Person completing Checklist: Mina Moe RN       GENERAL INFORMATION     Y  N     [x] [] Has the patient been informed that they are on a watch and what that means? [x] [] Can the patient get out of Bed without nursing assistance? [x] [] Can the patient use the restroom without nursing assistance? [x] [] Can the patient walk the halls to [place] their legs? \"   [] [x] Does the patient have metal utensils? [x] [] Have the patient's belongings been placed out of control of the patient? [x] [] Have the patient and his/her belongings been checked for contraband? [x] [] Is the patient under any visitor restrictions? If Yes, explain: Suicidal   [] [x] Is the patient under an alias? [place] [id number] Name:   Authorized visitors (no more than two are to be on the list)   Name/Relationship:   Name/Relationship:    Name of Staff member that you  Received this information from?:    General Description:    Breezy Rubin 28/32 female 48 y.o. Admission weight: 111 lb (50.3 kg) Height: 5' (152.4 cm)  Race: [x]  [] Black  []   []   [] [place] [] Other  Facial Hair:  [] Yes  [x] No  If yes, please describe: Identifying Marks (i.e. Visible tattoos, scars, etc... ):     NURSING CARE PLAN    Nursing Diagnosis: Risk of Self Directed Harm  [x] Actual  [] Potential  Date Started: 03/03/2022  Etiological Factors: (related to)  [x] Expressed or implied suicidal ideation/behavior  [] Depression  [x] Suicide attempt      [] Low self-esteem  [] Hallucinations      [] Feeling of Hopelessness  [] Substance abuse or withdrawal    [] Dysfunctional family  [] Major traumatic event, eg., divorce, etc   [x] Excessive stress/anxiety    3/3/22    Expected Outcomes    Patient will:   [x] Patient will remain safe for the duration of their stay   [x] Patient's environment will be safe, eg. Free of potential suicide weapons   [x] Verbalize Recovery from suicidal episode and improvement in self-worth   [x] Discuss feeling that precipitated suicide attempt/thoughts/behavior   [x] Will describe available resources for crisis prevention and management   [x] Will verbalize positive coping skills     Nursing Intervention   [x] Assessment and Observations hourly   [x] Suicide Precautions implemented with patient, should be 1:1 observation   [x] Document observation [id number] and RN assessment hourly   [] Consult physician for:    [] Psychiatric consult    [] Pharmacological therapy    [] Other:    [x] Patient search completed by security   [x] Initiated appropriate safety protocols by removing from the patient's environment anything that could be used to inflict self injury, eg. Order safe tray, snap gown, etc   [x] Maintain open, warm, caring, non-judgmental attitude/manner towards patient   [] Discuss advantages and disadvantages of existing coping methods/skills   [x] Assist and educate patient with identifying present strengths and coping skills   [x] Keep patient informed regarding plan of care and provide clear concise explanations. Provide the patient/family education information as well as telephone numbers and other information about crisis centers, hot lines, and counselors.     Discharge Planning:   [] Referral  [] Groups [] Health agencies  [] Other:            Compa Alvarado RN  03/03/22 3542

## 2022-03-03 NOTE — ED PROVIDER NOTES
[facility]     Emergency Department     Faculty Attestation    I performed a history and physical examination of the patient and discussed management with the resident. I reviewed the residents note and agree with the documented findings and plan of care. Any areas of disagreement are noted on the chart. I was personally present for the key portions of any procedures. I have documented in the chart those procedures where I was not present during the key portions. I have reviewed the emergency nurses triage note. I agree with the chief complaint, past medical history, past surgical history, allergies, medications, social and family history as documented unless otherwise noted below. For Physician Assistant/ Nurse Practitioner cases/documentation I have personally evaluated this patient and have completed at least one if not all key elements of the E/M (history, physical exam, and MDM). Additional findings are as noted. I have personally seen and evaluated the patient. I find the patient's history and physical exam are consistent with the NP/PA documentation. I agree with the care provided, treatment rendered, disposition and follow-up plan. It appears to be obviously inebriated and has admitted to being suicidal earlier quite agitated well-established psychiatric history as well. Her vitals her saturations are 70% is erroneous she is mentating clearly at this time but does appear to be impaired      Critical Care     Rivera Benoit M.D.   Attending Emergency  Physician              Alex Diamond MD  03/03/22 9990

## 2022-03-03 NOTE — ED NOTES
Patient noted to have an ethanol level of .398, social work to assess for further psychiatric care when patient is sober.      GIOVANI Couch  03/03/22 5881

## 2022-03-04 PROBLEM — F31.4 BIPOLAR I DISORDER, MOST RECENT EPISODE DEPRESSED, SEVERE WITHOUT PSYCHOTIC FEATURES (HCC): Status: ACTIVE | Noted: 2022-03-04

## 2022-03-04 PROBLEM — F10.230 ALCOHOL DEPENDENCE WITH WITHDRAWAL, UNCOMPLICATED (HCC): Status: ACTIVE | Noted: 2022-03-04

## 2022-03-04 PROBLEM — F41.0 GENERALIZED ANXIETY DISORDER WITH PANIC ATTACKS: Status: ACTIVE | Noted: 2022-03-04

## 2022-03-04 PROBLEM — F41.1 GENERALIZED ANXIETY DISORDER WITH PANIC ATTACKS: Status: ACTIVE | Noted: 2022-03-04

## 2022-03-04 PROBLEM — F43.10 PTSD (POST-TRAUMATIC STRESS DISORDER): Status: ACTIVE | Noted: 2017-11-28

## 2022-03-04 LAB
EKG ATRIAL RATE: 75 BPM
EKG P AXIS: 56 DEGREES
EKG P-R INTERVAL: 158 MS
EKG Q-T INTERVAL: 408 MS
EKG QRS DURATION: 76 MS
EKG QTC CALCULATION (BAZETT): 455 MS
EKG R AXIS: 29 DEGREES
EKG T AXIS: 43 DEGREES
EKG VENTRICULAR RATE: 75 BPM

## 2022-03-04 PROCEDURE — 99223 1ST HOSP IP/OBS HIGH 75: CPT | Performed by: PSYCHIATRY & NEUROLOGY

## 2022-03-04 PROCEDURE — 6370000000 HC RX 637 (ALT 250 FOR IP): Performed by: PSYCHIATRY & NEUROLOGY

## 2022-03-04 PROCEDURE — 1240000000 HC EMOTIONAL WELLNESS R&B

## 2022-03-04 PROCEDURE — APPSS60 APP SPLIT SHARED TIME 46-60 MINUTES: Performed by: NURSE PRACTITIONER

## 2022-03-04 RX ORDER — ALBUTEROL SULFATE 90 UG/1
2 AEROSOL, METERED RESPIRATORY (INHALATION) EVERY 4 HOURS PRN
Status: DISCONTINUED | OUTPATIENT
Start: 2022-03-04 | End: 2022-03-07 | Stop reason: HOSPADM

## 2022-03-04 RX ORDER — VENLAFAXINE HYDROCHLORIDE 75 MG/1
75 CAPSULE, EXTENDED RELEASE ORAL
Status: DISCONTINUED | OUTPATIENT
Start: 2022-03-05 | End: 2022-03-07 | Stop reason: HOSPADM

## 2022-03-04 RX ORDER — ARIPIPRAZOLE 5 MG/1
5 TABLET ORAL DAILY
Status: DISCONTINUED | OUTPATIENT
Start: 2022-03-04 | End: 2022-03-07 | Stop reason: HOSPADM

## 2022-03-04 RX ORDER — PANTOPRAZOLE SODIUM 40 MG/1
40 TABLET, DELAYED RELEASE ORAL
Status: DISCONTINUED | OUTPATIENT
Start: 2022-03-05 | End: 2022-03-07 | Stop reason: HOSPADM

## 2022-03-04 RX ORDER — BUSPIRONE HYDROCHLORIDE 7.5 MG/1
7.5 TABLET ORAL 2 TIMES DAILY PRN
Status: DISCONTINUED | OUTPATIENT
Start: 2022-03-04 | End: 2022-03-07 | Stop reason: HOSPADM

## 2022-03-04 RX ORDER — ATORVASTATIN CALCIUM 20 MG/1
40 TABLET, FILM COATED ORAL NIGHTLY
Status: DISCONTINUED | OUTPATIENT
Start: 2022-03-04 | End: 2022-03-05

## 2022-03-04 RX ORDER — FOLIC ACID 1 MG/1
1 TABLET ORAL DAILY
Status: DISCONTINUED | OUTPATIENT
Start: 2022-03-04 | End: 2022-03-07 | Stop reason: HOSPADM

## 2022-03-04 RX ORDER — ACAMPROSATE CALCIUM 333 MG/1
666 TABLET, DELAYED RELEASE ORAL 3 TIMES DAILY
Status: DISCONTINUED | OUTPATIENT
Start: 2022-03-04 | End: 2022-03-07 | Stop reason: HOSPADM

## 2022-03-04 RX ORDER — TRAZODONE HYDROCHLORIDE 150 MG/1
150 TABLET ORAL NIGHTLY
Status: DISCONTINUED | OUTPATIENT
Start: 2022-03-04 | End: 2022-03-07 | Stop reason: HOSPADM

## 2022-03-04 RX ORDER — CHLORDIAZEPOXIDE HYDROCHLORIDE 25 MG/1
25 CAPSULE, GELATIN COATED ORAL 4 TIMES DAILY
Status: COMPLETED | OUTPATIENT
Start: 2022-03-04 | End: 2022-03-06

## 2022-03-04 RX ADMIN — FOLIC ACID 1 MG: 1 TABLET ORAL at 16:23

## 2022-03-04 RX ADMIN — MULTIPLE VITAMINS W/ MINERALS TAB 1 TABLET: TAB at 08:54

## 2022-03-04 RX ADMIN — LORAZEPAM 1 MG: 1 TABLET ORAL at 12:24

## 2022-03-04 RX ADMIN — ALBUTEROL SULFATE 2 PUFF: 90 AEROSOL, METERED RESPIRATORY (INHALATION) at 20:23

## 2022-03-04 RX ADMIN — ACAMPROSATE CALCIUM 666 MG: 333 TABLET, DELAYED RELEASE ORAL at 20:30

## 2022-03-04 RX ADMIN — CHLORDIAZEPOXIDE HYDROCHLORIDE 25 MG: 25 CAPSULE ORAL at 20:30

## 2022-03-04 RX ADMIN — HYDROXYZINE HYDROCHLORIDE 50 MG: 50 TABLET, FILM COATED ORAL at 20:30

## 2022-03-04 RX ADMIN — CHLORDIAZEPOXIDE HYDROCHLORIDE 25 MG: 25 CAPSULE ORAL at 16:23

## 2022-03-04 RX ADMIN — ARIPIPRAZOLE 5 MG: 5 TABLET ORAL at 16:23

## 2022-03-04 RX ADMIN — TRAZODONE HYDROCHLORIDE 150 MG: 150 TABLET ORAL at 20:30

## 2022-03-04 RX ADMIN — ATORVASTATIN CALCIUM 40 MG: 20 TABLET, FILM COATED ORAL at 20:30

## 2022-03-04 RX ADMIN — ACAMPROSATE CALCIUM 666 MG: 333 TABLET, DELAYED RELEASE ORAL at 17:01

## 2022-03-04 RX ADMIN — THIAMINE HCL TAB 100 MG 100 MG: 100 TAB at 08:54

## 2022-03-04 RX ADMIN — HYDROXYZINE HYDROCHLORIDE 50 MG: 50 TABLET, FILM COATED ORAL at 08:55

## 2022-03-04 ASSESSMENT — PAIN SCALES - GENERAL: PAINLEVEL_OUTOF10: 0

## 2022-03-04 NOTE — PROGRESS NOTES
Behavioral Services  Medicare Certification Upon Admission    I certify that this patient's inpatient psychiatric hospital admission is medically necessary for:    [x] (1) Treatment which could reasonably be expected to improve this patient's condition,       [x] (2) Or for diagnostic study;     AND     [x](2) The inpatient psychiatric services are provided while the individual is under the care of a physician and are included in the individualized plan of care.     Estimated length of stay/service 3-9 days    Plan for post-hospital care -outpatient care    Electronically signed by Patt Galaviz MD on 3/4/2022 at 9:22 AM

## 2022-03-04 NOTE — PLAN OF CARE
[facility]  Initial Interdisciplinary Treatment Plan NO      Original treatment plan Date & Time: 3/4/2022 0756    Admission Type:  Admission Type:  Involuntary    Reason for admission:   Reason for Admission: attempted overdose on a handful of trazodone, alcohol dependence    Estimated Length of Stay:  5-7days  Estimated Discharge Date: to be determined by physician    PATIENT STRENGTHS:  Patient Strengths:Strengths: Motivated,Communication  Patient Strengths and Limitations:Limitations: Apathetic / unmotivated,Multiple barriers to leisure interests,Tendency to isolate self  Addictive Behavior: Addictive Behavior  In the past 3 months, have you felt or has someone told you that you have a problem with:  : None  Do you have a history of Chemical Use?: No  Do you have a history of Alcohol Use?: Yes  Do you have a history of Street Drug Abuse?: Yes  Histroy of Prescripton Drug Abuse?: No  Medical Problems:  Past Medical History:   Diagnosis Date    MORTEZA (acute kidney injury) ([facility] [address] 4/65/8631    Alcoholic hepatitis without ascites 57/98/5360    Alcoholic ketoacidosis 37/46/1332    Alcoholism ([facility] [address]     3 pints daily    Anxiety     Asthma     Bipolar affective disorder ([address] 6/12/2019    Depression     Drug overdose, accidental or unintentional, initial encounter 9/29/2018    Drug overdose, intentional self-harm, initial encounter ([facility] [address] 5/8/2018    [place] coma scale total score 3-8 ([address]     Hepatitis C antibody positive in blood 6/18/2019    History of gastric surgery (gastric sleeve 2012) 4/25/2017    Intentional drug overdose ([address] 5/7/2018    MDD (major depressive disorder), recurrent episode ([address] 5/8/2018    Pneumonia     Polysubstance abuse ([address]     drug abuse includes, cocaine, IV heroin, cannabis, and ETOH    Post traumatic stress disorder     Seizure ([address] 12/17/2019    Smoker unmotivated to quit 4/25/2017    Suicide attempt ([address] 11/08/2017    S/A by overdosing on Trazodone and Seroquel Suicide ideation     Toxic metabolic encephalopathy 2/82/8495     Status EXAM:Status and Exam  Normal: No  Facial Expression: Flat,Sad,Worried  Affect: Blunt  Level of Consciousness: Alert  Mood:Normal: No  Mood: Depressed,Anxious,Helpless  Motor Activity:Normal: No  Motor Activity: Tremors  Interview Behavior: Cooperative  Preception: [place] to Person,[place] to Time,[place] to Place,[place] to Situation  Attention:Normal: No  Attention: Unable to Concentrate  Thought Processes: Circumstantial  Thought Content:Normal: No  Thought Content: Preoccupations  Hallucinations: None  Delusions: No  Memory:Normal: No  Memory: Poor Recent,Poor Remote  Insight and Judgment: No  Insight and Judgment: Poor Judgment,Poor Insight  Present Suicidal Ideation: Yes (contracts for safety)  Present Homicidal Ideation: No    EDUCATION:   Learner Progress Toward Treatment Goals: reviewed group plans and strategies for care    Method:group therapy, medication compliance, individualized assessments and care planning    Outcome: needs reinforcement    PATIENT GOALS: to be discussed with patient within 72 hours    PLAN/TREATMENT RECOMMENDATIONS:     continue group therapy , medications compliance, goal setting, individualized assessments and care, continue to monitor pt on unit      SHORT-TERM GOALS:   Time frame for Short-Term Goals: 5-7 days    LONG-TERM GOALS:  Time frame for Long-Term Goals: 6 months  Members Present in Team Meeting: See Signature Sheet    KURT Terrell

## 2022-03-04 NOTE — GROUP NOTE
Group Therapy Note    Date: 3/4/2022    Group Start Time: 1400  Group End Time: 5839  Group Topic: Cognitive Skills    [facility]    Jackson Degree, CTRS    Pt did not attend 1400 cognitive skills group d/t resting in room despite staff invitation to attend. 1:1 talk time offered as alternative to group session, pt declined.             Signature:  Dorie Davis
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
## 2022-03-04 NOTE — BH NOTE
Patient recorded all phone numbers she needed out of her cell phone. Educated on phone being locked with security until discharge.

## 2022-03-04 NOTE — ED NOTES
Pt resting in bed comfortably with eyes closed. NAD noted. Sitter remains with pt. Will continue to monitor.       Angel Mckinley RN  03/03/22 1931

## 2022-03-04 NOTE — BH NOTE
BPA for suicide precautions populated due to attempted overdose. Patient contracts for safety while on the unit, agrees to seek out staff if thoughts of self harm become overwhelming, provider notified, no 1:1 placed Q 15 min safety checks remain per policy.

## 2022-03-04 NOTE — GROUP NOTE
Group Therapy Note    Date: 3/4/2022    Group Start Time: 1201  Group End Time: 1013  Group Topic: Psychotherapy    [facility] Ary Castleman        Group Therapy Note    Attendees: 5/15         Patient's Goal:  PT will demonstrate increased interpersonal interaction and a clear understanding on multiple types of coping skills relating to the here-and-now therapeutic practice. Notes: :  Patient is making progress, AEB participating in group discussion, actively listening, and supporting other group members. PT participates in group and encourages others to participate     Status After Intervention:  Improved    Participation Level: Active Listener and Interactive    Participation Quality: Appropriate and Attentive      Speech:  normal      Thought Process/Content: Logical      Affective Functioning: Flat      Mood: depressed      Level of consciousness:  Alert, Oriented x4 and Attentive      Response to Learning: Able to verbalize/acknowledge new learning and Progressing to goal      Endings: None Reported    Modes of Intervention: Support, Socialization, Exploration, Clarifying and Problem-solving      Discipline Responsible: /Counselor      Signature:   Ary Castleman

## 2022-03-04 NOTE — ED NOTES
Patient accepted to room 229. [facility] is unable to transport until 02:00. [facility] is unable to transport. [facility] scheduled for 22:30. Notified Christina.       Ellen Perez, [address]  03/03/22 1167
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
## 2022-03-04 NOTE — GROUP NOTE
Group Therapy Note    Date: 3/4/2022    Group Start Time: 1100  Group End Time: 8801  Group Topic: Cognitive Skills    [facility]    [place] Degree, CTRS    Pt did not attend 1100 cognitive skills group d/t resting in room despite staff invitation to attend. 1:1 talk time offered as alternative to group session, pt declined.             Signature:  Dorie Davis

## 2022-03-04 NOTE — BH NOTE
[facility]  Admission Note     Admission Type:   Admission Type:  Involuntary    Reason for admission:  Reason for Admission: attempted overdose on a handful of trazodone, alcohol dependence    PATIENT STRENGTHS:  Strengths: Motivated,Communication    Patient Strengths and Limitations:  Limitations: Apathetic / unmotivated,Multiple barriers to leisure interests,Tendency to isolate self    Addictive Behavior:   Addictive Behavior  In the past 3 months, have you felt or has someone told you that you have a problem with:  : None  Do you have a history of Chemical Use?: No  Do you have a history of Alcohol Use?: Yes  Do you have a history of Street Drug Abuse?: Yes  Histroy of Prescripton Drug Abuse?: No    Medical Problems:   Past Medical History:   Diagnosis Date    [facility]acute [facility]) [facility] 6/58/2377    Alcoholic hepatitis without ascites 53/01/5792    Alcoholic ketoacidosis 15/25/7881    Alcoholism ([facility]     3 pints daily    Anxiety     Asthma     Bipolar affective disorder ([facility] 6/12/2019    Depression     Drug overdose, accidental or unintentional, initial encounter 9/29/2018    Drug overdose, intentional self-harm, initial encounter ([facility] 5/8/2018    Alexander coma scale total score 3-8 ([facility]     Hepatitis C antibody positive in blood 6/18/2019    History of gastric surgery (gastric sleeve 2012) 4/25/2017    Intentional drug overdose ([facility] 5/7/2018    MDD (major depressive disorder), recurrent episode ([facility] 5/8/2018    Pneumonia     Polysubstance abuse ([facility]     drug abuse includes, cocaine, IV heroin, cannabis, and ETOH    Post traumatic stress disorder     Seizure ([facility] 12/17/2019    Smoker unmotivated to quit 4/25/2017    Suicide attempt ([facility] 11/08/2017    S/A by overdosing on Trazodone and Seroquel    Suicide ideation     Toxic metabolic encephalopathy 6/75/3092       Status EXAM:  Status and Exam  Normal: No  Facial Expression: Flat,Sad,Worried  Affect: Blunt  Level of Consciousness: Alert  Mood:Normal: No  Mood: Depressed,Anxious,Helpless  Motor Activity:Normal: No  Motor Activity: Tremors  Interview Behavior: Cooperative  Preception: [place] to Person,[place] to Time,[place] to Place,[place] to Situation  Attention:Normal: No  Attention: Unable to Concentrate  Thought Processes: Circumstantial  Thought Content:Normal: No  Thought Content: Preoccupations  Hallucinations: None  Delusions: No  Memory:Normal: No  Memory: Poor Recent,Poor Remote  Insight and Judgment: No  Insight and Judgment: Poor Judgment,Poor Insight  Present Suicidal Ideation: Yes (contracts for safety)  Present Homicidal Ideation: No    Tobacco Screening:  Practical Counseling, on admission, ainsley X, if applicable and completed (first 3 are required if patient doesn't refuse):            ( )  Recognizing danger situations (included triggers and roadblocks)                    ( )  Coping skills (new ways to manage stress, exercise, relaxation techniques, changing routine, distraction)                                                           ( )  Basic information about quitting (benefits of quitting, techniques in how to quit, available resources  ( ) Referral for counseling faxed to [facility]                                           ( ) Patient refused counseling  ( ) Patient has not smoked in the last 30 days    Metabolic Screening:    Lab Results   Component Value Date    LABA1C 5.5 08/01/2018       Lab Results   Component Value Date    CHOL 196 06/13/2019    CHOL 212 (H) 08/01/2018    CHOL 204 (H) 06/05/2018     Lab Results   Component Value Date    TRIG 99 06/13/2019    TRIG 168 (H) 08/01/2018    TRIG 274 (H) 06/05/2018    TRIG 158 (H) 04/27/2017     Lab Results   Component Value Date    HDL 56 06/13/2019    HDL 51 08/01/2018    HDL 45 06/05/2018    HDL 26 (L) 10/23/2017     No components found for: LDLCAL  No results found for: LABVLDL      Body mass index is 23.05 kg/m².     BP Readings from Last 2 Encounters:   03/03/22 120/75   03/03/22 127/83           Pt admitted with followings belongings:  Dental Appliances: None  Vision - Corrective Lenses: Glasses  Hearing Aid: None  Jewelry: Ring,Necklace  Body Piercings Removed: N/A  Clothing: Jacket / coat,Footwear,Shirt,Undergarments (Comment),Pajamas,Socks  Were All Patient Medications Collected?: Not Applicable  Other Valuables: Cell phone     Patient's home medications were reviewed. Patient oriented to surroundings and program expectations and copy of patient rights given. Received admission packet:  yes. Consents reviewed, signed yes. Refused no. Patient verbalize understanding:  yes. Patient education on precautions: yes                   Diamond Turner RN       [facility] from s ER, after a suicide attempt by taking a handful of trazodone. Patient reports drinking a gallon to a gallon and a half of vodka daily. She reports an increase in depressive symptoms poor sleep and appetite. She reports medication compliance. Patient searched per unit policy provided a meal and opportunity to obtain numbers from cell phone.

## 2022-03-04 NOTE — PLAN OF CARE
Problem: Altered Mood, Depressive Behavior:  Goal: Able to verbalize and/or display a decrease in depressive symptoms  Description: Able to verbalize and/or display a decrease in depressive symptoms  3/4/2022 0935 by Jessica Goodman  Outcome: Ongoing   Patient came in voluntarily due to an intentional overdose. Patient states she is feeling okay but shaky currently from alcohol withdrawal. Patient denies any current suicidal ideations, homicidal ideations, hearing voices, delusions, or paranoia. Patient reports an anxiety level of 7 with no particular cause and a depression level of 3 \"because she has depression. \" Patient states she is sleeping decently. Patient states that due to weight loss surgery in the past, she snacks more than she eats full meals however she ate her breakfast. Patient reports that her medications are effective. Patient reports that she has not attended any groups. Patient was encouraged to attend groups. Patient reports socializing a little bit with her peers. Patient reports that she is taking proper care of her hygiene.

## 2022-03-04 NOTE — ED NOTES
[facility] here to transport pt to [facility] via stretcher.       Faith Goodson RN  03/03/22 [id number]

## 2022-03-04 NOTE — ED NOTES
Faxed pink slip to [facility]. Pending bed placement.      Faxed medical necessity to [facility] for will-call transportation     Demetrio Lennox, [address]  03/03/22 1319
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
## 2022-03-05 PROCEDURE — 99223 1ST HOSP IP/OBS HIGH 75: CPT | Performed by: INTERNAL MEDICINE

## 2022-03-05 PROCEDURE — 1240000000 HC EMOTIONAL WELLNESS R&B

## 2022-03-05 PROCEDURE — 6370000000 HC RX 637 (ALT 250 FOR IP): Performed by: PSYCHIATRY & NEUROLOGY

## 2022-03-05 PROCEDURE — 99231 SBSQ HOSP IP/OBS SF/LOW 25: CPT | Performed by: NURSE PRACTITIONER

## 2022-03-05 RX ADMIN — ACAMPROSATE CALCIUM 666 MG: 333 TABLET, DELAYED RELEASE ORAL at 14:38

## 2022-03-05 RX ADMIN — HYDROXYZINE HYDROCHLORIDE 50 MG: 50 TABLET, FILM COATED ORAL at 20:49

## 2022-03-05 RX ADMIN — ALUMINUM HYDROXIDE, MAGNESIUM HYDROXIDE, AND SIMETHICONE 30 ML: 200; 200; 20 SUSPENSION ORAL at 15:45

## 2022-03-05 RX ADMIN — CHLORDIAZEPOXIDE HYDROCHLORIDE 25 MG: 25 CAPSULE ORAL at 08:12

## 2022-03-05 RX ADMIN — CHLORDIAZEPOXIDE HYDROCHLORIDE 25 MG: 25 CAPSULE ORAL at 17:55

## 2022-03-05 RX ADMIN — ACAMPROSATE CALCIUM 666 MG: 333 TABLET, DELAYED RELEASE ORAL at 20:49

## 2022-03-05 RX ADMIN — FOLIC ACID 1 MG: 1 TABLET ORAL at 08:12

## 2022-03-05 RX ADMIN — CHLORDIAZEPOXIDE HYDROCHLORIDE 25 MG: 25 CAPSULE ORAL at 20:49

## 2022-03-05 RX ADMIN — ARIPIPRAZOLE 5 MG: 5 TABLET ORAL at 08:12

## 2022-03-05 RX ADMIN — VENLAFAXINE HYDROCHLORIDE 75 MG: 75 CAPSULE, EXTENDED RELEASE ORAL at 08:12

## 2022-03-05 RX ADMIN — MULTIPLE VITAMINS W/ MINERALS TAB 1 TABLET: TAB at 08:12

## 2022-03-05 RX ADMIN — PANTOPRAZOLE SODIUM 40 MG: 40 TABLET, DELAYED RELEASE ORAL at 08:12

## 2022-03-05 RX ADMIN — CHLORDIAZEPOXIDE HYDROCHLORIDE 25 MG: 25 CAPSULE ORAL at 13:29

## 2022-03-05 RX ADMIN — THIAMINE HCL TAB 100 MG 100 MG: 100 TAB at 08:12

## 2022-03-05 RX ADMIN — TRAZODONE HYDROCHLORIDE 150 MG: 150 TABLET ORAL at 20:49

## 2022-03-05 RX ADMIN — ACAMPROSATE CALCIUM 666 MG: 333 TABLET, DELAYED RELEASE ORAL at 08:14

## 2022-03-05 NOTE — PROGRESS NOTES
Daily Progress Note  3/5/2022    Patient Name: Jone Marrufo    CHIEF COMPLAINT: Suicide attempt - patient overdosed on trazodone         SUBJECTIVE:   Patient was seen for follow-up assessment in her assigned room today. Nursing staff report patient has been medication adherent and behaviorally in control. She has not required any emergency medications during this admission. She continues to be isolative to her room yet cooperative. Patient was resting on approach but responded to verbal stimuli. She remained resting in bed and reported feeling tired today. Patient is discharge focused and immediately asks writer when she will be able to leave. Patient is advised that during withdrawal from alcohol patient should be medically supervised. Patient becomes tearful when learning she will not be discharged today from this facility. She states \"I just want to go home\". Patient endorses ongoing symptoms of depression and anxiety and has not yet noticed significant improvement in her mood. She reports that she feels she spent a little bit more time out of bed today. She reports internal feelings of restlessness and nausea with vomiting as symptoms of withdrawal from alcohol. Suicidal ideation has been intermittent and \"comes and goes\" from her mind. With further questioning patient is not able to contract for safety in the community and requires further hospitalization for stabilization and safety.     Appetite:  [] Normal/Adequate/Unchanged  [] Increased  [x] Decreased      Sleep:       [] Normal/Adequate/Unchanged  [x] Fair  [] Poor      Group Attendance on Unit:   [] Yes  [x] Selectively    [] No    Medication Side Effects: Somnolence         Mental Status Exam  Level of consciousness: Alert and awake   Appearance: Appropriate attire for setting, resting in bed, with fair  grooming and hygiene   Behavior/Motor: Approachable, relaxed, tearful  Attitude toward examiner: Cooperative, attentive, good eye contact   Speech: spontaneous and whispered, normal rate  Mood: Depressed  Affect: Mood congruent  Thought processes: linear and coherent   Thought content: Denies homicidal ideation  Suicidal Ideation: Endorses, improving; patient unable to contract for safety in community  Delusions: Not evident  Perceptual Disturbance: patient is not observed responding to internal stimuli; denies AVH  Cognition: Oriented to self, location, time, and situation  Memory: intact  Insight & Judgement: poor     Data   height is 5' (1.524 m) and weight is 118 lb (53.5 kg). Her oral temperature is 98.6 °F (37 °C). Her blood pressure is 120/72 and her pulse is 106. Her respiration is 14. Labs:   No visits with results within 2 Day(s) from this visit.    Latest known visit with results is:   Admission on 03/03/2022, Discharged on 03/03/2022   Component Date Value Ref Range Status    Glucose 03/03/2022 108* 70 - 99 mg/dL Final    BUN 03/03/2022 6  6 - 20 mg/dL Final    CREATININE 03/03/2022 0.53  0.50 - 0.90 mg/dL Final    Calcium 03/03/2022 8.3* 8.6 - 10.4 mg/dL Final    Sodium 03/03/2022 141  135 - 144 mmol/L Final    Potassium 03/03/2022 3.8  3.7 - 5.3 mmol/L Final    Chloride 03/03/2022 100  98 - 107 mmol/L Final    CO2 03/03/2022 27  20 - 31 mmol/L Final    Anion Gap 03/03/2022 14  9 - 17 mmol/L Final    Alkaline Phosphatase 03/03/2022 89  35 - 104 U/L Final    ALT 03/03/2022 217* 5 - 33 U/L Final    AST 03/03/2022 229* <32 U/L Final    Total Bilirubin 03/03/2022 0.43  0.3 - 1.2 mg/dL Final    Total Protein 03/03/2022 7.7  6.4 - 8.3 g/dL Final    Albumin 03/03/2022 4.1  3.5 - 5.2 g/dL Final    Albumin/Globulin Ratio 03/03/2022 1.1  1.0 - 2.5 Final    GFR Non- 03/03/2022 >60  >60 mL/min Final    GFR  03/03/2022 >60  >60 mL/min Final    GFR Comment 03/03/2022        Final    Comment: Average GFR for 38-51 years old:   80 mL/min/1.73sq m  Chronic Kidney Disease:   <60 mL/min/1.73sq m  Kidney failure:   <15 mL/min/1.73sq m              eGFR calculated using average adult body mass. Additional eGFR calculator available at:        [employer]            WBC 03/03/2022 5.0  3.5 - 11.3 k/uL Final    RBC 03/03/2022 5.20* 3.95 - 5.11 m/uL Final    Hemoglobin 03/03/2022 12.3  11.9 - 15.1 g/dL Final    Hematocrit 03/03/2022 40.0  36.3 - 47.1 % Final    MCV 03/03/2022 76.9* 82.6 - 102.9 fL Final    MCH 03/03/2022 23.7* 25.2 - 33.5 pg Final    MCHC 03/03/2022 30.8  28.4 - 34.8 g/dL Final    RDW 03/03/2022 22.5* 11.8 - 14.4 % Final    Platelets 54/44/6210 264  138 - 453 k/uL Final    MPV 03/03/2022 9.3  8.1 - 13.5 fL Final    NRBC Automated 03/03/2022 0.0  0.0 per 100 WBC Final    Immature Granulocytes 03/03/2022 0  0 % Final    Seg Neutrophils 03/03/2022 37  36 - 65 % Final    Lymphocytes 03/03/2022 48* 24 - 43 % Final    Monocytes 03/03/2022 11  3 - 12 % Final    Eosinophils % 03/03/2022 3  1 - 4 % Final    Basophils 03/03/2022 1  0 - 2 % Final    Absolute Immature Granulocyte 03/03/2022 0.00  0.00 - 0.30 k/uL Final    Segs Absolute 03/03/2022 1.85  1.50 - 8.10 k/uL Final    Absolute Lymph # 03/03/2022 2.40  1.10 - 3.70 k/uL Final    Absolute Mono # 03/03/2022 0.55  0.10 - 1.20 k/uL Final    Absolute Eos # 03/03/2022 0.15  0.00 - 0.44 k/uL Final    Basophils Absolute 03/03/2022 0.05  0.00 - 0.20 k/uL Final    Morphology 03/03/2022 MICROCYTOSIS PRESENT   Final    Morphology 03/03/2022 ANISOCYTOSIS PRESENT   Final    Ventricular Rate 03/03/2022 75  BPM Final    Atrial Rate 03/03/2022 75  BPM Final    P-R Interval 03/03/2022 158  ms Final    QRS Duration 03/03/2022 76  ms Final    Q-T Interval 03/03/2022 408  ms Final    QTc Calculation (Bazett) 03/03/2022 455  ms Final    P Axis 03/03/2022 56  degrees Final    R Axis 03/03/2022 29  degrees Final    T Axis 03/03/2022 43  degrees Final    Specimen Description 03/03/2022 . NASOPHARYNGEAL SWAB   Final    SARS-CoV-2, Rapid 03/03/2022 Not Detected  Not Detected Final    Comment:       Rapid NAAT:  The specimen is NEGATIVE for SARS-CoV-2, the novel coronavirus associated with   COVID-19. The ID NOW COVID-19 assay is designed to detect the virus that causes COVID-19 in patients   with signs and symptoms of infection who are suspected of COVID-19. An individual without symptoms of COVID-19 and who is not shedding SARS-CoV-2 virus would   expect to have a negative (not detected) result in this assay. Negative results should be treated as presumptive and, if inconsistent with clinical signs   and symptoms or necessary for patient management,  should be tested with an alternative molecular assay. Negative results do not preclude   SARS-CoV-2 infection and   should not be used as the sole basis for patient management decisions.          Fact sheet for Healthcare Providers: [employer]tasha  Fact sheet for Patients: [employer]tasha          Methodology: Isothermal Nucleic Acid Amplification      Acetaminophen Level 03/03/2022 <5* 10 - 30 ug/mL Final    Ethanol 03/03/2022 398* <10 mg/dL Final    Ethanol percent 03/03/2022 0.398* <3.018 % Final    Salicylate Lvl 04/27/9575 <1* 3 - 10 mg/dL Final    Toxic Tricyclic Sc,Blood 38/32/8812 NEGATIVE  NEGATIVE Final    Amphetamine Screen, Ur 03/03/2022 NEGATIVE  NEGATIVE Final    Comment:       (Positive cutoff 1000 ng/mL)                  Barbiturate Screen, Ur 03/03/2022 NEGATIVE  NEGATIVE Final    Comment:       (Positive cutoff 200 ng/mL)                  Benzodiazepine Screen, Urine 03/03/2022 NEGATIVE  NEGATIVE Final    Comment:       (Positive cutoff 200 ng/mL)                  Cocaine Metabolite, Urine 03/03/2022 NEGATIVE  NEGATIVE Final    Comment:       (Positive cutoff 300 ng/mL)                  Methadone Screen, Urine 03/03/2022 NEGATIVE  NEGATIVE Final    Comment:       (Positive cutoff 300 ng/mL)                  Opiates, Urine 03/03/2022 NEGATIVE  NEGATIVE Final    Comment:       (Positive cutoff 300 ng/mL)                  Phencyclidine, Urine 03/03/2022 NEGATIVE  NEGATIVE Final    Comment:       (Positive cutoff 25 ng/mL)                  Cannabinoid Scrn, Ur 03/03/2022 NEGATIVE  NEGATIVE Final    Comment:       (Positive cutoff 50 ng/mL)                  Oxycodone Screen, Ur 03/03/2022 NEGATIVE  NEGATIVE Final    Comment:       (Positive cutoff 100 ng/mL)                  Test Information 03/03/2022 Assay provides medical screening only. The absence of expected drug(s) and/or metabolite(s) may indicate diluted or adulterated urine, limitations of testing or timing of collection. Final    Comment: Testing for legal purposes should be confirmed by another method. To request confirmation   of test result, please call the lab within 7 days of sample submission.  hCG Qual 03/03/2022 NEGATIVE  NEGATIVE Final    Comment: Specimens with hCG levels near the threshold of the test (25 mIU/mL) may give a negative or   indeterminate result. In such cases, another test should be performed with a new specimen   in 48-72 hours. If early pregnancy is suspected clinically in this setting, correlation   with quantitative serum b-hCG level is suggested. [employer] has confirmed the use of plasma for this test. This has not been cleared   or approved by the U.S. Food and Drug Administration. The FDA has determined that such   clearance is not necessary. Reviewed patient's current plan of care and vital signs with nursing staff.     Labs reviewed: [x] Yes  Last EKG in EMR reviewed: [x] Yes    Medications  Current Facility-Administered Medications: atorvastatin (LIPITOR) tablet 40 mg, 40 mg, Oral, Nightly  busPIRone (BUSPAR) tablet 7.5 mg, 7.5 mg, Oral, BID PRN  folic acid (FOLVITE) tablet 1 mg, 1 mg, Oral, Daily  traZODone (DESYREL) tablet 150 mg, 150 mg, Oral, Nightly  venlafaxine (EFFEXOR XR) extended release capsule 75 mg, 75 mg, Oral, Daily with breakfast  albuterol sulfate  (90 Base) MCG/ACT inhaler 2 puff, 2 puff, Inhalation, Q4H PRN  pantoprazole (PROTONIX) tablet 40 mg, 40 mg, Oral, QAM AC  ARIPiprazole (ABILIFY) tablet 5 mg, 5 mg, Oral, Daily  acamprosate (CAMPRAL) tablet 666 mg, 666 mg, Oral, TID  chlordiazePOXIDE (LIBRIUM) capsule 25 mg, 25 mg, Oral, 4x Daily  Benzocaine-Menthol (CEPACOL SORE THROAT) 15-2.6 MG lozenge 1 lozenge, 1 lozenge, Oral, Q2H PRN  acetaminophen (TYLENOL) tablet 650 mg, 650 mg, Oral, Q4H PRN  aluminum & magnesium hydroxide-simethicone (MAALOX) 200-200-20 MG/5ML suspension 30 mL, 30 mL, Oral, Q6H PRN  hydrOXYzine (ATARAX) tablet 50 mg, 50 mg, Oral, TID PRN  traZODone (DESYREL) tablet 50 mg, 50 mg, Oral, Nightly PRN  polyethylene glycol (GLYCOLAX) packet 17 g, 17 g, Oral, Daily PRN  nicotine polacrilex (NICORETTE) gum 2 mg, 2 mg, Oral, PRN  haloperidol (HALDOL) tablet 5 mg, 5 mg, Oral, Q4H PRN **AND** LORazepam (ATIVAN) tablet 2 mg, 2 mg, Oral, Q4H PRN  haloperidol lactate (HALDOL) injection 5 mg, 5 mg, IntraMUSCular, Q4H PRN **AND** LORazepam (ATIVAN) injection 2 mg, 2 mg, IntraMUSCular, Q4H PRN **AND** diphenhydrAMINE (BENADRYL) injection 50 mg, 50 mg, IntraMUSCular, Q4H PRN  LORazepam (ATIVAN) tablet 1 mg, 1 mg, Oral, Q1H PRN **OR** LORazepam (ATIVAN) injection 1 mg, 1 mg, IntraMUSCular, Q1H PRN **OR** LORazepam (ATIVAN) tablet 2 mg, 2 mg, Oral, Q1H PRN **OR** LORazepam (ATIVAN) injection 2 mg, 2 mg, IntraMUSCular, Q1H PRN **OR** LORazepam (ATIVAN) tablet 3 mg, 3 mg, Oral, Q1H PRN **OR** LORazepam (ATIVAN) injection 3 mg, 3 mg, IntraMUSCular, Q1H PRN **OR** LORazepam (ATIVAN) tablet 4 mg, 4 mg, Oral, Q1H PRN **OR** LORazepam (ATIVAN) injection 4 mg, 4 mg, IntraMUSCular, Q1H PRN  thiamine mononitrate tablet 100 mg, 100 mg, Oral, Daily  therapeutic multivitamin-minerals 1 tablet, 1 tablet, Oral, Daily    ASSESSMENT  Bipolar I disorder, most recent episode depressed, severe without psychotic features ([facility] [address]         PLAN  Patient symptoms are: Modestly improving  Continue current medication regimen. Will titrate Abilify tomorrow  Monitor need and frequency of PRN medications. Encourage participation in groups and milieu. Attempt to develop insight. Psycho-education conducted. Supportive Therapy conducted. Probable discharge is per attending physician. Follow-up daily while inpatient. Patient continues to be monitored in the inpatient psychiatric facility at [facility] for safety and stabilization. Patient continues to need, on a daily basis, active treatment furnished directly by or requiring the supervision of inpatient psychiatric personnel. Electronically signed by RAYMOND Gupta CNP on 3/5/2022 at 5:20 PM    **This report has been created using voice recognition software. It may contain minor errors which are inherent in voice recognition technology. **

## 2022-03-05 NOTE — GROUP NOTE
Group Therapy Note    Date: 3/5/2022    Group Start Time: 0900  Group End Time: 0915  Group Topic: Group Therapy    GORGE iSlva        Group Therapy Note    Attendees: 10/16       patient refused to attend goal group at 0900 after encouragement from staff.  1:1 talk time offered but refused      Signature:  Елена Lane

## 2022-03-05 NOTE — PLAN OF CARE
Problem: Altered Mood, Depressive Behavior:  Goal: Able to verbalize and/or display a decrease in depressive symptoms  Description: Able to verbalize and/or display a decrease in depressive symptoms  3/5/2022 0937 by Efrain Castañeda RN  Outcome: Ongoing   1:1 with pt x ten minutes. Pt encouraged to attend unit programming and interact with peers and staff. Pt also encouraged to tend to hygiene and ADLs. Pt encouraged to discuss feelings with staff and feedback and reassurance provided. Pt denies thoughts of self harm and is agreeable to seeking out should thoughts of self harm arise. Safe environment maintained. Q15 minute checks for safety cont per unit policy. Will cont to monitor for safety and provides support and reassurance as needed. Pt is depressed with a flat mood. Compliant with medications. Seclusive to room. Did attend groups. ADL's are appropriate. Appetite is fair. States minimal withdrawal symptoms et medicated as ordered with Librium.

## 2022-03-05 NOTE — GROUP NOTE
Group Therapy Note    Date: 3/5/2022    Group Start Time: 1430  Group End Time: 4377  Group Topic: Recreational    STCZ BHKIRSTEN Martin, CTRS        Group Therapy Note    Attendees: 8/17         Patient's Goal:  To increase social interaction and to practice leisure skills, and socialization, and communication skills. Notes: Pt participated fully in group task . Pt was able to practice leisure skills, and socialization, and communication skills. Pt was pleasant and supportive of peers. Status After Intervention:  Improved     Participation Level:  Active Listener and Interactive     Participation Quality: Appropriate, Attentive, Sharing and Supportive        Speech:  normal        Thought Process/Content: Logical  Linear        Affective Functioning: Congruent        Mood: euthymic        Level of consciousness:  Alert, Oriented x4 and Attentive        Response to Learning: Able to verbalize current knowledge/experience, Able to verbalize/acknowledge new learning, Able to retain information and Progressing to goal        Endings: None Reported     Modes of Intervention: Education, Support, Socialization, Exploration, Clarifying and Problem-solving        Discipline Responsible: Psychoeducational Specialist        Signature:  Sunni Valdez

## 2022-03-05 NOTE — PLAN OF CARE
[address] [facility]  Day 3 Interdisciplinary Treatment Plan NOTE    Review Date & Time: 3/5/2022                        1300    Admission Type:   Admission Type:  Involuntary    Reason for admission:  Reason for Admission: attempted overdose on a handful of trazodone, alcohol dependence  Estimated Length of Stay: 5-7 days  Estimated Discharge Date Update: to be determined by physician    PATIENT STRENGTHS:  Patient Strengths Strengths: Motivated,Communication  Patient Strengths and Limitations:Limitations: Difficulty problem solving/relies on others to help solve problems,Difficult relationships / poor social skills,Inappropriate/potentially harmful leisure interests,Multiple barriers to leisure interests,Apathetic / unmotivated  Addictive Behavior:Addictive Behavior  In the past 3 months, have you felt or has someone told you that you have a problem with:  : None  Do you have a history of Chemical Use?: No  Do you have a history of Alcohol Use?: Yes  Do you have a history of Street Drug Abuse?: Yes  Histroy of Prescripton Drug Abuse?: No  Medical Problems:  Past Medical History:   Diagnosis Date    MORTEZA (acute kidney injury) ([facility] [address] 9/49/5096    Alcoholic hepatitis without ascites 19/62/4506    Alcoholic ketoacidosis 23/73/4881    Alcoholism ([address]     3 pints daily    Anxiety     Asthma     Bipolar affective disorder ([address] 6/12/2019    Depression     Drug overdose, accidental or unintentional, initial encounter 9/29/2018    Drug overdose, intentional self-harm, initial encounter ([address] 5/8/2018    Gay coma scale total score 3-8 ([address]     Hepatitis C antibody positive in blood 6/18/2019    History of gastric surgery (gastric sleeve 2012) 4/25/2017    Intentional drug overdose ([address] 5/7/2018    MDD (major depressive disorder), recurrent episode ([address] 5/8/2018    Pneumonia     Polysubstance abuse ([address]     drug abuse includes, cocaine, IV heroin, cannabis, and ETOH    Post traumatic stress disorder     Seizure ([facility] 12/17/2019    Smoker unmotivated to quit 4/25/2017    Suicide attempt ([facility] 11/08/2017    S/A by overdosing on Trazodone and Seroquel    Suicide ideation     Toxic metabolic encephalopathy 6/16/0403       Risk:  Fall RiskTotal: 73  Tee Scale Tee Scale Score: 22  BVC    Change in scores no Changes to plan of Care no    Status EXAM:   Status and Exam  Normal: No  Facial Expression: Flat  Affect: Blunt  Level of Consciousness: Alert  Mood:Normal: No  Mood: Depressed  Motor Activity:Normal: Yes  Motor Activity: Decreased  Interview Behavior: Cooperative  Preception: Hadley to Person,Hadley to Time,Hadley to Place,Hadley to Situation  Attention:Normal: Yes  Attention: Distractible  Thought Processes: Circumstantial  Thought Content:Normal: Yes  Thought Content: Preoccupations  Hallucinations: None  Delusions: No  Memory:Normal: Yes  Memory: Poor Recent,Poor Remote  Insight and Judgment: No  Insight and Judgment: Poor Insight  Present Suicidal Ideation: No  Present Homicidal Ideation: No    Daily Assessment Last Entry:             Patient Currently in Pain: Denies  Daily Nutrition (WDL): Within Defined Limits  Barriers to Nutrition: None  Level of Assistance: Independent/Self    Patient Monitoring:  Frequency of Checks: 4 times per hour, close    Psychiatric Symptoms:   Depression Symptoms  Depression Symptoms: Isolative,Feelings of hopelessess  Anxiety Symptoms  Anxiety Symptoms: Generalized  Ingrid Symptoms  Ingrid Symptoms: No problems reported or observed. Psychosis Symptoms  Delusion Type: No problems reported or observed. Suicide Risk CSSR-S:  1) Within the past month, have you wished you were dead or wished you could go to sleep and not wake up? : Yes  2) Have you actually had any thoughts of killing yourself? : Yes  3) Have you been thinking about how you might kill yourself? : Yes  5) Have you started to work out or worked out the details of how to kill yourself?  Do you intend to carry out this plan? : No  6) Have you ever done anything, started to do anything, or prepared to do anything to end your life?: No  Change in Result                      no                   Change in Plan of care              no      EDUCATION:   EDUCATION:   Learner Progress Toward Treatment Goals: Reviewed results and recommendations of this team, Reviewed group plan and strategies, Reviewed signs, symptoms and risk of self harm and violent behavior, Reviewed goals and plan of care    Method:small group, individual verbal education    Outcome:verbalized by patient, but needs reinforcement to obtain goals    PATIENT GOALS:  Short term: Pt declined to attend team meeting and did not develop a short term goal  Long term:Pt did not develop a long term goal    PLAN/TREATMENT RECOMMENDATIONS UPDATE: continue with group therapies, increased socialization, continue planning for after discharge goals, continue with medication compliance    SHORT-TERM GOALS UPDATE:   Time frame for Short-Term Goals: 5-7 days    LONG-TERM GOALS UPDATE:   Time frame for Long-Term Goals: 6 months  Members Present in Team Meeting: See Signature Sheet    Theora Signs

## 2022-03-05 NOTE — BH NOTE
patient refused to attend safety planning group at 1030 after encouragement from staff.  1:1 talk time offered but refused

## 2022-03-05 NOTE — PLAN OF CARE
Problem: Tobacco Use:  Goal: Inpatient tobacco use cessation counseling participation  Description: Inpatient tobacco use cessation counseling participation  Outcome: Ongoing  Note: Patient participated in inpatient tobaccouse cessation counseling     Problem: Altered Mood, Depressive Behavior:  Goal: Able to verbalize and/or display a decrease in depressive symptoms  Description: Able to verbalize and/or display a decrease in depressive symptoms  3/4/2022 2322 by Arslan Swift LPN  Outcome: Ongoing  Note: Patient admits to depression/anxiety at this time. Problem: Altered Mood, Depressive Behavior:  Goal: Ability to disclose and discuss suicidal ideas will improve  Description: Ability to disclose and discuss suicidal ideas will improve  Outcome: Ongoing  Note: Patient denies suicidal ideation at this time. Problem: Suicide risk  Goal: Provide patient with safe environment  Description: Provide patient with safe environment  Outcome: Ongoing  Note: 15 minutes safety checks maintained. Problem: Pain:  Goal: Pain level will decrease  Description: Pain level will decrease  Outcome: Ongoing  Note: Patient admits that her pain level has decreased at this time.

## 2022-03-06 PROCEDURE — 6370000000 HC RX 637 (ALT 250 FOR IP): Performed by: NURSE PRACTITIONER

## 2022-03-06 PROCEDURE — 6370000000 HC RX 637 (ALT 250 FOR IP): Performed by: PSYCHIATRY & NEUROLOGY

## 2022-03-06 PROCEDURE — 1240000000 HC EMOTIONAL WELLNESS R&B

## 2022-03-06 PROCEDURE — 99231 SBSQ HOSP IP/OBS SF/LOW 25: CPT | Performed by: NURSE PRACTITIONER

## 2022-03-06 PROCEDURE — 99232 SBSQ HOSP IP/OBS MODERATE 35: CPT | Performed by: INTERNAL MEDICINE

## 2022-03-06 RX ORDER — CALCIUM CARBONATE 200(500)MG
500 TABLET,CHEWABLE ORAL 3 TIMES DAILY PRN
Status: DISCONTINUED | OUTPATIENT
Start: 2022-03-06 | End: 2022-03-07 | Stop reason: HOSPADM

## 2022-03-06 RX ADMIN — NICOTINE POLACRILEX 2 MG: 2 GUM, CHEWING BUCCAL at 19:23

## 2022-03-06 RX ADMIN — VENLAFAXINE HYDROCHLORIDE 75 MG: 75 CAPSULE, EXTENDED RELEASE ORAL at 07:49

## 2022-03-06 RX ADMIN — ALUMINUM HYDROXIDE, MAGNESIUM HYDROXIDE, AND SIMETHICONE 30 ML: 200; 200; 20 SUSPENSION ORAL at 01:20

## 2022-03-06 RX ADMIN — NICOTINE POLACRILEX 2 MG: 2 GUM, CHEWING BUCCAL at 11:06

## 2022-03-06 RX ADMIN — HYDROXYZINE HYDROCHLORIDE 50 MG: 50 TABLET, FILM COATED ORAL at 03:12

## 2022-03-06 RX ADMIN — CHLORDIAZEPOXIDE HYDROCHLORIDE 25 MG: 25 CAPSULE ORAL at 07:49

## 2022-03-06 RX ADMIN — NICOTINE POLACRILEX 2 MG: 2 GUM, CHEWING BUCCAL at 15:28

## 2022-03-06 RX ADMIN — ACAMPROSATE CALCIUM 666 MG: 333 TABLET, DELAYED RELEASE ORAL at 12:43

## 2022-03-06 RX ADMIN — THIAMINE HCL TAB 100 MG 100 MG: 100 TAB at 07:49

## 2022-03-06 RX ADMIN — ARIPIPRAZOLE 5 MG: 5 TABLET ORAL at 07:49

## 2022-03-06 RX ADMIN — MULTIPLE VITAMINS W/ MINERALS TAB 1 TABLET: TAB at 07:49

## 2022-03-06 RX ADMIN — CHLORDIAZEPOXIDE HYDROCHLORIDE 25 MG: 25 CAPSULE ORAL at 12:43

## 2022-03-06 RX ADMIN — ACAMPROSATE CALCIUM 666 MG: 333 TABLET, DELAYED RELEASE ORAL at 07:49

## 2022-03-06 RX ADMIN — POLYETHYLENE GLYCOL 3350 17 G: 17 POWDER, FOR SOLUTION ORAL at 20:18

## 2022-03-06 RX ADMIN — PANTOPRAZOLE SODIUM 40 MG: 40 TABLET, DELAYED RELEASE ORAL at 06:40

## 2022-03-06 RX ADMIN — NICOTINE POLACRILEX 2 MG: 2 GUM, CHEWING BUCCAL at 12:43

## 2022-03-06 RX ADMIN — TRAZODONE HYDROCHLORIDE 150 MG: 150 TABLET ORAL at 20:56

## 2022-03-06 RX ADMIN — ACAMPROSATE CALCIUM 666 MG: 333 TABLET, DELAYED RELEASE ORAL at 20:55

## 2022-03-06 RX ADMIN — ANTACID TABLETS 500 MG: 500 TABLET, CHEWABLE ORAL at 20:56

## 2022-03-06 RX ADMIN — FOLIC ACID 1 MG: 1 TABLET ORAL at 07:49

## 2022-03-06 RX ADMIN — BUSPIRONE HYDROCHLORIDE 7.5 MG: 7.5 TABLET ORAL at 20:56

## 2022-03-06 NOTE — PLAN OF CARE
Problem: Altered Mood, Depressive Behavior:  Goal: Able to verbalize and/or display a decrease in depressive symptoms  Description: Able to verbalize and/or display a decrease in depressive symptoms  3/5/2022 2035 by Armand Harris LPN  Outcome: Ongoing  Patient admits to having depression/anxiety at this time. Patient is isolative to room most of the evening and out for needs only. Patient endorses having auditory hallucinations of her ex-girlfriend saying negative things about her. Patient states she feels unmotivated and has a poor appetite. Patient accepting of support and reassurance upon 1:1 talk time. Snacks and fluids encouraged. Patient is medication compliant and behavior is controlled. Safety checks maintained every 15 minutes. Problem: Pain:  Goal: Pain level will decrease  Description: Pain level will decrease  3/5/2022 2035 by Armand Harris LPN  Outcome: Ongoing  No pain or discomfort voiced at this time.

## 2022-03-06 NOTE — PROGRESS NOTES
Daily Progress Note  3/6/2022    Patient Name: Gloria García    CHIEF COMPLAINT: Suicide attempt - patient overdosed on trazodone         SUBJECTIVE:   Patient is seen for follow-up assessment in her assigned room today. Nursing staff report patient continues to be isolative to her room yet cooperative, medication adherent and behaviorally in control. Patient is ambulating the day area, agreeable to conversation in room for privacy. Patient describes her gait as \"wobbly,\" she describes neuropathy, is able to ambulate without assistance. Patient asked writer and NP to look at her arms and feet, writer notes her veins, patient states, \"I know what veins should Iook like, I used to shoot up heroin. \" Writer notes what could be spider veins on patient's bilateral internal feet. Patient reports she experienced visual hallucinations, seeing her ex-girlfriend and her cat. Patient believes she heard auditory hallucinations last night, hearing country music and a newscaster's voice. Patient is discharge focused, reports she's showered two days in a row and asks writer and NP when she will be able to leave and that she's ready for discharge. NP asks about patient's plan for sobriety, patient states she's considering IOP but feels AA meetings would be too triggering and she may relapse. Patient is again encouraged to consider inpatient rehabilitation, patient declines. Patient reports she vomited in the shower yesterday and spit up in bed last night. Patient endorses ongoing symptoms of depression and anxiety and has not yet noticed significant improvement in her mood. Suicidal ideation has been intermittent and \"comes and goes\" from her mind. Patient has not exhibited stability and is not able to contract for safety in the community and requires further hospitalization for stabilization and safety.      Appetite:  [] Normal/Adequate/Unchanged  [x] Increased  [] Decreased      Sleep:       [] Normal/Adequate/Unchanged [] Fair  [x] Poor      Group Attendance on Unit:   [] Yes  [x] Selectively    [] No    Medication Side Effects: Somnolence         Mental Status Exam  Level of consciousness: Alert and awake   Appearance: Appropriate attire for setting, seated on bed, with fair  grooming and hygiene   Behavior/Motor: Approachable, relaxed, slowed gait  Attitude toward examiner: Cooperative, attentive, good eye contact   Speech: spontaneous, normal volume, normal rate, depressed tone  Mood: Depressed  Affect: Mood congruent  Thought processes: linear and coherent   Thought content: Denies homicidal ideation  Suicidal Ideation: Endorses, improving; patient unable to contract for safety in community  Delusions: Not evident  Perceptual Disturbance: patient is not observed responding to internal stimuli; endorses possible AVH   Cognition: Oriented to self, location, time, and situation  Memory: intact  Insight & Judgement: poor     Data   height is 5' (1.524 m) and weight is 118 lb (53.5 kg). Her temporal temperature is 97.6 °F (36.4 °C). Her blood pressure is 112/88 and her pulse is 90. Her respiration is 14. Labs:   No visits with results within 2 Day(s) from this visit.    Latest known visit with results is:   Admission on 03/03/2022, Discharged on 03/03/2022   Component Date Value Ref Range Status    Glucose 03/03/2022 108* 70 - 99 mg/dL Final    BUN 03/03/2022 6  6 - 20 mg/dL Final    CREATININE 03/03/2022 0.53  0.50 - 0.90 mg/dL Final    Calcium 03/03/2022 8.3* 8.6 - 10.4 mg/dL Final    Sodium 03/03/2022 141  135 - 144 mmol/L Final    Potassium 03/03/2022 3.8  3.7 - 5.3 mmol/L Final    Chloride 03/03/2022 100  98 - 107 mmol/L Final    CO2 03/03/2022 27  20 - 31 mmol/L Final    Anion Gap 03/03/2022 14  9 - 17 mmol/L Final    Alkaline Phosphatase 03/03/2022 89  35 - 104 U/L Final    ALT 03/03/2022 217* 5 - 33 U/L Final    AST 03/03/2022 229* <32 U/L Final    Total Bilirubin 03/03/2022 0.43  0.3 - 1.2 mg/dL Final    Ventricular Rate 03/03/2022 75  BPM Final    Atrial Rate 03/03/2022 75  BPM Final    P-R Interval 03/03/2022 158  ms Final    QRS Duration 03/03/2022 76  ms Final    Q-T Interval 03/03/2022 408  ms Final    QTc Calculation (Bazett) 03/03/2022 455  ms Final    P Axis 03/03/2022 56  degrees Final    R Axis 03/03/2022 29  degrees Final    T Axis 03/03/2022 43  degrees Final    Specimen Description 03/03/2022 . NASOPHARYNGEAL SWAB   Final    SARS-CoV-2, Rapid 03/03/2022 Not Detected  Not Detected Final    Comment:       Rapid NAAT:  The specimen is NEGATIVE for SARS-CoV-2, the novel coronavirus associated with   COVID-19. The ID NOW COVID-19 assay is designed to detect the virus that causes COVID-19 in patients   with signs and symptoms of infection who are suspected of COVID-19. An individual without symptoms of COVID-19 and who is not shedding SARS-CoV-2 virus would   expect to have a negative (not detected) result in this assay. Negative results should be treated as presumptive and, if inconsistent with clinical signs   and symptoms or necessary for patient management,  should be tested with an alternative molecular assay. Negative results do not preclude   SARS-CoV-2 infection and   should not be used as the sole basis for patient management decisions.          Fact sheet for Healthcare Providers: Carmel.[email or web address]  Fact sheet for Patients: Carmel.tasha          Methodology: Isothermal Nucleic Acid Amplification      Acetaminophen Level 03/03/2022 <5* 10 - 30 ug/mL Final    Ethanol 03/03/2022 398* <10 mg/dL Final    Ethanol percent 03/03/2022 0.398* <2.763 % Final    Salicylate Lvl 58/48/8699 <1* 3 - 10 mg/dL Final    Toxic Tricyclic Sc,Blood 94/01/6068 NEGATIVE  NEGATIVE Final    Amphetamine Screen, Ur 03/03/2022 NEGATIVE  NEGATIVE Final    Comment:       (Positive cutoff 1000 ng/mL)                  Barbiturate Screen, Ur 03/03/2022 NEGATIVE  NEGATIVE Final    Comment:       (Positive cutoff 200 ng/mL)                  Benzodiazepine Screen, Urine 03/03/2022 NEGATIVE  NEGATIVE Final    Comment:       (Positive cutoff 200 ng/mL)                  Cocaine Metabolite, Urine 03/03/2022 NEGATIVE  NEGATIVE Final    Comment:       (Positive cutoff 300 ng/mL)                  Methadone Screen, Urine 03/03/2022 NEGATIVE  NEGATIVE Final    Comment:       (Positive cutoff 300 ng/mL)                  Opiates, Urine 03/03/2022 NEGATIVE  NEGATIVE Final    Comment:       (Positive cutoff 300 ng/mL)                  Phencyclidine, Urine 03/03/2022 NEGATIVE  NEGATIVE Final    Comment:       (Positive cutoff 25 ng/mL)                  Cannabinoid Scrn, Ur 03/03/2022 NEGATIVE  NEGATIVE Final    Comment:       (Positive cutoff 50 ng/mL)                  Oxycodone Screen, Ur 03/03/2022 NEGATIVE  NEGATIVE Final    Comment:       (Positive cutoff 100 ng/mL)                  Test Information 03/03/2022 Assay provides medical screening only. The absence of expected drug(s) and/or metabolite(s) may indicate diluted or adulterated urine, limitations of testing or timing of collection. Final    Comment: Testing for legal purposes should be confirmed by another method. To request confirmation   of test result, please call the lab within 7 days of sample submission.  hCG Qual 03/03/2022 NEGATIVE  NEGATIVE Final    Comment: Specimens with hCG levels near the threshold of the test (25 mIU/mL) may give a negative or   indeterminate result. In such cases, another test should be performed with a new specimen   in 48-72 hours. If early pregnancy is suspected clinically in this setting, correlation   with quantitative serum b-hCG level is suggested. [employer] has confirmed the use of plasma for this test. This has not been cleared   or approved by the U.[place]. Food and Drug Administration.   The FDA has determined that such   clearance is not necessary. Reviewed patient's current plan of care and vital signs with nursing staff.     Labs reviewed: [x] Yes  Last EKG in EMR reviewed: [x] Yes    Medications  Current Facility-Administered Medications: busPIRone (BUSPAR) tablet 7.5 mg, 7.5 mg, Oral, BID PRN  folic acid (FOLVITE) tablet 1 mg, 1 mg, Oral, Daily  traZODone (DESYREL) tablet 150 mg, 150 mg, Oral, Nightly  venlafaxine (EFFEXOR XR) extended release capsule 75 mg, 75 mg, Oral, Daily with breakfast  albuterol sulfate  (90 Base) MCG/ACT inhaler 2 puff, 2 puff, Inhalation, Q4H PRN  pantoprazole (PROTONIX) tablet 40 mg, 40 mg, Oral, QAM AC  ARIPiprazole (ABILIFY) tablet 5 mg, 5 mg, Oral, Daily  acamprosate (CAMPRAL) tablet 666 mg, 666 mg, Oral, TID  Benzocaine-Menthol (CEPACOL SORE THROAT) 15-2.6 MG lozenge 1 lozenge, 1 lozenge, Oral, Q2H PRN  acetaminophen (TYLENOL) tablet 650 mg, 650 mg, Oral, Q4H PRN  aluminum & magnesium hydroxide-simethicone (MAALOX) 200-200-20 MG/5ML suspension 30 mL, 30 mL, Oral, Q6H PRN  hydrOXYzine (ATARAX) tablet 50 mg, 50 mg, Oral, TID PRN  traZODone (DESYREL) tablet 50 mg, 50 mg, Oral, Nightly PRN  polyethylene glycol (GLYCOLAX) packet 17 g, 17 g, Oral, Daily PRN  nicotine polacrilex (NICORETTE) gum 2 mg, 2 mg, Oral, PRN  haloperidol (HALDOL) tablet 5 mg, 5 mg, Oral, Q4H PRN **AND** LORazepam (ATIVAN) tablet 2 mg, 2 mg, Oral, Q4H PRN  haloperidol lactate (HALDOL) injection 5 mg, 5 mg, IntraMUSCular, Q4H PRN **AND** LORazepam (ATIVAN) injection 2 mg, 2 mg, IntraMUSCular, Q4H PRN **AND** diphenhydrAMINE (BENADRYL) injection 50 mg, 50 mg, IntraMUSCular, Q4H PRN  LORazepam (ATIVAN) tablet 1 mg, 1 mg, Oral, Q1H PRN **OR** LORazepam (ATIVAN) injection 1 mg, 1 mg, IntraMUSCular, Q1H PRN **OR** LORazepam (ATIVAN) tablet 2 mg, 2 mg, Oral, Q1H PRN **OR** LORazepam (ATIVAN) injection 2 mg, 2 mg, IntraMUSCular, Q1H PRN **OR** LORazepam (ATIVAN) tablet 3 mg, 3 mg, Oral, Q1H PRN **OR** LORazepam (ATIVAN) injection 3 mg, 3 mg, IntraMUSCular, Q1H PRN **OR** LORazepam (ATIVAN) tablet 4 mg, 4 mg, Oral, Q1H PRN **OR** LORazepam (ATIVAN) injection 4 mg, 4 mg, IntraMUSCular, Q1H PRN  thiamine mononitrate tablet 100 mg, 100 mg, Oral, Daily  therapeutic multivitamin-minerals 1 tablet, 1 tablet, Oral, Daily    ASSESSMENT  Bipolar I disorder, most recent episode depressed, severe without psychotic features ([facility] [address]         PLAN  Patient symptoms are: Modestly improving  Continue current medication regimen. Begin Librium taper Monday  Monitor need and frequency of PRN medications. Encourage participation in groups and milieu. Attempt to develop insight. Psycho-education conducted. Supportive Therapy conducted. Probable discharge is per attending physician. Follow-up daily while inpatient. Patient continues to be monitored in the inpatient psychiatric facility at [facility] for safety and stabilization. Patient continues to need, on a daily basis, active treatment furnished directly by or requiring the supervision of inpatient psychiatric personnel.     Electronically signed by Korene Olszewski, RN, student nurse practitioner, on 3/6/2022 at 4:58 PM      Electronically signed by Kat Martin 3/5/2022 at 6:04 PM

## 2022-03-06 NOTE — GROUP NOTE
Group Therapy Note    Date: 3/6/2022    Group Start Time: 1400  Group End Time: 1500  Group Topic: Cognitive Skills    [facility]    Nelly Tolentino, CTRS        Group Therapy Note    Attendees: 10/18         Patient's Goal:  To increase social interaction and to practice deductive reasoning, and communication skills. Notes: Pt participated fully in group task . Pt was able to practice deductive reasoning, and communication skills. Pt was pleasant and supportive of peers. Pt is also exploring sobriety steps/support and resources and has started writing goals and list of supports to contact. Status After Intervention:  Improved     Participation Level:  Active Listener and Interactive     Participation Quality: Appropriate, Attentive, Sharing and Supportive        Speech:  normal        Thought Process/Content: Logical  Linear        Affective Functioning: Congruent        Mood: euthymic        Level of consciousness:  Alert, Oriented x4 and Attentive        Response to Learning: Able to verbalize current knowledge/experience, Able to verbalize/acknowledge new learning, Able to retain information and Progressing to goal        Endings: None Reported     Modes of Intervention: Education, Support, Socialization, Exploration, Clarifying and Problem-solving        Discipline Responsible: Psychoeducational Specialist        Signature:  Riley Patient

## 2022-03-06 NOTE — H&P
[facility] Internal Medicine    CONSULTATION / HISTORY AND PHYSICAL EXAMINATION            Date:   3/5/2022  Patient name:  Chad Ramos  Date of admission:  3/3/2022 10:47 PM  MRN:   [id number]  Account:  [de-identified]  YOB: 1975  PCP:    Mickey Poole MD  Room:   0229/0229-01  Code Status:    Full Code    Physician Requesting Consult: Kevin Mendiola MD    Reason for Consult:  medical management    Chief Complaint:     No chief complaint on file.   Hepatitis    History Obtained From:     Patient medical record nursing staff    History of Present Illness:     47-year-old lady with history of gastric sleeve surgery many years ago which was complicated by bowel obstruction and had laparotomy done  Patient is asymptomatic from abdominal standpoint but is positive for hepatitis C alcohol abuse and liver function panel was abnormal elevated AST and ALT patient is also on Lipitor for hyperlipidemia  Patient denies any jaundice nausea vomiting diarrhea suffers from mental health disorder    Past Medical History:     Past Medical History:   Diagnosis Date    MORTEZA (acute kidney injury) ([address] 7/94/8699    Alcoholic hepatitis without ascites 58/19/6416    Alcoholic ketoacidosis 87/83/2882    Alcoholism ([address]     3 pints daily    Anxiety     Asthma     Bipolar affective disorder ([address] 6/12/2019    Depression     Drug overdose, accidental or unintentional, initial encounter 9/29/2018    Drug overdose, intentional self-harm, initial encounter ([address] 5/8/2018    [place] coma scale total score 3-8 ([address]     Hepatitis C antibody positive in blood 6/18/2019    History of gastric surgery (gastric sleeve 2012) 4/25/2017    Intentional drug overdose ([address] 5/7/2018    MDD (major depressive disorder), recurrent episode ([address] 5/8/2018    Pneumonia     Polysubstance abuse ([address]     drug abuse includes, cocaine, IV heroin, cannabis, and ETOH    Post traumatic stress disorder  [facility] 12/17/2019    Smoker unmotivated to quit 4/25/2017    Suicide attempt ([facility] 11/08/2017    S/A by overdosing on Trazodone and Seroquel    Suicide ideation     Toxic metabolic encephalopathy 9/43/7391        Past Surgical History:     Past Surgical History:   Procedure Laterality Date    CARPAL TUNNEL RELEASE      CHOLECYSTECTOMY      HYSTERECTOMY      HYSTERECTOMY, TOTAL ABDOMINAL      STOMACH SURGERY      gastric sleeve    UPPER GASTROINTESTINAL ENDOSCOPY N/A 6/19/2019    EGD BIOPSY performed by Ismael Paulino MD at [address]        Medications Prior to Admission:     Prior to Admission medications    Medication Sig Start Date End Date Taking?  Authorizing Provider   busPIRone (BUSPAR) 7.5 MG tablet Take 1 tablet by mouth 2 times daily as needed (Anxiety) 1/3/22   Palmer Harris MD   atorvastatin (LIPITOR) 40 MG tablet Take 1 tablet by mouth nightly 1/3/22   Palmer Harris MD   folic acid (FOLVITE) 1 MG tablet Take 1 tablet by mouth daily 1/3/22   Palmer Harris MD   omeprazole (PRILOSEC) 20 MG delayed release capsule Take 1 capsule by mouth 2 times daily 1/3/22   Palmer Harris MD   nicotine (NICODERM CQ) 14 MG/24HR Place 1 patch onto the skin daily 1/4/22   Palmer Harris MD   paliperidone (INVEGA) 3 MG extended release tablet Take 1 tablet by mouth daily 1/4/22   Palmer Harris MD   Multiple Vitamins-Minerals (THERAPEUTIC MULTIVITAMIN-MINERALS) tablet Take 1 tablet by mouth daily 1/4/22   Palmer Harris MD   thiamine 100 MG tablet Take 1 tablet by mouth daily 1/3/22   Palmer Harris MD   traZODone (DESYREL) 150 MG tablet Take 1 tablet by mouth nightly 1/3/22   Palmer Harris MD   venlafaxine (EFFEXOR XR) 75 MG extended release capsule Take 1 capsule by mouth daily (with breakfast) 1/4/22   Palmer Harris MD   albuterol sulfate  (90 Base) MCG/ACT inhaler Inhale 2 puffs into the lungs every 4 hours as needed for Wheezing 7/10/20   Gertrudis Kidd MD Allergies:     Morphine, Morphine and related, Azithromycin, Morphine, Nsaids, and Zithromax [azithromycin]    Social History:     Tobacco:    reports that she has been smoking cigarettes. She has a 60.00 pack-year smoking history. She has never used smokeless tobacco.  Alcohol:      reports current alcohol use of about 3.0 standard drinks of alcohol per week. Drug Use:  reports current drug use. Frequency: 7.00 times per week. Drugs: Marijuana ([place] [facility]), IV, and Cocaine. Family History:     Family History   Problem Relation Age of Onset    Brain Cancer Mother     Cancer Mother         \"female cancer\"    Heart Disease Father        Review of Systems:     Positive and Negative as described in HPI. CONSTITUTIONAL:  negative for fevers, chills, sweats, fatigue, weight loss  HEENT:  negative for vision, hearing changes, runny nose, throat pain  RESPIRATORY:  negative for shortness of breath, cough, congestion, wheezing. CARDIOVASCULAR:  negative for chest pain, palpitations. GASTROINTESTINAL:  negative for nausea, vomiting, diarrhea, constipation, change in bowel habits, abdominal pain   GENITOURINARY:  negative for difficulty of urination, burning with urination, frequency   INTEGUMENT:  negative for rash, skin lesions, easy bruising   HEMATOLOGIC/LYMPHATIC:  negative for swelling/edema   ALLERGIC/IMMUNOLOGIC:  negative for urticaria , itching  ENDOCRINE:  negative increase in drinking, increase in urination, hot or cold intolerance  MUSCULOSKELETAL:  negative joint pains, muscle aches, swelling of joints  NEUROLOGICAL:  negative for headaches, dizziness, lightheadedness, numbness, pain, tingling extremities      Physical Exam:     /72   Pulse 106   Temp 98.6 °F (37 °C) (Oral)   Resp 14   Ht 5' (1.524 m)   Wt 118 lb (53.5 kg)   BMI 23.05 kg/m²   Temp (24hrs), Av.6 °F (37 °C), Min:98.6 °F (37 °C), Max:98.6 °F (37 °C)    No results for input(s): POCGLU in the last 72 hours.   No intake or secondary to alcohol abuse and hep C positive  Hyperlipidemia on Lipitor we will stop due to hepatitis  Counseled against the use of alcohol and patient will benefit from [facility] treatment outpatient for hepatitis Luis Dumont MD  3/5/2022  7:44 PM    Copy sent to Dr. Caterina Montaño MD    Please note that this chart was generated using voice recognition Dragon dictation software. Although every effort was made to ensure the accuracy of this automated transcription, some errors in transcription may have occurred.

## 2022-03-06 NOTE — PROGRESS NOTES
[facility]    CONSULTATION / HISTORY AND PHYSICAL EXAMINATION            Date:   3/6/2022  Patient name:  Jone Marrufo  Date of admission:  3/3/2022 10:47 PM  MRN:   [id number]  Account:  [de-identified]  YOB: 1975  PCP:    Nusrat Thakkar MD  Room:   0229/0229-01  Code Status:    Full Code    Physician Requesting Consult: Tamy Shelton MD    Reason for Consult:  medical management    Chief Complaint:     No chief complaint on file.   Hepatitis    History Obtained From:     Patient medical record nursing staff    History of Present Illness:     73-year-old lady with history of gastric sleeve surgery many years ago which was complicated by bowel obstruction and had laparotomy done  Patient is asymptomatic from abdominal standpoint but is positive for hepatitis C alcohol abuse and liver function panel was abnormal elevated AST and ALT patient is also on Lipitor for hyperlipidemia  Patient denies any jaundice nausea vomiting diarrhea suffers from mental health disorder    Past Medical History:     Past Medical History:   Diagnosis Date    MORTEZA (acute kidney injury) ([address] 9/78/6524    Alcoholic hepatitis without ascites 32/59/3570    Alcoholic ketoacidosis 36/74/3342    Alcoholism ([address]     3 pints daily    Anxiety     Asthma     Bipolar affective disorder ([address] 6/12/2019    Depression     Drug overdose, accidental or unintentional, initial encounter 9/29/2018    Drug overdose, intentional self-harm, initial encounter ([address] 5/8/2018    Boswell coma scale total score 3-8 ([address]     Hepatitis C antibody positive in blood 6/18/2019    History of gastric surgery (gastric sleeve 2012) 4/25/2017    Intentional drug overdose ([address] 5/7/2018    MDD (major depressive disorder), recurrent episode ([address] 5/8/2018    Pneumonia     Polysubstance abuse ([address]     drug abuse includes, cocaine, IV heroin, cannabis, and ETOH    Post traumatic stress disorder  [facility]iz[facility] 12/17/2019    Smoker unmotivated to quit 4/25/2017    Suicide attempt ([facility] 11/08/2017    S/A by overdosing on Trazodone and Seroquel    Suicide ideation     Toxic metabolic encephalopathy 5/23/6036        Past Surgical History:     Past Surgical History:   Procedure Laterality Date    CARPAL TUNNEL RELEASE      CHOLECYSTECTOMY      HYSTERECTOMY      HYSTERECTOMY, TOTAL ABDOMINAL      STOMACH SURGERY      gastric sleeve    UPPER GASTROINTESTINAL ENDOSCOPY N/A 6/19/2019    EGD BIOPSY performed by Jcarlos Pena MD at [address]        Medications Prior to Admission:     Prior to Admission medications    Medication Sig Start Date End Date Taking?  Authorizing Provider   busPIRone (BUSPAR) 7.5 MG tablet Take 1 tablet by mouth 2 times daily as needed (Anxiety) 1/3/22   Dee Webb MD   atorvastatin (LIPITOR) 40 MG tablet Take 1 tablet by mouth nightly 1/3/22   Dee Webb MD   folic acid (FOLVITE) 1 MG tablet Take 1 tablet by mouth daily 1/3/22   Dee Webb MD   omeprazole (PRILOSEC) 20 MG delayed release capsule Take 1 capsule by mouth 2 times daily 1/3/22   Dee Webb MD   nicotine (NICODERM CQ) 14 MG/24HR Place 1 patch onto the skin daily 1/4/22   Dee Webb MD   paliperidone (INVEGA) 3 MG extended release tablet Take 1 tablet by mouth daily 1/4/22   Dee Webb MD   Multiple Vitamins-Minerals (THERAPEUTIC MULTIVITAMIN-MINERALS) tablet Take 1 tablet by mouth daily 1/4/22   Dee Webb MD   thiamine 100 MG tablet Take 1 tablet by mouth daily 1/3/22   Dee Webb MD   traZODone (DESYREL) 150 MG tablet Take 1 tablet by mouth nightly 1/3/22   Dee Webb MD   venlafaxine (EFFEXOR XR) 75 MG extended release capsule Take 1 capsule by mouth daily (with breakfast) 1/4/22   Dee Webb MD   albuterol sulfate  (90 Base) MCG/ACT inhaler Inhale 2 puffs into the lungs every 4 hours as needed for Wheezing 7/10/20   Aron Oneil MD Allergies:     Morphine, Morphine and related, Azithromycin, Morphine, Nsaids, and Zithromax [azithromycin]    Social History:     Tobacco:    reports that she has been smoking cigarettes. She has a 60.00 pack-year smoking history. She has never used smokeless tobacco.  Alcohol:      reports current alcohol use of about 3.0 standard drinks of alcohol per week. Drug Use:  reports current drug use. Frequency: 7.00 times per week. Drugs: Marijuana ([place]ul Flower), IV, and Cocaine. Family History:     Family History   Problem Relation Age of Onset    Brain Cancer Mother     Cancer Mother         \"female cancer\"    Heart Disease Father        Review of Systems:     Positive and Negative as described in HPI. CONSTITUTIONAL:  negative for fevers, chills, sweats, fatigue, weight loss  HEENT:  negative for vision, hearing changes, runny nose, throat pain  RESPIRATORY:  negative for shortness of breath, cough, congestion, wheezing. CARDIOVASCULAR:  negative for chest pain, palpitations. GASTROINTESTINAL:  negative for nausea, vomiting, diarrhea, constipation, change in bowel habits, abdominal pain   GENITOURINARY:  negative for difficulty of urination, burning with urination, frequency   INTEGUMENT:  negative for rash, skin lesions, easy bruising   HEMATOLOGIC/LYMPHATIC:  negative for swelling/edema   ALLERGIC/IMMUNOLOGIC:  negative for urticaria , itching  ENDOCRINE:  negative increase in drinking, increase in urination, hot or cold intolerance  MUSCULOSKELETAL:  negative joint pains, muscle aches, swelling of joints  NEUROLOGICAL:  negative for headaches, dizziness, lightheadedness, numbness, pain, tingling extremities      Physical Exam:     /88   Pulse 90   Temp 97.6 °F (36.4 °C) (Temporal)   Resp 14   Ht 5' (1.524 m)   Wt 118 lb (53.5 kg)   BMI 23.05 kg/m²   Temp (24hrs), Av.1 °F (36.7 °C), Min:97.6 °F (36.4 °C), Max:98.6 °F (37 °C)    No results for input(s): POCGLU in the last 72 hours.   No intake or output data in the 24 hours ending 03/06/22 1710    General Appearance:  alert, well appearing, and in no acute distress  Mental status: oriented to person, place, and time with normal affect  Head:  normocephalic, atraumatic. Eye: no icterus, redness, pupils equal and reactive, extraocular eye movements intact, conjunctiva clear  Ear: normal external ear, no discharge, hearing intact  Nose:  no drainage noted  Mouth: mucous membranes moist  Neck: supple, no carotid bruits, thyroid not palpable  Lungs: Bilateral equal air entry, clear to ausculation, no wheezing, rales or rhonchi, normal effort  Cardiovascular: normal rate, regular rhythm, no murmur, gallop, rub. Abdomen:   Laparotomy scar noted midline soft nontender no guarding no rigidity  Neurologic: There are no new focal motor or sensory deficits, normal muscle tone and bulk, no abnormal sensation, normal speech, cranial nerves II through XII grossly intact  Skin: No gross lesions, rashes, bruising or bleeding on exposed skin area  Extremities:  peripheral pulses palpable, no pedal edema or calf pain with palpation      Investigations:      Laboratory Testing:  No results found for this or any previous visit (from the past 24 hour(s)).         Consultations:   IP CONSULT TO SOCIAL WORK  IP CONSULT TO PSYCHIATRY  Assessment :      Primary Problem  Bipolar I disorder, most recent episode depressed, severe without psychotic features [facility])    Active Hospital Problems    Diagnosis Date Noted    Bipolar I disorder, most recent episode depressed, [facility] psychotic features [facility] [F31.4] 03/04/2022    Alcohol dependence with withdrawal, uncomplicated ([facility] [R33.816] 03/04/2022    Generalized anxiety disorder with panic attacks [F41.1, F41.0] 03/04/2022    PTSD (post-traumatic stress disorder) [F43.10] 11/28/2017       Plan:   29-year-old lady with a history of gastric sleeve surgery complicated by bowel obstruction and had laparotomy done  Hepatitis likely secondary to alcohol abuse and hep C positive  Hyperlipidemia on Lipitor we will stop due to hepatitis  Counseled against the use of alcohol and patient will benefit from [facility] treatment outpatient for hepatitis C    Ref to dr Sofie Cortes out pt for harvoni  Hep c positive        Samantha Rider MD  3/6/2022  5:10 PM    Copy sent to Dr. Sulma Stewart MD    Please note that this chart was generated using voice recognition Dragon dictation software. Although every effort was made to ensure the accuracy of this automated transcription, some errors in transcription may have occurred.

## 2022-03-06 NOTE — PLAN OF CARE
Problem: Altered Mood, Depressive Behavior:  Goal: Able to verbalize and/or display a decrease in depressive symptoms  Description: Able to verbalize and/or display a decrease in depressive symptoms  3/6/2022 0903 by Gareth Clay RN  Outcome: Ongoing   1:1 with pt x ten minutes. Pt encouraged to attend unit programming and interact with peers and staff. Pt also encouraged to tend to hygiene and ADLs. Pt encouraged to discuss feelings with staff and feedback and reassurance provided. Pt denies thoughts of self harm and is agreeable to seeking out should thoughts of self harm arise. Safe environment maintained. Q15 minute checks for safety cont per unit policy. Will cont to monitor for safety and provides support and reassurance as needed. Pt controlled in behaviors. Compliant with medications. Voices no withdrawal symptoms et is hopeful for discharge soon.

## 2022-03-07 VITALS
RESPIRATION RATE: 14 BRPM | TEMPERATURE: 97.6 F | WEIGHT: 118 LBS | BODY MASS INDEX: 23.16 KG/M2 | HEART RATE: 80 BPM | DIASTOLIC BLOOD PRESSURE: 82 MMHG | HEIGHT: 60 IN | SYSTOLIC BLOOD PRESSURE: 113 MMHG

## 2022-03-07 PROCEDURE — 6370000000 HC RX 637 (ALT 250 FOR IP): Performed by: PSYCHIATRY & NEUROLOGY

## 2022-03-07 PROCEDURE — 99239 HOSP IP/OBS DSCHRG MGMT >30: CPT | Performed by: PSYCHIATRY & NEUROLOGY

## 2022-03-07 PROCEDURE — 99232 SBSQ HOSP IP/OBS MODERATE 35: CPT | Performed by: INTERNAL MEDICINE

## 2022-03-07 RX ORDER — THIAMINE MONONITRATE (VIT B1) 100 MG
100 TABLET ORAL DAILY
Qty: 30 TABLET | Refills: 0 | Status: SHIPPED | OUTPATIENT
Start: 2022-03-08

## 2022-03-07 RX ORDER — M-VIT,TX,IRON,MINS/CALC/FOLIC 27MG-0.4MG
1 TABLET ORAL DAILY
Qty: 30 TABLET | Refills: 0 | Status: SHIPPED | OUTPATIENT
Start: 2022-03-07

## 2022-03-07 RX ORDER — VENLAFAXINE HYDROCHLORIDE 75 MG/1
75 CAPSULE, EXTENDED RELEASE ORAL
Qty: 30 CAPSULE | Refills: 3 | Status: SHIPPED | OUTPATIENT
Start: 2022-03-08

## 2022-03-07 RX ORDER — ACAMPROSATE CALCIUM 333 MG/1
666 TABLET, DELAYED RELEASE ORAL 3 TIMES DAILY
Qty: 180 TABLET | Refills: 0 | Status: SHIPPED | OUTPATIENT
Start: 2022-03-07 | End: 2022-03-08

## 2022-03-07 RX ORDER — BUSPIRONE HYDROCHLORIDE 7.5 MG/1
7.5 TABLET ORAL 2 TIMES DAILY PRN
Qty: 60 TABLET | Refills: 0 | Status: SHIPPED | OUTPATIENT
Start: 2022-03-07

## 2022-03-07 RX ORDER — ARIPIPRAZOLE 5 MG/1
5 TABLET ORAL DAILY
Qty: 30 TABLET | Refills: 3 | Status: SHIPPED | OUTPATIENT
Start: 2022-03-08

## 2022-03-07 RX ORDER — FOLIC ACID 1 MG/1
1 TABLET ORAL DAILY
Qty: 30 TABLET | Refills: 0 | Status: SHIPPED | OUTPATIENT
Start: 2022-03-07

## 2022-03-07 RX ADMIN — PANTOPRAZOLE SODIUM 40 MG: 40 TABLET, DELAYED RELEASE ORAL at 06:21

## 2022-03-07 RX ADMIN — HYDROXYZINE HYDROCHLORIDE 50 MG: 50 TABLET, FILM COATED ORAL at 02:40

## 2022-03-07 RX ADMIN — MULTIPLE VITAMINS W/ MINERALS TAB 1 TABLET: TAB at 07:26

## 2022-03-07 RX ADMIN — ARIPIPRAZOLE 5 MG: 5 TABLET ORAL at 07:26

## 2022-03-07 RX ADMIN — VENLAFAXINE HYDROCHLORIDE 75 MG: 75 CAPSULE, EXTENDED RELEASE ORAL at 07:26

## 2022-03-07 RX ADMIN — THIAMINE HCL TAB 100 MG 100 MG: 100 TAB at 07:26

## 2022-03-07 RX ADMIN — TRAZODONE HYDROCHLORIDE 50 MG: 50 TABLET ORAL at 02:40

## 2022-03-07 RX ADMIN — FOLIC ACID 1 MG: 1 TABLET ORAL at 07:26

## 2022-03-07 RX ADMIN — NICOTINE POLACRILEX 2 MG: 2 GUM, CHEWING BUCCAL at 07:04

## 2022-03-07 NOTE — GROUP NOTE
Group Therapy Note    Date: 3/7/2022    Group Start Time: 1000  Group End Time: 6458  Group Topic: Psychotherapy    [facility] RUDDY Dias        Group Therapy Note           Patient refused to attend psychotherapy group after encouragement from staff. 1:1 talk time offered but refused. Signature:   Ike Dias

## 2022-03-07 NOTE — GROUP NOTE
Group Therapy Note    Date: 3/7/2022    Group Start Time: 1100  Group End Time: 1869  Group Topic: Cognitive Skills    KRYSTIAN Daniels    Pt did not attend 1100 cognitive skills group d/t resting in room despite staff invitation to attend. 1:1 talk time offered as alternative to group session, pt declined.             Signature:  Yael Diaz
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
## 2022-03-07 NOTE — PROGRESS NOTES
Patient given tobacco quitline number 35510988653 at this time, refusing to call at this time, states \" I just dont want to quit now\"- patient given information as to the dangers of long term tobacco use. Continue to reinforce the importance of tobacco cessation.

## 2022-03-07 NOTE — GROUP NOTE
Group Therapy Note    Date: 3/7/2022    Group Start Time: 0900  Group End Time: 0930  Group Topic: Group Therapy    [facility]    Melvina Ramos LPN        Group Therapy Note    Attendees: 5/20         Patient's Goal:  Work with social work on discharge    Status After Intervention:  Improved    Participation Level: Interactive    Participation Quality: Appropriate      Speech:  normal      Thought Process/Content: Logical      Affective Functioning: Congruent      Mood: elevated      Level of consciousness:  Oriented x4      Response to Learning: Able to verbalize current knowledge/experience, Able to verbalize/acknowledge new learning, Able to retain information, Capable of insight, Able to change behavior and Progressing to goal      Endings: None Reported    Modes of Intervention: Support      Discipline Responsible: Licensed Practical Nurse      Signature:  Melvina Ramos LPN

## 2022-03-07 NOTE — PROGRESS NOTES
[facility]  Discharge Note    Pt discharged with followings belongings:   Dental Appliances: None  Vision - Corrective Lenses: Glasses  Hearing Aid: None  Jewelry: Ring,Necklace  Body Piercings Removed: N/A  Clothing: Jacket / coat,Footwear,Shirt,Undergarments (Comment),Pajamas,Socks  Were All Patient Medications Collected?: Not Applicable  Other Valuables: Cell phone   Valuables sent home with pt or returned to patient. Patient education on aftercare instructions: yes  Information faxed to [facility] by Ginger Hodges  at 1:54 PM .Patient verbalize understanding of AVS:  yes. Status EXAM upon discharge:  Status and Exam  Normal: Yes  Facial Expression: Brightened  Affect: Appropriate  Level of Consciousness: Alert  Mood:Normal: Yes  Mood: Depressed,Anxious  Motor Activity:Normal: Yes  Motor Activity: Decreased  Interview Behavior: Cooperative  Preception: Baxter to Person,Baxter to Time,Baxter to Place,Baxter to Situation  Attention:Normal: Yes  Attention: Distractible  Thought Processes: Circumstantial  Thought Content:Normal: Yes  Thought Content: Preoccupations  Hallucinations: None  Delusions: No  Memory:Normal: Yes  Memory: Poor Recent,Poor Remote  Insight and Judgment: Yes  Insight and Judgment: Poor Insight,Unmotivated,Poor Judgment  Present Suicidal Ideation: No  Present Homicidal Ideation: No      Metabolic Screening:    Lab Results   Component Value Date    LABA1C 5.5 08/01/2018       Lab Results   Component Value Date    CHOL 196 06/13/2019    CHOL 212 (H) 08/01/2018    CHOL 204 (H) 06/05/2018     Lab Results   Component Value Date    TRIG 99 06/13/2019    TRIG 168 (H) 08/01/2018    TRIG 274 (H) 06/05/2018    TRIG 158 (H) 04/27/2017     Lab Results   Component Value Date    HDL 56 06/13/2019    HDL 51 08/01/2018    HDL 45 06/05/2018    HDL 26 (L) 10/23/2017     No components found for: LDLCAL  No results found for: LABVLDL    Pt discharged to home by cab. All belongings et valuables sent with pt. Pt verbalizes all discharge instructions.      Carolina Irwin RN

## 2022-03-07 NOTE — PROGRESS NOTES
CLINICAL PHARMACY NOTE: MEDS TO BEDS    Total # of Prescriptions Filled: 3   The following medications were delivered to the patient:  · Aripiprazole 5mg  · Buspirone HCL 7.5mg  · Venlafaxine ER 75mg    Additional Documentation:    Delivered medications to nurses station

## 2022-03-07 NOTE — PROGRESS NOTES
[facility] Internal Medicine    CONSULTATION / HISTORY AND PHYSICAL EXAMINATION            Date:   3/7/2022  Patient name:  Ann Rivers  Date of admission:  3/3/2022 10:47 PM  MRN:   [id number]  Account:  [de-identified]  YOB: 1975  PCP:    Mireya Mata MD  Room:   0229/0229-01  Code Status:    Full Code    Physician Requesting Consult: Shanelle Rodriguez MD    Reason for Consult:  medical management    Chief Complaint:     No chief complaint on file.   Hepatitis    History Obtained From:     Patient medical record nursing staff    History of Present Illness:     Hospital Problems           Last Modified POA    * (Principal) Bipolar I disorder, most recent episode depressed, severe without psychotic features ([address] 3/4/2022 Yes    PTSD (post-traumatic stress disorder) 8/2/8321 Yes    Alcoholic hepatitis without ascites 3/7/2022 Yes    Chronic hepatitis C virus infection ([address] 3/7/2022 Yes    Alcohol dependence with withdrawal, uncomplicated ([address] 5/4/9531 Yes    Generalized anxiety disorder with panic attacks 3/4/2022 Yes        47-year-old lady with history of gastric sleeve surgery many years ago which was complicated by bowel obstruction and had laparotomy done  Patient is asymptomatic from abdominal standpoint but is positive for hepatitis C alcohol abuse and liver function panel was abnormal elevated AST and ALT patient is also on Lipitor for hyperlipidemia  Patient denies any jaundice nausea vomiting diarrhea suffers from mental health disorder    Past Medical History:     Past Medical History:   Diagnosis Date    MORTEZA (acute kidney injury) ([address] 4/48/3172    Alcoholic hepatitis without ascites 77/41/3651    Alcoholic ketoacidosis 88/97/9146    Alcoholism ([address]     3 pints daily    Anxiety     Asthma     Bipolar affective disorder ([address] 6/12/2019    Depression     Drug overdose, accidental or unintentional, initial encounter 9/29/2018    Drug overdose, intentional self-harm, initial encounter ([facility] 5/8/2018    Lisa coma scale total score 3-8 (HCC)     Hepatitis C antibody positive in blood 6/18/2019    History of gastric surgery (gastric sleeve 2012) 4/25/2017    Intentional drug overdose ([facility] 5/7/2018    MDD (major depressive disorder), recurrent episode ([facility] 5/8/2018    Pneumonia     Polysubstance abuse ([facility]     drug abuse includes, cocaine, IV heroin, cannabis, and ETOH    Post traumatic stress disorder     Seizure ([facility] 12/17/2019    Smoker unmotivated to quit 4/25/2017    Suicide attempt ([facility] 11/08/2017    S/A by overdosing on Trazodone and Seroquel    Suicide ideation     Toxic metabolic encephalopathy 4/77/2680        Past Surgical History:     Past Surgical History:   Procedure Laterality Date    CARPAL TUNNEL RELEASE      CHOLECYSTECTOMY      HYSTERECTOMY      HYSTERECTOMY, TOTAL ABDOMINAL      STOMACH SURGERY      gastric sleeve    UPPER GASTROINTESTINAL ENDOSCOPY N/A 6/19/2019    EGD BIOPSY performed by Angela Faria MD at [id number] [place] [address]         Medications Prior to Admission:     Prior to Admission medications    Medication Sig Start Date End Date Taking?  Authorizing Provider   acamprosate (CAMPRAL) 333 MG tablet Take 2 tablets by mouth 3 times daily 3/7/22  Yes Carlette Buerger, MD   ARIPiprazole (ABILIFY) 5 MG tablet Take 1 tablet by mouth daily 3/8/22  Yes Carlette Buerger, MD   busPIRone (BUSPAR) 7.5 MG tablet Take 1 tablet by mouth 2 times daily as needed (Anxiety) 3/7/22  Yes Carlette Buerger, MD   folic acid (FOLVITE) 1 MG tablet Take 1 tablet by mouth daily 3/7/22  Yes Carlette Buerger, MD   Multiple Vitamins-Minerals (THERAPEUTIC MULTIVITAMIN-MINERALS) tablet Take 1 tablet by mouth daily 3/7/22  Yes Carlette Buerger, MD   venlafaxine (EFFEXOR XR) 75 MG extended release capsule Take 1 capsule by mouth daily (with breakfast) 3/8/22  Yes Carlette Buerger, MD   thiamine mononitrate (THIAMINE) 100 MG tablet Take 1 tablet by mouth daily 3/8/22 Yes Shandra Phillips MD   atorvastatin (LIPITOR) 40 MG tablet Take 1 tablet by mouth nightly 1/3/22   Juana Santana MD   omeprazole (PRILOSEC) 20 MG delayed release capsule Take 1 capsule by mouth 2 times daily 1/3/22   Juana Santana MD   traZODone (DESYREL) 150 MG tablet Take 1 tablet by mouth nightly 1/3/22   Juana Santana MD   albuterol sulfate  (90 Base) MCG/ACT inhaler Inhale 2 puffs into the lungs every 4 hours as needed for Wheezing 7/10/20   Wyatt Uribe MD        Allergies:     Morphine, Morphine and related, Azithromycin, Morphine, Nsaids, and Zithromax [azithromycin]    Social History:     Tobacco:    reports that she has been smoking cigarettes. She has a 60.00 pack-year smoking history. She has never used smokeless tobacco.  Alcohol:      reports current alcohol use of about 3.0 standard drinks of alcohol per week. Drug Use:  reports current drug use. Frequency: 7.00 times per week. Drugs: Marijuana (Suzie Rist), IV, and Cocaine. Family History:     Family History   Problem Relation Age of Onset    Brain Cancer Mother     Cancer Mother         \"female cancer\"    Heart Disease Father        Review of Systems:     Positive and Negative as described in HPI. CONSTITUTIONAL:  negative for fevers, chills, sweats, fatigue, weight loss  HEENT:  negative for vision, hearing changes, runny nose, throat pain  RESPIRATORY:  negative for shortness of breath, cough, congestion, wheezing. CARDIOVASCULAR:  negative for chest pain, palpitations.   GASTROINTESTINAL:  negative for nausea, vomiting, diarrhea, constipation, change in bowel habits, abdominal pain   GENITOURINARY:  negative for difficulty of urination, burning with urination, frequency   INTEGUMENT:  negative for rash, skin lesions, easy bruising   HEMATOLOGIC/LYMPHATIC:  negative for swelling/edema   ALLERGIC/IMMUNOLOGIC:  negative for urticaria , itching  ENDOCRINE:  negative increase in drinking, increase in urination, hot or cold intolerance  MUSCULOSKELETAL:  negative joint pains, muscle aches, swelling of joints  NEUROLOGICAL:  negative for headaches, dizziness, lightheadedness, numbness, pain, tingling extremities      Physical Exam:     /82   Pulse 80   Temp 97.6 °F (36.4 °C) (Oral)   Resp 14   Ht 5' (1.524 m)   Wt 118 lb (53.5 kg)   BMI 23.05 kg/m²   Temp (24hrs), Av.5 °F (36.4 °C), Min:97.4 °F (36.3 °C), Max:97.6 °F (36.4 °C)    No results for input(s): POCGLU in the last 72 hours. No intake or output data in the 24 hours ending 22 1143    General Appearance:  alert, well appearing, and in no acute distress  Mental status: oriented to person, place, and time with normal affect  Head:  normocephalic, atraumatic. Eye: no icterus, redness, pupils equal and reactive, extraocular eye movements intact, conjunctiva clear  Ear: normal external ear, no discharge, hearing intact  Nose:  no drainage noted  Mouth: mucous membranes moist  Neck: supple, no carotid bruits, thyroid not palpable  Lungs: Bilateral equal air entry, clear to ausculation, no wheezing, rales or rhonchi, normal effort  Cardiovascular: normal rate, regular rhythm, no murmur, gallop, rub. Abdomen:   Laparotomy scar noted midline soft nontender no guarding no rigidity  Neurologic: There are no new focal motor or sensory deficits, normal muscle tone and bulk, no abnormal sensation, normal speech, cranial nerves II through XII grossly intact  Skin: No gross lesions, rashes, bruising or bleeding on exposed skin area  Extremities:  peripheral pulses palpable, no pedal edema or calf pain with palpation      Investigations:      Laboratory Testing:  No results found for this or any previous visit (from the past 24 hour(s)).         Consultations:   IP CONSULT TO SOCIAL WORK  IP CONSULT TO PSYCHIATRY  Assessment :      Primary Problem  Bipolar I disorder, most recent episode depressed, severe without psychotic features [facility])    Active Hospital Problems Diagnosis Date Noted    Bipolar I disorder, most recent episode depressed, severe without psychotic features ([facility] [F31.4] 03/04/2022    Alcohol dependence with withdrawal, uncomplicated ([facility] [P18.049] 03/04/2022    Generalized anxiety disorder with panic attacks [F41.1, F41.0] 03/04/2022    PTSD (post-traumatic stress disorder) [F43.10] 11/28/2017       Plan:   42-year-old lady with a history of gastric sleeve surgery complicated by bowel obstruction and had laparotomy done  Hepatitis likely secondary to alcohol abuse and hep C positive  Hyperlipidemia on Lipitor we will stop due to hepatitis  Counseled against the use of alcohol and patient will benefit from [facility] treatment outpatient for hepatitis C    Ref to dr Sal Laurent out pt for harvoni  Hep c positive      3/7/22    Patient has significant elevated liver enzymes  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 Jaciel Diane MD    Please note that this chart was generated using voice recognition Dragon dictation software. Although every effort was made to ensure the accuracy of this automated transcription, some errors in transcription may have occurred.

## 2022-03-07 NOTE — CARE COORDINATION
Name: Michael Silva    : 1975    Discharge Date: 3/7/2022    Primary Auth/Cert #: [id number]    Destination: home via cab    Discharge Medications:      Medication List      START taking these medications    acamprosate 333 MG tablet  Commonly known as: CAMPRAL  Take 2 tablets by mouth 3 times daily  Notes to patient: Alcohol dependency      ARIPiprazole 5 MG tablet  Commonly known as: ABILIFY  Take 1 tablet by mouth daily  Start taking on: 2022  Notes to patient: Clear thoughts     vitamin B-1 100 MG tablet  Commonly known as: THIAMINE  Take 1 tablet by mouth daily  Start taking on: 2022  Notes to patient: vitamin        CONTINUE taking these medications    albuterol sulfate  (90 Base) MCG/ACT inhaler  Inhale 2 puffs into the lungs every 4 hours as needed for Wheezing  Notes to patient: Breathing aid     atorvastatin 40 MG tablet  Commonly known as: LIPITOR  Take 1 tablet by mouth nightly  Notes to patient: Control cholesterol     busPIRone 7.5 MG tablet  Commonly known as: BUSPAR  Take 1 tablet by mouth 2 times daily as needed (Anxiety)  Notes to patient: anxiety     folic acid 1 MG tablet  Commonly known as: FOLVITE  Take 1 tablet by mouth daily  Notes to patient: Vitamin     omeprazole 20 MG delayed release capsule  Commonly known as: PRILOSEC  Take 1 capsule by mouth 2 times daily  Notes to patient: Stomach health     therapeutic multivitamin-minerals tablet  Take 1 tablet by mouth daily  Notes to patient: Vitamin     traZODone 150 MG tablet  Commonly known as: DESYREL  Take 1 tablet by mouth nightly  Notes to patient: Sleep aid     venlafaxine 75 MG extended release capsule  Commonly known as: EFFEXOR XR  Take 1 capsule by mouth daily (with breakfast)  Start taking on: 2022  Notes to patient: depression        STOP taking these medications    nicotine 14 MG/24HR  Commonly known as: NICODERM CQ     paliperidone 3 MG extended release tablet  Commonly known as:

## 2022-03-08 RX ORDER — ACAMPROSATE CALCIUM 333 MG/1
666 TABLET, DELAYED RELEASE ORAL 3 TIMES DAILY
Qty: 180 TABLET | Refills: 0 | Status: SHIPPED | OUTPATIENT
Start: 2022-03-08

## 2022-03-25 ENCOUNTER — APPOINTMENT (OUTPATIENT)
Dept: GENERAL RADIOLOGY | Age: 47
DRG: 897 | End: 2022-03-25
Payer: MEDICARE

## 2022-03-25 ENCOUNTER — HOSPITAL ENCOUNTER (INPATIENT)
Age: 47
LOS: 5 days | Discharge: HOME HEALTH | DRG: 897 | End: 2022-03-30
Attending: EMERGENCY MEDICINE | Admitting: INTERNAL MEDICINE
Payer: MEDICARE

## 2022-03-25 ENCOUNTER — APPOINTMENT (OUTPATIENT)
Dept: CT IMAGING | Age: 47
DRG: 897 | End: 2022-03-25
Payer: MEDICARE

## 2022-03-25 DIAGNOSIS — F10.939 ALCOHOL WITHDRAWAL SEIZURE WITH COMPLICATION (HCC): ICD-10-CM

## 2022-03-25 DIAGNOSIS — R56.9 ALCOHOL WITHDRAWAL SEIZURE WITH COMPLICATION (HCC): ICD-10-CM

## 2022-03-25 DIAGNOSIS — R56.9 SEIZURE (HCC): Primary | ICD-10-CM

## 2022-03-25 LAB
-: ABNORMAL
ABSOLUTE EOS #: 0.26 K/UL (ref 0–0.44)
ABSOLUTE IMMATURE GRANULOCYTE: <0.03 K/UL (ref 0–0.3)
ABSOLUTE LYMPH #: 3.62 K/UL (ref 1.1–3.7)
ABSOLUTE MONO #: 0.75 K/UL (ref 0.1–1.2)
ACETAMINOPHEN LEVEL: <5 UG/ML (ref 10–30)
ALBUMIN SERPL-MCNC: 4.3 G/DL (ref 3.5–5.2)
ALBUMIN/GLOBULIN RATIO: 1.2 (ref 1–2.5)
ALP BLD-CCNC: 60 U/L (ref 35–104)
ALT SERPL-CCNC: 16 U/L (ref 5–33)
AMPHETAMINE SCREEN URINE: NEGATIVE
ANION GAP SERPL CALCULATED.3IONS-SCNC: 18 MMOL/L (ref 9–17)
ANION GAP: 12 MMOL/L (ref 7–16)
AST SERPL-CCNC: 27 U/L
BACTERIA: ABNORMAL
BARBITURATE SCREEN URINE: NEGATIVE
BASOPHILS # BLD: 1 % (ref 0–2)
BASOPHILS ABSOLUTE: 0.06 K/UL (ref 0–0.2)
BENZODIAZEPINE SCREEN, URINE: NEGATIVE
BILIRUB SERPL-MCNC: 0.19 MG/DL (ref 0.3–1.2)
BILIRUBIN URINE: NEGATIVE
BUN BLDV-MCNC: 5 MG/DL (ref 6–20)
CALCIUM SERPL-MCNC: 9.4 MG/DL (ref 8.6–10.4)
CANNABINOID SCREEN URINE: NEGATIVE
CASTS UA: ABNORMAL /LPF (ref 0–2)
CASTS UA: ABNORMAL /LPF (ref 0–2)
CHLORIDE BLD-SCNC: 102 MMOL/L (ref 98–107)
CO2: 18 MMOL/L (ref 20–31)
COCAINE METABOLITE, URINE: NEGATIVE
COLOR: YELLOW
CREAT SERPL-MCNC: 0.65 MG/DL (ref 0.5–0.9)
EOSINOPHILS RELATIVE PERCENT: 4 % (ref 1–4)
EPITHELIAL CELLS UA: ABNORMAL /HPF (ref 0–5)
ETHANOL PERCENT: 0.11 %
ETHANOL: 113 MG/DL
GFR AFRICAN AMERICAN: >60 ML/MIN
GFR NON-AFRICAN AMERICAN: >60 ML/MIN
GFR NON-AFRICAN AMERICAN: >60 ML/MIN
GFR SERPL CREATININE-BSD FRML MDRD: >60 ML/MIN
GFR SERPL CREATININE-BSD FRML MDRD: ABNORMAL ML/MIN/{1.73_M2}
GFR SERPL CREATININE-BSD FRML MDRD: NORMAL ML/MIN/{1.73_M2}
GLUCOSE BLD-MCNC: 55 MG/DL (ref 70–99)
GLUCOSE BLD-MCNC: 61 MG/DL (ref 74–100)
GLUCOSE URINE: NEGATIVE
HCO3 VENOUS: 23.6 MMOL/L (ref 22–29)
HCT VFR BLD CALC: 34.9 % (ref 36.3–47.1)
HEMOGLOBIN: 10.8 G/DL (ref 11.9–15.1)
IMMATURE GRANULOCYTES: 0 %
KETONES, URINE: NEGATIVE
LACTIC ACID, WHOLE BLOOD: 2.5 MMOL/L (ref 0.7–2.1)
LEUKOCYTE ESTERASE, URINE: NEGATIVE
LYMPHOCYTES # BLD: 49 % (ref 24–43)
MAGNESIUM: 2.2 MG/DL (ref 1.6–2.6)
MCH RBC QN AUTO: 23.9 PG (ref 25.2–33.5)
MCHC RBC AUTO-ENTMCNC: 30.9 G/DL (ref 28.4–34.8)
MCV RBC AUTO: 77.2 FL (ref 82.6–102.9)
METHADONE SCREEN, URINE: NEGATIVE
MONOCYTES # BLD: 10 % (ref 3–12)
NEGATIVE BASE EXCESS, VEN: 1 (ref 0–2)
NITRITE, URINE: NEGATIVE
NRBC AUTOMATED: 0 PER 100 WBC
O2 SAT, VEN: 52 % (ref 60–85)
OPIATES, URINE: NEGATIVE
OXYCODONE SCREEN URINE: NEGATIVE
PCO2, VEN: 38.2 MM HG (ref 41–51)
PDW BLD-RTO: 19.8 % (ref 11.8–14.4)
PH UA: 5.5 (ref 5–8)
PH VENOUS: 7.4 (ref 7.32–7.43)
PHENCYCLIDINE, URINE: NEGATIVE
PLATELET # BLD: 340 K/UL (ref 138–453)
PMV BLD AUTO: 9.3 FL (ref 8.1–13.5)
PO2, VEN: 27.7 MM HG (ref 30–50)
POC BUN: 6 MG/DL (ref 8–26)
POC CHLORIDE: 109 MMOL/L (ref 98–107)
POC CREATININE: 0.66 MG/DL (ref 0.51–1.19)
POC HEMATOCRIT: 38 % (ref 36–46)
POC HEMOGLOBIN: 12.9 G/DL (ref 12–16)
POC IONIZED CALCIUM: 1.16 MMOL/L (ref 1.15–1.33)
POC LACTIC ACID: 2.96 MMOL/L (ref 0.56–1.39)
POC POTASSIUM: 4 MMOL/L (ref 3.5–4.5)
POC SODIUM: 144 MMOL/L (ref 138–146)
POC TCO2: 24 MMOL/L (ref 22–30)
POTASSIUM SERPL-SCNC: 3.8 MMOL/L (ref 3.7–5.3)
PROTEIN UA: NEGATIVE
RBC # BLD: 4.52 M/UL (ref 3.95–5.11)
RBC # BLD: ABNORMAL 10*6/UL
RBC UA: ABNORMAL /HPF (ref 0–2)
REASON FOR REJECTION: NORMAL
SALICYLATE LEVEL: <1 MG/DL (ref 3–10)
SARS-COV-2, RAPID: NOT DETECTED
SEG NEUTROPHILS: 36 % (ref 36–65)
SEGMENTED NEUTROPHILS ABSOLUTE COUNT: 2.65 K/UL (ref 1.5–8.1)
SODIUM BLD-SCNC: 138 MMOL/L (ref 135–144)
SPECIFIC GRAVITY UA: 1 (ref 1–1.03)
SPECIMEN DESCRIPTION: NORMAL
TEST INFORMATION: NORMAL
TOTAL PROTEIN: 8 G/DL (ref 6.4–8.3)
TOXIC TRICYCLIC SC,BLOOD: NEGATIVE
TROPONIN, HIGH SENSITIVITY: <6 NG/L (ref 0–14)
TURBIDITY: CLEAR
URINE HGB: NEGATIVE
UROBILINOGEN, URINE: NORMAL
WBC # BLD: 7.4 K/UL (ref 3.5–11.3)
WBC UA: ABNORMAL /HPF (ref 0–5)
ZZ NTE CLEAN UP: ORDERED TEST: NORMAL
ZZ NTE WITH NAME CLEAN UP: SPECIMEN SOURCE: NORMAL

## 2022-03-25 PROCEDURE — 80051 ELECTROLYTE PANEL: CPT

## 2022-03-25 PROCEDURE — 83605 ASSAY OF LACTIC ACID: CPT

## 2022-03-25 PROCEDURE — 2060000000 HC ICU INTERMEDIATE R&B

## 2022-03-25 PROCEDURE — 99285 EMERGENCY DEPT VISIT HI MDM: CPT

## 2022-03-25 PROCEDURE — 83735 ASSAY OF MAGNESIUM: CPT

## 2022-03-25 PROCEDURE — 2580000003 HC RX 258: Performed by: STUDENT IN AN ORGANIZED HEALTH CARE EDUCATION/TRAINING PROGRAM

## 2022-03-25 PROCEDURE — 2580000003 HC RX 258: Performed by: NURSE PRACTITIONER

## 2022-03-25 PROCEDURE — 80143 DRUG ASSAY ACETAMINOPHEN: CPT

## 2022-03-25 PROCEDURE — 80307 DRUG TEST PRSMV CHEM ANLYZR: CPT

## 2022-03-25 PROCEDURE — 85025 COMPLETE CBC W/AUTO DIFF WBC: CPT

## 2022-03-25 PROCEDURE — 85014 HEMATOCRIT: CPT

## 2022-03-25 PROCEDURE — 87635 SARS-COV-2 COVID-19 AMP PRB: CPT

## 2022-03-25 PROCEDURE — 96365 THER/PROPH/DIAG IV INF INIT: CPT

## 2022-03-25 PROCEDURE — 6360000002 HC RX W HCPCS: Performed by: STUDENT IN AN ORGANIZED HEALTH CARE EDUCATION/TRAINING PROGRAM

## 2022-03-25 PROCEDURE — 81001 URINALYSIS AUTO W/SCOPE: CPT

## 2022-03-25 PROCEDURE — 82565 ASSAY OF CREATININE: CPT

## 2022-03-25 PROCEDURE — 82330 ASSAY OF CALCIUM: CPT

## 2022-03-25 PROCEDURE — G0480 DRUG TEST DEF 1-7 CLASSES: HCPCS

## 2022-03-25 PROCEDURE — 82947 ASSAY GLUCOSE BLOOD QUANT: CPT

## 2022-03-25 PROCEDURE — 70496 CT ANGIOGRAPHY HEAD: CPT

## 2022-03-25 PROCEDURE — 71045 X-RAY EXAM CHEST 1 VIEW: CPT

## 2022-03-25 PROCEDURE — 84520 ASSAY OF UREA NITROGEN: CPT

## 2022-03-25 PROCEDURE — 87086 URINE CULTURE/COLONY COUNT: CPT

## 2022-03-25 PROCEDURE — 70450 CT HEAD/BRAIN W/O DYE: CPT

## 2022-03-25 PROCEDURE — 6370000000 HC RX 637 (ALT 250 FOR IP): Performed by: NURSE PRACTITIONER

## 2022-03-25 PROCEDURE — 80053 COMPREHEN METABOLIC PANEL: CPT

## 2022-03-25 PROCEDURE — 6360000004 HC RX CONTRAST MEDICATION: Performed by: STUDENT IN AN ORGANIZED HEALTH CARE EDUCATION/TRAINING PROGRAM

## 2022-03-25 PROCEDURE — 84484 ASSAY OF TROPONIN QUANT: CPT

## 2022-03-25 PROCEDURE — 82803 BLOOD GASES ANY COMBINATION: CPT

## 2022-03-25 PROCEDURE — 2500000003 HC RX 250 WO HCPCS: Performed by: STUDENT IN AN ORGANIZED HEALTH CARE EDUCATION/TRAINING PROGRAM

## 2022-03-25 PROCEDURE — 80179 DRUG ASSAY SALICYLATE: CPT

## 2022-03-25 PROCEDURE — 96375 TX/PRO/DX INJ NEW DRUG ADDON: CPT

## 2022-03-25 PROCEDURE — 93005 ELECTROCARDIOGRAM TRACING: CPT | Performed by: STUDENT IN AN ORGANIZED HEALTH CARE EDUCATION/TRAINING PROGRAM

## 2022-03-25 RX ORDER — ONDANSETRON 2 MG/ML
4 INJECTION INTRAMUSCULAR; INTRAVENOUS EVERY 6 HOURS PRN
Status: DISCONTINUED | OUTPATIENT
Start: 2022-03-25 | End: 2022-03-30 | Stop reason: HOSPADM

## 2022-03-25 RX ORDER — POTASSIUM CHLORIDE 7.45 MG/ML
10 INJECTION INTRAVENOUS PRN
Status: DISCONTINUED | OUTPATIENT
Start: 2022-03-25 | End: 2022-03-30 | Stop reason: HOSPADM

## 2022-03-25 RX ORDER — HYDROXYZINE 50 MG/1
50 TABLET, FILM COATED ORAL 3 TIMES DAILY PRN
Status: ON HOLD | COMMUNITY
End: 2022-03-30 | Stop reason: HOSPADM

## 2022-03-25 RX ORDER — DEXTROSE MONOHYDRATE 25 G/50ML
12.5 INJECTION, SOLUTION INTRAVENOUS ONCE
Status: DISCONTINUED | OUTPATIENT
Start: 2022-03-25 | End: 2022-03-25

## 2022-03-25 RX ORDER — LORAZEPAM 2 MG/ML
4 INJECTION INTRAMUSCULAR
Status: DISCONTINUED | OUTPATIENT
Start: 2022-03-25 | End: 2022-03-29

## 2022-03-25 RX ORDER — LORAZEPAM 2 MG/ML
1 INJECTION INTRAMUSCULAR
Status: DISCONTINUED | OUTPATIENT
Start: 2022-03-25 | End: 2022-03-29

## 2022-03-25 RX ORDER — FOLIC ACID 1 MG/1
1 TABLET ORAL DAILY
Status: DISCONTINUED | OUTPATIENT
Start: 2022-03-26 | End: 2022-03-30 | Stop reason: HOSPADM

## 2022-03-25 RX ORDER — ATORVASTATIN CALCIUM 80 MG/1
40 TABLET, FILM COATED ORAL NIGHTLY
Status: DISCONTINUED | OUTPATIENT
Start: 2022-03-25 | End: 2022-03-30 | Stop reason: HOSPADM

## 2022-03-25 RX ORDER — SODIUM CHLORIDE 0.9 % (FLUSH) 0.9 %
10 SYRINGE (ML) INJECTION PRN
Status: DISCONTINUED | OUTPATIENT
Start: 2022-03-25 | End: 2022-03-30 | Stop reason: HOSPADM

## 2022-03-25 RX ORDER — POLYETHYLENE GLYCOL 3350 17 G/17G
17 POWDER, FOR SOLUTION ORAL DAILY PRN
Status: DISCONTINUED | OUTPATIENT
Start: 2022-03-25 | End: 2022-03-30 | Stop reason: HOSPADM

## 2022-03-25 RX ORDER — LORAZEPAM 0.5 MG/1
2 TABLET ORAL
Status: DISCONTINUED | OUTPATIENT
Start: 2022-03-25 | End: 2022-03-29

## 2022-03-25 RX ORDER — ONDANSETRON 4 MG/1
4 TABLET, ORALLY DISINTEGRATING ORAL EVERY 8 HOURS PRN
Status: DISCONTINUED | OUTPATIENT
Start: 2022-03-25 | End: 2022-03-30 | Stop reason: HOSPADM

## 2022-03-25 RX ORDER — LORAZEPAM 2 MG/ML
2 INJECTION INTRAMUSCULAR
Status: DISCONTINUED | OUTPATIENT
Start: 2022-03-25 | End: 2022-03-29

## 2022-03-25 RX ORDER — POTASSIUM CHLORIDE 20 MEQ/1
40 TABLET, EXTENDED RELEASE ORAL PRN
Status: DISCONTINUED | OUTPATIENT
Start: 2022-03-25 | End: 2022-03-30 | Stop reason: HOSPADM

## 2022-03-25 RX ORDER — LORAZEPAM 2 MG/1
4 TABLET ORAL
Status: DISCONTINUED | OUTPATIENT
Start: 2022-03-25 | End: 2022-03-29

## 2022-03-25 RX ORDER — LORAZEPAM 2 MG/ML
3 INJECTION INTRAMUSCULAR
Status: DISCONTINUED | OUTPATIENT
Start: 2022-03-25 | End: 2022-03-29

## 2022-03-25 RX ORDER — DEXTROSE MONOHYDRATE 25 G/50ML
25 INJECTION, SOLUTION INTRAVENOUS ONCE
Status: COMPLETED | OUTPATIENT
Start: 2022-03-25 | End: 2022-03-25

## 2022-03-25 RX ORDER — THIAMINE MONONITRATE (VIT B1) 100 MG
100 TABLET ORAL DAILY
Status: DISCONTINUED | OUTPATIENT
Start: 2022-03-26 | End: 2022-03-25

## 2022-03-25 RX ORDER — ARIPIPRAZOLE 5 MG/1
5 TABLET ORAL DAILY
Status: DISCONTINUED | OUTPATIENT
Start: 2022-03-26 | End: 2022-03-30 | Stop reason: HOSPADM

## 2022-03-25 RX ORDER — LORAZEPAM 2 MG/ML
2 INJECTION INTRAMUSCULAR ONCE
Status: COMPLETED | OUTPATIENT
Start: 2022-03-25 | End: 2022-03-25

## 2022-03-25 RX ORDER — LORAZEPAM 0.5 MG/1
1 TABLET ORAL
Status: DISCONTINUED | OUTPATIENT
Start: 2022-03-25 | End: 2022-03-29

## 2022-03-25 RX ORDER — VENLAFAXINE HYDROCHLORIDE 75 MG/1
75 CAPSULE, EXTENDED RELEASE ORAL
Status: DISCONTINUED | OUTPATIENT
Start: 2022-03-26 | End: 2022-03-30 | Stop reason: HOSPADM

## 2022-03-25 RX ORDER — LANOLIN ALCOHOL/MO/W.PET/CERES
100 CREAM (GRAM) TOPICAL DAILY
Status: DISCONTINUED | OUTPATIENT
Start: 2022-03-26 | End: 2022-03-30 | Stop reason: HOSPADM

## 2022-03-25 RX ORDER — 0.9 % SODIUM CHLORIDE 0.9 %
1000 INTRAVENOUS SOLUTION INTRAVENOUS ONCE
Status: COMPLETED | OUTPATIENT
Start: 2022-03-25 | End: 2022-03-25

## 2022-03-25 RX ORDER — ACETAMINOPHEN 325 MG/1
650 TABLET ORAL EVERY 6 HOURS PRN
Status: DISCONTINUED | OUTPATIENT
Start: 2022-03-25 | End: 2022-03-30 | Stop reason: HOSPADM

## 2022-03-25 RX ORDER — SODIUM CHLORIDE 9 MG/ML
25 INJECTION, SOLUTION INTRAVENOUS PRN
Status: DISCONTINUED | OUTPATIENT
Start: 2022-03-25 | End: 2022-03-30 | Stop reason: HOSPADM

## 2022-03-25 RX ORDER — ACETAMINOPHEN 650 MG/1
650 SUPPOSITORY RECTAL EVERY 6 HOURS PRN
Status: DISCONTINUED | OUTPATIENT
Start: 2022-03-25 | End: 2022-03-30 | Stop reason: HOSPADM

## 2022-03-25 RX ORDER — SODIUM CHLORIDE 0.9 % (FLUSH) 0.9 %
5-40 SYRINGE (ML) INJECTION EVERY 12 HOURS SCHEDULED
Status: DISCONTINUED | OUTPATIENT
Start: 2022-03-25 | End: 2022-03-30 | Stop reason: HOSPADM

## 2022-03-25 RX ORDER — SODIUM CHLORIDE 9 MG/ML
INJECTION, SOLUTION INTRAVENOUS CONTINUOUS
Status: DISCONTINUED | OUTPATIENT
Start: 2022-03-25 | End: 2022-03-30 | Stop reason: HOSPADM

## 2022-03-25 RX ORDER — FOLIC ACID 5 MG/ML
1 INJECTION, SOLUTION INTRAMUSCULAR; INTRAVENOUS; SUBCUTANEOUS ONCE
Status: DISCONTINUED | OUTPATIENT
Start: 2022-03-25 | End: 2022-03-25

## 2022-03-25 RX ADMIN — FOLIC ACID 1 MG: 5 INJECTION, SOLUTION INTRAMUSCULAR; INTRAVENOUS; SUBCUTANEOUS at 22:22

## 2022-03-25 RX ADMIN — SODIUM CHLORIDE 1000 ML: 9 INJECTION, SOLUTION INTRAVENOUS at 21:45

## 2022-03-25 RX ADMIN — Medication 25 G: at 22:35

## 2022-03-25 RX ADMIN — THIAMINE HYDROCHLORIDE 500 MG: 100 INJECTION, SOLUTION INTRAMUSCULAR; INTRAVENOUS at 21:43

## 2022-03-25 RX ADMIN — IOPAMIDOL 90 ML: 755 INJECTION, SOLUTION INTRAVENOUS at 21:04

## 2022-03-25 RX ADMIN — SODIUM CHLORIDE: 9 INJECTION, SOLUTION INTRAVENOUS at 23:52

## 2022-03-25 RX ADMIN — LORAZEPAM 2 MG: 2 INJECTION INTRAMUSCULAR; INTRAVENOUS at 20:47

## 2022-03-25 RX ADMIN — SODIUM CHLORIDE, PRESERVATIVE FREE 10 ML: 5 INJECTION INTRAVENOUS at 23:53

## 2022-03-25 RX ADMIN — ATORVASTATIN CALCIUM 40 MG: 80 TABLET, FILM COATED ORAL at 23:53

## 2022-03-25 ASSESSMENT — PAIN SCALES - GENERAL
PAINLEVEL_OUTOF10: 0
PAINLEVEL_OUTOF10: 0

## 2022-03-25 ASSESSMENT — PAIN - FUNCTIONAL ASSESSMENT
PAIN_FUNCTIONAL_ASSESSMENT: 0-10
PAIN_FUNCTIONAL_ASSESSMENT: 0-10

## 2022-03-25 NOTE — Clinical Note
Discharge Plan[de-identified] Home with Office Follow-up   Telemetry/Cardiac Monitoring Required?: Yes   Bed request comments: Jo Ann Harmon

## 2022-03-26 LAB
ALBUMIN SERPL-MCNC: 3.7 G/DL (ref 3.5–5.2)
ALBUMIN/GLOBULIN RATIO: 1.3 (ref 1–2.5)
ALP BLD-CCNC: 49 U/L (ref 35–104)
ALT SERPL-CCNC: 13 U/L (ref 5–33)
ANION GAP SERPL CALCULATED.3IONS-SCNC: 12 MMOL/L (ref 9–17)
AST SERPL-CCNC: 20 U/L
BILIRUB SERPL-MCNC: 0.28 MG/DL (ref 0.3–1.2)
BUN BLDV-MCNC: 6 MG/DL (ref 6–20)
CALCIUM IONIZED: 1.15 MMOL/L (ref 1.13–1.33)
CALCIUM SERPL-MCNC: 8.5 MG/DL (ref 8.6–10.4)
CHLORIDE BLD-SCNC: 110 MMOL/L (ref 98–107)
CO2: 22 MMOL/L (ref 20–31)
CREAT SERPL-MCNC: 0.54 MG/DL (ref 0.5–0.9)
CULTURE: NORMAL
EKG ATRIAL RATE: 78 BPM
EKG P AXIS: 59 DEGREES
EKG P-R INTERVAL: 142 MS
EKG Q-T INTERVAL: 404 MS
EKG QRS DURATION: 66 MS
EKG QTC CALCULATION (BAZETT): 460 MS
EKG R AXIS: 38 DEGREES
EKG T AXIS: 43 DEGREES
EKG VENTRICULAR RATE: 78 BPM
GFR AFRICAN AMERICAN: >60 ML/MIN
GFR NON-AFRICAN AMERICAN: >60 ML/MIN
GFR SERPL CREATININE-BSD FRML MDRD: ABNORMAL ML/MIN/{1.73_M2}
GLUCOSE BLD-MCNC: 73 MG/DL (ref 65–105)
GLUCOSE BLD-MCNC: 76 MG/DL (ref 70–99)
INR BLD: 1
MAGNESIUM: 2 MG/DL (ref 1.6–2.6)
PHOSPHORUS: 4.6 MG/DL (ref 2.6–4.5)
POTASSIUM SERPL-SCNC: 4.2 MMOL/L (ref 3.7–5.3)
PROTHROMBIN TIME: 10.8 SEC (ref 9.1–12.3)
SODIUM BLD-SCNC: 144 MMOL/L (ref 135–144)
SPECIMEN DESCRIPTION: NORMAL
TOTAL PROTEIN: 6.6 G/DL (ref 6.4–8.3)
TSH SERPL DL<=0.05 MIU/L-ACNC: 1.23 MIU/L (ref 0.3–5)

## 2022-03-26 PROCEDURE — 6360000002 HC RX W HCPCS: Performed by: NURSE PRACTITIONER

## 2022-03-26 PROCEDURE — 94761 N-INVAS EAR/PLS OXIMETRY MLT: CPT

## 2022-03-26 PROCEDURE — 84100 ASSAY OF PHOSPHORUS: CPT

## 2022-03-26 PROCEDURE — 85610 PROTHROMBIN TIME: CPT

## 2022-03-26 PROCEDURE — 83036 HEMOGLOBIN GLYCOSYLATED A1C: CPT

## 2022-03-26 PROCEDURE — 83735 ASSAY OF MAGNESIUM: CPT

## 2022-03-26 PROCEDURE — 82947 ASSAY GLUCOSE BLOOD QUANT: CPT

## 2022-03-26 PROCEDURE — 2060000000 HC ICU INTERMEDIATE R&B

## 2022-03-26 PROCEDURE — 99222 1ST HOSP IP/OBS MODERATE 55: CPT | Performed by: PSYCHIATRY & NEUROLOGY

## 2022-03-26 PROCEDURE — 6370000000 HC RX 637 (ALT 250 FOR IP): Performed by: FAMILY MEDICINE

## 2022-03-26 PROCEDURE — 36415 COLL VENOUS BLD VENIPUNCTURE: CPT

## 2022-03-26 PROCEDURE — 2580000003 HC RX 258: Performed by: NURSE PRACTITIONER

## 2022-03-26 PROCEDURE — 84443 ASSAY THYROID STIM HORMONE: CPT

## 2022-03-26 PROCEDURE — 6370000000 HC RX 637 (ALT 250 FOR IP): Performed by: NURSE PRACTITIONER

## 2022-03-26 PROCEDURE — 99222 1ST HOSP IP/OBS MODERATE 55: CPT | Performed by: FAMILY MEDICINE

## 2022-03-26 PROCEDURE — 93010 ELECTROCARDIOGRAM REPORT: CPT | Performed by: INTERNAL MEDICINE

## 2022-03-26 PROCEDURE — 82330 ASSAY OF CALCIUM: CPT

## 2022-03-26 PROCEDURE — 80053 COMPREHEN METABOLIC PANEL: CPT

## 2022-03-26 RX ORDER — NICOTINE 21 MG/24HR
1 PATCH, TRANSDERMAL 24 HOURS TRANSDERMAL DAILY
Status: DISCONTINUED | OUTPATIENT
Start: 2022-03-26 | End: 2022-03-28

## 2022-03-26 RX ADMIN — SODIUM CHLORIDE: 9 INJECTION, SOLUTION INTRAVENOUS at 01:16

## 2022-03-26 RX ADMIN — LORAZEPAM 1 MG: 2 INJECTION INTRAMUSCULAR; INTRAVENOUS at 22:53

## 2022-03-26 RX ADMIN — LORAZEPAM 2 MG: 2 INJECTION INTRAMUSCULAR; INTRAVENOUS at 14:21

## 2022-03-26 RX ADMIN — LORAZEPAM 2 MG: 2 INJECTION INTRAMUSCULAR; INTRAVENOUS at 11:50

## 2022-03-26 RX ADMIN — LORAZEPAM 2 MG: 2 INJECTION INTRAMUSCULAR; INTRAVENOUS at 19:49

## 2022-03-26 RX ADMIN — ATORVASTATIN CALCIUM 40 MG: 80 TABLET, FILM COATED ORAL at 20:46

## 2022-03-26 RX ADMIN — SODIUM CHLORIDE, PRESERVATIVE FREE 10 ML: 5 INJECTION INTRAVENOUS at 20:46

## 2022-03-26 RX ADMIN — FOLIC ACID 1 MG: 1 TABLET ORAL at 07:55

## 2022-03-26 RX ADMIN — ARIPIPRAZOLE 5 MG: 5 TABLET ORAL at 07:55

## 2022-03-26 RX ADMIN — SODIUM CHLORIDE: 9 INJECTION, SOLUTION INTRAVENOUS at 08:36

## 2022-03-26 RX ADMIN — SODIUM CHLORIDE: 9 INJECTION, SOLUTION INTRAVENOUS at 16:27

## 2022-03-26 RX ADMIN — VENLAFAXINE HYDROCHLORIDE 75 MG: 75 CAPSULE, EXTENDED RELEASE ORAL at 07:55

## 2022-03-26 RX ADMIN — Medication 100 MG: at 07:54

## 2022-03-26 RX ADMIN — ACETAMINOPHEN 650 MG: 325 TABLET ORAL at 19:49

## 2022-03-26 RX ADMIN — LORAZEPAM 2 MG: 2 INJECTION INTRAMUSCULAR; INTRAVENOUS at 08:36

## 2022-03-26 RX ADMIN — LORAZEPAM 2 MG: 2 INJECTION INTRAMUSCULAR; INTRAVENOUS at 16:27

## 2022-03-26 ASSESSMENT — ENCOUNTER SYMPTOMS
NAUSEA: 0
CONSTIPATION: 0
BACK PAIN: 0
COUGH: 0
RHINORRHEA: 0
SHORTNESS OF BREATH: 0
DIARRHEA: 0
SORE THROAT: 0
ABDOMINAL PAIN: 0
VOMITING: 0

## 2022-03-26 ASSESSMENT — PAIN SCALES - GENERAL
PAINLEVEL_OUTOF10: 0
PAINLEVEL_OUTOF10: 4
PAINLEVEL_OUTOF10: 4
PAINLEVEL_OUTOF10: 0

## 2022-03-26 ASSESSMENT — PAIN DESCRIPTION - PAIN TYPE: TYPE: ACUTE PAIN

## 2022-03-26 ASSESSMENT — PAIN DESCRIPTION - LOCATION: LOCATION: HEAD

## 2022-03-26 NOTE — ED NOTES
The following labs labeled with pt sticker and tubed to lab:     [] Blue     [] Lavender   [] on ice  [] Green/yellow  [x] Green/black [] on ice  [] Yellow  [] Red  [] Pink      [] COVID-19 swab    [] Rapid  [] PCR  [] Flu swab  [] Peds Viral Panel     [] Urine Sample  [] Pelvic Cultures  [] Blood Cultures            Sharlyne Duverney, RN  03/25/22 [id number]

## 2022-03-26 NOTE — PROGRESS NOTES
Comprehensive Nutrition Assessment    Type and Reason for Visit:  Initial,Positive Nutrition Screen (weight loss)    Nutrition Recommendations/Plan: Continue Current Diet,Start Oral Nutrition Supplement. Encourage PO intake as tolerated. Will monitor adequacy of intake. Nutrition Assessment:  Pt admitted with alcohol withdrawal seizures. PMH includes: polysubstance abuse. Pt reports appetite/intake is \"hit or miss\" at home. Reports weight loss over past few months; states weight was 125-130 lbs (however, noted weight of 114 lb on 7/17/21 upon wt review in EHR). Currently on a Regular diet and tolerating well. Ate 50% of breakfast today, per pt report. Pt willing to try ONS as long as it is not chocolate. Meds/labs reviewed. Malnutrition Assessment:  Malnutrition Status:  Insufficient data    Context:  Chronic Illness     Findings of the 6 clinical characteristics of malnutrition:  Energy Intake:  7 - 75% or less estimated energy requirements for 1 month or longer  Weight Loss:  No significant weight loss (over past 8 months)     Body Fat Loss:  Unable to assess     Muscle Mass Loss:  Unable to assess    Fluid Accumulation:  No significant fluid accumulation (per RN documentation)     Strength:  Not Performed    Estimated Daily Nutrient Needs:  Energy (kcal):  8103-8812 kcal/day; Weight Used for Energy Requirements:  Current     Protein (g):  65-80 g pro/day; Weight Used for Protein Requirements:  Current (1.2-1.5)        Fluid (ml/day):  1600 mL/day; Method Used for Fluid Requirements:  ml/Kg (30)      Nutrition Related Findings:  meds/labs reviewed      Wounds:  None       Current Nutrition Therapies:    ADULT DIET; Regular  Additional Calorie Sources:   none    Anthropometric Measures:  · Height: 5' (152.4 cm)  · Current Body Weight: 117 lb (53.1 kg)   · Ideal Body Weight: 100 lbs; % Ideal Body Weight 117 %   · BMI: 22.9  · BMI Categories: Normal Weight (BMI 18.5-24. 9)       Nutrition Diagnosis: · Inadequate oral intake related to current medical condition/substance abuse as evidenced by intake 26-50% at present, variable intake PTA    Nutrition Interventions:   Food and/or Nutrient Delivery:  Continue Current Diet,Start Oral Nutrition Supplement  Nutrition Education/Counseling:  No recommendation at this time   Coordination of Nutrition Care:  Continue to monitor while inpatient    Goals:  meet % of estimated nutrient needs with oral diet/supplements       Nutrition Monitoring and Evaluation:   Behavioral-Environmental Outcomes:  None Identified   Food/Nutrient Intake Outcomes:  Diet Advancement/Tolerance,Food and Nutrient Intake,Supplement Intake  Physical Signs/Symptoms Outcomes:  Biochemical Data,Nutrition Focused Physical Findings,Skin,Weight     Discharge Planning:     Too soon to determine     Electronically signed by Rosendo Avila RD, LD on 3/26/22 at 1:35 PM EDT    Contact: [phone]

## 2022-03-26 NOTE — CONSULTS
[facility]   [address] [place] [address]  Inpatient Neurology Consult note             Date:   3/26/2022  Patient name:  Kenji Morales  Date of admission:  3/25/2022  8:15 PM  MRN:   [id number]  Account:  [de-identified]  YOB: 1975  PCP:    Renny Mathew MD  Room:   [address] [place] [address]  Code Status:    Full Code    Chief Complaint:     Chief Complaint   Patient presents with    Seizures   Provoked seizure from ETOH withdrawal and hypoglycemia    History Obtained From:   patient, spouse, electronic medical record    History of Present Illness: The patient is a 48 y.o.  female who presents via EMS following provoked EtOH withdrawal seizures and hypoglycemia. Past medical history significant for polysubstance abuse including crack cocaine, marijuana, heroin, alcohol dependency with multiple alcohol related withdrawal seizures, bipolar recently taken off lithium earlier this month, suicidal ideation with previous attempts, drug overdose, PTSD. Patient is accompanied by her girlfriend who states that she has been having a difficult month with at least 2 admissions to [facility] behavioral health unit earlier in the month taken off lithium and had worsening of mood after discharge with suicidal attempt again requiring I admission. This week patient was attempting to decrease her alcohol use states that she normally drinks approximately 1.5 gallons per day decreased to two 1.5 1/5th bottles of vodka daily with her last vodka drink Wednesday 3/23 and drank 2 tall boy beers yesterday and again today. Around 7:00 PM this evening she went upstairs because she was feeling generally unwell and nauseous when her girlfriend heard her fall and found her having approximately 1 to 2-minute seizure with about 2 to 3-minute postictal phase. She called EMS who prior to their arrival had an additional generalized tonic-clonic seizure-like activity lasting 1 minute.   EMS reports an additional third episode in the ambulance lasting approximately 1 to 2 minutes. On arrival to the emergency department patient noted to be lethargic although oriented to month, year, person and place. Patient able to follow simple and complex commands nonfocal neurologic exam with the exception of right arm and right leg weakness and paresthesias similar to her previous seizures.     Normotensive on arrival 111/83, heart rate 76 temperature 98.0. Patient was not given any abortive by EMS as she had rapid return to her baseline. Patient had received Ativan just prior to my exam 2 mg one-time dose although again appeared to be near her baseline with exception of lethargy and effort dependent right-sided arm and leg weakness. Lab work significant for hemoglobin of 10.8, MCV 77.2, platelets 240, glucose 55, bicarbonate 18, anion gap 18, LFTs essentially unremarkable, magnesium 2.2, trop <6, ethanol level 113. Given the patient has extensive EtOH history plan for stat thiamine 709 mg IV, folic acid followed by dextrose infusion to correct her hypoglycemia. CT head without along with CTA head and neck unremarkable patient has provoked EtOH withdrawal seizure and hypoglycemia. Given the patient has multiple previous admissions with unremarkable EEGs and MRI brains would not recommend any further neurologic work-up at this time.   Would recommend continued medical treatment of her hyperglycemia and alcohol withdrawals and if patient is admitted can have general neurology continue to follow her progress.        Last know well: 7:00 PM 3/25/22  On presentation:  BP: 111/83   BSL: 55     Prior to arrival patient was on  Antiplatelets/anticoagulants:none  Statins:lipitor 40mg nightly   Smoking history: Daily tobacco proximally half pack per day for 30 years    Past Medical History:     Past Medical History:   Diagnosis Date    MORTEZA (acute kidney injury) ([facility] 5/95/2831    Alcoholic hepatitis without ascites 06/18/1701    Alcoholic ketoacidosis 14/52/6255    Alcoholism ([facility]     3 pints daily    Anxiety     Asthma     Bipolar affective disorder ([facility] 6/12/2019    Depression     Drug overdose, accidental or unintentional, initial encounter 9/29/2018    Drug overdose, intentional self-harm, initial encounter ([facility] 5/8/2018    Lisa coma scale total score 3-8 (HCC)     Hepatitis C antibody positive in blood 6/18/2019    History of gastric surgery (gastric sleeve 2012) 4/25/2017    Intentional drug overdose ([facility] 5/7/2018    MDD (major depressive disorder[facility] recurrent episode [facility] 5/8/2018    Pneumonia     Polysubstance abuse ([facility]     drug abuse includes, cocaine, IV heroin, cannabis, and ETOH    Post traumatic stress disorder     Seizure ([facility] 12/17/2019    Smoker unmotivated to quit 4/25/2017    Suicide attempt ([facility] 11/08/2017    S/A by overdosing on Trazodone and Seroquel    Suicide ideation     Toxic metabolic encephalopathy 4/29/0079        Past Surgical History:     Past Surgical History:   Procedure Laterality Date    CARPAL TUNNEL RELEASE      CHOLECYSTECTOMY      HYSTERECTOMY      HYSTERECTOMY, TOTAL ABDOMINAL      STOMACH SURGERY      gastric sleeve    UPPER GASTROINTESTINAL ENDOSCOPY N/A 6/19/2019    EGD BIOPSY performed by Elena Estevez MD at [address] [facility]        Medications Prior to Admission:     Prior to Admission medications    Medication Sig Start Date End Date Taking?  Authorizing Provider   hydrOXYzine (ATARAX) 50 MG tablet Take 50 mg by mouth 3 times daily as needed for Itching  Patient not taking: Reported on 3/26/2022    Historical Provider, MD   acamprosate (CAMPRAL) 333 MG tablet Take 2 tablets by mouth 3 times daily 3/8/22   Kadeem Huffman MD   ARIPiprazole (ABILIFY) 5 MG tablet Take 1 tablet by mouth daily 3/8/22   Kadeem Huffman MD   busPIRone (BUSPAR) 7.5 MG tablet Take 1 tablet by mouth 2 times daily as needed (Anxiety) 3/7/22   Kadeem Huffman MD   folic acid ([place]ka Yee) 1 MG tablet Take 1 tablet by mouth daily  Patient not taking: Reported on 3/26/2022 3/7/22   Antonio Guadarrama MD   Multiple Vitamins-Minerals (THERAPEUTIC MULTIVITAMIN-MINERALS) tablet Take 1 tablet by mouth daily 3/7/22   Antonio Guadarrama MD   venlafaxine (EFFEXOR XR) 75 MG extended release capsule Take 1 capsule by mouth daily (with breakfast) 3/8/22   Antonio Guadarrama MD   thiamine mononitrate (THIAMINE) 100 MG tablet Take 1 tablet by mouth daily  Patient not taking: Reported on 3/26/2022 3/8/22   Antonio Guadarrama MD   atorvastatin (LIPITOR) 40 MG tablet Take 1 tablet by mouth nightly  Patient not taking: Reported on 3/26/2022 1/3/22   Julieanne Alpers, MD   omeprazole (PRILOSEC) 20 MG delayed release capsule Take 1 capsule by mouth 2 times daily 1/3/22   Julieanne Alpers, MD   traZODone (DESYREL) 150 MG tablet Take 1 tablet by mouth nightly 1/3/22   Julieanne Alpers, MD   albuterol sulfate  (90 Base) MCG/ACT inhaler Inhale 2 puffs into the lungs every 4 hours as needed for Wheezing 7/10/20   Mya Kaiser MD        Allergies:     Morphine, Morphine and related, Azithromycin, Morphine, Nsaids, and Zithromax [azithromycin]    Social History:     Tobacco:    reports that she has been smoking cigarettes. She has a 60.00 pack-year smoking history. She has never used smokeless tobacco.  Alcohol:      reports current alcohol use of about 3.0 standard drinks of alcohol per week. Drug Use:  reports current drug use. Frequency: 7.00 times per week. Drugs: Marijuana ([employer]), IV, and Cocaine.     Family History:     Family History   Problem Relation Age of Onset    Brain Cancer Mother     Cancer Mother         \"female cancer\"    Heart Disease Father        Review of Systems:     ROS:  CONSTITUTIONAL:  Lethargic   EYES: negative for double vision and photophobia    HEENT: negative for tinnitus and sore throat   RESPIRATORY: negative for cough, shortness of breath   CARDIOVASCULAR: negative for chest pain, palpitations normal     Deep Tendon Reflexes:    Right Bicep:  1+  Left Bicep:  1+  Right Knee:  1+  Left Knee:  1+     Plantar Response:  Right:  downgoing  Left:  downgoing     Clonus:  absent  Gonzáles's:  absent     Coordination/Dysmetria:  Heel to Shin:  Right:  normal  Left:  normal  Finger to Nose:   Right:  normal  Left:  normal   Dysdiadochokinesia:  absent         Investigations:      Laboratory Testing:  Recent Results (from the past 24 hour(s))   CBC with Auto Differential    Collection Time: 03/25/22  8:38 PM   Result Value Ref Range    WBC 7.4 3.5 - 11.3 k/uL    RBC 4.52 3.95 - 5.11 m/uL    Hemoglobin 10.8 (L) 11.9 - 15.1 g/dL    Hematocrit 34.9 (L) 36.3 - 47.1 %    MCV 77.2 (L) 82.6 - 102.9 fL    MCH 23.9 (L) 25.2 - 33.5 pg    MCHC 30.9 28.4 - 34.8 g/dL    RDW 19.8 (H) 11.8 - 14.4 %    Platelets 543 629 - 812 k/uL    MPV 9.3 8.1 - 13.5 fL    NRBC Automated 0.0 0.0 per 100 WBC    Seg Neutrophils 36 36 - 65 %    Lymphocytes 49 (H) 24 - 43 %    Monocytes 10 3 - 12 %    Eosinophils % 4 1 - 4 %    Basophils 1 0 - 2 %    Immature Granulocytes 0 0 %    Segs Absolute 2.65 1.50 - 8.10 k/uL    Absolute Lymph # 3.62 1.10 - 3.70 k/uL    Absolute Mono # 0.75 0.10 - 1.20 k/uL    Absolute Eos # 0.26 0.00 - 0.44 k/uL    Basophils Absolute 0.06 0.00 - 0.20 k/uL    Absolute Immature Granulocyte <0.03 0.00 - 0.30 k/uL    RBC Morphology ANISOCYTOSIS PRESENT    Comprehensive Metabolic Panel    Collection Time: 03/25/22  8:38 PM   Result Value Ref Range    Glucose 55 (L) 70 - 99 mg/dL    BUN 5 (L) 6 - 20 mg/dL    CREATININE 0.65 0.50 - 0.90 mg/dL    Calcium 9.4 8.6 - 10.4 mg/dL    Sodium 138 135 - 144 mmol/L    Potassium 3.8 3.7 - 5.3 mmol/L    Chloride 102 98 - 107 mmol/L    CO2 18 (L) 20 - 31 mmol/L    Anion Gap 18 (H) 9 - 17 mmol/L    Alkaline Phosphatase 60 35 - 104 U/L    ALT 16 5 - 33 U/L    AST 27 <32 U/L    Total Bilirubin 0.19 (L) 0.3 - 1.2 mg/dL    Total Protein 8.0 6.4 - 8.3 g/dL    Albumin 4.3 3.5 - 5.2 g/dL Albumin/Globulin Ratio 1.2 1.0 - 2.5    GFR Non-African American >60 >60 mL/min    GFR African American >60 >60 mL/min    GFR Comment         Magnesium    Collection Time: 03/25/22  8:38 PM   Result Value Ref Range    Magnesium 2.2 1.6 - 2.6 mg/dL   Troponin    Collection Time: 03/25/22  8:38 PM   Result Value Ref Range    Troponin, High Sensitivity <6 0 - 14 ng/L   TOX SCR, BLD, ED    Collection Time: 03/25/22  8:38 PM   Result Value Ref Range    Acetaminophen Level <5 (L) 10 - 30 ug/mL    Ethanol 113 (H) <10 mg/dL    Ethanol percent 0.113 (H) <0.384 %    Salicylate Lvl <1 (L) 3 - 10 mg/dL    Toxic Tricyclic Sc,Blood NEGATIVE NEGATIVE   Urinalysis with Microscopic    Collection Time: 03/25/22  8:39 PM   Result Value Ref Range    Color, UA Yellow Yellow    Turbidity UA Clear Clear    Glucose, Ur NEGATIVE NEGATIVE    Bilirubin Urine NEGATIVE NEGATIVE    Ketones, Urine NEGATIVE NEGATIVE    Specific Gravity, UA 1.003 (L) 1.005 - 1.030    Urine Hgb NEGATIVE NEGATIVE    pH, UA 5.5 5.0 - 8.0    Protein, UA NEGATIVE NEGATIVE    Urobilinogen, Urine Normal Normal    Nitrite, Urine NEGATIVE NEGATIVE    Leukocyte Esterase, Urine NEGATIVE NEGATIVE    -          WBC, UA 0 TO 2 0 - 5 /HPF    RBC, UA 0 TO 2 0 - 2 /HPF    Casts UA 0 TO 2 0 - 2 /LPF    Casts UA HYALINE 0 - 2 /LPF    Epithelial Cells UA 0 TO 2 0 - 5 /HPF    Bacteria, UA FEW (A) None   Venous Blood Gas, POC    Collection Time: 03/25/22  8:47 PM   Result Value Ref Range    pH, Jeremi 7.398 7.320 - 7.430    pCO2, Jeremi 38.2 (L) 41.0 - 51.0 mm Hg    pO2, Jeremi 27.7 (L) 30.0 - 50.0 mm Hg    HCO3, Venous 23.6 22.0 - 29.0 mmol/L    Negative Base Excess, Jeremi 1 0.0 - 2.0    O2 Sat, Jeremi 52 (L) 60.0 - 85.0 %   ELECTROLYTES PLUS    Collection Time: 03/25/22  8:47 PM   Result Value Ref Range    POC Sodium 144 138 - 146 mmol/L    POC Potassium 4.0 3.5 - 4.5 mmol/L    POC Chloride 109 (H) 98 - 107 mmol/L    POC TCO2 24 22 - 30 mmol/L    Anion Gap 12 7 - 16 mmol/L   Hemoglobin and hematocrit, blood    Collection Time: 03/25/22  8:47 PM   Result Value Ref Range    POC Hemoglobin 12.9 12.0 - 16.0 g/dL    POC Hematocrit 38 36 - 46 %   Creatinine W/GFR Point of Care    Collection Time: 03/25/22  8:47 PM   Result Value Ref Range    POC Creatinine 0.66 0.51 - 1.19 mg/dL    GFR Comment >60 >60 mL/min    GFR Non-African American >60 >60 mL/min    GFR Comment         CALCIUM, IONIC (POC)    Collection Time: 03/25/22  8:47 PM   Result Value Ref Range    POC Ionized Calcium 1.16 1.15 - 1.33 mmol/L   POCT urea (BUN)    Collection Time: 03/25/22  8:47 PM   Result Value Ref Range    POC BUN 6 (L) 8 - 26 mg/dL   Lactic Acid, POC    Collection Time: 03/25/22  8:47 PM   Result Value Ref Range    POC Lactic Acid 2.96 (H) 0.56 - 1.39 mmol/L   POCT Glucose    Collection Time: 03/25/22  8:47 PM   Result Value Ref Range    POC Glucose 61 (L) 74 - 100 mg/dL   SPECIMEN REJECTION    Collection Time: 03/25/22  9:54 PM   Result Value Ref Range    Specimen Source . BLOOD     Ordered Test lacds     Reason for Rejection       Unable to perform testing: Specimen quantity not sufficient. Lactic Acid    Collection Time: 03/25/22 10:14 PM   Result Value Ref Range    Lactic Acid, Whole Blood 2.5 (H) 0.7 - 2.1 mmol/L   DRUG SCREEN MULTI URINE    Collection Time: 03/25/22 10:16 PM   Result Value Ref Range    Amphetamine Screen, Ur NEGATIVE NEGATIVE    Barbiturate Screen, Ur NEGATIVE NEGATIVE    Benzodiazepine Screen, Urine NEGATIVE NEGATIVE    Cocaine Metabolite, Urine NEGATIVE NEGATIVE    Methadone Screen, Urine NEGATIVE NEGATIVE    Opiates, Urine NEGATIVE NEGATIVE    Phencyclidine, Urine NEGATIVE NEGATIVE    Cannabinoid Scrn, Ur NEGATIVE NEGATIVE    Oxycodone Screen, Ur NEGATIVE NEGATIVE    Test Information       Assay provides medical screening only. The absence of expected drug(s) and/or metabolite(s) may indicate diluted or adulterated urine, limitations of testing or timing of collection.    COVID-19, Rapid    Collection Time: 03/25/22 10:40 PM    Specimen: Nasopharyngeal Swab   Result Value Ref Range    Specimen Description . NASOPHARYNGEAL SWAB     SARS-CoV-2, Rapid Not Detected Not Detected         Assessment :    48 y.o. female who presents with ETOH related provoked seizure and right arm and leg Todds paralysis. Patient is hypoglycemic requiring IV thiamine, folic acid followed by dextrose. Patient is lethargic as she received 2mg IV ativan on arrival to the emergency department otherwise neurologically in tact and very low suspiccion for stroke to cause her right hemiparesis as she has atleast 3 documented admissions now with provoked seizures and right hemiparesis from Todds paralysis each admission negative MRI and EEG. No further neurologic work-up warranted at this time although patient will likely require close monitoring for delirium tremens given her extensive alcohol history and withdrawals.       Primary Problem  Alcohol withdrawal syndrome with complication 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] 12/18/2019    Alcohol related seizure ([facility] [R56.9] 12/17/2019    Alcohol withdrawal syndrome with complication ([facility] [P35.048] 10/20/2018    Polysubstance abuse ([address] [F19.10] 03/23/2018       Plan:     Patient status Admit as inpatient in the  Progressive Unit/Step down    -CT head and CTA head and neck completed  -Previously EEG 10/15/2019, 12/19/2019, 8/28/2020, 11/03/2020 All reviewed and all non-epileptiform would not recommend repeating at this time given provoked seizure from ETOH withdrawls     -MRI brain W/WO 10/14/2019, 12/19/2019, and 11/02/2020 All no acute IC abnormalities   MRI brain WO 8/27/2020 also unremarkable no evidence for any previous stroke contrary to patient saying she had a stroke in 2019 which caused her right sided weakness long term    -Received 500 mg IV thiamine in the emergency department can continue 100 mg daily    -no need for any further neurologic workup at this time given repeated presentation with ETOH withdrawal seizures and residual right sided weakness after all of them and negative workups   -MAL cuellar per primary medicine team  -Rest of medical management per primary medicine team      DVT prophylaxis-Lovenox  Social work-assistance with alcohol cessation and resources          Consultations:   IP CONSULT TO STROKE TEAM  IP CONSULT TO HOSPITALIST  IP CONSULT TO SOCIAL WORK  IP CONSULT TO NEUROLOGY      Follow-up further recommendations after discussing the case with attending  The plan was discussed with the patient, patient's family and the medical staff. Patient is admitted as inpatient status because of co-morbidities listed above, severity of signs and symptoms as outlined, requirement for current medical therapies and most importantly because of direct risk to patient if care not provided in a hospital setting.     Rebecca Gao MD   PGY-3 Neurology Resident   3/26/2022  3:22 AM    Copy sent to Dr. Elan Huertas MD

## 2022-03-26 NOTE — ED NOTES
The following labs labeled with pt sticker and tubed to lab:     [] Blue     [] Lavender   [] on ice  [] Green/yellow  [x] Green/black [] on ice  [] Yellow  [] Red  [] Pink      [] COVID-19 swab    [] Rapid  [] PCR  [] Flu swab  [] Peds Viral Panel     [] Urine Sample  [] Pelvic Cultures  [] Blood Cultures            Rolo Arita RN  03/25/22 1555

## 2022-03-26 NOTE — ED PROVIDER NOTES
[facility]     Emergency Department     Faculty Attestation    I performed a history and physical examination of the patient and discussed management with the resident. I reviewed the residents note and agree with the documented findings and plan of care. Any areas of disagreement are noted on the chart. I was personally present for the key portions of any procedures. I have documented in the chart those procedures where I was not present during the key portions. I have reviewed the emergency nurses triage note. I agree with the chief complaint, past medical history, past surgical history, allergies, medications, social and family history as documented unless otherwise noted below. For Physician Assistant/ Nurse Practitioner cases/documentation I have personally evaluated this patient and have completed at least one if not all key elements of the E/M (history, physical exam, and MDM). Additional findings are as noted. I have personally seen and evaluated the patient. I find the patient's history and physical exam are consistent with the NP/PA documentation. I agree with the care provided, treatment rendered, disposition and follow-up plan. Seizures today history of alcohol withdrawal in addition to this the patient has developed a right-sided weakness that occurred prior to arrival last known well at 1900. Currently with moderate weakness of the right upper and right lower extremity a stroke alert has been called concerns of alcohol related seizures and possible strokelike symptoms. Critical Care     Guanako Gracia M.D.   Attending Emergency  Physician              Cristina Arredondo MD  03/25/22 2041

## 2022-03-26 NOTE — ED PROVIDER NOTES
[facility] ED  Emergency Department Encounter  Emergency Medicine Resident     Pt Name: Cuong Zamorano  MRN: [id number]  [place] 1975  Date of evaluation: 3/26/22  PCP:  Sheliah Cranker, MD    CHIEF COMPLAINT       Chief Complaint   Patient presents with    Seizures       HISTORY [facility]  (Location/Symptom, Timing/Onset, Context/Setting, Quality, Duration, Modifying [place].)      Cuong Zamorano is a 48 y.o. female with past medical history of alcohol use disorder, alcoholic ketoacidosis, alcohol withdrawal with seizure, hepatitis C, suicidal ideation and suicide attempt who presents with 2 seizures today. Patient reports that she normally drinks 1 pint to 1/2 gallon of vodka along with 6 tall boy beers daily. She has been trying to detox herself off of alcohol so stopped drinking any vodka 2 days ago. She states she only drank 2.5 tall boy beers today. Per girlfriend who is at bedside, she heard a thud at approximately 7 PM and then heard the patient call out. She then went to the other room and saw her seizing. She thinks the seizure lasted approximately 4 minutes. She called EMS and on their arrival patient was no longer seizing but had not returned to baseline. Patient then had another seizure during transport. EMS did not give her any medication, as the seizure stopped under 1 minute. Patient did hit her head during the first seizure and is now complaining of right arm and leg weakness. Patient states she did not have this weakness prior to the seizure. Patient does have a prior history of 2 strokes but who does not have any deficits from them. She is not taking any aspirin or blood thinners. She denies fever, chills, rhinorrhea, congestion, chest pain, shortness of breath, abdominal pain, nausea, vomiting, diarrhea or urinary symptoms. She denies changes in her vision or hearing, auditory or visual hallucinations.   She does report the sensation of bugs crawling on her and mild tremors. PAST MEDICAL / SURGICAL / SOCIAL / FAMILY HISTORY      has a past medical history of MORTEZA (acute kidney injury) ([facility] [address] Alcoholic hepatitis without ascites, Alcoholic ketoacidosis, Alcoholism ([facility] [address] Anxiety, Asthma, Bipolar affective disorder ([facility] [address] Depression, Drug overdose, accidental or unintentional, initial encounter, Drug overdose, intentional self-harm, initial encounter ([facility] [address] Lisa coma scale total score 3-8 ([facility] [address] Hepatitis C antibody positive in blood, History of gastric surgery (gastric sleeve 2012), Intentional drug overdose ([facility] [address] MDD (major depressive disorder), recurrent episode ([facility] [address] Pneumonia, Polysubstance abuse ([facility] [address] Post traumatic stress disorder, Seizure ([facility] [address] Smoker unmotivated to quit, Suicide attempt ([facility] [address] Suicide ideation, and Toxic metabolic encephalopathy. has a past surgical history that includes Hysterectomy; Carpal tunnel release; Hysterectomy, total abdominal; Cholecystectomy; Stomach surgery; and Upper gastrointestinal endoscopy (N/A, 6/19/2019). Social:  reports that she has been smoking cigarettes. She has a 60.00 pack-year smoking history. She has never used smokeless tobacco. She reports current alcohol use of about 3.0 standard drinks of alcohol per week. She reports current drug use. Frequency: 7.00 times per week. Drugs: Marijuana ([employer]ane [employer]ane), IV, and Cocaine. Family Hx:   Family History   Problem Relation Age of Onset    Brain Cancer Mother     Cancer Mother         \"female cancer\"    Heart Disease Father         Allergies:  Morphine, Morphine and related, Azithromycin, Morphine, Nsaids, and Zithromax [azithromycin]    Home Medications:  Prior to Admission medications    Medication Sig Start Date End Date Taking?  Authorizing Provider   hydrOXYzine (ATARAX) 50 MG tablet Take 50 mg by mouth 3 times daily as needed for Itching  Patient not taking: Reported on 3/26/2022    Historical Provider, MD   acamprosate (CAMPRAL) 333 MG tablet Take 2 tablets by mouth 3 times daily 3/8/22   Shayy Ty MD   ARIPiprazole (ABILIFY) 5 MG tablet Take 1 tablet by mouth daily 3/8/22   Shayy Ty MD   busPIRone (BUSPAR) 7.5 MG tablet Take 1 tablet by mouth 2 times daily as needed (Anxiety) 3/7/22   Shayy Ty MD   folic acid (FOLVITE) 1 MG tablet Take 1 tablet by mouth daily  Patient not taking: Reported on 3/26/2022 3/7/22   Shayy MD Karson   Multiple Vitamins-Minerals (THERAPEUTIC MULTIVITAMIN-MINERALS) tablet Take 1 tablet by mouth daily 3/7/22   Shayy Ty MD   venlafaxine (EFFEXOR XR) 75 MG extended release capsule Take 1 capsule by mouth daily (with breakfast) 3/8/22   Shayy Ty MD   thiamine mononitrate (THIAMINE) 100 MG tablet Take 1 tablet by mouth daily  Patient not taking: Reported on 3/26/2022 3/8/22   Shayy MD Karson   atorvastatin (LIPITOR) 40 MG tablet Take 1 tablet by mouth nightly  Patient not taking: Reported on 3/26/2022 1/3/22   Jose Valente MD   omeprazole (PRILOSEC) 20 MG delayed release capsule Take 1 capsule by mouth 2 times daily 1/3/22   Jose Valente MD   traZODone (DESYREL) 150 MG tablet Take 1 tablet by mouth nightly 1/3/22   Jose Valente MD   albuterol sulfate  (90 Base) MCG/ACT inhaler Inhale 2 puffs into the lungs every 4 hours as needed for Wheezing 7/10/20   Chloe Barney MD       REVIEW OFSYSTEMS    (2-9 systems for level 4, 10 or more for level 5)      Review of Systems   Constitutional: Negative for chills and fever. HENT: Negative for congestion, rhinorrhea and sore throat. Eyes: Negative for visual disturbance. Respiratory: Negative for cough and shortness of breath. Cardiovascular: Negative for chest pain and leg swelling. Gastrointestinal: Negative for abdominal pain, constipation, diarrhea, nausea and vomiting. Genitourinary: Negative for dysuria, frequency and hematuria. Musculoskeletal: Negative for arthralgias, back pain and neck pain.    Skin: Negative for rash. Neurological: Positive for seizures, weakness and numbness. Negative for light-headedness and headaches. Psychiatric/Behavioral: Negative for confusion. All other systems reviewed and are negative. PHYSICAL EXAM   (up to 7 for level 4, 8 or more forlevel 5)      INITIAL VITALS:   Vitals:    03/26/22 0032 03/26/22 0045 03/26/22 0327 03/26/22 0518   BP: 108/77 104/75 92/72    Pulse: 97 84 77    Resp: 18 20 19    Temp:  98 °F (36.7 °C) 98.3 °F (36.8 °C)    TempSrc:  Oral Oral    SpO2: 95% 94% 97%    Weight:  117 lb 11.2 oz (53.4 kg)  117 lb (53.1 kg)   Height:  5' (1.524 m)          Physical Exam  Vitals and nursing note reviewed. Constitutional:       General: She is not in acute distress. Appearance: She is not toxic-appearing. Comments: Adult female lying in stretcher awake and alert and in no acute distress. She speaks in full sentences and answers questions appropriately   HENT:      Head: Normocephalic and atraumatic. Nose: Nose normal.      Mouth/Throat:      Mouth: Mucous membranes are moist.      Pharynx: Oropharynx is clear. Eyes:      Conjunctiva/sclera: Conjunctivae normal.      Pupils: Pupils are equal, round, and reactive to light. Neck:      Comments: No midline cervical spinal tenderness to palpation  Cardiovascular:      Rate and Rhythm: Normal rate and regular rhythm. Pulses: Normal pulses. Heart sounds: No murmur heard. No friction rub. No gallop. Pulmonary:      Effort: Pulmonary effort is normal. No respiratory distress. Breath sounds: Normal breath sounds. No wheezing, rhonchi or rales. Abdominal:      General: There is no distension. Palpations: Abdomen is soft. Tenderness: There is no abdominal tenderness. Musculoskeletal:      Cervical back: Neck supple. No rigidity. Right lower leg: No edema. Left lower leg: No edema. Skin:     General: Skin is warm and dry.       Capillary Refill: Capillary refill takes less than 2 seconds. Findings: No rash. Neurological:      Mental Status: She is alert. Comments: Alert and oriented x3, answers questions appropriately, speech clear  CN 2-12 intact, no facial droop  Minor tongue fasciculations  Moves all extremities spontaneously  3/5 strength right upper and lower extremity  5/5 strength flexion and extension left upper extremity   5/5 strength left hip flexion, knee extension, dorsi and plantar flexion   Sensation intact to light touch extremities x4  Right pronator drift. Patient is unable to complete finger-to-nose on the right   Psychiatric:         Mood and Affect: Mood normal.         Behavior: Behavior normal.         DIFFERENTIAL  DIAGNOSIS     DDX: Alcohol withdrawal, alcohol withdrawal seizure, delirium tremens, seizure disorder, Delroy's paralysis, head trauma, intracranial bleed, CVA, TIA, lecture light abnormality, dehydration    Initial MDM/Plan: 48 y.o. female with past medical history of alcohol use disorder, alcoholic ketoacidosis, alcohol withdrawal with seizure, hepatitis C, suicidal ideation and suicide attempt who presents with 2 witnessed seizures today after trying to detox herself from alcohol. After the first seizure, patient did hit her head and now has right-sided weakness. On physical exam, patient is awake and alert and answering questions appropriately. She has some mild tongue fasciculations. She does have marked weakness in the right upper and lower extremity. She is unable to complete finger-to-nose on the right and does have a pronator drift. Concern for head trauma, alcohol withdrawal seizure, Delroy's paralysis and stroke. Given patient's significant physical exam findings, will call stroke alert and obtain CT of the head and CTA head and neck. Will obtain labs to evaluate and treat with IV Ativan.   Patient will likely warrant admission    DIAGNOSTIC RESULTS / EMERGENCYDEPARTMENT COURSE / MDM     LABS:  Results for orders placed or performed during the hospital encounter of 03/25/22   COVID-19, Rapid    Specimen: Nasopharyngeal Swab   Result Value Ref Range    Specimen Description . NASOPHARYNGEAL SWAB     SARS-CoV-2, Rapid Not Detected Not Detected   CBC with Auto Differential   Result Value Ref Range    WBC 7.4 3.5 - 11.3 k/uL    RBC 4.52 3.95 - 5.11 m/uL    Hemoglobin 10.8 (L) 11.9 - 15.1 g/dL    Hematocrit 34.9 (L) 36.3 - 47.1 %    MCV 77.2 (L) 82.6 - 102.9 fL    MCH 23.9 (L) 25.2 - 33.5 pg    MCHC 30.9 28.4 - 34.8 g/dL    RDW 19.8 (H) 11.8 - 14.4 %    Platelets 266 450 - 303 k/uL    MPV 9.3 8.1 - 13.5 fL    NRBC Automated 0.0 0.0 per 100 WBC    Seg Neutrophils 36 36 - 65 %    Lymphocytes 49 (H) 24 - 43 %    Monocytes 10 3 - 12 %    Eosinophils % 4 1 - 4 %    Basophils 1 0 - 2 %    Immature Granulocytes 0 0 %    Segs Absolute 2.65 1.50 - 8.10 k/uL    Absolute Lymph # 3.62 1.10 - 3.70 k/uL    Absolute Mono # 0.75 0.10 - 1.20 k/uL    Absolute Eos # 0.26 0.00 - 0.44 k/uL    Basophils Absolute 0.06 0.00 - 0.20 k/uL    Absolute Immature Granulocyte <0.03 0.00 - 0.30 k/uL    RBC Morphology ANISOCYTOSIS PRESENT    Comprehensive Metabolic Panel   Result Value Ref Range    Glucose 55 (L) 70 - 99 mg/dL    BUN 5 (L) 6 - 20 mg/dL    CREATININE 0.65 0.50 - 0.90 mg/dL    Calcium 9.4 8.6 - 10.4 mg/dL    Sodium 138 135 - 144 mmol/L    Potassium 3.8 3.7 - 5.3 mmol/L    Chloride 102 98 - 107 mmol/L    CO2 18 (L) 20 - 31 mmol/L    Anion Gap 18 (H) 9 - 17 mmol/L    Alkaline Phosphatase 60 35 - 104 U/L    ALT 16 5 - 33 U/L    AST 27 <32 U/L    Total Bilirubin 0.19 (L) 0.3 - 1.2 mg/dL    Total Protein 8.0 6.4 - 8.3 g/dL    Albumin 4.3 3.5 - 5.2 g/dL    Albumin/Globulin Ratio 1.2 1.0 - 2.5    GFR Non-African American >60 >60 mL/min    GFR African American >60 >60 mL/min    GFR Comment         Magnesium   Result Value Ref Range    Magnesium 2.2 1.6 - 2.6 mg/dL   Troponin   Result Value Ref Range    Troponin, High Sensitivity <6 0 - 14 ng/L   Urinalysis with Microscopic   Result Value Ref Range    Color, UA Yellow Yellow    Turbidity UA Clear Clear    Glucose, Ur NEGATIVE NEGATIVE    Bilirubin Urine NEGATIVE NEGATIVE    Ketones, Urine NEGATIVE NEGATIVE    Specific Gravity, UA 1.003 (L) 1.005 - 1.030    Urine Hgb NEGATIVE NEGATIVE    pH, UA 5.5 5.0 - 8.0    Protein, UA NEGATIVE NEGATIVE    Urobilinogen, Urine Normal Normal    Nitrite, Urine NEGATIVE NEGATIVE    Leukocyte Esterase, Urine NEGATIVE NEGATIVE    -          WBC, UA 0 TO 2 0 - 5 /HPF    RBC, UA 0 TO 2 0 - 2 /HPF    Casts UA 0 TO 2 0 - 2 /LPF    Casts UA HYALINE 0 - 2 /LPF    Epithelial Cells UA 0 TO 2 0 - 5 /HPF    Bacteria, UA FEW (A) None   TOX SCR, BLD, ED   Result Value Ref Range    Acetaminophen Level <5 (L) 10 - 30 ug/mL    Ethanol 113 (H) <10 mg/dL    Ethanol percent 0.113 (H) <4.587 %    Salicylate Lvl <1 (L) 3 - 10 mg/dL    Toxic Tricyclic Sc,Blood NEGATIVE NEGATIVE   ELECTROLYTES PLUS   Result Value Ref Range    POC Sodium 144 138 - 146 mmol/L    POC Potassium 4.0 3.5 - 4.5 mmol/L    POC Chloride 109 (H) 98 - 107 mmol/L    POC TCO2 24 22 - 30 mmol/L    Anion Gap 12 7 - 16 mmol/L   Hemoglobin and hematocrit, blood   Result Value Ref Range    POC Hemoglobin 12.9 12.0 - 16.0 g/dL    POC Hematocrit 38 36 - 46 %   CALCIUM, IONIC (POC)   Result Value Ref Range    POC Ionized Calcium 1.16 1.15 - 1.33 mmol/L   DRUG SCREEN MULTI URINE   Result Value Ref Range    Amphetamine Screen, Ur NEGATIVE NEGATIVE    Barbiturate Screen, Ur NEGATIVE NEGATIVE    Benzodiazepine Screen, Urine NEGATIVE NEGATIVE    Cocaine Metabolite, Urine NEGATIVE NEGATIVE    Methadone Screen, Urine NEGATIVE NEGATIVE    Opiates, Urine NEGATIVE NEGATIVE    Phencyclidine, Urine NEGATIVE NEGATIVE    Cannabinoid Scrn, Ur NEGATIVE NEGATIVE    Oxycodone Screen, Ur NEGATIVE NEGATIVE    Test Information       Assay provides medical screening only.   The absence of expected drug(s) and/or metabolite(s) may indicate diluted or adulterated urine, limitations of testing or timing of collection. SPECIMEN REJECTION   Result Value Ref Range    Specimen Source . BLOOD     Ordered Test lacds     Reason for Rejection       Unable to perform testing: Specimen quantity not sufficient. Lactic Acid   Result Value Ref Range    Lactic Acid, Whole Blood 2.5 (H) 0.7 - 2.1 mmol/L   Venous Blood Gas, POC   Result Value Ref Range    pH, Jeremi 7.398 7.320 - 7.430    pCO2, Jeremi 38.2 (L) 41.0 - 51.0 mm Hg    pO2, Jeremi 27.7 (L) 30.0 - 50.0 mm Hg    HCO3, Venous 23.6 22.0 - 29.0 mmol/L    Negative Base Excess, Jeremi 1 0.0 - 2.0    O2 Sat, Jeremi 52 (L) 60.0 - 85.0 %   Creatinine W/GFR Point of Care   Result Value Ref Range    POC Creatinine 0.66 0.51 - 1.19 mg/dL    GFR Comment >60 >60 mL/min    GFR Non-African American >60 >60 mL/min    GFR Comment         POCT urea (BUN)   Result Value Ref Range    POC BUN 6 (L) 8 - 26 mg/dL   Lactic Acid, POC   Result Value Ref Range    POC Lactic Acid 2.96 (H) 0.56 - 1.39 mmol/L   POCT Glucose   Result Value Ref Range    POC Glucose 61 (L) 74 - 100 mg/dL         RADIOLOGY:  CT HEAD WO CONTRAST    Result Date: 3/25/2022  EXAMINATION: CT OF THE HEAD WITHOUT CONTRAST  3/25/2022 8:53 pm TECHNIQUE: CT of the head was performed without the administration of intravenous contrast. Dose modulation, iterative reconstruction, and/or weight based adjustment of the mA/kV was utilized to reduce the radiation dose to as low as reasonably achievable. COMPARISON: CT head 12/30/2021 HISTORY: ORDERING SYSTEM PROVIDED HISTORY: hit head, seizure, right sided weakness TECHNOLOGIST PROVIDED HISTORY: hit head, seizure, right sided weakness Decision Support Exception - unselect if not a suspected or confirmed emergency medical condition->Emergency Medical Condition (MA) Is the patient pregnant?->No Reason for Exam: right side weakness FINDINGS: BRAIN/VENTRICLES: There is no acute intracranial hemorrhage, mass effect or midline shift.   No abnormal extra-axial fluid collection. The gray-white differentiation is maintained without evidence of an acute infarct. There is no evidence of hydrocephalus. ORBITS: The visualized portion of the orbits demonstrate no acute abnormality. SINUSES: The visualized paranasal sinuses and mastoid air cells demonstrate no acute abnormality. SOFT TISSUES/SKULL:  No acute abnormality of the visualized skull or soft tissues. No acute intracranial abnormality. The findings were sent to the Radiology Results [address] at 9:37 pm on 3/25/2022to be communicated to a licensed caregiver. XR CHEST PORTABLE    Result Date: 3/25/2022  EXAMINATION: ONE XRAY VIEW OF THE CHEST 3/25/2022 9:49 pm COMPARISON: 12/30/2021 HISTORY: ORDERING SYSTEM PROVIDED HISTORY: seizure TECHNOLOGIST PROVIDED HISTORY: seizure Reason for Exam: upr,seizure FINDINGS: The cardiomediastinal silhouette is normal in size and contour. The lungs are clear. No pleural effusion or pneumothorax is present. No acute cardiopulmonary process     CTA HEAD NECK W CONTRAST    Result Date: 3/25/2022  EXAMINATION: CTA OF THE HEAD AND NECK WITH CONTRAST 3/25/2022 8:53 pm: TECHNIQUE: CTA of the head and neck was performed with the administration of intravenous contrast. Multiplanar reformatted images are provided for review. MIP images are provided for review. Stenosis of the internal carotid arteries measured using NASCET criteria. Dose modulation, iterative reconstruction, and/or weight based adjustment of the mA/kV was utilized to reduce the radiation dose to as low as reasonably achievable. This scan was analyzed using Viz. ai contact LVO. Identification of suspected findings is not for diagnostic use beyond notification. Viz LVO is limited to analysis of imaging data and should not be used in-lieu of full patient evaluation or relied upon to make or confirm diagnosis. 3D reconstructed images were performed on a separate workstation and provided for review.  COMPARISON: CT head, noncontrast 05/20/2022 HISTORY: ORDERING SYSTEM PROVIDED HISTORY: seizure, hit head, right sided weakness TECHNOLOGIST PROVIDED HISTORY: seizure, hit head, right sided weakness Decision Support Exception - unselect if not a suspected or confirmed emergency medical condition->Emergency Medical Condition (MA) Reason for Exam: right arm weakness FINDINGS: CTA NECK: Moderate motion degradation notably through the bifurcations and mid cervical ICAs challenge evaluation. AORTIC ARCH/ARCH VESSELS: No dissection or arterial injury. No significant stenosis of the brachiocephalic or subclavian arteries. CAROTID ARTERIES: No definite dissection, arterial injury, or high-grade hemodynamically significant stenosis by NASCET criteria. Motion through the mid cervical ICAs and the bifurcation challenge evaluation. VERTEBRAL ARTERIES: Mild motion through the vertebral arteries also challenge evaluation. This notably at C3-C4 through the V2 segments. No definite high-grade dissection, arterial injury, or high-grade significant stenosis. SOFT TISSUES: The lung apices are clear. No cervical or superior mediastinal lymphadenopathy. The larynx and pharynx are unremarkable. No acute abnormality of the salivary and thyroid glands. BONES: Motion challenge evaluation. Multilevel degenerate change. CTA HEAD: ANTERIOR CIRCULATION: No significant stenosis of the intracranial internal carotid, anterior cerebral, or middle cerebral arteries. No aneurysm. POSTERIOR CIRCULATION: No significant stenosis of the vertebral, basilar, or posterior cerebral arteries. No aneurysm. OTHER: No dural venous sinus thrombosis on this non-dedicated study. BRAIN: No mass effect or midline shift. No extra-axial fluid collection. The gray-white differentiation is maintained. Motion degradation through the neck. No definite cervical arterial occlusion or high-grade stenosis identified.   If suspicion concern, consider repeat imaging through the neck. Otherwise negative for large vessel occlusion intracranially. EKG  Normal sinus rhythm  NE: Normal  QRS: Normal  QT/QTc: Normal  No ST elevation or depression  No T wave abnormality    All EKG's are interpreted by the Emergency Department Physician who either signs or Co-signs this chart in the absence of a cardiologist.      MEDICATIONS ORDERED:  Orders Placed This Encounter   Medications    LORazepam (ATIVAN) injection 2 mg    iopamidol (ISOVUE-370) 76 % injection 90 mL    0.9 % sodium chloride bolus    thiamine (B-1) 500 mg in sodium chloride 0.9 % 080 mL IVPB    folic acid 1 mg in dextrose 5 % 50 mL IVPB    dextrose 50 % IV solution         PROCEDURES:  None      CONSULTS:  IP CONSULT TO STROKE TEAM  IP CONSULT TO HOSPITALIST  IP CONSULT TO SOCIAL WORK  IP CONSULT TO NEUROLOGY      EMERGENCY DEPARTMENT COURSE:  ED Course as of 03/26/22 0535   Fri Mar 25, 2022   2050 WBC: 7.4 [KA]     2117 Troponin, High Sensitivity: <6 [KA]     2117 POC Lactic Acid(!): 2.96  Suspect this is elevated secondary to seizure activity, may be an element of dehydration. No other indications for infection at this time. Will get formal lactic and treat with IV fluids. [KA]     2118 POC Glucose(!): 61  Will treat with dextrose but will wait until patient has received thiamine infusion. She also received folic acid infusion. [KA]     2200 CT head and CTA head and neck are unremarkable. Rediscussed the patient with neurology who declines to admit at this time. They will continue to follow the patient but feel this is a clearly provoked seizure with alcohol withdrawal.  Do not recommend loading with AEDs. Will page [facility] for admission [KA]     2226 Discussed with [facility] who will admit at this time [KA]      ED Course User Index  [KA] Neyda Smith DO          FINAL IMPRESSION      1.  Seizure ([facility] [address]          DISPOSITION / PLAN     DISPOSITION Admitted 03/26/2022 12:21:15 AM      PATIENT REFERRED TO:  No follow-up provider specified.     DISCHARGE MEDICATIONS:  Current Discharge Medication List          Qi Oliveira DO  Emergency Medicine Resident    (Please note that portions of this note were completed with a voice recognition program.Efforts were made to edit the dictations but occasionally words are mis-transcribed.)        Qi Oliveira DO  Resident  03/26/22 [id number]

## 2022-03-26 NOTE — FLOWSHEET NOTE
[facility]     Emergency/Trauma Note    PATIENT NAME: Aubrey Patel    Shift date: 3/25/22  Shift day: Friday   Shift # 2    Room # 17/17   Name: Aubrey Patel            Age: 48 y.o. Gender: female          Nondenominational: None   Place of Temple:     Trauma/Incident type: Stroke Alert  Admit Date & Time: 3/25/2022  8:15 PM  TRAUMA NAME: N/A    ADVANCE DIRECTIVES IN CHART? No    NAME OF DECISION MAKER: N/A    RELATIONSHIP OF DECISION MAKER TO PATIENT: N/A    PATIENT/EVENT DESCRIPTION:  Aubrey Patel is a 48 y.o. female who arrived at [facility].  received PS for Stroke Alert. Patient had already arrived at time of  arrival. Pt to be admitted to 17/17. SPIRITUAL ASSESSMENT/INTERVENTION:  Yury Ponce was a ministry of presence to patient and medical staff. Patient was taken for tests and  spoke with significant other in room who appeared to be tearful and anxious.  offered prayer for spiritual comfort/support which was accepted. Significant other expressed gratitude for  visit. PATIENT BELONGINGS:  With patient    ANY BELONGINGS OF SIGNIFICANT VALUE NOTED:  N/A    REGISTRATION STAFF NOTIFIED? Yes      WHAT IS YOUR SPIRITUAL CARE PLAN FOR THIS PATIENT?:   Chaplains will remain available to offer spiritual and emotional support as needed. Electronically signed by Elizabeth Comer Resident, on 3/25/2022 at 9:04 PM.  [facility]  [phone]       03/25/22 [id number]   Encounter Summary   Services provided to: Patient;Significant other   Referral/Consult From: Multi-disciplinary team   Support System Significant other   Continue Visiting   (3/25/22)   Complexity of Encounter Moderate   Length of Encounter 30 minutes   Spiritual Assessment Completed Yes   Crisis   Type Stroke Alert   Assessment Anxious; Coping;Tearful   Intervention Nurtured hope;Prayer;Sustaining presence/ Ministry

## 2022-03-26 NOTE — ED NOTES
Pt placed on 2L nasal cannula for SpO2 of 88% on RA. Pt states she used to have sleep apnea.       Carla Rivera RN  03/26/22 0010

## 2022-03-26 NOTE — ED NOTES
Pt to ED after multiple seizures and fall at home today after attempt to detox from alcohol. Pt states she usually drinks about a fifth of liquor daily with her last liquor consumption on Wednesday, and normally a 6-12 pack of beer as well. Pt has been cutting back to detox and states she only had 1.5 tall beers today. Pt's significant other is at bedside and states she heard her fall in her room and states the pt hit her head during the fall. Pt had 3 witnessed seizures, two at home and one with EMS lasting between 1-2 minutes. Pt has hx of a stroke, but does not take any blood thinners. She states she had right sided deficits and went through PT/OT for about a month and made a full recovery. Right now, pt is complaining of right sided numbness that began today. She is alert and oriented x4, RR even and nonlabored. She denies any further needs at this time.           Carla Rivera RN  03/25/22 2037

## 2022-03-26 NOTE — CONSULTS
Resident Stroke Consult Note  Stroke Alert paged @ 8:40 PM  ER Room # 17  Arrival to patient bedside @ 8:44        Reason for Consult:  Stroke alert  Requesting Physician:  Dr. Guanako Gracia MD   Stroke Attending  [x]Dr. Allan Blum       History Obtained From:  patient, Girlfriend, electronic medical record    CHIEF COMPLAINT:     Provoked seizure ETOH withdrawal and hypoglycemia     HISTORY OF PRESENT ILLNESS:     The patient is a 48 y.o.  female who presents via EMS following provoked EtOH withdrawal seizures and hypoglycemia. Past medical history significant for polysubstance abuse including crack cocaine, marijuana, heroin, alcohol dependency with multiple alcohol related withdrawal seizures, bipolar recently taken off lithium earlier this month, suicidal ideation with previous attempts, drug overdose, PTSD. Patient is accompanied by her girlfriend who states that she has been having a difficult month with at least 2 admissions to [facility] behavioral [facility] earlier in the month taken off lithium and had worsening of mood after discharge with suicidal attempt again requiring I admission. This week patient was attempting to decrease her alcohol use states that she normally drinks approximately 1.5 gallons per day decreased to two 1.5 1/5th bottles of vodka daily with her last vodka drink Wednesday 3/23 and drank 2 tall boy beers yesterday and again today. Around 7:00 PM this evening she went upstairs because she was feeling generally unwell and nauseous when her girlfriend heard her fall and found her having approximately 1 to 2-minute seizure with about 2 to 3-minute postictal phase. She called EMS who prior to their arrival had an additional generalized tonic-clonic seizure-like activity lasting 1 minute.   EMS reports an additional third episode in the ambulance lasting approximately 1 to 2 minutes. On arrival to the emergency department patient noted to be lethargic although oriented to month, year, person and place. Patient able to follow simple and complex commands nonfocal neurologic exam with the exception of right arm and right leg weakness and paresthesias similar to her previous seizures. Normotensive on arrival 111/83, heart rate 76 temperature 98.0. Patient was not given any abortive by EMS as she had rapid return to her baseline. Patient had received Ativan just prior to my exam 2 mg one-time dose although again appeared to be near her baseline with exception of lethargy and effort dependent right-sided arm and leg weakness. Lab work significant for hemoglobin of 10.8, MCV 77.2, platelets 957, glucose 55, bicarbonate 18, anion gap 18, LFTs essentially unremarkable, magnesium 2.2, trop <6, ethanol level 113. Given the patient has extensive EtOH history plan for stat thiamine 309 mg IV, folic acid followed by dextrose infusion to correct her hypoglycemia. CT head without along with CTA head and neck unremarkable patient has provoked EtOH withdrawal seizure and hypoglycemia. Given the patient has multiple previous admissions with unremarkable EEGs and MRI brains would not recommend any further neurologic work-up at this time. Would recommend continued medical treatment of her hyperglycemia and alcohol withdrawals and if patient is admitted can have general neurology continue to follow her progress.       Last know well: 7:00 PM 3/25/22  On presentation:  BP: 111/83   BSL: 55    Prior to arrival patient was on  Antiplatelets/anticoagulants:none  Statins:lipitor 40mg nightly   Smoking history: Daily tobacco proximally half pack per day for 30 years  PAST MEDICAL HISTORY :       Past Medical History:        Diagnosis Date    MORTEZA (acute kidney injury) ([facility] 3/14/3847    Alcoholic hepatitis without ascites 91/25/2471    Alcoholic ketoacidosis 16/00/9519    Alcoholism ([email or web address]     3 pints daily    Anxiety     Asthma     Bipolar affective disorder ([facility] [address] 6/12/2019    Depression     Drug overdose, accidental or unintentional, initial encounter 9/29/2018    Drug overdose, intentional self-harm, initial encounter ([email or web address] 5/8/2018    Lisa coma scale total score 3-8 (HCC)     Hepatitis C antibody positive in blood 6/18/2019    History of gastric surgery (gastric sleeve 2012) 4/25/2017    Intentional drug overdose ([facility] [address] 5/7/2018    MD[facility]major depressive disorder[facility] 5/8/2018    Pneumonia     Polysubstance abuse ([email or web address]     drug abuse includes, cocaine, IV heroin, cannabis, and ETOH    Post traumatic stress disorder     Seizure ([facility] 12/17/2019    Smoker unmotivated to quit 4/25/2017    Suicide attempt ([email or web address] 11/08/2017    S/A by overdosing on Trazodone and Seroquel    Suicide ideation     Toxic metabolic encephalopathy 0/21/9644       Past Surgical History:        Procedure Laterality Date    CARPAL TUNNEL RELEASE      CHOLECYSTECTOMY      HYSTERECTOMY      HYSTERECTOMY, TOTAL ABDOMINAL      STOMACH SURGERY      gastric sleeve    UPPER GASTROINTESTINAL ENDOSCOPY N/A 6/19/2019    EGD BIOPSY performed by Carlos Branch MD at [address] [place] [address] History:   Social History     Socioeconomic History    Marital status: Single     Spouse name: Not on file    Number of children: 0    Years of education: 15    Highest education level: Not on file   Occupational History    Not on file   Tobacco Use    Smoking status: Current Every Day Smoker     Packs/day: 2.00     Years: 30.00     Pack years: 60.00     Types: Cigarettes    Smokeless tobacco: Never Used   Vaping Use    Vaping Use: Never used   Substance and Sexual Activity    Alcohol use: Yes     Alcohol/week: 3.0 standard drinks     Types: 3 Shots of liquor per week     Comment: reports 1/2 gallon/day     Drug use:  Yes Frequency: 7.0 times per week     Types: Marijuana ([employer]), IV, Cocaine     Comment: drug abuse includes, cocaine, IV heroin, cannabis    Sexual activity: Not Currently   Other Topics Concern    Not on file   Social History Narrative    ** Merged History Encounter **         ** Merged History Encounter **          Social Determinants of Health     Financial Resource Strain:     Difficulty of Paying Living Expenses: Not on file   Food Insecurity:     Worried About Running Out of Food in the Last Year: Not on file    Lorna of Food in the Last Year: Not on file   Transportation Needs:     Lack of Transportation (Medical): Not on file    Lack of Transportation (Non-Medical): Not on file   Physical Activity:     Days of Exercise per Week: Not on file    Minutes of Exercise per Session: Not on file   Stress:     Feeling of Stress : Not on file   Social Connections:     Frequency of Communication with Friends and Family: Not on file    Frequency of Social Gatherings with Friends and Family: Not on file    Attends Sabianist Services: Not on file    Active Member of [address] [place] [address] [employer] or Organizations: Not on file    Attends Club or Organization Meetings: Not on file    Marital Status: Not on file   Intimate Partner Violence:     Fear of Current or Ex-Partner: Not on file    Emotionally Abused: Not on file    Physically Abused: Not on file    Sexually Abused: Not on file   Housing Stability:     Unable to Pay for Housing in the Last Year: Not on file    Number of [place] in the Last Year: Not on file    Unstable Housing in the Last Year: Not on file       Family History:       Problem Relation Age of Onset    Brain Cancer Mother     Cancer Mother         \"female cancer\"    Heart Disease Father        Allergies:  Morphine, Morphine and related, Azithromycin, Morphine, Nsaids, and Zithromax [azithromycin]    Home Medications:  Prior to Admission medications    Medication Sig Start Date End Date Taking? Authorizing Provider   hydrOXYzine (ATARAX) 50 MG tablet Take 50 mg by mouth 3 times daily as needed for Itching   Yes Historical Provider, MD   acamprosate (CAMPRAL) 333 MG tablet Take 2 tablets by mouth 3 times daily 3/8/22   Tru Whitten MD   ARIPiprazole (ABILIFY) 5 MG tablet Take 1 tablet by mouth daily 3/8/22   Tru Whitten MD   busPIRone (BUSPAR) 7.5 MG tablet Take 1 tablet by mouth 2 times daily as needed (Anxiety) 3/7/22   Tru Whitten MD   folic acid (FOLVITE) 1 MG tablet Take 1 tablet by mouth daily 3/7/22   Tru Whitten MD   Multiple Vitamins-Minerals (THERAPEUTIC MULTIVITAMIN-MINERALS) tablet Take 1 tablet by mouth daily 3/7/22   Tru Whitten MD   venlafaxine (EFFEXOR XR) 75 MG extended release capsule Take 1 capsule by mouth daily (with breakfast) 3/8/22   Tru Whitten MD   thiamine mononitrate (THIAMINE) 100 MG tablet Take 1 tablet by mouth daily 3/8/22   Tru Whitten MD   atorvastatin (LIPITOR) 40 MG tablet Take 1 tablet by mouth nightly 1/3/22   Cris Perkins MD   omeprazole (PRILOSEC) 20 MG delayed release capsule Take 1 capsule by mouth 2 times daily 1/3/22   Cris Perkins MD   traZODone (DESYREL) 150 MG tablet Take 1 tablet by mouth nightly 1/3/22   Cris Perkins MD   albuterol sulfate  (90 Base) MCG/ACT inhaler Inhale 2 puffs into the lungs every 4 hours as needed for Wheezing 7/10/20   Eric Roper MD       Current Medications:   Current Facility-Administered Medications: 0.9 % sodium chloride bolus, 1,000 mL, IntraVENous, Once  folic acid 1 mg in dextrose 5 % 50 mL IVPB, 1 mg, IntraVENous, Once  dextrose 50 % IV solution, 25 g, IntraVENous, Once    REVIEW OF SYSTEMS:       CONSTITUTIONAL:  Lethargic   EYES: negative for double vision and photophobia    HEENT: negative for tinnitus and sore throat   RESPIRATORY: negative for cough, shortness of breath   CARDIOVASCULAR: negative for chest pain, palpitations   GASTROINTESTINAL: negative for nausea, vomiting GENITOURINARY: negative for incontinence   MUSCULOSKELETAL: negative for neck or back pain   NEUROLOGICAL:  Positive for 3 seizure-like episodes prior to admission   PSYCHIATRIC:  Positive for mood disturbance recent medication adjustments by psych outpatient     Review of systems otherwise negative.     PHYSICAL EXAM:       BP (!) 122/94   Pulse 78   Temp 98 °F (36.7 °C) (Oral)   Resp 18   SpO2 95%     CONSTITUTIONAL:   Patient appears older than her stated age of 48,  female with significant deconditioning and diffuse atrophy   HEAD:  normocephalic, atraumatic    EYES:  PERRLA, EOMI.--> patient noted to have bilateral horizontal nystagmus consistent with intoxication   ENT:  moist mucous membranes   NECK:  supple, symmetric, no midline tenderness to palpation    BACK:  without midline tenderness, step-offs or deformities    LUNGS:  Equal air entry bilaterally   CARDIOVASCULAR:  normal s1 / s2   ABDOMEN:  Soft, no rigidity   NEUROLOGIC:  Mental Status:  A & O x3,lethargic             Cranial Nerves:    cranial nerves II-XII are grossly intact    Motor Exam:    Drift:  present - Right upper and lower  Tone:  normal    Motor exam is 5 out of 5 all extremities with the exception of Right upper and lower extremity minor drift that is effort dependent as patient was able to hold her right arm up for finger to nose testing and right leg heal to shin testing      Sensory:    Touch:    Right Upper Extremity:  abnormal - paresteshia  Left Upper Extremity:  normal  Right Lower Extremity:  abnormal - paresthesia  Left Lower Extremity:  normal    Deep Tendon Reflexes:    Right Bicep:  1+  Left Bicep:  1+  Right Knee:  1+  Left Knee:  1+    Plantar Response:  Right:  downgoing  Left:  downgoing    Clonus:  absent  Gonzáles's:  absent    Coordination/Dysmetria:  Heel to Shin:  Right:  normal  Left:  normal  Finger to Nose:   Right:  normal  Left:  normal   Dysdiadochokinesia:  absent      INITIAL NIH STROKE SCALE:    Time Performed:  8:45 PM     1a. Level of consciousness:  0 - alert; keenly responsive  1b. Level of consciousness questions:  0 - answers both questions correctly  1c. Level of consciousness questions:  0 - performs both tasks correctly  2. Best Gaze:  0 - normal  3. Visual:  0 - no visual loss  4. Facial Palsy:  0 - normal symmetric movement  5a. Motor left arm:  0 - no drift, limb holds 90 (or 45) degrees for full 10 seconds  5b. Motor right arm:  1 - drift, limb holds 90 (or 45) degrees but drifts down before full 10 seconds: does not hit bed  6a. Motor left le - no drift; leg holds 30 degree position for full 5 seconds  6b. Motor right le - drift; leg falls by the end of the 5 second period but does not hit bed  7. Limb Ataxia:  0 - absent  8. Sensory:  0 - normal; no sensory loss  9. Best Language:  0 - no aphasia, normal  10. Dysarthria:  0 - normal  11.   Extinction and Inattention:  0 - no abnormality     TOTAL:  2     SKIN:  no rash      Modified Biola Score Scale:     [] Zero: No symptoms at all   [x] 1: No significant disability despite symptoms; able to carry out all usual duties and activities   [] 2: Slight disability; unable to carry out all previous activities, but able to look after own affairs without assistance   [] 3:Moderate disability; requiring some help, but able to walk without assistance   [] 4: Moderately severe disability; unable to walk and attend to bodily needs without assistance   [] 5:Severe disability; bedridden, incontinent and requiring constant nursing care and attention    LABS AND IMAGING:     CBC with Differential:    Lab Results   Component Value Date    WBC 7.4 2022    RBC 4.52 2022    HGB 10.8 2022    HCT 34.9 2022     2022    MCV 77.2 2022    MCH 23.9 2022    MCHC 30.9 2022    RDW 19.8 2022    NRBC 0 2015    SEGSPCT 43.3 2015    LYMPHOPCT 49 2022 MONOPCT 10 03/25/2022    BASOPCT 1 03/25/2022    MONOSABS 0.75 03/25/2022    MONOSABS 0.6 11/22/2015    LYMPHSABS 3.62 03/25/2022    LYMPHSABS 3.3 11/22/2015    EOSABS 0.26 03/25/2022    EOSABS 0.2 11/22/2015    BASOSABS 0.06 03/25/2022    DIFFTYPE NOT REPORTED 01/06/2022         BMP:    Lab Results   Component Value Date     03/25/2022    K 3.8 03/25/2022     03/25/2022    CO2 18 03/25/2022    BUN 5 03/25/2022    LABALBU 4.3 03/25/2022    CREATININE 0.66 03/25/2022    CREATININE 0.65 03/25/2022    CALCIUM 9.4 03/25/2022    GFRAA >60 03/25/2022    LABGLOM >60 03/25/2022    LABGLOM >90 11/22/2015    GLUCOSE 55 03/25/2022       Radiology Review:    1.) CT brain without contrast: (Reviewed at 9:00 PM 3/25)  No acute intracranial process, more then expected diffuse cerebral and cerebellar atrophy considering her age of 48 consistent with her >30 years ETOH abuse . Impression   No acute intracranial abnormality. 2.) CTA Head/Neck: (Reviewed at 9:02 PM 3/25)  -No evidence of large vessel occlusion or critical stenosis although Cervical region is motion limited   Impression   Motion degradation through the neck.  No definite cervical arterial occlusion   or high-grade stenosis identified.  If suspicion concern, consider repeat   imaging through the neck.       Otherwise negative for large vessel occlusion intracranially.          3.) Brain MRI W/O 11/02/2020: (Reviewed at 9:05PM )  Impression   Unremarkable MRI of the brain with and without contrast.     ASSESSMENT AND PLAN:       Patient Active Problem List   Diagnosis    Alcohol abuse    Heroin dependence ([address]    PTSD (post-traumatic stress disorder)    Major depression, recurrent ([address]    Depressed bipolar I disorder ([address]    Polysubstance abuse ([address]    Bipolar I disorder, current or most recent episode depressed, with psychotic features with mood-congruent psychotic features ([address]    Alcoholic hepatitis without ascites    Alcohol withdrawal syndrome with complication (HCC)    Bipolar 1 disorder (HCC)    Mild intermittent asthma without complication    Alcohol related seizure ([address]    Delroy's paresis (HCC)    Stroke-like symptoms    Drug use    Cocaine abuse ([address]    Abnormal CT of the head    Alcoholic intoxication without complication ([address]    History of bipolar disorder    Seizure ([address]    MORTEZA (acute kidney injury) ([address]    Chronic hepatitis C virus infection ([address]    Right otitis media    Acute dehydration    Acute pharyngitis    Depression with suicidal ideation    Severe recurrent major depression with psychotic features ([address]    Bipolar I disorder, most recent episode depressed, severe without psychotic features ([address]    Alcohol dependence with withdrawal, uncomplicated ([address]    Generalized anxiety disorder with panic attacks       Assessment                 48 y.o. female who presents with ETOH related provoked seizure and right arm and leg Todds paralysis. Patient is hypoglycemic requiring IV thiamine, folic acid followed by dextrose. Patient is lethargic as she received 2mg IV ativan on arrival to the emergency department otherwise neurologically in tact and very low suspiccion for stroke to cause her right hemiparesis as she has atleast 3 documented admissions now with provoked seizures and right hemiparesis from Todds paralysis each admission negative MRI and EEG. Last Known Well (date and time): 7:00PM 3/25/22    2. Candidate for IV tPA therapy     Yes []     No  [x] due to the following exclusion criteria: low NIH, other eitiology of Seizure which explain her symptoms other then stroke     3.  Candidate for Thrombectomy    Yes []      No [x] due to the following exclusion criteria: low NIH and no LVO or critical stenosis   - Discussed with Dr. Zoë Liriano      Recommendations:    [] General Neurology Care Status - prefer 5th floor (5A/5C)   [x] Internal Medicine General Care Status   [] NICU Status - (5B)     [] MICU Status   [] Observation Status    Please use the following admission order set for stroke admission:   - General seizure precautions with gen gen admission orderset as patient does not have stroke but rather ETOH withdrawal provoked seizure       Imaging   - CT Head  WO : done   - CTA Head and Neck : done       Medications   -Do not recommend any AED at this time given provoked seizure from ETOH withdrawal, can provide CIWA protocal and supportive measures  -  No need for asa as this is not stroke   - IV Thiamine 500mg in the ED can continue 191UV daily   - Folic acid 1mg BID  - Atorvastatin 40 mg nightly home dose   Labs  - Fasting Lipid panel  - HgbA1c lab      - PT, OT, Speech eval   - Telemetry   - Neuro checks per protocol  - We recommend SBP normotensive   - Blood glucose goal less than 180          Additional recommendations may follow    Please contact general neurology or stroke team with any changes in patients neurologic status. Thank you for your consult.        Jeerd Estrada MD   PGY 3 Neurology Resident  3/25/2022 at 10:14 PM

## 2022-03-26 NOTE — PLAN OF CARE
Problem: Skin Integrity:  Goal: Will show no infection signs and symptoms  Description: Will show no infection signs and symptoms  3/26/2022 0719 by Buddy Calvillo RN  Outcome: Ongoing  3/26/2022 0231 by Iain Whalen RN  Outcome: Ongoing  Goal: Absence of new skin breakdown  Description: Absence of new skin breakdown  3/26/2022 0719 by Buddy Calvillo RN  Outcome: Ongoing  3/26/2022 0231 by Iain Whalen RN  Outcome: Ongoing     Problem: Falls - Risk of:  Goal: Will remain free from falls  Description: Will remain free from falls  3/26/2022 0719 by Buddy Calvillo RN  Outcome: Ongoing  3/26/2022 0231 by Iain Whalen RN  Outcome: Met This Shift  Goal: Absence of physical injury  Description: Absence of physical injury  3/26/2022 0719 by Buddy Calvillo RN  Outcome: Ongoing  3/26/2022 0231 by Iain Whalen RN  Outcome: Met This Shift

## 2022-03-26 NOTE — H&P
admissions to [facility] behavioral health unit earlier in the month taken off lithium and had worsening of mood after discharge with suicidal attempt again requiring I admission.  This week patient was attempting to decrease her alcohol use states that she normally drinks approximately 1.5 gallons per day decreased to two 1.5 1/5th bottles of vodka daily with her last vodka drink Wednesday 3/23 and drank 2 tall boy beers yesterday and again today.    Currently the patient is feeling anxious and jittery but otherwise no other complaints     Past Medical History:     Past Medical History:   Diagnosis Date    MORTEZA (acute kidney injury) ([facility] 0/65/6934    Alcoholic hepatitis without ascites 49/00/6188    Alcoholic ketoacidosis 08/61/6090    Alcoholism ([address]     3 pints daily    Anxiety     Asthma     Bipolar affective disorder ([address] 6/12/2019    Depression     Drug overdose, accidental or unintentional, initial encounter 9/29/2018    Drug overdose, intentional self-harm, initial encounter ([address] 5/8/2018    Olmito coma scale total score 3-8 (HCC)     Hepatitis C antibody positive in blood 6/18/2019    History of gastric surgery (gastric sleeve 2012) 4/25/2017    Intentional drug overdose ([address] 5/7/2018    MDD (major depressive disorder), recurrent episode ([address] 5/8/2018    Pneumonia     Polysubstance abuse ([address]     drug abuse includes, cocaine, IV heroin, cannabis, and ETOH    Post traumatic stress disorder     Seizure ([address] 12/17/2019    Smoker unmotivated to quit 4/25/2017    Suicide attempt ([address] 11/08/2017    S/A by overdosing on Trazodone and Seroquel    Suicide ideation     Toxic metabolic encephalopathy 5/31/1549        Past Surgical History:     Past Surgical History:   Procedure Laterality Date    CARPAL TUNNEL RELEASE      CHOLECYSTECTOMY      HYSTERECTOMY      HYSTERECTOMY, TOTAL ABDOMINAL      STOMACH SURGERY      gastric sleeve    UPPER GASTROINTESTINAL ENDOSCOPY N/A 6/19/2019    EGD BIOPSY performed by Ld Edgar MD at [address]        Medications Prior to Admission:     Prior to Admission medications    Medication Sig Start Date End Date Taking? Authorizing Provider   hydrOXYzine (ATARAX) 50 MG tablet Take 50 mg by mouth 3 times daily as needed for Itching  Patient not taking: Reported on 3/26/2022    Historical Provider, MD   acamprosate (CAMPRAL) 333 MG tablet Take 2 tablets by mouth 3 times daily 3/8/22   Leonides Schmidt MD   ARIPiprazole (ABILIFY) 5 MG tablet Take 1 tablet by mouth daily 3/8/22   Leonides Schmidt MD   busPIRone (BUSPAR) 7.5 MG tablet Take 1 tablet by mouth 2 times daily as needed (Anxiety) 3/7/22   Leonides Schmidt MD   folic acid (FOLVITE) 1 MG tablet Take 1 tablet by mouth daily  Patient not taking: Reported on 3/26/2022 3/7/22   Leonides Schmidt MD   Multiple Vitamins-Minerals (THERAPEUTIC MULTIVITAMIN-MINERALS) tablet Take 1 tablet by mouth daily 3/7/22   Leonides Schmidt MD   venlafaxine (EFFEXOR XR) 75 MG extended release capsule Take 1 capsule by mouth daily (with breakfast) 3/8/22   Leonides Schmidt MD   thiamine mononitrate (THIAMINE) 100 MG tablet Take 1 tablet by mouth daily  Patient not taking: Reported on 3/26/2022 3/8/22   Leonides Schmidt MD   atorvastatin (LIPITOR) 40 MG tablet Take 1 tablet by mouth nightly  Patient not taking: Reported on 3/26/2022 1/3/22   Gem Valladares MD   omeprazole (PRILOSEC) 20 MG delayed release capsule Take 1 capsule by mouth 2 times daily 1/3/22   Gem Valladares MD   traZODone (DESYREL) 150 MG tablet Take 1 tablet by mouth nightly 1/3/22   Gem Valladares MD   albuterol sulfate  (90 Base) MCG/ACT inhaler Inhale 2 puffs into the lungs every 4 hours as needed for Wheezing 7/10/20   Mandi Rizvi MD        Allergies:     Morphine, Morphine and related, Azithromycin, Morphine, Nsaids, and Zithromax [azithromycin]    Social History:     Tobacco:    reports that she has been smoking cigarettes.  She has a 60.00 pack-year smoking history. She has never used smokeless tobacco.  Alcohol:      reports current alcohol use of about 3.0 standard drinks of alcohol per week. Drug Use:  reports current drug use. Frequency: 7.00 times per week. Drugs: Marijuana ([place]), IV, and Cocaine. Family History:     Family History   Problem Relation Age of Onset    Brain Cancer Mother     Cancer Mother         \"female cancer\"    Heart Disease Father        Review of Systems:     Positive and Negative as described in HPI.     12 point ROS performed and negative for anything other than what was stated in HPI     Physical Exam:   BP 92/72   Pulse 77   Temp 98.3 °F (36.8 °C) (Oral)   Resp 19   Ht 5' (1.524 m)   Wt 117 lb (53.1 kg)   SpO2 97%   BMI 22.85 kg/m²   Temp (24hrs), Av.1 °F (36.7 °C), Min:98 °F (36.7 °C), Max:98.3 °F (36.8 °C)    Recent Labs     22  0604   POCGLU 61* 73       Intake/Output Summary (Last 24 hours) at 3/26/2022 7341  Last data filed at 3/26/2022 0518  Gross per 24 hour   Intake 2071.79 ml   Output --   Net 2071.79 ml       General Appearance: alert, well appearing, and in no acute distress  Mental status: oriented to person, place, and time  Head: normocephalic, atraumatic  Eye: no icterus, redness, pupils equal and reactive, extraocular eye movements intact, conjunctiva clear  Ear: normal external ear, no discharge, hearing intact  Nose: no drainage noted  Mouth: mucous membranes moist  Neck: supple, no carotid bruits, thyroid not palpable  Lungs: Bilateral equal air entry, clear to ausculation, no wheezing, rales or rhonchi, normal effort  Cardiovascular: normal rate, regular rhythm, no murmur, gallop, rub  Abdomen: Soft, nontender, nondistended, normal bowel sounds  Neurologic: There are no new focal motor or sensory deficits, normal muscle tone and bulk, no abnormal sensation, normal speech  Skin: No gross lesions, rashes, bruising or bleeding on exposed skin area  Extremities: peripheral pulses palpable, no pedal edema or calf pain with palpation  Psych: anxious    Investigations:      Laboratory Testing:  Recent Results (from the past 24 hour(s))   CBC with Auto Differential    Collection Time: 03/25/22  8:38 PM   Result Value Ref Range    WBC 7.4 3.5 - 11.3 k/uL    RBC 4.52 3.95 - 5.11 m/uL    Hemoglobin 10.8 (L) 11.9 - 15.1 g/dL    Hematocrit 34.9 (L) 36.3 - 47.1 %    MCV 77.2 (L) 82.6 - 102.9 fL    MCH 23.9 (L) 25.2 - 33.5 pg    MCHC 30.9 28.4 - 34.8 g/dL    RDW 19.8 (H) 11.8 - 14.4 %    Platelets 251 814 - 125 k/uL    MPV 9.3 8.1 - 13.5 fL    NRBC Automated 0.0 0.0 per 100 WBC    Seg Neutrophils 36 36 - 65 %    Lymphocytes 49 (H) 24 - 43 %    Monocytes 10 3 - 12 %    Eosinophils % 4 1 - 4 %    Basophils 1 0 - 2 %    Immature Granulocytes 0 0 %    Segs Absolute 2.65 1.50 - 8.10 k/uL    Absolute Lymph # 3.62 1.10 - 3.70 k/uL    Absolute Mono # 0.75 0.10 - 1.20 k/uL    Absolute Eos # 0.26 0.00 - 0.44 k/uL    Basophils Absolute 0.06 0.00 - 0.20 k/uL    Absolute Immature Granulocyte <0.03 0.00 - 0.30 k/uL    RBC Morphology ANISOCYTOSIS PRESENT    Comprehensive Metabolic Panel    Collection Time: 03/25/22  8:38 PM   Result Value Ref Range    Glucose 55 (L) 70 - 99 mg/dL    BUN 5 (L) 6 - 20 mg/dL    CREATININE 0.65 0.50 - 0.90 mg/dL    Calcium 9.4 8.6 - 10.4 mg/dL    Sodium 138 135 - 144 mmol/L    Potassium 3.8 3.7 - 5.3 mmol/L    Chloride 102 98 - 107 mmol/L    CO2 18 (L) 20 - 31 mmol/L    Anion Gap 18 (H) 9 - 17 mmol/L    Alkaline Phosphatase 60 35 - 104 U/L    ALT 16 5 - 33 U/L    AST 27 <32 U/L    Total Bilirubin 0.19 (L) 0.3 - 1.2 mg/dL    Total Protein 8.0 6.4 - 8.3 g/dL    Albumin 4.3 3.5 - 5.2 g/dL    Albumin/Globulin Ratio 1.2 1.0 - 2.5    GFR Non-African American >60 >60 mL/min    GFR African American >60 >60 mL/min    GFR Comment         Magnesium    Collection Time: 03/25/22  8:38 PM   Result Value Ref Range    Magnesium 2.2 1.6 - 2.6 mg/dL   Troponin    Collection Time: mg/dL    GFR Comment >60 >60 mL/min    GFR Non-African American >60 >60 mL/min    GFR Comment         CALCIUM, IONIC (POC)    Collection Time: 03/25/22  8:47 PM   Result Value Ref Range    POC Ionized Calcium 1.16 1.15 - 1.33 mmol/L   POCT urea (BUN)    Collection Time: 03/25/22  8:47 PM   Result Value Ref Range    POC BUN 6 (L) 8 - 26 mg/dL   Lactic Acid, POC    Collection Time: 03/25/22  8:47 PM   Result Value Ref Range    POC Lactic Acid 2.96 (H) 0.56 - 1.39 mmol/L   POCT Glucose    Collection Time: 03/25/22  8:47 PM   Result Value Ref Range    POC Glucose 61 (L) 74 - 100 mg/dL   SPECIMEN REJECTION    Collection Time: 03/25/22  9:54 PM   Result Value Ref Range    Specimen Source . BLOOD     Ordered Test lacds     Reason for Rejection       Unable to perform testing: Specimen quantity not sufficient. Lactic Acid    Collection Time: 03/25/22 10:14 PM   Result Value Ref Range    Lactic Acid, Whole Blood 2.5 (H) 0.7 - 2.1 mmol/L   DRUG SCREEN MULTI URINE    Collection Time: 03/25/22 10:16 PM   Result Value Ref Range    Amphetamine Screen, Ur NEGATIVE NEGATIVE    Barbiturate Screen, Ur NEGATIVE NEGATIVE    Benzodiazepine Screen, Urine NEGATIVE NEGATIVE    Cocaine Metabolite, Urine NEGATIVE NEGATIVE    Methadone Screen, Urine NEGATIVE NEGATIVE    Opiates, Urine NEGATIVE NEGATIVE    Phencyclidine, Urine NEGATIVE NEGATIVE    Cannabinoid Scrn, Ur NEGATIVE NEGATIVE    Oxycodone Screen, Ur NEGATIVE NEGATIVE    Test Information       Assay provides medical screening only. The absence of expected drug(s) and/or metabolite(s) may indicate diluted or adulterated urine, limitations of testing or timing of collection. COVID-19, Rapid    Collection Time: 03/25/22 10:40 PM    Specimen: Nasopharyngeal Swab   Result Value Ref Range    Specimen Description . NASOPHARYNGEAL SWAB     SARS-CoV-2, Rapid Not Detected Not Detected   POC Glucose Fingerstick    Collection Time: 03/26/22  6:04 AM   Result Value Ref Range    POC Glucose 73 65 - 105 mg/dL   Calcium, Ionized    Collection Time: 03/26/22  6:05 AM   Result Value Ref Range    Calcium, Ion 1.15 1.13 - 1.33 mmol/L   Comprehensive Metabolic Panel w/ Reflex to MG    Collection Time: 03/26/22  6:05 AM   Result Value Ref Range    Glucose 76 70 - 99 mg/dL    BUN 6 6 - 20 mg/dL    CREATININE 0.54 0.50 - 0.90 mg/dL    Calcium 8.5 (L) 8.6 - 10.4 mg/dL    Sodium 144 135 - 144 mmol/L    Potassium 4.2 3.7 - 5.3 mmol/L    Chloride 110 (H) 98 - 107 mmol/L    CO2 22 20 - 31 mmol/L    Anion Gap 12 9 - 17 mmol/L    Alkaline Phosphatase 49 35 - 104 U/L    ALT 13 5 - 33 U/L    AST 20 <32 U/L    Total Bilirubin 0.28 (L) 0.3 - 1.2 mg/dL    Total Protein 6.6 6.4 - 8.3 g/dL    Albumin 3.7 3.5 - 5.2 g/dL    Albumin/Globulin Ratio 1.3 1.0 - 2.5    GFR Non-African American >60 >60 mL/min    GFR African American >60 >60 mL/min    GFR Comment         Magnesium    Collection Time: 03/26/22  6:05 AM   Result Value Ref Range    Magnesium 2.0 1.6 - 2.6 mg/dL   Phosphorus    Collection Time: 03/26/22  6:05 AM   Result Value Ref Range    Phosphorus 4.6 (H) 2.6 - 4.5 mg/dL   Protime-INR    Collection Time: 03/26/22  6:05 AM   Result Value Ref Range    Protime 10.8 9.1 - 12.3 sec    INR 1.0    TSH without Reflex    Collection Time: 03/26/22  6:05 AM   Result Value Ref Range    TSH 1.23 0.30 - 5.00 mIU/L       Imaging/Diagnostics:  CT HEAD WO CONTRAST    Result Date: 3/25/2022  No acute intracranial abnormality. The findings were sent to the Radiology Results [address] at 9:37 pm on 3/25/2022to be communicated to a licensed caregiver. XR CHEST PORTABLE    Result Date: 3/25/2022  No acute cardiopulmonary process     CTA HEAD NECK W CONTRAST    Result Date: 3/25/2022  Motion degradation through the neck. No definite cervical arterial occlusion or high-grade stenosis identified. If suspicion concern, consider repeat imaging through the neck. Otherwise negative for large vessel occlusion intracranially. 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 Alcohol withdrawal   2. Seizure related to #1  - neurology consult   - seizure precaution   - likelyl related to alcohol withdrawal seizure   - EEG ordered   - CIWA   - thiamine   - folic acid   - SW consult    Consultations:   IP CONSULT TO STROKE TEAM  IP CONSULT TO HOSPITALIST  IP CONSULT TO SOCIAL WORK  IP CONSULT TO NEUROLOGY      Patient is admitted as inpatient status because of co-morbidities listed above, severity of signs and symptoms as outlined, requirement for current medical therapies and most importantly because of direct risk to patient if care not provided in a hospital setting. Expected length of stay > 48 hours.     Collin Gil MD  3/26/2022  7:38 AM    Copy sent to Dr. Randall Rene MD

## 2022-03-26 NOTE — PLAN OF CARE
Nutrition Problem #1: Inadequate oral intake  Intervention: Food and/or Nutrient Delivery: Continue Current Diet,Start Oral Nutrition Supplement  Nutritional Goals: meet % of estimated nutrient needs with oral diet/supplements

## 2022-03-26 NOTE — CARE COORDINATION
Consult received for consideration of rehab. Pt has been seen by SW in the past  Met with pt and her gfriend, ELAINE, with pt permission  ELAINE left shortly after SW arrived and encourage pt to get some help. Pt reports she lives alone. She does not work, has [address] in place  Pt reports daily alcohol use, 1/5-1/2 gallon of vodka. Stated she last drank yest and had 3 tall boys. She denies all other drug use since Oct-Nov 2021 (hx of marijuana, crack, heroin in the past). Pt reports she has been trying to wean herself from alcohol. She has been thru numerous tx programs ([facility] x2, [facility], [facility], [facility], [facility]. Pt stated her longest sobriety has been 8m when she was staying at [facility]. She has been to [place] [id number] in the past.  Pt reports she is interested in tx but only a short term, residential setting (30 days or less). Pt was given a list of tx resources to review and discussed the few program that have residential.  Pt undecided at this time on what she wants to do.   Pt is linked with [facility] for depression/anxiety and follows with the psychiatrist monthly and has a CM  Will f/u with pt to see if she has decided on wanting to pursue tx

## 2022-03-27 LAB
ESTIMATED AVERAGE GLUCOSE: 105 MG/DL
HBA1C MFR BLD: 5.3 % (ref 4–6)

## 2022-03-27 PROCEDURE — 2580000003 HC RX 258: Performed by: NURSE PRACTITIONER

## 2022-03-27 PROCEDURE — 6360000002 HC RX W HCPCS: Performed by: NURSE PRACTITIONER

## 2022-03-27 PROCEDURE — 6370000000 HC RX 637 (ALT 250 FOR IP): Performed by: NURSE PRACTITIONER

## 2022-03-27 PROCEDURE — 2060000000 HC ICU INTERMEDIATE R&B

## 2022-03-27 PROCEDURE — 6370000000 HC RX 637 (ALT 250 FOR IP): Performed by: FAMILY MEDICINE

## 2022-03-27 PROCEDURE — 99231 SBSQ HOSP IP/OBS SF/LOW 25: CPT | Performed by: PSYCHIATRY & NEUROLOGY

## 2022-03-27 PROCEDURE — 94761 N-INVAS EAR/PLS OXIMETRY MLT: CPT

## 2022-03-27 PROCEDURE — 99232 SBSQ HOSP IP/OBS MODERATE 35: CPT | Performed by: FAMILY MEDICINE

## 2022-03-27 RX ADMIN — Medication 100 MG: at 08:31

## 2022-03-27 RX ADMIN — LORAZEPAM 4 MG: 2 INJECTION INTRAMUSCULAR; INTRAVENOUS at 11:45

## 2022-03-27 RX ADMIN — LORAZEPAM 4 MG: 2 INJECTION INTRAMUSCULAR; INTRAVENOUS at 19:28

## 2022-03-27 RX ADMIN — LORAZEPAM 4 MG: 2 INJECTION INTRAMUSCULAR; INTRAVENOUS at 16:29

## 2022-03-27 RX ADMIN — SODIUM CHLORIDE, PRESERVATIVE FREE 10 ML: 5 INJECTION INTRAVENOUS at 08:32

## 2022-03-27 RX ADMIN — LORAZEPAM 2 MG: 2 INJECTION INTRAMUSCULAR; INTRAVENOUS at 10:12

## 2022-03-27 RX ADMIN — SODIUM CHLORIDE: 9 INJECTION, SOLUTION INTRAVENOUS at 22:13

## 2022-03-27 RX ADMIN — ATORVASTATIN CALCIUM 40 MG: 80 TABLET, FILM COATED ORAL at 20:03

## 2022-03-27 RX ADMIN — ARIPIPRAZOLE 5 MG: 5 TABLET ORAL at 08:31

## 2022-03-27 RX ADMIN — LORAZEPAM 4 MG: 2 INJECTION INTRAMUSCULAR; INTRAVENOUS at 18:31

## 2022-03-27 RX ADMIN — VENLAFAXINE HYDROCHLORIDE 75 MG: 75 CAPSULE, EXTENDED RELEASE ORAL at 08:31

## 2022-03-27 RX ADMIN — SODIUM CHLORIDE: 9 INJECTION, SOLUTION INTRAVENOUS at 16:29

## 2022-03-27 RX ADMIN — SODIUM CHLORIDE, PRESERVATIVE FREE 10 ML: 5 INJECTION INTRAVENOUS at 08:31

## 2022-03-27 RX ADMIN — FOLIC ACID 1 MG: 1 TABLET ORAL at 08:31

## 2022-03-27 RX ADMIN — LORAZEPAM 2 MG: 2 INJECTION INTRAMUSCULAR; INTRAVENOUS at 06:05

## 2022-03-27 RX ADMIN — ONDANSETRON 4 MG: 2 INJECTION INTRAMUSCULAR; INTRAVENOUS at 16:35

## 2022-03-27 RX ADMIN — LORAZEPAM 4 MG: 2 INJECTION INTRAMUSCULAR; INTRAVENOUS at 15:15

## 2022-03-27 RX ADMIN — SODIUM CHLORIDE, PRESERVATIVE FREE 10 ML: 5 INJECTION INTRAVENOUS at 20:04

## 2022-03-27 RX ADMIN — LORAZEPAM 4 MG: 2 INJECTION INTRAMUSCULAR; INTRAVENOUS at 22:10

## 2022-03-27 ASSESSMENT — PAIN SCALES - GENERAL
PAINLEVEL_OUTOF10: 0
PAINLEVEL_OUTOF10: 6
PAINLEVEL_OUTOF10: 0

## 2022-03-27 ASSESSMENT — PAIN DESCRIPTION - LOCATION: LOCATION: HEAD

## 2022-03-27 NOTE — CARE COORDINATION
03/27/22 0959   Readmission Assessment   Number of Days since last admission? 8-30 days   Previous Disposition Home with Family   Who is being Interviewed Patient   What was the patient's/caregiver's perception as to why they think they needed to return back to the hospital? Other (Comment)  (seizures and multiple falls)   Did you visit your Primary Care Physician after you left the hospital, before you returned this time? No   Why weren't you able to visit your PCP? Did not have an appointment   Did you see a specialist, such as Cardiac, Pulmonary, Orthopedic Physician, etc. after you left the hospital? No   Who advised the patient to return to the hospital? Caregiver  (significant other)   Does the patient report anything that got in the way of taking their medications? No   In our efforts to provide the best possible care to you and others like you, can you think of anything that we could have done to help you after you left the hospital the first time, so that you might not have needed to return so soon?  Other (Comment)  (no)

## 2022-03-27 NOTE — PLAN OF CARE
Problem: Skin Integrity:  Goal: Will show no infection signs and symptoms  3/27/2022 0800 by Mirian Lawson RN  Outcome: Ongoing  3/27/2022 0500 by Dez Ely RN  Outcome: Ongoing  Goal: Absence of new skin breakdown  3/27/2022 0800 by Mirian Lawson RN  Outcome: Ongoing  3/27/2022 0500 by Dez Ely RN  Outcome: Ongoing     Problem: Falls - Risk of:  Goal: Will remain free from falls  3/27/2022 0800 by Mirian Lawson RN  Outcome: Ongoing  3/27/2022 0500 by Dez Ely RN  Outcome: Ongoing  Goal: Absence of physical injury  3/27/2022 0800 by Mirian Lawson RN  Outcome: Ongoing  3/27/2022 0500 by Dez Ely RN  Outcome: Ongoing     Problem: Nutrition  Goal: Optimal nutrition therapy  3/27/2022 0800 by Mirian Lawson RN  Outcome: Ongoing  3/27/2022 0500 by Dez Ely RN  Outcome: Ongoing     Problem: Anxiety:  Goal: Level of anxiety will decrease  Outcome: Ongoing     Problem: Coping:  Goal: Ability to cope will improve  Outcome: Ongoing  Goal: Ability to identify appropriate support needs will improve  Outcome: Ongoing     Problem: Health Behavior:  Goal: Ability to manage health-related needs will improve  Outcome: Ongoing     Problem: Physical Regulation:  Goal: Signs of adequate cerebral perfusion will increase  Outcome: Ongoing  Goal: Ability to maintain a stable neurologic state will improve  Outcome: Ongoing     Problem: Safety:  Goal: Ability to remain free from injury will improve  Outcome: Ongoing     Problem: Self-Concept:  Goal: Level of anxiety will decrease  Outcome: Ongoing  Goal: Ability to verbalize feelings about condition will improve  Outcome: Ongoing     Problem: Discharge Planning:  Goal: Discharged to appropriate level of care  Outcome: Ongoing     Problem: Fluid Volume - Deficit:  Goal: Absence of fluid volume deficit signs and symptoms  Outcome: Ongoing     Problem: Nutrition Deficit:  Goal: Ability to achieve adequate nutritional intake will improve  Outcome: Ongoing     Problem: Sleep Pattern Disturbance:  Goal: Appears well-rested  Outcome: Ongoing     Problem: Violence - Risk of, Self/Other-Directed:  Goal: Knowledge of developmental care interventions  Outcome: Ongoing

## 2022-03-27 NOTE — PROGRESS NOTES
[facility]  Office: [address], DO, Aldo Langleyf, DO, Kev Johnston, DO, Maria Isabel Nguyen Blood, DO, Surjit Wharton MD, Wilbert Saavedra MD, Teja Hathaway MD, Mary Og MD, Noel Abel MD, Mai Jalloh MD, Gely Child MD, Jose Luis Nicholsony, DO, Geneva Harness, DO, Maryan Yarbrough MD,  Gonzales Bear, DO, Ashley Peacock MD, Sara Wayne MD, Louisa Doll MD, Paul Cullen DO, Taco Nicolas MD, Judy Arana MD, Melanie Jacques, [facility], [facility], CNP, Ludivina Mullen, CNP, Obey Hernandez, CNS, Donavan Ramirez, CNP, Gerilyn Cockayne, CNP, Farrukh Layne, CNP, Olimpia Porras, CNP, Camillo Schaumann, CNP, Shiva Salinas PA-C, Tony Wharton [facility], Juan Ramos [facility], Leticia Wan, CNP, Alexis Echavarria, [facility], Rox Kaiser, [address] [place] [address]    Progress Note    3/27/2022    7:51 AM    Name:   Wanda Espinal  MRN:     [id number]     Luisalyside:      [de-identified]   Room:   [address] [place] [address] Day:  2  Admit Date:  3/25/2022  8:15 PM    PCP:   Gabriel Webber MD  Code Status:  Full Code    Subjective:     C/C:   Chief Complaint   Patient presents with    Seizures      Interval History Status: not changed. Pt was seen and examined this morning  No acute events overnight   No new copmlaints   Continues to feel anxious and jittery per patient       Review of Systems:     12 point ROS performed and negative for anythign other than what was stated in subjective     Medications: Allergies:     Allergies   Allergen Reactions    Morphine Anaphylaxis    Morphine And Related      Itching, nausea    Azithromycin     Morphine     Nsaids      contraindicated    Zithromax [Azithromycin] Swelling       Current Meds:   Scheduled Meds:    nicotine  1 patch TransDERmal Daily    ARIPiprazole  5 mg Oral Daily    atorvastatin  40 mg Oral Nightly    folic acid  1 mg Oral Daily    venlafaxine  75 mg Oral Daily with breakfast    sodium chloride flush  5-40 mL IntraVENous 2 times per day    thiamine  100 mg Oral Daily     Continuous Infusions:    sodium chloride 125 mL/hr at 03/26/22 1746    sodium chloride       PRN Meds: sodium chloride flush, sodium chloride, potassium chloride **OR** potassium alternative oral replacement **OR** potassium chloride, ondansetron **OR** ondansetron, polyethylene glycol, acetaminophen **OR** acetaminophen, LORazepam **OR** LORazepam **OR** LORazepam **OR** LORazepam **OR** LORazepam **OR** LORazepam **OR** LORazepam **OR** LORazepam    Data:     Past Medical History:   has a past medical history of MORTEZA (acute kidney injury) ([facility] Alcoholic hepatitis without ascites, Alcoholic ketoacidosis, Alcoholism ([facility] Anxiety, Asthma, Bipolar affective disorder ([facility] Depression, Drug overdose, accidental or unintentional, initial encounter, Drug overdose, intentional self-harm, initial encounter ([facility] Lisa coma scale total score 3-8 ([facility] Hepatitis C antibody positive in blood, History of gastric surgery (gastric sleeve 2012), Intentional drug overdose ([facility] MDD (major depressive disorder), recurrent episode ([facility] Pneumonia, Polysubstance abuse ([facility] Post traumatic stress disorder, Seizure ([facility] Smoker unmotivated to quit, Suicide attempt ([facility] Suicide ideation, and Toxic metabolic encephalopathy. Social History:   reports that she has been smoking cigarettes. She has a 60.00 pack-year smoking history. She has never used smokeless tobacco. She reports current alcohol use of about 3.0 standard drinks of alcohol per week. She reports current drug use. Frequency: 7.00 times per week. Drugs: Marijuana (Jerelyn Linus), IV, and Cocaine.      Family History:   Family History   Problem Relation Age of Onset    Brain Cancer Mother     Cancer Mother         \"female cancer\"    Heart Disease Father        Vitals:  BP (!) 135/95   Pulse 78   Temp 98 °F (36.7 °C) (Oral)   Resp 22   Ht 5' (1.524 m)   Wt 121 lb 14.4 oz (55.3 kg) SpO2 96%   BMI 23.81 kg/m²   Temp (24hrs), Av.3 °F (36.8 °C), Min:97.8 °F (36.6 °C), Max:98.8 °F (37.1 °C)    Recent Labs     22  0604   POCGLU 61* 73       I/O (24Hr):     Intake/Output Summary (Last 24 hours) at 3/27/2022 0751  Last data filed at 3/26/2022 1746  Gross per 24 hour   Intake 1789.61 ml   Output --   Net 1789.61 ml       Labs:  Hematology:  Recent Labs     22  06   WBC 7.4  --    RBC 4.52  --    HGB 10.8*  --    HCT 34.9*  --    MCV 77.2*  --    MCH 23.9*  --    MCHC 30.9  --    RDW 19.8*  --      --    MPV 9.3  --    INR  --  1.0     Chemistry:  Recent Labs     22  0605     --   --  144   K 3.8  --   --  4.2     --   --  110*   CO2 18*  --   --  22   GLUCOSE 55*  --   --  76   BUN 5*  --   --  6   CREATININE 0.65 0.66  --  0.54   MG 2.2  --   --  2.0   ANIONGAP 18*  --   --  12   LABGLOM >60 >60  --  >60   GFRAA >60  --   --  >60   CALCIUM 9.4  --   --  8.5*   CAION  --   --   --  1.15   PHOS  --   --   --  4.6*   TROPHS <6  --   --   --    LACTACIDWB  --   --  2.5*  --      Recent Labs     22  0604 22  0605   PROT 8.0  --   --  6.6   LABALBU 4.3  --   --  3.7   TSH  --   --   --  1.23   AST 27  --   --  20   ALT 16  --   --  13   ALKPHOS 60  --   --  49   BILITOT 0.19*  --   --  0.28*   POCGLU  --  61* 73  --      ABG:  Lab Results   Component Value Date    POCPH 7.351 2018    PHART 7.397 2018    POCPCO2 45.7 2018    CBK1UBH 53.5 2018    POCPO2 73.8 2018    PO2ART 77.3 2018    POCHCO3 25.3 2018    BMQ9JEP 32.9 2018    NBEA 1 2018    PBEA NOT REPORTED 2018    IFJ4MYE 27 2018    PGHR6FNB 94 2018    U2OKHWRL 93.7 2018    FIO2 NOT REPORTED 2020     Lab Results   Component Value Date/Time    SPECIAL R HAND 4 ML 2021 09:00 PM    SPECIAL L HAND 4 ML 2021 09:00 PM     Lab Results   Component Value Date/Time    CULTURE NO SIGNIFICANT GROWTH 03/25/2022 10:16 PM       Radiology:  CT HEAD WO CONTRAST    Result Date: 3/25/2022  No acute intracranial abnormality. The findings were sent to the Radiology Results [address] at 9:37 pm on 3/25/2022to be communicated to a licensed caregiver. XR CHEST PORTABLE    Result Date: 3/25/2022  No acute cardiopulmonary process     CTA HEAD NECK W CONTRAST    Result Date: 3/25/2022  Motion degradation through the neck. No definite cervical arterial occlusion or high-grade stenosis identified. If suspicion concern, consider repeat imaging through the neck. Otherwise negative for large vessel occlusion intracranially. Physical Examination:        General appearance:  alert, cooperative and no distress  Mental Status:  oriented to person, place and time and normal affect  Lungs:  clear to auscultation bilaterally, normal effort  Heart:  regular rate and rhythm, no murmur  Abdomen:  soft, nontender, nondistended, normal bowel sounds  Extremities:  no edema, redness, tenderness in the calves  Skin:  no gross lesions, rashes, induration    Assessment:        Hospital Problems           Last Modified POA    * (Principal) Alcohol withdrawal syndrome with complication ([address] 2/53/6825 Yes    Polysubstance abuse ([address] (Chronic) 3/25/2022 Yes    Alcohol related seizure ([address] 3/25/2022 Yes    Delroy's paresis ([address] 3/25/2022 Yes    Chronic hepatitis C virus infection ([address] 3/25/2022 Yes    Alcohol withdrawal seizure with complication ([address] 3/83/8864 Yes          Plan:        1. Alcohol withdrawal   2.  Seizure related to #1  - neurology consult   - seizure precaution   - likelyl related to alcohol withdrawal seizure   - CIWA   - thiamine   - folic acid   - SW consult   - continue to monitor as patient actively withdrawing     Norris Holter, MD  3/27/2022  7:51 AM

## 2022-03-27 NOTE — PROGRESS NOTES
[id number] [facility]   [address] [place] [address]    Resident Progress Note             Date:   3/27/2022  Patient name:  Gagandeep Quintana  Date of admission:  3/25/2022  8:15 PM  MRN:   [id number]  Account:  [de-identified]  YOB: 1975  PCP:    Shelley Rankin MD  Room:   [address] [place] [address]  Code Status:    Full Code    Subjective: Interval History:     The patient was seen and examined and the chart was reviewed. There were no acute events overnight. Patient was awake, alert, oriented, doing well this morning. Had multiple doses of Ativan; last dose was 0605. Brief History: This is a 48 y.o.  female presented via EMS with alcohol withdrawal seizures and hypoglycemia. Past medical history: Polysubstance abuse including cocaine, marijuana, heroin, alcohol dependency with multiple alcohol-related withdrawal seizures, bipolar recently taken off lithium earlier this month, suicidal ideation previous attempts, drug overdose, PTSD. On presentation, patient was accompanied by her girlfriend who stated that she has been having a difficult month with 2 admissions to [facility] earlier this month and was taken off lithium and had worsening of mood after discharge with suicide attempt requiring another [facility] admission. This week, the patient was attempting to decrease her alcohol use [normally she drinks about 1.5 gallons per day]- to 1.51 fifth bottles of vodka daily with her last vodka drink was 323 and drank 2 tall boy beers yesterday and again today. In the evening of the day of presentation, she went upstairs because she was feeling generally unwell and nauseous when her girlfriend heard her fall and found her having 1 to 2 minutes seizures with about 2 to 3-minute postictal phase. EMS were called and she had an additional generalized tonic-clonic seizure lasting 1 minute. EMS reported additional third episode in the ambulance lasting about 1 to 2 minutes.   On arrival to the ED, patient was noted to be lethargic but able to follow simple commands, nonfocal neurologic examination except for right arm and right leg weakness and paresthesia similar to her previous strokes. She was oriented. No abortive treatment was given as the patient was back to her baseline. Patient was started on thiamine 576 mg IV, folic acid followed by dextrose infusion to correct hyperglycemia. CT head along with CTA negative. ROS  Constitutional: no fever, chills, fatigue  HENT: No change in vision or hearing   Respiratory: No cough, SOB, wheezing. Cardiovascular:  No chest pain, palpitations, leg swelling. Gastrointestinal: No nausea, vomiting, diarrhea. Genitourinary: No increased frequency, urgency. Musculoskeletal: No myalgia or arthralgia. Skin: No rashes or scarring or bruises. Neurological: No headache, paresthesia, or focal weakness. Endo/Heme/Allergies: Negative for itchy eyes or runny nose. Psychiatric/Behavioral: No anxiety or depressed mood.         nicotine  1 patch TransDERmal Daily    ARIPiprazole  5 mg Oral Daily    atorvastatin  40 mg Oral Nightly    folic acid  1 mg Oral Daily    venlafaxine  75 mg Oral Daily with breakfast    sodium chloride flush  5-40 mL IntraVENous 2 times per day    thiamine  100 mg Oral Daily       Past Medical History:   Diagnosis Date    MORTEZA (acute kidney injury) ([facility] 4/66/1366    Alcoholic hepatitis without ascites 42/05/8872    Alcoholic ketoacidosis 90/82/1067    Alcoholism ([address]     3 pints daily    Anxiety     Asthma     Bipolar affective disorder ([address] 6/12/2019    Depression     Drug overdose, accidental or unintentional, initial encounter 9/29/2018    Drug overdose, intentional self-harm, initial encounter ([facility] 5/8/2018    Lisa coma scale total score 3-8 ([address]     Hepatitis C antibody positive in blood 6/18/2019    History of gastric surgery (gastric sleeve 2012) 4/25/2017    Intentional drug overdose ([facility] 5/7/2018    MDD (major depressive disorder), recurrent episode ([facility] 5/8/2018    Pneumonia     Polysubstance abuse ([facility]     drug abuse includes, cocaine, IV heroin, cannabis, and ETOH    Post traumatic stress disorder     Seizure ([facility] 12/17/2019    Smoker unmotivated to quit 4/25/2017    Suicide attempt ([facility] 11/08/2017    S/A by overdosing on Trazodone and Seroquel    Suicide ideation     Toxic metabolic encephalopathy 7/08/5229       Past Surgical History:   Procedure Laterality Date    CARPAL TUNNEL RELEASE      CHOLECYSTECTOMY      HYSTERECTOMY      HYSTERECTOMY, TOTAL ABDOMINAL      STOMACH SURGERY      gastric sleeve    UPPER GASTROINTESTINAL ENDOSCOPY N/A 6/19/2019    EGD BIOPSY performed by Omid Mcqueen MD at [address]       Objective:     PHYSICAL EXAM:      Blood pressure (!) 135/95, pulse 78, temperature 98 °F (36.7 °C), temperature source Oral, resp. rate 22, height 5' (1.524 m), weight 121 lb 14.4 oz (55.3 kg), SpO2 96 %, not currently breastfeeding. General Examination    General Resting comfortably in bed   Head Normocephalic, without obvious abnormality   Neck Supple, symmetrical. Good ROM. No midline or paraspinal tenderness. Lungs Respirations unlabored, no wheezing   Chest Wall No deformity   Heart RRR, no murmur   Abdomen Soft. Non-tender, non-distended   Extremities No cyanosis or edema or warmth. Pulses 2+ and symmetric   Skin: Skin  turgor normal, no rashes or lesions         Mental status  Speech Alert. Oriented to person, place, and time. Speech is fluent without paraphasic errors  Good repetition and naming  Can do 1 step, 2 step, and cross-body commands  Can spell world backwards. Language appropriate. No hallucinations or delusions. No SI/HI. Cranial nerves   II - VFF, visual threat intact  III, IV, VI - extra-ocular muscles full. No nystagmus. Pupils symmetric and responsive.    V - sensation symmetric         VII -  No facial droop or asymmetric NLF  VIII - intact hearing to conversational tone          IX, X - symmetrical palate elevation   XI - 5/5 strength symmetric  XII - tongue midline   Motor function  Strength: grossly 5/5 in b/l              Deltoid, biceps, triceps, wrist flexion, wrist extension             Hip flexion/extension, knee flexion/extension, plantar flexion  Bulk: grossly normal no atrophy  Tone: symmetric b/l arms and legs  Abnormal movements: No abnormal movements or tremor   Sensory function Symmetric to touch in all extremities bilaterally   Cerebellar No dysmetria or dysdiadochocinesia    Reflex function DTR:        2+ b/l symmetric in biceps, brachioradialis, patellar, calcaneal  Babinski b/l plantar downgoing   Gait                  Not assessed       Investigations:      Laboratory Testing:  No results found for this or any previous visit (from the past 24 hour(s)). Imaging/Diagnostics:  CT HEAD WO CONTRAST    Result Date: 3/25/2022  EXAMINATION: CT OF THE HEAD WITHOUT CONTRAST  3/25/2022 8:53 pm TECHNIQUE: CT of the head was performed without the administration of intravenous contrast. Dose modulation, iterative reconstruction, and/or weight based adjustment of the mA/kV was utilized to reduce the radiation dose to as low as reasonably achievable. COMPARISON: CT head 12/30/2021 HISTORY: ORDERING SYSTEM PROVIDED HISTORY: hit head, seizure, right sided weakness TECHNOLOGIST PROVIDED HISTORY: hit head, seizure, right sided weakness Decision Support Exception - unselect if not a suspected or confirmed emergency medical condition->Emergency Medical Condition (MA) Is the patient pregnant?->No Reason for Exam: right side weakness FINDINGS: BRAIN/VENTRICLES: There is no acute intracranial hemorrhage, mass effect or midline shift. No abnormal extra-axial fluid collection. The gray-white differentiation is maintained without evidence of an acute infarct. There is no evidence of hydrocephalus.  ORBITS: The visualized portion of the orbits demonstrate no acute abnormality. SINUSES: The visualized paranasal sinuses and mastoid air cells demonstrate no acute abnormality. SOFT TISSUES/SKULL:  No acute abnormality of the visualized skull or soft tissues. No acute intracranial abnormality. The findings were sent to the Radiology Results [address] at 9:37 pm on 3/25/2022to be communicated to a licensed caregiver. XR CHEST PORTABLE    Result Date: 3/25/2022  EXAMINATION: ONE XRAY VIEW OF THE CHEST 3/25/2022 9:49 pm COMPARISON: 12/30/2021 HISTORY: ORDERING SYSTEM PROVIDED HISTORY: seizure TECHNOLOGIST PROVIDED HISTORY: seizure Reason for Exam: upr,seizure FINDINGS: The cardiomediastinal silhouette is normal in size and contour. The lungs are clear. No pleural effusion or pneumothorax is present. No acute cardiopulmonary process     CTA HEAD NECK W CONTRAST    Result Date: 3/25/2022  EXAMINATION: CTA OF THE HEAD AND NECK WITH CONTRAST 3/25/2022 8:53 pm: TECHNIQUE: CTA of the head and neck was performed with the administration of intravenous contrast. Multiplanar reformatted images are provided for review. MIP images are provided for review. Stenosis of the internal carotid arteries measured using NASCET criteria. Dose modulation, iterative reconstruction, and/or weight based adjustment of the mA/kV was utilized to reduce the radiation dose to as low as reasonably achievable. This scan was analyzed using Viz. ai contact LVO. Identification of suspected findings is not for diagnostic use beyond notification. Viz LVO is limited to analysis of imaging data and should not be used in-lieu of full patient evaluation or relied upon to make or confirm diagnosis. 3D reconstructed images were performed on a separate workstation and provided for review.  COMPARISON: CT head, noncontrast 05/20/2022 HISTORY: ORDERING SYSTEM PROVIDED HISTORY: seizure, hit head, right sided weakness TECHNOLOGIST PROVIDED HISTORY: seizure, hit head, right sided weakness Decision Support Exception - unselect if not a suspected or confirmed emergency medical condition->Emergency Medical Condition (MA) Reason for Exam: right arm weakness FINDINGS: CTA NECK: Moderate motion degradation notably through the bifurcations and mid cervical ICAs challenge evaluation. AORTIC ARCH/ARCH VESSELS: No dissection or arterial injury. No significant stenosis of the brachiocephalic or subclavian arteries. CAROTID ARTERIES: No definite dissection, arterial injury, or high-grade hemodynamically significant stenosis by NASCET criteria. Motion through the mid cervical ICAs and the bifurcation challenge evaluation. VERTEBRAL ARTERIES: Mild motion through the vertebral arteries also challenge evaluation. This notably at C3-C4 through the V2 segments. No definite high-grade dissection, arterial injury, or high-grade significant stenosis. SOFT TISSUES: The lung apices are clear. No cervical or superior mediastinal lymphadenopathy. The larynx and pharynx are unremarkable. No acute abnormality of the salivary and thyroid glands. BONES: Motion challenge evaluation. Multilevel degenerate change. CTA HEAD: ANTERIOR CIRCULATION: No significant stenosis of the intracranial internal carotid, anterior cerebral, or middle cerebral arteries. No aneurysm. POSTERIOR CIRCULATION: No significant stenosis of the vertebral, basilar, or posterior cerebral arteries. No aneurysm. OTHER: No dural venous sinus thrombosis on this non-dedicated study. BRAIN: No mass effect or midline shift. No extra-axial fluid collection. The gray-white differentiation is maintained. Motion degradation through the neck. No definite cervical arterial occlusion or high-grade stenosis identified. If suspicion concern, consider repeat imaging through the neck. Otherwise negative for large vessel occlusion intracranially.        Assessment & Differential Dx:      Primary Problem  Alcohol withdrawal syndrome with complication [facility])    Active Hospital Problems    Diagnosis Date Noted    Alcohol withdrawal seizure with complication ([facility] [I65.420, R56.9] 03/25/2022    Chronic hepatitis C virus infection ([facility] [B18.2] 07/17/2021    Delroy's paresis ([facility] [G83.84] 12/18/2019    [facility] ([facility] [R56.9] 12/17/2019    Alcohol withdrawal syndrome with complication ([facility]) [M97.702] 10/20/2018    Polysubstance abuse ([facility] [F19.10] 03/23/2018     48years old female with alcohol seizures and right arm and leg type paralysis. Patient was hypoglycemic on presentation requiring IV thiamine, folic acid followed by dextrose. Patient was lethargic as she received 2 mg IV Ativan on arrival to the ED otherwise neurologically intact. Very low suspicion for stroke on initial presentation. Case of alcohol withdrawal.     Plan:     No need for AED at this point as this is a provoked seizure secondary to alcohol. No need for further neurodiagnostic testing at this point. Continue CIWA protocol.      Vicky Wheeler MD, MD, 3/27/2022 9:15 AM

## 2022-03-27 NOTE — CARE COORDINATION
Case Management Initial Discharge Plan  Corin Vaughn,             Met with:patient to discuss discharge plans. Information verified: address, contacts, phone number, , insurance Yes  Insurance Provider: Tobie Aran, medicaid    Emergency Contact/Next of Kin name & number:   Reggie Olguin (domestic Partner) [phone]  Nancie Jiang (sister) [phone]  Who are involved in patient's support system? S.O and sister    PCP: Mariah Taveras MD  Date of last visit: 2021      Discharge Planning    Living Arrangements:  [address] is a 2nd floor apartment with elevator    Patient able to perform ADL's:Independent    Current Services (outpatient & in home) none  DME equipment: na  DME provider: na    Is patient receiving oral anticoagulation therapy? No      Potential Assistance Needed:  Outpatient PT/OT,Meals [facility]    Patient agreeable to home care: Yes  Freedom of choice provided:  yes    Prior SNF/Rehab Placement and Facility: none  Agreeable to SNF/Rehab: No  Stella of choice provided: n/a     Evaluation: no    Expected Discharge date:  22    Patient expects to be discharged to:   home      Follow Up Appointment: Best Day/ Time: Monday AM    Transportation provider: S.O. Transportation arrangements needed for discharge: No    Readmission Risk              Risk of Unplanned Readmission:  40             Does patient have a readmission risk score greater than 14?: Yes  If yes, follow-up appointment must be made within 7 days of discharge. Goals of Care:       Educated patient on transitional options, provided freedom of choice and are agreeable with plan      Discharge Plan: d/c home.  Requesting home care, freedom of choice list provided, requesting walker          Electronically signed by Jaci Paiz RN on 3/27/22 at 9:39 AM EDT

## 2022-03-28 PROCEDURE — 6370000000 HC RX 637 (ALT 250 FOR IP): Performed by: FAMILY MEDICINE

## 2022-03-28 PROCEDURE — 6370000000 HC RX 637 (ALT 250 FOR IP): Performed by: NURSE PRACTITIONER

## 2022-03-28 PROCEDURE — 94761 N-INVAS EAR/PLS OXIMETRY MLT: CPT

## 2022-03-28 PROCEDURE — 6360000002 HC RX W HCPCS: Performed by: NURSE PRACTITIONER

## 2022-03-28 PROCEDURE — 2060000000 HC ICU INTERMEDIATE R&B

## 2022-03-28 PROCEDURE — 2580000003 HC RX 258: Performed by: NURSE PRACTITIONER

## 2022-03-28 PROCEDURE — 99232 SBSQ HOSP IP/OBS MODERATE 35: CPT | Performed by: FAMILY MEDICINE

## 2022-03-28 RX ADMIN — LORAZEPAM 2 MG: 2 INJECTION INTRAMUSCULAR; INTRAVENOUS at 07:55

## 2022-03-28 RX ADMIN — LORAZEPAM 2 MG: 2 INJECTION INTRAMUSCULAR; INTRAVENOUS at 20:09

## 2022-03-28 RX ADMIN — LORAZEPAM 4 MG: 2 INJECTION INTRAMUSCULAR; INTRAVENOUS at 09:04

## 2022-03-28 RX ADMIN — ONDANSETRON 4 MG: 2 INJECTION INTRAMUSCULAR; INTRAVENOUS at 09:04

## 2022-03-28 RX ADMIN — SODIUM CHLORIDE, PRESERVATIVE FREE 10 ML: 5 INJECTION INTRAVENOUS at 07:55

## 2022-03-28 RX ADMIN — SODIUM CHLORIDE, PRESERVATIVE FREE 10 ML: 5 INJECTION INTRAVENOUS at 22:53

## 2022-03-28 RX ADMIN — VENLAFAXINE HYDROCHLORIDE 75 MG: 75 CAPSULE, EXTENDED RELEASE ORAL at 07:55

## 2022-03-28 RX ADMIN — ATORVASTATIN CALCIUM 40 MG: 80 TABLET, FILM COATED ORAL at 20:09

## 2022-03-28 RX ADMIN — LORAZEPAM 2 MG: 2 INJECTION INTRAMUSCULAR; INTRAVENOUS at 16:23

## 2022-03-28 RX ADMIN — ARIPIPRAZOLE 5 MG: 5 TABLET ORAL at 07:55

## 2022-03-28 RX ADMIN — SODIUM CHLORIDE: 9 INJECTION, SOLUTION INTRAVENOUS at 22:52

## 2022-03-28 RX ADMIN — FOLIC ACID 1 MG: 1 TABLET ORAL at 07:55

## 2022-03-28 RX ADMIN — LORAZEPAM 4 MG: 2 INJECTION INTRAMUSCULAR; INTRAVENOUS at 00:37

## 2022-03-28 RX ADMIN — LORAZEPAM 4 MG: 2 INJECTION INTRAMUSCULAR; INTRAVENOUS at 11:39

## 2022-03-28 RX ADMIN — Medication 100 MG: at 07:55

## 2022-03-28 ASSESSMENT — PAIN SCALES - GENERAL: PAINLEVEL_OUTOF10: 0

## 2022-03-28 NOTE — PLAN OF CARE
Problem: Skin Integrity:  Goal: Will show no infection signs and symptoms  3/28/2022 0739 by Lucy Thayer RN  Outcome: Met This Shift  3/27/2022 2242 by Julius Hays RN  Outcome: Ongoing  Goal: Absence of new skin breakdown  3/27/2022 2242 by Julius Hays RN  Outcome: Ongoing     Problem: Falls - Risk of:  Goal: Will remain free from falls  3/28/2022 0739 by Lucy Thayer RN  Outcome: Ongoing  3/27/2022 2242 by Julius Hays RN  Outcome: Ongoing  Goal: Absence of physical injury  3/27/2022 2242 by Julius Hays RN  Outcome: Ongoing     Problem: Nutrition  Goal: Optimal nutrition therapy  3/28/2022 0739 by Lucy Thayer RN  Outcome: Met This Shift  3/27/2022 2242 by Julius Hays RN  Outcome: Ongoing     Problem: Pain:  Goal: Pain level will decrease  3/28/2022 0739 by Lucy Thayer RN  Outcome: Met This Shift  3/27/2022 2242 by Julius Hays RN  Outcome: Ongoing  Goal: Control of acute pain  3/27/2022 2242 by Julius Hays RN  Outcome: Ongoing  Goal: Control of chronic pain  3/27/2022 2242 by Julius Hays RN  Outcome: Ongoing     Problem: Anxiety:  Goal: Level of anxiety will decrease  3/28/2022 0739 by Lucy Thayer RN  Outcome: Ongoing  3/27/2022 2242 by Julius Hays RN  Outcome: Ongoing     Problem: Coping:  Goal: Ability to cope will improve  3/28/2022 0739 by Lucy Thayer RN  Outcome: Ongoing  3/27/2022 2242 by Julius Hays RN  Outcome: Ongoing  Goal: Ability to identify appropriate support needs will improve  3/28/2022 0739 by Lucy Thayer RN  Outcome: Ongoing  3/27/2022 2242 by Julius Hays RN  Outcome: Ongoing     Problem: Health Behavior:  Goal: Ability to manage health-related needs will improve  3/28/2022 0739 by Lucy Thayer RN  Outcome: Ongoing  3/27/2022 2242 by Julius Hays RN  Outcome: Ongoing     Problem: Physical Regulation:  Goal: Signs of adequate cerebral perfusion will increase  3/28/2022 0739 by Lucy Thayer RN  Outcome: Ongoing  3/27/2022 2242 by Casie Sotelo Davin Dorsey RN  Outcome: Ongoing  Goal: Ability to maintain a stable neurologic state will improve  3/28/2022 0739 by Charlie Myers RN  Outcome: Ongoing  3/27/2022 2242 by Ashish Hernandez RN  Outcome: Ongoing     Problem: Safety:  Goal: Ability to remain free from injury will improve  3/28/2022 0739 by Charlie Myers RN  Outcome: Ongoing  3/27/2022 2242 by Ashish Hernandez RN  Outcome: Ongoing     Problem: Self-Concept:  Goal: Level of anxiety will decrease  3/28/2022 0739 by Charlie Myers RN  Outcome: Ongoing  3/27/2022 2242 by Ashish Hernandez RN  Outcome: Ongoing  Goal: Ability to verbalize feelings about condition will improve  3/28/2022 0739 by Charlie Myers RN  Outcome: Ongoing  3/27/2022 2242 by Ashish Hernandez RN  Outcome: Ongoing     Problem: Discharge Planning:  Goal: Discharged to appropriate level of care  3/28/2022 0739 by Charlie Myers RN  Outcome: Ongoing  3/27/2022 2242 by Ashish Hernandez RN  Outcome: Ongoing     Problem: Fluid Volume - Deficit:  Goal: Absence of fluid volume deficit signs and symptoms  3/28/2022 0739 by Charlie Myers RN  Outcome: Ongoing  3/27/2022 2242 by Ashish Hernandez RN  Outcome: Ongoing     Problem: Nutrition Deficit:  Goal: Ability to achieve adequate nutritional intake will improve  3/28/2022 0739 by Charlie Myers RN  Outcome: Met This Shift  3/27/2022 2242 by Ashish Hernandez RN  Outcome: Ongoing     Problem: Sleep Pattern Disturbance:  Goal: Appears well-rested  3/28/2022 0739 by Charlie Myers RN  Outcome: Ongoing  3/27/2022 2242 by Ashish Hernandez RN  Outcome: Ongoing     Problem: Violence - Risk of, Self/Other-Directed:  Goal: Knowledge of developmental care interventions  3/28/2022 0739 by Charlie Myers RN  Outcome: Met This Shift  3/27/2022 2242 by Ashish Hernandez RN  Outcome: Ongoing

## 2022-03-28 NOTE — PROGRESS NOTES
Pt has refused to have cardiac monitor on all shift  Put on per nurse and pt takes right off    Unable to reason with  Call light in reach

## 2022-03-28 NOTE — PROGRESS NOTES
Pt trying to get out of bed per self multiple times this shift  Reoriented wit each episode  At [facility] does not realize in the hospital and is redirected  Tele sitter remains   call light in reach

## 2022-03-28 NOTE — PROGRESS NOTES
[facility]  Office: [address], DO, Chinedusesar Ebbs, DO, Major Yen, DO, Daya Dobson Blood, DO, Julio Cesar Pizano MD, Navya Farmer MD, Jose Arriola MD, Chris Garcias MD, Beth Causey MD, Nuha Casanova MD, Rabia Estrada MD, Scout Kaufman, DO, James Velez, DO, Leilani Darden MD,  Maddie Espinoza, DO, Yasmin Rooney MD, Raenette Felty, MD, Ivana Hanson MD, Leda Vazquez DO, Jaiden Robertson MD, Laura Quintana MD, Diana Dang, CNP, [facility] Joe, CNP, Holley Gage, CNP, David Calloway, CNS, Anuj Connolly, CNP, Cee Vazquez, CNP, Cierra Mejia, CNP, Sammy Ortiz, CNP, Shayla Rocha, CNP, ALTAGRACIA CrespoC, Meryl Schulte, DNP, James Zamora [facility], Toya Castillo, CNP, Erica Spray, CNP, Josette Limb, [address] [place] [address]    Progress Note    3/28/2022    7:07 AM    Name:   Emiliano Harris  MRN:     [id number]     [place]:      [de-identified]   Room:   [address] [place] [address] Day:  3  Admit Date:  3/25/2022  8:15 PM    PCP:   Alec Rodriguez MD  Code Status:  Full Code    Subjective:     C/C:   Chief Complaint   Patient presents with    Seizures      Interval History Status: improved. Pt was seen and examined this morning  States that she is beginning to feel much better this morning  Denies any complaints or concerns at this time     Brief History:     [address] female with heavy alcohol use history presented with seizures related to alcohol withdrawal.     Review of Systems:     12 point ROS performed and negative for anything other than what was stated in subjective     Medications: Allergies:     Allergies   Allergen Reactions    Morphine Anaphylaxis    Morphine And Related      Itching, nausea    Azithromycin     Morphine     Nsaids      contraindicated    Zithromax [Azithromycin] Swelling       Current Meds:   Scheduled Meds:    nicotine  1 patch TransDERmal Daily    ARIPiprazole  5 mg Oral Daily    atorvastatin  40 mg Oral Nightly    folic acid  1 mg Oral Daily    venlafaxine  75 mg Oral Daily with breakfast    sodium chloride flush  5-40 mL IntraVENous 2 times per day    thiamine  100 mg Oral Daily     Continuous Infusions:    sodium chloride 125 mL/hr at 03/27/22 2213    sodium chloride       PRN Meds: sodium chloride flush, sodium chloride, potassium chloride **OR** potassium alternative oral replacement **OR** potassium chloride, ondansetron **OR** ondansetron, polyethylene glycol, acetaminophen **OR** acetaminophen, LORazepam **OR** LORazepam **OR** LORazepam **OR** LORazepam **OR** LORazepam **OR** LORazepam **OR** LORazepam **OR** LORazepam    Data:     Past Medical History:   has a past medical history of MORTEZA (acute kidney injury) ([facility] Alcoholic hepatitis without ascites, Alcoholic ketoacidosis, Alcoholism ([facility] Anxiety, Asthma, Bipolar affective disorder ([facility] Depression, Drug overdose, accidental or unintentional, initial encounter, Drug overdose, intentional self-harm, initial encounter ([facility] Lisa coma scale total score 3-8 ([facility] Hepatitis C antibody positive in blood, History of gastric surgery (gastric sleeve 2012), Intentional drug overdose ([facility] MDD (major depressive disorder), recurrent episode ([facility] Pneumonia, Polysubstance abuse ([facility] Post traumatic stress disorder, Seizure ([facility] Smoker unmotivated to quit, Suicide attempt ([facility] Suicide ideation, and Toxic metabolic encephalopathy. Social History:   reports that she has been smoking cigarettes. She has a 60.00 pack-year smoking history. She has never used smokeless tobacco. She reports current alcohol use of about 3.0 standard drinks of alcohol per week. She reports current drug use. Frequency: 7.00 times per week. Drugs: Marijuana (Margurite Dias), IV, and Cocaine.      Family History:   Family History   Problem Relation Age of Onset    Brain Cancer Mother     Cancer Mother         \"female cancer\"    Heart Disease Father Vitals:  /84   Pulse 84   Temp 97 °F (36.1 °C) (Oral)   Resp 18   Ht 5' (1.524 m)   Wt 121 lb 14.4 oz (55.3 kg)   SpO2 96%   BMI 23.81 kg/m²   Temp (24hrs), Av.9 °F (36.6 °C), Min:97 °F (36.1 °C), Max:98.9 °F (37.2 °C)    Recent Labs     22  06   POCGLU 61* 73       I/O (24Hr):     Intake/Output Summary (Last 24 hours) at 3/28/2022 0707  Last data filed at 3/27/2022 1812  Gross per 24 hour   Intake 4343.18 ml   Output 1300 ml   Net 3043.18 ml       Labs:  Hematology:  Recent Labs     22  0605   WBC 7.4  --    RBC 4.52  --    HGB 10.8*  --    HCT 34.9*  --    MCV 77.2*  --    MCH 23.9*  --    MCHC 30.9  --    RDW 19.8*  --      --    MPV 9.3  --    INR  --  1.0     Chemistry:  Recent Labs     22  06     --   --  144   K 3.8  --   --  4.2     --   --  110*   CO2 18*  --   --  22   GLUCOSE 55*  --   --  76   BUN 5*  --   --  6   CREATININE 0.65 0.66  --  0.54   MG 2.2  --   --  2.0   ANIONGAP 18*  --   --  12   LABGLOM >60 >60  --  >60   GFRAA >60  --   --  >60   CALCIUM 9.4  --   --  8.5*   CAION  --   --   --  1.15   PHOS  --   --   --  4.6*   TROPHS <6  --   --   --    LACTACIDWB  --   --  2.5*  --      Recent Labs     22  0604 03/26/22  0605   PROT 8.0  --   --  6.6   LABALBU 4.3  --   --  3.7   LABA1C  --   --   --  5.3   TSH  --   --   --  1.23   AST 27  --   --  20   ALT 16  --   --  13   ALKPHOS 60  --   --  49   BILITOT 0.19*  --   --  0.28*   POCGLU  --  61* 73  --      ABG:  Lab Results   Component Value Date    POCPH 7.351 2018    PHART 7.397 2018    POCPCO2 45.7 2018    BDK7DIV 53.5 2018    POCPO2 73.8 2018    PO2ART 77.3 2018    POCHCO3 25.3 2018    KTT0AOI 32.9 2018    NBEA 1 2018    PBEA NOT REPORTED 2018    SXB5HQI 27 2018    UDTX7MDJ 94 2018    B7VMVKWH 93.7 03/23/2018    FIO2 NOT REPORTED 11/02/2020     Lab Results   Component Value Date/Time    SPECIAL R HAND 4 ML 07/17/2021 09:00 PM    SPECIAL L HAND 4 ML 07/17/2021 09:00 PM     Lab Results   Component Value Date/Time    CULTURE NO SIGNIFICANT GROWTH 03/25/2022 10:16 PM       Radiology:  CT HEAD WO CONTRAST    Result Date: 3/25/2022  No acute intracranial abnormality. The findings were sent to the Radiology Results [address] at 9:37 pm on 3/25/2022to be communicated to a licensed caregiver. XR CHEST PORTABLE    Result Date: 3/25/2022  No acute cardiopulmonary process     CTA HEAD NECK W CONTRAST    Result Date: 3/25/2022  Motion degradation through the neck. No definite cervical arterial occlusion or high-grade stenosis identified. If suspicion concern, consider repeat imaging through the neck. Otherwise negative for large vessel occlusion intracranially. Physical Examination:        General appearance:  alert, cooperative and no distress  Mental Status:  oriented to person, place and time and normal affect  Lungs:  clear to auscultation bilaterally, normal effort  Heart:  regular rate and rhythm, no murmur  Abdomen:  soft, nontender, nondistended, normal bowel sounds   Extremities: chronic RUE weakness   Skin:  no gross lesions, rashes, induration    Assessment:        Hospital Problems           Last Modified POA    * (Principal) Alcohol withdrawal syndrome with complication ([address] 6/98/4568 Yes    Polysubstance abuse ([address] (Chronic) 3/25/2022 Yes    Alcohol related seizure ([address] 3/25/2022 Yes    Delroy's paresis ([address] 3/25/2022 Yes    Chronic hepatitis C virus infection ([address] 3/25/2022 Yes    Alcohol withdrawal seizure with complication ([address] 3/66/1493 Yes          Plan:        1. Alcohol withdrawal   2.  Seizure related to #1  - neurology consult   - seizure precaution   - likelyl related to alcohol withdrawal seizure   - CIWA   - thiamine   - folic acid   - SW consult   - continue to monitor as patient actively withdrawing   - pt had second seizure episode yday afternoon     3.  DISPO  - plan for d/c once patient is stable from withdrawal perspective    Ashok Freeman MD  3/28/2022  7:07 AM

## 2022-03-28 NOTE — CARE COORDINATION
Met with pt to f/u on decision re: alcohol tx. Pt stated she no longer wants an IP program, only OP. Pt stated she would like to go to [facility] for OP. Pt advised that she will need to go to their walk-in assessment clinic,Mon-Fri 8a-1p for an assessment for tx and info written down for pt.   Pt also stated she would like HC (had HC list) and wanted [facility] as her first choice and [facility] as her second choice - notified Steffi Villatoro, [address] of pt's choices

## 2022-03-28 NOTE — CARE COORDINATION
Transitional Planning    Patient told  home care choices.  Referrals sent to  [address] and [address] [facility]

## 2022-03-29 LAB — HIV AG/AB: NONREACTIVE

## 2022-03-29 PROCEDURE — 36415 COLL VENOUS BLD VENIPUNCTURE: CPT

## 2022-03-29 PROCEDURE — 2580000003 HC RX 258: Performed by: NURSE PRACTITIONER

## 2022-03-29 PROCEDURE — 6370000000 HC RX 637 (ALT 250 FOR IP): Performed by: NURSE PRACTITIONER

## 2022-03-29 PROCEDURE — 87491 CHLMYD TRACH DNA AMP PROBE: CPT

## 2022-03-29 PROCEDURE — 87591 N.GONORRHOEAE DNA AMP PROB: CPT

## 2022-03-29 PROCEDURE — 2060000000 HC ICU INTERMEDIATE R&B

## 2022-03-29 PROCEDURE — 6360000002 HC RX W HCPCS: Performed by: NURSE PRACTITIONER

## 2022-03-29 PROCEDURE — 99232 SBSQ HOSP IP/OBS MODERATE 35: CPT | Performed by: INTERNAL MEDICINE

## 2022-03-29 PROCEDURE — 87389 HIV-1 AG W/HIV-1&-2 AB AG IA: CPT

## 2022-03-29 PROCEDURE — 6370000000 HC RX 637 (ALT 250 FOR IP): Performed by: INTERNAL MEDICINE

## 2022-03-29 RX ORDER — PANTOPRAZOLE SODIUM 40 MG/1
40 TABLET, DELAYED RELEASE ORAL
Status: DISCONTINUED | OUTPATIENT
Start: 2022-03-29 | End: 2022-03-30 | Stop reason: HOSPADM

## 2022-03-29 RX ORDER — NICOTINE 21 MG/24HR
1 PATCH, TRANSDERMAL 24 HOURS TRANSDERMAL DAILY
Status: DISCONTINUED | OUTPATIENT
Start: 2022-03-29 | End: 2022-03-30 | Stop reason: HOSPADM

## 2022-03-29 RX ORDER — LORAZEPAM 2 MG/ML
0.5 INJECTION INTRAMUSCULAR EVERY 6 HOURS PRN
Status: DISCONTINUED | OUTPATIENT
Start: 2022-03-29 | End: 2022-03-30 | Stop reason: HOSPADM

## 2022-03-29 RX ORDER — CHLORDIAZEPOXIDE HYDROCHLORIDE 5 MG/1
10 CAPSULE, GELATIN COATED ORAL 3 TIMES DAILY
Status: DISCONTINUED | OUTPATIENT
Start: 2022-03-29 | End: 2022-03-30 | Stop reason: HOSPADM

## 2022-03-29 RX ADMIN — ATORVASTATIN CALCIUM 40 MG: 80 TABLET, FILM COATED ORAL at 20:27

## 2022-03-29 RX ADMIN — FOLIC ACID 1 MG: 1 TABLET ORAL at 11:05

## 2022-03-29 RX ADMIN — VENLAFAXINE HYDROCHLORIDE 75 MG: 75 CAPSULE, EXTENDED RELEASE ORAL at 11:07

## 2022-03-29 RX ADMIN — PANTOPRAZOLE SODIUM 40 MG: 40 TABLET, DELAYED RELEASE ORAL at 11:06

## 2022-03-29 RX ADMIN — Medication 100 MG: at 11:06

## 2022-03-29 RX ADMIN — SODIUM CHLORIDE: 9 INJECTION, SOLUTION INTRAVENOUS at 05:40

## 2022-03-29 RX ADMIN — CHLORDIAZEPOXIDE HYDROCHLORIDE 10 MG: 5 CAPSULE ORAL at 13:49

## 2022-03-29 RX ADMIN — CHLORDIAZEPOXIDE HYDROCHLORIDE 10 MG: 5 CAPSULE ORAL at 20:26

## 2022-03-29 RX ADMIN — LORAZEPAM 2 MG: 2 INJECTION INTRAMUSCULAR; INTRAVENOUS at 05:45

## 2022-03-29 RX ADMIN — SODIUM CHLORIDE, PRESERVATIVE FREE 10 ML: 5 INJECTION INTRAVENOUS at 20:27

## 2022-03-29 RX ADMIN — ACETAMINOPHEN 650 MG: 325 TABLET ORAL at 20:29

## 2022-03-29 RX ADMIN — CHLORDIAZEPOXIDE HYDROCHLORIDE 10 MG: 5 CAPSULE ORAL at 11:05

## 2022-03-29 RX ADMIN — ARIPIPRAZOLE 5 MG: 5 TABLET ORAL at 11:03

## 2022-03-29 ASSESSMENT — PAIN SCALES - GENERAL
PAINLEVEL_OUTOF10: 0
PAINLEVEL_OUTOF10: 3
PAINLEVEL_OUTOF10: 3

## 2022-03-29 NOTE — DISCHARGE INSTR - COC
Continuity of Care Form    Patient Name: Meg Costa   :  1975  MRN:  [id number]    Admit date:  3/25/2022  Discharge date:  2022    Code Status Order: Full Code   Advance Directives:      Admitting Physician:  Fatou Cheng MD  PCP: Severiano Andes, MD    Discharging Nurse: Nikita Langston, RN  [address] Unit/Room#: 6928/5522-89  Discharging Unit Phone Number: [phone]    Emergency Contact:   Extended Emergency Contact Information  Primary Emergency Contact: [address] [facility], [address] Phone: [phone]  Relation: Domestic Partner  Secondary Emergency Contact: Krysten Lo [address] Phone: [phone]  Relation: Brother/Sister  Hearing or visual needs: None  Other needs: None  Preferred language: English   needed?  No    Past Surgical History:  Past Surgical History:   Procedure Laterality Date    CARPAL TUNNEL RELEASE      CHOLECYSTECTOMY      HYSTERECTOMY      HYSTERECTOMY, TOTAL ABDOMINAL      STOMACH SURGERY      gastric sleeve    UPPER GASTROINTESTINAL ENDOSCOPY N/A 2019    EGD BIOPSY performed by Lb Raphael MD at [address] [place] [address]        Immunization History:   Immunization History   Administered Date(s) Administered    Influenza Vaccine, unspecified formulation 2016    Influenza, Quadv, 6 mo and older, IM, PF (Flulaval, Fluarix) 2018    Influenza, Quadv, IM, PF (6 mo and older Fluzone, Flulaval, Fluarix, and 3 yrs and older Afluria) 2017    Tdap (Boostrix, Adacel) 10/30/2017       Active Problems:  Patient Active Problem List   Diagnosis Code    Alcohol abuse F10.10    Heroin dependence ([facility] F11.20    PTSD (post-traumatic stress disorder) F43.10    Major depression, recurrent ([address] F33.9    Depressed bipolar I disorder ([facility] F31.9    Polysubstance abuse ([facility] F19.10    Bipolar I disorder, current or most recent episode depressed, with psychotic features with mood-congruent psychotic features ([address] Y37.2    Alcoholic hepatitis without ascites K70.10    Alcohol withdrawal syndrome with complication (HCC) J78.497    Bipolar 1 disorder (HCC) F31.9    Mild intermittent asthma without complication G55.72    Alcohol related seizure ([address] R56.9    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    Stroke-like symptoms R29.90    Drug use F19.90    Cocaine abuse (HCC) F14.10    Abnormal CT of the head C70.7    Alcoholic intoxication without complication (HCC) S97.935    History of bipolar disorder Z86.59    Seizure ([address] R56.9    MORTEZA (acute kidney injury) ([address] N17.9    Chronic hepatitis C virus infection ([address] B18.2    Right otitis media H66.91    Acute dehydration E86.0    Acute pharyngitis J02.9    Depression with suicidal ideation F32. A, R45.851    Severe recurrent major depression with psychotic features ([address] F33.3    Bipolar I disorder, most recent episode depressed, severe without psychotic features ([address] F31.4    Alcohol dependence with withdrawal, uncomplicated ([address] Z97.136    Generalized anxiety disorder with panic attacks F41.1, F41.0    Alcohol withdrawal seizure with complication ([address] T47.174, R56.9       Isolation/Infection:   Isolation            No Isolation          Patient Infection Status       Infection Onset Added Last Indicated Last Indicated By Review Planned Expiration Resolved Resolved By    None active    Resolved    COVID-19 (Rule Out) 07/17/21 07/17/21 07/17/21 COVID-19, Rapid (Ordered)   07/17/21 Rule-Out Test Resulted            Nurse Assessment:  Last Vital Signs: /69   Pulse 64   Temp 97.7 °F (36.5 °C) (Oral)   Resp 18   Ht 5' (1.524 m)   Wt 121 lb 14.4 oz (55.3 kg)   SpO2 97%   BMI 23.81 kg/m²     Last documented pain score (0-10 scale): Pain Level: 0  Last Weight:   Wt Readings from Last 1 Encounters:   03/27/22 121 lb 14.4 oz (55.3 kg)     Mental Status:  oriented and alert    IV Access:  - None    Nursing Mobility/ADLs:  Walking   Independent  Transfer Independent  Bathing  Independent  Dressing  Independent  Toileting  Independent  Feeding  Independent  Med [address]  Independent  Med Delivery   whole    Wound Care Documentation and Therapy:        Elimination:  Continence: Bowel: Yes  Bladder: Yes  Urinary Catheter: None   Colostomy/Ileostomy/Ileal Conduit: No       Date of Last BM: 03/29/2022, per pt    Intake/Output Summary (Last 24 hours) at 3/29/2022 0732  Last data filed at 3/28/2022 0913  Gross per 24 hour   Intake 380 ml   Output 100 ml   Net 280 ml     I/O last 3 completed shifts: In: 380 [P.O.:360; I.V.:20]  Out: 100 [Emesis/NG output:100]    Safety Concerns:     History of Falls (last 30 days), At Risk for Falls, and History of Seizures    Impairments/Disabilities:      Weakness/tingling in R foot and R hand    Nutrition Therapy:  Current Nutrition Therapy:   - Oral Diet:  General    Routes of Feeding: Oral  Liquids: No Restrictions  Daily Fluid Restriction: no  Last Modified Barium Swallow with Video (Video Swallowing Test): not done    Treatments at the Time of Hospital Discharge:   Respiratory Treatments: None  Oxygen Therapy:  is not on home oxygen therapy.   Ventilator:    - No ventilator support    Rehab Therapies: None  Weight Bearing Status/Restrictions: No weight bearing restrictions  Other Medical Equipment (for information only, NOT a DME order):  walker  Other Treatments: None    Patient's personal belongings (please select all that are sent with patient):  Clothing    RN SIGNATURE:  Electronically signed by Sahara Murdock RN on 3/30/22 at 12:18 PM EDT    CASE MANAGEMENT/SOCIAL WORK SECTION    Inpatient Status Date: ***    Readmission Risk Assessment Score:  Readmission Risk              Risk of Unplanned Readmission:  40           Discharging to Facility/ Agency   Name:   [facility] [place] [id number]       Phone: [phone]       Fax: [phone]          / signature: Electronically signed by Tee Liriano RN on 3/30/22 at 10:54 AM EDT    PHYSICIAN SECTION    Prognosis: Good    Condition at Discharge: Stable    Rehab Potential (if transferring to Rehab): Good    Recommended Labs or Other Treatments After Discharge: none    Physician Certification: I certify the above information and transfer of Jesús Ronquillo  is necessary for the continuing treatment of the diagnosis listed and that she requires Home Care for greater 30 days.      Update Admission H&P: No change in H&P    PHYSICIAN SIGNATURE:  Electronically signed by Wilian Watson MD on 3/29/22 at 7:33 AM EDT

## 2022-03-29 NOTE — PROGRESS NOTES
[facility]  Office: [address], DO, Aung Carrasco, DO, Ryan Signs, DO, Sandhya Stubbs Blood, DO, Toya Merino MD, Mignon Meckel, MD, Rachel Walker MD, Breanna Daigle MD, Elissa Richards MD, Scarlet Velasquez MD, Lyly Aguirre MD, Tasha Cortés, DO, Chano Gooden, DO, Selin Rice MD,  Julio Cesar Mejia, DO, Jonathan Rain MD, Chloe Barney MD, Della Valverde MD, Machelle Thorpe DO, Valencia Gerard MD, Zoey Owen MD, Duke Duque, [facility], [facility] Melissa, CNP, Shi Ruvalcaba, CNP, Maribel Thompson, CNS, Sandi Jim, CNP, Shaye Steven, CNP, Reynaldo Wellington, CNP, Evangelina Linder, CNP, Jeison Monroe, CNP, Cecilia Fay PA-C, Diana Quiroz, CARMELLA, Lisa Estrada, [facility], Amairani Castro, CNP, Caryle Poet, CNP, Carl London, [facility] Pedro 19    Progress Note    Name:   Annabel Reaves  MRN:     [id number]     [place]:      [de-identified]   Room:   [facility]/0402-[facility] Day:  4  Admit Date:  3/25/2022  8:15 PM    PCP:   Aby Trivedi MD  Code Status:  Full Code    Subjective:     C/C:   Chief Complaint   Patient presents with    Seizures     Interval History Status: improved. Patient in bed not in any distress. Bedside sitter reports patient ambulates but is unsteady on her feet and is being wheeled to smoke outside. Patient herself complains of right upper quadrant abdominal pain, 3 on 10 severity, no nausea vomiting or change in bowel habits. Also complains of left frontal headache also 3 on 10 severe dull aching, no radiation no changes in her vision. Patient is hemodynamically stable maintaining O2 sats on room air. Remains on CIWA protocol  Brief History:   64-year-old female presented to ED after having witnessed seizures at home, noted to have 6 of confusion, hypoglycemia and right hemiparesis consistent with Delroy's paralysis.     pmh significant for polysubstance abuse including crack cocaine marijuana heroin and alcohol, multiple alcohol-related withdrawal seizures, bipolar disorder recently taken off lithium, prior suicidal ideation and drug overdose PTSD. Patient apparently has been attempting to decrease her alcohol use. Review of Systems:     Constitutional:  negative for chills, fevers, sweats  Respiratory:  negative for cough, dyspnea on exertion, shortness of breath, wheezing  Cardiovascular:  negative for chest pain, chest pressure/discomfort, lower extremity edema, palpitations  Gastrointestinal:  negative for constipation, diarrhea, nausea, vomiting,+ abdominal pain  Neurological:  negative for dizziness, +headache    Medications: Allergies:     Allergies   Allergen Reactions    Morphine Anaphylaxis    Morphine And Related      Itching, nausea    Azithromycin     Morphine     Nsaids      contraindicated    Zithromax [Azithromycin] Swelling       Current Meds:   Scheduled Meds:    pantoprazole  40 mg Oral QAM AC    chlordiazePOXIDE  10 mg Oral TID    nicotine  1 patch TransDERmal Daily    ARIPiprazole  5 mg Oral Daily    atorvastatin  40 mg Oral Nightly    folic acid  1 mg Oral Daily    venlafaxine  75 mg Oral Daily with breakfast    sodium chloride flush  5-40 mL IntraVENous 2 times per day    thiamine  100 mg Oral Daily     Continuous Infusions:    sodium chloride 75 mL/hr at 03/29/22 0940    sodium chloride       PRN Meds: LORazepam, sodium chloride flush, sodium chloride, potassium chloride **OR** potassium alternative oral replacement **OR** potassium chloride, ondansetron **OR** ondansetron, polyethylene glycol, acetaminophen **OR** acetaminophen    Data:     Past Medical History:   has a past medical history of MORTEZA (acute kidney injury) ([facility] Alcoholic hepatitis without ascites, Alcoholic ketoacidosis, Alcoholism ([facility] Anxiety, Asthma, Bipolar affective disorder ([facility] Depression, Drug overdose, accidental or unintentional, initial encounter, Drug overdose, intentional self-harm, initial encounter [facility]), [place] coma scale total score 3-8 ([facility] Hepatitis C antibody positive in blood, History of gastric surgery (gastric sleeve ), Intentional drug overdose ([facility] MDD (major depressive disorder), recurrent episode ([facility] Pneumonia, Polysubstance abuse ([facility] Post traumatic stress disorder, Seizure ([facility] Smoker unmotivated to quit, Suicide attempt [facility]), Suicide ideation, and Toxic metabolic encephalopathy. Social History:   reports that she has been smoking cigarettes. She has a 60.00 pack-year smoking history. She has never used smokeless tobacco. She reports current alcohol use of about 3.0 standard drinks of alcohol per week. She reports current drug use. Frequency: 7.00 times per week. Drugs: Marijuana (Charlaine Brighter), IV, and Cocaine. Family History:   Family History   Problem Relation Age of Onset    Brain Cancer Mother     Cancer Mother         \"female cancer\"    Heart Disease Father        Vitals:  BP (!) 143/86   Pulse 77   Temp 98.1 °F (36.7 °C) (Temporal)   Resp 21   Ht 5' (1.524 m)   Wt 121 lb 14.4 oz (55.3 kg)   SpO2 97%   BMI 23.81 kg/m²   Temp (24hrs), Av °F (36.7 °C), Min:97.7 °F (36.5 °C), Max:98.1 °F (36.7 °C)    No results for input(s): POCGLU in the last 72 hours. I/O (24Hr): No intake or output data in the 24 hours ending 22 1252    Labs:  Hematology:No results for input(s): WBC, RBC, HGB, HCT, MCV, MCH, MCHC, RDW, PLT, MPV, SEDRATE, CRP, INR, DDIMER, XJ7YDUKU, LABABSO in the last 72 hours. Invalid input(s): PT  Chemistry:No results for input(s): NA, K, CL, CO2, GLUCOSE, BUN, CREATININE, MG, ANIONGAP, LABGLOM, GFRAA, CALCIUM, CAION, PHOS, PSA, PROBNP, TROPHS, CKTOTAL, CKMB, CKMBINDEX, MYOGLOBIN, DIGOXIN, LACTACIDWB in the last 72 hours. No results for input(s): PROT, LABALBU, LABA1C, R6EPKVX, B9NVTFJ, FT4, TSH, AST, ALT, LDH, GGT, ALKPHOS, LABGGT, BILITOT, BILIDIR, AMMONIA, AMYLASE, LIPASE, LACTATE, CHOL, HDL, LDLCHOLESTEROL, CHOLHDLRATIO, TRIG, VLDL, YAS50PN, PHENYTOIN, PHENYF, URICACID, POCGLU in the last 72 hours. ABG:  Lab Results   Component Value Date    POCPH 7.351 09/29/2018    PHART 7.397 03/23/2018    POCPCO2 45.7 09/29/2018    PSR7RYI 53.5 03/23/2018    POCPO2 73.8 09/29/2018    PO2ART 77.3 03/23/2018    POCHCO3 25.3 09/29/2018    UHY2GVT 32.9 03/23/2018    NBEA 1 09/29/2018    PBEA NOT REPORTED 09/29/2018    SIN8TNG 27 09/29/2018    UUAJ8QDW 94 09/29/2018    L4UWCOVW 93.7 03/23/2018    FIO2 NOT REPORTED 11/02/2020     Lab Results   Component Value Date/Time    SPECIAL R HAND 4 ML 07/17/2021 09:00 PM    SPECIAL L HAND 4 ML 07/17/2021 09:00 PM     Lab Results   Component Value Date/Time    CULTURE NO SIGNIFICANT GROWTH 03/25/2022 10:16 PM       Radiology:  CT HEAD WO CONTRAST    Result Date: 3/25/2022  No acute intracranial abnormality. The findings were sent to the Radiology Results [address] at 9:37 pm on 3/25/2022to be communicated to a licensed caregiver. XR CHEST PORTABLE    Result Date: 3/25/2022  No acute cardiopulmonary process     CTA HEAD NECK W CONTRAST    Result Date: 3/25/2022  Motion degradation through the neck. No definite cervical arterial occlusion or high-grade stenosis identified. If suspicion concern, consider repeat imaging through the neck. Otherwise negative for large vessel occlusion intracranially.        Physical Examination:        General appearance:  alert, cooperative and no distress  Mental Status:  oriented to person, place and time and normal affect  Lungs:  clear to auscultation bilaterally, normal effort  Heart:  regular rate and rhythm, no murmur  Abdomen:  soft, nontender, nondistended, normal bowel sounds, no masses, hepatomegaly, splenomegaly  Extremities:  no edema, redness, tenderness in the calves  Skin:  no gross lesions, rashes, induration    Assessment:        Hospital Problems           Last Modified POA    * (Principal) Alcohol withdrawal syndrome with complication ([facility] 1/50/6033 Yes Polysubstance abuse ([facility] (Chronic) 3/25/2022 Yes    Alcohol related seizure ([facility] 3/25/2022 Yes    Delroy's paresis ([facility] 3/25/2022 Yes    Chronic hepatitis C virus infection ([facility] 3/25/2022 Yes    Alcohol withdrawal seizure with complication ([facility] 6/70/7935 Yes          Plan:      -Alcohol withdrawal with seizure and Delroy's paralysis resolved  No further seizure episodes observed. Will DC CIWA scale. Will start on Librium 10 mg 3 times daily, Ativan IV as needed. Decrease IV fluids to normal saline 75 mill per hour. Patient requesting outpatient referral for rehab and home care. Will get PT OT evaluation. DVT/GI prophylaxis. If remains stable plan to DC in the next 24 hours.     Jolene Lindsey MD  3/29/2022

## 2022-03-29 NOTE — PLAN OF CARE
Problem: Skin Integrity:  Goal: Will show no infection signs and symptoms  Description: Will show no infection signs and symptoms  Outcome: Ongoing  Goal: Absence of new skin breakdown  Description: Absence of new skin breakdown  Outcome: Ongoing     Problem: Falls - Risk of:  Goal: Will remain free from falls  Description: Will remain free from falls  Outcome: Ongoing  Goal: Absence of physical injury  Description: Absence of physical injury  Outcome: Ongoing     Problem: Nutrition  Goal: Optimal nutrition therapy  Outcome: Ongoing     Problem: Pain:  Goal: Pain level will decrease  Description: Pain level will decrease  Outcome: Ongoing  Goal: Control of acute pain  Description: Control of acute pain  Outcome: Ongoing  Goal: Control of chronic pain  Description: Control of chronic pain  Outcome: Ongoing     Problem: Anxiety:  Goal: Level of anxiety will decrease  Description: Level of anxiety will decrease  Outcome: Ongoing     Problem: Coping:  Goal: Ability to cope will improve  Description: Ability to cope will improve  Outcome: Ongoing  Goal: Ability to identify appropriate support needs will improve  Description: Ability to identify appropriate support needs will improve  Outcome: Ongoing     Problem: Health Behavior:  Goal: Ability to manage health-related needs will improve  Description: Ability to manage health-related needs will improve  Outcome: Ongoing     Problem: Physical Regulation:  Goal: Signs of adequate cerebral perfusion will increase  Description: Signs of adequate cerebral perfusion will increase  Outcome: Ongoing  Goal: Ability to maintain a stable neurologic state will improve  Description: Ability to maintain a stable neurologic state will improve  Outcome: Ongoing     Problem: Safety:  Goal: Ability to remain free from injury will improve  Description: Ability to remain free from injury will improve  Outcome: Ongoing     Problem: Self-Concept:  Goal: Level of anxiety will decrease  Description: Level of anxiety will decrease  Outcome: Ongoing  Goal: Ability to verbalize feelings about condition will improve  Description: Ability to verbalize feelings about condition will improve  Outcome: Ongoing     Problem: Discharge Planning:  Goal: Discharged to appropriate level of care  Description: Discharged to appropriate level of care  Outcome: Ongoing     Problem: Fluid Volume - Deficit:  Goal: Absence of fluid volume deficit signs and symptoms  Description: Absence of fluid volume deficit signs and symptoms  Outcome: Ongoing     Problem: Nutrition Deficit:  Goal: Ability to achieve adequate nutritional intake will improve  Description: Ability to achieve adequate nutritional intake will improve  Outcome: Ongoing     Problem: Sleep Pattern Disturbance:  Goal: Appears well-rested  Description: Appears well-rested  Outcome: Ongoing     Problem: Violence - Risk of, Self/Other-Directed:  Goal: Knowledge of developmental care interventions  Description: Absence of violence  Outcome: Ongoing

## 2022-03-29 NOTE — PLAN OF CARE
Problem: Skin Integrity:  Goal: Will show no infection signs and symptoms  Description: Will show no infection signs and symptoms  3/28/2022 2023 by Golden Pyle RN  Outcome: Ongoing  3/28/2022 0739 by Joseph Nobles RN  Outcome: Met This Shift  Goal: Absence of new skin breakdown  Description: Absence of new skin breakdown  Outcome: Ongoing     Problem: Nutrition  Goal: Optimal nutrition therapy  3/28/2022 2023 by Golden Pyle RN  Outcome: Ongoing  3/28/2022 0739 by Joseph Nobles RN  Outcome: Met This Shift     Problem: Pain:  Goal: Pain level will decrease  Description: Pain level will decrease  3/28/2022 2023 by Golden Pyle RN  Outcome: Ongoing  3/28/2022 0739 by Joseph Nobles RN  Outcome: Met This Shift  Goal: Control of acute pain  Description: Control of acute pain  Outcome: Ongoing  Goal: Control of chronic pain  Description: Control of chronic pain  Outcome: Ongoing     Problem: Anxiety:  Goal: Level of anxiety will decrease  Description: Level of anxiety will decrease  3/28/2022 2023 by Golden Pyle RN  Outcome: Ongoing  3/28/2022 0739 by Joseph Nobles RN  Outcome: Ongoing     Problem: Coping:  Goal: Ability to cope will improve  Description: Ability to cope will improve  3/28/2022 2023 by Golden Pyle RN  Outcome: Ongoing  3/28/2022 0739 by Joseph Nobles RN  Outcome: Ongoing  Goal: Ability to identify appropriate support needs will improve  Description: Ability to identify appropriate support needs will improve  3/28/2022 2023 by Golden Pyle RN  Outcome: Ongoing  3/28/2022 0739 by Joseph Nobles RN  Outcome: Ongoing     Problem: Safety:  Goal: Ability to remain free from injury will improve  Description: Ability to remain free from injury will improve  3/28/2022 2023 by Golden Pyle RN  Outcome: Ongoing  3/28/2022 0739 by Joseph Nobles RN  Outcome: Ongoing

## 2022-03-29 NOTE — CARE COORDINATION
Transitional planning:  Received call from Deandre at [facility]. States they will review her case. PS Dr. Jaspreet Nixon notifying pt has requested home care. No PT/OT evals yet. 1335 Received vm from [address] [place] [address] Road at [address]. Called her back at [phone]. Cannot guarantee how many visits she will get per her [place]. Has call out to Dr. Ruiz Santos, to see if they will follow, as long as they do, she will call back if they can accept. Unless, [facility] has a doctor who will follow her until they can reach the PCP.     [facility] at home to ask if they can accept this pt. Brien Young took message will call writer back.

## 2022-03-30 VITALS
OXYGEN SATURATION: 95 % | HEART RATE: 75 BPM | BODY MASS INDEX: 23.84 KG/M2 | SYSTOLIC BLOOD PRESSURE: 130 MMHG | RESPIRATION RATE: 16 BRPM | DIASTOLIC BLOOD PRESSURE: 94 MMHG | WEIGHT: 121.4 LBS | HEIGHT: 60 IN | TEMPERATURE: 97.6 F

## 2022-03-30 LAB
C. TRACHOMATIS DNA ,URINE: NEGATIVE
N. GONORRHOEAE DNA, URINE: NEGATIVE
SPECIMEN DESCRIPTION: NORMAL

## 2022-03-30 PROCEDURE — 99238 HOSP IP/OBS DSCHRG MGMT 30/<: CPT | Performed by: INTERNAL MEDICINE

## 2022-03-30 PROCEDURE — 6370000000 HC RX 637 (ALT 250 FOR IP): Performed by: INTERNAL MEDICINE

## 2022-03-30 PROCEDURE — 6370000000 HC RX 637 (ALT 250 FOR IP): Performed by: NURSE PRACTITIONER

## 2022-03-30 PROCEDURE — 2580000003 HC RX 258: Performed by: NURSE PRACTITIONER

## 2022-03-30 RX ORDER — CHLORDIAZEPOXIDE HYDROCHLORIDE 5 MG/1
CAPSULE, GELATIN COATED ORAL
Qty: 27 CAPSULE | Refills: 0 | Status: SHIPPED | OUTPATIENT
Start: 2022-03-30 | End: 2022-04-05

## 2022-03-30 RX ADMIN — ARIPIPRAZOLE 5 MG: 5 TABLET ORAL at 08:19

## 2022-03-30 RX ADMIN — CHLORDIAZEPOXIDE HYDROCHLORIDE 10 MG: 5 CAPSULE ORAL at 08:19

## 2022-03-30 RX ADMIN — SODIUM CHLORIDE, PRESERVATIVE FREE 10 ML: 5 INJECTION INTRAVENOUS at 08:20

## 2022-03-30 RX ADMIN — Medication 100 MG: at 08:19

## 2022-03-30 RX ADMIN — FOLIC ACID 1 MG: 1 TABLET ORAL at 08:19

## 2022-03-30 RX ADMIN — PANTOPRAZOLE SODIUM 40 MG: 40 TABLET, DELAYED RELEASE ORAL at 06:13

## 2022-03-30 RX ADMIN — VENLAFAXINE HYDROCHLORIDE 75 MG: 75 CAPSULE, EXTENDED RELEASE ORAL at 08:19

## 2022-03-30 ASSESSMENT — PAIN SCALES - GENERAL: PAINLEVEL_OUTOF10: 0

## 2022-03-30 NOTE — PROGRESS NOTES
CLINICAL PHARMACY NOTE: MEDS TO BEDS    Total # of Prescriptions Filled: 1   The following medications were delivered to the patient:  · Chlordiazepoxide 5mg    Additional Documentation: delivered to RN 3/30 11:53am. Patients room number 402. Patient outside smoking. No co-pay.

## 2022-03-30 NOTE — PROGRESS NOTES
[facility])  Occupational Therapy Not Seen Note    DATE: 3/30/2022    NAME: Cynthia Brian  MRN: [id number]   : 1975      Patient not seen this date for Occupational Therapy due to:    Patient independent with ADLs and functional tasks with no acute OT needs. Will defer OT evaluation at this time. Please reorder OT if future needs arise. Pt reports has been performing functional mobility off unit and outside this admission, reports no concerns with independently completing ADLs/IADLs upon discharge.  Pt encouraged to notify RN or MD if concerns arise throughout hospitalization    Electronically signed by JUAN DAVID Acuna on 3/30/2022 at 11:43 AM

## 2022-03-30 NOTE — PROGRESS NOTES
Physical Therapy        Physical Therapy Cancel Note      DATE: 3/30/2022    NAME: Chu Hou  MRN: [id number]   : 1975      Patient not seen this date for Physical Therapy due to:    Patient independent with functional mobility. Will defer PT evaluation at this time. Please reorder PT if future needs arise.        Electronically signed by Cindy Encinas PT on 3/30/2022 at 2:24 PM

## 2022-03-30 NOTE — PROGRESS NOTES
Around 1300 discharge paperwork was gone over with pt and IVs were removed. All questions were answered. Pt was told that we would call a cab for her. Pt waited until approx 1500 and told writer that her girlfriend was going to call a cab for her instead because she did not want to wait any longer. Pt told writer she was going down to smoke and would return to the floor to inform that she was being picked up. Around 1 writer went into pt's room and noted all belongings had been taken and pt's wristband was left behind.

## 2022-03-30 NOTE — DISCHARGE SUMMARY
[facility]  Office: [address], DO, España Kras, DO, Brenda Kaufman, DO, Elginkae Wiggins, DO, Tigre Bennett MD, Alejandro Reno MD, Marleny Allison MD, Farrukh Sepulveda MD, Wilian Watson MD, Kenny Araiza MD, Lashawn Velazquez MD, Zoë Calloway, DO, Taurus Alejo, DO, Trent Kahn MD,  Ramy Bradley, DO, Ced Chowdhury MD, Azeem Kelley MD, Raoul Alexander MD, Chidi Guaman DO, Bobby Heredia MD, Marii Trevino MD, Dulce Meza, [facility], [facility], CNP, Abdirashid Hawk, CNP, Tahmina Gates, CNS, Mindi Aguilar, CNP, Angel Section, CNP, Starlett Market, CNP, Mateo Mckee, CNP, Pebbles Solders, CNP, Laura Killian PA-C, Coco Tamayo, [facility], Deepa Chino, [facility], Deric Husbands, CNP, Reji Howe, CNP, Chemo Bernabe, [facility]    Discharge Summary     Patient ID: Jesús Ronquillo  :  1975   MRN: [id number]     ACCOUNT:  [de-identified]   Patient's PCP: Lu Camargo MD  Admit Date: 3/25/2022   Discharge Date: 3/30/2022Length of Stay: 5  Code Status:  Full Code  Admitting Physician: Wilian Watson MD  Discharge Physician: Wilian Watsno MD     Active Discharge Diagnoses:     Hospital Problem Lists:  Principal Problem:    Alcohol withdrawal syndrome with complication [facility])  Active Problems:    Polysubstance abuse ([facility]    Alcohol related seizure ([facility]    Delroy's paresis ([facility]    Chronic hepatitis C virus infection ([facility]    Alcohol withdrawal seizure with complication ([facility]  Resolved Problems:    * No resolved hospital problems. *    Admission Condition:  fair   Discharged Condition: good    Hospital Stay:     Hospital Course:  23-year-old female presented to ED after having witnessed seizures at home, noted to have confusion, hypoglycemia and right hemiparesis consistent with Delroy's paralysis.     pmh significant for polysubstance abuse including crack cocaine marijuana heroin and alcohol, multiple alcohol-related withdrawal seizures, bipolar disorder recently taken off lithium, prior suicidal ideation and drug overdose PTSD. Patient apparently has been attempting to decrease her alcohol use. Was started on CIWA protocol, thiamine and folate along with IV hydration. Neurology was consulted and recommended no AED, she did not have further episodes of seizures. - She was switched to PO librium and did well , being discharged to home in stable condition. Radiology:  CT HEAD WO CONTRAST    Result Date: 3/25/2022  No acute intracranial abnormality. The findings were sent to the Radiology Results [address] at 9:37 pm on 3/25/2022to be communicated to a licensed caregiver. XR CHEST PORTABLE    Result Date: 3/25/2022  No acute cardiopulmonary process     CTA HEAD NECK W CONTRAST    Result Date: 3/25/2022  Motion degradation through the neck. No definite cervical arterial occlusion or high-grade stenosis identified. If suspicion concern, consider repeat imaging through the neck. Otherwise negative for large vessel occlusion intracranially. Consultations:    Consults:     Final Specialist Recommendations/Findings:   IP CONSULT TO STROKE TEAM  IP CONSULT TO HOSPITALIST  IP CONSULT TO SOCIAL WORK  IP CONSULT TO NEUROLOGY  IP CONSULT TO HOME CARE NEEDS      The patient was seen and examined on day of discharge and this discharge summary is in conjunction with any daily progress note from day of discharge. Discharge plan:     Disposition: Home    Physician Follow Up:   Teofilo Carrizales MD  [address] [place] [address]  [phone]    Schedule an appointment as soon as possible for a visit in 1 week      Diet: regular diet    Activity: As tolerated    Discharge Medications:      Medication List      START taking these medications    chlordiazePOXIDE 5 MG capsule  Commonly known as: LIBRIUM  Take 2 capsules by mouth 3 times daily for 3 days, THEN 1 capsule 3 times daily for 3 days.   Start taking on: March 30, 2022        CONTINUE taking these medications    acamprosate 333 MG tablet  Commonly known as: CAMPRAL  Take 2 tablets by mouth 3 times daily     albuterol sulfate  (90 Base) MCG/ACT inhaler  Inhale 2 puffs into the lungs every 4 hours as needed for Wheezing     ARIPiprazole 5 MG tablet  Commonly known as: ABILIFY  Take 1 tablet by mouth daily     atorvastatin 40 MG tablet  Commonly known as: LIPITOR  Take 1 tablet by mouth nightly     busPIRone 7.5 MG tablet  Commonly known as: BUSPAR  Take 1 tablet by mouth 2 times daily as needed (Anxiety)     folic acid 1 MG tablet  Commonly known as: FOLVITE  Take 1 tablet by mouth daily     omeprazole 20 MG delayed release capsule  Commonly known as: PRILOSEC  Take 1 capsule by mouth 2 times daily     therapeutic multivitamin-minerals tablet  Take 1 tablet by mouth daily     traZODone 150 MG tablet  Commonly known as: DESYREL  Take 1 tablet by mouth nightly     venlafaxine 75 MG extended release capsule  Commonly known as: EFFEXOR XR  Take 1 capsule by mouth daily (with breakfast)     vitamin B-1 100 MG tablet  Commonly known as: THIAMINE  Take 1 tablet by mouth daily        STOP taking these medications    hydrOXYzine 50 MG tablet  Commonly known as: ATARAX           Where to Get Your Medications      These medications were sent to [facility] [address] [facility], [address], [address]  [address]    Phone: [phone]   chlordiazePOXIDE 5 MG capsule         No discharge procedures on file. Time Spent on discharge is  20 mins in patient examination, evaluation, counseling as well as medication reconciliation, prescriptions for required medications, discharge plan and follow up. Electronically signed by   Beth Causey MD  3/30/2022  10:58 AM      Thank you Dr. Alise Akers MD for the opportunity to be involved in this patient's care.

## 2022-03-30 NOTE — PLAN OF CARE
Problem: Skin Integrity:  Goal: Will show no infection signs and symptoms  Description: Will show no infection signs and symptoms  3/30/2022 1327 by Janak Fernández RN  Outcome: Completed  3/30/2022 0108 by Bandar Miranda RN  Outcome: Ongoing  Goal: Absence of new skin breakdown  Description: Absence of new skin breakdown  3/30/2022 1327 by Janak Fernández RN  Outcome: Completed  3/30/2022 0108 by Bandar Miranda RN  Outcome: Ongoing     Problem: Falls - Risk of:  Goal: Will remain free from falls  Description: Will remain free from falls  3/30/2022 1327 by Janak Fernández RN  Outcome: Completed  3/30/2022 0108 by Bandar Miranda RN  Outcome: Met This Shift  Goal: Absence of physical injury  Description: Absence of physical injury  3/30/2022 1327 by Janak Fernández RN  Outcome: Completed  3/30/2022 0108 by Bandar Miranda RN  Outcome: Met This Shift     Problem: Nutrition  Goal: Optimal nutrition therapy  3/30/2022 1327 by Janak Fernández RN  Outcome: Completed  3/30/2022 0108 by Bandar Miranda RN  Outcome: Ongoing     Problem: Pain:  Goal: Pain level will decrease  Description: Pain level will decrease  3/30/2022 1327 by Janak Fernández RN  Outcome: Completed  3/30/2022 0108 by Bandar Miranda RN  Outcome: Ongoing  Goal: Control of acute pain  Description: Control of acute pain  3/30/2022 1327 by Janak Fernández RN  Outcome: Completed  3/30/2022 0108 by Bandar Miranda RN  Outcome: Ongoing  Goal: Control of chronic pain  Description: Control of chronic pain  3/30/2022 1327 by Janak Fernández RN  Outcome: Completed  3/30/2022 0108 by Bandar Miranda RN  Outcome: Ongoing     Problem: Anxiety:  Goal: Level of anxiety will decrease  Description: Level of anxiety will decrease  3/30/2022 1327 by Janak Fernández RN  Outcome: Completed  3/30/2022 0108 by Bandar Miranda RN  Outcome: Met This Shift     Problem: Coping:  Goal: Ability to cope will improve  Description: Ability to cope will improve  3/30/2022 1327 by Adis Vazquez Silvia Be RN  Outcome: Completed  3/30/2022 0108 by Ada Lujan RN  Outcome: Ongoing  Goal: Ability to identify appropriate support needs will improve  Description: Ability to identify appropriate support needs will improve  3/30/2022 1327 by Dick Martin RN  Outcome: Completed  3/30/2022 0108 by Ada Lujan RN  Outcome: Ongoing     Problem: Health Behavior:  Goal: Ability to manage health-related needs will improve  Description: Ability to manage health-related needs will improve  3/30/2022 1327 by Dick Martin RN  Outcome: Completed  3/30/2022 0108 by Ada Lujan RN  Outcome: Ongoing     Problem: Physical Regulation:  Goal: Signs of adequate cerebral perfusion will increase  Description: Signs of adequate cerebral perfusion will increase  3/30/2022 1327 by Dick Martin RN  Outcome: Completed  3/30/2022 0108 by Ada Lujan RN  Outcome: Ongoing  Goal: Ability to maintain a stable neurologic state will improve  Description: Ability to maintain a stable neurologic state will improve  3/30/2022 1327 by Dick Martin RN  Outcome: Completed  3/30/2022 0108 by Ada Lujan RN  Outcome: Ongoing     Problem: Safety:  Goal: Ability to remain free from injury will improve  Description: Ability to remain free from injury will improve  3/30/2022 1327 by Dick Martin RN  Outcome: Completed  3/30/2022 0108 by Ada Lujan RN  Outcome: Met This Shift     Problem: Self-Concept:  Goal: Level of anxiety will decrease  Description: Level of anxiety will decrease  3/30/2022 1327 by Dick Martin RN  Outcome: Completed  3/30/2022 0108 by Ada Lujan RN  Outcome: Met This Shift  Goal: Ability to verbalize feelings about condition will improve  Description: Ability to verbalize feelings about condition will improve  3/30/2022 1327 by Dick Martin RN  Outcome: Completed  3/30/2022 0108 by Ada Lujan RN  Outcome: Ongoing     Problem: Discharge Planning:  Goal: Discharged to appropriate level of care  Description: Discharged to appropriate level of care  3/30/2022 1327 by Iván Trinidad RN  Outcome: Completed  3/30/2022 0108 by Tonia Chairez RN  Outcome: Not Met This Shift     Problem: Fluid Volume - Deficit:  Goal: Absence of fluid volume deficit signs and symptoms  Description: Absence of fluid volume deficit signs and symptoms  3/30/2022 1327 by Iván Trinidad RN  Outcome: Completed  3/30/2022 0108 by Tonia Chairze RN  Outcome: Ongoing     Problem: Nutrition Deficit:  Goal: Ability to achieve adequate nutritional intake will improve  Description: Ability to achieve adequate nutritional intake will improve  3/30/2022 1327 by Iván Trinidad RN  Outcome: Completed  3/30/2022 0108 by Tonia Chairez RN  Outcome: Ongoing     Problem: Sleep Pattern Disturbance:  Goal: Appears well-rested  Description: Appears well-rested  3/30/2022 1327 by Iván Trinidad RN  Outcome: Completed  3/30/2022 0108 by Tonia Chairez RN  Outcome: Met This Shift     Problem: Violence - Risk of, Self/Other-Directed:  Goal: Knowledge of developmental care interventions  Description: Absence of violence  3/30/2022 1327 by Iván Trinidad RN  Outcome: Completed  3/30/2022 0108 by Tonia Chairez RN  Outcome: Ongoing

## 2022-03-30 NOTE — PLAN OF CARE
Problem: Falls - Risk of:  Goal: Will remain free from falls  Description: Will remain free from falls  3/30/2022 0108 by Janny Lofton RN  Outcome: Met This Shift  3/29/2022 1917 by Bryon Araujo RN  Outcome: Ongoing  Goal: Absence of physical injury  Description: Absence of physical injury  3/30/2022 0108 by Janny Lofton RN  Outcome: Met This Shift  3/29/2022 1917 by Bryon Araujo RN  Outcome: Ongoing     Problem: Anxiety:  Goal: Level of anxiety will decrease  Description: Level of anxiety will decrease  3/30/2022 0108 by Janny Lofton RN  Outcome: Met This Shift  3/29/2022 1917 by Bryon Araujo RN  Outcome: Ongoing     Problem: Safety:  Goal: Ability to remain free from injury will improve  Description: Ability to remain free from injury will improve  3/30/2022 0108 by Janny Lofton RN  Outcome: Met This Shift  3/29/2022 1917 by Bryon Araujo RN  Outcome: Ongoing     Problem: Self-Concept:  Goal: Level of anxiety will decrease  Description: Level of anxiety will decrease  3/30/2022 0108 by Janny Lofton RN  Outcome: Met This Shift  3/29/2022 1917 by Bryon Araujo RN  Outcome: Ongoing     Problem: Sleep Pattern Disturbance:  Goal: Appears well-rested  Description: Appears well-rested  3/30/2022 0108 by Janny Lofton RN  Outcome: Met This Shift  3/29/2022 1917 by Bryon Araujo RN  Outcome: Ongoing     Problem: Skin Integrity:  Goal: Will show no infection signs and symptoms  Description: Will show no infection signs and symptoms  3/30/2022 0108 by Janny Lofton RN  Outcome: Ongoing  3/29/2022 1917 by Bryon Araujo RN  Outcome: Ongoing  Goal: Absence of new skin breakdown  Description: Absence of new skin breakdown  3/30/2022 0108 by Janny Lofton RN  Outcome: Ongoing  3/29/2022 1917 by Bryon Araujo RN  Outcome: Ongoing     Problem: Nutrition  Goal: Optimal nutrition therapy  3/30/2022 0108 by Janny Lofton RN  Outcome: Ongoing  3/29/2022 1917 by Bryon Araujo RN  Outcome: Ongoing Problem: Pain:  Goal: Pain level will decrease  Description: Pain level will decrease  3/30/2022 0108 by Vikas Olivo RN  Outcome: Ongoing  3/29/2022 1917 by Hai Donovan RN  Outcome: Ongoing  Goal: Control of acute pain  Description: Control of acute pain  3/30/2022 0108 by Vikas Olivo RN  Outcome: Ongoing  3/29/2022 1917 by Hai Donovan RN  Outcome: Ongoing  Goal: Control of chronic pain  Description: Control of chronic pain  3/30/2022 0108 by Vikas Olivo RN  Outcome: Ongoing  3/29/2022 1917 by Hai Donovan RN  Outcome: Ongoing     Problem: Coping:  Goal: Ability to cope will improve  Description: Ability to cope will improve  3/30/2022 0108 by Vikas Olivo RN  Outcome: Ongoing  3/29/2022 1917 by Hai Donovan RN  Outcome: Ongoing  Goal: Ability to identify appropriate support needs will improve  Description: Ability to identify appropriate support needs will improve  3/30/2022 0108 by Vikas Olivo RN  Outcome: Ongoing  3/29/2022 1917 by Hai Donovan RN  Outcome: Ongoing     Problem: Health Behavior:  Goal: Ability to manage health-related needs will improve  Description: Ability to manage health-related needs will improve  3/30/2022 0108 by Vikas Olivo RN  Outcome: Ongoing  3/29/2022 1917 by Hai Donovan RN  Outcome: Ongoing     Problem: Physical Regulation:  Goal: Signs of adequate cerebral perfusion will increase  Description: Signs of adequate cerebral perfusion will increase  3/30/2022 0108 by Vikas Olivo RN  Outcome: Ongoing  3/29/2022 1917 by Hai Donovan RN  Outcome: Ongoing  Goal: Ability to maintain a stable neurologic state will improve  Description: Ability to maintain a stable neurologic state will improve  3/30/2022 0108 by Vikas Olivo RN  Outcome: Ongoing  3/29/2022 1917 by Hai Donovan RN  Outcome: Ongoing     Problem: Self-Concept:  Goal: Ability to verbalize feelings about condition will improve  Description: Ability to verbalize feelings about condition will improve  3/30/2022 0108 by Yoan Sandoval RN  Outcome: Ongoing  3/29/2022 1917 by Alpa Pena RN  Outcome: Ongoing     Problem: Fluid Volume - Deficit:  Goal: Absence of fluid volume deficit signs and symptoms  Description: Absence of fluid volume deficit signs and symptoms  3/30/2022 0108 by Yoan Sandoval RN  Outcome: Ongoing  3/29/2022 1917 by Alpa Pena RN  Outcome: Ongoing     Problem: Nutrition Deficit:  Goal: Ability to achieve adequate nutritional intake will improve  Description: Ability to achieve adequate nutritional intake will improve  3/30/2022 0108 by Yoan Sandoval RN  Outcome: Ongoing  3/29/2022 1917 by Alpa Pena RN  Outcome: Ongoing     Problem: Violence - Risk of, Self/Other-Directed:  Goal: Knowledge of developmental care interventions  Description: Absence of violence  3/30/2022 0108 by Yoan Sandoval RN  Outcome: Ongoing  3/29/2022 1917 by Alpa Pena RN  Outcome: Ongoing     Problem: Discharge Planning:  Goal: Discharged to appropriate level of care  Description: Discharged to appropriate level of care  3/30/2022 0108 by Yoan Sandoval RN  Outcome: Not Met This Shift  3/29/2022 1917 by Alpa Pena RN  Outcome: Ongoing

## 2022-03-30 NOTE — CARE COORDINATION
Transitional Planning    Spoke with Cody Hurtado [facility].  They accept patient
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
## 2022-04-22 ENCOUNTER — HOSPITAL ENCOUNTER (EMERGENCY)
Age: 47
Discharge: LEFT BEFORE DISCHARGE | End: 2022-04-22
Attending: EMERGENCY MEDICINE
Payer: MEDICARE

## 2022-04-22 VITALS
OXYGEN SATURATION: 97 % | BODY MASS INDEX: 23.56 KG/M2 | WEIGHT: 120 LBS | HEIGHT: 60 IN | RESPIRATION RATE: 18 BRPM | SYSTOLIC BLOOD PRESSURE: 131 MMHG | TEMPERATURE: 98.1 F | DIASTOLIC BLOOD PRESSURE: 102 MMHG | HEART RATE: 111 BPM

## 2022-04-22 DIAGNOSIS — T74.21XA SEXUAL ASSAULT OF ADULT, INITIAL ENCOUNTER: Primary | ICD-10-CM

## 2022-04-22 PROCEDURE — 99283 EMERGENCY DEPT VISIT LOW MDM: CPT

## 2022-04-22 RX ORDER — ACETAMINOPHEN 500 MG
1000 TABLET ORAL ONCE
Status: DISCONTINUED | OUTPATIENT
Start: 2022-04-22 | End: 2022-04-22 | Stop reason: HOSPADM

## 2022-04-22 NOTE — ED NOTES
Pt no longer in room. Pt's belongings no longer in room. Per ER staff, pt was not seen exiting facility, and all bathrooms clear and open currently. Dr. Toni Lerner and Dr. Brigid Acosta aware.       Norbert Morales, STEFANI  04/22/22 3306
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
## 2023-11-16 NOTE — BH NOTE
Home Patient given tobacco quitline number 96083873568 at this time, refusing to call at this time, states \" I just dont want to quit now\"- patient given information as to the dangers of long term tobacco use. Continue to reinforce the importance of tobacco cessation.

## 2024-07-10 ENCOUNTER — HOSPITAL ENCOUNTER (OUTPATIENT)
Dept: MAMMOGRAPHY | Age: 49
Discharge: HOME | End: 2024-07-12
Payer: MEDICARE

## 2024-07-10 DIAGNOSIS — Z12.39 ENCOUNTER FOR SPECIAL SCREENING EXAMINATION FOR NEOPLASM OF BREAST: ICD-10-CM

## 2024-07-10 DIAGNOSIS — Z12.31 ENCOUNTER FOR SCREENING MAMMOGRAM FOR MALIGNANT NEOPLASM OF BREAST: ICD-10-CM

## 2024-07-10 PROCEDURE — 77063 BREAST TOMOSYNTHESIS BI: CPT

## 2024-07-15 NOTE — PLAN OF CARE
Problem: Falls - Risk of  Goal: Absence of falls  Outcome: Ongoing  Pt remains free of falls and verbalizes understanding of individual fall risks. Wearing nonskid footwear and encouraged to seek out staff if assistance needed. Problem: Suicide risk  Goal: Provide patient with safe environment  Provide patient with safe environment   Outcome: Ongoing  Remains free from self-harm and is safe on the unit. Q15min checks for safety. Problem: Depressive Behavior with or without Suicide precautions  Goal: STG-Able to verbalize suicidal ideations  Pt admits to fleeting SI due to lack of family and supports. Writer provided support and community recommendations. Goal: LTG-Absence of self-harm  Outcome: Ongoing  Safe environment maintained & pt remains free from self-harm. Agreeable to seeking staff should thoughts to harm self or others arise. Q15min checks for safety. Problem: Substance Abuse  Goal: LTG-Absence of acute withdrawl symptoms  Outcome: Ongoing  Pt vocalized insight into her relapse and a willingness to maintain sobriety. Goal: STG-Knowledge of positive coping patterns  Outcome: Ongoing  Pt encouraged to attend unit programming and interact with peers and staff to increase coping skills.  used

## 2025-07-10 NOTE — PLAN OF CARE
Problem: Depressive Behavior with or without Suicide precautions  Goal: STG-Able to verbalize suicidal ideations  Pt has been seclusive to her room. Depressed mood. Flat affect. Denies current SI. Admits to visual hallucinations.  Nonrelating of issues no

## (undated) DEVICE — GAUZE,SPONGE,4"X4",16PLY,XRAY,STRL,LF: Brand: MEDLINE

## (undated) DEVICE — DEFENDO AIR WATER SUCTION AND BIOPSY VALVE KIT FOR  OLYMPUS: Brand: DEFENDO AIR/WATER/SUCTION AND BIOPSY VALVE

## (undated) DEVICE — FORCEPS BX L240CM WRK CHN 2.8MM STD CAP W/ NDL MIC MESH

## (undated) DEVICE — CANNULA NSL AD L2IN ETCO2 SAMP SFT CRUSH RESIST FEM AIRLFE

## (undated) DEVICE — JELLY,LUBE,STERILE,FLIP TOP,TUBE,2-OZ: Brand: MEDLINE

## (undated) DEVICE — BITEBLOCK 54FR W/ DENT RIM BLOX

## (undated) DEVICE — GOWN,POLY REINFORCED,LG: Brand: MEDLINE